# Patient Record
Sex: FEMALE | Race: WHITE | NOT HISPANIC OR LATINO | Employment: UNEMPLOYED
[De-identification: names, ages, dates, MRNs, and addresses within clinical notes are randomized per-mention and may not be internally consistent; named-entity substitution may affect disease eponyms.]

---

## 2021-05-12 ENCOUNTER — TRANSCRIBE ORDERS (OUTPATIENT)
Dept: OTHER | Age: 26
End: 2021-05-12

## 2021-05-12 DIAGNOSIS — K59.00 CONSTIPATION: ICD-10-CM

## 2021-05-12 DIAGNOSIS — K86.0 ALCOHOL-INDUCED CHRONIC PANCREATITIS (H): Primary | ICD-10-CM

## 2021-05-13 ENCOUNTER — TELEPHONE (OUTPATIENT)
Dept: GASTROENTEROLOGY | Facility: CLINIC | Age: 26
End: 2021-05-13

## 2021-05-13 NOTE — TELEPHONE ENCOUNTER
Advanced Endoscopy     Referring provider: Isaac Pride - [facility]     Referred to: [facility] Endoscopy [facility]     Provider Requested: NA     Referral Received: 5/11/21     Records received: in CareEverywhere     Images received: requesting CT noting calcifications 5/13    Evaluation for: alcohol-induced chronic pancreatitis     Clinical History (per RN review):     Clinic note 5/12/21    GI note from 5/11/21  Isaac Pride, APRN, CNP - 05/11/2021 1:45 PM CDT    Formatting of this note might be different from the original.  1. We discussed her recurring acute pancreatitis along with her chronic pancreatitis. We also discussed her recent CT scan noting chronic pancreatitis with numerous calcifications and pancreatic ductal dilation and dilated side branches. We briefly discussed ordering an EUS but will hold off for now. I am going to place a referral for her to be seen down at the [facility] at their pancreatic specialty clinic.  2. We reviewed her recent lab work and I reassured her that there were no abnormal findings. I did place an order to obtain an IgG4 today to assess for autoimmune pancreatitis. I will notify her of these results once available.  3. I would still encourage her to avoid fatty/greasy foods. Would like her to continue to advance her diet as tolerated.  4. She can continue on her Creon as prescribed.  5. We discussed her use of narcotics and that these are notorious for causing constipation. With her already having issues with constipation I encouraged her to continue on a daily dose of MiraLAX. We discussed that she could continue to play with that dose a bit to get the desired results.  6. I stressed the importance of her trying to quit smoking cigarettes as well as try to abstain from marijuana use. She should continue to not drink any alcohol.    PCP put in referral to [facility] Pancreatitis clinic 5/12/21  1. Highly urged to discontinue marijuana use for 1 to 2  weeks. Concerned about the potential of cyclical vomiting and abdominal pain related to marijuana use. Update lipase today. Injection of ketorolac in office. Up to 5 days of Toradol as an outpatient as needed for pain. Cautioned on no additional NSAIDs while using ketorolac. Phenergan for nausea to help with sleep. Increase Creon to 3 capsules with each meal/snack until starts feeling better. Visit with GI yesterday, 5/11/2021. Referral was placed for her to be seen at the [facility] at the pancreatic specialty clinic.         Chart review  Many elevations of lipase dating back to 2018 (CareEverywhere)  Has NOT had sMRCP, EUS or other pancreatic work up    CT 5/7/21  FINDINGS:  No consolidation or effusion at the lung bases.  Several liver hemangiomas  again noted.  Normal appearance of the gallbladder and spleen.  Normal  appearance of the kidneys.  Findings of remote, chronic pancreatitis with  numerous calcifications and pancreatic ductal dilation and dilated side  branches.  Acute interstitial pancreatitis also noted prominently visualized at  the pancreatic head and the uncinate process.  Negative for definite necrosis  or peripancreatic fluid collection.  IMPRESSION:  1. Acute on chronic interstitial pancreatitis.  Negative for necrosis or  peripancreatic collection.      MD review date: 5/13  MD Decision for clinic consultation/Orders:            Referral updates/Patient contacted: 5/19- called patient, referral received, waiting on images- updated patient  Called facility, says they mailed disk on 5/14, will follow

## 2021-05-14 ENCOUNTER — DOCUMENTATION ONLY (OUTPATIENT)
Dept: GASTROENTEROLOGY | Facility: CLINIC | Age: 26
End: 2021-05-14

## 2021-05-14 NOTE — PROGRESS NOTES
Called to request for images:  -CT abd pelv 5/7/21  -US abd 3/16/21  -CT abd pelv 9/5/20  -CT abd pelv 8/20/20  -CT abd liver 3/20/19    [facility]    [address] [place], [place] [id number] [phone]      Images will be sent in a CD.      Also called to have Xray images from 3/29/21 pushed:  [facility]    [address] [place] [address] [phone]    Images will be pushed.    Aime Velasquez LPN

## 2021-05-28 NOTE — TELEPHONE ENCOUNTER
Called Pt to schedule appt with Dr. Campuzano   Dx: alcohol-induced chronic pancreatitis    Pt scheduled for 7/22/21 at 8am. Pt will get Cand[employer] activated for video visit.    Aime Velasquez LPN

## 2021-06-27 ENCOUNTER — HEALTH MAINTENANCE LETTER (OUTPATIENT)
Age: 26
End: 2021-06-27

## 2021-07-22 ENCOUNTER — VIRTUAL VISIT (OUTPATIENT)
Dept: GASTROENTEROLOGY | Facility: CLINIC | Age: 26
End: 2021-07-22
Payer: COMMERCIAL

## 2021-07-22 VITALS — HEIGHT: 62 IN | WEIGHT: 110 LBS | BODY MASS INDEX: 20.24 KG/M2

## 2021-07-22 DIAGNOSIS — K86.0 ALCOHOL-INDUCED CHRONIC PANCREATITIS (H): ICD-10-CM

## 2021-07-22 PROCEDURE — 99204 OFFICE O/P NEW MOD 45 MIN: CPT | Mod: 95 | Performed by: INTERNAL MEDICINE

## 2021-07-22 RX ORDER — CETIRIZINE HYDROCHLORIDE 10 MG/1
10 TABLET ORAL DAILY PRN
Status: ON HOLD | COMMUNITY
End: 2023-10-03

## 2021-07-22 RX ORDER — BUSPIRONE HYDROCHLORIDE 10 MG/1
10 TABLET ORAL 2 TIMES DAILY
Status: ON HOLD | COMMUNITY
Start: 2021-01-05 | End: 2023-10-03

## 2021-07-22 RX ORDER — DULOXETIN HYDROCHLORIDE 20 MG/1
20 CAPSULE, DELAYED RELEASE ORAL
COMMUNITY
Start: 2021-01-05 | End: 2021-09-08

## 2021-07-22 RX ORDER — PANCRELIPASE 15000; 3000; 9500 [USP'U]/1; [USP'U]/1; [USP'U]/1
2 CAPSULE, DELAYED RELEASE ORAL
Status: ON HOLD | COMMUNITY
Start: 2021-03-19 | End: 2023-10-03

## 2021-07-22 RX ORDER — FAMOTIDINE 20 MG/1
20 TABLET, FILM COATED ORAL DAILY PRN
Status: ON HOLD | COMMUNITY
Start: 2021-02-01 | End: 2023-10-03

## 2021-07-22 ASSESSMENT — MIFFLIN-ST. JEOR: SCORE: 1187.21

## 2021-07-22 ASSESSMENT — PATIENT HEALTH QUESTIONNAIRE - PHQ9: SUM OF ALL RESPONSES TO PHQ QUESTIONS 1-9: 12

## 2021-07-22 ASSESSMENT — PAIN SCALES - GENERAL: PAINLEVEL: SEVERE PAIN (7)

## 2021-07-22 NOTE — PROGRESS NOTES
"Artie Burger is a 25 year old female who is being evaluated via a billable video visit.      The patient has been notified of following:     \"This video visit will be conducted via a call between you and your physician/provider. We have found that certain health care needs can be provided without the need for an in-person physical exam.  This service lets us provide the care you need with a video conversation.  If a prescription is necessary we can send it directly to your pharmacy.  If lab work is needed we can place an order for that and you can then stop by our lab to have the test done at a later time.    If during the course of the call the physician/provider feels a video visit is not appropriate, you will not be charged for this service.\"     Patient confirmed that they are in [place] for today's visit YES    Video-Visit Details  Type of service:  Video Visit    Video Start Time:   Video End Time:  \  Originating Location (pt. Location): Home    Distant Location (provider location):  [facility] [place]     Platform used: Usman          "

## 2021-07-22 NOTE — NURSING NOTE
Depression Response    Patient completed the PHQ-9 assessment for depression and scored >9? Yes  Question 9 on the PHQ-9 was positive for suicidality? No  Does patient have current mental health provider? Yes    Is this a virtual visit? No    I personally notified the following: clinic nurse     Ivette PONCE RN, BSN

## 2021-07-22 NOTE — PROGRESS NOTES
REFERRING PROVIDER:  ROSIE Davis, CNP    HISTORY OF PRESENT ILLNESS:  This is a very pleasant 25-year-old female who is being referred for further evaluation and management for acute recurrent pancreatitis which has progressed to chronic calcific pancreatitis, likely from alcohol etiology.  The patient reports that her history dates back to 02/2018.  Since that time, she had been hospitalized at least 3-4 times for pancreatitis related to alcohol abuse.  Her lipase level is ranging up to 7000.  She has been smoking for over 11 years and has had heavy alcohol history but has been abstinent from alcohol for the past 10 months.  The patient reports that she was in remission from alcohol from 2018 but relapsed and subsequently had further attacks as well. She has been worked up by referring provider and has had multiple imaging including CT scans.  The most recent CT was done in 05/2021 which shows multiple stones in the pancreatic head, most likely either in the side branch, with a possible stone obstructing the main duct and side branch stones are extrinsically compressing the main duct from outside. Patient currently reports that she has 6/10 abdominal pain and currently is not on narcotics and is controlling pain with Tylenol and ibuprofen.  She reports taking narcotics only during hospital admission.  She also had nausea for which she takes Pepto-Bismol and Zofran.  She denies steatorrhea but is more constipated and has constipation with 1 bowel movement every other day or every 2 days.  She has had significant weight loss, about 40 pounds, over the past 5-6 months and 7 pounds more recently.  She denies dysphagia, odynophagia, although reports that her stools have been dark colored recently. She has been started on Creon which she is taking judiciously.    PAST MEDICAL HISTORY:    1.  Alcohol abuse, currently in remission.  2.  Panic attacks.  3.  Alcohol hepatitis.  4.  Major depression.    PAST  SURGICAL HISTORY:  None.    FAMILY HISTORY:  History of pancreatic cancer in a distant cousin.  No history of pancreatic cancer, bile duct cancer or gallbladder cancer and no history of recurrent pancreatitis in the family.    SOCIAL HISTORY:  Currently smokes 1 pack every 3-4 days.  Used to smoke 1 pack every 1-1/2 to 2 days and has been smoking for over 11 years.  No history of alcohol for the past 10 months but was a heavy drinker.  The patient currently smoking marijuana a couple of times daily for nausea.    ASSESSMENT AND PLAN:     25-year-old female with past medical history significant for depression, panic attacks, alcoholism, currently in remission, currently with recurrent bouts of pancreatitis which has progressed to chronic calcific pancreatitis likely from alcohol abuse.  The following are our recommendations.  1. I reviewed the CT scan with the patient.  The stones appear mainly to be in the side branch but a stone possibly in the main pancreatic duct.  Will discuss patient in our multidisciplinary chronic pancreatitis meeting next week.  We will consider consultation with Dr. Raheem Yanes, our pancreatic surgeon, for possible Jaye procedure with pancreatojejunostomy as the pancreatic duct is significantly dilated up to 9 mm   2.  The patient has been asked to abstain from smoking.  She wanted nicotine patches, which I would strongly favor and have the primary care physician order the same.  3.  Will recommend tramadol 50 mg 3 times daily as needed for pain management.  4.  If our surgery team thinks she is not ready for surgery, can consider an ERCP.  However, I expressed my concerns that her stones are probably not in the main duct but stenting may help extrinsic compression from the stones in the side branch and also maybe we can remove 1 or 2 stones in the main duct.    5.  Recommend nutrition panel since she has lost 40 pounds.  This would include vitamin A, D, E, K, B12, folic acid, iron panel  and zinc and will supplement micronutrients which she is deficient.    6. We will need a DEXA scan in future as per AGA guidelines.  7.  The patient has been advised to abstain from alcohol as she is currently doing.     Video start time 8:00 AM and Video End time 8:45 AM    A total of 45 minutes was spent in reviewing her chart and formulating a management plan.  Will consider ERCP if the surgery team favors us doing that.

## 2021-07-22 NOTE — LETTER
7/22/2021         RE: Artie Burger  [address]  [facility] [address]        Dear Colleague,    Thank you for referring your patient, Artie Burger, to the [facility] [place]. Please see a copy of my visit note below.    REFERRING PROVIDER:  ROSIE Davis CNP    HISTORY OF PRESENT ILLNESS:  This is a very pleasant 25-year-old female who is being referred for further evaluation and management for acute recurrent pancreatitis which has progressed to chronic calcific pancreatitis, likely from alcohol etiology.  The patient reports that her history dates back to 02/2018.  Since that time, she had been hospitalized at least 3-4 times for pancreatitis related to alcohol abuse.  Her lipase level is ranging up to 7000.  She has been smoking for over 11 years and has had heavy alcohol history but has been abstinent from alcohol for the past 10 months.  The patient reports that she was in remission from alcohol from 2018 but relapsed and subsequently had further attacks as well. She has been worked up by referring provider and has had multiple imaging including CT scans.  The most recent CT was done in 05/2021 which shows multiple stones in the pancreatic head, most likely either in the side branch, with a possible stone obstructing the main duct and side branch stones are extrinsically compressing the main duct from outside. Patient currently reports that she has 6/10 abdominal pain and currently is not on narcotics and is controlling pain with Tylenol and ibuprofen.  She reports taking narcotics only during hospital admission.  She also had nausea for which she takes Pepto-Bismol and Zofran.  She denies steatorrhea but is more constipated and has constipation with 1 bowel movement every other day or every 2 days.  She has had significant weight loss, about 40 pounds, over the past 5-6 months and 7 pounds more recently.  She denies dysphagia, odynophagia, although reports  that her stools have been dark colored recently. She has been started on Creon which she is taking judiciously.    PAST MEDICAL HISTORY:    1.  Alcohol abuse, currently in remission.  2.  Panic attacks.  3.  Alcohol hepatitis.  4.  Major depression.    PAST SURGICAL HISTORY:  None.    FAMILY HISTORY:  History of pancreatic cancer in a distant cousin.  No history of pancreatic cancer, bile duct cancer or gallbladder cancer and no history of recurrent pancreatitis in the family.    SOCIAL HISTORY:  Currently smokes 1 pack every 3-4 days.  Used to smoke 1 pack every 1-1/2 to 2 days and has been smoking for over 11 years.  No history of alcohol for the past 10 months but was a heavy drinker.  The patient currently smoking marijuana a couple of times daily for nausea.    ASSESSMENT AND PLAN:     25-year-old female with past medical history significant for depression, panic attacks, alcoholism, currently in remission, currently with recurrent bouts of pancreatitis which has progressed to chronic calcific pancreatitis likely from alcohol abuse.  The following are our recommendations.  1. I reviewed the CT scan with the patient.  The stones appear mainly to be in the side branch but a stone possibly in the main pancreatic duct.  Will discuss patient in our multidisciplinary chronic pancreatitis meeting next week.  We will consider consultation with Dr. Raheem Yanes, our pancreatic surgeon, for possible Jaye procedure with pancreatojejunostomy as the pancreatic duct is significantly dilated up to 9 mm   2.  The patient has been asked to abstain from smoking.  She wanted nicotine patches, which I would strongly favor and have the primary care physician order the same.  3.  Will recommend tramadol 50 mg 3 times daily as needed for pain management.  4.  If our surgery team thinks she is not ready for surgery, can consider an ERCP.  However, I expressed my concerns that her stones are probably not in the main duct but stenting  may help extrinsic compression from the stones in the side branch and also maybe we can remove 1 or 2 stones in the main duct.    5.  Recommend nutrition panel since she has lost 40 pounds.  This would include vitamin A, D, E, K, B12, folic acid, iron panel and zinc and will supplement micronutrients which she is deficient.    6. We will need a DEXA scan in future as per AGA guidelines.  7.  The patient has been advised to abstain from alcohol as she is currently doing.     Video start time 8:00 AM and Video End time 8:45 AM    A total of 45 minutes was spent in reviewing her chart and formulating a management plan.  Will consider ERCP if the surgery team favors us doing that.       Again, thank you for allowing me to participate in the care of your patient.        Sincerely,    Guru Justen MD

## 2021-07-22 NOTE — NURSING NOTE
"Chief Complaint   Patient presents with     Consult     New patient consult for alcohol-induced chronic pancreatitis.        Vitals:    07/22/21 0740   Weight: 49.9 kg (110 lb)   Height: 1.575 m (5' 2\")       Body mass index is 20.12 kg/m .    Ivette Darling, RN, BSN      "

## 2021-07-22 NOTE — PATIENT INSTRUCTIONS
Follow up:    Dr. Campuzano has outlined the following steps after your recent clinic visit:    1) Will discuss your case at the multidisciplinary chronic pancreatitis meeting next week. Will consider consultation with Dr. Raheem Yanes, our pancreatic surgeon, for possible Jaye procedure, pending conference    2) Recommend to abstain from smoking. Please follow up with primary care physician to discuss and order nicotine patches    3) Recommend Tramadol 50 mg 3 times daily as needed for pain management.    4) If our surgery team thinks your are not ready for surgery, can consider an ERCP.     5) Recommend nutrition panel.  This would include vitamin A, D, E, K, B12, folic acid, iron panel and zinc and will supplement micronutrients which she is deficient. Please let me know where you would like these orders faxed to.     6) We will need a DEXA scan in future as per AGA guidelines. Please let me know where you would like this order faxed to.    7) Recommend to abstain from alcohol as you are currently doing.         Please call with any questions or concerns regarding your clinic visit today.    It is a pleasure being involved in your health care.    Contacts post-consultation depending on your need:    Schedule Clinic Appointments            [phone] # 1   M-F 7:30 - 5 pm    Jacklyn Rodríguez RN Care Coordinator  [phone]    Aime Velasquez LPN    [phone]     OR Procedure Scheduling                             [phone]    My Chart is available 24 hours a day and is a secure way to access your records and communicate with your care team.  I strongly recommend signing up if you haven't already done so, if you are comfortable with computers.  If you would like to inquire about this or are having problems with My Chart access, you may call [phone] or go online at [email or web address].  Please allow at least 24 hours for a response and extra time on weekends and Holidays.

## 2021-07-26 ENCOUNTER — CARE COORDINATION (OUTPATIENT)
Dept: GASTROENTEROLOGY | Facility: CLINIC | Age: 26
End: 2021-07-26

## 2021-07-26 DIAGNOSIS — K86.0 ALCOHOL-INDUCED CHRONIC PANCREATITIS (H): Primary | ICD-10-CM

## 2021-07-26 NOTE — PROGRESS NOTES
Per Dr. Campuzano    Tramadol 50 mg 3 times daily as needed for pain management. Per Dr. Amaro, #90 (0)     Message sent to to Dr. Campuzano to clarify order w/ pharmacy warning.

## 2021-07-26 NOTE — PROGRESS NOTES
Per Dr. Campuzano  We should hold it off. I will discuss her in the chronic pancreatitis meeting and see what they can offer      Called to discuss with patient, she's already gotten the med from her PCP.     ML

## 2021-07-28 ENCOUNTER — MYC MEDICAL ADVICE (OUTPATIENT)
Dept: GASTROENTEROLOGY | Facility: CLINIC | Age: 26
End: 2021-07-28

## 2021-07-28 DIAGNOSIS — K86.0 ALCOHOL-INDUCED CHRONIC PANCREATITIS (H): Primary | ICD-10-CM

## 2021-07-29 NOTE — TELEPHONE ENCOUNTER
Returned patient call regarding MyChart questions and to discuss next steps in POC.    Advised multivitamin as directed is fine.     Plan per Dr. Campuzano  Let us schedule pt for ERCP after I come back from Aug vacation   Also needs sMRCP to check for divisum    Pt at work, asked that I send Incentive[employer] message- sent.    ML

## 2021-08-24 ENCOUNTER — PATIENT OUTREACH (OUTPATIENT)
Dept: GASTROENTEROLOGY | Facility: CLINIC | Age: 26
End: 2021-08-24

## 2021-08-24 ENCOUNTER — PREP FOR PROCEDURE (OUTPATIENT)
Dept: GASTROENTEROLOGY | Facility: CLINIC | Age: 26
End: 2021-08-24

## 2021-08-24 DIAGNOSIS — K86.1 CHRONIC PANCREATITIS (H): Primary | ICD-10-CM

## 2021-08-24 NOTE — TELEPHONE ENCOUNTER
Called patient to discuss ERCP with Dr. Campuzano    Please assist in scheduling:     Procedure/Imaging/Clinic: ERCP  Physician: Dr. Campuzano  Timin/13  Procedure length:90 min  Anesthesia:gen  Dx: chronic pancreatitis  Tier:2   Location: [facility]       Called to discuss with patient. Explained they can expect a call from  for date and time of procedure, will need a , someone to stay with them for 24 hours and should stay in town for 24 hours (within 45 min of Hospital) post procedure    Patient needs to get pre-op physical completed. If outside [facility] system will need physical faxed to number [phone]   If you do not get a preop physical, your procedure could be cancelled, patient voiced understanding*    Preop Plan:PCP will do    Med Review    Blood thinner -  no  ASA - no  Diabetic - no    COVID test discussed:will get 3-4 days prior    Patient Education r/t procedure:done    Does patient have any history of gastric bypass/gastric surgery/altered panc/bili anatomy?no    A pre-op nurse will call 1-2 days prior to the procedure. I    NPO/Prep: 8 solids/2 CLD, no prep needed    Other specific details/comments:none     Verbalized understanding of all instructions. All questions answered.              ML

## 2021-08-25 DIAGNOSIS — Z11.59 ENCOUNTER FOR SCREENING FOR OTHER VIRAL DISEASES: ICD-10-CM

## 2021-08-25 PROBLEM — K86.1 CHRONIC PANCREATITIS (H): Status: ACTIVE | Noted: 2021-08-25

## 2021-09-03 ENCOUNTER — PATIENT OUTREACH (OUTPATIENT)
Dept: GASTROENTEROLOGY | Facility: CLINIC | Age: 26
End: 2021-09-03

## 2021-09-03 ENCOUNTER — ANCILLARY PROCEDURE (OUTPATIENT)
Dept: MRI IMAGING | Facility: CLINIC | Age: 26
End: 2021-09-03
Attending: INTERNAL MEDICINE
Payer: COMMERCIAL

## 2021-09-03 VITALS — WEIGHT: 110 LBS | BODY MASS INDEX: 20.12 KG/M2

## 2021-09-03 DIAGNOSIS — K86.0 ALCOHOL-INDUCED CHRONIC PANCREATITIS (H): ICD-10-CM

## 2021-09-03 PROCEDURE — 74183 MRI ABD W/O CNTR FLWD CNTR: CPT | Mod: GC | Performed by: RADIOLOGY

## 2021-09-03 PROCEDURE — A9585 GADOBUTROL INJECTION: HCPCS | Performed by: RADIOLOGY

## 2021-09-03 RX ORDER — HEPARIN SODIUM,PORCINE 10 UNIT/ML
5 VIAL (ML) INTRAVENOUS
Status: CANCELLED | OUTPATIENT
Start: 2021-09-03

## 2021-09-03 RX ORDER — HEPARIN SODIUM (PORCINE) LOCK FLUSH IV SOLN 100 UNIT/ML 100 UNIT/ML
5 SOLUTION INTRAVENOUS
Status: CANCELLED | OUTPATIENT
Start: 2021-09-03

## 2021-09-03 RX ORDER — ONDANSETRON 2 MG/ML
4 INJECTION INTRAMUSCULAR; INTRAVENOUS ONCE
Status: CANCELLED | OUTPATIENT
Start: 2021-09-03 | End: 2021-09-03

## 2021-09-03 RX ORDER — GADOBUTROL 604.72 MG/ML
7.5 INJECTION INTRAVENOUS ONCE
Status: COMPLETED | OUTPATIENT
Start: 2021-09-03 | End: 2021-09-03

## 2021-09-03 RX ADMIN — GADOBUTROL 5 ML: 604.72 INJECTION INTRAVENOUS at 14:31

## 2021-09-03 NOTE — DISCHARGE INSTRUCTIONS
MRI Contrast Discharge Instructions    The IV contrast you received today will pass out of your body in your  urine. This will happen in the next 24 hours. You will not feel this process.  Your urine will not change color.    Drink at least 4 extra glasses of water or juice today (unless your doctor  has restricted your fluids). This reduces the stress on your kidneys.  You may take your regular medicines.    If you are on dialysis: It is best to have dialysis today.    If you have a reaction: Most reactions happen right away. If you have  any new symptoms after leaving the hospital (such as hives or swelling),  call your hospital at the correct number below. Or call your family doctor.  If you have breathing distress or wheezing, call 911.    Special instructions: ***    I have read and understand the above information.    Signature:______________________________________ Date:___________    Staff:__________________________________________ Date:___________     Time:__________    [facility] Departments:    ___[place]: [phone]  ___[facility]: [phone]  ___[place]: [phone] ___[facility]: [phone]  ___[facility]: [phone]  ___[facility]: [phone]  ___[phone]  ___[facility]: [phone]  ___[phone]

## 2021-09-08 RX ORDER — KETOROLAC TROMETHAMINE 10 MG/1
TABLET, FILM COATED ORAL
COMMUNITY
Start: 2021-09-01 | End: 2021-10-04

## 2021-09-08 RX ORDER — ONDANSETRON 4 MG/1
4-8 TABLET, ORALLY DISINTEGRATING ORAL EVERY 6 HOURS PRN
Status: ON HOLD | COMMUNITY
Start: 2021-08-23 | End: 2023-10-03

## 2021-09-08 RX ORDER — MULTIVITAMIN WITH FOLIC ACID 400 MCG
1 TABLET ORAL DAILY
Status: ON HOLD | COMMUNITY
Start: 2020-11-09 | End: 2023-10-03

## 2021-09-08 RX ORDER — NICOTINE 21 MG/24HR
1 PATCH, TRANSDERMAL 24 HOURS TRANSDERMAL
COMMUNITY
Start: 2021-07-22 | End: 2022-11-17

## 2021-09-08 RX ORDER — DULOXETIN HYDROCHLORIDE 60 MG/1
60 CAPSULE, DELAYED RELEASE ORAL
COMMUNITY
Start: 2021-08-10 | End: 2022-03-30

## 2021-09-08 RX ORDER — QUETIAPINE FUMARATE 25 MG/1
TABLET, FILM COATED ORAL
Status: ON HOLD | COMMUNITY
Start: 2021-08-11 | End: 2023-05-10

## 2021-09-11 ENCOUNTER — ANESTHESIA EVENT (OUTPATIENT)
Dept: SURGERY | Facility: CLINIC | Age: 26
End: 2021-09-11
Payer: COMMERCIAL

## 2021-09-11 ASSESSMENT — LIFESTYLE VARIABLES: TOBACCO_USE: 1

## 2021-09-11 NOTE — BRIEF OP NOTE
[facility] Brief Operative Note    Pre-operative diagnosis: Chronic pancreatitis (H) [K86.1]   Post-operative diagnosis As prior    Procedure: Procedure(s):  ENDOSCOPIC RETROGRADE CHOLANGIOPANCREATOGRAPHY   Surgeon(s): Surgeon(s) and Role:     * Guru Sabino Campuzano MD - Primary   Estimated blood loss: * No values recorded between  and 9/11/2021 12:59 PM *    Specimens: * No specimens in log *   Findings:          ** Overall impression: Patient is a 25-year-old female with past medical history significant for alcoholism, currently in remission, with recurrent bouts of pancreatitis which has progressed to chronic calcific pancreatitis likely from alcohol abuse. CT scan with pancreatic stones appear mainly to be in the side branch but a stone possibly in the main pancreatic duct. ERCP today showed showed a normal major papilla. We proceeded with an uncomplicated pancreatic sphincterotomy. Pancreaticogram demonstrated a severe stricture in the MPD (head/genu) with upstream ductal dilation. Balloon dilation was successfully performed first with a 3.5 mm and then 4 mm balloon. Sludge and stone fragments were removed from the MPD. Encountered difficulties advancing the wire to the body part of the MPD because of stones and stricture in the head area. Elected to place a short 4 Fr x 4 cm Zimmon stents into the ventral PD. This is expected to help with stricture dilation and possibly stone fragmentation prior to future pancreatic endotherapy for complete stones clearance.     - Observe patient in PACU for possible discharge same day.   - Avoid aspirin and nonsteroidal anti-inflammatory medicines for 3 days.   - Check serum amylase/lipase in 2-hours post-procedure, & give clear liquids and Grahm crackers in 3C.   - Discharge home if asymptomatic, amylase/lipase < 3x ULN, & tolerating PO.  - Will order short course of oxycodone for 3-45 days to help with expected postprocedural pain (to discharge  pharmacy)  - Repeat ERCP with Dr. Campuzano in 3-4 weeks for further pancreatic endotherapy and stones removal.   - If patient develops pancreatitis like pain, bleeding, fever, chills before scheduled ERCP, will recommend patient to call us immediately for consideration of an earlier urgent ERCP  - The findings and recommendations were discussed with the patient and their family.

## 2021-09-11 NOTE — ANESTHESIA PREPROCEDURE EVALUATION
Anesthesia Pre-Procedure Evaluation    Patient: Artie Burger   MRN: [id number] : 1995        Preoperative Diagnosis: Chronic pancreatitis (H) [K86.1]   Procedure : Procedure(s):  ENDOSCOPIC RETROGRADE CHOLANGIOPANCREATOGRAPHY     Past Medical History:   Diagnosis Date     Gastroesophageal reflux disease      Pancreatic disease      PONV (postoperative nausea and vomiting)       History reviewed. No pertinent surgical history.   No Known Allergies   Social History     Tobacco Use     Smoking status: Current Every Day Smoker     Types: Cigarettes     Smokeless tobacco: Never Used   Substance Use Topics     Alcohol use: Not Currently      Wt Readings from Last 1 Encounters:   21 49.9 kg (110 lb)        Anesthesia Evaluation   Pt has not had prior anesthetic         ROS/MED HX  ENT/Pulmonary:     (+) tobacco use, Current use,  (-) asthma, COPD and sleep apnea   Neurologic:       Cardiovascular:    (-) hypertension   METS/Exercise Tolerance: >4 METS    Hematologic:    (-) history of blood clots and history of blood transfusion   Musculoskeletal:       GI/Hepatic: Comment: 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)     Infectious Disease:       Malignancy:       Other:            Physical Exam    Airway        Mallampati: I   TM distance: > 3 FB   Neck ROM: full   Mouth opening: > 3 cm    Respiratory Devices and Support         Dental  no notable dental history         Cardiovascular          Rhythm and rate: regular and normal     Pulmonary           breath sounds clear to auscultation           OUTSIDE LABS:  CBC: No results found for: WBC, HGB, HCT, PLT  BMP: No results found for: NA, POTASSIUM, CHLORIDE, CO2, BUN, CR, GLC  COAGS: No results found for: PTT, INR, FIBR  POC: No results  found for: BGM, HCG, HCGS  HEPATIC: No results found for: ALBUMIN, PROTTOTAL, ALT, AST, GGT, ALKPHOS, BILITOTAL, BILIDIRECT, LEONA  OTHER: No results found for: PH, LACT, A1C, MURTAZA, PHOS, MAG, LIPASE, AMYLASE, TSH, T4, T3, CRP, SED    Anesthesia Plan    ASA Status:  2   NPO Status:  NPO Appropriate    Anesthesia Type: General.     - Airway: ETT   Induction: Intravenous.   Maintenance: Inhalation.   Techniques and Equipment:     - Lines/Monitors: BIS     Consents    Anesthesia Plan(s) and associated risks, benefits, and realistic alternatives discussed. Questions answered and patient/representative(s) expressed understanding.     - Discussed with:  Patient      - Extended Intubation/Ventilatory Support Discussed: No.      - Patient is DNR/DNI Status: No    Use of blood products discussed: Yes.     - Discussed with: Patient.     Postoperative Care    Pain management: IV analgesics, Oral pain medications, Multi-modal analgesia.   PONV prophylaxis: Ondansetron (or other 5HT-3), Dexamethasone or Solumedrol     Comments:    25 Y F ASA2 BMI 20 schedulled for  ECRP for chronic pancreatitis  Pertinent PMH:   Anxiety, panic attack,  Current smoker, GERD,  alcohl abuse in remision  Currently on Hormonal contraceptive    Plan:  GA/ETT, Standard ASA monitors.  IV induction. Maintenance with anesthetic gas &/or IV meds.  PACU after extubation.  Would discuss using the second contraception method for 7 days (as plan to reverse the NMB with sugammadex )     Discussed anesthetic procedure and risks  with the patient which include, but are not limited to:  sore throat, PONV, dental/oral trauma, (and if T&S done), administration of blood products.  Questions welcomed.  Verbalized understanding and agrees to proceed.              Dougie Calix MD

## 2021-09-13 ENCOUNTER — HOSPITAL ENCOUNTER (OUTPATIENT)
Facility: CLINIC | Age: 26
Discharge: HOME | End: 2021-09-13
Attending: INTERNAL MEDICINE | Admitting: INTERNAL MEDICINE
Payer: COMMERCIAL

## 2021-09-13 ENCOUNTER — APPOINTMENT (OUTPATIENT)
Dept: GENERAL RADIOLOGY | Facility: CLINIC | Age: 26
End: 2021-09-13
Attending: INTERNAL MEDICINE
Payer: COMMERCIAL

## 2021-09-13 ENCOUNTER — ANESTHESIA (OUTPATIENT)
Dept: SURGERY | Facility: CLINIC | Age: 26
End: 2021-09-13
Payer: COMMERCIAL

## 2021-09-13 VITALS
DIASTOLIC BLOOD PRESSURE: 75 MMHG | RESPIRATION RATE: 12 BRPM | BODY MASS INDEX: 20.65 KG/M2 | SYSTOLIC BLOOD PRESSURE: 112 MMHG | TEMPERATURE: 97.6 F | OXYGEN SATURATION: 99 % | WEIGHT: 112.21 LBS | HEART RATE: 53 BPM | HEIGHT: 62 IN

## 2021-09-13 DIAGNOSIS — K86.1 CHRONIC PANCREATITIS (H): ICD-10-CM

## 2021-09-13 LAB
ALBUMIN SERPL-MCNC: 3.6 G/DL (ref 3.4–5)
ALP SERPL-CCNC: 54 U/L (ref 40–150)
ALT SERPL W P-5'-P-CCNC: 14 U/L (ref 0–50)
AMYLASE SERPL-CCNC: 191 U/L (ref 30–110)
AMYLASE SERPL-CCNC: 804 U/L (ref 30–110)
ANION GAP SERPL CALCULATED.3IONS-SCNC: 4 MMOL/L (ref 3–14)
AST SERPL W P-5'-P-CCNC: 9 U/L (ref 0–45)
BILIRUB SERPL-MCNC: 0.4 MG/DL (ref 0.2–1.3)
BUN SERPL-MCNC: 13 MG/DL (ref 7–30)
CALCIUM SERPL-MCNC: 9.3 MG/DL (ref 8.5–10.1)
CHLORIDE BLD-SCNC: 108 MMOL/L (ref 94–109)
CO2 SERPL-SCNC: 24 MMOL/L (ref 20–32)
CREAT SERPL-MCNC: 0.64 MG/DL (ref 0.52–1.04)
ERCP: NORMAL
ERYTHROCYTE [DISTWIDTH] IN BLOOD BY AUTOMATED COUNT: 13 % (ref 10–15)
GFR SERPL CREATININE-BSD FRML MDRD: >90 ML/MIN/1.73M2
GLUCOSE BLD-MCNC: 100 MG/DL (ref 70–99)
GLUCOSE BLDC GLUCOMTR-MCNC: 105 MG/DL (ref 70–99)
HCG UR QL: NEGATIVE
HCT VFR BLD AUTO: 33 % (ref 35–47)
HGB BLD-MCNC: 11.4 G/DL (ref 11.7–15.7)
INR PPP: 1.06 (ref 0.85–1.15)
LIPASE SERPL-CCNC: 360 U/L (ref 73–393)
LIPASE SERPL-CCNC: 459 U/L (ref 73–393)
MCH RBC QN AUTO: 31 PG (ref 26.5–33)
MCHC RBC AUTO-ENTMCNC: 34.5 G/DL (ref 31.5–36.5)
MCV RBC AUTO: 90 FL (ref 78–100)
PLATELET # BLD AUTO: 372 10E3/UL (ref 150–450)
POTASSIUM BLD-SCNC: 3.6 MMOL/L (ref 3.4–5.3)
PROT SERPL-MCNC: 7 G/DL (ref 6.8–8.8)
RBC # BLD AUTO: 3.68 10E6/UL (ref 3.8–5.2)
SODIUM SERPL-SCNC: 136 MMOL/L (ref 133–144)
WBC # BLD AUTO: 8.6 10E3/UL (ref 4–11)

## 2021-09-13 PROCEDURE — 250N000011 HC RX IP 250 OP 636: Performed by: STUDENT IN AN ORGANIZED HEALTH CARE EDUCATION/TRAINING PROGRAM

## 2021-09-13 PROCEDURE — 250N000011 HC RX IP 250 OP 636: Performed by: NURSE ANESTHETIST, CERTIFIED REGISTERED

## 2021-09-13 PROCEDURE — 83690 ASSAY OF LIPASE: CPT | Performed by: INTERNAL MEDICINE

## 2021-09-13 PROCEDURE — 272N000001 HC OR GENERAL SUPPLY STERILE: Performed by: INTERNAL MEDICINE

## 2021-09-13 PROCEDURE — C1769 GUIDE WIRE: HCPCS | Performed by: INTERNAL MEDICINE

## 2021-09-13 PROCEDURE — 80053 COMPREHEN METABOLIC PANEL: CPT | Performed by: INTERNAL MEDICINE

## 2021-09-13 PROCEDURE — C1877 STENT, NON-COAT/COV W/O DEL: HCPCS | Performed by: INTERNAL MEDICINE

## 2021-09-13 PROCEDURE — 999N000181 XR SURGERY CARM FLUORO GREATER THAN 5 MIN W STILLS: Mod: TC

## 2021-09-13 PROCEDURE — 710N000010 HC RECOVERY PHASE 1, LEVEL 2, PER MIN: Performed by: INTERNAL MEDICINE

## 2021-09-13 PROCEDURE — 250N000009 HC RX 250: Performed by: STUDENT IN AN ORGANIZED HEALTH CARE EDUCATION/TRAINING PROGRAM

## 2021-09-13 PROCEDURE — 250N000013 HC RX MED GY IP 250 OP 250 PS 637: Performed by: STUDENT IN AN ORGANIZED HEALTH CARE EDUCATION/TRAINING PROGRAM

## 2021-09-13 PROCEDURE — 999N000141 HC STATISTIC PRE-PROCEDURE NURSING ASSESSMENT: Performed by: INTERNAL MEDICINE

## 2021-09-13 PROCEDURE — 370N000017 HC ANESTHESIA TECHNICAL FEE, PER MIN: Performed by: INTERNAL MEDICINE

## 2021-09-13 PROCEDURE — 82150 ASSAY OF AMYLASE: CPT | Performed by: INTERNAL MEDICINE

## 2021-09-13 PROCEDURE — 258N000003 HC RX IP 258 OP 636: Performed by: STUDENT IN AN ORGANIZED HEALTH CARE EDUCATION/TRAINING PROGRAM

## 2021-09-13 PROCEDURE — 360N000083 HC SURGERY LEVEL 3 W/ FLUORO, PER MIN: Performed by: INTERNAL MEDICINE

## 2021-09-13 PROCEDURE — 81025 URINE PREGNANCY TEST: CPT | Performed by: STUDENT IN AN ORGANIZED HEALTH CARE EDUCATION/TRAINING PROGRAM

## 2021-09-13 PROCEDURE — 250N000025 HC SEVOFLURANE, PER MIN: Performed by: INTERNAL MEDICINE

## 2021-09-13 PROCEDURE — 85027 COMPLETE CBC AUTOMATED: CPT | Performed by: INTERNAL MEDICINE

## 2021-09-13 PROCEDURE — 82962 GLUCOSE BLOOD TEST: CPT

## 2021-09-13 PROCEDURE — 710N000012 HC RECOVERY PHASE 2, PER MINUTE: Performed by: INTERNAL MEDICINE

## 2021-09-13 PROCEDURE — 250N000009 HC RX 250: Performed by: INTERNAL MEDICINE

## 2021-09-13 PROCEDURE — C1725 CATH, TRANSLUMIN NON-LASER: HCPCS | Performed by: INTERNAL MEDICINE

## 2021-09-13 PROCEDURE — 255N000002 HC RX 255 OP 636: Performed by: INTERNAL MEDICINE

## 2021-09-13 PROCEDURE — 36415 COLL VENOUS BLD VENIPUNCTURE: CPT | Performed by: INTERNAL MEDICINE

## 2021-09-13 PROCEDURE — C1726 CATH, BAL DIL, NON-VASCULAR: HCPCS | Performed by: INTERNAL MEDICINE

## 2021-09-13 PROCEDURE — 85610 PROTHROMBIN TIME: CPT | Performed by: INTERNAL MEDICINE

## 2021-09-13 DEVICE — IMPLANTABLE DEVICE
Type: IMPLANTABLE DEVICE | Site: PANCREATIC DUCT | Status: NON-FUNCTIONAL
Removed: 2021-10-06

## 2021-09-13 RX ORDER — PROPOFOL 10 MG/ML
INJECTION, EMULSION INTRAVENOUS PRN
Status: DISCONTINUED | OUTPATIENT
Start: 2021-09-13 | End: 2021-09-13

## 2021-09-13 RX ORDER — NALOXONE HYDROCHLORIDE 0.4 MG/ML
0.2 INJECTION, SOLUTION INTRAMUSCULAR; INTRAVENOUS; SUBCUTANEOUS
Status: CANCELLED | OUTPATIENT
Start: 2021-09-13

## 2021-09-13 RX ORDER — LIDOCAINE 40 MG/G
CREAM TOPICAL
Status: DISCONTINUED | OUTPATIENT
Start: 2021-09-13 | End: 2021-09-13 | Stop reason: HOSPADM

## 2021-09-13 RX ORDER — SODIUM CHLORIDE, SODIUM LACTATE, POTASSIUM CHLORIDE, CALCIUM CHLORIDE 600; 310; 30; 20 MG/100ML; MG/100ML; MG/100ML; MG/100ML
INJECTION, SOLUTION INTRAVENOUS CONTINUOUS
Status: DISCONTINUED | OUTPATIENT
Start: 2021-09-13 | End: 2021-09-13 | Stop reason: HOSPADM

## 2021-09-13 RX ORDER — ONDANSETRON 2 MG/ML
4 INJECTION INTRAMUSCULAR; INTRAVENOUS EVERY 30 MIN PRN
Status: DISCONTINUED | OUTPATIENT
Start: 2021-09-13 | End: 2021-09-13 | Stop reason: HOSPADM

## 2021-09-13 RX ORDER — ACETAMINOPHEN 325 MG/1
975 TABLET ORAL ONCE
Status: COMPLETED | OUTPATIENT
Start: 2021-09-13 | End: 2021-09-13

## 2021-09-13 RX ORDER — OXYCODONE HYDROCHLORIDE 5 MG/1
5 TABLET ORAL EVERY 6 HOURS PRN
Qty: 12 TABLET | Refills: 0 | Status: SHIPPED | OUTPATIENT
Start: 2021-09-13 | End: 2021-09-16

## 2021-09-13 RX ORDER — OXYCODONE HYDROCHLORIDE 5 MG/1
5 TABLET ORAL EVERY 4 HOURS PRN
Status: DISCONTINUED | OUTPATIENT
Start: 2021-09-13 | End: 2021-09-13 | Stop reason: HOSPADM

## 2021-09-13 RX ORDER — LIDOCAINE HYDROCHLORIDE 20 MG/ML
INJECTION, SOLUTION INFILTRATION; PERINEURAL PRN
Status: DISCONTINUED | OUTPATIENT
Start: 2021-09-13 | End: 2021-09-13

## 2021-09-13 RX ORDER — FLUMAZENIL 0.1 MG/ML
0.2 INJECTION, SOLUTION INTRAVENOUS
Status: CANCELLED | OUTPATIENT
Start: 2021-09-13 | End: 2021-09-14

## 2021-09-13 RX ORDER — NALOXONE HYDROCHLORIDE 0.4 MG/ML
0.4 INJECTION, SOLUTION INTRAMUSCULAR; INTRAVENOUS; SUBCUTANEOUS
Status: CANCELLED | OUTPATIENT
Start: 2021-09-13

## 2021-09-13 RX ORDER — LEVOFLOXACIN 5 MG/ML
INJECTION, SOLUTION INTRAVENOUS PRN
Status: DISCONTINUED | OUTPATIENT
Start: 2021-09-13 | End: 2021-09-13

## 2021-09-13 RX ORDER — FENTANYL CITRATE 50 UG/ML
25 INJECTION, SOLUTION INTRAMUSCULAR; INTRAVENOUS EVERY 5 MIN PRN
Status: DISCONTINUED | OUTPATIENT
Start: 2021-09-13 | End: 2021-09-13 | Stop reason: HOSPADM

## 2021-09-13 RX ORDER — ONDANSETRON 4 MG/1
4 TABLET, ORALLY DISINTEGRATING ORAL EVERY 6 HOURS PRN
Status: CANCELLED | OUTPATIENT
Start: 2021-09-13

## 2021-09-13 RX ORDER — INDOMETHACIN 50 MG/1
SUPPOSITORY RECTAL PRN
Status: DISCONTINUED | OUTPATIENT
Start: 2021-09-13 | End: 2021-09-13 | Stop reason: HOSPADM

## 2021-09-13 RX ORDER — DEXAMETHASONE SODIUM PHOSPHATE 4 MG/ML
INJECTION, SOLUTION INTRA-ARTICULAR; INTRALESIONAL; INTRAMUSCULAR; INTRAVENOUS; SOFT TISSUE PRN
Status: DISCONTINUED | OUTPATIENT
Start: 2021-09-13 | End: 2021-09-13

## 2021-09-13 RX ORDER — HYDROMORPHONE HCL IN WATER/PF 6 MG/30 ML
0.2 PATIENT CONTROLLED ANALGESIA SYRINGE INTRAVENOUS EVERY 5 MIN PRN
Status: DISCONTINUED | OUTPATIENT
Start: 2021-09-13 | End: 2021-09-13 | Stop reason: HOSPADM

## 2021-09-13 RX ORDER — ONDANSETRON 2 MG/ML
4 INJECTION INTRAMUSCULAR; INTRAVENOUS EVERY 6 HOURS PRN
Status: CANCELLED | OUTPATIENT
Start: 2021-09-13

## 2021-09-13 RX ORDER — ONDANSETRON 2 MG/ML
4 INJECTION INTRAMUSCULAR; INTRAVENOUS ONCE
Status: COMPLETED | OUTPATIENT
Start: 2021-09-13 | End: 2021-09-13

## 2021-09-13 RX ORDER — ONDANSETRON 2 MG/ML
INJECTION INTRAMUSCULAR; INTRAVENOUS PRN
Status: DISCONTINUED | OUTPATIENT
Start: 2021-09-13 | End: 2021-09-13

## 2021-09-13 RX ORDER — PROPOFOL 10 MG/ML
INJECTION, EMULSION INTRAVENOUS CONTINUOUS PRN
Status: DISCONTINUED | OUTPATIENT
Start: 2021-09-13 | End: 2021-09-13

## 2021-09-13 RX ORDER — ONDANSETRON 2 MG/ML
4 INJECTION INTRAMUSCULAR; INTRAVENOUS EVERY 6 HOURS PRN
Status: DISCONTINUED | OUTPATIENT
Start: 2021-09-13 | End: 2021-09-13

## 2021-09-13 RX ORDER — ONDANSETRON 4 MG/1
4 TABLET, ORALLY DISINTEGRATING ORAL EVERY 30 MIN PRN
Status: DISCONTINUED | OUTPATIENT
Start: 2021-09-13 | End: 2021-09-13 | Stop reason: HOSPADM

## 2021-09-13 RX ORDER — IOPAMIDOL 510 MG/ML
INJECTION, SOLUTION INTRAVASCULAR PRN
Status: DISCONTINUED | OUTPATIENT
Start: 2021-09-13 | End: 2021-09-13 | Stop reason: HOSPADM

## 2021-09-13 RX ORDER — FENTANYL CITRATE 50 UG/ML
INJECTION, SOLUTION INTRAMUSCULAR; INTRAVENOUS PRN
Status: DISCONTINUED | OUTPATIENT
Start: 2021-09-13 | End: 2021-09-13

## 2021-09-13 RX ADMIN — FENTANYL CITRATE 50 MCG: 50 INJECTION, SOLUTION INTRAMUSCULAR; INTRAVENOUS at 14:35

## 2021-09-13 RX ADMIN — HYDROMORPHONE HYDROCHLORIDE 0.2 MG: 0.2 INJECTION, SOLUTION INTRAMUSCULAR; INTRAVENOUS; SUBCUTANEOUS at 16:57

## 2021-09-13 RX ADMIN — FENTANYL CITRATE 50 MCG: 50 INJECTION, SOLUTION INTRAMUSCULAR; INTRAVENOUS at 14:40

## 2021-09-13 RX ADMIN — SODIUM CHLORIDE, POTASSIUM CHLORIDE, SODIUM LACTATE AND CALCIUM CHLORIDE: 600; 310; 30; 20 INJECTION, SOLUTION INTRAVENOUS at 14:52

## 2021-09-13 RX ADMIN — ONDANSETRON 4 MG: 2 INJECTION INTRAMUSCULAR; INTRAVENOUS at 12:02

## 2021-09-13 RX ADMIN — FENTANYL CITRATE 25 MCG: 50 INJECTION, SOLUTION INTRAMUSCULAR; INTRAVENOUS at 16:04

## 2021-09-13 RX ADMIN — PROPOFOL 30 MG: 10 INJECTION, EMULSION INTRAVENOUS at 13:59

## 2021-09-13 RX ADMIN — PROPOFOL 30 MG: 10 INJECTION, EMULSION INTRAVENOUS at 14:18

## 2021-09-13 RX ADMIN — ACETAMINOPHEN 975 MG: 325 TABLET, FILM COATED ORAL at 11:41

## 2021-09-13 RX ADMIN — MIDAZOLAM 2 MG: 1 INJECTION INTRAMUSCULAR; INTRAVENOUS at 12:24

## 2021-09-13 RX ADMIN — ONDANSETRON 4 MG: 2 INJECTION INTRAMUSCULAR; INTRAVENOUS at 15:07

## 2021-09-13 RX ADMIN — HYDROMORPHONE HYDROCHLORIDE 0.2 MG: 0.2 INJECTION, SOLUTION INTRAMUSCULAR; INTRAVENOUS; SUBCUTANEOUS at 16:39

## 2021-09-13 RX ADMIN — LIDOCAINE HYDROCHLORIDE 100 MG: 20 INJECTION, SOLUTION INFILTRATION; PERINEURAL at 12:27

## 2021-09-13 RX ADMIN — DEXAMETHASONE SODIUM PHOSPHATE 4 MG: 4 INJECTION, SOLUTION INTRA-ARTICULAR; INTRALESIONAL; INTRAMUSCULAR; INTRAVENOUS; SOFT TISSUE at 12:30

## 2021-09-13 RX ADMIN — SODIUM CHLORIDE, POTASSIUM CHLORIDE, SODIUM LACTATE AND CALCIUM CHLORIDE: 600; 310; 30; 20 INJECTION, SOLUTION INTRAVENOUS at 12:21

## 2021-09-13 RX ADMIN — FENTANYL CITRATE 25 MCG: 50 INJECTION, SOLUTION INTRAMUSCULAR; INTRAVENOUS at 13:32

## 2021-09-13 RX ADMIN — GLUCAGON HYDROCHLORIDE 0.4 MG: KIT at 13:15

## 2021-09-13 RX ADMIN — FENTANYL CITRATE 25 MCG: 50 INJECTION, SOLUTION INTRAMUSCULAR; INTRAVENOUS at 15:58

## 2021-09-13 RX ADMIN — FENTANYL CITRATE 25 MCG: 50 INJECTION, SOLUTION INTRAMUSCULAR; INTRAVENOUS at 15:53

## 2021-09-13 RX ADMIN — PROPOFOL 200 MCG/KG/MIN: 10 INJECTION, EMULSION INTRAVENOUS at 12:38

## 2021-09-13 RX ADMIN — LEVOFLOXACIN 500 MG: 5 INJECTION, SOLUTION INTRAVENOUS at 12:38

## 2021-09-13 RX ADMIN — GLUCAGON HYDROCHLORIDE 0.4 MG: KIT at 13:50

## 2021-09-13 RX ADMIN — FENTANYL CITRATE 25 MCG: 50 INJECTION, SOLUTION INTRAMUSCULAR; INTRAVENOUS at 16:10

## 2021-09-13 RX ADMIN — PHENYLEPHRINE HYDROCHLORIDE 100 MCG: 10 INJECTION INTRAVENOUS at 12:51

## 2021-09-13 RX ADMIN — FENTANYL CITRATE 25 MCG: 50 INJECTION, SOLUTION INTRAMUSCULAR; INTRAVENOUS at 13:04

## 2021-09-13 RX ADMIN — FENTANYL CITRATE 100 MCG: 50 INJECTION, SOLUTION INTRAMUSCULAR; INTRAVENOUS at 12:27

## 2021-09-13 RX ADMIN — FENTANYL CITRATE 50 MCG: 50 INJECTION, SOLUTION INTRAMUSCULAR; INTRAVENOUS at 13:10

## 2021-09-13 RX ADMIN — Medication 100 MG: at 12:28

## 2021-09-13 RX ADMIN — PROPOFOL 100 MG: 10 INJECTION, EMULSION INTRAVENOUS at 12:27

## 2021-09-13 RX ADMIN — PROPOFOL 20 MG: 10 INJECTION, EMULSION INTRAVENOUS at 13:46

## 2021-09-13 ASSESSMENT — MIFFLIN-ST. JEOR: SCORE: 1197.25

## 2021-09-13 ASSESSMENT — COPD QUESTIONNAIRES: COPD: 0

## 2021-09-13 NOTE — ANESTHESIA CARE TRANSFER NOTE
Patient: Artie Burger    Procedure(s):  ENDOSCOPIC RETROGRADE CHOLANGIOPANCREATOGRAPHY with pancreatic sphincterotomy, dilation, stone removal, stent placement    Diagnosis: Chronic pancreatitis (H) [K86.1]  Diagnosis Additional Information: No value filed.    Anesthesia Type:   General     Note:    Oropharynx: oropharynx clear of all foreign objects and spontaneously breathing  Level of Consciousness: awake  Oxygen Supplementation: nasal cannula  Level of Supplemental Oxygen (L/min / FiO2): 2  Independent Airway: airway patency satisfactory and stable  Dentition: dentition unchanged  Vital Signs Stable: post-procedure vital signs reviewed and stable  Report to RN Given: handoff report given  Patient transferred to: PACU    Handoff Report: Identifed the Patient, Identified the Reponsible Provider, Reviewed the pertinent medical history, Discussed the surgical course, Reviewed Intra-OP anesthesia mangement and issues during anesthesia, Set expectations for post-procedure period and Allowed opportunity for questions and acknowledgement of understanding      Vitals:  Vitals Value Taken Time   BP     Temp     Pulse     Resp     SpO2         Electronically Signed By: ROSIE Garcia CRNA  September 13, 2021  3:21 PM

## 2021-09-13 NOTE — ANESTHESIA PROCEDURE NOTES
Airway       Patient location during procedure: OR       Procedure Start/Stop Times: 9/13/2021 12:31 PM  Staff -        Anesthesiologist:  Marjorie Thomason MD       Resident/Fellow: Dougie Calix MD       Performed By: anesthesiologist and resident  Consent for Airway        Urgency: elective  Indications and Patient Condition       Indications for airway management: rolando-procedural       Induction type:intravenous       Mask difficulty assessment: 1 - vent by mask    Final Airway Details       Final airway type: endotracheal airway       Successful airway: ETT - single  Endotracheal Airway Details        ETT size (mm): 7.0       Cuffed: yes       Successful intubation technique: direct laryngoscopy       DL Blade Type: MAC 3       Grade View of Cords: 1       Adjucts: bougie ([id number])       Position: Right       Measured from: gums/teeth       Secured at (cm): 21    Post intubation assessment        Placement verified by: capnometry, equal breath sounds and chest rise        Number of attempts at approach: 1       Secured with: pink tape       Ease of procedure: easy       Dentition: Intact and Unchanged

## 2021-09-13 NOTE — OR NURSING
Dr. Edgar at bedside to assess patient for Sign Out. Per Dr. Edgar, patient can transition to Phase II.

## 2021-09-13 NOTE — DISCHARGE INSTRUCTIONS
[facility]  Same-Day Surgery   Adult Discharge Orders & Instructions     For 24 hours after surgery    1. Get plenty of rest.  A responsible adult must stay with you for at least 24 hours after you leave the hospital.   2. Do not drive or use heavy equipment.  If you have weakness or tingling, don't drive or use heavy equipment until this feeling goes away.  3. Do not drink alcohol.  4. Avoid strenuous or risky activities.  Ask for help when climbing stairs.   5. You may feel lightheaded.  IF so, sit for a few minutes before standing.  Have someone help you get up.   6. If you have nausea (feel sick to your stomach): Drink only clear liquids such as apple juice, ginger ale, broth or 7-Up.  Rest may also help.  Be sure to drink enough fluids.  Move to a regular diet as you feel able.  7. You may have a slight fever. Call the doctor if your fever is over 100 F (37.7 C) (taken under the tongue) or lasts longer than 24 hours.  8. You may have a dry mouth, a sore throat, muscle aches or trouble sleeping.  These should go away after 24 hours.  9. Do not make important or legal decisions.   Call your doctor for any of the followin.  Signs of infection (fever, growing tenderness at the surgery site, a large amount of drainage or bleeding, severe pain, foul-smelling drainage, redness, swelling).    2. It has been over 8 to 10 hours since surgery and you are still not able to urinate (pass water).    3.  Headache for over 24 hours.    To contact a doctor, call Dr. Schwartz' office at [phone] or:        [phone] and ask for the resident on call for gastroenterology (answered 24 hours a day)      Emergency Department:[facility]: [phone]       (TTY for hearing impaired: [phone])

## 2021-09-13 NOTE — PROVIDER NOTIFICATION
Dr. Amaro and Dr. Paula called regarding amalyse and lipase levels. Patient not complaining of pain, nausea or vomiting. Patient is tolerating PO intake. Patient okay to discharge per Dr. Amaro.     Discharge instructions reviewed with patient and responsible adult. All questions regarding this information answered at this time. AVS papers printed and given to patient to give to responsible adult. Patient and responsible adult verbalized understanding of discharge instructions and understand where to find the phone number to call with any questions or concerns.

## 2021-09-14 NOTE — ANESTHESIA POSTPROCEDURE EVALUATION
Patient: Artie Burger    Procedure(s):  ENDOSCOPIC RETROGRADE CHOLANGIOPANCREATOGRAPHY with pancreatic sphincterotomy, dilation, stone removal, stent placement    Diagnosis:Chronic pancreatitis (H) [K86.1]  Diagnosis Additional Information: No value filed.    Anesthesia Type:  General    Note:  Disposition: Outpatient   Postop Pain Control: Uneventful            Sign Out: Well controlled pain   PONV: No   Neuro/Psych: Uneventful            Sign Out: Acceptable/Baseline neuro status   Airway/Respiratory: Uneventful            Sign Out: Acceptable/Baseline resp. status   CV/Hemodynamics: Uneventful            Sign Out: Acceptable CV status; No obvious hypovolemia; No obvious fluid overload   Other NRE: NONE   DID A NON-ROUTINE EVENT OCCUR? No    Event details/Postop Comments:  I assessed the patient in PACU prior to discharge.  The patient was awake and alert with minimal to moderate pain which was well controlled with medications.  The patient denied any significant nausea.  The patient's heart rate and blood pressure with consistent with her preoperative measurements, and she was breathing comfortably without any signs of respiratory distress.  There were no readily apparent anesthetic complications.  All her questions were answered.           Last vitals:  Vitals Value Taken Time   /80 09/13/21 1715   Temp 36.3  C (97.4  F) 09/13/21 1700   Pulse 56 09/13/21 1722   Resp 11 09/13/21 1722   SpO2 91 % 09/13/21 1724   Vitals shown include unvalidated device data.    Electronically Signed By: Lon Edgar MD  September 13, 2021  7:05 PM

## 2021-09-16 ENCOUNTER — PREP FOR PROCEDURE (OUTPATIENT)
Dept: GASTROENTEROLOGY | Facility: CLINIC | Age: 26
End: 2021-09-16

## 2021-09-16 ENCOUNTER — PATIENT OUTREACH (OUTPATIENT)
Dept: GASTROENTEROLOGY | Facility: CLINIC | Age: 26
End: 2021-09-16

## 2021-09-16 DIAGNOSIS — K86.89 PANCREATIC DUCT STONES: Primary | ICD-10-CM

## 2021-09-16 NOTE — PROGRESS NOTES
Called patient to follow up s/p procedure 9/13  Having abdominal discomfort, though it is lessening each day   Discussed recommendations :  If patient develops pancreatitis like pain, bleeding, fever, chills before scheduled ERCP, will recommend patient to call us immediately for consideration of an   earlier urgent ERCP  Repeat ERCP with Dr. Campuzano in 3-4 weeks with possible digital spyglass for further pancreatic endotherapy and stones removal.     Procedure/Imaging/Clinic: ERCP with spyglass  Physician: Justen  Timing: 3-4 weeks (from prior procedure 9/13)  Procedure length:   Anesthesia:general  Dx: Panc duct stone removal  Tier: 2  Location: [facility]     Pt understands needs an up dated pre op exam within 30 days of procedure. Stated that she has a follow up today in clinic and will ask that provider to perform a pre op     Discussed COVID test christine 4 days of procedure    Case request placed, message sent to OR     Jessie Amin, RN, BSN,   [facility]  Care coordinator

## 2021-09-17 ENCOUNTER — TELEPHONE (OUTPATIENT)
Dept: GASTROENTEROLOGY | Facility: CLINIC | Age: 26
End: 2021-09-17

## 2021-09-17 NOTE — TELEPHONE ENCOUNTER
LVM for patient in regards to scheduled procedure with Dr. Amaro on 10/6/2021. Left direct line for patient to call to go over scheduling details.

## 2021-09-21 DIAGNOSIS — Z11.59 ENCOUNTER FOR SCREENING FOR OTHER VIRAL DISEASES: ICD-10-CM

## 2021-09-21 NOTE — TELEPHONE ENCOUNTER
Patient returned call from last week. Stated that the 10/6 procedure with Dr. Venegas works for her schedule. Informed patient she will need an updated pre-op physical within 30 days of her procedure and a COVID-19 test within 96-72 hours of procedure date.  Informed patient she will need a  and someone to monitor her for 24 hours after the procedure. Informed patient all scheduling details will be sent to the address listed on Epic. Address confirmed on this call.

## 2021-10-04 RX ORDER — ACETAMINOPHEN 500 MG
500-1000 TABLET ORAL EVERY 6 HOURS PRN
Status: ON HOLD | COMMUNITY
End: 2023-10-03

## 2021-10-05 ENCOUNTER — ANESTHESIA EVENT (OUTPATIENT)
Dept: SURGERY | Facility: CLINIC | Age: 26
End: 2021-10-05
Payer: COMMERCIAL

## 2021-10-06 ENCOUNTER — HOSPITAL ENCOUNTER (OUTPATIENT)
Facility: CLINIC | Age: 26
Discharge: HOME | End: 2021-10-06
Attending: INTERNAL MEDICINE | Admitting: INTERNAL MEDICINE
Payer: COMMERCIAL

## 2021-10-06 ENCOUNTER — APPOINTMENT (OUTPATIENT)
Dept: GENERAL RADIOLOGY | Facility: CLINIC | Age: 26
End: 2021-10-06
Attending: INTERNAL MEDICINE
Payer: COMMERCIAL

## 2021-10-06 ENCOUNTER — ANESTHESIA (OUTPATIENT)
Dept: SURGERY | Facility: CLINIC | Age: 26
End: 2021-10-06
Payer: COMMERCIAL

## 2021-10-06 VITALS
RESPIRATION RATE: 16 BRPM | WEIGHT: 117.5 LBS | TEMPERATURE: 97.5 F | HEART RATE: 57 BPM | SYSTOLIC BLOOD PRESSURE: 118 MMHG | OXYGEN SATURATION: 99 % | DIASTOLIC BLOOD PRESSURE: 83 MMHG | BODY MASS INDEX: 21.62 KG/M2 | HEIGHT: 62 IN

## 2021-10-06 DIAGNOSIS — K86.89 PANCREATIC DUCT STONES: ICD-10-CM

## 2021-10-06 LAB
ALBUMIN SERPL-MCNC: 3 G/DL (ref 3.4–5)
ALP SERPL-CCNC: 116 U/L (ref 40–150)
ALT SERPL W P-5'-P-CCNC: 60 U/L (ref 0–50)
AMYLASE SERPL-CCNC: 126 U/L (ref 30–110)
ANION GAP SERPL CALCULATED.3IONS-SCNC: 9 MMOL/L (ref 3–14)
AST SERPL W P-5'-P-CCNC: 51 U/L (ref 0–45)
BILIRUB SERPL-MCNC: 0.5 MG/DL (ref 0.2–1.3)
BUN SERPL-MCNC: 9 MG/DL (ref 7–30)
CALCIUM SERPL-MCNC: 9.1 MG/DL (ref 8.5–10.1)
CHLORIDE BLD-SCNC: 105 MMOL/L (ref 94–109)
CO2 SERPL-SCNC: 27 MMOL/L (ref 20–32)
CREAT SERPL-MCNC: 0.76 MG/DL (ref 0.52–1.04)
ERCP: NORMAL
ERYTHROCYTE [DISTWIDTH] IN BLOOD BY AUTOMATED COUNT: 13.2 % (ref 10–15)
GFR SERPL CREATININE-BSD FRML MDRD: >90 ML/MIN/1.73M2
GLUCOSE BLD-MCNC: 103 MG/DL (ref 70–99)
GLUCOSE BLDC GLUCOMTR-MCNC: 86 MG/DL (ref 70–99)
HCT VFR BLD AUTO: 29.5 % (ref 35–47)
HGB BLD-MCNC: 9.9 G/DL (ref 11.7–15.7)
INR PPP: 0.95 (ref 0.85–1.15)
LIPASE SERPL-CCNC: 390 U/L (ref 73–393)
MCH RBC QN AUTO: 30.9 PG (ref 26.5–33)
MCHC RBC AUTO-ENTMCNC: 33.6 G/DL (ref 31.5–36.5)
MCV RBC AUTO: 92 FL (ref 78–100)
PLATELET # BLD AUTO: 337 10E3/UL (ref 150–450)
POTASSIUM BLD-SCNC: 3.5 MMOL/L (ref 3.4–5.3)
PROT SERPL-MCNC: 6.3 G/DL (ref 6.8–8.8)
RBC # BLD AUTO: 3.2 10E6/UL (ref 3.8–5.2)
SODIUM SERPL-SCNC: 141 MMOL/L (ref 133–144)
WBC # BLD AUTO: 8.5 10E3/UL (ref 4–11)

## 2021-10-06 PROCEDURE — 36415 COLL VENOUS BLD VENIPUNCTURE: CPT | Performed by: INTERNAL MEDICINE

## 2021-10-06 PROCEDURE — 83690 ASSAY OF LIPASE: CPT | Performed by: INTERNAL MEDICINE

## 2021-10-06 PROCEDURE — 80053 COMPREHEN METABOLIC PANEL: CPT | Performed by: INTERNAL MEDICINE

## 2021-10-06 PROCEDURE — 999N000141 HC STATISTIC PRE-PROCEDURE NURSING ASSESSMENT: Performed by: INTERNAL MEDICINE

## 2021-10-06 PROCEDURE — 272N000001 HC OR GENERAL SUPPLY STERILE: Performed by: INTERNAL MEDICINE

## 2021-10-06 PROCEDURE — 999N000181 XR SURGERY CARM FLUORO GREATER THAN 5 MIN W STILLS: Mod: TC

## 2021-10-06 PROCEDURE — 999N000010 HC STATISTIC ANES STAT CODE-CRNA PER MINUTE: Performed by: INTERNAL MEDICINE

## 2021-10-06 PROCEDURE — 85610 PROTHROMBIN TIME: CPT | Performed by: INTERNAL MEDICINE

## 2021-10-06 PROCEDURE — C1877 STENT, NON-COAT/COV W/O DEL: HCPCS | Performed by: INTERNAL MEDICINE

## 2021-10-06 PROCEDURE — 82962 GLUCOSE BLOOD TEST: CPT

## 2021-10-06 PROCEDURE — 370N000017 HC ANESTHESIA TECHNICAL FEE, PER MIN: Performed by: INTERNAL MEDICINE

## 2021-10-06 PROCEDURE — 360N000084 HC SURGERY LEVEL 4 W/ FLUORO, PER MIN: Performed by: INTERNAL MEDICINE

## 2021-10-06 PROCEDURE — 255N000002 HC RX 255 OP 636: Performed by: INTERNAL MEDICINE

## 2021-10-06 PROCEDURE — C1726 CATH, BAL DIL, NON-VASCULAR: HCPCS | Performed by: INTERNAL MEDICINE

## 2021-10-06 PROCEDURE — 250N000009 HC RX 250: Performed by: STUDENT IN AN ORGANIZED HEALTH CARE EDUCATION/TRAINING PROGRAM

## 2021-10-06 PROCEDURE — 710N000012 HC RECOVERY PHASE 2, PER MINUTE: Performed by: INTERNAL MEDICINE

## 2021-10-06 PROCEDURE — 250N000011 HC RX IP 250 OP 636: Performed by: STUDENT IN AN ORGANIZED HEALTH CARE EDUCATION/TRAINING PROGRAM

## 2021-10-06 PROCEDURE — C1769 GUIDE WIRE: HCPCS | Performed by: INTERNAL MEDICINE

## 2021-10-06 PROCEDURE — 258N000003 HC RX IP 258 OP 636: Performed by: STUDENT IN AN ORGANIZED HEALTH CARE EDUCATION/TRAINING PROGRAM

## 2021-10-06 PROCEDURE — 82150 ASSAY OF AMYLASE: CPT | Performed by: INTERNAL MEDICINE

## 2021-10-06 PROCEDURE — C1725 CATH, TRANSLUMIN NON-LASER: HCPCS | Performed by: INTERNAL MEDICINE

## 2021-10-06 PROCEDURE — 710N000010 HC RECOVERY PHASE 1, LEVEL 2, PER MIN: Performed by: INTERNAL MEDICINE

## 2021-10-06 PROCEDURE — 250N000009 HC RX 250: Performed by: INTERNAL MEDICINE

## 2021-10-06 PROCEDURE — 250N000025 HC SEVOFLURANE, PER MIN: Performed by: INTERNAL MEDICINE

## 2021-10-06 PROCEDURE — 85027 COMPLETE CBC AUTOMATED: CPT | Performed by: INTERNAL MEDICINE

## 2021-10-06 PROCEDURE — 250N000013 HC RX MED GY IP 250 OP 250 PS 637: Performed by: STUDENT IN AN ORGANIZED HEALTH CARE EDUCATION/TRAINING PROGRAM

## 2021-10-06 DEVICE — IMPLANTABLE DEVICE
Type: IMPLANTABLE DEVICE | Site: PANCREATIC DUCT | Status: NON-FUNCTIONAL
Removed: 2021-11-01

## 2021-10-06 RX ORDER — OXYCODONE HYDROCHLORIDE 5 MG/1
5 TABLET ORAL EVERY 4 HOURS PRN
Status: DISCONTINUED | OUTPATIENT
Start: 2021-10-06 | End: 2021-10-06 | Stop reason: HOSPADM

## 2021-10-06 RX ORDER — NALOXONE HYDROCHLORIDE 0.4 MG/ML
0.2 INJECTION, SOLUTION INTRAMUSCULAR; INTRAVENOUS; SUBCUTANEOUS
Status: DISCONTINUED | OUTPATIENT
Start: 2021-10-06 | End: 2021-10-06 | Stop reason: HOSPADM

## 2021-10-06 RX ORDER — NALOXONE HYDROCHLORIDE 0.4 MG/ML
0.4 INJECTION, SOLUTION INTRAMUSCULAR; INTRAVENOUS; SUBCUTANEOUS
Status: DISCONTINUED | OUTPATIENT
Start: 2021-10-06 | End: 2021-10-06

## 2021-10-06 RX ORDER — ONDANSETRON 2 MG/ML
4 INJECTION INTRAMUSCULAR; INTRAVENOUS EVERY 6 HOURS PRN
Status: DISCONTINUED | OUTPATIENT
Start: 2021-10-06 | End: 2021-10-06 | Stop reason: HOSPADM

## 2021-10-06 RX ORDER — SODIUM CHLORIDE, SODIUM LACTATE, POTASSIUM CHLORIDE, CALCIUM CHLORIDE 600; 310; 30; 20 MG/100ML; MG/100ML; MG/100ML; MG/100ML
INJECTION, SOLUTION INTRAVENOUS CONTINUOUS
Status: DISCONTINUED | OUTPATIENT
Start: 2021-10-06 | End: 2021-10-06 | Stop reason: HOSPADM

## 2021-10-06 RX ORDER — LEVOFLOXACIN 5 MG/ML
INJECTION, SOLUTION INTRAVENOUS PRN
Status: DISCONTINUED | OUTPATIENT
Start: 2021-10-06 | End: 2021-10-06

## 2021-10-06 RX ORDER — FENTANYL CITRATE 50 UG/ML
50 INJECTION, SOLUTION INTRAMUSCULAR; INTRAVENOUS EVERY 5 MIN PRN
Status: DISCONTINUED | OUTPATIENT
Start: 2021-10-06 | End: 2021-10-06 | Stop reason: HOSPADM

## 2021-10-06 RX ORDER — LIDOCAINE HYDROCHLORIDE 20 MG/ML
INJECTION, SOLUTION INFILTRATION; PERINEURAL PRN
Status: DISCONTINUED | OUTPATIENT
Start: 2021-10-06 | End: 2021-10-06

## 2021-10-06 RX ORDER — NALOXONE HYDROCHLORIDE 0.4 MG/ML
0.4 INJECTION, SOLUTION INTRAMUSCULAR; INTRAVENOUS; SUBCUTANEOUS
Status: DISCONTINUED | OUTPATIENT
Start: 2021-10-06 | End: 2021-10-06 | Stop reason: HOSPADM

## 2021-10-06 RX ORDER — FENTANYL CITRATE 50 UG/ML
25 INJECTION, SOLUTION INTRAMUSCULAR; INTRAVENOUS
Status: DISCONTINUED | OUTPATIENT
Start: 2021-10-06 | End: 2021-10-06 | Stop reason: HOSPADM

## 2021-10-06 RX ORDER — FENTANYL CITRATE 50 UG/ML
INJECTION, SOLUTION INTRAMUSCULAR; INTRAVENOUS PRN
Status: DISCONTINUED | OUTPATIENT
Start: 2021-10-06 | End: 2021-10-06

## 2021-10-06 RX ORDER — IOPAMIDOL 510 MG/ML
INJECTION, SOLUTION INTRAVASCULAR PRN
Status: DISCONTINUED | OUTPATIENT
Start: 2021-10-06 | End: 2021-10-06 | Stop reason: HOSPADM

## 2021-10-06 RX ORDER — FLUMAZENIL 0.1 MG/ML
0.2 INJECTION, SOLUTION INTRAVENOUS
Status: DISCONTINUED | OUTPATIENT
Start: 2021-10-06 | End: 2021-10-06 | Stop reason: HOSPADM

## 2021-10-06 RX ORDER — PROPOFOL 10 MG/ML
INJECTION, EMULSION INTRAVENOUS PRN
Status: DISCONTINUED | OUTPATIENT
Start: 2021-10-06 | End: 2021-10-06

## 2021-10-06 RX ORDER — ONDANSETRON 2 MG/ML
INJECTION INTRAMUSCULAR; INTRAVENOUS PRN
Status: DISCONTINUED | OUTPATIENT
Start: 2021-10-06 | End: 2021-10-06

## 2021-10-06 RX ORDER — ONDANSETRON 4 MG/1
4 TABLET, ORALLY DISINTEGRATING ORAL EVERY 6 HOURS PRN
Status: DISCONTINUED | OUTPATIENT
Start: 2021-10-06 | End: 2021-10-06

## 2021-10-06 RX ORDER — LIDOCAINE 40 MG/G
CREAM TOPICAL
Status: DISCONTINUED | OUTPATIENT
Start: 2021-10-06 | End: 2021-10-06 | Stop reason: HOSPADM

## 2021-10-06 RX ORDER — ONDANSETRON 4 MG/1
4 TABLET, ORALLY DISINTEGRATING ORAL EVERY 6 HOURS PRN
Status: DISCONTINUED | OUTPATIENT
Start: 2021-10-06 | End: 2021-10-06 | Stop reason: HOSPADM

## 2021-10-06 RX ORDER — DEXAMETHASONE SODIUM PHOSPHATE 4 MG/ML
INJECTION, SOLUTION INTRA-ARTICULAR; INTRALESIONAL; INTRAMUSCULAR; INTRAVENOUS; SOFT TISSUE PRN
Status: DISCONTINUED | OUTPATIENT
Start: 2021-10-06 | End: 2021-10-06

## 2021-10-06 RX ORDER — NALOXONE HYDROCHLORIDE 0.4 MG/ML
0.2 INJECTION, SOLUTION INTRAMUSCULAR; INTRAVENOUS; SUBCUTANEOUS
Status: DISCONTINUED | OUTPATIENT
Start: 2021-10-06 | End: 2021-10-06

## 2021-10-06 RX ORDER — INDOMETHACIN 50 MG/1
SUPPOSITORY RECTAL PRN
Status: DISCONTINUED | OUTPATIENT
Start: 2021-10-06 | End: 2021-10-06 | Stop reason: HOSPADM

## 2021-10-06 RX ORDER — SODIUM CHLORIDE, SODIUM LACTATE, POTASSIUM CHLORIDE, CALCIUM CHLORIDE 600; 310; 30; 20 MG/100ML; MG/100ML; MG/100ML; MG/100ML
INJECTION, SOLUTION INTRAVENOUS CONTINUOUS PRN
Status: DISCONTINUED | OUTPATIENT
Start: 2021-10-06 | End: 2021-10-06

## 2021-10-06 RX ORDER — ONDANSETRON 2 MG/ML
4 INJECTION INTRAMUSCULAR; INTRAVENOUS EVERY 30 MIN PRN
Status: DISCONTINUED | OUTPATIENT
Start: 2021-10-06 | End: 2021-10-06

## 2021-10-06 RX ORDER — FLUMAZENIL 0.1 MG/ML
0.2 INJECTION, SOLUTION INTRAVENOUS
Status: DISCONTINUED | OUTPATIENT
Start: 2021-10-06 | End: 2021-10-06

## 2021-10-06 RX ORDER — ONDANSETRON 2 MG/ML
4 INJECTION INTRAMUSCULAR; INTRAVENOUS EVERY 6 HOURS PRN
Status: DISCONTINUED | OUTPATIENT
Start: 2021-10-06 | End: 2021-10-06

## 2021-10-06 RX ORDER — INDOMETHACIN 50 MG/1
100 SUPPOSITORY RECTAL
Status: DISCONTINUED | OUTPATIENT
Start: 2021-10-06 | End: 2021-10-06 | Stop reason: HOSPADM

## 2021-10-06 RX ORDER — PROPOFOL 10 MG/ML
INJECTION, EMULSION INTRAVENOUS CONTINUOUS PRN
Status: DISCONTINUED | OUTPATIENT
Start: 2021-10-06 | End: 2021-10-06

## 2021-10-06 RX ORDER — ONDANSETRON 4 MG/1
4 TABLET, ORALLY DISINTEGRATING ORAL EVERY 30 MIN PRN
Status: DISCONTINUED | OUTPATIENT
Start: 2021-10-06 | End: 2021-10-06 | Stop reason: HOSPADM

## 2021-10-06 RX ADMIN — FENTANYL CITRATE 100 MCG: 50 INJECTION, SOLUTION INTRAMUSCULAR; INTRAVENOUS at 07:40

## 2021-10-06 RX ADMIN — SUGAMMADEX 150 MG: 100 INJECTION, SOLUTION INTRAVENOUS at 10:48

## 2021-10-06 RX ADMIN — LEVOFLOXACIN 500 MG: 5 INJECTION, SOLUTION INTRAVENOUS at 07:50

## 2021-10-06 RX ADMIN — ROCURONIUM BROMIDE 20 MG: 10 INJECTION INTRAVENOUS at 08:27

## 2021-10-06 RX ADMIN — ONDANSETRON 4 MG: 2 INJECTION INTRAMUSCULAR; INTRAVENOUS at 10:34

## 2021-10-06 RX ADMIN — LIDOCAINE HYDROCHLORIDE 40 MG: 20 INJECTION, SOLUTION INFILTRATION; PERINEURAL at 07:41

## 2021-10-06 RX ADMIN — GLUCAGON HYDROCHLORIDE 0.2 MG: KIT at 08:30

## 2021-10-06 RX ADMIN — SODIUM CHLORIDE, POTASSIUM CHLORIDE, SODIUM LACTATE AND CALCIUM CHLORIDE: 600; 310; 30; 20 INJECTION, SOLUTION INTRAVENOUS at 07:33

## 2021-10-06 RX ADMIN — MIDAZOLAM 2 MG: 1 INJECTION INTRAMUSCULAR; INTRAVENOUS at 07:35

## 2021-10-06 RX ADMIN — FENTANYL CITRATE 50 MCG: 50 INJECTION, SOLUTION INTRAMUSCULAR; INTRAVENOUS at 08:24

## 2021-10-06 RX ADMIN — PROPOFOL 150 MCG/KG/MIN: 10 INJECTION, EMULSION INTRAVENOUS at 07:42

## 2021-10-06 RX ADMIN — ROCURONIUM BROMIDE 20 MG: 10 INJECTION INTRAVENOUS at 09:16

## 2021-10-06 RX ADMIN — DEXAMETHASONE SODIUM PHOSPHATE 10 MG: 4 INJECTION, SOLUTION INTRA-ARTICULAR; INTRALESIONAL; INTRAMUSCULAR; INTRAVENOUS; SOFT TISSUE at 07:43

## 2021-10-06 RX ADMIN — ROCURONIUM BROMIDE 50 MG: 10 INJECTION INTRAVENOUS at 07:40

## 2021-10-06 RX ADMIN — HYDROMORPHONE HYDROCHLORIDE 0.5 MG: 1 INJECTION, SOLUTION INTRAMUSCULAR; INTRAVENOUS; SUBCUTANEOUS at 10:18

## 2021-10-06 RX ADMIN — SODIUM CHLORIDE, POTASSIUM CHLORIDE, SODIUM LACTATE AND CALCIUM CHLORIDE: 600; 310; 30; 20 INJECTION, SOLUTION INTRAVENOUS at 09:19

## 2021-10-06 RX ADMIN — FENTANYL CITRATE 50 MCG: 50 INJECTION, SOLUTION INTRAMUSCULAR; INTRAVENOUS at 08:43

## 2021-10-06 RX ADMIN — PROPOFOL 170 MG: 10 INJECTION, EMULSION INTRAVENOUS at 07:41

## 2021-10-06 RX ADMIN — OXYCODONE HYDROCHLORIDE 5 MG: 5 TABLET ORAL at 12:13

## 2021-10-06 RX ADMIN — GLUCAGON HYDROCHLORIDE 0.4 MG: KIT at 08:18

## 2021-10-06 ASSESSMENT — MIFFLIN-ST. JEOR: SCORE: 1221.25

## 2021-10-06 ASSESSMENT — LIFESTYLE VARIABLES: TOBACCO_USE: 1

## 2021-10-06 ASSESSMENT — COPD QUESTIONNAIRES: COPD: 0

## 2021-10-06 NOTE — ANESTHESIA CARE TRANSFER NOTE
Patient: Artie Burger    Procedure: Procedure(s):  ENDOSCOPIC RETROGRADE CHOLANGIOPANCREATOGRAPHY, WITH DIRECT DUCT VISUALIZATION, USING PANCREATICOBILIARY FIBEROPTIC PROBE, BILE DUCT STENT EXCHANGE, LITHOTRIPSY OF PANCREATIC STONE, SPHINCTEROTOMY, BALLOON DILATION OF PANCREATIC DUCT AND STONE EXTRACTION       Diagnosis: Pancreatic duct stones [K86.89]  Diagnosis Additional Information: No value filed.    Anesthesia Type:   General     Note:    Oropharynx: oropharynx clear of all foreign objects and spontaneously breathing  Level of Consciousness: awake  Oxygen Supplementation: nasal cannula    Independent Airway: airway patency satisfactory and stable  Dentition: dentition unchanged  Vital Signs Stable: post-procedure vital signs reviewed and stable  Report to RN Given: handoff report given  Patient transferred to: PACU  Comments: Patient transferred to PACU in stable condition, breathing spontaneously on NC, VSS.  Report given to RN.  Handoff Report: Identifed the Patient, Identified the Reponsible Provider, Reviewed the pertinent medical history, Discussed the surgical course, Reviewed Intra-OP anesthesia mangement and issues during anesthesia, Set expectations for post-procedure period and Allowed opportunity for questions and acknowledgement of understanding      Vitals:  Vitals Value Taken Time   /82 10/06/21 1058   Temp     Pulse 59 10/06/21 1058   Resp     SpO2 97 % 10/06/21 1059   Vitals shown include unvalidated device data.    Electronically Signed By: ROSIE Al CRNA  October 6, 2021  11:01 AM

## 2021-10-06 NOTE — OR NURSING
Patient had negative COVID-19 result from clinic test on 10/4. Result presented on patient's phone and writer RN verified negative result.

## 2021-10-06 NOTE — DISCHARGE INSTRUCTIONS
[facility]  Same-Day Surgery   Adult Discharge Orders & Instructions     For 24 hours after surgery    1. Get plenty of rest.  A responsible adult must stay with you for at least 24 hours after you leave the hospital.   2. Do not drive or use heavy equipment.  If you have weakness or tingling, don't drive or use heavy equipment until this feeling goes away.  3. Do not drink alcohol.  4. Avoid strenuous or risky activities.  Ask for help when climbing stairs.   5. You may feel lightheaded.  IF so, sit for a few minutes before standing.  Have someone help you get up.   6. If you have nausea (feel sick to your stomach): Drink only clear liquids such as apple juice, ginger ale, broth or 7-Up.  Rest may also help.  Be sure to drink enough fluids.  Move to a regular diet as you feel able.  7. You may have a slight fever. Call the doctor if your fever is over 100 F (37.7 C) (taken under the tongue) or lasts longer than 24 hours.  8. You may have a dry mouth, a sore throat, muscle aches or trouble sleeping.  These should go away after 24 hours.  9. Do not make important or legal decisions.   Call your doctor for any of the followin.  Signs of infection (fever, growing tenderness at the surgery site, a large amount of drainage or bleeding, severe pain, foul-smelling drainage, redness, swelling).    2. It has been over 8 to 10 hours since surgery and you are still not able to urinate (pass water).    3.  Headache for over 24 hours.    To contact a doctor, Dr. Guru Campuzano @ [phone] (GI Clinic) or [phone] and ask for the resident on call for Gastroenterology (answered 24 hours a day)  Emergency Department on [facility]: [phone]                                                                  (TTY for hearing impaired: [phone])

## 2021-10-06 NOTE — ANESTHESIA PREPROCEDURE EVALUATION
Anesthesia Pre-Procedure Evaluation    Patient: Artie Burger   MRN: [id number] : 1995        Preoperative Diagnosis: Chronic pancreatitis (H) [K86.1]   Procedure : Procedure(s):  ENDOSCOPIC RETROGRADE CHOLANGIOPANCREATOGRAPHY     Past Medical History:   Diagnosis Date     Gastroesophageal reflux disease      Pancreatic disease      PONV (postoperative nausea and vomiting)       Past Surgical History:   Procedure Laterality Date     ENDOSCOPIC RETROGRADE CHOLANGIOPANCREATOGRAM COMPLEX N/A 2021    Procedure: ENDOSCOPIC RETROGRADE CHOLANGIOPANCREATOGRAPHY with pancreatic sphincterotomy, dilation, stone removal, stent placement;  Surgeon: Guru Sabino Campuzano MD;  Location:  OR      No Known Allergies   Social History     Tobacco Use     Smoking status: Current Every Day Smoker     Types: Cigarettes     Smokeless tobacco: Never Used   Substance Use Topics     Alcohol use: Not Currently      Wt Readings from Last 1 Encounters:   10/06/21 53.3 kg (117 lb 8.1 oz)        Anesthesia Evaluation   Pt has had prior anesthetic. Type: General.    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)     Infectious Disease:       Malignancy:       Other:            Physical Exam    Airway        Mallampati: I   TM distance: > 3 FB   Neck ROM: full   Mouth opening: > 3 cm    Respiratory Devices and Support         Dental  no notable dental  history         Cardiovascular          Rhythm and rate: regular and normal     Pulmonary           breath sounds clear to auscultation           OUTSIDE LABS:  CBC:   Lab Results   Component Value Date    WBC 8.6 09/13/2021    HGB 11.4 (L) 09/13/2021    HCT 33.0 (L) 09/13/2021     09/13/2021     BMP:   Lab Results   Component Value Date     09/13/2021    POTASSIUM 3.6 09/13/2021    CHLORIDE 108 09/13/2021    CO2 24 09/13/2021    BUN 13 09/13/2021    CR 0.64 09/13/2021    GLC 86 10/06/2021     (H) 09/13/2021     COAGS:   Lab Results   Component Value Date    INR 1.06 09/13/2021     POC:   Lab Results   Component Value Date    HCG Negative 09/13/2021     HEPATIC:   Lab Results   Component Value Date    ALBUMIN 3.6 09/13/2021    PROTTOTAL 7.0 09/13/2021    ALT 14 09/13/2021    AST 9 09/13/2021    ALKPHOS 54 09/13/2021    BILITOTAL 0.4 09/13/2021     OTHER:   Lab Results   Component Value Date    MURTAZA 9.3 09/13/2021    LIPASE 459 (H) 09/13/2021    AMYLASE 804 (H) 09/13/2021       Anesthesia Plan    ASA Status:  2   NPO Status:  NPO Appropriate    Anesthesia Type: General.     - Airway: ETT   Induction: Intravenous.   Maintenance: Inhalation.   Techniques and Equipment:     - Lines/Monitors: BIS     Consents    Anesthesia Plan(s) and associated risks, benefits, and realistic alternatives discussed. Questions answered and patient/representative(s) expressed understanding.     - Discussed with:  Patient      - Extended Intubation/Ventilatory Support Discussed: No.      - Patient is DNR/DNI Status: No    Use of blood products discussed: Yes.     - Discussed with: Patient.     Postoperative Care    Pain management: IV analgesics, Oral pain medications, Multi-modal analgesia.   PONV prophylaxis: Ondansetron (or other 5HT-3), Dexamethasone or Solumedrol     Comments:    25 Y F ASA2 BMI 20 schedulled for  ECRP for chronic pancreatitis  Pertinent PMH:   Anxiety, panic attack,  Current smoker, GERD,  alcohl  abuse in remision  Currently on Hormonal contraceptive    Plan:  GA/ETT, Standard ASA monitors.  IV induction. Maintenance with anesthetic gas &/or IV meds.  PACU after extubation.  Would discuss using the second contraception method for 7 days (as plan to reverse the NMB with sugammadex )                   Álvaro Truong MD

## 2021-10-06 NOTE — ANESTHESIA PROCEDURE NOTES
Airway       Patient location during procedure: OR       Procedure Start/Stop Times: 10/6/2021 7:59 AM  Staff -        Performed By: with residents       Procedure performed by resident/fellow/CRNA in presence of a teaching physician.    Consent for Airway        Urgency: elective  Indications and Patient Condition       Indications for airway management: rolando-procedural       Induction type:intravenous       Mask difficulty assessment: 1 - vent by mask    Final Airway Details       Final airway type: endotracheal airway       Successful airway: ETT - single  Endotracheal Airway Details        ETT size (mm): 7.0       Cuffed: yes       Successful intubation technique: direct laryngoscopy       DL Blade Type: MAC 3       Grade View of Cords: 2       Adjucts: stylet       Position: Right       Measured from: lips       Secured at (cm): 22       Bite Block used: Endo bite block.    Post intubation assessment        Placement verified by: capnometry, equal breath sounds and chest rise        Number of attempts at approach: 1       Secured with: pink tape       Ease of procedure: easy       Dentition: Unchanged and Intact

## 2021-10-06 NOTE — ANESTHESIA POSTPROCEDURE EVALUATION
Patient: Artie Burger    Procedure: Procedure(s):  ENDOSCOPIC RETROGRADE CHOLANGIOPANCREATOGRAPHY, WITH DIRECT DUCT VISUALIZATION, USING PANCREATICOBILIARY FIBEROPTIC PROBE, BILE DUCT STENT EXCHANGE, LITHOTRIPSY OF PANCREATIC STONE, SPHINCTEROTOMY, BALLOON DILATION OF PANCREATIC DUCT AND STONE EXTRACTION       Diagnosis:Pancreatic duct stones [K86.89]  Diagnosis Additional Information: No value filed.    Anesthesia Type:  General    Note:  Disposition: Outpatient   Postop Pain Control: Uneventful            Sign Out: Well controlled pain   PONV: No   Neuro/Psych: Uneventful            Sign Out: Acceptable/Baseline neuro status   Airway/Respiratory: Uneventful            Sign Out: Acceptable/Baseline resp. status   CV/Hemodynamics: Uneventful            Sign Out: Acceptable CV status; No obvious hypovolemia; No obvious fluid overload   Other NRE: NONE   DID A NON-ROUTINE EVENT OCCUR? No           Last vitals:  Vitals Value Taken Time   /80 10/06/21 1140   Temp 36.6  C (97.9  F) 10/06/21 1100   Pulse 53 10/06/21 1144   Resp 14 10/06/21 1130   SpO2 95 % 10/06/21 1144   Vitals shown include unvalidated device data.    Electronically Signed By: Álvaro Truong MD  October 6, 2021  11:46 AM

## 2021-10-12 ENCOUNTER — PATIENT OUTREACH (OUTPATIENT)
Dept: GASTROENTEROLOGY | Facility: CLINIC | Age: 26
End: 2021-10-12

## 2021-10-12 ENCOUNTER — PREP FOR PROCEDURE (OUTPATIENT)
Dept: GASTROENTEROLOGY | Facility: CLINIC | Age: 26
End: 2021-10-12

## 2021-10-12 DIAGNOSIS — K86.89 PANCREATIC STONES: Primary | ICD-10-CM

## 2021-10-12 NOTE — TELEPHONE ENCOUNTER
Post ERCP (10-5-21) with Dr. Campuzano: Follow-up    Post procedure recommendations:   Will repeat ERCP with digital spyglass lithotripsy in  4 weeks for further fragmentation of stones.       - If patient develops pancreatitis like pain, bleeding,  fever, chills before scheduled ERCP, will recommend patient to call us immediately for consideration of an earlier urgent ERCP     Orders placed:     Please assist in scheduling:     Procedure/Imaging/Clinic: ERCP w/ spyglass  Physician: Dr. Campuzano  Timin weeks  Procedure length:90 min  Anesthesia:gen  Dx: pancreatic stones  Tier:2  Location: [facility]       Comments:       Patient states: left message, [employer] sent    Clinic contact and scheduling numbers verified for future questions/concerns.    Jacklyn Rodríguez, RN Care Coordinator

## 2021-10-17 ENCOUNTER — HEALTH MAINTENANCE LETTER (OUTPATIENT)
Age: 26
End: 2021-10-17

## 2021-10-25 ENCOUNTER — TRANSFERRED RECORDS (OUTPATIENT)
Dept: MULTI SPECIALTY CLINIC | Facility: CLINIC | Age: 26
End: 2021-10-25

## 2021-10-25 LAB — TSH SERPL-ACNC: 4.01 ULU/ML (ref 0.47–4.68)

## 2021-10-31 ENCOUNTER — ANESTHESIA EVENT (OUTPATIENT)
Dept: SURGERY | Facility: CLINIC | Age: 26
End: 2021-10-31
Payer: COMMERCIAL

## 2021-11-01 ENCOUNTER — APPOINTMENT (OUTPATIENT)
Dept: GENERAL RADIOLOGY | Facility: CLINIC | Age: 26
End: 2021-11-01
Attending: INTERNAL MEDICINE
Payer: COMMERCIAL

## 2021-11-01 ENCOUNTER — HOSPITAL ENCOUNTER (OUTPATIENT)
Facility: CLINIC | Age: 26
Discharge: HOME | End: 2021-11-01
Attending: INTERNAL MEDICINE | Admitting: INTERNAL MEDICINE
Payer: COMMERCIAL

## 2021-11-01 ENCOUNTER — ANESTHESIA (OUTPATIENT)
Dept: SURGERY | Facility: CLINIC | Age: 26
End: 2021-11-01
Payer: COMMERCIAL

## 2021-11-01 VITALS
RESPIRATION RATE: 15 BRPM | HEART RATE: 51 BPM | HEIGHT: 62 IN | TEMPERATURE: 98 F | WEIGHT: 121.25 LBS | DIASTOLIC BLOOD PRESSURE: 81 MMHG | SYSTOLIC BLOOD PRESSURE: 108 MMHG | OXYGEN SATURATION: 100 % | BODY MASS INDEX: 22.31 KG/M2

## 2021-11-01 DIAGNOSIS — K86.89 PANCREATIC STONES: ICD-10-CM

## 2021-11-01 LAB
ALBUMIN SERPL-MCNC: 3.7 G/DL (ref 3.4–5)
ALP SERPL-CCNC: 57 U/L (ref 40–150)
ALT SERPL W P-5'-P-CCNC: 12 U/L (ref 0–50)
AMYLASE SERPL-CCNC: 295 U/L (ref 30–110)
AMYLASE SERPL-CCNC: 62 U/L (ref 30–110)
ANION GAP SERPL CALCULATED.3IONS-SCNC: 3 MMOL/L (ref 3–14)
AST SERPL W P-5'-P-CCNC: 12 U/L (ref 0–45)
BILIRUB SERPL-MCNC: 0.4 MG/DL (ref 0.2–1.3)
BUN SERPL-MCNC: 17 MG/DL (ref 7–30)
CALCIUM SERPL-MCNC: 9.1 MG/DL (ref 8.5–10.1)
CHLORIDE BLD-SCNC: 109 MMOL/L (ref 94–109)
CO2 SERPL-SCNC: 24 MMOL/L (ref 20–32)
CREAT SERPL-MCNC: 0.72 MG/DL (ref 0.52–1.04)
ERCP: NORMAL
ERYTHROCYTE [DISTWIDTH] IN BLOOD BY AUTOMATED COUNT: 14.1 % (ref 10–15)
GFR SERPL CREATININE-BSD FRML MDRD: >90 ML/MIN/1.73M2
GLUCOSE BLD-MCNC: 72 MG/DL (ref 70–99)
GLUCOSE BLDC GLUCOMTR-MCNC: 71 MG/DL (ref 70–99)
HCT VFR BLD AUTO: 36.6 % (ref 35–47)
HGB BLD-MCNC: 12.1 G/DL (ref 11.7–15.7)
INR PPP: 1.01 (ref 0.85–1.15)
LIPASE SERPL-CCNC: 151 U/L (ref 73–393)
LIPASE SERPL-CCNC: 87 U/L (ref 73–393)
MCH RBC QN AUTO: 30.6 PG (ref 26.5–33)
MCHC RBC AUTO-ENTMCNC: 33.1 G/DL (ref 31.5–36.5)
MCV RBC AUTO: 93 FL (ref 78–100)
PLATELET # BLD AUTO: 301 10E3/UL (ref 150–450)
POTASSIUM BLD-SCNC: 3.6 MMOL/L (ref 3.4–5.3)
PROT SERPL-MCNC: 7.3 G/DL (ref 6.8–8.8)
RBC # BLD AUTO: 3.95 10E6/UL (ref 3.8–5.2)
SODIUM SERPL-SCNC: 136 MMOL/L (ref 133–144)
WBC # BLD AUTO: 10.2 10E3/UL (ref 4–11)

## 2021-11-01 PROCEDURE — 250N000009 HC RX 250: Performed by: NURSE ANESTHETIST, CERTIFIED REGISTERED

## 2021-11-01 PROCEDURE — 255N000002 HC RX 255 OP 636: Performed by: INTERNAL MEDICINE

## 2021-11-01 PROCEDURE — C1725 CATH, TRANSLUMIN NON-LASER: HCPCS | Performed by: INTERNAL MEDICINE

## 2021-11-01 PROCEDURE — 710N000012 HC RECOVERY PHASE 2, PER MINUTE: Performed by: INTERNAL MEDICINE

## 2021-11-01 PROCEDURE — 250N000009 HC RX 250: Performed by: INTERNAL MEDICINE

## 2021-11-01 PROCEDURE — 360N000082 HC SURGERY LEVEL 2 W/ FLUORO, PER MIN: Performed by: INTERNAL MEDICINE

## 2021-11-01 PROCEDURE — 85610 PROTHROMBIN TIME: CPT | Performed by: INTERNAL MEDICINE

## 2021-11-01 PROCEDURE — C1877 STENT, NON-COAT/COV W/O DEL: HCPCS | Performed by: INTERNAL MEDICINE

## 2021-11-01 PROCEDURE — 250N000009 HC RX 250: Performed by: ANESTHESIOLOGY

## 2021-11-01 PROCEDURE — 36415 COLL VENOUS BLD VENIPUNCTURE: CPT | Performed by: INTERNAL MEDICINE

## 2021-11-01 PROCEDURE — 272N000001 HC OR GENERAL SUPPLY STERILE: Performed by: INTERNAL MEDICINE

## 2021-11-01 PROCEDURE — 370N000017 HC ANESTHESIA TECHNICAL FEE, PER MIN: Performed by: INTERNAL MEDICINE

## 2021-11-01 PROCEDURE — 250N000011 HC RX IP 250 OP 636: Performed by: ANESTHESIOLOGY

## 2021-11-01 PROCEDURE — 710N000010 HC RECOVERY PHASE 1, LEVEL 2, PER MIN: Performed by: INTERNAL MEDICINE

## 2021-11-01 PROCEDURE — 83690 ASSAY OF LIPASE: CPT | Performed by: INTERNAL MEDICINE

## 2021-11-01 PROCEDURE — 82962 GLUCOSE BLOOD TEST: CPT

## 2021-11-01 PROCEDURE — 250N000025 HC SEVOFLURANE, PER MIN: Performed by: INTERNAL MEDICINE

## 2021-11-01 PROCEDURE — 258N000003 HC RX IP 258 OP 636: Performed by: NURSE ANESTHETIST, CERTIFIED REGISTERED

## 2021-11-01 PROCEDURE — 82040 ASSAY OF SERUM ALBUMIN: CPT | Performed by: INTERNAL MEDICINE

## 2021-11-01 PROCEDURE — 999N000181 XR SURGERY CARM FLUORO GREATER THAN 5 MIN W STILLS: Mod: TC

## 2021-11-01 PROCEDURE — 85027 COMPLETE CBC AUTOMATED: CPT | Performed by: INTERNAL MEDICINE

## 2021-11-01 PROCEDURE — 258N000003 HC RX IP 258 OP 636: Performed by: ANESTHESIOLOGY

## 2021-11-01 PROCEDURE — C1726 CATH, BAL DIL, NON-VASCULAR: HCPCS | Performed by: INTERNAL MEDICINE

## 2021-11-01 PROCEDURE — 82150 ASSAY OF AMYLASE: CPT | Performed by: INTERNAL MEDICINE

## 2021-11-01 PROCEDURE — C1769 GUIDE WIRE: HCPCS | Performed by: INTERNAL MEDICINE

## 2021-11-01 PROCEDURE — 999N000141 HC STATISTIC PRE-PROCEDURE NURSING ASSESSMENT: Performed by: INTERNAL MEDICINE

## 2021-11-01 PROCEDURE — 250N000011 HC RX IP 250 OP 636: Performed by: NURSE ANESTHETIST, CERTIFIED REGISTERED

## 2021-11-01 DEVICE — STENT ZIMMON PANCREA 5FRX12CM SGL PIGTAIL G22363
Type: IMPLANTABLE DEVICE | Site: PANCREATIC DUCT | Status: NON-FUNCTIONAL
Removed: 2021-12-13

## 2021-11-01 RX ORDER — INDOMETHACIN 50 MG/1
SUPPOSITORY RECTAL PRN
Status: DISCONTINUED | OUTPATIENT
Start: 2021-11-01 | End: 2021-11-01 | Stop reason: HOSPADM

## 2021-11-01 RX ORDER — IOPAMIDOL 510 MG/ML
INJECTION, SOLUTION INTRAVASCULAR PRN
Status: DISCONTINUED | OUTPATIENT
Start: 2021-11-01 | End: 2021-11-01 | Stop reason: HOSPADM

## 2021-11-01 RX ORDER — NALOXONE HYDROCHLORIDE 0.4 MG/ML
0.4 INJECTION, SOLUTION INTRAMUSCULAR; INTRAVENOUS; SUBCUTANEOUS
Status: DISCONTINUED | OUTPATIENT
Start: 2021-11-01 | End: 2021-11-01 | Stop reason: HOSPADM

## 2021-11-01 RX ORDER — SCOLOPAMINE TRANSDERMAL SYSTEM 1 MG/1
1 PATCH, EXTENDED RELEASE TRANSDERMAL ONCE
Status: COMPLETED | OUTPATIENT
Start: 2021-11-01 | End: 2021-11-01

## 2021-11-01 RX ORDER — HYDROMORPHONE HCL IN WATER/PF 6 MG/30 ML
.2-.4 PATIENT CONTROLLED ANALGESIA SYRINGE INTRAVENOUS EVERY 10 MIN PRN
Status: DISCONTINUED | OUTPATIENT
Start: 2021-11-01 | End: 2021-11-01 | Stop reason: HOSPADM

## 2021-11-01 RX ORDER — ONDANSETRON 2 MG/ML
4 INJECTION INTRAMUSCULAR; INTRAVENOUS EVERY 6 HOURS PRN
Status: DISCONTINUED | OUTPATIENT
Start: 2021-11-01 | End: 2021-11-01 | Stop reason: HOSPADM

## 2021-11-01 RX ORDER — FLUMAZENIL 0.1 MG/ML
0.2 INJECTION, SOLUTION INTRAVENOUS
Status: DISCONTINUED | OUTPATIENT
Start: 2021-11-01 | End: 2021-11-01 | Stop reason: HOSPADM

## 2021-11-01 RX ORDER — ONDANSETRON 4 MG/1
4 TABLET, ORALLY DISINTEGRATING ORAL EVERY 30 MIN PRN
Status: DISCONTINUED | OUTPATIENT
Start: 2021-11-01 | End: 2021-11-01 | Stop reason: HOSPADM

## 2021-11-01 RX ORDER — PROPOFOL 10 MG/ML
INJECTION, EMULSION INTRAVENOUS PRN
Status: DISCONTINUED | OUTPATIENT
Start: 2021-11-01 | End: 2021-11-01

## 2021-11-01 RX ORDER — MEPERIDINE HYDROCHLORIDE 25 MG/ML
12.5 INJECTION INTRAMUSCULAR; INTRAVENOUS; SUBCUTANEOUS
Status: DISCONTINUED | OUTPATIENT
Start: 2021-11-01 | End: 2021-11-01 | Stop reason: HOSPADM

## 2021-11-01 RX ORDER — FENTANYL CITRATE 50 UG/ML
INJECTION, SOLUTION INTRAMUSCULAR; INTRAVENOUS PRN
Status: DISCONTINUED | OUTPATIENT
Start: 2021-11-01 | End: 2021-11-01

## 2021-11-01 RX ORDER — LABETALOL HYDROCHLORIDE 5 MG/ML
5-10 INJECTION, SOLUTION INTRAVENOUS EVERY 10 MIN PRN
Status: DISCONTINUED | OUTPATIENT
Start: 2021-11-01 | End: 2021-11-01 | Stop reason: HOSPADM

## 2021-11-01 RX ORDER — LIDOCAINE 40 MG/G
CREAM TOPICAL
Status: DISCONTINUED | OUTPATIENT
Start: 2021-11-01 | End: 2021-11-01 | Stop reason: HOSPADM

## 2021-11-01 RX ORDER — NALOXONE HYDROCHLORIDE 0.4 MG/ML
0.2 INJECTION, SOLUTION INTRAMUSCULAR; INTRAVENOUS; SUBCUTANEOUS
Status: DISCONTINUED | OUTPATIENT
Start: 2021-11-01 | End: 2021-11-01 | Stop reason: HOSPADM

## 2021-11-01 RX ORDER — ONDANSETRON 4 MG/1
4 TABLET, ORALLY DISINTEGRATING ORAL EVERY 6 HOURS PRN
Status: DISCONTINUED | OUTPATIENT
Start: 2021-11-01 | End: 2021-11-01 | Stop reason: HOSPADM

## 2021-11-01 RX ORDER — HYDRALAZINE HYDROCHLORIDE 20 MG/ML
2.5-5 INJECTION INTRAMUSCULAR; INTRAVENOUS EVERY 10 MIN PRN
Status: DISCONTINUED | OUTPATIENT
Start: 2021-11-01 | End: 2021-11-01 | Stop reason: HOSPADM

## 2021-11-01 RX ORDER — SODIUM CHLORIDE, SODIUM LACTATE, POTASSIUM CHLORIDE, CALCIUM CHLORIDE 600; 310; 30; 20 MG/100ML; MG/100ML; MG/100ML; MG/100ML
INJECTION, SOLUTION INTRAVENOUS CONTINUOUS PRN
Status: DISCONTINUED | OUTPATIENT
Start: 2021-11-01 | End: 2021-11-01

## 2021-11-01 RX ORDER — FENTANYL CITRATE 50 UG/ML
25-50 INJECTION, SOLUTION INTRAMUSCULAR; INTRAVENOUS
Status: DISCONTINUED | OUTPATIENT
Start: 2021-11-01 | End: 2021-11-01 | Stop reason: HOSPADM

## 2021-11-01 RX ORDER — ONDANSETRON 2 MG/ML
4 INJECTION INTRAMUSCULAR; INTRAVENOUS EVERY 30 MIN PRN
Status: DISCONTINUED | OUTPATIENT
Start: 2021-11-01 | End: 2021-11-01 | Stop reason: HOSPADM

## 2021-11-01 RX ORDER — SODIUM CHLORIDE, SODIUM LACTATE, POTASSIUM CHLORIDE, CALCIUM CHLORIDE 600; 310; 30; 20 MG/100ML; MG/100ML; MG/100ML; MG/100ML
INJECTION, SOLUTION INTRAVENOUS CONTINUOUS
Status: DISCONTINUED | OUTPATIENT
Start: 2021-11-01 | End: 2021-11-01 | Stop reason: HOSPADM

## 2021-11-01 RX ORDER — DEXAMETHASONE SODIUM PHOSPHATE 4 MG/ML
INJECTION, SOLUTION INTRA-ARTICULAR; INTRALESIONAL; INTRAMUSCULAR; INTRAVENOUS; SOFT TISSUE PRN
Status: DISCONTINUED | OUTPATIENT
Start: 2021-11-01 | End: 2021-11-01

## 2021-11-01 RX ORDER — INDOMETHACIN 50 MG/1
100 SUPPOSITORY RECTAL
Status: DISCONTINUED | OUTPATIENT
Start: 2021-11-01 | End: 2021-11-01 | Stop reason: HOSPADM

## 2021-11-01 RX ORDER — ONDANSETRON 2 MG/ML
INJECTION INTRAMUSCULAR; INTRAVENOUS PRN
Status: DISCONTINUED | OUTPATIENT
Start: 2021-11-01 | End: 2021-11-01

## 2021-11-01 RX ORDER — FENTANYL CITRATE 50 UG/ML
25-50 INJECTION, SOLUTION INTRAMUSCULAR; INTRAVENOUS EVERY 5 MIN PRN
Status: DISCONTINUED | OUTPATIENT
Start: 2021-11-01 | End: 2021-11-01 | Stop reason: HOSPADM

## 2021-11-01 RX ORDER — LEVOFLOXACIN 5 MG/ML
INJECTION, SOLUTION INTRAVENOUS PRN
Status: DISCONTINUED | OUTPATIENT
Start: 2021-11-01 | End: 2021-11-01

## 2021-11-01 RX ORDER — OXYCODONE HYDROCHLORIDE 5 MG/1
5 TABLET ORAL EVERY 4 HOURS PRN
Status: DISCONTINUED | OUTPATIENT
Start: 2021-11-01 | End: 2021-11-01 | Stop reason: HOSPADM

## 2021-11-01 RX ORDER — ESMOLOL HYDROCHLORIDE 10 MG/ML
INJECTION INTRAVENOUS PRN
Status: DISCONTINUED | OUTPATIENT
Start: 2021-11-01 | End: 2021-11-01

## 2021-11-01 RX ADMIN — SCOPALAMINE 1 PATCH: 1 PATCH, EXTENDED RELEASE TRANSDERMAL at 15:41

## 2021-11-01 RX ADMIN — ROCURONIUM BROMIDE 50 MG: 50 INJECTION, SOLUTION INTRAVENOUS at 13:38

## 2021-11-01 RX ADMIN — ONDANSETRON 4 MG: 2 INJECTION INTRAMUSCULAR; INTRAVENOUS at 14:27

## 2021-11-01 RX ADMIN — FENTANYL CITRATE 50 MCG: 50 INJECTION, SOLUTION INTRAMUSCULAR; INTRAVENOUS at 13:29

## 2021-11-01 RX ADMIN — PROPOFOL 20 MG: 10 INJECTION, EMULSION INTRAVENOUS at 14:15

## 2021-11-01 RX ADMIN — FENTANYL CITRATE 50 MCG: 50 INJECTION, SOLUTION INTRAMUSCULAR; INTRAVENOUS at 13:34

## 2021-11-01 RX ADMIN — SUGAMMADEX 200 MG: 100 INJECTION, SOLUTION INTRAVENOUS at 15:14

## 2021-11-01 RX ADMIN — GLUCAGON HYDROCHLORIDE 0.4 MG: KIT at 14:17

## 2021-11-01 RX ADMIN — ONDANSETRON 4 MG: 2 INJECTION INTRAMUSCULAR; INTRAVENOUS at 15:41

## 2021-11-01 RX ADMIN — PROPOFOL 200 MG: 10 INJECTION, EMULSION INTRAVENOUS at 13:37

## 2021-11-01 RX ADMIN — ESMOLOL HYDROCHLORIDE 20 MG: 10 INJECTION, SOLUTION INTRAVENOUS at 14:41

## 2021-11-01 RX ADMIN — ESMOLOL HYDROCHLORIDE 20 MG: 10 INJECTION, SOLUTION INTRAVENOUS at 14:28

## 2021-11-01 RX ADMIN — LEVOFLOXACIN 500 MG: 5 INJECTION, SOLUTION INTRAVENOUS at 13:44

## 2021-11-01 RX ADMIN — SODIUM CHLORIDE, POTASSIUM CHLORIDE, SODIUM LACTATE AND CALCIUM CHLORIDE: 600; 310; 30; 20 INJECTION, SOLUTION INTRAVENOUS at 13:19

## 2021-11-01 RX ADMIN — SODIUM CHLORIDE, POTASSIUM CHLORIDE, SODIUM LACTATE AND CALCIUM CHLORIDE: 600; 310; 30; 20 INJECTION, SOLUTION INTRAVENOUS at 15:43

## 2021-11-01 RX ADMIN — GLUCAGON HYDROCHLORIDE 0.4 MG: KIT at 14:05

## 2021-11-01 RX ADMIN — MIDAZOLAM 2 MG: 1 INJECTION INTRAMUSCULAR; INTRAVENOUS at 13:19

## 2021-11-01 RX ADMIN — DEXAMETHASONE SODIUM PHOSPHATE 8 MG: 4 INJECTION, SOLUTION INTRA-ARTICULAR; INTRALESIONAL; INTRAMUSCULAR; INTRAVENOUS; SOFT TISSUE at 13:57

## 2021-11-01 RX ADMIN — HYDROMORPHONE HYDROCHLORIDE 0.5 MG: 1 INJECTION, SOLUTION INTRAMUSCULAR; INTRAVENOUS; SUBCUTANEOUS at 14:09

## 2021-11-01 RX ADMIN — GLUCAGON HYDROCHLORIDE 0.2 MG: KIT at 14:47

## 2021-11-01 ASSESSMENT — MIFFLIN-ST. JEOR: SCORE: 1233.25

## 2021-11-01 ASSESSMENT — LIFESTYLE VARIABLES: TOBACCO_USE: 1

## 2021-11-01 NOTE — ANESTHESIA PREPROCEDURE EVALUATION
Anesthesia Pre-Procedure Evaluation    Patient: Artie Burger   MRN: [id number] : 1995        Preoperative Diagnosis: Pancreatic stones [K86.89]    Procedure : Procedure(s):  ENDOSCOPIC RETROGRADE CHOLANGIOPANCREATOGRAPHY, WITH ELECTROHYDRAULIC LITHOTRIPSY USING SPYGLASS DIRECT VISUALIZATION SYSTEM          Past Medical History:   Diagnosis Date     Gastroesophageal reflux disease      Pancreatic disease      PONV (postoperative nausea and vomiting)       Past Surgical History:   Procedure Laterality Date     ENDOSCOPIC RETROGRADE CHOLANGIOPANCREATOGRAM COMPLEX N/A 2021    Procedure: ENDOSCOPIC RETROGRADE CHOLANGIOPANCREATOGRAPHY with pancreatic sphincterotomy, dilation, stone removal, stent placement;  Surgeon: Guru Sabino Campuzano MD;  Location: [facility] OR     ENDOSCOPIC RETROGRADE CHOLANGIOPANCREATOGRAM WITH SPYGLASS N/A 10/6/2021    Procedure: ENDOSCOPIC RETROGRADE CHOLANGIOPANCREATOGRAPHY, WITH DIRECT DUCT VISUALIZATION, USING PANCREATICOBILIARY FIBEROPTIC PROBE, BILE DUCT STENT EXCHANGE, LITHOTRIPSY OF PANCREATIC STONE, SPHINCTEROTOMY, BALLOON DILATION OF PANCREATIC DUCT AND STONE EXTRACTION;  Surgeon: Guru Sabino Campuzano MD;  Location: [facility] OR      No Known Allergies   Social History     Tobacco Use     Smoking status: Current Every Day Smoker     Types: Cigarettes     Smokeless tobacco: Never Used     Tobacco comment: smokes about 4 cigarettes a day   Substance Use Topics     Alcohol use: Not Currently      Wt Readings from Last 1 Encounters:   21 55 kg (121 lb 4.1 oz)        Anesthesia Evaluation   Pt has had prior anesthetic.     No history of anesthetic complications       ROS/MED HX  ENT/Pulmonary:     (+) tobacco use, Current use,     Neurologic:  - neg neurologic ROS     Cardiovascular:       METS/Exercise Tolerance:     Hematologic:       Musculoskeletal:       GI/Hepatic: Comment: EtOH pancreatitis    (+) GERD,     Renal/Genitourinary:       Endo:        Psychiatric/Substance Use:       Infectious Disease:       Malignancy:       Other:            Physical Exam    Airway        Mallampati: II   TM distance: > 3 FB   Neck ROM: full   Mouth opening: > 3 cm    Respiratory Devices and Support         Dental  no notable dental history         Cardiovascular          Rhythm and rate: regular and normal     Pulmonary           breath sounds clear to auscultation           OUTSIDE LABS:  CBC:   Lab Results   Component Value Date    WBC 10.2 11/01/2021    WBC 8.5 10/06/2021    HGB 12.1 11/01/2021    HGB 9.9 (L) 10/06/2021    HCT 36.6 11/01/2021    HCT 29.5 (L) 10/06/2021     11/01/2021     10/06/2021     BMP:   Lab Results   Component Value Date     11/01/2021     10/06/2021    POTASSIUM 3.6 11/01/2021    POTASSIUM 3.5 10/06/2021    CHLORIDE 109 11/01/2021    CHLORIDE 105 10/06/2021    CO2 24 11/01/2021    CO2 27 10/06/2021    BUN 17 11/01/2021    BUN 9 10/06/2021    CR 0.72 11/01/2021    CR 0.76 10/06/2021    GLC 72 11/01/2021    GLC 71 11/01/2021     COAGS:   Lab Results   Component Value Date    INR 1.01 11/01/2021     POC:   Lab Results   Component Value Date    HCG Negative 09/13/2021     HEPATIC:   Lab Results   Component Value Date    ALBUMIN 3.7 11/01/2021    PROTTOTAL 7.3 11/01/2021    ALT 12 11/01/2021    AST 12 11/01/2021    ALKPHOS 57 11/01/2021    BILITOTAL 0.4 11/01/2021     OTHER:   Lab Results   Component Value Date    MURTAZA 9.1 11/01/2021    LIPASE 87 11/01/2021    AMYLASE 62 11/01/2021       Anesthesia Plan    ASA Status:  2      Anesthesia Type: General.     - Airway: ETT              Consents    Anesthesia Plan(s) and associated risks, benefits, and realistic alternatives discussed. Questions answered and patient/representative(s) expressed understanding.     - Discussed with:  Patient      - Extended Intubation/Ventilatory Support Discussed: No.      - Patient is DNR/DNI Status: No    Use of blood products discussed: No .      Postoperative Care            Comments:                Jackson Noble MD

## 2021-11-01 NOTE — ANESTHESIA CARE TRANSFER NOTE
Patient: Artie Burger    Procedure: Procedure(s):  ENDOSCOPIC RETROGRADE CHOLANGIOPANCREATOGRAPHY, dilation and debridement sludge removal, pancreatic duct stent placement       Diagnosis: Pancreatic stones [K86.89]  Diagnosis Additional Information: No value filed.    Anesthesia Type:   No value filed.     Note:    Oropharynx: oropharynx clear of all foreign objects and spontaneously breathing  Level of Consciousness: awake  Oxygen Supplementation: face mask  Level of Supplemental Oxygen (L/min / FiO2): 6  Independent Airway: airway patency satisfactory and stable  Dentition: dentition unchanged  Vital Signs Stable: post-procedure vital signs reviewed and stable  Report to RN Given: handoff report given  Patient transferred to: PACU    Handoff Report: Identifed the Patient, Identified the Reponsible Provider, Reviewed the pertinent medical history, Discussed the surgical course, Reviewed Intra-OP anesthesia mangement and issues during anesthesia, Set expectations for post-procedure period and Allowed opportunity for questions and acknowledgement of understanding      Vitals:  Vitals Value Taken Time   /84    Temp 96.3    Pulse 63 11/01/21 1526   Resp 14    SpO2 99 % 11/01/21 1526   Vitals shown include unvalidated device data.    Electronically Signed By: Gladis Barton  November 1, 2021  3:27 PM

## 2021-11-01 NOTE — ANESTHESIA PROCEDURE NOTES
Airway       Patient location during procedure: OR       Procedure Start/Stop Times: 11/1/2021 1:43 PM  Staff -        Anesthesiologist:  Jackson Noble MD       Performed By: anesthesiologistIndications and Patient Condition       Indications for airway management: rolando-procedural       Induction type:intravenous       Mask difficulty assessment: 1 - vent by mask    Final Airway Details       Final airway type: endotracheal airway       Successful airway: ETT - single  Endotracheal Airway Details        ETT size (mm): 7.0       Cuffed: yes       Successful intubation technique: video laryngoscopy (for teaching purposes)       VL Blade Size: Boyer 3 (cords visible DL)       Grade View of Cords: 1       Adjucts: stylet       Position: Right       Measured from: gums/teeth       Secured at (cm): 22       Bite Block used: Gi green oaw.    Post intubation assessment        Number of attempts at approach: 1       Secured with: pink tape       Ease of procedure: easy       Dentition: Intact and Unchanged    Additional Comments       Performed by medical student

## 2021-11-01 NOTE — OR NURSING
Dr. Campuzano paged with post op lab results. Ok with patient discharging. IV out, belongings returned to patient. Discharged to home via wheelchair.

## 2021-11-01 NOTE — ANESTHESIA POSTPROCEDURE EVALUATION
Patient: Artie Burger    Procedure: Procedure(s):  ENDOSCOPIC RETROGRADE CHOLANGIOPANCREATOGRAPHY, dilation and debridement sludge removal, pancreatic duct stent placement       Diagnosis:Pancreatic stones [K86.89]  Diagnosis Additional Information: No value filed.    Anesthesia Type:  No value filed.    Note:  Disposition: Outpatient   Postop Pain Control: Uneventful            Sign Out: Well controlled pain   PONV: No   Neuro/Psych: Uneventful            Sign Out: Acceptable/Baseline neuro status   Airway/Respiratory: Uneventful            Sign Out: Acceptable/Baseline resp. status   CV/Hemodynamics: Uneventful            Sign Out: Acceptable CV status; No obvious hypovolemia; No obvious fluid overload   Other NRE: NONE   DID A NON-ROUTINE EVENT OCCUR? No           Last vitals:  Vitals Value Taken Time   /79 11/01/21 1530   Temp     Pulse 50 11/01/21 1536   Resp     SpO2 100 % 11/01/21 1536   Vitals shown include unvalidated device data.    Electronically Signed By: Jackson Noble MD  November 1, 2021  3:38 PM

## 2021-11-01 NOTE — DISCHARGE INSTRUCTIONS
[facility], [place]  Same-Day Surgery ERCP Procedure   Adult Discharge Orders & Instructions     You had a procedure known as an Endoscopic Retrograde Cholangiopancreatography (ERCP). Your healthcare provider performed the ERCP to look at your bile or pancreatic ducts, and to locate and/or treat blockages (dilation, stenting, removal, etc.) in these ducts. Often biopsies, small samples of tissue, are taken to help diagnose and/or classify stages of disease growth. This procedure is used to diagnose diseases of the pancreas, bile ducts, pancreatic duct, liver, and gallbladder.     After your procedure   1. Make sure to clarify with your healthcare provider any diet restrictions (For example, clear liquid, low fat, no caffeine, etc.)   2. Do NOT take aspirin containing medications or any other blood-thinning medicines (anticoagulants) until your healthcare provider says it's OK.   3. You MAY be prescribed antibiotics, depending on what was done and/or found during your EUS, make sure to take antibiotics as prescribed by your healthcare provider    For 24 hours after surgery  1. Get plenty of rest.  A responsible adult must stay with you for at least 24 hours after you leave the hospital.   2. Do not drive or use heavy equipment.  If you have weakness or tingling, don't drive or use heavy equipment until this feeling goes away.  3. Do not drink alcohol.  4. Avoid strenuous or risky activities (gym, yoga, cycling, etc.).  Ask for help when climbing stairs.   5. You may feel lightheaded.  IF so, sit for a few minutes before standing.  Have someone help you get up.   6. If you have nausea (feel sick to your stomach): Drink only clear liquids such as apple juice, ginger ale, broth or 7-Up.  Rest may also help.  Be sure to drink enough fluids.  Move to a regular diet as you feel able.  7. If you feel bloated or have too much gas, use a heating pad on your belly to help reduce the discomfort.  This should help you feel better.   8. You may have a slight fever. This is normal for the first 24 hours.   9. You may have a dry mouth, a sore throat, muscle aches or trouble sleeping.  These are normal and will go away after 24 hours. A sore throat is most common. Use lozenges or gargle with salt water to ease the discomfort.   10. Do not make important or legal decisions.      Call your doctor for any of the followin. Chest pain, and/or shortness of breath  2. Abdominal  pain, bloating or cramping that has not improved or does not respond to pain reliving medications (Tylenol or narcotics if prescribed)   3. Difficulty swallowing or feeling as though food or liquids are stuck in your throat   4. Sore throat lasting more than 2 days or pain that has worsened over time   5. Black or tarry stools   6. Nausea and/or vomiting that is not resolving or has not responded to anti-nausea medications prescribed to you   7. It has been over 8 to 10 hours since surgery and you are still not able to urinate (pass water)   8. Headache for over 24 hours   9. Fever over 100.5 F (38 C) lasting more than 24 hours after the procedure   10. Signs of jaundice or blockage (fever, chills, abdominal pain, yellowing of the whites of your eyes, yellowing of your skin, and/or passing darker than normal urine)     To contact a doctor, call:   [ ] Dr. Guru Campuzano @ [phone] (GI Clinic) or [phone] (Monday thru Friday 8:00am to 4:30pm)   [ ] [phone] and ask for the Gastroenterology (GI) resident on call (answered 24 hours a day)   [ ] Emergency Department: [facility]: [phone]     Take it easy when you get home.  Remember, same day surgery DOES NOT MEAN SAME DAY RECOVERY!  Healing is a gradual process.  You will need some time to recover - you may be more tired than you realize at first.  Rest and relax for at least the first 24 hours at home.  You'll feel better and heal faster if you take good care of  yourself.     [facility], [place]  Same-Day Surgery   Adult Discharge Orders & Instructions     For 24 hours after surgery    1. Get plenty of rest.  A responsible adult must stay with you for at least 24 hours after you leave the hospital.   2. Do not drive or use heavy equipment.  If you have weakness or tingling, don't drive or use heavy equipment until this feeling goes away.  3. Do not drink alcohol.  4. Avoid strenuous or risky activities.  Ask for help when climbing stairs.   5. You may feel lightheaded.  IF so, sit for a few minutes before standing.  Have someone help you get up.   6. If you have nausea (feel sick to your stomach): Drink only clear liquids such as apple juice, ginger ale, broth or 7-Up.  Rest may also help.  Be sure to drink enough fluids.  Move to a regular diet as you feel able.  7. You may have a slight fever. Call the doctor if your fever is over 100 F (37.7 C) (taken under the tongue) or lasts longer than 24 hours.  8. You may have a dry mouth, a sore throat, muscle aches or trouble sleeping.  These should go away after 24 hours.  9. Do not make important or legal decisions.   Call your doctor for any of the followin.  Signs of infection (fever, growing tenderness at the surgery site, a large amount of drainage or bleeding, severe pain, foul-smelling drainage, redness, swelling).    2. It has been over 8 to 10 hours since surgery and you are still not able to urinate (pass water).    3.  Headache for over 24 hours.    4.  Numbness, tingling or weakness the day after surgery (if you had spinal anesthesia).

## 2021-11-01 NOTE — OR NURSING
Dr. Campuzano at bedside, put procedure note in paper chart, stated he does not need to see patient again.

## 2021-11-04 ENCOUNTER — MYC MEDICAL ADVICE (OUTPATIENT)
Dept: GASTROENTEROLOGY | Facility: CLINIC | Age: 26
End: 2021-11-04

## 2021-11-05 ENCOUNTER — PREP FOR PROCEDURE (OUTPATIENT)
Dept: GASTROENTEROLOGY | Facility: CLINIC | Age: 26
End: 2021-11-05

## 2021-11-05 DIAGNOSIS — K86.89 PANCREATIC DUCT STONES: Primary | ICD-10-CM

## 2021-11-05 NOTE — TELEPHONE ENCOUNTER
Post ERCP (21) with Dr. Campuzano: Follow-up    Post procedure recommendations:   Will recommend repeat ERCP in 6-8 weeks       Orders placed:   Please assist in scheduling:     Procedure/Imaging/Clinic: ERCP  Physician: Dr. Campuzano  Timin-8 weeks  Procedure length:90 min  Anesthesia:gen  Dx: PD stone  Tier:2  Location: [facility]      Patient states: left message, sent [facility]    Clinic contact and scheduling numbers verified for future questions/concerns.    Jacklyn Rodríguez RN Care Coordinator

## 2021-11-15 DIAGNOSIS — Z11.59 ENCOUNTER FOR SCREENING FOR OTHER VIRAL DISEASES: ICD-10-CM

## 2021-12-11 NOTE — BRIEF OP NOTE
[facility]    Brief Operative Note    Pre-operative diagnosis: Pancreatic duct stones [K86.89]  Post-operative diagnosis Same as pre-operative diagnosis    Procedure: Procedure(s):  ENDOSCOPIC RETROGRADE CHOLANGIOPANCREATOGRAPHY Latex Free  Surgeon: Surgeon(s) and Role:     * Guru Sabino Campuzano MD - Primary  Anesthesia: General   Estimated Blood Loss: Minimal    Drains: None  Specimens: * No specimens in log *  Findings:   None.  Complications: None.      Patient is a 26-year-old female with past medical history significant for alcoholism, currently in remission, with recurrent bouts of pancreatitis which has progressed to chronic calcific pancreatitis likely from alcohol abuse. CT scan with pancreatic stones appear mainly to be in the side branch but a stone possibly in the main pancreatic duct. Now s/p interval ERCPs with last one 11/1/2021 with confirmed multiple stone, sidebranches and a high grade stricture in the neck with stone impacted in stricture, dilated using Angioplasty balloon.  5 Fr by 12 cm Zimmon plastic pancreatic stent was placed across the PD stricture with great difficulty. Patient presents today for repeat ERCP with plans  For further interrogation of the MPD and stone/stricture management    -  film showed a well positioned pancreatic stent and calcifications in the area of the pancreatic genu and uncinate.   - Selective pancreatic cannulation and pancreatogram confirmed a moderate grade stricture at the genu/body junction with multiple stone involving sidebranches.   - Stricture was dilated using 6 mm balloon.  - SpyGlass performed with visualization of small MPD stone and stricture in the neck area.   - Basket sweep with successful extraction of conceretions and clearance of main PD.   - Placement of two 5 Fr Zimmon stent deep to the tail area (12, 11 cm) passing the stricture. This suggests improvement of stricture site  from prior with stone clearance and dilation.    - Procedure was technically very challenging given the high grade nature of the stricture and required multiple accessories (MODIFIER 22).    Plan  - Observe patient in same day observation unit for possible same day discharge.   - Check amylase and lipase in 2 hours.   - Will recommend to discharge patient as per our standard post ERCP recovery protocol based on clinical symptoms after reviewing the labs (amylase and lipase).   - Will recommend repeat ERCP in 8 weeks for repeat PD interrogation and stent exchange.  -If patient develops fever, or worsening abdominal pain before scheduled ERCP, will recommend patient to call us immediately for consideration of an earlier urgent ERCP.   - The findings and recommendations were discussed with the patient and their family.

## 2021-12-13 ENCOUNTER — ANESTHESIA EVENT (OUTPATIENT)
Dept: SURGERY | Facility: CLINIC | Age: 26
End: 2021-12-13
Payer: COMMERCIAL

## 2021-12-13 ENCOUNTER — HOSPITAL ENCOUNTER (OUTPATIENT)
Facility: CLINIC | Age: 26
Discharge: HOME | End: 2021-12-13
Attending: INTERNAL MEDICINE | Admitting: INTERNAL MEDICINE
Payer: COMMERCIAL

## 2021-12-13 ENCOUNTER — ANESTHESIA (OUTPATIENT)
Dept: SURGERY | Facility: CLINIC | Age: 26
End: 2021-12-13
Payer: COMMERCIAL

## 2021-12-13 ENCOUNTER — APPOINTMENT (OUTPATIENT)
Dept: GENERAL RADIOLOGY | Facility: CLINIC | Age: 26
End: 2021-12-13
Attending: INTERNAL MEDICINE
Payer: COMMERCIAL

## 2021-12-13 VITALS
TEMPERATURE: 97.9 F | WEIGHT: 120.59 LBS | DIASTOLIC BLOOD PRESSURE: 77 MMHG | BODY MASS INDEX: 22.19 KG/M2 | RESPIRATION RATE: 14 BRPM | OXYGEN SATURATION: 99 % | HEIGHT: 62 IN | SYSTOLIC BLOOD PRESSURE: 108 MMHG | HEART RATE: 54 BPM

## 2021-12-13 LAB
ALBUMIN SERPL-MCNC: 3.7 G/DL (ref 3.4–5)
ALP SERPL-CCNC: 50 U/L (ref 40–150)
ALT SERPL W P-5'-P-CCNC: 19 U/L (ref 0–50)
AMYLASE SERPL-CCNC: 301 U/L (ref 30–110)
AMYLASE SERPL-CCNC: 55 U/L (ref 30–110)
ANION GAP SERPL CALCULATED.3IONS-SCNC: 7 MMOL/L (ref 3–14)
AST SERPL W P-5'-P-CCNC: 13 U/L (ref 0–45)
BILIRUB SERPL-MCNC: 0.2 MG/DL (ref 0.2–1.3)
BUN SERPL-MCNC: 19 MG/DL (ref 7–30)
CALCIUM SERPL-MCNC: 9.8 MG/DL (ref 8.5–10.1)
CHLORIDE BLD-SCNC: 111 MMOL/L (ref 94–109)
CO2 SERPL-SCNC: 22 MMOL/L (ref 20–32)
CREAT SERPL-MCNC: 0.81 MG/DL (ref 0.52–1.04)
ERCP: NORMAL
ERYTHROCYTE [DISTWIDTH] IN BLOOD BY AUTOMATED COUNT: 14.5 % (ref 10–15)
GFR SERPL CREATININE-BSD FRML MDRD: >90 ML/MIN/1.73M2
GLUCOSE BLD-MCNC: 109 MG/DL (ref 70–99)
GLUCOSE BLDC GLUCOMTR-MCNC: 121 MG/DL (ref 70–99)
HCT VFR BLD AUTO: 35 % (ref 35–47)
HGB BLD-MCNC: 11.7 G/DL (ref 11.7–15.7)
INR PPP: 0.97 (ref 0.85–1.15)
LIPASE SERPL-CCNC: 134 U/L (ref 73–393)
LIPASE SERPL-CCNC: 689 U/L (ref 73–393)
MCH RBC QN AUTO: 30.6 PG (ref 26.5–33)
MCHC RBC AUTO-ENTMCNC: 33.4 G/DL (ref 31.5–36.5)
MCV RBC AUTO: 92 FL (ref 78–100)
PLATELET # BLD AUTO: 327 10E3/UL (ref 150–450)
POTASSIUM BLD-SCNC: 3.8 MMOL/L (ref 3.4–5.3)
PROT SERPL-MCNC: 7.1 G/DL (ref 6.8–8.8)
RBC # BLD AUTO: 3.82 10E6/UL (ref 3.8–5.2)
SODIUM SERPL-SCNC: 140 MMOL/L (ref 133–144)
WBC # BLD AUTO: 7.9 10E3/UL (ref 4–11)

## 2021-12-13 PROCEDURE — 258N000003 HC RX IP 258 OP 636: Performed by: ANESTHESIOLOGY

## 2021-12-13 PROCEDURE — 250N000013 HC RX MED GY IP 250 OP 250 PS 637: Performed by: ANESTHESIOLOGY

## 2021-12-13 PROCEDURE — 85610 PROTHROMBIN TIME: CPT | Performed by: INTERNAL MEDICINE

## 2021-12-13 PROCEDURE — 999N000141 HC STATISTIC PRE-PROCEDURE NURSING ASSESSMENT: Performed by: INTERNAL MEDICINE

## 2021-12-13 PROCEDURE — 250N000009 HC RX 250: Performed by: ANESTHESIOLOGY

## 2021-12-13 PROCEDURE — 258N000003 HC RX IP 258 OP 636: Performed by: INTERNAL MEDICINE

## 2021-12-13 PROCEDURE — 370N000017 HC ANESTHESIA TECHNICAL FEE, PER MIN: Performed by: INTERNAL MEDICINE

## 2021-12-13 PROCEDURE — 82962 GLUCOSE BLOOD TEST: CPT

## 2021-12-13 PROCEDURE — C1769 GUIDE WIRE: HCPCS | Performed by: INTERNAL MEDICINE

## 2021-12-13 PROCEDURE — 82150 ASSAY OF AMYLASE: CPT | Performed by: INTERNAL MEDICINE

## 2021-12-13 PROCEDURE — 36415 COLL VENOUS BLD VENIPUNCTURE: CPT | Performed by: INTERNAL MEDICINE

## 2021-12-13 PROCEDURE — 250N000009 HC RX 250: Performed by: INTERNAL MEDICINE

## 2021-12-13 PROCEDURE — 272N000001 HC OR GENERAL SUPPLY STERILE: Performed by: INTERNAL MEDICINE

## 2021-12-13 PROCEDURE — C1877 STENT, NON-COAT/COV W/O DEL: HCPCS | Performed by: INTERNAL MEDICINE

## 2021-12-13 PROCEDURE — 250N000011 HC RX IP 250 OP 636: Performed by: NURSE ANESTHETIST, CERTIFIED REGISTERED

## 2021-12-13 PROCEDURE — 710N000010 HC RECOVERY PHASE 1, LEVEL 2, PER MIN: Performed by: INTERNAL MEDICINE

## 2021-12-13 PROCEDURE — 82247 BILIRUBIN TOTAL: CPT | Performed by: INTERNAL MEDICINE

## 2021-12-13 PROCEDURE — 255N000002 HC RX 255 OP 636: Performed by: INTERNAL MEDICINE

## 2021-12-13 PROCEDURE — 85027 COMPLETE CBC AUTOMATED: CPT | Performed by: INTERNAL MEDICINE

## 2021-12-13 PROCEDURE — 82040 ASSAY OF SERUM ALBUMIN: CPT | Performed by: INTERNAL MEDICINE

## 2021-12-13 PROCEDURE — 999N000181 XR SURGERY CARM FLUORO GREATER THAN 5 MIN W STILLS: Mod: TC

## 2021-12-13 PROCEDURE — 250N000009 HC RX 250: Performed by: NURSE ANESTHETIST, CERTIFIED REGISTERED

## 2021-12-13 PROCEDURE — 710N000012 HC RECOVERY PHASE 2, PER MINUTE: Performed by: INTERNAL MEDICINE

## 2021-12-13 PROCEDURE — 360N000084 HC SURGERY LEVEL 4 W/ FLUORO, PER MIN: Performed by: INTERNAL MEDICINE

## 2021-12-13 PROCEDURE — 83690 ASSAY OF LIPASE: CPT | Performed by: INTERNAL MEDICINE

## 2021-12-13 PROCEDURE — C1726 CATH, BAL DIL, NON-VASCULAR: HCPCS | Performed by: INTERNAL MEDICINE

## 2021-12-13 DEVICE — STENT ZIMMON PANCREA 5FRX12CM SGL PIGTAIL G22363
Type: IMPLANTABLE DEVICE | Site: PANCREATIC DUCT | Status: NON-FUNCTIONAL
Removed: 2022-02-16

## 2021-12-13 DEVICE — STENT ZIMMON PANCREA 5FRX11CM SGL PIGTAIL G27194
Type: IMPLANTABLE DEVICE | Site: PANCREATIC DUCT | Status: NON-FUNCTIONAL
Removed: 2022-02-16

## 2021-12-13 RX ORDER — INDOMETHACIN 50 MG/1
SUPPOSITORY RECTAL PRN
Status: DISCONTINUED | OUTPATIENT
Start: 2021-12-13 | End: 2021-12-13 | Stop reason: HOSPADM

## 2021-12-13 RX ORDER — LIDOCAINE 40 MG/G
CREAM TOPICAL
Status: DISCONTINUED | OUTPATIENT
Start: 2021-12-13 | End: 2021-12-13 | Stop reason: HOSPADM

## 2021-12-13 RX ORDER — ONDANSETRON 2 MG/ML
4 INJECTION INTRAMUSCULAR; INTRAVENOUS EVERY 6 HOURS PRN
Status: DISCONTINUED | OUTPATIENT
Start: 2021-12-13 | End: 2021-12-13 | Stop reason: HOSPADM

## 2021-12-13 RX ORDER — ACETAMINOPHEN 325 MG/1
975 TABLET ORAL
Status: COMPLETED | OUTPATIENT
Start: 2021-12-13 | End: 2021-12-13

## 2021-12-13 RX ORDER — ONDANSETRON 4 MG/1
4 TABLET, ORALLY DISINTEGRATING ORAL EVERY 6 HOURS PRN
Status: DISCONTINUED | OUTPATIENT
Start: 2021-12-13 | End: 2021-12-13 | Stop reason: HOSPADM

## 2021-12-13 RX ORDER — ONDANSETRON 4 MG/1
4 TABLET, ORALLY DISINTEGRATING ORAL EVERY 30 MIN PRN
Status: DISCONTINUED | OUTPATIENT
Start: 2021-12-13 | End: 2021-12-13 | Stop reason: HOSPADM

## 2021-12-13 RX ORDER — FLUMAZENIL 0.1 MG/ML
0.2 INJECTION, SOLUTION INTRAVENOUS
Status: DISCONTINUED | OUTPATIENT
Start: 2021-12-13 | End: 2021-12-13 | Stop reason: HOSPADM

## 2021-12-13 RX ORDER — PROPOFOL 10 MG/ML
INJECTION, EMULSION INTRAVENOUS PRN
Status: DISCONTINUED | OUTPATIENT
Start: 2021-12-13 | End: 2021-12-13

## 2021-12-13 RX ORDER — FENTANYL CITRATE 50 UG/ML
INJECTION, SOLUTION INTRAMUSCULAR; INTRAVENOUS PRN
Status: DISCONTINUED | OUTPATIENT
Start: 2021-12-13 | End: 2021-12-13

## 2021-12-13 RX ORDER — IOPAMIDOL 510 MG/ML
INJECTION, SOLUTION INTRAVASCULAR PRN
Status: DISCONTINUED | OUTPATIENT
Start: 2021-12-13 | End: 2021-12-13 | Stop reason: HOSPADM

## 2021-12-13 RX ORDER — SCOLOPAMINE TRANSDERMAL SYSTEM 1 MG/1
1 PATCH, EXTENDED RELEASE TRANSDERMAL ONCE
Status: DISCONTINUED | OUTPATIENT
Start: 2021-12-13 | End: 2021-12-13 | Stop reason: HOSPADM

## 2021-12-13 RX ORDER — NALOXONE HYDROCHLORIDE 0.4 MG/ML
0.4 INJECTION, SOLUTION INTRAMUSCULAR; INTRAVENOUS; SUBCUTANEOUS
Status: DISCONTINUED | OUTPATIENT
Start: 2021-12-13 | End: 2021-12-13 | Stop reason: HOSPADM

## 2021-12-13 RX ORDER — FENTANYL CITRATE 50 UG/ML
25-50 INJECTION, SOLUTION INTRAMUSCULAR; INTRAVENOUS EVERY 5 MIN PRN
Status: DISCONTINUED | OUTPATIENT
Start: 2021-12-13 | End: 2021-12-13 | Stop reason: HOSPADM

## 2021-12-13 RX ORDER — ALBUTEROL SULFATE 0.83 MG/ML
2.5 SOLUTION RESPIRATORY (INHALATION) EVERY 4 HOURS PRN
Status: DISCONTINUED | OUTPATIENT
Start: 2021-12-13 | End: 2021-12-13 | Stop reason: HOSPADM

## 2021-12-13 RX ORDER — DEXAMETHASONE SODIUM PHOSPHATE 4 MG/ML
INJECTION, SOLUTION INTRA-ARTICULAR; INTRALESIONAL; INTRAMUSCULAR; INTRAVENOUS; SOFT TISSUE PRN
Status: DISCONTINUED | OUTPATIENT
Start: 2021-12-13 | End: 2021-12-13

## 2021-12-13 RX ORDER — HYDROMORPHONE HCL IN WATER/PF 6 MG/30 ML
.2-.4 PATIENT CONTROLLED ANALGESIA SYRINGE INTRAVENOUS EVERY 5 MIN PRN
Status: DISCONTINUED | OUTPATIENT
Start: 2021-12-13 | End: 2021-12-13 | Stop reason: HOSPADM

## 2021-12-13 RX ORDER — MEPERIDINE HYDROCHLORIDE 25 MG/ML
12.5 INJECTION INTRAMUSCULAR; INTRAVENOUS; SUBCUTANEOUS
Status: DISCONTINUED | OUTPATIENT
Start: 2021-12-13 | End: 2021-12-13 | Stop reason: HOSPADM

## 2021-12-13 RX ORDER — ONDANSETRON 2 MG/ML
INJECTION INTRAMUSCULAR; INTRAVENOUS PRN
Status: DISCONTINUED | OUTPATIENT
Start: 2021-12-13 | End: 2021-12-13

## 2021-12-13 RX ORDER — ONDANSETRON 2 MG/ML
4 INJECTION INTRAMUSCULAR; INTRAVENOUS EVERY 30 MIN PRN
Status: DISCONTINUED | OUTPATIENT
Start: 2021-12-13 | End: 2021-12-13 | Stop reason: HOSPADM

## 2021-12-13 RX ORDER — LIDOCAINE HYDROCHLORIDE 20 MG/ML
INJECTION, SOLUTION INFILTRATION; PERINEURAL PRN
Status: DISCONTINUED | OUTPATIENT
Start: 2021-12-13 | End: 2021-12-13

## 2021-12-13 RX ORDER — LEVOFLOXACIN 5 MG/ML
INJECTION, SOLUTION INTRAVENOUS PRN
Status: DISCONTINUED | OUTPATIENT
Start: 2021-12-13 | End: 2021-12-13

## 2021-12-13 RX ORDER — OXYCODONE HYDROCHLORIDE 5 MG/1
5 TABLET ORAL EVERY 4 HOURS PRN
Status: DISCONTINUED | OUTPATIENT
Start: 2021-12-13 | End: 2021-12-13 | Stop reason: HOSPADM

## 2021-12-13 RX ORDER — NALOXONE HYDROCHLORIDE 0.4 MG/ML
0.2 INJECTION, SOLUTION INTRAMUSCULAR; INTRAVENOUS; SUBCUTANEOUS
Status: DISCONTINUED | OUTPATIENT
Start: 2021-12-13 | End: 2021-12-13 | Stop reason: HOSPADM

## 2021-12-13 RX ORDER — SODIUM CHLORIDE, SODIUM LACTATE, POTASSIUM CHLORIDE, CALCIUM CHLORIDE 600; 310; 30; 20 MG/100ML; MG/100ML; MG/100ML; MG/100ML
INJECTION, SOLUTION INTRAVENOUS CONTINUOUS
Status: DISCONTINUED | OUTPATIENT
Start: 2021-12-13 | End: 2021-12-13 | Stop reason: HOSPADM

## 2021-12-13 RX ORDER — ESCITALOPRAM OXALATE 20 MG/1
20 TABLET ORAL DAILY
COMMUNITY
Start: 2021-11-26 | End: 2023-07-03

## 2021-12-13 RX ORDER — PROPOFOL 10 MG/ML
INJECTION, EMULSION INTRAVENOUS CONTINUOUS PRN
Status: DISCONTINUED | OUTPATIENT
Start: 2021-12-13 | End: 2021-12-13

## 2021-12-13 RX ORDER — FENTANYL CITRATE 50 UG/ML
25 INJECTION, SOLUTION INTRAMUSCULAR; INTRAVENOUS
Status: DISCONTINUED | OUTPATIENT
Start: 2021-12-13 | End: 2021-12-13 | Stop reason: HOSPADM

## 2021-12-13 RX ADMIN — LIDOCAINE HYDROCHLORIDE 50 MG: 20 INJECTION, SOLUTION INFILTRATION; PERINEURAL at 07:43

## 2021-12-13 RX ADMIN — FENTANYL CITRATE 50 MCG: 50 INJECTION, SOLUTION INTRAMUSCULAR; INTRAVENOUS at 07:40

## 2021-12-13 RX ADMIN — FENTANYL CITRATE 50 MCG: 50 INJECTION, SOLUTION INTRAMUSCULAR; INTRAVENOUS at 08:45

## 2021-12-13 RX ADMIN — LEVOFLOXACIN 500 MG: 5 INJECTION, SOLUTION INTRAVENOUS at 07:58

## 2021-12-13 RX ADMIN — PROPOFOL 150 MG: 10 INJECTION, EMULSION INTRAVENOUS at 07:43

## 2021-12-13 RX ADMIN — SODIUM CHLORIDE, POTASSIUM CHLORIDE, SODIUM LACTATE AND CALCIUM CHLORIDE 1000 ML: 600; 310; 30; 20 INJECTION, SOLUTION INTRAVENOUS at 09:56

## 2021-12-13 RX ADMIN — PROPOFOL 125 MCG/KG/MIN: 10 INJECTION, EMULSION INTRAVENOUS at 07:44

## 2021-12-13 RX ADMIN — FENTANYL CITRATE 50 MCG: 50 INJECTION, SOLUTION INTRAMUSCULAR; INTRAVENOUS at 08:25

## 2021-12-13 RX ADMIN — ROCURONIUM BROMIDE 10 MG: 50 INJECTION, SOLUTION INTRAVENOUS at 08:22

## 2021-12-13 RX ADMIN — FENTANYL CITRATE 50 MCG: 50 INJECTION, SOLUTION INTRAMUSCULAR; INTRAVENOUS at 09:10

## 2021-12-13 RX ADMIN — ACETAMINOPHEN 975 MG: 325 TABLET, FILM COATED ORAL at 11:53

## 2021-12-13 RX ADMIN — ROCURONIUM BROMIDE 40 MG: 50 INJECTION, SOLUTION INTRAVENOUS at 07:43

## 2021-12-13 RX ADMIN — MIDAZOLAM 2 MG: 1 INJECTION INTRAMUSCULAR; INTRAVENOUS at 07:33

## 2021-12-13 RX ADMIN — SCOPALAMINE 1 PATCH: 1 PATCH, EXTENDED RELEASE TRANSDERMAL at 07:09

## 2021-12-13 RX ADMIN — SODIUM CHLORIDE, POTASSIUM CHLORIDE, SODIUM LACTATE AND CALCIUM CHLORIDE: 600; 310; 30; 20 INJECTION, SOLUTION INTRAVENOUS at 09:00

## 2021-12-13 RX ADMIN — SUGAMMADEX 120 MG: 100 INJECTION, SOLUTION INTRAVENOUS at 09:10

## 2021-12-13 RX ADMIN — SODIUM CHLORIDE, POTASSIUM CHLORIDE, SODIUM LACTATE AND CALCIUM CHLORIDE: 600; 310; 30; 20 INJECTION, SOLUTION INTRAVENOUS at 07:34

## 2021-12-13 RX ADMIN — ONDANSETRON 4 MG: 2 INJECTION INTRAMUSCULAR; INTRAVENOUS at 09:03

## 2021-12-13 RX ADMIN — GLUCAGON HYDROCHLORIDE 0.4 MG: KIT at 08:06

## 2021-12-13 RX ADMIN — DEXAMETHASONE SODIUM PHOSPHATE 8 MG: 4 INJECTION, SOLUTION INTRA-ARTICULAR; INTRALESIONAL; INTRAMUSCULAR; INTRAVENOUS; SOFT TISSUE at 08:11

## 2021-12-13 ASSESSMENT — LIFESTYLE VARIABLES: TOBACCO_USE: 1

## 2021-12-13 ASSESSMENT — MIFFLIN-ST. JEOR: SCORE: 1230.25

## 2021-12-13 NOTE — DISCHARGE INSTRUCTIONS
[facility], [place]  Same-Day Surgery   Adult Discharge Orders & Instructions       *Take it easy when you get home.  Remember, same day surgery DOES NOT MEAN SAME DAY RECOVERY!    *Healing is a gradual process and you will need some time to recover - you may be more tired than you realize at first.    *Rest and relax for at least the first 24 hours at home.  You'll feel better and heal faster if you take good care of yourself.    For 24 hours after surgery    1. A responsible adult must stay with you for at least 24 hours after you leave the hospital.   2. Do not drink alcohol, drive, or use heavy equipment for 24 hours. This is because you have had anesthesia medications.  3. Avoid strenuous and risky activities for 24 hours.   4. You may feel lightheaded, if so, sit for a few minutes before standing.  You may need someone to help you get up. Ask for help when climbing stairs.  5. If you have nausea: Drink only clear liquids such as water, juice, soda, or broth. Rest may also help. Be sure to drink enough fluids. Move to a  regular diet as you feel able.  6. You may have a slight fever. Call the doctor if your fever is over 100 F (37.7 C) (taken under the tongue) or lasts longer than 24 hours.  7. You might have a dry mouth, sore throat, muscle aches or trouble sleeping.  These should go away after 24 hours.  8. Do not make important or legal decisions.     Call your doctor for any of the followin.  Signs of infection: fever, growing tenderness at the surgery site, a large amount of drainage or bleeding, severe pain, foul-smelling drainage, redness, swelling. Please call if you experience any of these symptoms.    2. If it has been 8 to 10 hours since surgery and you are still not able to urinate (pass water), please call.    3.  If you have a headache for over 24 hours, please call.    4. - Will recommend repeat ERCP in 8 weeks for repeat PD interrogation and stent  "exchange.  -If patient develops fever, or worsening abdominal pain before scheduled ERCP, will recommend patient to call us immediately for consideration of an earlier urgent ERCP.     To contact a doctor, call Dr. Guru Campuzano @ [phone] (GI Clinic) or [phone]   or:    '   [phone] and ask for \"the resident on call for  gastroenterology       \" (this is the hospital and is answered 24 hours a day)    '   Emergency Department: [facility]: [phone]       (TTY for hearing impaired: [phone])    "

## 2021-12-13 NOTE — ANESTHESIA POSTPROCEDURE EVALUATION
Patient: Artie Burger    Procedure: Procedure(s):  ENDOSCOPIC RETROGRADE CHOLANGIOPANCREATOGRAPHY, with pancreatic stent exchange, ballon dilation, stone removal, with spyglass direct visualization       Diagnosis:Pancreatic duct stones [K86.89]  Diagnosis Additional Information: No value filed.    Anesthesia Type:  General    Note:  Disposition: Outpatient   Postop Pain Control: Uneventful            Sign Out: Well controlled pain   PONV: No   Neuro/Psych: Uneventful            Sign Out: Acceptable/Baseline neuro status   Airway/Respiratory: Uneventful            Sign Out: Acceptable/Baseline resp. status   CV/Hemodynamics: Uneventful            Sign Out: Acceptable CV status; No obvious hypovolemia; No obvious fluid overload   Other NRE: NONE   DID A NON-ROUTINE EVENT OCCUR? No           Last vitals:  Vitals Value Taken Time   BP     Temp     Pulse     Resp     SpO2 99 % 12/13/21 0915   Vitals shown include unvalidated device data.    Electronically Signed By: Luli Smith MD  December 13, 2021  9:16 AM

## 2021-12-13 NOTE — ANESTHESIA PROCEDURE NOTES
Airway       Patient location during procedure: OR       Procedure Start/Stop Times: 12/13/2021 7:46 AM  Staff -        CRNA: Vitaly Isaac APRN CRNA       Performed By: CRNA  Consent for Airway        Urgency: elective  Indications and Patient Condition       Indications for airway management: rolando-procedural       Induction type:intravenous       Mask difficulty assessment: 1 - vent by mask    Final Airway Details       Final airway type: endotracheal airway       Successful airway: ETT - single  Endotracheal Airway Details        ETT size (mm): 6.5       Successful intubation technique: direct laryngoscopy       DL Blade Type: Marsh 2       Grade View of Cords: 1       Position: Right       Measured from: gums/teeth       Secured at (cm): 20       Bite block used: None    Post intubation assessment        Placement verified by: capnometry, equal breath sounds and chest rise        Number of attempts at approach: 1       Secured with: plastic tape and pink tape       Ease of procedure: easy       Dentition: Intact and Unchanged

## 2021-12-13 NOTE — PROVIDER NOTIFICATION
Spoke to Dr. Paula regarding Lipase and Amylase result. OK to discharge home.    Therese LEVINE RN

## 2021-12-13 NOTE — ANESTHESIA PREPROCEDURE EVALUATION
Anesthesia Pre-Procedure Evaluation    Patient: Artie Burger   MRN: [id number] : 1995        Preoperative Diagnosis: Pancreatic duct stones [K86.89]    Procedure : Procedure(s):  ENDOSCOPIC RETROGRADE CHOLANGIOPANCREATOGRAPHY Latex Free          Past Medical History:   Diagnosis Date     Gastroesophageal reflux disease      Pancreatic disease      PONV (postoperative nausea and vomiting)       Past Surgical History:   Procedure Laterality Date     ENDOSCOPIC RETROGRADE CHOLANGIOPANCREATOGRAM COMPLEX N/A 2021    Procedure: ENDOSCOPIC RETROGRADE CHOLANGIOPANCREATOGRAPHY with pancreatic sphincterotomy, dilation, stone removal, stent placement;  Surgeon: Guru Sabino Campuzano MD;  [facility]: [facility]     ENDOSCOPIC RETROGRADE CHOLANGIOPANCREATOGRAM WITH DIRECT VISUALIZATION SYSTEM AND GENERATOR N/A 2021    Procedure: ENDOSCOPIC RETROGRADE CHOLANGIOPANCREATOGRAPHY, dilation and debridement sludge removal, pancreatic duct stent placement;  Surgeon: Guru Sabino Campuzano MD;  Location: [facility]     ENDOSCOPIC RETROGRADE CHOLANGIOPANCREATOGRAM WITH SPYGLASS N/A 10/6/2021    Procedure: ENDOSCOPIC RETROGRADE CHOLANGIOPANCREATOGRAPHY, WITH DIRECT DUCT VISUALIZATION, USING PANCREATICOBILIARY FIBEROPTIC PROBE, BILE DUCT STENT EXCHANGE, LITHOTRIPSY OF PANCREATIC STONE, SPHINCTEROTOMY, BALLOON DILATION OF PANCREATIC DUCT AND STONE EXTRACTION;  Surgeon: Guru Sabino Campuzano MD;  Location: [facility]      No Known Allergies   Social History     Tobacco Use     Smoking status: Current Every Day Smoker     Types: Cigarettes     Smokeless tobacco: Never Used     Tobacco comment: smokes about 4 cigarettes a day   Substance Use Topics     Alcohol use: Not Currently      Wt Readings from Last 1 Encounters:   21 54.7 kg (120 lb 9.5 oz)        Anesthesia Evaluation   Pt has had prior anesthetic.     History of anesthetic complications  - PONV.      ROS/MED HX  ENT/Pulmonary:      (+) tobacco use, Current use,     Neurologic:  - neg neurologic ROS     Cardiovascular:       METS/Exercise Tolerance:     Hematologic:       Musculoskeletal:       GI/Hepatic: Comment: EtOH pancreatitis    (+) GERD,     Renal/Genitourinary:       Endo:       Psychiatric/Substance Use:     (+) psychiatric history anxiety and depression     Infectious Disease:       Malignancy:       Other:            Physical Exam    Airway  airway exam normal           Respiratory Devices and Support         Dental  no notable dental history         Cardiovascular             Pulmonary                   OUTSIDE LABS:  CBC:   Lab Results   Component Value Date    WBC 7.9 12/13/2021    WBC 10.2 11/01/2021    HGB 11.7 12/13/2021    HGB 12.1 11/01/2021    HCT 35.0 12/13/2021    HCT 36.6 11/01/2021     12/13/2021     11/01/2021     BMP:   Lab Results   Component Value Date     11/01/2021     10/06/2021    POTASSIUM 3.6 11/01/2021    POTASSIUM 3.5 10/06/2021    CHLORIDE 109 11/01/2021    CHLORIDE 105 10/06/2021    CO2 24 11/01/2021    CO2 27 10/06/2021    BUN 17 11/01/2021    BUN 9 10/06/2021    CR 0.72 11/01/2021    CR 0.76 10/06/2021     (H) 12/13/2021    GLC 72 11/01/2021     COAGS:   Lab Results   Component Value Date    INR 1.01 11/01/2021     POC:   Lab Results   Component Value Date    HCG Negative 09/13/2021     HEPATIC:   Lab Results   Component Value Date    ALBUMIN 3.7 11/01/2021    PROTTOTAL 7.3 11/01/2021    ALT 12 11/01/2021    AST 12 11/01/2021    ALKPHOS 57 11/01/2021    BILITOTAL 0.4 11/01/2021     OTHER:   Lab Results   Component Value Date    MURTAZA 9.1 11/01/2021    LIPASE 151 11/01/2021    AMYLASE 295 (H) 11/01/2021       Anesthesia Plan    ASA Status:  2   NPO Status:  NPO Appropriate    Anesthesia Type: General.     - Airway: ETT   Induction: Intravenous, Propofol.   Maintenance: TIVA.        Consents    Anesthesia Plan(s) and associated risks, benefits, and realistic alternatives  discussed. Questions answered and patient/representative(s) expressed understanding.    - Discussed:     - Discussed with:  Patient      - Extended Intubation/Ventilatory Support Discussed: No.      - Patient is DNR/DNI Status: No    Use of blood products discussed: No .     Postoperative Care    Pain management: IV analgesics.   PONV prophylaxis: Ondansetron (or other 5HT-3), Dexamethasone or Solumedrol, Background Propofol Infusion, Scopolamine patch     Comments:                Jeremías Schumacher MD

## 2021-12-13 NOTE — ANESTHESIA CARE TRANSFER NOTE
Patient: Artie Burger    Procedure: Procedure(s):  ENDOSCOPIC RETROGRADE CHOLANGIOPANCREATOGRAPHY, with pancreatic stent exchange, ballon dilation, stone removal, with spyglass direct visualization       Diagnosis: Pancreatic duct stones [K86.89]  Diagnosis Additional Information: No value filed.    Anesthesia Type:   General     Note:    Oropharynx: oropharynx clear of all foreign objects and spontaneously breathing  Level of Consciousness: awake  Oxygen Supplementation: nasal cannula  Level of Supplemental Oxygen (L/min / FiO2): 3  Independent Airway: airway patency satisfactory and stable  Dentition: dentition unchanged  Vital Signs Stable: post-procedure vital signs reviewed and stable  Report to RN Given: handoff report given  Patient transferred to: PACU    Handoff Report: Identifed the Patient, Identified the Reponsible Provider, Reviewed the pertinent medical history, Discussed the surgical course, Reviewed Intra-OP anesthesia mangement and issues during anesthesia, Set expectations for post-procedure period and Allowed opportunity for questions and acknowledgement of understanding      Vitals:  Vitals Value Taken Time   BP     Temp     Pulse     Resp     SpO2 100 % 12/13/21 0916   Vitals shown include unvalidated device data.    Electronically Signed By: ROSIE Burroughs CRNA  December 13, 2021  9:17 AM

## 2021-12-20 ENCOUNTER — PATIENT OUTREACH (OUTPATIENT)
Dept: GASTROENTEROLOGY | Facility: CLINIC | Age: 26
End: 2021-12-20
Payer: COMMERCIAL

## 2021-12-20 ENCOUNTER — PREP FOR PROCEDURE (OUTPATIENT)
Dept: GASTROENTEROLOGY | Facility: CLINIC | Age: 26
End: 2021-12-20
Payer: COMMERCIAL

## 2021-12-20 DIAGNOSIS — Z46.59 ENCOUNTER FOR PANCREATIC DUCT STENT EXCHANGE: Primary | ICD-10-CM

## 2021-12-20 NOTE — TELEPHONE ENCOUNTER
Post ERCP (21) with Dr. Campuzano: Follow-up    Post procedure recommendations:   Will recommend repeat ERCP in 8 weeks for repeat PD   interrogation and stent exchange.      -If patient develops fever, or worsening abdominal      pain before scheduled ERCP, will recommend patient to  call us immediately for consideration of an earlier  urgent ERCP.     Orders placed:   Please assist in scheduling:     Procedure/Imaging/Clinic: ERCP  Physician: Dr. Campuzano  Timin weeks  Procedure length:90 min  Anesthesia:gen  Dx: pancreatic stent exchange  Tier:2  [facility]: [facility]       Comments:       Patient states: left message.    Clinic contact and scheduling numbers verified for future questions/concerns.    Jacklyn Rodríguez, RN Care Coordinator

## 2022-02-14 RX ORDER — PROPRANOLOL HYDROCHLORIDE 10 MG/1
10 TABLET ORAL 3 TIMES DAILY PRN
Status: ON HOLD | COMMUNITY
End: 2023-05-10

## 2022-02-14 RX ORDER — DESOGESTREL AND ETHINYL ESTRADIOL 0.15-0.03
600 KIT ORAL DAILY
COMMUNITY
End: 2022-07-13

## 2022-02-16 ENCOUNTER — HOSPITAL ENCOUNTER (OUTPATIENT)
Facility: CLINIC | Age: 27
Discharge: HOME | End: 2022-02-16
Attending: INTERNAL MEDICINE | Admitting: INTERNAL MEDICINE
Payer: COMMERCIAL

## 2022-02-16 ENCOUNTER — ANESTHESIA EVENT (OUTPATIENT)
Dept: SURGERY | Facility: CLINIC | Age: 27
End: 2022-02-16
Payer: COMMERCIAL

## 2022-02-16 ENCOUNTER — ANESTHESIA (OUTPATIENT)
Dept: SURGERY | Facility: CLINIC | Age: 27
End: 2022-02-16
Payer: COMMERCIAL

## 2022-02-16 ENCOUNTER — APPOINTMENT (OUTPATIENT)
Dept: GENERAL RADIOLOGY | Facility: CLINIC | Age: 27
End: 2022-02-16
Attending: INTERNAL MEDICINE
Payer: COMMERCIAL

## 2022-02-16 VITALS
DIASTOLIC BLOOD PRESSURE: 79 MMHG | HEART RATE: 90 BPM | RESPIRATION RATE: 16 BRPM | SYSTOLIC BLOOD PRESSURE: 119 MMHG | WEIGHT: 119.93 LBS | HEIGHT: 62 IN | TEMPERATURE: 98.5 F | OXYGEN SATURATION: 98 % | BODY MASS INDEX: 22.07 KG/M2

## 2022-02-16 LAB
ALBUMIN SERPL-MCNC: 3.3 G/DL (ref 3.4–5)
ALP SERPL-CCNC: 52 U/L (ref 40–150)
ALT SERPL W P-5'-P-CCNC: 15 U/L (ref 0–50)
AMYLASE SERPL-CCNC: 93 U/L (ref 30–110)
ANION GAP SERPL CALCULATED.3IONS-SCNC: 6 MMOL/L (ref 3–14)
AST SERPL W P-5'-P-CCNC: 15 U/L (ref 0–45)
BILIRUB SERPL-MCNC: 0.4 MG/DL (ref 0.2–1.3)
BUN SERPL-MCNC: 20 MG/DL (ref 7–30)
CALCIUM SERPL-MCNC: 8.8 MG/DL (ref 8.5–10.1)
CHLORIDE BLD-SCNC: 109 MMOL/L (ref 94–109)
CO2 SERPL-SCNC: 23 MMOL/L (ref 20–32)
CREAT SERPL-MCNC: 0.83 MG/DL (ref 0.52–1.04)
ERCP: NORMAL
ERYTHROCYTE [DISTWIDTH] IN BLOOD BY AUTOMATED COUNT: 13.4 % (ref 10–15)
GFR SERPL CREATININE-BSD FRML MDRD: >90 ML/MIN/1.73M2
GLUCOSE BLD-MCNC: 105 MG/DL (ref 70–99)
GLUCOSE BLDC GLUCOMTR-MCNC: 115 MG/DL (ref 70–99)
HCT VFR BLD AUTO: 35.3 % (ref 35–47)
HGB BLD-MCNC: 11.6 G/DL (ref 11.7–15.7)
INR PPP: 0.9 (ref 0.85–1.15)
LIPASE SERPL-CCNC: 217 U/L (ref 73–393)
MCH RBC QN AUTO: 30.5 PG (ref 26.5–33)
MCHC RBC AUTO-ENTMCNC: 32.9 G/DL (ref 31.5–36.5)
MCV RBC AUTO: 93 FL (ref 78–100)
PLATELET # BLD AUTO: 368 10E3/UL (ref 150–450)
POTASSIUM BLD-SCNC: 3.8 MMOL/L (ref 3.4–5.3)
PROT SERPL-MCNC: 7 G/DL (ref 6.8–8.8)
RBC # BLD AUTO: 3.8 10E6/UL (ref 3.8–5.2)
SODIUM SERPL-SCNC: 138 MMOL/L (ref 133–144)
WBC # BLD AUTO: 12 10E3/UL (ref 4–11)

## 2022-02-16 PROCEDURE — 999N000141 HC STATISTIC PRE-PROCEDURE NURSING ASSESSMENT: Performed by: INTERNAL MEDICINE

## 2022-02-16 PROCEDURE — 360N000082 HC SURGERY LEVEL 2 W/ FLUORO, PER MIN: Performed by: INTERNAL MEDICINE

## 2022-02-16 PROCEDURE — 82040 ASSAY OF SERUM ALBUMIN: CPT | Performed by: INTERNAL MEDICINE

## 2022-02-16 PROCEDURE — 250N000011 HC RX IP 250 OP 636: Performed by: NURSE ANESTHETIST, CERTIFIED REGISTERED

## 2022-02-16 PROCEDURE — 999N000181 XR SURGERY CARM FLUORO GREATER THAN 5 MIN W STILLS: Mod: TC

## 2022-02-16 PROCEDURE — 255N000002 HC RX 255 OP 636: Performed by: INTERNAL MEDICINE

## 2022-02-16 PROCEDURE — 82962 GLUCOSE BLOOD TEST: CPT

## 2022-02-16 PROCEDURE — 250N000013 HC RX MED GY IP 250 OP 250 PS 637

## 2022-02-16 PROCEDURE — C1769 GUIDE WIRE: HCPCS | Performed by: INTERNAL MEDICINE

## 2022-02-16 PROCEDURE — 82150 ASSAY OF AMYLASE: CPT | Performed by: INTERNAL MEDICINE

## 2022-02-16 PROCEDURE — 258N000003 HC RX IP 258 OP 636: Performed by: NURSE ANESTHETIST, CERTIFIED REGISTERED

## 2022-02-16 PROCEDURE — 272N000001 HC OR GENERAL SUPPLY STERILE: Performed by: INTERNAL MEDICINE

## 2022-02-16 PROCEDURE — 36415 COLL VENOUS BLD VENIPUNCTURE: CPT | Performed by: INTERNAL MEDICINE

## 2022-02-16 PROCEDURE — C1877 STENT, NON-COAT/COV W/O DEL: HCPCS | Performed by: INTERNAL MEDICINE

## 2022-02-16 PROCEDURE — 80053 COMPREHEN METABOLIC PANEL: CPT | Performed by: INTERNAL MEDICINE

## 2022-02-16 PROCEDURE — 85610 PROTHROMBIN TIME: CPT | Performed by: INTERNAL MEDICINE

## 2022-02-16 PROCEDURE — C1726 CATH, BAL DIL, NON-VASCULAR: HCPCS | Performed by: INTERNAL MEDICINE

## 2022-02-16 PROCEDURE — 250N000009 HC RX 250: Performed by: INTERNAL MEDICINE

## 2022-02-16 PROCEDURE — 250N000009 HC RX 250: Performed by: NURSE ANESTHETIST, CERTIFIED REGISTERED

## 2022-02-16 PROCEDURE — 370N000017 HC ANESTHESIA TECHNICAL FEE, PER MIN: Performed by: INTERNAL MEDICINE

## 2022-02-16 PROCEDURE — 710N000010 HC RECOVERY PHASE 1, LEVEL 2, PER MIN: Performed by: INTERNAL MEDICINE

## 2022-02-16 PROCEDURE — 85027 COMPLETE CBC AUTOMATED: CPT | Performed by: INTERNAL MEDICINE

## 2022-02-16 PROCEDURE — 250N000025 HC SEVOFLURANE, PER MIN: Performed by: INTERNAL MEDICINE

## 2022-02-16 PROCEDURE — 83690 ASSAY OF LIPASE: CPT | Performed by: INTERNAL MEDICINE

## 2022-02-16 PROCEDURE — 710N000012 HC RECOVERY PHASE 2, PER MINUTE: Performed by: INTERNAL MEDICINE

## 2022-02-16 DEVICE — ZIMMON PANCREATIC STENT
Type: IMPLANTABLE DEVICE | Site: PANCREATIC DUCT | Status: NON-FUNCTIONAL
Brand: ZIMMON
Removed: 2022-04-04

## 2022-02-16 RX ORDER — NALOXONE HYDROCHLORIDE 0.4 MG/ML
0.4 INJECTION, SOLUTION INTRAMUSCULAR; INTRAVENOUS; SUBCUTANEOUS
Status: DISCONTINUED | OUTPATIENT
Start: 2022-02-16 | End: 2022-02-21 | Stop reason: HOSPADM

## 2022-02-16 RX ORDER — IOPAMIDOL 510 MG/ML
INJECTION, SOLUTION INTRAVASCULAR PRN
Status: DISCONTINUED | OUTPATIENT
Start: 2022-02-16 | End: 2022-02-16 | Stop reason: HOSPADM

## 2022-02-16 RX ORDER — ONDANSETRON 4 MG/1
4 TABLET, ORALLY DISINTEGRATING ORAL EVERY 30 MIN PRN
Status: DISCONTINUED | OUTPATIENT
Start: 2022-02-16 | End: 2022-02-21 | Stop reason: HOSPADM

## 2022-02-16 RX ORDER — NALOXONE HYDROCHLORIDE 0.4 MG/ML
0.2 INJECTION, SOLUTION INTRAMUSCULAR; INTRAVENOUS; SUBCUTANEOUS
Status: DISCONTINUED | OUTPATIENT
Start: 2022-02-16 | End: 2022-02-21 | Stop reason: HOSPADM

## 2022-02-16 RX ORDER — SODIUM CHLORIDE, SODIUM LACTATE, POTASSIUM CHLORIDE, CALCIUM CHLORIDE 600; 310; 30; 20 MG/100ML; MG/100ML; MG/100ML; MG/100ML
INJECTION, SOLUTION INTRAVENOUS CONTINUOUS
Status: DISCONTINUED | OUTPATIENT
Start: 2022-02-16 | End: 2022-02-21 | Stop reason: HOSPADM

## 2022-02-16 RX ORDER — ONDANSETRON 4 MG/1
4 TABLET, ORALLY DISINTEGRATING ORAL EVERY 6 HOURS PRN
Status: DISCONTINUED | OUTPATIENT
Start: 2022-02-16 | End: 2022-02-21 | Stop reason: HOSPADM

## 2022-02-16 RX ORDER — ONDANSETRON 2 MG/ML
INJECTION INTRAMUSCULAR; INTRAVENOUS PRN
Status: DISCONTINUED | OUTPATIENT
Start: 2022-02-16 | End: 2022-02-16

## 2022-02-16 RX ORDER — ONDANSETRON 2 MG/ML
4 INJECTION INTRAMUSCULAR; INTRAVENOUS EVERY 6 HOURS PRN
Status: DISCONTINUED | OUTPATIENT
Start: 2022-02-16 | End: 2022-02-21 | Stop reason: HOSPADM

## 2022-02-16 RX ORDER — FLUMAZENIL 0.1 MG/ML
0.2 INJECTION, SOLUTION INTRAVENOUS
Status: ACTIVE | OUTPATIENT
Start: 2022-02-16 | End: 2022-02-16

## 2022-02-16 RX ORDER — FENTANYL CITRATE 50 UG/ML
25 INJECTION, SOLUTION INTRAMUSCULAR; INTRAVENOUS
Status: DISCONTINUED | OUTPATIENT
Start: 2022-02-16 | End: 2022-02-21 | Stop reason: HOSPADM

## 2022-02-16 RX ORDER — INDOMETHACIN 50 MG/1
SUPPOSITORY RECTAL PRN
Status: DISCONTINUED | OUTPATIENT
Start: 2022-02-16 | End: 2022-02-16 | Stop reason: HOSPADM

## 2022-02-16 RX ORDER — MEPERIDINE HYDROCHLORIDE 25 MG/ML
12.5 INJECTION INTRAMUSCULAR; INTRAVENOUS; SUBCUTANEOUS
Status: DISCONTINUED | OUTPATIENT
Start: 2022-02-16 | End: 2022-02-21 | Stop reason: HOSPADM

## 2022-02-16 RX ORDER — PROPOFOL 10 MG/ML
INJECTION, EMULSION INTRAVENOUS PRN
Status: DISCONTINUED | OUTPATIENT
Start: 2022-02-16 | End: 2022-02-16

## 2022-02-16 RX ORDER — LEVOFLOXACIN 5 MG/ML
INJECTION, SOLUTION INTRAVENOUS PRN
Status: DISCONTINUED | OUTPATIENT
Start: 2022-02-16 | End: 2022-02-16

## 2022-02-16 RX ORDER — ACETAMINOPHEN 325 MG/1
975 TABLET ORAL ONCE
Status: COMPLETED | OUTPATIENT
Start: 2022-02-16 | End: 2022-02-16

## 2022-02-16 RX ORDER — FENTANYL CITRATE 50 UG/ML
INJECTION, SOLUTION INTRAMUSCULAR; INTRAVENOUS PRN
Status: DISCONTINUED | OUTPATIENT
Start: 2022-02-16 | End: 2022-02-16

## 2022-02-16 RX ORDER — DEXAMETHASONE SODIUM PHOSPHATE 4 MG/ML
INJECTION, SOLUTION INTRA-ARTICULAR; INTRALESIONAL; INTRAMUSCULAR; INTRAVENOUS; SOFT TISSUE PRN
Status: DISCONTINUED | OUTPATIENT
Start: 2022-02-16 | End: 2022-02-16

## 2022-02-16 RX ORDER — OXYCODONE HYDROCHLORIDE 5 MG/1
5 TABLET ORAL EVERY 4 HOURS PRN
Status: DISCONTINUED | OUTPATIENT
Start: 2022-02-16 | End: 2022-02-21 | Stop reason: HOSPADM

## 2022-02-16 RX ORDER — SODIUM CHLORIDE, SODIUM LACTATE, POTASSIUM CHLORIDE, CALCIUM CHLORIDE 600; 310; 30; 20 MG/100ML; MG/100ML; MG/100ML; MG/100ML
INJECTION, SOLUTION INTRAVENOUS CONTINUOUS PRN
Status: DISCONTINUED | OUTPATIENT
Start: 2022-02-16 | End: 2022-02-16

## 2022-02-16 RX ORDER — ONDANSETRON 2 MG/ML
4 INJECTION INTRAMUSCULAR; INTRAVENOUS EVERY 30 MIN PRN
Status: DISCONTINUED | OUTPATIENT
Start: 2022-02-16 | End: 2022-02-21 | Stop reason: HOSPADM

## 2022-02-16 RX ORDER — FENTANYL CITRATE 50 UG/ML
25 INJECTION, SOLUTION INTRAMUSCULAR; INTRAVENOUS EVERY 5 MIN PRN
Status: DISCONTINUED | OUTPATIENT
Start: 2022-02-16 | End: 2022-02-16 | Stop reason: HOSPADM

## 2022-02-16 RX ORDER — HYDROMORPHONE HCL IN WATER/PF 6 MG/30 ML
0.2 PATIENT CONTROLLED ANALGESIA SYRINGE INTRAVENOUS EVERY 5 MIN PRN
Status: DISCONTINUED | OUTPATIENT
Start: 2022-02-16 | End: 2022-02-16 | Stop reason: HOSPADM

## 2022-02-16 RX ORDER — LIDOCAINE HYDROCHLORIDE 20 MG/ML
INJECTION, SOLUTION INFILTRATION; PERINEURAL PRN
Status: DISCONTINUED | OUTPATIENT
Start: 2022-02-16 | End: 2022-02-16

## 2022-02-16 RX ORDER — LIDOCAINE 40 MG/G
CREAM TOPICAL
Status: DISCONTINUED | OUTPATIENT
Start: 2022-02-16 | End: 2022-02-16 | Stop reason: HOSPADM

## 2022-02-16 RX ORDER — EPHEDRINE SULFATE 50 MG/ML
INJECTION, SOLUTION INTRAMUSCULAR; INTRAVENOUS; SUBCUTANEOUS PRN
Status: DISCONTINUED | OUTPATIENT
Start: 2022-02-16 | End: 2022-02-16

## 2022-02-16 RX ADMIN — SODIUM CHLORIDE, POTASSIUM CHLORIDE, SODIUM LACTATE AND CALCIUM CHLORIDE: 600; 310; 30; 20 INJECTION, SOLUTION INTRAVENOUS at 07:30

## 2022-02-16 RX ADMIN — FENTANYL CITRATE 100 MCG: 50 INJECTION, SOLUTION INTRAMUSCULAR; INTRAVENOUS at 07:40

## 2022-02-16 RX ADMIN — PHENYLEPHRINE HYDROCHLORIDE 100 MCG: 10 INJECTION INTRAVENOUS at 07:56

## 2022-02-16 RX ADMIN — SUGAMMADEX 200 MG: 100 INJECTION, SOLUTION INTRAVENOUS at 08:42

## 2022-02-16 RX ADMIN — Medication 10 MG: at 07:56

## 2022-02-16 RX ADMIN — PROPOFOL 200 MG: 10 INJECTION, EMULSION INTRAVENOUS at 07:40

## 2022-02-16 RX ADMIN — LEVOFLOXACIN 500 MG: 5 INJECTION, SOLUTION INTRAVENOUS at 07:56

## 2022-02-16 RX ADMIN — ROCURONIUM BROMIDE 50 MG: 50 INJECTION, SOLUTION INTRAVENOUS at 07:40

## 2022-02-16 RX ADMIN — ACETAMINOPHEN 1000 MG: 500 TABLET, FILM COATED ORAL at 09:51

## 2022-02-16 RX ADMIN — DEXAMETHASONE SODIUM PHOSPHATE 8 MG: 4 INJECTION, SOLUTION INTRA-ARTICULAR; INTRALESIONAL; INTRAMUSCULAR; INTRAVENOUS; SOFT TISSUE at 07:40

## 2022-02-16 RX ADMIN — ONDANSETRON 4 MG: 2 INJECTION INTRAMUSCULAR; INTRAVENOUS at 07:40

## 2022-02-16 RX ADMIN — MIDAZOLAM 2 MG: 1 INJECTION INTRAMUSCULAR; INTRAVENOUS at 07:30

## 2022-02-16 RX ADMIN — LIDOCAINE HYDROCHLORIDE 100 MG: 20 INJECTION, SOLUTION INFILTRATION; PERINEURAL at 07:40

## 2022-02-16 NOTE — ANESTHESIA CARE TRANSFER NOTE
Patient: Artie Burger    Procedure: Procedure(s):  ENDOSCOPIC RETROGRADE CHOLANGIOPANCREATOGRAPH with pancreatic dilation, debris removal and stent exchange.       Diagnosis: Encounter for pancreatic duct stent exchange [Z46.59]  Diagnosis Additional Information: No value filed.    Anesthesia Type:   General     Note:    Oropharynx: oropharynx clear of all foreign objects  Level of Consciousness: awake  Oxygen Supplementation: room air    Independent Airway: airway patency satisfactory and stable  Dentition: dentition unchanged  Vital Signs Stable: post-procedure vital signs reviewed and stable  Report to RN Given: handoff report given  Patient transferred to: PACU    Handoff Report: Identifed the Patient, Identified the Reponsible Provider, Reviewed the pertinent medical history, Discussed the surgical course, Reviewed Intra-OP anesthesia mangement and issues during anesthesia, Set expectations for post-procedure period and Allowed opportunity for questions and acknowledgement of understanding      Vitals:  Vitals Value Taken Time   /82 02/16/22 0900   Temp 36.5 02/16/22 0900   Pulse 92 02/16/22 0901   Resp 13 02/16/22 0901   SpO2 100 % 02/16/22 0901   Vitals shown include unvalidated device data.    Electronically Signed By: ROSIE Aragon CRNA  February 16, 2022  9:03 AM

## 2022-02-16 NOTE — DISCHARGE INSTRUCTIONS
Will recommend repeat ERCP in 10-12 weeks for repeat PD interrogation and stent exchange.   -If patient develops fever, or worsening abdominal pain before scheduled ERCP, will recommend patient to call us immediately for consideration of an earlier urgent ERCP.     [facility], [place]  Same-Day Surgery   Adult Discharge Orders & Instructions     For 24 hours after surgery    1. Get plenty of rest.  A responsible adult must stay with you for at least 24 hours after you leave the hospital.   2. Do not drive or use heavy equipment.  If you have weakness or tingling, don't drive or use heavy equipment until this feeling goes away.  3. Do not drink alcohol.  4. Avoid strenuous or risky activities.  Ask for help when climbing stairs.   5. You may feel lightheaded.  IF so, sit for a few minutes before standing.  Have someone help you get up.   6. If you have nausea (feel sick to your stomach): Drink only clear liquids such as apple juice, ginger ale, broth or 7-Up.  Rest may also help.  Be sure to drink enough fluids.  Move to a regular diet as you feel able.  7. You may have a slight fever. Call the doctor if your fever is over 100 F (37.7 C) (taken under the tongue) or lasts longer than 24 hours.  8. You may have a dry mouth, a sore throat, muscle aches or trouble sleeping.  These should go away after 24 hours.  9. Do not make important or legal decisions.   Call your doctor for any of the followin.  Signs of infection (fever, growing tenderness at the surgery site, a large amount of drainage or bleeding, severe pain, foul-smelling drainage, redness, swelling).    2. It has been over 8 to 10 hours since surgery and you are still not able to urinate (pass water).    3.  Headache for over 24 hours.    To contact a doctor, call Dr. Schwartz' office at [phone]   or:      [phone] and ask for the resident on call for GI (answered 24 hours a day)      Emergency  Department:    [facility]: [phone]       (TTY for hearing impaired: [phone])

## 2022-02-16 NOTE — OR NURSING
Patient brought negative COVID test from [facility] completed on 2/13. Witnessed result from patient's cell phone. Will show anesthesia result as well.

## 2022-02-16 NOTE — ANESTHESIA PREPROCEDURE EVALUATION
Anesthesia Pre-Procedure Evaluation    Patient: Artie Burger   MRN: [id number] : 1995        Preoperative Diagnosis: Encounter for pancreatic duct stent exchange [Z46.59]    Procedure : Procedure(s):  ENDOSCOPIC RETROGRADE CHOLANGIOPANCREATOGRAPHY          Past Medical History:   Diagnosis Date     Gastroesophageal reflux disease      Pancreatic disease      PONV (postoperative nausea and vomiting)       Past Surgical History:   Procedure Laterality Date     ENDOSCOPIC RETROGRADE CHOLANGIOPANCREATOGRAM COMPLEX N/A 2021    Procedure: ENDOSCOPIC RETROGRADE CHOLANGIOPANCREATOGRAPHY with pancreatic sphincterotomy, dilation, stone removal, stent placement;  Surgeon: Guru Sabino Campuzano MD;  Location: [facility]     ENDOSCOPIC RETROGRADE CHOLANGIOPANCREATOGRAM WITH DIRECT VISUALIZATION SYSTEM AND GENERATOR N/A 2021    Procedure: ENDOSCOPIC RETROGRADE CHOLANGIOPANCREATOGRAPHY, dilation and debridement sludge removal, pancreatic duct stent placement;  Surgeon: Guru Sabino Campuzano MD;  Location: [facility]     ENDOSCOPIC RETROGRADE CHOLANGIOPANCREATOGRAM WITH SPYGLASS N/A 10/6/2021    Procedure: ENDOSCOPIC RETROGRADE CHOLANGIOPANCREATOGRAPHY, WITH DIRECT DUCT VISUALIZATION, USING PANCREATICOBILIARY FIBEROPTIC PROBE, BILE DUCT STENT EXCHANGE, LITHOTRIPSY OF PANCREATIC STONE, SPHINCTEROTOMY, BALLOON DILATION OF PANCREATIC DUCT AND STONE EXTRACTION;  Surgeon: Guru Sabino Campuzano MD;  Location: [facility]     ENDOSCOPIC RETROGRADE CHOLANGIOPANCREATOGRAM WITH SPYGLASS N/A 2021    Procedure: ENDOSCOPIC RETROGRADE CHOLANGIOPANCREATOGRAPHY, with pancreatic stent exchange, ballon dilation, stone removal, with spyglass direct visualization;  Surgeon: Guru Sabino Campuzano MD;  Location: [facility]      No Known Allergies   Social History     Tobacco Use     Smoking status: Current Every Day Smoker     Types: Cigarettes     Smokeless tobacco: Never Used      Tobacco comment: smokes about 4 cigarettes a day   Substance Use Topics     Alcohol use: Not Currently      Wt Readings from Last 1 Encounters:   02/16/22 54.4 kg (119 lb 14.9 oz)        Anesthesia Evaluation   Pt has had prior anesthetic. Type: General.    History of anesthetic complications  - PONV.      ROS/MED HX  ENT/Pulmonary:       Neurologic:       Cardiovascular:    (-) murmur and wheezes   METS/Exercise Tolerance:     Hematologic:       Musculoskeletal:       GI/Hepatic:     (+) GERD, Asymptomatic on medication,     Renal/Genitourinary:       Endo:       Psychiatric/Substance Use:       Infectious Disease:       Malignancy:       Other:            Physical Exam    Airway        Mallampati: II   TM distance: > 3 FB   Neck ROM: full   Mouth opening: > 3 cm    Respiratory Devices and Support         Dental  no notable dental history         Cardiovascular          Rhythm and rate: regular and normal (-) no systolic click and no murmur    Pulmonary   pulmonary exam normal        breath sounds clear to auscultation   (-) no wheezes        OUTSIDE LABS:  CBC:   Lab Results   Component Value Date    WBC 12.0 (H) 02/16/2022    WBC 7.9 12/13/2021    HGB 11.6 (L) 02/16/2022    HGB 11.7 12/13/2021    HCT 35.3 02/16/2022    HCT 35.0 12/13/2021     02/16/2022     12/13/2021     BMP:   Lab Results   Component Value Date     02/16/2022     12/13/2021    POTASSIUM 3.8 02/16/2022    POTASSIUM 3.8 12/13/2021    CHLORIDE 109 02/16/2022    CHLORIDE 111 (H) 12/13/2021    CO2 22 12/13/2021    CO2 24 11/01/2021    BUN 19 12/13/2021    BUN 17 11/01/2021    CR 0.81 12/13/2021    CR 0.72 11/01/2021     (H) 02/16/2022     (H) 12/13/2021     COAGS:   Lab Results   Component Value Date    INR 0.90 02/16/2022     POC:   Lab Results   Component Value Date    HCG Negative 09/13/2021     HEPATIC:   Lab Results   Component Value Date    ALBUMIN 3.7 12/13/2021    PROTTOTAL 7.1 12/13/2021    ALT 19  12/13/2021    AST 13 12/13/2021    ALKPHOS 50 12/13/2021    BILITOTAL 0.2 12/13/2021     OTHER:   Lab Results   Component Value Date    MURTAZA 9.8 12/13/2021    LIPASE 689 (H) 12/13/2021    AMYLASE 301 (H) 12/13/2021       Anesthesia Plan    ASA Status:  2   NPO Status:  NPO Appropriate    Anesthesia Type: General.     - Airway: ETT   Induction: Intravenous.   Maintenance: Inhalation.        Consents    Anesthesia Plan(s) and associated risks, benefits, and realistic alternatives discussed. Questions answered and patient/representative(s) expressed understanding.    - Discussed:     - Discussed with:  Patient      - Extended Intubation/Ventilatory Support Discussed: Yes.      - Patient is DNR/DNI Status: No    Use of blood products discussed: Yes.     - Discussed with: Patient.     Postoperative Care    Pain management: IV analgesics.   PONV prophylaxis: Ondansetron (or other 5HT-3), Dexamethasone or Solumedrol     Comments:                Christopher J. Behrens, MD

## 2022-02-16 NOTE — ANESTHESIA POSTPROCEDURE EVALUATION
Patient: Artie Burger    Procedure: Procedure(s):  ENDOSCOPIC RETROGRADE CHOLANGIOPANCREATOGRAPH with pancreatic dilation, debris removal and stent exchange.       Diagnosis:Encounter for pancreatic duct stent exchange [Z46.59]  Diagnosis Additional Information: No value filed.    Anesthesia Type:  General    Note:  Disposition: Outpatient   Postop Pain Control: Uneventful            Sign Out: Well controlled pain   PONV: No   Neuro/Psych: Uneventful            Sign Out: Acceptable/Baseline neuro status   Airway/Respiratory: Uneventful            Sign Out: Acceptable/Baseline resp. status   CV/Hemodynamics: Uneventful            Sign Out: Acceptable CV status; No obvious hypovolemia; No obvious fluid overload   Other NRE: NONE   DID A NON-ROUTINE EVENT OCCUR? No    Event details/Postop Comments:  Hemodynamically stable, denies N/V, well controlled pain.             Last vitals:  Vitals Value Taken Time   /82 02/16/22 0900   Temp     Pulse 92 02/16/22 0901   Resp 13 02/16/22 0901   SpO2 100 % 02/16/22 0901   Vitals shown include unvalidated device data.    Electronically Signed By: Rai Murphy MD  February 16, 2022  9:03 AM

## 2022-02-16 NOTE — ANESTHESIA PROCEDURE NOTES
Airway       Patient location during procedure: OR       Procedure Start/Stop Times: 2/16/2022 7:42 AM  Staff -        CRNA: Daniel Devi APRN CRNA       Performed By: CRNAIndications and Patient Condition       Indications for airway management: rolando-procedural       Induction type:intravenous       Mask difficulty assessment: 1 - vent by mask    Final Airway Details       Final airway type: endotracheal airway       Successful airway: ETT - single  Endotracheal Airway Details        ETT size (mm): 7.0       Cuffed: yes       Cuff volume (mL): 7       Successful intubation technique: direct laryngoscopy       DL Blade Type: Marsh 2       Grade View of Cords: 1       Adjucts: stylet       Position: Right       Measured from: lips       Secured at (cm): 21       Bite block used: None    Post intubation assessment        Placement verified by: capnometry, equal breath sounds and chest rise        Number of attempts at approach: 1       Number of other approaches attempted: 0       Secured with: pink tape       Ease of procedure: easy       Dentition: Intact and Unchanged

## 2022-02-21 ENCOUNTER — PATIENT OUTREACH (OUTPATIENT)
Dept: GASTROENTEROLOGY | Facility: CLINIC | Age: 27
End: 2022-02-21
Payer: COMMERCIAL

## 2022-02-21 ENCOUNTER — PREP FOR PROCEDURE (OUTPATIENT)
Dept: GASTROENTEROLOGY | Facility: CLINIC | Age: 27
End: 2022-02-21
Payer: COMMERCIAL

## 2022-02-21 DIAGNOSIS — Z46.59 ENCOUNTER FOR PANCREATIC DUCT STENT EXCHANGE: Primary | ICD-10-CM

## 2022-02-21 NOTE — TELEPHONE ENCOUNTER
Post ERCP (2-16-22) with Dr. Campuzano: Follow-up    Post procedure recommendations:   Will recommend repeat ERCP in 10-12 weeks for repeat   PD interrogation and stent exchange.      -If patient develops fever, or worsening abdominal  pain before scheduled ERCP, will recommend patient to  call us immediately for consideration of an earlier    urgent ERCP.     Orders placed:   Please assist in scheduling:     Procedure/Imaging/Clinic: ERCP  Physician: Dr. Campuzano  Timing: 10-12 weeks  Procedure length:provider average  Anesthesia:gen  Dx: pancreatic stent exchange  Tier:2  Location: [facility]     Patient states: left message    Clinic contact and scheduling numbers verified for future questions/concerns.    Jacklyn Rodríguez, RN Care Coordinator

## 2022-02-24 ENCOUNTER — MYC MEDICAL ADVICE (OUTPATIENT)
Dept: GASTROENTEROLOGY | Facility: CLINIC | Age: 27
End: 2022-02-24
Payer: COMMERCIAL

## 2022-02-25 NOTE — TELEPHONE ENCOUNTER
Called patient  To discuss symptoms- per patient symptoms better today.     No Fever, some nausea, no vomiting - able to stay hydrated. Encouraged patient to call back with questions or worsening symptoms    ML

## 2022-03-16 ENCOUNTER — PATIENT OUTREACH (OUTPATIENT)
Dept: GASTROENTEROLOGY | Facility: CLINIC | Age: 27
End: 2022-03-16
Payer: COMMERCIAL

## 2022-03-16 DIAGNOSIS — Z11.59 ENCOUNTER FOR SCREENING FOR OTHER VIRAL DISEASES: Primary | ICD-10-CM

## 2022-03-16 NOTE — TELEPHONE ENCOUNTER
Pt sent MyChart related to increased symptoms. Per last ERCP note from 2/16/22  Will recommend repeat ERCP in 10-12 weeks for repeat   PD interrogation and stent exchange.    -If patient develops fever, or worsening abdominal  pain before scheduled ERCP, will recommend patient to  call us immediately for consideration of an earlier urgent ERCP.     Per patient, pain under ribs, in the middle, radiates to back, also bad heartburn. [place] with all food, vomited on Monday. Tolerates liquids ok. No fever, tmax 99.8. Encouraged liquid diet to see if that helps, small frequent meals. Ok to keep taking tylenol and ibuprofen for pain, cautioned to keep under daily maximums. Encouraged hydration.    Will get in for earlier ERCP with Dr. Campuzano on 4/4, reviewed need for preop physical and covid test prior to procedure.    Jacklyn Rodríguez, RN Care Coordinator

## 2022-04-02 NOTE — BRIEF OP NOTE
[facility] Brief Operative Note    Pre-operative diagnosis: Encounter for pancreatic duct stent exchange [Z46.59]   Post-operative diagnosis As prior   Procedure: Procedure(s):  ENDOSCOPIC RETROGRADE CHOLANGIOPANCREATOGRAPHY, WITH REPLACEMENT OF TUBE OR STENT   Surgeon(s): Surgeon(s) and Role:     * Guru Sabino Campuzano MD - Primary   Estimated blood loss: * No values recorded between  and 4/2/2022 11:01 AM *    Specimens: * No specimens in log *   Findings:    26-year-old white female with past medical history  significant for alcoholism, currently in remission, with recurrent bouts of pancreatitis which has progressed to chronic calcific pancreatitis likely from alcohol abuse. CT scan with pancreatic stones  appear mainly to be in the side branch but a stone possibly in the main pancreatic duct. Now s/p interval ERCPs with last one 2/16/2022 with PD stricture in genu has significantly improved since we started. Pancreatogram shows multiple side branches in the head with stone consistent with advanced Sar[place] 4 changes for calcific chronic pancreatitis. Two 5 Fr by 12 cm and one 5 Fr by 11 cm Zimmon stents were placed across the PD stricture . ERCP today with plans to perform stent exchange after re-evaluating the PD stricture       - PD stents well positioned across the major papilla. Extracted.  - stricture in genu has almost resolved. Pancreatogram shows multiple side branches in the head with stone consistent with advanced [place] 4 changes for calcific chronic pancreatitis.  - Balloon sweep and extraction of all ductal stones.  - Placement of a 5 Fr by 12 cm ''fall out'' single pigtailed plastic pancreatic stent       - Observe patient in PACU for possible discharge same day.   - Follow with clinical course and obtain a KUB (AP/Lat.) in 4 weeks to ensure spontaneous passage of pancreatic stent. If still in place, will arrange for an EGD and stent removal in unit J.   - Expect no  futher ERCP given resolution of prior stricture and clearance of ductal stones.   - The findings and recommendations were discussed with the patient and their family.

## 2022-04-04 ENCOUNTER — ANESTHESIA (OUTPATIENT)
Dept: SURGERY | Facility: CLINIC | Age: 27
End: 2022-04-04
Payer: COMMERCIAL

## 2022-04-04 ENCOUNTER — HOSPITAL ENCOUNTER (OUTPATIENT)
Facility: CLINIC | Age: 27
Discharge: HOME | End: 2022-04-04
Attending: INTERNAL MEDICINE | Admitting: INTERNAL MEDICINE
Payer: COMMERCIAL

## 2022-04-04 ENCOUNTER — APPOINTMENT (OUTPATIENT)
Dept: GENERAL RADIOLOGY | Facility: CLINIC | Age: 27
End: 2022-04-04
Attending: INTERNAL MEDICINE
Payer: COMMERCIAL

## 2022-04-04 ENCOUNTER — ANESTHESIA EVENT (OUTPATIENT)
Dept: SURGERY | Facility: CLINIC | Age: 27
End: 2022-04-04
Payer: COMMERCIAL

## 2022-04-04 VITALS
SYSTOLIC BLOOD PRESSURE: 119 MMHG | WEIGHT: 123.02 LBS | RESPIRATION RATE: 16 BRPM | HEART RATE: 75 BPM | DIASTOLIC BLOOD PRESSURE: 83 MMHG | TEMPERATURE: 98.5 F | OXYGEN SATURATION: 98 % | BODY MASS INDEX: 22.64 KG/M2 | HEIGHT: 62 IN

## 2022-04-04 LAB
ALBUMIN SERPL-MCNC: 3.2 G/DL (ref 3.4–5)
ALP SERPL-CCNC: 52 U/L (ref 40–150)
ALT SERPL W P-5'-P-CCNC: 13 U/L (ref 0–50)
ANION GAP SERPL CALCULATED.3IONS-SCNC: 7 MMOL/L (ref 3–14)
AST SERPL W P-5'-P-CCNC: 12 U/L (ref 0–45)
BILIRUB SERPL-MCNC: 0.7 MG/DL (ref 0.2–1.3)
BUN SERPL-MCNC: 20 MG/DL (ref 7–30)
CALCIUM SERPL-MCNC: 8.6 MG/DL (ref 8.5–10.1)
CHLORIDE BLD-SCNC: 112 MMOL/L (ref 94–109)
CO2 SERPL-SCNC: 21 MMOL/L (ref 20–32)
CREAT SERPL-MCNC: 0.67 MG/DL (ref 0.52–1.04)
ERCP: NORMAL
ERYTHROCYTE [DISTWIDTH] IN BLOOD BY AUTOMATED COUNT: 12.8 % (ref 10–15)
GFR SERPL CREATININE-BSD FRML MDRD: >90 ML/MIN/1.73M2
GLUCOSE BLD-MCNC: 110 MG/DL (ref 70–99)
HCG SERPL QL: NEGATIVE
HCT VFR BLD AUTO: 32.8 % (ref 35–47)
HGB BLD-MCNC: 10.9 G/DL (ref 11.7–15.7)
INR PPP: 0.93 (ref 0.85–1.15)
MCH RBC QN AUTO: 31.1 PG (ref 26.5–33)
MCHC RBC AUTO-ENTMCNC: 33.2 G/DL (ref 31.5–36.5)
MCV RBC AUTO: 94 FL (ref 78–100)
PLATELET # BLD AUTO: 438 10E3/UL (ref 150–450)
POTASSIUM BLD-SCNC: 3.8 MMOL/L (ref 3.4–5.3)
PROT SERPL-MCNC: 6.6 G/DL (ref 6.8–8.8)
RBC # BLD AUTO: 3.5 10E6/UL (ref 3.8–5.2)
SODIUM SERPL-SCNC: 140 MMOL/L (ref 133–144)
WBC # BLD AUTO: 12.2 10E3/UL (ref 4–11)

## 2022-04-04 PROCEDURE — 85027 COMPLETE CBC AUTOMATED: CPT | Performed by: INTERNAL MEDICINE

## 2022-04-04 PROCEDURE — 250N000009 HC RX 250: Performed by: NURSE ANESTHETIST, CERTIFIED REGISTERED

## 2022-04-04 PROCEDURE — 360N000082 HC SURGERY LEVEL 2 W/ FLUORO, PER MIN: Performed by: INTERNAL MEDICINE

## 2022-04-04 PROCEDURE — 84703 CHORIONIC GONADOTROPIN ASSAY: CPT | Performed by: STUDENT IN AN ORGANIZED HEALTH CARE EDUCATION/TRAINING PROGRAM

## 2022-04-04 PROCEDURE — 250N000011 HC RX IP 250 OP 636: Performed by: NURSE ANESTHETIST, CERTIFIED REGISTERED

## 2022-04-04 PROCEDURE — 370N000017 HC ANESTHESIA TECHNICAL FEE, PER MIN: Performed by: INTERNAL MEDICINE

## 2022-04-04 PROCEDURE — 255N000002 HC RX 255 OP 636: Performed by: INTERNAL MEDICINE

## 2022-04-04 PROCEDURE — 36415 COLL VENOUS BLD VENIPUNCTURE: CPT | Performed by: INTERNAL MEDICINE

## 2022-04-04 PROCEDURE — C1726 CATH, BAL DIL, NON-VASCULAR: HCPCS | Performed by: INTERNAL MEDICINE

## 2022-04-04 PROCEDURE — C1769 GUIDE WIRE: HCPCS | Performed by: INTERNAL MEDICINE

## 2022-04-04 PROCEDURE — 85610 PROTHROMBIN TIME: CPT | Performed by: INTERNAL MEDICINE

## 2022-04-04 PROCEDURE — 999N000141 HC STATISTIC PRE-PROCEDURE NURSING ASSESSMENT: Performed by: INTERNAL MEDICINE

## 2022-04-04 PROCEDURE — 999N000179 XR SURGERY CARM FLUORO LESS THAN 5 MIN W STILLS: Mod: TC

## 2022-04-04 PROCEDURE — 80053 COMPREHEN METABOLIC PANEL: CPT | Performed by: INTERNAL MEDICINE

## 2022-04-04 PROCEDURE — 250N000009 HC RX 250: Performed by: INTERNAL MEDICINE

## 2022-04-04 PROCEDURE — 710N000010 HC RECOVERY PHASE 1, LEVEL 2, PER MIN: Performed by: INTERNAL MEDICINE

## 2022-04-04 PROCEDURE — 250N000025 HC SEVOFLURANE, PER MIN: Performed by: INTERNAL MEDICINE

## 2022-04-04 PROCEDURE — 258N000003 HC RX IP 258 OP 636: Performed by: NURSE ANESTHETIST, CERTIFIED REGISTERED

## 2022-04-04 PROCEDURE — 710N000012 HC RECOVERY PHASE 2, PER MINUTE: Performed by: INTERNAL MEDICINE

## 2022-04-04 PROCEDURE — 272N000001 HC OR GENERAL SUPPLY STERILE: Performed by: INTERNAL MEDICINE

## 2022-04-04 PROCEDURE — C1877 STENT, NON-COAT/COV W/O DEL: HCPCS | Performed by: INTERNAL MEDICINE

## 2022-04-04 DEVICE — IMPLANTABLE DEVICE
Type: IMPLANTABLE DEVICE | Site: PANCREATIC DUCT | Status: NON-FUNCTIONAL
Removed: 2022-07-13

## 2022-04-04 RX ORDER — LIDOCAINE 40 MG/G
CREAM TOPICAL
Status: DISCONTINUED | OUTPATIENT
Start: 2022-04-04 | End: 2022-04-04 | Stop reason: HOSPADM

## 2022-04-04 RX ORDER — FENTANYL CITRATE 50 UG/ML
25 INJECTION, SOLUTION INTRAMUSCULAR; INTRAVENOUS
Status: DISCONTINUED | OUTPATIENT
Start: 2022-04-04 | End: 2022-04-04 | Stop reason: HOSPADM

## 2022-04-04 RX ORDER — NALOXONE HYDROCHLORIDE 0.4 MG/ML
0.4 INJECTION, SOLUTION INTRAMUSCULAR; INTRAVENOUS; SUBCUTANEOUS
Status: DISCONTINUED | OUTPATIENT
Start: 2022-04-04 | End: 2022-04-04 | Stop reason: HOSPADM

## 2022-04-04 RX ORDER — OXYCODONE HYDROCHLORIDE 5 MG/1
5 TABLET ORAL EVERY 4 HOURS PRN
Status: DISCONTINUED | OUTPATIENT
Start: 2022-04-04 | End: 2022-04-04 | Stop reason: HOSPADM

## 2022-04-04 RX ORDER — FENTANYL CITRATE 50 UG/ML
25 INJECTION, SOLUTION INTRAMUSCULAR; INTRAVENOUS EVERY 5 MIN PRN
Status: DISCONTINUED | OUTPATIENT
Start: 2022-04-04 | End: 2022-04-04 | Stop reason: HOSPADM

## 2022-04-04 RX ORDER — SODIUM CHLORIDE, SODIUM LACTATE, POTASSIUM CHLORIDE, CALCIUM CHLORIDE 600; 310; 30; 20 MG/100ML; MG/100ML; MG/100ML; MG/100ML
INJECTION, SOLUTION INTRAVENOUS CONTINUOUS
Status: DISCONTINUED | OUTPATIENT
Start: 2022-04-04 | End: 2022-04-04 | Stop reason: HOSPADM

## 2022-04-04 RX ORDER — SODIUM CHLORIDE, SODIUM LACTATE, POTASSIUM CHLORIDE, CALCIUM CHLORIDE 600; 310; 30; 20 MG/100ML; MG/100ML; MG/100ML; MG/100ML
INJECTION, SOLUTION INTRAVENOUS CONTINUOUS PRN
Status: DISCONTINUED | OUTPATIENT
Start: 2022-04-04 | End: 2022-04-04

## 2022-04-04 RX ORDER — PROPOFOL 10 MG/ML
INJECTION, EMULSION INTRAVENOUS PRN
Status: DISCONTINUED | OUTPATIENT
Start: 2022-04-04 | End: 2022-04-04

## 2022-04-04 RX ORDER — NALOXONE HYDROCHLORIDE 0.4 MG/ML
0.2 INJECTION, SOLUTION INTRAMUSCULAR; INTRAVENOUS; SUBCUTANEOUS
Status: DISCONTINUED | OUTPATIENT
Start: 2022-04-04 | End: 2022-04-04 | Stop reason: HOSPADM

## 2022-04-04 RX ORDER — IOPAMIDOL 510 MG/ML
INJECTION, SOLUTION INTRAVASCULAR PRN
Status: DISCONTINUED | OUTPATIENT
Start: 2022-04-04 | End: 2022-04-04 | Stop reason: HOSPADM

## 2022-04-04 RX ORDER — ONDANSETRON 2 MG/ML
4 INJECTION INTRAMUSCULAR; INTRAVENOUS EVERY 6 HOURS PRN
Status: DISCONTINUED | OUTPATIENT
Start: 2022-04-04 | End: 2022-04-04 | Stop reason: HOSPADM

## 2022-04-04 RX ORDER — MEPERIDINE HYDROCHLORIDE 25 MG/ML
12.5 INJECTION INTRAMUSCULAR; INTRAVENOUS; SUBCUTANEOUS
Status: DISCONTINUED | OUTPATIENT
Start: 2022-04-04 | End: 2022-04-04 | Stop reason: HOSPADM

## 2022-04-04 RX ORDER — INDOMETHACIN 50 MG/1
SUPPOSITORY RECTAL PRN
Status: DISCONTINUED | OUTPATIENT
Start: 2022-04-04 | End: 2022-04-04 | Stop reason: HOSPADM

## 2022-04-04 RX ORDER — ONDANSETRON 2 MG/ML
4 INJECTION INTRAMUSCULAR; INTRAVENOUS EVERY 30 MIN PRN
Status: DISCONTINUED | OUTPATIENT
Start: 2022-04-04 | End: 2022-04-04 | Stop reason: HOSPADM

## 2022-04-04 RX ORDER — FENTANYL CITRATE 50 UG/ML
INJECTION, SOLUTION INTRAMUSCULAR; INTRAVENOUS PRN
Status: DISCONTINUED | OUTPATIENT
Start: 2022-04-04 | End: 2022-04-04

## 2022-04-04 RX ORDER — ONDANSETRON 4 MG/1
4 TABLET, ORALLY DISINTEGRATING ORAL EVERY 30 MIN PRN
Status: DISCONTINUED | OUTPATIENT
Start: 2022-04-04 | End: 2022-04-04 | Stop reason: HOSPADM

## 2022-04-04 RX ORDER — ONDANSETRON 4 MG/1
4 TABLET, ORALLY DISINTEGRATING ORAL EVERY 6 HOURS PRN
Status: DISCONTINUED | OUTPATIENT
Start: 2022-04-04 | End: 2022-04-04 | Stop reason: HOSPADM

## 2022-04-04 RX ORDER — FLUMAZENIL 0.1 MG/ML
0.2 INJECTION, SOLUTION INTRAVENOUS
Status: DISCONTINUED | OUTPATIENT
Start: 2022-04-04 | End: 2022-04-04 | Stop reason: HOSPADM

## 2022-04-04 RX ORDER — ONDANSETRON 2 MG/ML
INJECTION INTRAMUSCULAR; INTRAVENOUS PRN
Status: DISCONTINUED | OUTPATIENT
Start: 2022-04-04 | End: 2022-04-04

## 2022-04-04 RX ORDER — DEXAMETHASONE SODIUM PHOSPHATE 4 MG/ML
INJECTION, SOLUTION INTRA-ARTICULAR; INTRALESIONAL; INTRAMUSCULAR; INTRAVENOUS; SOFT TISSUE PRN
Status: DISCONTINUED | OUTPATIENT
Start: 2022-04-04 | End: 2022-04-04

## 2022-04-04 RX ORDER — LIDOCAINE HYDROCHLORIDE 20 MG/ML
INJECTION, SOLUTION INFILTRATION; PERINEURAL PRN
Status: DISCONTINUED | OUTPATIENT
Start: 2022-04-04 | End: 2022-04-04

## 2022-04-04 RX ORDER — LEVOFLOXACIN 5 MG/ML
INJECTION, SOLUTION INTRAVENOUS PRN
Status: DISCONTINUED | OUTPATIENT
Start: 2022-04-04 | End: 2022-04-04

## 2022-04-04 RX ORDER — OMEGA-3 FATTY ACIDS/FISH OIL 300-1000MG
800 CAPSULE ORAL ONCE
Status: ON HOLD | COMMUNITY
End: 2023-08-24

## 2022-04-04 RX ORDER — HYDROMORPHONE HCL IN WATER/PF 6 MG/30 ML
0.2 PATIENT CONTROLLED ANALGESIA SYRINGE INTRAVENOUS EVERY 5 MIN PRN
Status: DISCONTINUED | OUTPATIENT
Start: 2022-04-04 | End: 2022-04-04 | Stop reason: HOSPADM

## 2022-04-04 RX ADMIN — SUGAMMADEX 150 MG: 100 INJECTION, SOLUTION INTRAVENOUS at 08:50

## 2022-04-04 RX ADMIN — LIDOCAINE HYDROCHLORIDE 100 MG: 20 INJECTION, SOLUTION INFILTRATION; PERINEURAL at 08:15

## 2022-04-04 RX ADMIN — MIDAZOLAM 2 MG: 1 INJECTION INTRAMUSCULAR; INTRAVENOUS at 08:00

## 2022-04-04 RX ADMIN — ONDANSETRON 4 MG: 2 INJECTION INTRAMUSCULAR; INTRAVENOUS at 08:18

## 2022-04-04 RX ADMIN — FENTANYL CITRATE 100 MCG: 50 INJECTION, SOLUTION INTRAMUSCULAR; INTRAVENOUS at 08:10

## 2022-04-04 RX ADMIN — PHENYLEPHRINE HYDROCHLORIDE 50 MCG: 10 INJECTION INTRAVENOUS at 08:32

## 2022-04-04 RX ADMIN — ROCURONIUM BROMIDE 50 MG: 50 INJECTION, SOLUTION INTRAVENOUS at 08:17

## 2022-04-04 RX ADMIN — PHENYLEPHRINE HYDROCHLORIDE 50 MCG: 10 INJECTION INTRAVENOUS at 08:29

## 2022-04-04 RX ADMIN — LEVOFLOXACIN 500 MG: 5 INJECTION, SOLUTION INTRAVENOUS at 08:20

## 2022-04-04 RX ADMIN — DEXAMETHASONE SODIUM PHOSPHATE 10 MG: 4 INJECTION, SOLUTION INTRA-ARTICULAR; INTRALESIONAL; INTRAMUSCULAR; INTRAVENOUS; SOFT TISSUE at 08:18

## 2022-04-04 RX ADMIN — SODIUM CHLORIDE, POTASSIUM CHLORIDE, SODIUM LACTATE AND CALCIUM CHLORIDE: 600; 310; 30; 20 INJECTION, SOLUTION INTRAVENOUS at 08:05

## 2022-04-04 RX ADMIN — PROPOFOL 100 MG: 10 INJECTION, EMULSION INTRAVENOUS at 08:16

## 2022-04-04 RX ADMIN — PHENYLEPHRINE HYDROCHLORIDE 100 MCG: 10 INJECTION INTRAVENOUS at 08:34

## 2022-04-04 NOTE — ANESTHESIA CARE TRANSFER NOTE
Patient: Artie Burger    Procedure: Procedure(s):  ENDOSCOPIC RETROGRADE CHOLANGIOPANCREATOGRAPHY, WITH REPLACEMENT OF pancreatic STENT, stone removal       Diagnosis: Encounter for pancreatic duct stent exchange [Z46.59]  Diagnosis Additional Information: No value filed.    Anesthesia Type:   General     Note:    Oropharynx: oropharynx clear of all foreign objects and spontaneously breathing  Level of Consciousness: drowsy  Oxygen Supplementation: nasal cannula  Level of Supplemental Oxygen (L/min / FiO2): 2  Independent Airway: airway patency satisfactory and stable  Dentition: dentition unchanged  Vital Signs Stable: post-procedure vital signs reviewed and stable  Report to RN Given: handoff report given  Patient transferred to: PACU    Handoff Report: Identifed the Patient, Identified the Reponsible Provider, Reviewed the pertinent medical history, Discussed the surgical course, Reviewed Intra-OP anesthesia mangement and issues during anesthesia, Set expectations for post-procedure period and Allowed opportunity for questions and acknowledgement of understanding      Vitals:  Vitals Value Taken Time   BP     Temp     Pulse     Resp     SpO2         Electronically Signed By: ROSIE Corbett CRNA  April 4, 2022  9:01 AM

## 2022-04-04 NOTE — OR NURSING
Pt A/O, VSS, denies pain/nausea.  Discharge education reviewed with patient and mother, belongings returned, all patient's questions answered. Discharge criteria met. Pt discharged to home care via private vehicle with mother.

## 2022-04-04 NOTE — ANESTHESIA POSTPROCEDURE EVALUATION
Patient: Artie Burger    Procedure: Procedure(s):  ENDOSCOPIC RETROGRADE CHOLANGIOPANCREATOGRAPHY, WITH REPLACEMENT OF pancreatic STENT, stone removal       Anesthesia Type:  General    Note:  Disposition: Outpatient   Postop Pain Control: Uneventful            Sign Out: Well controlled pain   PONV: No   Neuro/Psych: Uneventful            Sign Out: Acceptable/Baseline neuro status   Airway/Respiratory: Uneventful            Sign Out: Acceptable/Baseline resp. status   CV/Hemodynamics: Uneventful            Sign Out: Acceptable CV status; No obvious hypovolemia; No obvious fluid overload   Other NRE: NONE   DID A NON-ROUTINE EVENT OCCUR? No           Last vitals:  Vitals Value Taken Time   /81 04/04/22 0930   Temp 36.8  C (98.2  F) 04/04/22 0900   Pulse 71 04/04/22 0939   Resp 11 04/04/22 0939   SpO2 98 % 04/04/22 0939   Vitals shown include unvalidated device data.    Electronically Signed By: Chuy Vitale MD  April 4, 2022  9:39 AM

## 2022-04-04 NOTE — ANESTHESIA PREPROCEDURE EVALUATION
Anesthesia Pre-Procedure Evaluation    Patient: Artie Burger   MRN: [id number] : 1995        Preoperative Diagnosis: Encounter for pancreatic duct stent exchange [Z46.59]    Procedure : Procedure(s):  ENDOSCOPIC RETROGRADE CHOLANGIOPANCREATOGRAPHY          Past Medical History:   Diagnosis Date     Gastroesophageal reflux disease      Pancreatic disease      PONV (postoperative nausea and vomiting)       Past Surgical History:   Procedure Laterality Date     ENDOSCOPIC RETROGRADE CHOLANGIOPANCREATOGRAM COMPLEX N/A 2021    Procedure: ENDOSCOPIC RETROGRADE CHOLANGIOPANCREATOGRAPHY with pancreatic sphincterotomy, dilation, stone removal, stent placement;  Surgeon: Guru Sabino Campuzano MD;  Location: [facility]     ENDOSCOPIC RETROGRADE CHOLANGIOPANCREATOGRAM WITH DIRECT VISUALIZATION SYSTEM AND GENERATOR N/A 2021    Procedure: ENDOSCOPIC RETROGRADE CHOLANGIOPANCREATOGRAPHY, dilation and debridement sludge removal, pancreatic duct stent placement;  Surgeon: Guru Sabino Campuzano MD;  Location: [facility]     ENDOSCOPIC RETROGRADE CHOLANGIOPANCREATOGRAM WITH SPYGLASS N/A 10/6/2021    Procedure: ENDOSCOPIC RETROGRADE CHOLANGIOPANCREATOGRAPHY, WITH DIRECT DUCT VISUALIZATION, USING PANCREATICOBILIARY FIBEROPTIC PROBE, BILE DUCT STENT EXCHANGE, LITHOTRIPSY OF PANCREATIC STONE, SPHINCTEROTOMY, BALLOON DILATION OF PANCREATIC DUCT AND STONE EXTRACTION;  Surgeon: Guru Sabino Campuzano MD;  Location: [facility]     ENDOSCOPIC RETROGRADE CHOLANGIOPANCREATOGRAM WITH SPYGLASS N/A 2021    Procedure: ENDOSCOPIC RETROGRADE CHOLANGIOPANCREATOGRAPHY, with pancreatic stent exchange, ballon dilation, stone removal, with spyglass direct visualization;  Surgeon: Guru Sabino Campuzano MD;  Location: [facility]     ENDOSCOPIC RETROGRADE CHOLANGIOPANCREATOGRAPHY, EXCHANGE TUBE/STENT N/A 2022    Procedure: ENDOSCOPIC RETROGRADE CHOLANGIOPANCREATOGRAPH with pancreatic  dilation, debris removal and stent exchange.;  Surgeon: Guru Sabino Campuzano MD;  Location: [facility]      No Known Allergies   Social History     Tobacco Use     Smoking status: Current Every Day Smoker     Types: Cigarettes     Smokeless tobacco: Never Used     Tobacco comment: smokes about 4 cigarettes a day   Substance Use Topics     Alcohol use: Not Currently      Wt Readings from Last 1 Encounters:   04/04/22 55.8 kg (123 lb 0.3 oz)        Anesthesia Evaluation   Pt has had prior anesthetic. Type: General.    History of anesthetic complications  - PONV.      ROS/MED HX  ENT/Pulmonary:       Neurologic:       Cardiovascular:    (-) murmur and wheezes   METS/Exercise Tolerance:     Hematologic:       Musculoskeletal:       GI/Hepatic:     (+) GERD, Asymptomatic on medication,     Renal/Genitourinary:       Endo:       Psychiatric/Substance Use:       Infectious Disease:       Malignancy:       Other:            Physical Exam    Airway        Mallampati: II   TM distance: > 3 FB   Neck ROM: full   Mouth opening: > 3 cm    Respiratory Devices and Support         Dental  no notable dental history         Cardiovascular          Rhythm and rate: regular and normal (-) no systolic click and no murmur    Pulmonary   pulmonary exam normal        breath sounds clear to auscultation   (-) no wheezes        OUTSIDE LABS:  CBC:   Lab Results   Component Value Date    WBC 12.0 (H) 02/16/2022    WBC 7.9 12/13/2021    HGB 11.6 (L) 02/16/2022    HGB 11.7 12/13/2021    HCT 35.3 02/16/2022    HCT 35.0 12/13/2021     02/16/2022     12/13/2021     BMP:   Lab Results   Component Value Date     02/16/2022     12/13/2021    POTASSIUM 3.8 02/16/2022    POTASSIUM 3.8 12/13/2021    CHLORIDE 109 02/16/2022    CHLORIDE 111 (H) 12/13/2021    CO2 23 02/16/2022    CO2 22 12/13/2021    BUN 20 02/16/2022    BUN 19 12/13/2021    CR 0.83 02/16/2022    CR 0.81 12/13/2021     (H) 02/16/2022      (H) 02/16/2022     COAGS:   Lab Results   Component Value Date    INR 0.90 02/16/2022     POC:   Lab Results   Component Value Date    HCG Negative 09/13/2021     HEPATIC:   Lab Results   Component Value Date    ALBUMIN 3.3 (L) 02/16/2022    PROTTOTAL 7.0 02/16/2022    ALT 15 02/16/2022    AST 15 02/16/2022    ALKPHOS 52 02/16/2022    BILITOTAL 0.4 02/16/2022     OTHER:   Lab Results   Component Value Date    MURTAZA 8.8 02/16/2022    LIPASE 217 02/16/2022    AMYLASE 93 02/16/2022       Anesthesia Plan    ASA Status:  2   NPO Status:  NPO Appropriate    Anesthesia Type: General.     - Airway: ETT   Induction: Intravenous.   Maintenance: Inhalation.        Consents    Anesthesia Plan(s) and associated risks, benefits, and realistic alternatives discussed. Questions answered and patient/representative(s) expressed understanding.     - Discussed: Risks, Benefits and Alternatives for the PROCEDURE were discussed     - Discussed with:  Patient      - Extended Intubation/Ventilatory Support Discussed: Yes.      - Patient is DNR/DNI Status: No    Use of blood products discussed: Yes.     - Discussed with: Patient.     Postoperative Care    Pain management: IV analgesics.   PONV prophylaxis: Ondansetron (or other 5HT-3), Dexamethasone or Solumedrol     Comments:                    Alan Sepulveda MD

## 2022-04-04 NOTE — ANESTHESIA PROCEDURE NOTES
Airway       Patient location during procedure: OR       Procedure Start/Stop Times: 4/4/2022 8:19 AM  Staff -        Anesthesiologist:  Alan Sepulveda MD       CRNA: Miriam Vásquez APRN CRNA       Performed By: CRNA  Consent for Airway        Urgency: elective  Indications and Patient Condition       Indications for airway management: rolando-procedural       Induction type:intravenous       Mask difficulty assessment: 1 - vent by mask    Final Airway Details       Final airway type: endotracheal airway       Successful airway: ETT - single and Oral  Endotracheal Airway Details        ETT size (mm): 7.0       Cuffed: yes       Successful intubation technique: direct laryngoscopy       DL Blade Type: Marsh 2       Grade View of Cords: 1 (clear and open)       Adjucts: stylet       Position: Right       Measured from: lips       Secured at (cm): 22       Bite Block used: endo bite block.    Post intubation assessment        Placement verified by: capnometry, equal breath sounds and chest rise        Number of attempts at approach: 1       Number of other approaches attempted: 0       Secured with: silk tape       Ease of procedure: easy       Dentition: Intact and Unchanged

## 2022-04-04 NOTE — DISCHARGE INSTRUCTIONS
[facility]  Same-Day Surgery   Adult Discharge Orders & Instructions     For 24 hours after surgery    1. Get plenty of rest.  A responsible adult must stay with you for at least 24 hours after you leave the hospital.   2. Do not drive or use heavy equipment.  If you have weakness or tingling, don't drive or use heavy equipment until this feeling goes away.  3. Do not drink alcohol.  4. Avoid strenuous or risky activities.  Ask for help when climbing stairs.   5. You may feel lightheaded.  IF so, sit for a few minutes before standing.  Have someone help you get up.   6. If you have nausea (feel sick to your stomach): Drink only clear liquids such as apple juice, ginger ale, broth or 7-Up.  Rest may also help.  Be sure to drink enough fluids.  Move to a regular diet as you feel able.  7. You may have a slight fever. Call the doctor if your fever is over 100 F (37.7 C) (taken under the tongue) or lasts longer than 24 hours.  8. You may have a dry mouth, a sore throat, muscle aches or trouble sleeping.  These should go away after 24 hours.  9. Do not make important or legal decisions.   Call your doctor for any of the followin.  Signs of infection (fever, growing tenderness at the surgery site, a large amount of drainage or bleeding, severe pain, foul-smelling drainage, redness, swelling).    2. It has been over 8 to 10 hours since surgery and you are still not able to urinate (pass water).    3.  Headache for over 24 hours.    4.  Numbness, tingling or weakness the day after surgery (if you had spinal anesthesia).  To contact a doctor, call .hung     or:        [phone] and ask for the resident on call for   ______________________________________________ (answered 24 hours a day)      Emergency Department:    [facility]: [phone]       (TTY for hearing impaired: [phone])    [facility]: [phone]       (TTY for hearing impaired:  [phone])

## 2022-04-08 ENCOUNTER — PATIENT OUTREACH (OUTPATIENT)
Dept: GASTROENTEROLOGY | Facility: CLINIC | Age: 27
End: 2022-04-08
Payer: COMMERCIAL

## 2022-04-08 DIAGNOSIS — Z46.89 ENCOUNTER FOR REMOVAL OF PANCREATIC STENT: Primary | ICD-10-CM

## 2022-04-08 NOTE — TELEPHONE ENCOUNTER
Post  ERCP (4/4/22) with Dr. Campuzano: Follow-up    Post procedure recommendations:   Follow with clinical course and obtain a KUB  (AP/Lat.) in 4 weeks to ensure spontaneous passage of   pancreatic stent. If still in place, will arrange for    an EGD and stent removal in unit J.    - Expect no futher ERCP given resolution of prior stricture and clearance of ductal stones.     Orders placed: KUB    Patient states: MyChart sent    Clinic contact and scheduling numbers verified for future questions/concerns.    Jacklyn Rodríguez RN Care Coordinator

## 2022-05-10 ENCOUNTER — DOCUMENTATION ONLY (OUTPATIENT)
Dept: GASTROENTEROLOGY | Facility: CLINIC | Age: 27
End: 2022-05-10
Payer: COMMERCIAL

## 2022-05-10 NOTE — PROGRESS NOTES
VO - Fax xray orders to  [email or web address]  [address], [place] [address]  [phone]    Called and fax number is   [phone]    Faxed on 5/10/22 at 11AM    Jessica Chiu MA

## 2022-05-17 ENCOUNTER — TRANSFERRED RECORDS (OUTPATIENT)
Dept: HEALTH INFORMATION MANAGEMENT | Facility: CLINIC | Age: 27
End: 2022-05-17
Payer: COMMERCIAL

## 2022-05-18 ENCOUNTER — CARE COORDINATION (OUTPATIENT)
Dept: GASTROENTEROLOGY | Facility: CLINIC | Age: 27
End: 2022-05-18
Payer: COMMERCIAL

## 2022-05-18 NOTE — PROGRESS NOTES
Called to get xray imaging pushed from [place], read in CE. Called film room to attach record, forwarded to Dr. Campuzano for review.    ML

## 2022-06-23 ENCOUNTER — VIRTUAL VISIT (OUTPATIENT)
Dept: GASTROENTEROLOGY | Facility: CLINIC | Age: 27
End: 2022-06-23
Payer: COMMERCIAL

## 2022-06-23 ENCOUNTER — PREP FOR PROCEDURE (OUTPATIENT)
Dept: GASTROENTEROLOGY | Facility: CLINIC | Age: 27
End: 2022-06-23

## 2022-06-23 ENCOUNTER — PATIENT OUTREACH (OUTPATIENT)
Dept: GASTROENTEROLOGY | Facility: CLINIC | Age: 27
End: 2022-06-23

## 2022-06-23 VITALS — BODY MASS INDEX: 22.5 KG/M2 | WEIGHT: 123 LBS

## 2022-06-23 DIAGNOSIS — K86.0 ALCOHOL-INDUCED CHRONIC PANCREATITIS (H): Primary | ICD-10-CM

## 2022-06-23 PROCEDURE — 99214 OFFICE O/P EST MOD 30 MIN: CPT | Mod: 95 | Performed by: INTERNAL MEDICINE

## 2022-06-23 ASSESSMENT — PAIN SCALES - GENERAL: PAINLEVEL: MODERATE PAIN (4)

## 2022-06-23 NOTE — PROGRESS NOTES
Artie is a 26 year old who is being evaluated via a billable video visit.    Patient denies any changes since echeck-in regarding medication and allergies and states all information entered during echeck-in remains accurate.      How would you like to obtain your AVS? MyChart  If the video visit is dropped, the invitation should be resent by: Text to cell phone: [phone]  Will anyone else be joining your video visit? No       Pamela Cash        Video-Visit Details    Video Start Time: 9:00 AM    Type of service:  Video Visit    Video End Time:9:50 AM    Originating Location (pt. Location): Home    Distant Location (provider location):  [facility] [place]     Platform used for Video Visit: [employer]

## 2022-06-23 NOTE — PROGRESS NOTES
Per Dr. Campuzano  ERCP in  with me for PD stenting and also obtain the CT and MRCP from earlier this month     Please assist in scheduling:     Procedure/Imaging/Clinic: ERCP  Physician: Dr. Campuzano  Timin/13  Procedure length:provider average  Anesthesia:gen  Dx: recurrent pancreatitis  Tier:2  Location: [facility]     Orders placed, pt prefers [employer] message    ML

## 2022-06-23 NOTE — LETTER
6/23/2022         RE: Artie Burger  [address]  [place] [address]        Dear Colleague,    Thank you for referring your patient, Artie Burger, to the [facility] [place]. Please see a copy of my visit note below.    REFERRING PHYSICIAN:  Isaac Pride CNP; Lila Gallegos CNP    HISTORY OF PRESENT ILLNESS:  This is an extremely pleasant 26-year-old white female who was referred for further evaluation of acute recurrent pancreatitis which has progressed to chronic calcific pancreatitis from alcohol etiology and was seen first in clinic in 07/2021.  Her history dates back to 02/2018.  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  Lipase levels reached up to 7000.  She was also noted to be smoking for about 11 years and heavy alcohol history. Abstinent for 10 months at the time of initial clinic visit (22 months now).  The patient reports she was in remission from alcohol but relapsed and subsequently had further attacks of pancreatitis.  During the initial clinic visit, a CT scan on 05/2021 showed multiple stones in the pancreatic head, most likely either side branch, but a stone obstructing the main duct and multiple side branch stones.  She had 6/10 abdominal pain, although not on narcotics.  She was started on Creon and she underwent an ERCP 04/13/2021.  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  Initially, we were able to only place a 4-Cook Islander Zimmon stent in September.  Subsequently, ERCP in October, we were able to perform SpyGlass pancreatoscopy with EHL, which was successful in fragmenting the stones.  She had subsequent ERCP in November.  During this ERCP, we were able to get a 5-Cook Islander stent across the PD stricture and patient was improving  clinically with every ERCP.  During the December ERCP, we were able to get two 5-Mozambican stents and pancreatoscopy showed minimal stone burden and stricture was a more dominant problem.  During the February ERCP, she had three 5-Mozambican stents, and during the April ERCP, we decided to give her a stent-free trial as the PD stricture had improved.  She had significant number of side branches in the head, which was consistent with chronic calcific pancreatitis.  Pancreatic duct stent spontaneously had fallen off as noted on the 05/17 x-ray.  Subsequently, she developed an episode of acute pancreatitis for which she was admitted at [facility] between 06/11/2022  with acute pancreatitis.  CT scan during this hospital stay 06/09/2022 which showed a dilated pancreatic duct at the level of the neck measuring 4 mm and mild fat stranding around the pancreatic head.  No pancreatic fluid or necrotic collections were noted on the CT.  She subsequently had an MRCP which confirmed diffusely dilated pancreatic duct with small side branch noted again and minor pancreatic interstitial edema was seen.  There were no pancreatic duct filling defects.  She reports daily ongoing pain and has much worse quality of life since the stents have been removed.  She is not on narcotics, but she has had at least 2 absences from work secondary to pain.  She reports that she is more on the constipated side and she has lost about 6 pounds since the hospitalization.  She denies dysphagia or odynophagia.  She is currently not on Creon.    PAST MEDICAL HISTORY:    1.  Alcohol abuse.  2.  Panic attacks.  3.  Alcoholic hepatitis.  4.  Major depression.    PAST SURGICAL HISTORY:  None.    FAMILY HISTORY:  History of pancreatic cancer in distant cousin.  No history of pancreas cancer, bile duct cancer, gallbladder cancer or recurrent pancreatitis in the family.  She reports having quit smoking.  No history of alcohol for the past 22 months.  Used to be a heavy  drinker.  Used to smoke marijuana a couple of times daily.  She currently for [employer].     ASSESSMENT AND PLAN:  26-year-old female with past medical history significant for depression, panic attacks, alcoholism, currently in remission for the last 22 months, with recurrent bouts of pancreatitis, which progressed to chronic calcific pancreatitis from alcohol abuse, status post multiple ERCPs.  Most recent ERCP in April she was noted to have a high-grade PD stricture and stone.  The stones have all been removed.  The stricture was treated with multiple stents and those stents were all removed.  Developed an attack of pancreatitis after stent removal and is here for a followup, currently with ongoing abdominal pain.  The following are our recommendations:  1.  The patient had obviously failed a pancreatic stent free trial. The MRCP suggests recurrent PD stricture.  However, no stones have been noted.  We will, therefore, recommend a repeat ERCP with placement of multiple pancreatic stents. Will recommend stenting for another 6 months.  If she fails next stent-free trial, will refer her for surgery given her age and given how young she is, and surgery would be a definitive option.  The patient agrees with this plan.  2.  She has been recommended to continue abstaining from smoking and alcohol as before.  3.  We will recommend pancreatic enzyme replacement therapy.  She should be dosed with Creon 24,000 International Units 3 capsules with meals and 2 capsules with snacks.  She is currently not taking Creon and Creon may help with her pain and her weight loss.  4.  I will recommend DEXA scan as per AGA guidelines.     A total of 45 minutes was spent in reviewing her chart and discussing further procedures including ERCP.        Sincerely,    Guru Justen MD

## 2022-06-23 NOTE — PROGRESS NOTES
REFERRING PHYSICIAN:  Isaac Pride CNP; Lila Gallegos CNP    HISTORY OF PRESENT ILLNESS:  This is an extremely pleasant 26-year-old white female who was referred for further evaluation of acute recurrent pancreatitis which has progressed to chronic calcific pancreatitis from alcohol etiology and was seen first in clinic in 07/2021.  Her history dates back to 02/2018.  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  Lipase levels reached up to 7000.  She was also noted to be smoking for about 11 years and heavy alcohol history. Abstinent for 10 months at the time of initial clinic visit (22 months now).  The patient reports she was in remission from alcohol but relapsed and subsequently had further attacks of pancreatitis.  During the initial clinic visit, a CT scan on 05/2021 showed multiple stones in the pancreatic head, most likely either side branch, but a stone obstructing the main duct and multiple side branch stones.  She had 6/10 abdominal pain, although not on narcotics.  She was started on Creon and she underwent an ERCP 04/13/2021.  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  Initially, we were able to only place a 4-Panamanian Zimmon stent in September.  Subsequently, ERCP in October, we were able to perform SpyGlass pancreatoscopy with EHL, which was successful in fragmenting the stones.  She had subsequent ERCP in November.  During this ERCP, we were able to get a 5-Panamanian stent across the PD stricture and patient was improving clinically with every ERCP.  During the December ERCP, we were able to get two 5-Panamanian stents and pancreatoscopy showed minimal stone burden and stricture was a more dominant problem.  During the February ERCP, she had three 5-Panamanian stents, and during the April ERCP, we decided  to give her a stent-free trial as the PD stricture had improved.  She had significant number of side branches in the head, which was consistent with chronic calcific pancreatitis.  Pancreatic duct stent spontaneously had fallen off as noted on the 05/17 x-ray.  Subsequently, she developed an episode of acute pancreatitis for which she was admitted at [facility] between 06/11/2022  with acute pancreatitis.  CT scan during this hospital stay 06/09/2022 which showed a dilated pancreatic duct at the level of the neck measuring 4 mm and mild fat stranding around the pancreatic head.  No pancreatic fluid or necrotic collections were noted on the CT.  She subsequently had an MRCP which confirmed diffusely dilated pancreatic duct with small side branch noted again and minor pancreatic interstitial edema was seen.  There were no pancreatic duct filling defects.  She reports daily ongoing pain and has much worse quality of life since the stents have been removed.  She is not on narcotics, but she has had at least 2 absences from work secondary to pain.  She reports that she is more on the constipated side and she has lost about 6 pounds since the hospitalization.  She denies dysphagia or odynophagia.  She is currently not on Creon.    PAST MEDICAL HISTORY:    1.  Alcohol abuse.  2.  Panic attacks.  3.  Alcoholic hepatitis.  4.  Major depression.    PAST SURGICAL HISTORY:  None.    FAMILY HISTORY:  History of pancreatic cancer in distant cousin.  No history of pancreas cancer, bile duct cancer, gallbladder cancer or recurrent pancreatitis in the family.  She reports having quit smoking.  No history of alcohol for the past 22 months.  Used to be a heavy drinker.  Used to smoke marijuana a couple of times daily.  She currently for [employer].     ASSESSMENT AND PLAN:  26-year-old female with past medical history significant for depression, panic attacks, alcoholism, currently in remission for the last 22 months, with recurrent  bouts of pancreatitis, which progressed to chronic calcific pancreatitis from alcohol abuse, status post multiple ERCPs.  Most recent ERCP in April she was noted to have a high-grade PD stricture and stone.  The stones have all been removed.  The stricture was treated with multiple stents and those stents were all removed.  Developed an attack of pancreatitis after stent removal and is here for a followup, currently with ongoing abdominal pain.  The following are our recommendations:  1.  The patient had obviously failed a pancreatic stent free trial. The MRCP suggests recurrent PD stricture.  However, no stones have been noted.  We will, therefore, recommend a repeat ERCP with placement of multiple pancreatic stents. Will recommend stenting for another 6 months.  If she fails next stent-free trial, will refer her for surgery given her age and given how young she is, and surgery would be a definitive option.  The patient agrees with this plan.  2.  She has been recommended to continue abstaining from smoking and alcohol as before.  3.  We will recommend pancreatic enzyme replacement therapy.  She should be dosed with Creon 24,000 International Units 3 capsules with meals and 2 capsules with snacks.  She is currently not taking Creon and Creon may help with her pain and her weight loss.  4.  I will recommend DEXA scan as per AGA guidelines.     A total of 45 minutes was spent in reviewing her chart and discussing further procedures including ERCP.

## 2022-07-12 ENCOUNTER — ANESTHESIA EVENT (OUTPATIENT)
Dept: SURGERY | Facility: CLINIC | Age: 27
End: 2022-07-12
Payer: COMMERCIAL

## 2022-07-12 ASSESSMENT — COPD QUESTIONNAIRES: COPD: 0

## 2022-07-12 ASSESSMENT — LIFESTYLE VARIABLES: TOBACCO_USE: 1

## 2022-07-12 NOTE — ANESTHESIA PREPROCEDURE EVALUATION
Anesthesia Pre-Procedure Evaluation    Patient: Artie Burger   MRN: [id number] : 1995        Procedure : Procedure(s):  ENDOSCOPIC RETROGRADE CHOLANGIOPANCREATOGRAPHY          Past Medical History:   Diagnosis Date     Gastroesophageal reflux disease      Pancreatic disease      PONV (postoperative nausea and vomiting)       Past Surgical History:   Procedure Laterality Date     ENDOSCOPIC RETROGRADE CHOLANGIOPANCREATOGRAM COMPLEX N/A 2021    Procedure: ENDOSCOPIC RETROGRADE CHOLANGIOPANCREATOGRAPHY with pancreatic sphincterotomy, dilation, stone removal, stent placement;  Surgeon: Guru Sabino Campuzano MD;  Location: [facility]     ENDOSCOPIC RETROGRADE CHOLANGIOPANCREATOGRAM WITH DIRECT VISUALIZATION SYSTEM AND GENERATOR N/A 2021    Procedure: ENDOSCOPIC RETROGRADE CHOLANGIOPANCREATOGRAPHY, dilation and debridement sludge removal, pancreatic duct stent placement;  Surgeon: Guru Sabino Campuzano MD;  Location: [facility]     ENDOSCOPIC RETROGRADE CHOLANGIOPANCREATOGRAM WITH SPYGLASS N/A 10/6/2021    Procedure: ENDOSCOPIC RETROGRADE CHOLANGIOPANCREATOGRAPHY, WITH DIRECT DUCT VISUALIZATION, USING PANCREATICOBILIARY FIBEROPTIC PROBE, BILE DUCT STENT EXCHANGE, LITHOTRIPSY OF PANCREATIC STONE, SPHINCTEROTOMY, BALLOON DILATION OF PANCREATIC DUCT AND STONE EXTRACTION;  Surgeon: Guru Sabino Campuzano MD;  Location: [facility]     ENDOSCOPIC RETROGRADE CHOLANGIOPANCREATOGRAM WITH SPYGLASS N/A 2021    Procedure: ENDOSCOPIC RETROGRADE CHOLANGIOPANCREATOGRAPHY, with pancreatic stent exchange, ballon dilation, stone removal, with spyglass direct visualization;  Surgeon: Guru Sabino Campuzano MD;  Location: [facility]     ENDOSCOPIC RETROGRADE CHOLANGIOPANCREATOGRAPHY, EXCHANGE TUBE/STENT N/A 2022    Procedure: ENDOSCOPIC RETROGRADE CHOLANGIOPANCREATOGRAPH with pancreatic dilation, debris removal and stent exchange.;  Surgeon: Guru Sabino Campuzano  MD Jamie;  Location: [facility]     ENDOSCOPIC RETROGRADE CHOLANGIOPANCREATOGRAPHY, EXCHANGE TUBE/STENT N/A 4/4/2022    Procedure: ENDOSCOPIC RETROGRADE CHOLANGIOPANCREATOGRAPHY, WITH REPLACEMENT OF pancreatic STENT, stone removal;  Surgeon: Guru Sabino Campuzano MD;  Location: [facility]      No Known Allergies   Social History     Tobacco Use     Smoking status: Former Smoker     Types: Cigarettes     Smokeless tobacco: Never Used   Substance Use Topics     Alcohol use: Not Currently      Wt Readings from Last 1 Encounters:   06/23/22 55.8 kg (123 lb)        Anesthesia Evaluation   Pt has had prior anesthetic. Type: General.    History of anesthetic complications  - PONV.      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    (+) GERD, Asymptomatic on medication, liver disease,     Renal/Genitourinary:  - neg Renal ROS     Endo:  - neg endo ROS  (-) Type II DM   Psychiatric/Substance Use:     (+) psychiatric history (Panic attack) anxiety (taking buspar and lexapro) alcohol abuse (alcohl abuse in remision)     Infectious Disease:  - neg infectious disease ROS     Malignancy:  - neg malignancy ROS     Other:            Physical Exam    Airway        Mallampati: I   TM distance: > 3 FB   Neck ROM: full   Mouth opening: > 3 cm    Respiratory Devices and Support         Dental  no notable dental history         Cardiovascular   cardiovascular exam normal          Pulmonary   pulmonary exam normal                OUTSIDE LABS:  CBC:   Lab Results   Component Value Date    WBC 12.2 (H) 04/04/2022    WBC 12.0 (H) 02/16/2022    HGB 10.9 (L) 04/04/2022    HGB 11.6 (L)  02/16/2022    HCT 32.8 (L) 04/04/2022    HCT 35.3 02/16/2022     04/04/2022     02/16/2022     BMP:   Lab Results   Component Value Date     04/04/2022     02/16/2022    POTASSIUM 3.8 04/04/2022    POTASSIUM 3.8 02/16/2022    CHLORIDE 112 (H) 04/04/2022    CHLORIDE 109 02/16/2022    CO2 21 04/04/2022    CO2 23 02/16/2022    BUN 20 04/04/2022    BUN 20 02/16/2022    CR 0.67 04/04/2022    CR 0.83 02/16/2022     (H) 04/04/2022     (H) 02/16/2022     COAGS:   Lab Results   Component Value Date    INR 0.93 04/04/2022     POC:   Lab Results   Component Value Date    HCG Negative 09/13/2021    HCGS Negative 04/04/2022     HEPATIC:   Lab Results   Component Value Date    ALBUMIN 3.2 (L) 04/04/2022    PROTTOTAL 6.6 (L) 04/04/2022    ALT 13 04/04/2022    AST 12 04/04/2022    ALKPHOS 52 04/04/2022    BILITOTAL 0.7 04/04/2022     OTHER:   Lab Results   Component Value Date    MURTAZA 8.6 04/04/2022    LIPASE 217 02/16/2022    AMYLASE 93 02/16/2022       Anesthesia Plan    ASA Status:  2   NPO Status:  NPO Appropriate    Anesthesia Type: General.     - Airway: ETT   Induction: Intravenous.   Maintenance: TIVA.   Techniques and Equipment:     - Lines/Monitors: BIS     Consents    Anesthesia Plan(s) and associated risks, benefits, and realistic alternatives discussed. Questions answered and patient/representative(s) expressed understanding.    - Discussed:     - Discussed with:  Patient, Parent (Mother and/or Father)      - Extended Intubation/Ventilatory Support Discussed: No.      - Patient is DNR/DNI Status: No    Use of blood products discussed: No .     Postoperative Care    Pain management: Multi-modal analgesia.   PONV prophylaxis: Ondansetron (or other 5HT-3), Dexamethasone or Solumedrol, Background Propofol Infusion, Scopolamine patch     Comments:                Dougie Calix MD

## 2022-07-13 ENCOUNTER — ANESTHESIA (OUTPATIENT)
Dept: SURGERY | Facility: CLINIC | Age: 27
End: 2022-07-13
Payer: COMMERCIAL

## 2022-07-13 ENCOUNTER — APPOINTMENT (OUTPATIENT)
Dept: GENERAL RADIOLOGY | Facility: CLINIC | Age: 27
End: 2022-07-13
Attending: INTERNAL MEDICINE
Payer: COMMERCIAL

## 2022-07-13 ENCOUNTER — HOSPITAL ENCOUNTER (OUTPATIENT)
Facility: CLINIC | Age: 27
Discharge: HOME | End: 2022-07-13
Attending: INTERNAL MEDICINE | Admitting: INTERNAL MEDICINE
Payer: COMMERCIAL

## 2022-07-13 VITALS
OXYGEN SATURATION: 98 % | HEART RATE: 61 BPM | WEIGHT: 124.34 LBS | RESPIRATION RATE: 15 BRPM | HEIGHT: 62 IN | DIASTOLIC BLOOD PRESSURE: 79 MMHG | TEMPERATURE: 98 F | SYSTOLIC BLOOD PRESSURE: 112 MMHG | BODY MASS INDEX: 22.88 KG/M2

## 2022-07-13 LAB
ALBUMIN SERPL BCG-MCNC: 4.1 G/DL (ref 3.5–5.2)
ALP SERPL-CCNC: 44 U/L (ref 35–104)
ALT SERPL W P-5'-P-CCNC: 12 U/L (ref 10–35)
AMYLASE SERPL-CCNC: 69 U/L (ref 28–100)
ANION GAP SERPL CALCULATED.3IONS-SCNC: 9 MMOL/L (ref 7–15)
AST SERPL W P-5'-P-CCNC: 18 U/L (ref 10–35)
BILIRUB SERPL-MCNC: 0.2 MG/DL
BUN SERPL-MCNC: 21.2 MG/DL (ref 6–20)
CALCIUM SERPL-MCNC: 8.8 MG/DL (ref 8.6–10)
CHLORIDE SERPL-SCNC: 105 MMOL/L (ref 98–107)
CREAT SERPL-MCNC: 0.88 MG/DL (ref 0.51–0.95)
DEPRECATED HCO3 PLAS-SCNC: 22 MMOL/L (ref 22–29)
ERCP: NORMAL
ERYTHROCYTE [DISTWIDTH] IN BLOOD BY AUTOMATED COUNT: 13.7 % (ref 10–15)
GFR SERPL CREATININE-BSD FRML MDRD: >90 ML/MIN/1.73M2
GLUCOSE BLDC GLUCOMTR-MCNC: 100 MG/DL (ref 70–99)
GLUCOSE SERPL-MCNC: 99 MG/DL (ref 70–99)
HCG UR QL: NEGATIVE
HCT VFR BLD AUTO: 33.2 % (ref 35–47)
HGB BLD-MCNC: 11.1 G/DL (ref 11.7–15.7)
INR PPP: 0.98 (ref 0.85–1.15)
LIPASE SERPL-CCNC: 30 U/L (ref 13–60)
MCH RBC QN AUTO: 30 PG (ref 26.5–33)
MCHC RBC AUTO-ENTMCNC: 33.4 G/DL (ref 31.5–36.5)
MCV RBC AUTO: 90 FL (ref 78–100)
PLATELET # BLD AUTO: 437 10E3/UL (ref 150–450)
POTASSIUM SERPL-SCNC: 4.4 MMOL/L (ref 3.4–5.3)
PROT SERPL-MCNC: 6.7 G/DL (ref 6.4–8.3)
RBC # BLD AUTO: 3.7 10E6/UL (ref 3.8–5.2)
SODIUM SERPL-SCNC: 136 MMOL/L (ref 136–145)
WBC # BLD AUTO: 9.4 10E3/UL (ref 4–11)

## 2022-07-13 PROCEDURE — 85610 PROTHROMBIN TIME: CPT | Performed by: INTERNAL MEDICINE

## 2022-07-13 PROCEDURE — 81025 URINE PREGNANCY TEST: CPT | Performed by: ANESTHESIOLOGY

## 2022-07-13 PROCEDURE — C1877 STENT, NON-COAT/COV W/O DEL: HCPCS | Performed by: INTERNAL MEDICINE

## 2022-07-13 PROCEDURE — 80053 COMPREHEN METABOLIC PANEL: CPT | Performed by: INTERNAL MEDICINE

## 2022-07-13 PROCEDURE — C1726 CATH, BAL DIL, NON-VASCULAR: HCPCS | Performed by: INTERNAL MEDICINE

## 2022-07-13 PROCEDURE — C1769 GUIDE WIRE: HCPCS | Performed by: INTERNAL MEDICINE

## 2022-07-13 PROCEDURE — 258N000003 HC RX IP 258 OP 636: Performed by: STUDENT IN AN ORGANIZED HEALTH CARE EDUCATION/TRAINING PROGRAM

## 2022-07-13 PROCEDURE — 360N000082 HC SURGERY LEVEL 2 W/ FLUORO, PER MIN: Performed by: INTERNAL MEDICINE

## 2022-07-13 PROCEDURE — 85027 COMPLETE CBC AUTOMATED: CPT | Performed by: INTERNAL MEDICINE

## 2022-07-13 PROCEDURE — 255N000002 HC RX 255 OP 636: Performed by: INTERNAL MEDICINE

## 2022-07-13 PROCEDURE — 36415 COLL VENOUS BLD VENIPUNCTURE: CPT | Performed by: INTERNAL MEDICINE

## 2022-07-13 PROCEDURE — 83690 ASSAY OF LIPASE: CPT | Performed by: INTERNAL MEDICINE

## 2022-07-13 PROCEDURE — 370N000017 HC ANESTHESIA TECHNICAL FEE, PER MIN: Performed by: INTERNAL MEDICINE

## 2022-07-13 PROCEDURE — 999N000181 XR SURGERY CARM FLUORO GREATER THAN 5 MIN W STILLS: Mod: TC

## 2022-07-13 PROCEDURE — 250N000013 HC RX MED GY IP 250 OP 250 PS 637: Performed by: STUDENT IN AN ORGANIZED HEALTH CARE EDUCATION/TRAINING PROGRAM

## 2022-07-13 PROCEDURE — 710N000010 HC RECOVERY PHASE 1, LEVEL 2, PER MIN: Performed by: INTERNAL MEDICINE

## 2022-07-13 PROCEDURE — 250N000011 HC RX IP 250 OP 636: Performed by: INTERNAL MEDICINE

## 2022-07-13 PROCEDURE — 272N000001 HC OR GENERAL SUPPLY STERILE: Performed by: INTERNAL MEDICINE

## 2022-07-13 PROCEDURE — 82962 GLUCOSE BLOOD TEST: CPT

## 2022-07-13 PROCEDURE — 250N000009 HC RX 250: Performed by: STUDENT IN AN ORGANIZED HEALTH CARE EDUCATION/TRAINING PROGRAM

## 2022-07-13 PROCEDURE — 250N000025 HC SEVOFLURANE, PER MIN: Performed by: INTERNAL MEDICINE

## 2022-07-13 PROCEDURE — 999N000141 HC STATISTIC PRE-PROCEDURE NURSING ASSESSMENT: Performed by: INTERNAL MEDICINE

## 2022-07-13 PROCEDURE — 250N000011 HC RX IP 250 OP 636: Performed by: STUDENT IN AN ORGANIZED HEALTH CARE EDUCATION/TRAINING PROGRAM

## 2022-07-13 PROCEDURE — 250N000009 HC RX 250: Performed by: INTERNAL MEDICINE

## 2022-07-13 PROCEDURE — 82150 ASSAY OF AMYLASE: CPT | Performed by: INTERNAL MEDICINE

## 2022-07-13 PROCEDURE — 710N000012 HC RECOVERY PHASE 2, PER MINUTE: Performed by: INTERNAL MEDICINE

## 2022-07-13 DEVICE — STENT ZIMMON PANCREA 4FRX12CM SGL PIGTAIL G50281
Type: IMPLANTABLE DEVICE | Site: PANCREATIC DUCT | Status: NON-FUNCTIONAL
Removed: 2022-09-14

## 2022-07-13 DEVICE — IMPLANTABLE DEVICE
Type: IMPLANTABLE DEVICE | Site: BILE DUCT | Status: NON-FUNCTIONAL
Removed: 2022-09-14

## 2022-07-13 DEVICE — STENT ZIMMON PANCREA 5FRX12CM SGL PIGTAIL G22363
Type: IMPLANTABLE DEVICE | Site: PANCREATIC DUCT | Status: NON-FUNCTIONAL
Removed: 2022-09-14

## 2022-07-13 RX ORDER — PROPOFOL 10 MG/ML
INJECTION, EMULSION INTRAVENOUS PRN
Status: DISCONTINUED | OUTPATIENT
Start: 2022-07-13 | End: 2022-07-13

## 2022-07-13 RX ORDER — INDOMETHACIN 50 MG/1
SUPPOSITORY RECTAL PRN
Status: DISCONTINUED | OUTPATIENT
Start: 2022-07-13 | End: 2022-07-13 | Stop reason: HOSPADM

## 2022-07-13 RX ORDER — FENTANYL CITRATE 50 UG/ML
INJECTION, SOLUTION INTRAMUSCULAR; INTRAVENOUS PRN
Status: DISCONTINUED | OUTPATIENT
Start: 2022-07-13 | End: 2022-07-13

## 2022-07-13 RX ORDER — DEXAMETHASONE SODIUM PHOSPHATE 4 MG/ML
INJECTION, SOLUTION INTRA-ARTICULAR; INTRALESIONAL; INTRAMUSCULAR; INTRAVENOUS; SOFT TISSUE PRN
Status: DISCONTINUED | OUTPATIENT
Start: 2022-07-13 | End: 2022-07-13

## 2022-07-13 RX ORDER — LIDOCAINE 40 MG/G
CREAM TOPICAL
Status: DISCONTINUED | OUTPATIENT
Start: 2022-07-13 | End: 2022-07-13 | Stop reason: HOSPADM

## 2022-07-13 RX ORDER — ACETAMINOPHEN 325 MG/1
975 TABLET ORAL ONCE
Status: COMPLETED | OUTPATIENT
Start: 2022-07-13 | End: 2022-07-13

## 2022-07-13 RX ORDER — GABAPENTIN 300 MG/1
300 CAPSULE ORAL
Status: COMPLETED | OUTPATIENT
Start: 2022-07-13 | End: 2022-07-13

## 2022-07-13 RX ORDER — MEPERIDINE HYDROCHLORIDE 25 MG/ML
12.5 INJECTION INTRAMUSCULAR; INTRAVENOUS; SUBCUTANEOUS
Status: DISCONTINUED | OUTPATIENT
Start: 2022-07-13 | End: 2022-07-13 | Stop reason: HOSPADM

## 2022-07-13 RX ORDER — ONDANSETRON 2 MG/ML
INJECTION INTRAMUSCULAR; INTRAVENOUS PRN
Status: DISCONTINUED | OUTPATIENT
Start: 2022-07-13 | End: 2022-07-13

## 2022-07-13 RX ORDER — FENTANYL CITRATE 50 UG/ML
25 INJECTION, SOLUTION INTRAMUSCULAR; INTRAVENOUS
Status: DISCONTINUED | OUTPATIENT
Start: 2022-07-13 | End: 2022-07-13 | Stop reason: HOSPADM

## 2022-07-13 RX ORDER — HYDROMORPHONE HCL IN WATER/PF 6 MG/30 ML
0.2 PATIENT CONTROLLED ANALGESIA SYRINGE INTRAVENOUS EVERY 5 MIN PRN
Status: DISCONTINUED | OUTPATIENT
Start: 2022-07-13 | End: 2022-07-13 | Stop reason: HOSPADM

## 2022-07-13 RX ORDER — ONDANSETRON 2 MG/ML
4 INJECTION INTRAMUSCULAR; INTRAVENOUS EVERY 30 MIN PRN
Status: DISCONTINUED | OUTPATIENT
Start: 2022-07-13 | End: 2022-07-13 | Stop reason: HOSPADM

## 2022-07-13 RX ORDER — OXYCODONE HYDROCHLORIDE 5 MG/1
5 TABLET ORAL EVERY 4 HOURS PRN
Status: DISCONTINUED | OUTPATIENT
Start: 2022-07-13 | End: 2022-07-13 | Stop reason: HOSPADM

## 2022-07-13 RX ORDER — LIDOCAINE HYDROCHLORIDE 20 MG/ML
INJECTION, SOLUTION INFILTRATION; PERINEURAL PRN
Status: DISCONTINUED | OUTPATIENT
Start: 2022-07-13 | End: 2022-07-13

## 2022-07-13 RX ORDER — FENTANYL CITRATE 50 UG/ML
25 INJECTION, SOLUTION INTRAMUSCULAR; INTRAVENOUS EVERY 5 MIN PRN
Status: DISCONTINUED | OUTPATIENT
Start: 2022-07-13 | End: 2022-07-13 | Stop reason: HOSPADM

## 2022-07-13 RX ORDER — ONDANSETRON 4 MG/1
4 TABLET, ORALLY DISINTEGRATING ORAL EVERY 30 MIN PRN
Status: DISCONTINUED | OUTPATIENT
Start: 2022-07-13 | End: 2022-07-13 | Stop reason: HOSPADM

## 2022-07-13 RX ORDER — CIPROFLOXACIN 2 MG/ML
400 INJECTION, SOLUTION INTRAVENOUS
Status: COMPLETED | OUTPATIENT
Start: 2022-07-13 | End: 2022-07-13

## 2022-07-13 RX ORDER — SODIUM CHLORIDE, SODIUM LACTATE, POTASSIUM CHLORIDE, CALCIUM CHLORIDE 600; 310; 30; 20 MG/100ML; MG/100ML; MG/100ML; MG/100ML
INJECTION, SOLUTION INTRAVENOUS CONTINUOUS
Status: DISCONTINUED | OUTPATIENT
Start: 2022-07-13 | End: 2022-07-13 | Stop reason: HOSPADM

## 2022-07-13 RX ORDER — IOPAMIDOL 510 MG/ML
INJECTION, SOLUTION INTRAVASCULAR PRN
Status: DISCONTINUED | OUTPATIENT
Start: 2022-07-13 | End: 2022-07-13 | Stop reason: HOSPADM

## 2022-07-13 RX ORDER — PROPOFOL 10 MG/ML
INJECTION, EMULSION INTRAVENOUS CONTINUOUS PRN
Status: DISCONTINUED | OUTPATIENT
Start: 2022-07-13 | End: 2022-07-13

## 2022-07-13 RX ORDER — SCOLOPAMINE TRANSDERMAL SYSTEM 1 MG/1
1 PATCH, EXTENDED RELEASE TRANSDERMAL ONCE
Status: DISCONTINUED | OUTPATIENT
Start: 2022-07-13 | End: 2022-07-13 | Stop reason: HOSPADM

## 2022-07-13 RX ORDER — SODIUM CHLORIDE, SODIUM LACTATE, POTASSIUM CHLORIDE, CALCIUM CHLORIDE 600; 310; 30; 20 MG/100ML; MG/100ML; MG/100ML; MG/100ML
INJECTION, SOLUTION INTRAVENOUS CONTINUOUS PRN
Status: DISCONTINUED | OUTPATIENT
Start: 2022-07-13 | End: 2022-07-13

## 2022-07-13 RX ADMIN — DEXAMETHASONE SODIUM PHOSPHATE 4 MG: 4 INJECTION, SOLUTION INTRA-ARTICULAR; INTRALESIONAL; INTRAMUSCULAR; INTRAVENOUS; SOFT TISSUE at 09:46

## 2022-07-13 RX ADMIN — SODIUM CHLORIDE, POTASSIUM CHLORIDE, SODIUM LACTATE AND CALCIUM CHLORIDE: 600; 310; 30; 20 INJECTION, SOLUTION INTRAVENOUS at 09:43

## 2022-07-13 RX ADMIN — SUGAMMADEX 200 MG: 100 INJECTION, SOLUTION INTRAVENOUS at 10:50

## 2022-07-13 RX ADMIN — PROPOFOL 150 MG: 10 INJECTION, EMULSION INTRAVENOUS at 09:46

## 2022-07-13 RX ADMIN — LIDOCAINE HYDROCHLORIDE 100 MG: 20 INJECTION, SOLUTION INFILTRATION; PERINEURAL at 09:46

## 2022-07-13 RX ADMIN — ACETAMINOPHEN 975 MG: 325 TABLET, FILM COATED ORAL at 08:33

## 2022-07-13 RX ADMIN — Medication 20 MG: at 10:15

## 2022-07-13 RX ADMIN — CIPROFLOXACIN 400 MG: 2 INJECTION INTRAVENOUS at 09:43

## 2022-07-13 RX ADMIN — FENTANYL CITRATE 25 MCG: 50 INJECTION, SOLUTION INTRAMUSCULAR; INTRAVENOUS at 10:38

## 2022-07-13 RX ADMIN — PROPOFOL 150 MCG/KG/MIN: 10 INJECTION, EMULSION INTRAVENOUS at 09:49

## 2022-07-13 RX ADMIN — FENTANYL CITRATE 100 MCG: 50 INJECTION, SOLUTION INTRAMUSCULAR; INTRAVENOUS at 09:46

## 2022-07-13 RX ADMIN — SCOPALAMINE 1 PATCH: 1 PATCH, EXTENDED RELEASE TRANSDERMAL at 08:33

## 2022-07-13 RX ADMIN — OXYCODONE HYDROCHLORIDE 5 MG: 5 TABLET ORAL at 11:47

## 2022-07-13 RX ADMIN — GLUCAGON HYDROCHLORIDE 0.2 MG: KIT at 10:34

## 2022-07-13 RX ADMIN — Medication 50 MG: at 09:46

## 2022-07-13 RX ADMIN — GABAPENTIN 300 MG: 300 CAPSULE ORAL at 08:33

## 2022-07-13 RX ADMIN — GLUCAGON HYDROCHLORIDE 0.4 MG: KIT at 10:18

## 2022-07-13 RX ADMIN — FENTANYL CITRATE 50 MCG: 50 INJECTION, SOLUTION INTRAMUSCULAR; INTRAVENOUS at 10:25

## 2022-07-13 RX ADMIN — ONDANSETRON 4 MG: 2 INJECTION INTRAMUSCULAR; INTRAVENOUS at 10:44

## 2022-07-13 NOTE — DISCHARGE INSTRUCTIONS
[facility]  Same-Day Surgery   Adult Discharge Orders & Instructions     For 24 hours after surgery    Get plenty of rest.  A responsible adult must stay with you for at least 24 hours after you leave the hospital.   Do not drive or use heavy equipment.  If you have weakness or tingling, don't drive or use heavy equipment until this feeling goes away.  Do not drink alcohol.  Avoid strenuous or risky activities.  Ask for help when climbing stairs.   You may feel lightheaded.  IF so, sit for a few minutes before standing.  Have someone help you get up.   If you have nausea (feel sick to your stomach): Drink only clear liquids such as apple juice, ginger ale, broth or 7-Up.  Rest may also help.  Be sure to drink enough fluids.  Move to a regular diet as you feel able.  You may have a slight fever. Call the doctor if your fever is over 100 F (37.7 C) (taken under the tongue) or lasts longer than 24 hours.  You may have a dry mouth, a sore throat, muscle aches or trouble sleeping.  These should go away after 24 hours.  Do not make important or legal decisions.   Call your doctor for any of the followin.  Signs of infection (fever, growing tenderness at the surgery site, a large amount of drainage or bleeding, severe pain, foul-smelling drainage, redness, swelling).    2. It has been over 8 to 10 hours since surgery and you are still not able to urinate (pass water).    3.  Headache for over 24 hours.    4.  Numbness, tingling or weakness the day after surgery (if you had spinal anesthesia).  To contact a doctor, call __Dr Campuzano's clinic at [phone] ________ or:    '   [phone] and ask for the resident on call for   __Medicine_________ (answered 24 hours a day)  '   Emergency Department:    [facility]: [phone]       (TTY for hearing impaired: [phone])    Scopolamine Patch- (Absorbed through the skin)    Prevents nausea and vomiting caused by  motion sickness or anesthesia and surgery in adults.    Brand Name(s): Transderm Scop, Transderm-Scope  There may be other brand names for this medicine.    How to Use This Medicine:  Patch  After you take off the patch, wash the place where the patch was and your hands thoroughly.    How to Dispose of This Medicine:    Fold the used patch in half with the sticky sides together. Throw any used patch away so that children or pets cannot get to it. You will also need to throw away old patches after the expiration date has passed.  Keep all medicine away from children and never share your medicine with anyone.    Drugs and Foods to Avoid:  Ask your doctor or pharmacist before using any other medicine, including over-the-counter medicines, vitamins, and herbal products.    Tell your doctor if you are using any medicines that make you sleepy. These include sleeping pills, cold and allergy medicine, narcotic pain relievers, and sedatives.   Do not drink alcohol while you are using this medicine.    Possible Side Effects While Using This Medicine:  Call your doctor right away if you notice any of these side effects:    Allergic reaction: Itching or hives, swelling in your face or hands, swelling or tingling in your mouth or throat, chest tightness, trouble breathing.  Blurred vision,  Confusion or memory loss.  Fast,slow, or uneven heartbeat.  Lightheadedness, dizziness, drowsiness, or fainting.  Seeing, hearing, or feeling things that are not there.  Severe eye pain.  Trouble urinating.      If you notice these less serious side effects, talk with your doctor:    Dry mouth.  Dry, itchy, or red eyes.  Restlessness  Skin rash or redness.      If you experience these side effects after you are discharged from the hospital - REMOVE your patch.

## 2022-07-13 NOTE — ANESTHESIA POSTPROCEDURE EVALUATION
Patient: Artie Burger    Procedure: Procedure(s):  ENDOSCOPIC RETROGRADE CHOLANGIOPANCREATOGRAPHY, WITH pancreatic duct dilation and stent exchange, biliary stent placement, debris removal       Anesthesia Type:  General    Note:  Disposition: Outpatient   Postop Pain Control: Uneventful            Sign Out: Well controlled pain   PONV: No   Neuro/Psych: Uneventful            Sign Out: Acceptable/Baseline neuro status   Airway/Respiratory: Uneventful            Sign Out: Acceptable/Baseline resp. status   CV/Hemodynamics: Uneventful            Sign Out: Acceptable CV status; No obvious hypovolemia; No obvious fluid overload   Other NRE: NONE   DID A NON-ROUTINE EVENT OCCUR? No           Last vitals:  Vitals Value Taken Time   /76 07/13/22 1130   Temp 36.8  C (98.2  F) 07/13/22 1110   Pulse 51 07/13/22 1141   Resp 18 07/13/22 1141   SpO2 97 % 07/13/22 1141   Vitals shown include unvalidated device data.    Electronically Signed By: Bryan Cuello MD  July 13, 2022  11:42 AM

## 2022-07-13 NOTE — ANESTHESIA PROCEDURE NOTES
Airway       Patient location during procedure: OR       Procedure Start/Stop Times: 7/13/2022 9:54 AM  Staff -        Anesthesiologist:  Cindy Bah MD       Resident/Fellow: Serenity Chowdary MD       Performed By: resident  Consent for Airway        Urgency: elective  Indications and Patient Condition       Indications for airway management: rolando-procedural       Induction type:intravenous       Mask difficulty assessment: 1 - vent by mask    Final Airway Details       Final airway type: endotracheal airway       Successful airway: ETT - single  Endotracheal Airway Details        ETT size (mm): 7.0       Cuffed: yes       Successful intubation technique: video laryngoscopy       VL Blade Size: MAC 3       Grade View of Cords: 1       Adjucts: stylet       Position: Right       Measured from: gums/teeth       Secured at (cm): 21    Post intubation assessment        Placement verified by: capnometry, equal breath sounds and chest rise        Number of attempts at approach: 1       Secured with: silk tape       Ease of procedure: easy       Dentition: Intact    Medication(s) Administered   Medication Administration Time: 7/13/2022 9:54 AM

## 2022-07-13 NOTE — ANESTHESIA CARE TRANSFER NOTE
Patient: Artie Burger    Procedure: Procedure(s):  ENDOSCOPIC RETROGRADE CHOLANGIOPANCREATOGRAPHY, WITH pancreatic duct dilation and stent exchange, biliary stent placement, debris removal       Diagnosis: Recurrent pancreatitis [K85.90]  Diagnosis Additional Information: No value filed.    Anesthesia Type:   General     Note:    Oropharynx: spontaneously breathing  Level of Consciousness: awake  Oxygen Supplementation: face mask  Level of Supplemental Oxygen (L/min / FiO2): 6  Independent Airway: airway patency satisfactory and stable  Dentition: dentition unchanged  Vital Signs Stable: post-procedure vital signs reviewed and stable  Report to RN Given: handoff report given  Patient transferred to: PACU    Handoff Report: Identifed the Patient, Identified the Reponsible Provider, Reviewed the pertinent medical history, Discussed the surgical course, Reviewed Intra-OP anesthesia mangement and issues during anesthesia, Set expectations for post-procedure period and Allowed opportunity for questions and acknowledgement of understanding      Vitals:  Vitals Value Taken Time   /78    Temp     Pulse 84    Resp 16    SpO2 100 % 07/13/22 1107   Vitals shown include unvalidated device data.    Electronically Signed By: Dougie Calix MD  July 13, 2022  11:08 AM

## 2022-07-19 ENCOUNTER — PREP FOR PROCEDURE (OUTPATIENT)
Dept: GASTROENTEROLOGY | Facility: CLINIC | Age: 27
End: 2022-07-19

## 2022-07-19 DIAGNOSIS — Z46.59 ENCOUNTER FOR PANCREATIC DUCT STENT EXCHANGE: ICD-10-CM

## 2022-07-19 DIAGNOSIS — Z46.89 ENCOUNTER FOR REPLACEMENT OF BILIARY STENT: Primary | ICD-10-CM

## 2022-07-21 ENCOUNTER — DOCUMENTATION ONLY (OUTPATIENT)
Dept: GASTROENTEROLOGY | Facility: CLINIC | Age: 27
End: 2022-07-21

## 2022-07-21 DIAGNOSIS — R10.84 ABDOMINAL PAIN, GENERALIZED: Primary | ICD-10-CM

## 2022-07-21 NOTE — PROGRESS NOTES
Faxed orders for CT scan dated 7/21/2022 Dr. Amaro to:   [email or web address]   [address], [place], [place] [address] [phone]- phone   Fax for radiology: [phone]       [facility]

## 2022-07-24 ENCOUNTER — HEALTH MAINTENANCE LETTER (OUTPATIENT)
Age: 27
End: 2022-07-24

## 2022-08-02 ENCOUNTER — DOCUMENTATION ONLY (OUTPATIENT)
Dept: GASTROENTEROLOGY | Facility: CLINIC | Age: 27
End: 2022-08-02

## 2022-08-02 NOTE — PROGRESS NOTES
VO -   Fax CT order   To: [place]   Fax number: [phone]    Order faxed on 8/2/2022 at 7:36AM    Done.    Jessica Chiu MA

## 2022-08-03 NOTE — TELEPHONE ENCOUNTER
RECORDS RECEIVED FROM: Internal   Appt Date: 08.31.2022   NOTES STATUS DETAILS   OFFICE NOTE from referring provider     OFFICE NOTES from other specialists Internal  06.23.2022 Guru Sabino Campuzano MD     DISCHARGE SUMMARY from hospital     MEDICATION LIST Internal / CE    LIVER BIOSPY (IF APPLICABLE)      PATHOLOGY REPORTS      IMAGING     ENDOSCOPY (IF AVAILABLE)     COLONOSCOPY (IF AVAILABLE)     ULTRASOUND LIVER     CT OF ABDOMEN     MRI OF LIVER     FIBROSCAN, US ELASTOGRAPHY, FIBROSIS SCAN, MR ELASTOGRAPHY     LABS     HEPATIC PANEL (LIVER PANEL) Care Everywhere 11.24.2021   BASIC METABOLIC PANEL Care Everywhere 03.10.2022   COMPLETE METABOLIC PANEL Internal 07.13.2022   COMPLETE BLOOD COUNT (CBC) Internal 07.13.2022   INTERNATIONAL NORMALIZED RATIO (INR) Internal 07.13.2022   HEPATITIS C ANTIBODY     HEPATITIS C VIRAL LOAD/PCR     HEPATITIS C GENOTYPE     HEPATITIS B SURFACE ANTIGEN     HEPATITIS B SURFACE ANTIBODY     HEPATITIS B DNA QUANT LEVEL     HEPATITIS B CORE ANTIBODY

## 2022-08-17 ENCOUNTER — PATIENT OUTREACH (OUTPATIENT)
Dept: GASTROENTEROLOGY | Facility: CLINIC | Age: 27
End: 2022-08-17

## 2022-08-17 NOTE — TELEPHONE ENCOUNTER
Called pt to confirm procedure on 9-14, she can come that day as scheduled. Understands preop requirements/plan.    ML

## 2022-08-31 ENCOUNTER — PRE VISIT (OUTPATIENT)
Dept: GASTROENTEROLOGY | Facility: CLINIC | Age: 27
End: 2022-08-31

## 2022-09-13 ENCOUNTER — ANESTHESIA EVENT (OUTPATIENT)
Dept: SURGERY | Facility: CLINIC | Age: 27
End: 2022-09-13
Payer: COMMERCIAL

## 2022-09-13 ASSESSMENT — COPD QUESTIONNAIRES: COPD: 0

## 2022-09-13 ASSESSMENT — LIFESTYLE VARIABLES: TOBACCO_USE: 1

## 2022-09-13 NOTE — ANESTHESIA PREPROCEDURE EVALUATION
Anesthesia Pre-Procedure Evaluation    Patient: Artie Burger   MRN: [id number] : 1995        Procedure : Procedure(s):  ENDOSCOPIC RETROGRADE CHOLANGIOPANCREATOGRAPHY, WITH REPLACEMENT OF TUBE OR STENT          Past Medical History:   Diagnosis Date     Gastroesophageal reflux disease      Pancreatic disease      PONV (postoperative nausea and vomiting)       Past Surgical History:   Procedure Laterality Date     ENDOSCOPIC RETROGRADE CHOLANGIOPANCREATOGRAM COMPLEX N/A 2021    Procedure: ENDOSCOPIC RETROGRADE CHOLANGIOPANCREATOGRAPHY with pancreatic sphincterotomy, dilation, stone removal, stent placement;  Surgeon: Guru Sabino Campuzano MD;  Location: [facility]     ENDOSCOPIC RETROGRADE CHOLANGIOPANCREATOGRAM WITH DIRECT VISUALIZATION SYSTEM AND GENERATOR N/A 2021    Procedure: ENDOSCOPIC RETROGRADE CHOLANGIOPANCREATOGRAPHY, dilation and debridement sludge removal, pancreatic duct stent placement;  Surgeon: Guru Sabino Campuzano MD;  Location: [facility]     ENDOSCOPIC RETROGRADE CHOLANGIOPANCREATOGRAM WITH SPYGLASS N/A 10/6/2021    Procedure: ENDOSCOPIC RETROGRADE CHOLANGIOPANCREATOGRAPHY, WITH DIRECT DUCT VISUALIZATION, USING PANCREATICOBILIARY FIBEROPTIC PROBE, BILE DUCT STENT EXCHANGE, LITHOTRIPSY OF PANCREATIC STONE, SPHINCTEROTOMY, BALLOON DILATION OF PANCREATIC DUCT AND STONE EXTRACTION;  Surgeon: Guru Sabino Campuzano MD;  Location: [facility]     ENDOSCOPIC RETROGRADE CHOLANGIOPANCREATOGRAM WITH SPYGLASS N/A 2021    Procedure: ENDOSCOPIC RETROGRADE CHOLANGIOPANCREATOGRAPHY, with pancreatic stent exchange, ballon dilation, stone removal, with spyglass direct visualization;  Surgeon: Guru Sabino Campuzano MD;  Location: [facility]     ENDOSCOPIC RETROGRADE CHOLANGIOPANCREATOGRAPHY, EXCHANGE TUBE/STENT N/A 2022    Procedure: ENDOSCOPIC RETROGRADE CHOLANGIOPANCREATOGRAPH with pancreatic dilation, debris removal and stent exchange.;   Surgeon: Guru Sabino Campuzano MD;  Location: [facility]     ENDOSCOPIC RETROGRADE CHOLANGIOPANCREATOGRAPHY, EXCHANGE TUBE/STENT N/A 4/4/2022    Procedure: ENDOSCOPIC RETROGRADE CHOLANGIOPANCREATOGRAPHY, WITH REPLACEMENT OF pancreatic STENT, stone removal;  Surgeon: Guru Sabino Campuzano MD;  Location: [facility]     ENDOSCOPIC RETROGRADE CHOLANGIOPANCREATOGRAPHY, EXCHANGE TUBE/STENT N/A 7/13/2022    Procedure: ENDOSCOPIC RETROGRADE CHOLANGIOPANCREATOGRAPHY, WITH pancreatic duct dilation and stent exchange, biliary stent placement, debris removal;  Surgeon: Guru Sabino Campuzano MD;  Location: [facility]      No Known Allergies   Social History     Tobacco Use     Smoking status: Former Smoker     Types: Cigarettes     Smokeless tobacco: Never Used   Substance Use Topics     Alcohol use: Not Currently      Wt Readings from Last 1 Encounters:   07/13/22 56.4 kg (124 lb 5.4 oz)        Anesthesia Evaluation   Pt has had prior anesthetic. Type: General.    History of anesthetic complications  - PONV.      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    (+) GERD, Asymptomatic on medication, liver disease,     Renal/Genitourinary:  - neg Renal ROS     Endo:  - neg endo ROS  (-) Type II DM   Psychiatric/Substance Use:     (+) psychiatric history (Panic attack) anxiety (taking buspar and lexapro) alcohol abuse (alcohl abuse in remision)     Infectious Disease:  - neg infectious disease ROS     Malignancy:  - neg malignancy ROS     Other:            Physical Exam    Airway        Mallampati: I    Neck ROM: full     Respiratory Devices and Support          Dental  no notable dental history         Cardiovascular   cardiovascular exam normal          Pulmonary   pulmonary exam normal                OUTSIDE LABS:  CBC:   Lab Results   Component Value Date    WBC 9.4 07/13/2022    WBC 12.2 (H) 04/04/2022    HGB 11.1 (L) 07/13/2022    HGB 10.9 (L) 04/04/2022    HCT 33.2 (L) 07/13/2022    HCT 32.8 (L) 04/04/2022     07/13/2022     04/04/2022     BMP:   Lab Results   Component Value Date     07/13/2022     04/04/2022    POTASSIUM 4.4 07/13/2022    POTASSIUM 3.8 04/04/2022    CHLORIDE 105 07/13/2022    CHLORIDE 112 (H) 04/04/2022    CO2 22 07/13/2022    CO2 21 04/04/2022    BUN 21.2 (H) 07/13/2022    BUN 20 04/04/2022    CR 0.88 07/13/2022    CR 0.67 04/04/2022    GLC 99 07/13/2022     (H) 07/13/2022     COAGS:   Lab Results   Component Value Date    INR 0.98 07/13/2022     POC:   Lab Results   Component Value Date    HCG Negative 07/13/2022    HCGS Negative 04/04/2022     HEPATIC:   Lab Results   Component Value Date    ALBUMIN 4.1 07/13/2022    PROTTOTAL 6.7 07/13/2022    ALT 12 07/13/2022    AST 18 07/13/2022    ALKPHOS 44 07/13/2022    BILITOTAL 0.2 07/13/2022     OTHER:   Lab Results   Component Value Date    MURTAZA 8.8 07/13/2022    LIPASE 30 07/13/2022    AMYLASE 69 07/13/2022       Anesthesia Plan    ASA Status:  2   NPO Status:  NPO Appropriate    Anesthesia Type: General.     - Airway: ETT   Induction: Intravenous.   Maintenance: Inhalation.        Consents    Anesthesia Plan(s) and associated risks, benefits, and realistic alternatives discussed. Questions answered and patient/representative(s) expressed understanding.     - Discussed: Risks, Benefits and Alternatives for BOTH SEDATION and the PROCEDURE were discussed     - Discussed with:  Patient         Postoperative Care    Pain management: IV analgesics.   PONV prophylaxis: Ondansetron (or other 5HT-3), Dexamethasone or Solumedrol, Scopolamine patch     Comments:                 Rashi Darling MD

## 2022-09-14 ENCOUNTER — APPOINTMENT (OUTPATIENT)
Dept: GENERAL RADIOLOGY | Facility: CLINIC | Age: 27
End: 2022-09-14
Attending: INTERNAL MEDICINE
Payer: COMMERCIAL

## 2022-09-14 ENCOUNTER — ANESTHESIA (OUTPATIENT)
Dept: SURGERY | Facility: CLINIC | Age: 27
End: 2022-09-14
Payer: COMMERCIAL

## 2022-09-14 ENCOUNTER — HOSPITAL ENCOUNTER (OUTPATIENT)
Facility: CLINIC | Age: 27
Discharge: HOME | End: 2022-09-14
Attending: INTERNAL MEDICINE | Admitting: INTERNAL MEDICINE
Payer: COMMERCIAL

## 2022-09-14 VITALS
DIASTOLIC BLOOD PRESSURE: 77 MMHG | RESPIRATION RATE: 16 BRPM | WEIGHT: 119.93 LBS | BODY MASS INDEX: 22.07 KG/M2 | TEMPERATURE: 98.4 F | HEIGHT: 62 IN | HEART RATE: 57 BPM | OXYGEN SATURATION: 100 % | SYSTOLIC BLOOD PRESSURE: 111 MMHG

## 2022-09-14 DIAGNOSIS — K86.0 ALCOHOL-INDUCED CHRONIC PANCREATITIS (H): Primary | ICD-10-CM

## 2022-09-14 LAB
ALBUMIN SERPL BCG-MCNC: 4.2 G/DL (ref 3.5–5.2)
ALP SERPL-CCNC: 47 U/L (ref 35–104)
ALT SERPL W P-5'-P-CCNC: 9 U/L (ref 10–35)
AMYLASE SERPL-CCNC: 50 U/L (ref 28–100)
AMYLASE SERPL-CCNC: 55 U/L (ref 28–100)
ANION GAP SERPL CALCULATED.3IONS-SCNC: 10 MMOL/L (ref 7–15)
AST SERPL W P-5'-P-CCNC: 20 U/L (ref 10–35)
BILIRUB SERPL-MCNC: 0.3 MG/DL
BUN SERPL-MCNC: 16.9 MG/DL (ref 6–20)
CALCIUM SERPL-MCNC: 9 MG/DL (ref 8.6–10)
CHLORIDE SERPL-SCNC: 106 MMOL/L (ref 98–107)
CREAT SERPL-MCNC: 0.89 MG/DL (ref 0.51–0.95)
DEPRECATED HCO3 PLAS-SCNC: 23 MMOL/L (ref 22–29)
ERCP: NORMAL
ERYTHROCYTE [DISTWIDTH] IN BLOOD BY AUTOMATED COUNT: 13.4 % (ref 10–15)
GFR SERPL CREATININE-BSD FRML MDRD: 90 ML/MIN/1.73M2
GLUCOSE BLDC GLUCOMTR-MCNC: 106 MG/DL (ref 70–99)
GLUCOSE SERPL-MCNC: 100 MG/DL (ref 70–99)
HCT VFR BLD AUTO: 34.1 % (ref 35–47)
HGB BLD-MCNC: 11.1 G/DL (ref 11.7–15.7)
INR PPP: 0.96 (ref 0.85–1.15)
LIPASE SERPL-CCNC: 22 U/L (ref 13–60)
LIPASE SERPL-CCNC: 31 U/L (ref 13–60)
MCH RBC QN AUTO: 29.9 PG (ref 26.5–33)
MCHC RBC AUTO-ENTMCNC: 32.6 G/DL (ref 31.5–36.5)
MCV RBC AUTO: 92 FL (ref 78–100)
PLATELET # BLD AUTO: 413 10E3/UL (ref 150–450)
POTASSIUM SERPL-SCNC: 3.8 MMOL/L (ref 3.4–5.3)
PROT SERPL-MCNC: 7.1 G/DL (ref 6.4–8.3)
RBC # BLD AUTO: 3.71 10E6/UL (ref 3.8–5.2)
SODIUM SERPL-SCNC: 139 MMOL/L (ref 136–145)
WBC # BLD AUTO: 9 10E3/UL (ref 4–11)

## 2022-09-14 PROCEDURE — 83690 ASSAY OF LIPASE: CPT | Mod: 91 | Performed by: INTERNAL MEDICINE

## 2022-09-14 PROCEDURE — 250N000009 HC RX 250: Performed by: INTERNAL MEDICINE

## 2022-09-14 PROCEDURE — 258N000003 HC RX IP 258 OP 636: Performed by: STUDENT IN AN ORGANIZED HEALTH CARE EDUCATION/TRAINING PROGRAM

## 2022-09-14 PROCEDURE — 250N000025 HC SEVOFLURANE, PER MIN: Performed by: INTERNAL MEDICINE

## 2022-09-14 PROCEDURE — 250N000009 HC RX 250: Performed by: STUDENT IN AN ORGANIZED HEALTH CARE EDUCATION/TRAINING PROGRAM

## 2022-09-14 PROCEDURE — C1877 STENT, NON-COAT/COV W/O DEL: HCPCS | Performed by: INTERNAL MEDICINE

## 2022-09-14 PROCEDURE — C1769 GUIDE WIRE: HCPCS | Performed by: INTERNAL MEDICINE

## 2022-09-14 PROCEDURE — 82150 ASSAY OF AMYLASE: CPT | Performed by: INTERNAL MEDICINE

## 2022-09-14 PROCEDURE — 370N000017 HC ANESTHESIA TECHNICAL FEE, PER MIN: Performed by: INTERNAL MEDICINE

## 2022-09-14 PROCEDURE — 710N000012 HC RECOVERY PHASE 2, PER MINUTE: Performed by: INTERNAL MEDICINE

## 2022-09-14 PROCEDURE — 360N000082 HC SURGERY LEVEL 2 W/ FLUORO, PER MIN: Performed by: INTERNAL MEDICINE

## 2022-09-14 PROCEDURE — 85027 COMPLETE CBC AUTOMATED: CPT | Performed by: INTERNAL MEDICINE

## 2022-09-14 PROCEDURE — 83690 ASSAY OF LIPASE: CPT | Performed by: INTERNAL MEDICINE

## 2022-09-14 PROCEDURE — 272N000001 HC OR GENERAL SUPPLY STERILE: Performed by: INTERNAL MEDICINE

## 2022-09-14 PROCEDURE — 36415 COLL VENOUS BLD VENIPUNCTURE: CPT | Performed by: INTERNAL MEDICINE

## 2022-09-14 PROCEDURE — 710N000010 HC RECOVERY PHASE 1, LEVEL 2, PER MIN: Performed by: INTERNAL MEDICINE

## 2022-09-14 PROCEDURE — 255N000002 HC RX 255 OP 636: Performed by: INTERNAL MEDICINE

## 2022-09-14 PROCEDURE — 85610 PROTHROMBIN TIME: CPT | Performed by: INTERNAL MEDICINE

## 2022-09-14 PROCEDURE — 80053 COMPREHEN METABOLIC PANEL: CPT | Performed by: INTERNAL MEDICINE

## 2022-09-14 PROCEDURE — 250N000011 HC RX IP 250 OP 636: Performed by: STUDENT IN AN ORGANIZED HEALTH CARE EDUCATION/TRAINING PROGRAM

## 2022-09-14 PROCEDURE — 82962 GLUCOSE BLOOD TEST: CPT

## 2022-09-14 PROCEDURE — 999N000181 XR SURGERY CARM FLUORO GREATER THAN 5 MIN W STILLS: Mod: TC

## 2022-09-14 PROCEDURE — 999N000141 HC STATISTIC PRE-PROCEDURE NURSING ASSESSMENT: Performed by: INTERNAL MEDICINE

## 2022-09-14 DEVICE — A STERILE NON-BIOABSORBABLE TUBULAR DEVICE INTENDED TO BE IMPLANTED IN AN OBSTRUCTED PANCREATIC DUCT (E.G., DUE TO STRICTURE OR MALIGNANCY) TO MAINTAIN LUMINAL PATENCY; IT MAY ALSO BE INTENDED TO TREAT A WIDE VARIETY OF CONDITIONS IN PANCREATIC ENDOSCOPY (E.G., DRAIN PSEUDOCYST, TREAT FISTULA OR DUCT DISRUPTION). IT IS MADE ENTIRELY OF A SYNTHETIC POLYMER AND HAS A CONTINUOUS TUBE DESIGN WITH OR WITHOUT RETENTION FLANGES. THIS IS A SINGLE-USE DEVICE.
Type: IMPLANTABLE DEVICE | Site: PANCREATIC DUCT | Status: NON-FUNCTIONAL
Brand: FREEMAN PANCREATIC FLEXI-STENT
Removed: 2022-11-21

## 2022-09-14 DEVICE — ZIMMON PANCREATIC STENT
Type: IMPLANTABLE DEVICE | Site: PANCREATIC DUCT | Status: NON-FUNCTIONAL
Brand: ZIMMON
Removed: 2022-11-21

## 2022-09-14 RX ORDER — ESMOLOL HYDROCHLORIDE 10 MG/ML
INJECTION INTRAVENOUS PRN
Status: DISCONTINUED | OUTPATIENT
Start: 2022-09-14 | End: 2022-09-14

## 2022-09-14 RX ORDER — SCOLOPAMINE TRANSDERMAL SYSTEM 1 MG/1
1 PATCH, EXTENDED RELEASE TRANSDERMAL ONCE
Status: DISCONTINUED | OUTPATIENT
Start: 2022-09-14 | End: 2022-09-14 | Stop reason: HOSPADM

## 2022-09-14 RX ORDER — OXYCODONE AND ACETAMINOPHEN 5; 325 MG/1; MG/1
1 TABLET ORAL EVERY 6 HOURS PRN
Qty: 20 TABLET | Refills: 0 | Status: SHIPPED | OUTPATIENT
Start: 2022-09-14 | End: 2022-09-19

## 2022-09-14 RX ORDER — HYDROMORPHONE HCL IN WATER/PF 6 MG/30 ML
0.2 PATIENT CONTROLLED ANALGESIA SYRINGE INTRAVENOUS EVERY 5 MIN PRN
Status: DISCONTINUED | OUTPATIENT
Start: 2022-09-14 | End: 2022-09-14 | Stop reason: HOSPADM

## 2022-09-14 RX ORDER — ONDANSETRON 4 MG/1
4 TABLET, ORALLY DISINTEGRATING ORAL EVERY 30 MIN PRN
Status: DISCONTINUED | OUTPATIENT
Start: 2022-09-14 | End: 2022-09-14 | Stop reason: HOSPADM

## 2022-09-14 RX ORDER — OXYCODONE HYDROCHLORIDE 5 MG/1
5 TABLET ORAL EVERY 4 HOURS PRN
Status: DISCONTINUED | OUTPATIENT
Start: 2022-09-14 | End: 2022-09-14 | Stop reason: HOSPADM

## 2022-09-14 RX ORDER — FENTANYL CITRATE 50 UG/ML
25 INJECTION, SOLUTION INTRAMUSCULAR; INTRAVENOUS
Status: DISCONTINUED | OUTPATIENT
Start: 2022-09-14 | End: 2022-09-14 | Stop reason: HOSPADM

## 2022-09-14 RX ORDER — LIDOCAINE HYDROCHLORIDE 20 MG/ML
INJECTION, SOLUTION INFILTRATION; PERINEURAL PRN
Status: DISCONTINUED | OUTPATIENT
Start: 2022-09-14 | End: 2022-09-14

## 2022-09-14 RX ORDER — ONDANSETRON 2 MG/ML
INJECTION INTRAMUSCULAR; INTRAVENOUS PRN
Status: DISCONTINUED | OUTPATIENT
Start: 2022-09-14 | End: 2022-09-14

## 2022-09-14 RX ORDER — NALOXONE HYDROCHLORIDE 0.4 MG/ML
0.4 INJECTION, SOLUTION INTRAMUSCULAR; INTRAVENOUS; SUBCUTANEOUS
Status: DISCONTINUED | OUTPATIENT
Start: 2022-09-14 | End: 2022-09-14 | Stop reason: HOSPADM

## 2022-09-14 RX ORDER — FENTANYL CITRATE 50 UG/ML
25 INJECTION, SOLUTION INTRAMUSCULAR; INTRAVENOUS EVERY 5 MIN PRN
Status: DISCONTINUED | OUTPATIENT
Start: 2022-09-14 | End: 2022-09-14 | Stop reason: HOSPADM

## 2022-09-14 RX ORDER — NALOXONE HYDROCHLORIDE 0.4 MG/ML
0.2 INJECTION, SOLUTION INTRAMUSCULAR; INTRAVENOUS; SUBCUTANEOUS
Status: DISCONTINUED | OUTPATIENT
Start: 2022-09-14 | End: 2022-09-14 | Stop reason: HOSPADM

## 2022-09-14 RX ORDER — DEXAMETHASONE SODIUM PHOSPHATE 4 MG/ML
INJECTION, SOLUTION INTRA-ARTICULAR; INTRALESIONAL; INTRAMUSCULAR; INTRAVENOUS; SOFT TISSUE PRN
Status: DISCONTINUED | OUTPATIENT
Start: 2022-09-14 | End: 2022-09-14

## 2022-09-14 RX ORDER — ONDANSETRON 2 MG/ML
4 INJECTION INTRAMUSCULAR; INTRAVENOUS EVERY 30 MIN PRN
Status: DISCONTINUED | OUTPATIENT
Start: 2022-09-14 | End: 2022-09-14 | Stop reason: HOSPADM

## 2022-09-14 RX ORDER — SODIUM CHLORIDE, SODIUM LACTATE, POTASSIUM CHLORIDE, CALCIUM CHLORIDE 600; 310; 30; 20 MG/100ML; MG/100ML; MG/100ML; MG/100ML
INJECTION, SOLUTION INTRAVENOUS CONTINUOUS
Status: DISCONTINUED | OUTPATIENT
Start: 2022-09-14 | End: 2022-09-14 | Stop reason: HOSPADM

## 2022-09-14 RX ORDER — MEPERIDINE HYDROCHLORIDE 25 MG/ML
12.5 INJECTION INTRAMUSCULAR; INTRAVENOUS; SUBCUTANEOUS
Status: DISCONTINUED | OUTPATIENT
Start: 2022-09-14 | End: 2022-09-14 | Stop reason: HOSPADM

## 2022-09-14 RX ORDER — FLUMAZENIL 0.1 MG/ML
0.2 INJECTION, SOLUTION INTRAVENOUS
Status: DISCONTINUED | OUTPATIENT
Start: 2022-09-14 | End: 2022-09-14 | Stop reason: HOSPADM

## 2022-09-14 RX ORDER — FENTANYL CITRATE 50 UG/ML
INJECTION, SOLUTION INTRAMUSCULAR; INTRAVENOUS PRN
Status: DISCONTINUED | OUTPATIENT
Start: 2022-09-14 | End: 2022-09-14

## 2022-09-14 RX ORDER — LIDOCAINE 40 MG/G
CREAM TOPICAL
Status: DISCONTINUED | OUTPATIENT
Start: 2022-09-14 | End: 2022-09-14 | Stop reason: HOSPADM

## 2022-09-14 RX ORDER — IOPAMIDOL 510 MG/ML
INJECTION, SOLUTION INTRAVASCULAR PRN
Status: DISCONTINUED | OUTPATIENT
Start: 2022-09-14 | End: 2022-09-14 | Stop reason: HOSPADM

## 2022-09-14 RX ORDER — GABAPENTIN 300 MG/1
300 CAPSULE ORAL 3 TIMES DAILY
Status: ON HOLD | COMMUNITY
End: 2023-05-10

## 2022-09-14 RX ORDER — PROPOFOL 10 MG/ML
INJECTION, EMULSION INTRAVENOUS PRN
Status: DISCONTINUED | OUTPATIENT
Start: 2022-09-14 | End: 2022-09-14

## 2022-09-14 RX ORDER — SCOLOPAMINE TRANSDERMAL SYSTEM 1 MG/1
1 PATCH, EXTENDED RELEASE TRANSDERMAL
Status: DISCONTINUED | OUTPATIENT
Start: 2022-09-14 | End: 2022-09-14 | Stop reason: HOSPADM

## 2022-09-14 RX ORDER — ONDANSETRON 2 MG/ML
4 INJECTION INTRAMUSCULAR; INTRAVENOUS EVERY 6 HOURS PRN
Status: DISCONTINUED | OUTPATIENT
Start: 2022-09-14 | End: 2022-09-14 | Stop reason: HOSPADM

## 2022-09-14 RX ORDER — DIMENHYDRINATE 50 MG/ML
25 INJECTION, SOLUTION INTRAMUSCULAR; INTRAVENOUS
Status: DISCONTINUED | OUTPATIENT
Start: 2022-09-14 | End: 2022-09-14 | Stop reason: HOSPADM

## 2022-09-14 RX ORDER — ONDANSETRON 4 MG/1
4 TABLET, ORALLY DISINTEGRATING ORAL EVERY 6 HOURS PRN
Status: DISCONTINUED | OUTPATIENT
Start: 2022-09-14 | End: 2022-09-14 | Stop reason: HOSPADM

## 2022-09-14 RX ORDER — INDOMETHACIN 50 MG/1
SUPPOSITORY RECTAL PRN
Status: DISCONTINUED | OUTPATIENT
Start: 2022-09-14 | End: 2022-09-14 | Stop reason: HOSPADM

## 2022-09-14 RX ORDER — LEVOFLOXACIN 5 MG/ML
INJECTION, SOLUTION INTRAVENOUS PRN
Status: DISCONTINUED | OUTPATIENT
Start: 2022-09-14 | End: 2022-09-14

## 2022-09-14 RX ADMIN — MIDAZOLAM 2 MG: 1 INJECTION INTRAMUSCULAR; INTRAVENOUS at 12:47

## 2022-09-14 RX ADMIN — ONDANSETRON 4 MG: 2 INJECTION INTRAMUSCULAR; INTRAVENOUS at 13:57

## 2022-09-14 RX ADMIN — LIDOCAINE HYDROCHLORIDE 100 MG: 20 INJECTION, SOLUTION INFILTRATION; PERINEURAL at 13:05

## 2022-09-14 RX ADMIN — GLUCAGON HYDROCHLORIDE 0.4 MG: KIT at 13:34

## 2022-09-14 RX ADMIN — SUGAMMADEX 400 MG: 100 INJECTION, SOLUTION INTRAVENOUS at 14:07

## 2022-09-14 RX ADMIN — FENTANYL CITRATE 50 MCG: 50 INJECTION, SOLUTION INTRAMUSCULAR; INTRAVENOUS at 13:38

## 2022-09-14 RX ADMIN — PROPOFOL 150 MG: 10 INJECTION, EMULSION INTRAVENOUS at 13:05

## 2022-09-14 RX ADMIN — PHENYLEPHRINE HYDROCHLORIDE 100 MCG: 10 INJECTION INTRAVENOUS at 13:26

## 2022-09-14 RX ADMIN — SODIUM CHLORIDE, POTASSIUM CHLORIDE, SODIUM LACTATE AND CALCIUM CHLORIDE: 600; 310; 30; 20 INJECTION, SOLUTION INTRAVENOUS at 12:55

## 2022-09-14 RX ADMIN — ESMOLOL HYDROCHLORIDE 10 MG: 10 INJECTION, SOLUTION INTRAVENOUS at 13:46

## 2022-09-14 RX ADMIN — PROPOFOL 20 MG: 10 INJECTION, EMULSION INTRAVENOUS at 13:53

## 2022-09-14 RX ADMIN — SCOPALAMINE 1 PATCH: 1 PATCH, EXTENDED RELEASE TRANSDERMAL at 10:28

## 2022-09-14 RX ADMIN — FENTANYL CITRATE 50 MCG: 50 INJECTION, SOLUTION INTRAMUSCULAR; INTRAVENOUS at 13:14

## 2022-09-14 RX ADMIN — PHENYLEPHRINE HYDROCHLORIDE 100 MCG: 10 INJECTION INTRAVENOUS at 13:24

## 2022-09-14 RX ADMIN — DEXAMETHASONE SODIUM PHOSPHATE 8 MG: 4 INJECTION, SOLUTION INTRA-ARTICULAR; INTRALESIONAL; INTRAMUSCULAR; INTRAVENOUS; SOFT TISSUE at 13:43

## 2022-09-14 RX ADMIN — LEVOFLOXACIN 500 MG: 5 INJECTION, SOLUTION INTRAVENOUS at 13:15

## 2022-09-14 RX ADMIN — Medication 50 MG: at 13:05

## 2022-09-14 ASSESSMENT — ACTIVITIES OF DAILY LIVING (ADL)
ADLS_ACUITY_SCORE: 35

## 2022-09-14 NOTE — ANESTHESIA PROCEDURE NOTES
Airway       Patient location during procedure: ICU       Procedure Start/Stop Times: 9/14/2022 1:07 PM  Staff -        Performed By: CRNA  Consent for Airway        Urgency: emergent  Indications and Patient Condition       Indications for airway management: rolando-procedural       Mallampati: I     Induction type:intravenous       Mask difficulty assessment: 1 - vent by mask    Final Airway Details       Final airway type: endotracheal airway       Successful airway: ETT - single  Endotracheal Airway Details        ETT size (mm): 7.0       Successful intubation technique: direct laryngoscopy       DL Blade Type: MAC 1       Grade View of Cords: 1       Adjucts: stylet       Measured from: lips       Secured at (cm): 21    Post intubation assessment        ETT secured       Placement verified by: capnometry        Number of attempts at approach: 1       Ease of procedure: easy       Dentition: Intact    Medication(s) Administered   Medication Administration Time: 9/14/2022 1:07 PM

## 2022-09-14 NOTE — ADDENDUM NOTE
Addendum  created 09/14/22 1806 by Jeremías Wright MD    Order list changed, Order sets accessed

## 2022-09-14 NOTE — ANESTHESIA POSTPROCEDURE EVALUATION
Patient: Artie Burger    Procedure: Procedure(s):  ENDOSCOPIC RETROGRADE CHOLANGIOPANCREATOGRAPHY, WITH pancreatic stone removal, biliary stent removal, pancreatic stent exchange       Anesthesia Type:  General    Note:  Disposition: Outpatient   Postop Pain Control: Uneventful            Sign Out: Well controlled pain   PONV: No   Neuro/Psych: Uneventful            Sign Out: Acceptable/Baseline neuro status   Airway/Respiratory: Uneventful            Sign Out: Acceptable/Baseline resp. status   CV/Hemodynamics: Uneventful            Sign Out: Acceptable CV status; No obvious hypovolemia; No obvious fluid overload   Other NRE: NONE   DID A NON-ROUTINE EVENT OCCUR? No           Last vitals:  Vitals Value Taken Time   /65 09/14/22 1450   Temp 37  C (98.6  F) 09/14/22 1450   Pulse 68 09/14/22 1452   Resp 13 09/14/22 1452   SpO2 99 % 09/14/22 1452   Vitals shown include unvalidated device data.    Electronically Signed By: Nima Lino MD  September 14, 2022  2:53 PM

## 2022-09-14 NOTE — DISCHARGE INSTRUCTIONS
[facility], [place] // Same-Day Surgery // Adult Discharge Orders & Instructions     Take it easy when you get home.  Remember, same day surgery DOES NOT MEAN SAME DAY RECOVERY! Healing is a gradual process.   You will need some time to recover- you may be more tired than you realize at first.  Rest and relax for at least the first 24 hours at home.  You'll feel better and heal faster if you take good care of yourself.     ANESTHESIA RECOVERY -   For 24 hours after surgery    Get plenty of rest.  A responsible adult must stay with you for at least 24 hours after you leave the hospital.   Do not drive or use heavy equipment.  If you have weakness or tingling, don't drive or use heavy equipment until this feeling goes away.  Do not drink alcohol.  Avoid strenuous or risky activities.  Ask for help when climbing stairs.   You may feel lightheaded.  IF so, sit for a few minutes before standing.  Have someone help you get up.   If you have nausea (feel sick to your stomach): Drink only clear liquids such as apple juice, ginger ale, broth or 7-Up.  Rest may also help.  Be sure to drink enough fluids.  Move to a regular diet as you feel able.  You may have a slight fever. Call the doctor if your fever is over 100 F (37.7 C) (taken under the tongue) or lasts longer than 24 hours.  You may have a dry mouth, a sore throat, muscle aches or trouble sleeping.  These should go away after 24 hours.  Do not make important or legal decisions.     Call your doctor for any of the following:  Chest pain, and/or shortness of breath  Abdominal  pain, bloating or cramping that has not improved or does not respond to pain reliving medications (Tylenol or narcotics if prescribed)   Difficulty swallowing or feeling as though food or liquids are stuck in your throat   Sore throat lasting more than 2 days or pain that has worsened over time   Black or tarry stools   Nausea and/or vomiting that is not resolving or  has not responded to anti-nausea medications prescribed to you   It has been over 8 to 10 hours since surgery and you are still not able to urinate (pass water)   Headache for over 24 hours   Fever over 100.5 F (38 C) lasting more than 24 hours after the procedure   Signs of jaundice or blockage (fever, chills, abdominal pain, yellowing of the whites of your eyes, yellowing of your skin, and/or passing darker than normal urine)     To contact a doctor, call:  Dr. Guru Campuzano @ [phone] (GI Clinic) or [phone] and ask for the resident on call for Gasteroenterology (answered 24 hours a day)  Emergency Department: [facility]: [phone] (TTY for hearing impaired: [phone])

## 2022-09-14 NOTE — ANESTHESIA CARE TRANSFER NOTE
Patient: Artie Burger    Procedure: Procedure(s):  ENDOSCOPIC RETROGRADE CHOLANGIOPANCREATOGRAPHY, WITH pancreatic stone removal, biliary stent removal, pancreatic stent exchange       Diagnosis: Encounter for replacement of biliary stent [Z46.89]  Encounter for pancreatic duct stent exchange [Z46.59]  Diagnosis Additional Information: No value filed.    Anesthesia Type:   General     Note:    Oropharynx: oropharynx clear of all foreign objects and spontaneously breathing  Level of Consciousness: awake  Oxygen Supplementation: face mask  Level of Supplemental Oxygen (L/min / FiO2): 6  Independent Airway: airway patency satisfactory and stable  Dentition: dentition unchanged  Vital Signs Stable: post-procedure vital signs reviewed and stable  Report to RN Given: handoff report given  Patient transferred to: PACU    Handoff Report: Identifed the Patient, Identified the Reponsible Provider, Reviewed the pertinent medical history, Discussed the surgical course, Reviewed Intra-OP anesthesia mangement and issues during anesthesia, Set expectations for post-procedure period and Allowed opportunity for questions and acknowledgement of understanding      Vitals:  Vitals Value Taken Time   /77 09/14/22 1420   Temp     Pulse 78 09/14/22 1424   Resp 21 09/14/22 1424   SpO2 100 % 09/14/22 1424   Vitals shown include unvalidated device data.    Electronically Signed By: Rashi Darling MD  September 14, 2022  2:25 PM

## 2022-09-14 NOTE — ANESTHESIA POSTPROCEDURE EVALUATION
Patient: Artie Burger    Procedure: Procedure(s):  ENDOSCOPIC RETROGRADE CHOLANGIOPANCREATOGRAPHY, WITH pancreatic stone removal, biliary stent removal, pancreatic stent exchange       Anesthesia Type:  General    Note:  Disposition: Outpatient   Postop Pain Control: Uneventful            Sign Out: Well controlled pain   PONV: No   Neuro/Psych: Uneventful            Sign Out: Acceptable/Baseline neuro status   Airway/Respiratory: Uneventful            Sign Out: Acceptable/Baseline resp. status   CV/Hemodynamics: Uneventful            Sign Out: Acceptable CV status; No obvious hypovolemia; No obvious fluid overload   Other NRE: NONE   DID A NON-ROUTINE EVENT OCCUR? No           Last vitals:  Vitals Value Taken Time   /65 09/14/22 1450   Temp 37  C (98.6  F) 09/14/22 1450   Pulse 64 09/14/22 1455   Resp 11 09/14/22 1455   SpO2 99 % 09/14/22 1455   Vitals shown include unvalidated device data.    Electronically Signed By: Roberto Caballero MD  September 14, 2022  2:57 PM

## 2022-09-20 ENCOUNTER — PATIENT OUTREACH (OUTPATIENT)
Dept: GASTROENTEROLOGY | Facility: CLINIC | Age: 27
End: 2022-09-20

## 2022-09-20 DIAGNOSIS — K86.1 OTHER CHRONIC PANCREATITIS (H): ICD-10-CM

## 2022-09-20 DIAGNOSIS — R10.84 ABDOMINAL PAIN, GENERALIZED: Primary | ICD-10-CM

## 2022-09-20 NOTE — PROGRESS NOTES
Post ERCP on 22 with Dr. Campuzano: Follow-up     Post procedure recommendations:        -Will recommend repeat ERCP in 8-10 weeks to                          re-evaluate the biliary and pancreatic strictures and                          for stent exchange as needed                          -If she has persistent pain, will recommend pain                          clinic follow up                          - Will also recommend genetic  visit to                          evaluate for possible genetic mutations which can also                          be implicated for pancreatitis     Orders placed:   Please assist in scheduling:     Procedure/Imaging/Clinic: ERCP  Physician: Justen  Timin-10 weeks  Procedure length:Provider average  Anesthesia:General  Dx: biliary and pancreatic strictures  Tier:2  Location: [facility]    Pain referral: Placed.   Genetics referral: Placed.     Patient states:   Patient is feeling well. Denies pain at this time. Eating and drinking well. No concerns. She is interested in pursuing recommended pain and genetics referrals; orders placed.     Discussed plan for ERCP in 8-10 weeks. Explained that our  would reach out to schedule and that she would need a repeat pre-op assessment and covid test prior to procedure. Patient articulates understanding of plan.     Clinic contact and scheduling numbers verified for future questions/concerns.     Melvi Humphreys, RN Care Coordinator

## 2022-09-21 ENCOUNTER — PREP FOR PROCEDURE (OUTPATIENT)
Dept: GASTROENTEROLOGY | Facility: CLINIC | Age: 27
End: 2022-09-21

## 2022-09-21 DIAGNOSIS — K83.1 BILIARY STRICTURE (H): Primary | ICD-10-CM

## 2022-09-21 DIAGNOSIS — K86.89 PANCREATIC DUCT STRICTURE: ICD-10-CM

## 2022-09-21 NOTE — PROGRESS NOTES
Procedure/Imaging/Clinic: ERCP  Physician: Justen  Timin-10 weeks  Procedure length:Provider average  Anesthesia:General  Dx: biliary and pancreatic strictures  Tier:2  Location: [facility]

## 2022-09-22 ENCOUNTER — TELEPHONE (OUTPATIENT)
Dept: CONSULT | Facility: CLINIC | Age: 27
End: 2022-09-22

## 2022-10-03 ENCOUNTER — HEALTH MAINTENANCE LETTER (OUTPATIENT)
Age: 27
End: 2022-10-03

## 2022-11-03 ENCOUNTER — OFFICE VISIT (OUTPATIENT)
Dept: PALLIATIVE MEDICINE | Facility: CLINIC | Age: 27
End: 2022-11-03
Payer: COMMERCIAL

## 2022-11-03 VITALS — HEART RATE: 77 BPM | DIASTOLIC BLOOD PRESSURE: 68 MMHG | SYSTOLIC BLOOD PRESSURE: 103 MMHG

## 2022-11-03 DIAGNOSIS — K86.1 CHRONIC PANCREATITIS, UNSPECIFIED PANCREATITIS TYPE (H): ICD-10-CM

## 2022-11-03 DIAGNOSIS — R10.84 ABDOMINAL PAIN, GENERALIZED: Primary | ICD-10-CM

## 2022-11-03 PROCEDURE — 99204 OFFICE O/P NEW MOD 45 MIN: CPT

## 2022-11-03 ASSESSMENT — PAIN SCALES - GENERAL: PAINLEVEL: MODERATE PAIN (4)

## 2022-11-03 NOTE — PROGRESS NOTES
[facility] Pain Management     Date of visit: 11/3/2022    Assessment:  Artie Burger is a 27 year old female with a past medical history significant for ETOH use, pancreatitis, GERD who presents with complaints of abdominal pain.     1. Abdominal pain - Her pain is likely secondary to chronic pancreatitis, though possible some other underlying GI process present. Of note, symptoms are intermittent and seem to be relieved with bowel movements, which may be suggestive of IBS. She has been using ibuprofen and acetaminophen PRN and OTC cannabis, which she finds helpful for symptoms and appetite. I think it is reasonable to continue conservative measures, though we may consider injections in the future if symptoms worsen/persist.       Visit diagnoses:   1. Abdominal pain, generalized    2. Chronic pancreatitis, unspecified pancreatitis type (H)        Plan:  The following recommendations were given to the patient. Diagnosis, treatment options, risks, benefits, and alternatives were discussed, and all questions were answered. The patient expressed understanding of the plan for management.     I am recommending a multidisciplinary treatment plan to help this patient better manage her pain.  This includes:      1.  Physical Therapy:  NO   She is active and works full time. Continue activity as tolerated at home.    2.  Pain Psychologist to address relaxation, behavioral change, coping style, and other factors important to improvement.  NO   3.  Diagnostic Studies:  None    4.  Medication Management: She is currently using cannabis for pain control and finds this helpful. [place] am cert[place] medical [place] program today so she can continue exploring the benefits of cannabis within a structured, regulated program. We discussed that cannabis is not FDA approved, though the [place] program has regulatory standards in place for production, which makes it a safer option from the hard reduction approach.    5.   Potential procedures: Defer - we may consider celiac plexus or splanchnic blocks in the future     6.  Referrals:   7.  Follow up with ROSIE Park CNP in 2-4 weeks after first visit with medical cannabis dispensary. Video visit is okay, may schedule video appt at [facility] or [place].     Review of Electronic Chart: Today I have also reviewed available medical information in the patient's medical record at [facility] ([facility]) and Care Everywhere (if available), including relevant provider notes, laboratory work, and imaging.     Jayde Del Toro DNP, ROSIE, AGNP-C  [facility] Pain Management         -------------------------------------------------------------------    Subjective     Reason for consultation:    Artie Burger is a 27 year old female who is seen in consultation today at the request of Guru Sabino Campuzano MD for evaluation of her pain issues and recommendations for management, with specific emphasis on  R10.84 (ICD-10-CM) - Abdominal pain, generalized    Please see the [facility] Pain Management Solo health questionnaire which the patient completed and reviewed with me in detail (if available).     Review of [place] Prescription Monitoring Program (): No concern for abuse or misuse of controlled medications based on this report. Reviewed - appears appropriate.     Review of Electronic Chart: Today I have also reviewed available medical information in the patient's medical record at [facility] ([facility]), including relevant provider notes, laboratory work, and imaging.     Pain medications are being prescribed by N/A.     Chief Complaint:    Chief Complaint   Patient presents with     Pain         HPI:     Artie Burger is a 27 year old female presents with a chief complaint of abdominal pain, epigastric.     The pain has been present for over one year .    The pain is Moderate Pain (4) in severity.    Severity/Intensity: 2/10 at best, 9/10 at worst, 4/10 on average  The  pain is described as aching, stabbing, .   The pain is alleviated by meds and BM, lying on left side.    It is exacerbated by standing, lifting, fatty foods.    Modalities that have been utilized in the past which were helpful include OTC pain meds, cannabis, GI procedures.    Things that were not helpful, but tried ,include tramadol.    The patient has never tried pain procedures (celiac, splanchnic, TAP block).    Artie Burger has not been seen at a pain clinic in the past.      -She has abdominal pain, mostly epigastric.   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   -She went to treatment, used to go to .   -Denies person or family h/o of schizophrenia, no major cardiac or hepatic conditions.       Current Pain Treatments:    1. Medications:    Tylenol 1000 mg    Ibuprofen 800 mg    Gabapentin 600 mg TID   Medical cannabis         Current Outpatient Medications   Medication     acetaminophen (TYLENOL) 500 MG tablet     busPIRone (BUSPAR) 10 MG tablet     cetirizine (ZYRTEC) 10 MG tablet     escitalopram (LEXAPRO) 20 MG tablet     famotidine (PEPCID) 20 MG tablet     gabapentin (NEURONTIN) 300 MG capsule     ibuprofen (ADVIL/MOTRIN) 200 MG capsule     Multiple Vitamin (TAB-A-FABI) TABS     ondansetron (ZOFRAN-ODT) 4 MG ODT tab     pancrelipase, lip-prot-amyl, 3000 UNITS (CREON) CPEP     propranolol (INDERAL) 10 MG tablet     QUEtiapine (SEROQUEL) 25 MG tablet     nicotine (NICODERM CQ) 21 MG/24HR 24 hr patch     No current facility-administered medications for this visit.     No Known Allergies   Past Medical History:   Diagnosis Date     Gastroesophageal reflux disease      Pancreatic disease      PONV (postoperative nausea and vomiting)      Past Surgical History:    Procedure Laterality Date     ENDOSCOPIC RETROGRADE CHOLANGIOPANCREATOGRAM COMPLEX N/A 9/13/2021    Procedure: ENDOSCOPIC RETROGRADE CHOLANGIOPANCREATOGRAPHY with pancreatic sphincterotomy, dilation, stone removal, stent placement;  Surgeon: Guru Sabino Campuzano MD;  [facility]     ENDOSCOPIC RETROGRADE CHOLANGIOPANCREATOGRAM WITH DIRECT VISUALIZATION SYSTEM AND GENERATOR N/A 11/1/2021    Procedure: ENDOSCOPIC RETROGRADE CHOLANGIOPANCREATOGRAPHY, dilation and debridement sludge removal, pancreatic duct stent placement;  Surgeon: Guru Sabino Campuzano MD;  [facility]     ENDOSCOPIC RETROGRADE CHOLANGIOPANCREATOGRAM WITH SPYGLASS N/A 10/6/2021    Procedure: ENDOSCOPIC RETROGRADE CHOLANGIOPANCREATOGRAPHY, WITH DIRECT DUCT VISUALIZATION, USING PANCREATICOBILIARY FIBEROPTIC PROBE, BILE DUCT STENT EXCHANGE, LITHOTRIPSY OF PANCREATIC STONE, SPHINCTEROTOMY, BALLOON DILATION OF PANCREATIC DUCT AND STONE EXTRACTION;  Surgeon: Guru Sabino Campuzano MD;  [facility]     ENDOSCOPIC RETROGRADE CHOLANGIOPANCREATOGRAM WITH SPYGLASS N/A 12/13/2021    Procedure: ENDOSCOPIC RETROGRADE CHOLANGIOPANCREATOGRAPHY, with pancreatic stent exchange, ballon dilation, stone removal, with spyglass direct visualization;  Surgeon: Guru Sabino Campuzano MD;  [facility]     ENDOSCOPIC RETROGRADE CHOLANGIOPANCREATOGRAPHY, EXCHANGE TUBE/STENT N/A 2/16/2022    Procedure: ENDOSCOPIC RETROGRADE CHOLANGIOPANCREATOGRAPH with pancreatic dilation, debris removal and stent exchange.;  Surgeon: Guru Sabino Campuzano MD;  [facility]     ENDOSCOPIC RETROGRADE CHOLANGIOPANCREATOGRAPHY, EXCHANGE TUBE/STENT N/A 4/4/2022    Procedure: ENDOSCOPIC RETROGRADE CHOLANGIOPANCREATOGRAPHY, WITH REPLACEMENT OF pancreatic STENT, stone removal;  Surgeon: Guru Sabino Campuzano MD;  [facility]     ENDOSCOPIC RETROGRADE CHOLANGIOPANCREATOGRAPHY, EXCHANGE  TUBE/STENT N/A 7/13/2022    Procedure: ENDOSCOPIC RETROGRADE CHOLANGIOPANCREATOGRAPHY, WITH pancreatic duct dilation and stent exchange, biliary stent placement, debris removal;  Surgeon: Guru Sabino Campuzano MD;  [facility]     ENDOSCOPIC RETROGRADE CHOLANGIOPANCREATOGRAPHY, EXCHANGE TUBE/STENT N/A 9/14/2022    Procedure: ENDOSCOPIC RETROGRADE CHOLANGIOPANCREATOGRAPHY, WITH pancreatic stone removal, biliary stent removal, pancreatic stent exchange;  Surgeon: Guru Sabino Campuzano MD;  [facility]     No family history on file.  Social History     Socioeconomic History     Marital status: Single     Spouse name: None     Number of children: None     Years of education: None     Highest education level: None   Tobacco Use     Smoking status: Former     Types: Cigarettes     Smokeless tobacco: Never   Substance and Sexual Activity     Alcohol use: Not Currently     Drug use: Yes     Types: Marijuana     Comment: smokes daily      ROS: 10 point ROS neg other than the symptoms noted above in the HPI.      Objective      Diagnostic Testing - Imaging/Labs:  Reviewed recent labs - CMP, amylase, lipase 9/2022, all WNL       Physical Exam  HENT:      Head: Normocephalic.   Pulmonary:      Effort: Pulmonary effort is normal.   Musculoskeletal:         General: Normal range of motion.   Neurological:      Mental Status: She is alert.   Psychiatric:         Mood and Affect: Mood normal.            BILLING TIME DOCUMENTATION:   The total TIME spent on this patient on the date of the encounter/appointment was 35 minutes.      TOTAL TIME includes:   Time spent preparing to see the patient (reviewing records and tests)   Time spent face to face (or over the phone) with the patient   Time spent ordering tests, medications, procedures and referrals   Time spent Referring and communicating with other healthcare professionals   Time spent documenting clinical information in Epic

## 2022-11-03 NOTE — PATIENT INSTRUCTIONS
1.  Physical Therapy:  NO   She is active and works full time. Continue activity as tolerated at home.    2.  Pain Psychologist to address relaxation, behavioral change, coping style, and other factors important to improvement.  NO   3.  Diagnostic Studies:  None    4.  Medication Management: She is currently using cannabis for pain control and finds this helpful. [place] today so she can continue exploring the benefits of cannabis within a structured, regulated program.    5.  Potential procedures: Defer - we may consider celiac plexus or splanchnic blocks in the future     6.  Referrals:   7.  Follow up with ROSIE Park CNP in 2-4 weeks after first visit with medical cannabis dispensary. Video visit is okay, may schedule video appt at [facility] or [place].       ----------------------------------------------------------------  Clinic Number:  [phone]   Call with any questions about your care and for scheduling assistance.   Calls are returned Monday through Friday between 8 AM and 4:30 PM. We usually get back to you within 2 business days depending on the issue/request.    If we are prescribing your medications:  For opioid medication refills, call the clinic or send a Nomios message 7 days in advance.  Please include:  Name of requested medication  Name of the pharmacy.  For non-opioid medications, call your pharmacy directly to request a refill. Please allow 3-4 days to be processed.   Per [place] State Law:  All controlled substance prescriptions must be filled within 30 days of being written.    For those controlled substances allowing refills, pickup must occur within 30 days of last fill.      We believe regular attendance is key to your success in our program!    Any time you are unable to keep your appointment we ask that you call us at least 24 hours in advance to cancel.This will allow us to offer the appointment time to another patient.   Multiple missed  appointments may lead to dismissal from the clinic.

## 2022-11-11 ENCOUNTER — VIRTUAL VISIT (OUTPATIENT)
Dept: CONSULT | Facility: CLINIC | Age: 27
End: 2022-11-11
Attending: INTERNAL MEDICINE
Payer: COMMERCIAL

## 2022-11-11 DIAGNOSIS — Z80.0 FAMILY HISTORY OF PANCREATIC CANCER: ICD-10-CM

## 2022-11-11 DIAGNOSIS — K86.0 ALCOHOL-INDUCED CHRONIC PANCREATITIS (H): ICD-10-CM

## 2022-11-11 DIAGNOSIS — K86.1 OTHER CHRONIC PANCREATITIS (H): Primary | ICD-10-CM

## 2022-11-11 DIAGNOSIS — Z80.3 FAMILY HISTORY OF BREAST CANCER: ICD-10-CM

## 2022-11-11 DIAGNOSIS — Z71.83 ENCOUNTER FOR NONPROCREATIVE GENETIC COUNSELING: ICD-10-CM

## 2022-11-11 DIAGNOSIS — K86.1 CHRONIC CALCIFIC PANCREATITIS (H): ICD-10-CM

## 2022-11-11 PROCEDURE — 96040 HC GENETIC COUNSELING, EACH 30 MINUTES: CPT | Mod: TEL,95 | Performed by: GENETIC COUNSELOR, MS

## 2022-11-11 NOTE — Clinical Note
11/11/2022      RE: Artie Burger  [address]  [facility] [address]     Dear Colleague,    Thank you for the opportunity to participate in the care of your patient, Artie Burger, at the [facility] at [facility]. Please see a copy of my visit note below.    Orlando is a 27 year old who is being evaluated via a billable telephone visit.      Patient denies any changes since echeck-in regarding medication and allergies and states all information entered during echeck-in remains accurate.    What phone number would you like to be contacted at? [phone]  How would you like to obtain your AVS? MyChart      Genetic Counseling Consultation  This note is a summary of Artie Burger s virtual visit to [facility] on November 11, 2022. She was referred to our clinic by Dr. Guru Campuzano (GI) for genetic testing due to a history of recurrent acute and chronic pancreatitis. Genetic testing and a genetic counseling consultation was recommended to aid in better understanding the cause of pancreatitis, as well as provide additional information helpful in medical management and genetic risks for family members.     Summary of visit:  1. Genetics of pancreatitis were discussed, including known involved genes, inheritance patterns, and impact on family members.    2. Genetic testing options were discussed, and the Chronic Pancreatitis Panel was ordered through [facility]. They will complete an insurance benefits investigation. Blood will be drawn during Artie's upcoming GI procedure (unless her GI care team notes this is not possible, in which case I will update Artie).  3. Once her sample is obtained and sent to the lab, results will take 2-3 weeks. I will call Artie at that time. Follow up will be pending results.  4. Contact information was given for future questions or concerns that  "arise.    Personal Medical History:  Artie is a 27 year old female with a history of acute recurrent pancreatitis which has progressed to chronic calcific pancreatitis. Her history dates back to 02/2018. 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 Lipase levels reached up to 7000 per GI notes. She had been abstinent for 10 months at the time of her initial GI clinic visit (no alcohol for over 2 years now per Artie). Per past GI notes: \"During the initial clinic visit, a CT scan on 05/2021 showed multiple stones in the pancreatic head, most likely either side branch, but a stone obstructing the main duct and multiple side branch stones. She had 6/10 abdominal pain, although not on narcotics. She was started on Creon and she underwent an ERCP 04/13/2021. 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 Initially, we were able to only place a 4-Mexican Zimmon stent in September. Subsequently, ERCP in October, we were able to perform SpyGlass pancreatoscopy with EHL, which was successful in fragmenting the stones. She had subsequent ERCP in November. During this ERCP, we were able to get a 5-Mexican stent across the PD stricture and patient was improving clinically with every ERCP. During the December ERCP, we were able to get two 5-Mexican stents and pancreatoscopy showed minimal stone burden and stricture was a more dominant problem. During the February ERCP, she had three 5-Mexican stents, and during the April ERCP, we decided to give her a stent-free trial as the PD stricture had improved. She had significant number of side branches in the head, which was consistent with chronic calcific pancreatitis. Pancreatic duct stent spontaneously had fallen off as noted on the 05/17 x-ray. Subsequently, she developed an episode of " "acute pancreatitis for which she was admitted at [facility] between 06/11/2022 with acute pancreatitis. CT scan during this hospital stay 06/09/2022 which showed a dilated pancreatic duct at the level of the neck measuring 4 mm and mild fat stranding around the pancreatic head. No pancreatic fluid or necrotic collections were noted on the CT. She subsequently had an MRCP which confirmed diffusely dilated pancreatic duct with small side branch noted again and minor pancreatic interstitial edema was seen. There were no pancreatic duct filling defects. She reports daily ongoing pain and has much worse quality of life since the stents have been removed.\" Artie notes she is on Creon, and notes lost weight. See Dr. Campuzano's notes for full GI history details.    Artie has above past history of alcohol usage/etiology, though not for over 2 years now. She smoked cigarettes for 11 years, but quit (vapes now). Other known triggering factors were denied.     Additional history includes possible IBS (per her PCP), mental health diagnoses, heartburn (with pancreatitis), and being on iron. She did have bronchitis and pneumonia as a child but not as an adult. She notes her liver was inflamed with some jaundice previously.    Family History:  A three generation pedigree was obtained today and sent to be scanned into the EMR. The family history was significant for the following:    Artie has no children at this time.    Artie has one sister, who is 34 years old. She has a history of problems gaining weight, but is otherwise healthy. She has three children, who have allergies (one requires nebs occasionally).    Artie's mother is healthy. She has one sister, who has diabetes. Artie's maternal first cousins are healthy.    Artie's maternal grandmother passed for breast cancer diagnosed in her 60's. Her mother (Artie's great grandmother) also passed from breast cancer diagnosed at over 50 years. Recommended seeing " if her grandmother had any cancer genetic testing previously, and at minimum that the family let their providers know about this family history of cancer so they can get appropriate screening. Artie's maternal grandfather's passing/health history is unknown.     Artie's father passed at 54 years from Hep C. He had a history of getting his stomach stapled with issues after that, mental health diagnoses, and back surgery. He had two brothers. One passed in his 40's; Artie was unsure of cause of passing today. If additional details are determined, this would be helpful information to share. His other brother has some lung issues and a history of smoking. Artie's paternal first cousins are healthy.    Artie's paternal grandmother passed in 2020. She has kidney problems and possible gallbladder problems (full details unknown). Artie's paternal grandfather's health history is unknown.    A distant paternal relative for Artie reportedly has pancreatic cancer. It is unknown if they had any cancer genetic testing.     The family history was negative for known individuals with diagnosed pancreatitis, known genetic conditions, and cystic fibrosis/CF symptoms.    Artie is of  ancestry. Consanguinity was denied.    General Genetic Background Information  Genes are the instruction code for our body, and tell our body how to grow and function. Our genes are in every cell of our body, and are packaged in our chromosomes.  Genes and chromosomes come in pairs. We inherit one copy out of each pair from our mother and one copy of each pair from our father. No one has control over which copy they pass on to their child since it is a random process.   Every person has two copies of each gene.     Genetics of Pancreatitis  There are many known factors for pancreatitis, including non-genetic (for example drinking, smoking, autoimmune, etc) and genetic (underlying genetic changes that increase risk). There are also  likely unknown genetic and/or non-genetic factors that influence pancreatitis, which we may know more about with time. Some individuals with pancreatitis likely have a multifactorial inheritance (many factors contributing to their history) instead of just one or two large factors.     Knowns and unknowns about these causes of pancreatitis, focusing on hereditary pancreatitis, were discussed today. Hereditary pancreatitis can be defined based on family history criteria or on genetic findings in an individual. One of the genes that is causative and associated with high risk for pancreatitis is PRSS1. This gene can also cause an increased risk for pancreatic cancer.  Many other genes, such as SPINK1, CTRC, CPA1, CaSR, and others are thought to be moderate risk genes and have an association with pancreatitis. Additionally idiopathic pancreatitis has been found to be associated with changes in the gene for cystic fibrosis (CFTR) as well. The variability in the inheritance of genes associated with hereditary pancreatitis was discussed.      Typical hereditary pancreatitis is a disease that is primarily inherited in an autosomal dominant manner, meaning that a change in one copy of a gene, like PRSS1, is needed to develop the condition. One other gene known to be associated with hereditary pancreatitis, CFTR, typically causes disease when inherited in an autosomal recessive manner, meaning that an individual must inherit a change in the CFTR gene from both their mother and father. The risk factor genes are also inherited in an autosomal dominant fashion, but with these genes most individuals that carry a change do not get pancreatitis. We discussed the complexity of genetic testing for hereditary pancreatitis and that further discussion would be needed when we have the results.    If a known disease causing change was found, this would carry up to a 50% potential risk for future children and siblings to inherit the genetic  change, and a 50% chance of not inheriting it. Not all individuals with genetic changes in these genes go on to develop pancreatitis, and each gene is associated with a different risk for the development of pancreatitis.     If a change is found in the CFTR gene, this result would have additional implications (such as chance for cystic fibrosis in an individual and/or their children) and they will be discussed further if applicable. Brief information about cystic fibrosis, its associated symptoms (lung infections and complications, pancreatitis, sinus disease, and/or male infertility), and its variability were discussed today.     If a specific genetic variant in a family can be identified, more information about risk for other family members and familial testing would be available.    Genetic Testing  There are genetic saliva or blood tests available that can help determine if an individual has changes in genes associated with pancreatitis (including PRSS1, SPINK1, CTRC, and CFTR genes). As a genetic factor for Dinoras pancreatitis possible, comprehensive genetic testing will give useful information to aid in directing medical management, reproductive risks, and family member risks. Specifically, if a full underlying explanation for Artie's pancreatis can be found, it may give more information about how to appropriate manage her or give information about what to expect. In addition, it is possible an underlying genetic cause may predispose Artie to other health risks. Knowing about these additional health risks can help us stay ahead of her healthcare to more appropriately screen for and potentially prevent other complications. Lastly, discovering an underlying reason may help predict the chance for other family members to have pancreatitis and/or other genetic conditions, such as cystic fibrosis. Given the above, Artie's young age of onset for her pancreatitis (continued even with alcohol abstinence), and  the family history of pancreatic cancer, genetic testing assessing hereditary pancreatitis factors is beneficial and medically necessary for Artie.    Limitations and risks of genetic testing were also discussed, including that our genetic testing in 2022 is only as good as our current knowledge of genetics and genes. Therefore, a negative test result does not rule out all genetic changes and causes of pancreatitis. Other follow up may be recommended in the future.    We discussed benefits and limitations of testing both through [facility] vs [facility] today. This included information about testing coverage/genes tested, cost, and testing protocol. Of note, [facility] includes testing for additional genetic minor risk factors for pancreatitis. [facility] panel includes the CASR, CFTR, CPA1, CTRC, PRSS1, and SPINK1 genes.     Artie elected to proceed with testing through [facility], pending coverage. Consent was received verbally. Artie elected to proceed with testing via a blood sample drawn when she is in for her GI procedure later this month; I will connect with her GI care team to confirm this is possible. [facility] will conduct a benefits investigation. Artie will be contacted by [facility] directly with benefits investigation information and billing options (insurance vs self pay) if estimated OOP is >$100. Results will take approximately 2-3 weeks once the sample is received, at which time I will call her. Follow up for Artie and her family would depend on results.    Lab results may be automatically released via [facility].  Department protocol is to hold genetic testing results until we have reviewed them. We will then contact the family directly to disclose the results and ensure they receive a copy of the report. This protocol was reviewed with Artie, who was in agreement to hold the results for genetics review and direct contact.    I encouraged Artie to contact me if she wishes to change  testing labs/plans based on coverage information shared by the lab.    Result Outcomes  Once results are available, we discussed the following possibilities:  1. One disease causing mutation found: Individuals with typical hereditary pancreatitis typically have one disease causing change detected.  Depending on the change found and other factors, individuals are at risk for development of pancreatitis, and other family members would be at risk.  2. One or two possibly contributory mutation found:  This result has various implications depending the specific change found.  The change(s) may contribute to disease.   3. Two mutations in the gene for CF:  Individuals with cystic fibrosis or cystic fibrosis-related disorder usually have two mutations in this gene, one inherited from their mother and one from their father. Depending on the changes in the gene and other factors, some people may have respiratory, GI symptoms, and/or infertility issues.   4. No mutations in these genes: No genetic evidence to support hereditary pancreatitis at this time. It is possible we will learn more about the genetics of pancreatitis in the future and be able to offer more comprehensive genetic testing.   5. The finding of an unknown change: This happens when the laboratory detects a change but they do not know if it is found only in individuals with the condition, or if the change is found in individuals without pancreatitis as well. If you have this type of result, we will discuss it further at that time.    Plan  1. Consent was obtained and orders were placed for [facility] Chronic Pancreatitis Panel (CASR, CFTR, CPA1, CTRC, PRSS1, SPINK1 genes). They will complete a benefits investigation. Blood will be drawn during Artie's upcoming GI procedure (unless her GI care team notes this is not possible, in which case I will update Artie)  2.  Results will be returned to us 2-3 weeks after Artie's sample is received by the lab, and  I will notify Artie of the results as soon as we receive them. This test can detect many changes in the known genes associated with hereditary pancreatitis; however, there remain genetic contributions that are not well understood.   3. Additional medical management recommendations or family member recommendations will be discussed in the future based on results        It was a pleasure to meet with Artie virtually today. she had no additional questions. My contact information was given for future questions or concerns that arise.     Tanya Bazan MS, [facility]  Genetic Counseling  Genetics and Metabolism Division  [facility]   Phone: [phone]  Pager: [phone]        CC: Dr. Campuzano; PCP    Virtual-Visit Details  Type of service:  Telephone Visit  Call Length: 44 min  Originating Location (pt. Location): Home  Distant Location (provider location):  Off-site  Mode of Communication:  Phone  Physician has received verbal consent for a Virtual Visit from the patient? Yes                Please do not hesitate to contact me if you have any questions/concerns.     Sincerely,       Brittany Bazan, GC

## 2022-11-11 NOTE — LETTER
11/11/2022      RE: Artie Burger  [address]  [facility] [address]       Orlando is a 27 year old who is being evaluated via a billable telephone visit.      Patient denies any changes since echeck-in regarding medication and allergies and states all information entered during echeck-in remains accurate.  t      Genetic Counseling Consultation  This note is a summary of Artie Burger s virtual visit to [facility] on November 11, 2022. She was referred to our clinic by Dr. Guru Campuzano (GI) for genetic testing due to a history of recurrent acute and chronic pancreatitis. Genetic testing and a genetic counseling consultation was recommended to aid in better understanding the cause of pancreatitis, as well as provide additional information helpful in medical management and genetic risks for family members.     Summary of visit:  1. Genetics of pancreatitis were discussed, including known involved genes, inheritance patterns, and impact on family members.    2. Genetic testing options were discussed, and the Chronic Pancreatitis Panel was ordered through "[employer] They will complete an insurance benefits investigation. Blood will be drawn during Artie's upcoming GI procedure (unless her GI care team notes this is not possible, in which case I will update Artie).  3. Once her sample is obtained and sent to the lab, results will take 2-3 weeks. I will call Artie at that time. Follow up will be pending results.  4. Contact information was given for future questions or concerns that arise.    Personal Medical History:  Artie is a 27 year old female with a history of acute recurrent pancreatitis which has progressed to chronic calcific pancreatitis. Her history dates back to 02/2018. 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 Lipase levels reached up to 7000 per GI notes. She had been abstinent for 10 months at the time of her initial GI clinic visit  "(no alcohol for over 2 years now per Artie). Per past GI notes: \"During the initial clinic visit, a CT scan on 05/2021 showed multiple stones in the pancreatic head, most likely either side branch, but a stone obstructing the main duct and multiple side branch stones. She had 6/10 abdominal pain, although not on narcotics. She was started on Creon and she underwent an ERCP 04/13/2021. 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 Initially, we were able to only place a 4-St Helenian Zimmon stent in September. Subsequently, ERCP in October, we were able to perform SpyGlass pancreatoscopy with EHL, which was successful in fragmenting the stones. She had subsequent ERCP in November. During this ERCP, we were able to get a 5-St Helenian stent across the PD stricture and patient was improving clinically with every ERCP. During the December ERCP, we were able to get two 5-St Helenian stents and pancreatoscopy showed minimal stone burden and stricture was a more dominant problem. During the February ERCP, she had three 5-St Helenian stents, and during the April ERCP, we decided to give her a stent-free trial as the PD stricture had improved. She had significant number of side branches in the head, which was consistent with chronic calcific pancreatitis. Pancreatic duct stent spontaneously had fallen off as noted on the 05/17 x-ray. Subsequently, she developed an episode of acute pancreatitis for which she was admitted at [facility] between 06/11/2022 with acute pancreatitis. CT scan during this hospital stay 06/09/2022 which showed a dilated pancreatic duct at the level of the neck measuring 4 mm and mild fat stranding around the pancreatic head. No pancreatic fluid or necrotic collections were noted on the CT. She subsequently had an MRCP which confirmed diffusely " "dilated pancreatic duct with small side branch noted again and minor pancreatic interstitial edema was seen. There were no pancreatic duct filling defects. She reports daily ongoing pain and has much worse quality of life since the stents have been removed.\" Artie notes she is on Creon, and notes lost weight. See Dr. Campuzano's notes for full GI history details.    Artie has above past history of alcohol usage/etiology, though not for over 2 years now. She smoked cigarettes for 11 years, but quit (vapes now). Other known triggering factors were denied.     Additional history includes possible IBS (per her PCP), mental health diagnoses, heartburn (with pancreatitis), and being on iron. She did have bronchitis and pneumonia as a child but not as an adult. She notes her liver was inflamed with some jaundice previously.    Family History:  A three generation pedigree was obtained today and sent to be scanned into the EMR. The family history was significant for the following:    Artie has no children at this time.    Artie has one sister, who is 34 years old. She has a history of problems gaining weight, but is otherwise healthy. She has three children, who have allergies (one requires nebs occasionally).    Artie's mother is healthy. She has one sister, who has diabetes. Artie's maternal first cousins are healthy.    Artie's maternal grandmother passed for breast cancer diagnosed in her 60's. Her mother (Artie's great grandmother) also passed from breast cancer diagnosed at over 50 years. Recommended seeing if her grandmother had any cancer genetic testing previously, and at minimum that the family let their providers know about this family history of cancer so they can get appropriate screening. Artie's maternal grandfather's passing/health history is unknown.     Artie's father passed at 54 years from Hep C. He had a history of getting his stomach stapled with issues after that, mental health " diagnoses, and back surgery. He had two brothers. One passed in his 40's; Artie was unsure of cause of passing today. If additional details are determined, this would be helpful information to share. His other brother has some lung issues and a history of smoking. Artie's paternal first cousins are healthy.    Artie's paternal grandmother passed in 2020. She has kidney problems and possible gallbladder problems (full details unknown). Artie's paternal grandfather's health history is unknown.    A distant paternal relative for Artie reportedly has pancreatic cancer. It is unknown if they had any cancer genetic testing.     The family history was negative for known individuals with diagnosed pancreatitis, known genetic conditions, and cystic fibrosis/CF symptoms.    Artie is of  ancestry. Consanguinity was denied.    General Genetic Background Information  Genes are the instruction code for our body, and tell our body how to grow and function. Our genes are in every cell of our body, and are packaged in our chromosomes.  Genes and chromosomes come in pairs. We inherit one copy out of each pair from our mother and one copy of each pair from our father. No one has control over which copy they pass on to their child since it is a random process.   Every person has two copies of each gene.     Genetics of Pancreatitis  There are many known factors for pancreatitis, including non-genetic (for example drinking, smoking, autoimmune, etc) and genetic (underlying genetic changes that increase risk). There are also likely unknown genetic and/or non-genetic factors that influence pancreatitis, which we may know more about with time. Some individuals with pancreatitis likely have a multifactorial inheritance (many factors contributing to their history) instead of just one or two large factors.     Knowns and unknowns about these causes of pancreatitis, focusing on hereditary pancreatitis, were discussed  today. Hereditary pancreatitis can be defined based on family history criteria or on genetic findings in an individual. One of the genes that is causative and associated with high risk for pancreatitis is PRSS1. This gene can also cause an increased risk for pancreatic cancer.  Many other genes, such as SPINK1, CTRC, CPA1, CaSR, and others are thought to be moderate risk genes and have an association with pancreatitis. Additionally idiopathic pancreatitis has been found to be associated with changes in the gene for cystic fibrosis (CFTR) as well. The variability in the inheritance of genes associated with hereditary pancreatitis was discussed.      Typical hereditary pancreatitis is a disease that is primarily inherited in an autosomal dominant manner, meaning that a change in one copy of a gene, like PRSS1, is needed to develop the condition. One other gene known to be associated with hereditary pancreatitis, CFTR, typically causes disease when inherited in an autosomal recessive manner, meaning that an individual must inherit a change in the CFTR gene from both their mother and father. The risk factor genes are also inherited in an autosomal dominant fashion, but with these genes most individuals that carry a change do not get pancreatitis. We discussed the complexity of genetic testing for hereditary pancreatitis and that further discussion would be needed when we have the results.    If a known disease causing change was found, this would carry up to a 50% potential risk for future children and siblings to inherit the genetic change, and a 50% chance of not inheriting it. Not all individuals with genetic changes in these genes go on to develop pancreatitis, and each gene is associated with a different risk for the development of pancreatitis.     If a change is found in the CFTR gene, this result would have additional implications (such as chance for cystic fibrosis in an individual and/or their children) and  they will be discussed further if applicable. Brief information about cystic fibrosis, its associated symptoms (lung infections and complications, pancreatitis, sinus disease, and/or male infertility), and its variability were discussed today.     If a specific genetic variant in a family can be identified, more information about risk for other family members and familial testing would be available.    Genetic Testing  There are genetic saliva or blood tests available that can help determine if an individual has changes in genes associated with pancreatitis (including PRSS1, SPINK1, CTRC, and CFTR genes). As a genetic factor for Dinoras pancreatitis possible, comprehensive genetic testing will give useful information to aid in directing medical management, reproductive risks, and family member risks. Specifically, if a full underlying explanation for Dinoras pancreatis can be found, it may give more information about how to appropriate manage her or give information about what to expect. In addition, it is possible an underlying genetic cause may predispose Artie to other health risks. Knowing about these additional health risks can help us stay ahead of her healthcare to more appropriately screen for and potentially prevent other complications. Lastly, discovering an underlying reason may help predict the chance for other family members to have pancreatitis and/or other genetic conditions, such as cystic fibrosis. Given the above, Artie's young age of onset for her pancreatitis (continued even with alcohol abstinence), and the family history of pancreatic cancer, genetic testing assessing hereditary pancreatitis factors is beneficial and medically necessary for Artie.    Limitations and risks of genetic testing were also discussed, including that our genetic testing in 2022 is only as good as our current knowledge of genetics and genes. Therefore, a negative test result does not rule out all genetic  changes and causes of pancreatitis. Other follow up may be recommended in the future.    We discussed benefits and limitations of testing both through [facility] vs [facility] today. This included information about testing coverage/genes tested, cost, and testing protocol. Of note, [facility] includes testing for additional genetic minor risk factors for pancreatitis. [facility] panel includes the CASR, CFTR, CPA1, CTRC, PRSS1, and SPINK1 genes.     Artie elected to proceed with testing through [facility], pending coverage. Consent was received verbally. Artie elected to proceed with testing via a blood sample drawn when she is in for her GI procedure later this month; I will connect with her GI care team to confirm this is possible. [facility] will conduct a benefits investigation. Artie will be contacted by [facility] directly with benefits investigation information and billing options (insurance vs self pay) if estimated OOP is >$100. Results will take approximately 2-3 weeks once the sample is received, at which time I will call her. Follow up for Artie and her family would depend on results.    Lab results may be automatically released via [employer].  Department protocol is to hold genetic testing results until we have reviewed them. We will then contact the family directly to disclose the results and ensure they receive a copy of the report. This protocol was reviewed with Artie, who was in agreement to hold the results for genetics review and direct contact.    I encouraged Artie to contact me if she wishes to change testing labs/plans based on coverage information shared by the lab.    Result Outcomes  Once results are available, we discussed the following possibilities:  1. One disease causing mutation found: Individuals with typical hereditary pancreatitis typically have one disease causing change detected.  Depending on the change found and other factors, individuals are at risk for  development of pancreatitis, and other family members would be at risk.  2. One or two possibly contributory mutation found:  This result has various implications depending the specific change found.  The change(s) may contribute to disease.   3. Two mutations in the gene for CF:  Individuals with cystic fibrosis or cystic fibrosis-related disorder usually have two mutations in this gene, one inherited from their mother and one from their father. Depending on the changes in the gene and other factors, some people may have respiratory, GI symptoms, and/or infertility issues.   4. No mutations in these genes: No genetic evidence to support hereditary pancreatitis at this time. It is possible we will learn more about the genetics of pancreatitis in the future and be able to offer more comprehensive genetic testing.   5. The finding of an unknown change: This happens when the laboratory detects a change but they do not know if it is found only in individuals with the condition, or if the change is found in individuals without pancreatitis as well. If you have this type of result, we will discuss it further at that time.    Plan  1. Consent was obtained and orders were placed for [facility] Chronic Pancreatitis Panel (CASR, CFTR, CPA1, CTRC, PRSS1, SPINK1 genes). They will complete a benefits investigation. Blood will be drawn during Artie's upcoming GI procedure (unless her GI care team notes this is not possible, in which case I will update Artie)  2.  Results will be returned to us 2-3 weeks after Artie's sample is received by the lab, and I will notify Artie of the results as soon as we receive them. This test can detect many changes in the known genes associated with hereditary pancreatitis; however, there remain genetic contributions that are not well understood.   3. Additional medical management recommendations or family member recommendations will be discussed in the future based on results        It  was a pleasure to meet with Artie aguirre today. she had no additional questions. My contact information was given for future questions or concerns that arise.     Tanya Bazan MS, [facility]  Genetic Counseling  Genetics and Metabolism Division  [facility]   Phone: [phone]  Pager: [phone]        CC: Dr. Campuzano; PCP    Virtual-Visit Details  Type of service:  Telephone Visit  Call Length: 44 min  Originating Location (pt. Location): Home  Distant Location (provider location):  Off-site  Mode of Communication:  Phone  Physician has received verbal consent for a Virtual Visit from the patient? Yes

## 2022-11-11 NOTE — PROGRESS NOTES
Orlando is a 27 year old who is being evaluated via a billable telephone visit.      Patient denies any changes since echeck-in regarding medication and allergies and states all information entered during echeck-in remains accurate.    What phone number would you like to be contacted at? [phone]  How would you like to obtain your AVS? [employer]

## 2022-11-16 ENCOUNTER — PATIENT OUTREACH (OUTPATIENT)
Dept: GASTROENTEROLOGY | Facility: CLINIC | Age: 27
End: 2022-11-16

## 2022-11-16 ENCOUNTER — TELEPHONE (OUTPATIENT)
Dept: CONSULT | Facility: CLINIC | Age: 27
End: 2022-11-16

## 2022-11-16 NOTE — TELEPHONE ENCOUNTER
I called Artie to follow up about her pancreatitis genetic testing sample after her recent genetic counseling visit. Specifically, her GI team did believe they could draw blood for this testing when she was in for her procedure on the 21st. We will plan on that, but I let Artie know I would reach out if there were any issues on my end (in which case a mailed cheek swab sample would be an alternative option). She had no questions.      Tanya Bazan MS, [facility]  Genetic Counseling  Genetics and Metabolism Division  [facility]   Phone: [phone]  Pager: [phone]  Email: [email or web address]

## 2022-11-17 RX ORDER — DESOGESTREL AND ETHINYL ESTRADIOL 0.15-0.03
1 KIT ORAL DAILY
COMMUNITY
End: 2023-07-03

## 2022-11-18 ENCOUNTER — PATIENT OUTREACH (OUTPATIENT)
Dept: GASTROENTEROLOGY | Facility: CLINIC | Age: 27
End: 2022-11-18

## 2022-11-18 NOTE — PROGRESS NOTES
Patient returned my call. She was not able to make today's appointment due to work. States she has scheduled an appointment tomorrow in [facility] at 8AM. Confirmed fax number to send visit.     Melvi Humphreys RN Care Coordinator

## 2022-11-18 NOTE — PROGRESS NOTES
Called Artie to discuss pre-op assessment prior to OR on 11/21. No indication of previously scheduled pre-op visit for today available in [facility]. M with contact information.     Melvi Humphreys RN Care Coordinator

## 2022-11-19 ENCOUNTER — TRANSFERRED RECORDS (OUTPATIENT)
Dept: HEALTH INFORMATION MANAGEMENT | Facility: CLINIC | Age: 27
End: 2022-11-19

## 2022-11-20 ENCOUNTER — ANESTHESIA EVENT (OUTPATIENT)
Dept: SURGERY | Facility: CLINIC | Age: 27
End: 2022-11-20
Payer: COMMERCIAL

## 2022-11-21 ENCOUNTER — HOSPITAL ENCOUNTER (OUTPATIENT)
Facility: CLINIC | Age: 27
Discharge: HOME | End: 2022-11-21
Attending: INTERNAL MEDICINE | Admitting: INTERNAL MEDICINE
Payer: COMMERCIAL

## 2022-11-21 ENCOUNTER — ANESTHESIA (OUTPATIENT)
Dept: SURGERY | Facility: CLINIC | Age: 27
End: 2022-11-21
Payer: COMMERCIAL

## 2022-11-21 ENCOUNTER — APPOINTMENT (OUTPATIENT)
Dept: GENERAL RADIOLOGY | Facility: CLINIC | Age: 27
End: 2022-11-21
Attending: INTERNAL MEDICINE
Payer: COMMERCIAL

## 2022-11-21 VITALS
TEMPERATURE: 98.2 F | SYSTOLIC BLOOD PRESSURE: 109 MMHG | DIASTOLIC BLOOD PRESSURE: 74 MMHG | HEART RATE: 56 BPM | WEIGHT: 135.36 LBS | BODY MASS INDEX: 24.91 KG/M2 | OXYGEN SATURATION: 99 % | RESPIRATION RATE: 14 BRPM | HEIGHT: 62 IN

## 2022-11-21 DIAGNOSIS — K86.0 ALCOHOL-INDUCED CHRONIC PANCREATITIS (H): ICD-10-CM

## 2022-11-21 DIAGNOSIS — Z80.0 FAMILY HISTORY OF PANCREATIC CANCER: ICD-10-CM

## 2022-11-21 DIAGNOSIS — K86.1 OTHER CHRONIC PANCREATITIS (H): ICD-10-CM

## 2022-11-21 DIAGNOSIS — K86.1 CHRONIC CALCIFIC PANCREATITIS (H): ICD-10-CM

## 2022-11-21 LAB
ALBUMIN SERPL BCG-MCNC: 3.9 G/DL (ref 3.5–5.2)
ALP SERPL-CCNC: 49 U/L (ref 35–104)
ALT SERPL W P-5'-P-CCNC: 10 U/L (ref 10–35)
AMYLASE SERPL-CCNC: 68 U/L (ref 28–100)
ANION GAP SERPL CALCULATED.3IONS-SCNC: 10 MMOL/L (ref 7–15)
AST SERPL W P-5'-P-CCNC: 17 U/L (ref 10–35)
BILIRUB SERPL-MCNC: 0.2 MG/DL
BUN SERPL-MCNC: 12.6 MG/DL (ref 6–20)
CALCIUM SERPL-MCNC: 9.1 MG/DL (ref 8.6–10)
CHLORIDE SERPL-SCNC: 106 MMOL/L (ref 98–107)
CREAT SERPL-MCNC: 0.84 MG/DL (ref 0.51–0.95)
DEPRECATED HCO3 PLAS-SCNC: 21 MMOL/L (ref 22–29)
ERCP: NORMAL
ERYTHROCYTE [DISTWIDTH] IN BLOOD BY AUTOMATED COUNT: 13.7 % (ref 10–15)
GFR SERPL CREATININE-BSD FRML MDRD: >90 ML/MIN/1.73M2
GLUCOSE BLDC GLUCOMTR-MCNC: 117 MG/DL (ref 70–99)
GLUCOSE SERPL-MCNC: 119 MG/DL (ref 70–99)
HCT VFR BLD AUTO: 31 % (ref 35–47)
HGB BLD-MCNC: 10 G/DL (ref 11.7–15.7)
INR PPP: 0.88 (ref 0.85–1.15)
LIPASE SERPL-CCNC: 33 U/L (ref 13–60)
MCH RBC QN AUTO: 29.3 PG (ref 26.5–33)
MCHC RBC AUTO-ENTMCNC: 32.3 G/DL (ref 31.5–36.5)
MCV RBC AUTO: 91 FL (ref 78–100)
PLATELET # BLD AUTO: 438 10E3/UL (ref 150–450)
POTASSIUM SERPL-SCNC: 3.9 MMOL/L (ref 3.4–5.3)
PROT SERPL-MCNC: 6.4 G/DL (ref 6.4–8.3)
RBC # BLD AUTO: 3.41 10E6/UL (ref 3.8–5.2)
SODIUM SERPL-SCNC: 137 MMOL/L (ref 136–145)
WBC # BLD AUTO: 8.8 10E3/UL (ref 4–11)

## 2022-11-21 PROCEDURE — 85610 PROTHROMBIN TIME: CPT | Performed by: INTERNAL MEDICINE

## 2022-11-21 PROCEDURE — 999N000181 XR SURGERY CARM FLUORO GREATER THAN 5 MIN W STILLS: Mod: TC

## 2022-11-21 PROCEDURE — 272N000001 HC OR GENERAL SUPPLY STERILE: Performed by: INTERNAL MEDICINE

## 2022-11-21 PROCEDURE — 250N000011 HC RX IP 250 OP 636: Performed by: ANESTHESIOLOGY

## 2022-11-21 PROCEDURE — 80053 COMPREHEN METABOLIC PANEL: CPT | Performed by: INTERNAL MEDICINE

## 2022-11-21 PROCEDURE — 255N000002 HC RX 255 OP 636: Performed by: INTERNAL MEDICINE

## 2022-11-21 PROCEDURE — 250N000011 HC RX IP 250 OP 636: Performed by: NURSE ANESTHETIST, CERTIFIED REGISTERED

## 2022-11-21 PROCEDURE — 85027 COMPLETE CBC AUTOMATED: CPT | Performed by: INTERNAL MEDICINE

## 2022-11-21 PROCEDURE — 258N000003 HC RX IP 258 OP 636: Performed by: NURSE ANESTHETIST, CERTIFIED REGISTERED

## 2022-11-21 PROCEDURE — 999N000141 HC STATISTIC PRE-PROCEDURE NURSING ASSESSMENT: Performed by: INTERNAL MEDICINE

## 2022-11-21 PROCEDURE — 360N000082 HC SURGERY LEVEL 2 W/ FLUORO, PER MIN: Performed by: INTERNAL MEDICINE

## 2022-11-21 PROCEDURE — 710N000012 HC RECOVERY PHASE 2, PER MINUTE: Performed by: INTERNAL MEDICINE

## 2022-11-21 PROCEDURE — 250N000009 HC RX 250: Performed by: INTERNAL MEDICINE

## 2022-11-21 PROCEDURE — 250N000009 HC RX 250: Performed by: NURSE ANESTHETIST, CERTIFIED REGISTERED

## 2022-11-21 PROCEDURE — 710N000010 HC RECOVERY PHASE 1, LEVEL 2, PER MIN: Performed by: INTERNAL MEDICINE

## 2022-11-21 PROCEDURE — C1877 STENT, NON-COAT/COV W/O DEL: HCPCS | Performed by: INTERNAL MEDICINE

## 2022-11-21 PROCEDURE — 82962 GLUCOSE BLOOD TEST: CPT

## 2022-11-21 PROCEDURE — 250N000025 HC SEVOFLURANE, PER MIN: Performed by: INTERNAL MEDICINE

## 2022-11-21 PROCEDURE — 370N000017 HC ANESTHESIA TECHNICAL FEE, PER MIN: Performed by: INTERNAL MEDICINE

## 2022-11-21 PROCEDURE — 82150 ASSAY OF AMYLASE: CPT | Performed by: INTERNAL MEDICINE

## 2022-11-21 PROCEDURE — 36415 COLL VENOUS BLD VENIPUNCTURE: CPT | Performed by: INTERNAL MEDICINE

## 2022-11-21 PROCEDURE — C1769 GUIDE WIRE: HCPCS | Performed by: INTERNAL MEDICINE

## 2022-11-21 PROCEDURE — C1726 CATH, BAL DIL, NON-VASCULAR: HCPCS | Performed by: INTERNAL MEDICINE

## 2022-11-21 PROCEDURE — 83690 ASSAY OF LIPASE: CPT | Performed by: INTERNAL MEDICINE

## 2022-11-21 DEVICE — STENT ZIMMON PANCREA 5FRX10CM SGL PIGTAIL G22362
Type: IMPLANTABLE DEVICE | Site: PANCREATIC DUCT | Status: NON-FUNCTIONAL
Removed: 2023-01-18

## 2022-11-21 DEVICE — STENT ZIMMON PANCREA 5FRX12CM SGL PIGTAIL G22363
Type: IMPLANTABLE DEVICE | Site: PANCREATIC DUCT | Status: NON-FUNCTIONAL
Removed: 2023-01-18

## 2022-11-21 RX ORDER — INDOMETHACIN 50 MG/1
SUPPOSITORY RECTAL PRN
Status: DISCONTINUED | OUTPATIENT
Start: 2022-11-21 | End: 2022-11-21 | Stop reason: HOSPADM

## 2022-11-21 RX ORDER — DEXAMETHASONE SODIUM PHOSPHATE 4 MG/ML
INJECTION, SOLUTION INTRA-ARTICULAR; INTRALESIONAL; INTRAMUSCULAR; INTRAVENOUS; SOFT TISSUE PRN
Status: DISCONTINUED | OUTPATIENT
Start: 2022-11-21 | End: 2022-11-21

## 2022-11-21 RX ORDER — MEPERIDINE HYDROCHLORIDE 25 MG/ML
12.5 INJECTION INTRAMUSCULAR; INTRAVENOUS; SUBCUTANEOUS
Status: DISCONTINUED | OUTPATIENT
Start: 2022-11-21 | End: 2022-11-28 | Stop reason: HOSPADM

## 2022-11-21 RX ORDER — HYDROMORPHONE HYDROCHLORIDE 1 MG/ML
0.2 INJECTION, SOLUTION INTRAMUSCULAR; INTRAVENOUS; SUBCUTANEOUS EVERY 5 MIN PRN
Status: DISCONTINUED | OUTPATIENT
Start: 2022-11-21 | End: 2022-11-21 | Stop reason: HOSPADM

## 2022-11-21 RX ORDER — NALOXONE HYDROCHLORIDE 0.4 MG/ML
0.2 INJECTION, SOLUTION INTRAMUSCULAR; INTRAVENOUS; SUBCUTANEOUS
Status: DISCONTINUED | OUTPATIENT
Start: 2022-11-21 | End: 2022-11-28 | Stop reason: HOSPADM

## 2022-11-21 RX ORDER — ONDANSETRON 4 MG/1
4 TABLET, ORALLY DISINTEGRATING ORAL EVERY 30 MIN PRN
Status: DISCONTINUED | OUTPATIENT
Start: 2022-11-21 | End: 2022-11-28 | Stop reason: HOSPADM

## 2022-11-21 RX ORDER — ONDANSETRON 2 MG/ML
4 INJECTION INTRAMUSCULAR; INTRAVENOUS EVERY 30 MIN PRN
Status: DISCONTINUED | OUTPATIENT
Start: 2022-11-21 | End: 2022-11-28 | Stop reason: HOSPADM

## 2022-11-21 RX ORDER — SODIUM CHLORIDE, SODIUM LACTATE, POTASSIUM CHLORIDE, CALCIUM CHLORIDE 600; 310; 30; 20 MG/100ML; MG/100ML; MG/100ML; MG/100ML
INJECTION, SOLUTION INTRAVENOUS CONTINUOUS PRN
Status: DISCONTINUED | OUTPATIENT
Start: 2022-11-21 | End: 2022-11-21

## 2022-11-21 RX ORDER — SODIUM CHLORIDE, SODIUM LACTATE, POTASSIUM CHLORIDE, CALCIUM CHLORIDE 600; 310; 30; 20 MG/100ML; MG/100ML; MG/100ML; MG/100ML
INJECTION, SOLUTION INTRAVENOUS CONTINUOUS
Status: DISCONTINUED | OUTPATIENT
Start: 2022-11-21 | End: 2022-11-21 | Stop reason: HOSPADM

## 2022-11-21 RX ORDER — FENTANYL CITRATE 50 UG/ML
50 INJECTION, SOLUTION INTRAMUSCULAR; INTRAVENOUS EVERY 5 MIN PRN
Status: DISCONTINUED | OUTPATIENT
Start: 2022-11-21 | End: 2022-11-21 | Stop reason: HOSPADM

## 2022-11-21 RX ORDER — IOPAMIDOL 510 MG/ML
INJECTION, SOLUTION INTRAVASCULAR PRN
Status: DISCONTINUED | OUTPATIENT
Start: 2022-11-21 | End: 2022-11-21 | Stop reason: HOSPADM

## 2022-11-21 RX ORDER — LIDOCAINE 40 MG/G
CREAM TOPICAL
Status: DISCONTINUED | OUTPATIENT
Start: 2022-11-21 | End: 2022-11-21 | Stop reason: HOSPADM

## 2022-11-21 RX ORDER — ONDANSETRON 2 MG/ML
4 INJECTION INTRAMUSCULAR; INTRAVENOUS EVERY 6 HOURS PRN
Status: DISCONTINUED | OUTPATIENT
Start: 2022-11-21 | End: 2022-11-28 | Stop reason: HOSPADM

## 2022-11-21 RX ORDER — PROPOFOL 10 MG/ML
INJECTION, EMULSION INTRAVENOUS PRN
Status: DISCONTINUED | OUTPATIENT
Start: 2022-11-21 | End: 2022-11-21

## 2022-11-21 RX ORDER — ONDANSETRON 4 MG/1
4 TABLET, ORALLY DISINTEGRATING ORAL EVERY 6 HOURS PRN
Status: DISCONTINUED | OUTPATIENT
Start: 2022-11-21 | End: 2022-11-28 | Stop reason: HOSPADM

## 2022-11-21 RX ORDER — HYDROMORPHONE HYDROCHLORIDE 1 MG/ML
0.4 INJECTION, SOLUTION INTRAMUSCULAR; INTRAVENOUS; SUBCUTANEOUS EVERY 5 MIN PRN
Status: DISCONTINUED | OUTPATIENT
Start: 2022-11-21 | End: 2022-11-21 | Stop reason: HOSPADM

## 2022-11-21 RX ORDER — NALOXONE HYDROCHLORIDE 0.4 MG/ML
0.4 INJECTION, SOLUTION INTRAMUSCULAR; INTRAVENOUS; SUBCUTANEOUS
Status: DISCONTINUED | OUTPATIENT
Start: 2022-11-21 | End: 2022-11-28 | Stop reason: HOSPADM

## 2022-11-21 RX ORDER — FENTANYL CITRATE 50 UG/ML
25-50 INJECTION, SOLUTION INTRAMUSCULAR; INTRAVENOUS
Status: DISCONTINUED | OUTPATIENT
Start: 2022-11-21 | End: 2022-11-28 | Stop reason: HOSPADM

## 2022-11-21 RX ORDER — SODIUM CHLORIDE, SODIUM LACTATE, POTASSIUM CHLORIDE, CALCIUM CHLORIDE 600; 310; 30; 20 MG/100ML; MG/100ML; MG/100ML; MG/100ML
INJECTION, SOLUTION INTRAVENOUS CONTINUOUS
Status: DISCONTINUED | OUTPATIENT
Start: 2022-11-21 | End: 2022-11-28 | Stop reason: HOSPADM

## 2022-11-21 RX ORDER — FENTANYL CITRATE 50 UG/ML
INJECTION, SOLUTION INTRAMUSCULAR; INTRAVENOUS PRN
Status: DISCONTINUED | OUTPATIENT
Start: 2022-11-21 | End: 2022-11-21

## 2022-11-21 RX ORDER — LEVOFLOXACIN 5 MG/ML
INJECTION, SOLUTION INTRAVENOUS PRN
Status: DISCONTINUED | OUTPATIENT
Start: 2022-11-21 | End: 2022-11-21

## 2022-11-21 RX ORDER — FLUMAZENIL 0.1 MG/ML
0.2 INJECTION, SOLUTION INTRAVENOUS
Status: ACTIVE | OUTPATIENT
Start: 2022-11-21 | End: 2022-11-21

## 2022-11-21 RX ORDER — LIDOCAINE HYDROCHLORIDE 20 MG/ML
INJECTION, SOLUTION INFILTRATION; PERINEURAL PRN
Status: DISCONTINUED | OUTPATIENT
Start: 2022-11-21 | End: 2022-11-21

## 2022-11-21 RX ORDER — SCOLOPAMINE TRANSDERMAL SYSTEM 1 MG/1
1 PATCH, EXTENDED RELEASE TRANSDERMAL
Status: DISCONTINUED | OUTPATIENT
Start: 2022-11-21 | End: 2022-11-21 | Stop reason: HOSPADM

## 2022-11-21 RX ORDER — ONDANSETRON 2 MG/ML
INJECTION INTRAMUSCULAR; INTRAVENOUS PRN
Status: DISCONTINUED | OUTPATIENT
Start: 2022-11-21 | End: 2022-11-21

## 2022-11-21 RX ORDER — FENTANYL CITRATE 50 UG/ML
25 INJECTION, SOLUTION INTRAMUSCULAR; INTRAVENOUS EVERY 5 MIN PRN
Status: DISCONTINUED | OUTPATIENT
Start: 2022-11-21 | End: 2022-11-21 | Stop reason: HOSPADM

## 2022-11-21 RX ORDER — HYDRALAZINE HYDROCHLORIDE 20 MG/ML
2.5-5 INJECTION INTRAMUSCULAR; INTRAVENOUS EVERY 10 MIN PRN
Status: DISCONTINUED | OUTPATIENT
Start: 2022-11-21 | End: 2022-11-21 | Stop reason: HOSPADM

## 2022-11-21 RX ADMIN — MIDAZOLAM 2 MG: 1 INJECTION INTRAMUSCULAR; INTRAVENOUS at 07:27

## 2022-11-21 RX ADMIN — Medication 50 MG: at 07:35

## 2022-11-21 RX ADMIN — FENTANYL CITRATE 25 MCG: 50 INJECTION, SOLUTION INTRAMUSCULAR; INTRAVENOUS at 09:07

## 2022-11-21 RX ADMIN — FENTANYL CITRATE 25 MCG: 50 INJECTION, SOLUTION INTRAMUSCULAR; INTRAVENOUS at 09:14

## 2022-11-21 RX ADMIN — LIDOCAINE HYDROCHLORIDE 100 MG: 20 INJECTION, SOLUTION INFILTRATION; PERINEURAL at 07:34

## 2022-11-21 RX ADMIN — SUGAMMADEX 200 MG: 100 INJECTION, SOLUTION INTRAVENOUS at 08:42

## 2022-11-21 RX ADMIN — LEVOFLOXACIN 500 MG: 5 INJECTION, SOLUTION INTRAVENOUS at 07:41

## 2022-11-21 RX ADMIN — FENTANYL CITRATE 50 MCG: 50 INJECTION, SOLUTION INTRAMUSCULAR; INTRAVENOUS at 07:34

## 2022-11-21 RX ADMIN — ONDANSETRON 4 MG: 2 INJECTION INTRAMUSCULAR; INTRAVENOUS at 07:47

## 2022-11-21 RX ADMIN — SODIUM CHLORIDE, POTASSIUM CHLORIDE, SODIUM LACTATE AND CALCIUM CHLORIDE: 600; 310; 30; 20 INJECTION, SOLUTION INTRAVENOUS at 07:31

## 2022-11-21 RX ADMIN — DEXAMETHASONE SODIUM PHOSPHATE 6 MG: 4 INJECTION, SOLUTION INTRA-ARTICULAR; INTRALESIONAL; INTRAMUSCULAR; INTRAVENOUS; SOFT TISSUE at 07:34

## 2022-11-21 RX ADMIN — PROPOFOL 200 MG: 10 INJECTION, EMULSION INTRAVENOUS at 07:34

## 2022-11-21 RX ADMIN — FENTANYL CITRATE 50 MCG: 50 INJECTION, SOLUTION INTRAMUSCULAR; INTRAVENOUS at 07:27

## 2022-11-21 RX ADMIN — HYDROMORPHONE HYDROCHLORIDE 0.5 MG: 1 INJECTION, SOLUTION INTRAMUSCULAR; INTRAVENOUS; SUBCUTANEOUS at 08:01

## 2022-11-21 RX ADMIN — PHENYLEPHRINE HYDROCHLORIDE 100 MCG: 10 INJECTION INTRAVENOUS at 07:49

## 2022-11-21 ASSESSMENT — ACTIVITIES OF DAILY LIVING (ADL)
ADLS_ACUITY_SCORE: 35

## 2022-11-21 ASSESSMENT — LIFESTYLE VARIABLES: TOBACCO_USE: 1

## 2022-11-21 NOTE — BRIEF OP NOTE
[facility]    Brief Operative Note  Artie Burger is a 27 year old female with a PMHx of depression, panic attacks, alcoholism, currently in remission for the last 22 months, with recurrent bouts of pancreatitis, which progressed to chronic calcific pancreatitis from alcohol abuse, status post multiple ERCPs. On ERCP in April she was noted to have a high-grade PD stricture and stone. The stones have all been removed. The stricture was treated with multiple stents and those stents were all removed. Developed an attack of pancreatitis after stent removal and underwent ERCP with placement of 5 Fr and 4 Fr stents across the PD stricture. Most recent ERCP was 9/14/2022 in which she was noted to have diffuse changes of chronic pancreatitis (dilation, irregularity, side branches, stenosis). Stricture in head appeared mildly improved. She had two 5 Fr (Ruiz and Zimmon) and 4 Fr Zimmon stent was placed in PD. She presents for repeat ERCP for further revaluation.     Pre-operative diagnosis: Biliary stricture [K83.1]  Pancreatic duct stricture [K86.89]  Post-operative diagnosis Same as pre-operative diagnosis    Procedure: Procedure(s):  ENDOSCOPIC RETROGRADE CHOLANGIOPANCREATOGRAPHY WITH PANCREATIC DUCT STENT EXCHANGE, DILATION, AND DEBRIS REMOVAL  Surgeon: Surgeon(s) and Role:     * Guru Sabino Campuzano MD - Primary     * Tricia Dumont MD - Fellow - Assisting  Anesthesia: General   Estimated Blood Loss: None    Drains: None  Specimens:   ID Type Source Tests Collected by Time Destination   A : Other Laboratory; [facility]; Chronic Pancreatitis Panel, test code 11062, genetic testing (Laboratory Miscellaneous Order) [[id number]] (Order [id number]) Blood Hand, Left LABORATORY MISCELLANEOUS ORDER Guru Sabino Campuzano MD 11/21/2022  8:41 AM      Findings:     - Moderate amount food was encountered in the stomach and second portion of the  duodenum   - The 5 Fr x 12 cm Ruiz pancreatic stent migrated out of the pancreatic duct   - Two pancreatic Zimmon stents were removed from the ventral pancreatic duct   - The ventral pancreatic duct was selectively cannulated   - Pancreaticogram showed mild ventral pancreatic duct stenosis which appears mildly improved compared to prior  - The pancreatic duct was dilated to 4 mm and swept with extraction of some debris   - Two 5 Fr x 12 cm Zimmon pancreatic stents and one 5 Fr x 10 cm Zimmon pancreatic stent was placed into the ventral pancreatic duct     Complications: None.  Implants:   Implant Name Type Inv. Item Serial No.  Lot No. LRB No. Used Action   STENT ZIMMON PANCREA [id number] SGL PIGTAIL [id number] - [id number] Stent STENT ZIMMON PANCREA [id number] SGL PIGTAIL [id number]  [employer] [id number] N/A 1 Explanted   Pancreatic Stent--Walter Flexi-stent 5fr x 12cm     [id number] N/A 1 Explanted   STENT ZIMMON PANCREA [id number] SGL PIGTAIL [id number] - [id number] Stent STENT ZIMMON PANCREA [id number] SGL PIGTAIL [id number]  [employer] [id number] N/A 1 Explanted   STENT ZIMMON PANCREA [id number] SGL PIGTAIL [id number] - [id number] Stent STENT ZIMMON PANCREA [id number] SGL PIGTAIL [id number]  [employer] [id number] N/A 1 Implanted   STENT ZIMMON PANCREA [id number] SGL PIGTAIL [id number] - [id number] Stent STENT ZIMMON PANCREA [id number] SGL PIGTAIL [id number]  [employer] [id number] N/A 1 Implanted   STENT ZIMMON PANCREA [id number] SGL PIGTAIL [id number] - [id number] Stent STENT ZIMMON PANCREA [id number] SGL PIGTAIL [id number]  [employer] [id number] N/A 1 Implanted     Recommendations  - Observe patient in the same day observation unit for ongoing care with plans for discharge to home later today   - Will plan for repeat ERCP in 10 weeks  - Will send for chronic pancreatitis genetic testing panel. Further recommendations pending on the results  - The findings and recommendations were discussed with the patient's  family

## 2022-11-21 NOTE — ANESTHESIA POSTPROCEDURE EVALUATION
Patient: Artie Burger    Procedure: Procedure(s):  ENDOSCOPIC RETROGRADE CHOLANGIOPANCREATOGRAPHY WITH PANCREATIC DUCT STENT EXCHANGE, DILATION, AND DEBRIS REMOVAL       Anesthesia Type:  General    Note:  Disposition: Outpatient   Postop Pain Control: Uneventful            Sign Out: Well controlled pain   PONV: No   Neuro/Psych: Uneventful            Sign Out: Acceptable/Baseline neuro status   Airway/Respiratory: Uneventful            Sign Out: Acceptable/Baseline resp. status   CV/Hemodynamics: Uneventful            Sign Out: Acceptable CV status; No obvious hypovolemia; No obvious fluid overload   Other NRE: NONE   DID A NON-ROUTINE EVENT OCCUR? No           Last vitals:  Vitals Value Taken Time   BP 99/68 11/21/22 0945   Temp 36.3  C (97.4  F) 11/21/22 0945   Pulse 67 11/21/22 0945   Resp 18 11/21/22 0945   SpO2 98 % 11/21/22 0948   Vitals shown include unvalidated device data.    Electronically Signed By: Jackson Noble MD  November 21, 2022  10:05 AM

## 2022-11-21 NOTE — ANESTHESIA PREPROCEDURE EVALUATION
Anesthesia Pre-Procedure Evaluation    Patient: Artie Burger   MRN: [id number] : 1995        Procedure : Procedure(s):  ENDOSCOPIC RETROGRADE CHOLANGIOPANCREATOGRAPHY WITH PANCREATIC DUCT STENT EXCHANGE, DILATION, AND DEBRIS REMOVAL          Past Medical History:   Diagnosis Date     Gastroesophageal reflux disease      Pancreatic disease      PONV (postoperative nausea and vomiting)       Past Surgical History:   Procedure Laterality Date     ENDOSCOPIC RETROGRADE CHOLANGIOPANCREATOGRAM COMPLEX N/A 2021    Procedure: ENDOSCOPIC RETROGRADE CHOLANGIOPANCREATOGRAPHY with pancreatic sphincterotomy, dilation, stone removal, stent placement;  Surgeon: Guru Sabino Campuzano MD;  Location: [facility]     ENDOSCOPIC RETROGRADE CHOLANGIOPANCREATOGRAM WITH DIRECT VISUALIZATION SYSTEM AND GENERATOR N/A 2021    Procedure: ENDOSCOPIC RETROGRADE CHOLANGIOPANCREATOGRAPHY, dilation and debridement sludge removal, pancreatic duct stent placement;  Surgeon: Guru Sabino Campuzano MD;  Location: [facility]     ENDOSCOPIC RETROGRADE CHOLANGIOPANCREATOGRAM WITH SPYGLASS N/A 10/6/2021    Procedure: ENDOSCOPIC RETROGRADE CHOLANGIOPANCREATOGRAPHY, WITH DIRECT DUCT VISUALIZATION, USING PANCREATICOBILIARY FIBEROPTIC PROBE, BILE DUCT STENT EXCHANGE, LITHOTRIPSY OF PANCREATIC STONE, SPHINCTEROTOMY, BALLOON DILATION OF PANCREATIC DUCT AND STONE EXTRACTION;  Surgeon: Guru Sabino Campuzano MD;  Location: [facility]     ENDOSCOPIC RETROGRADE CHOLANGIOPANCREATOGRAM WITH SPYGLASS N/A 2021    Procedure: ENDOSCOPIC RETROGRADE CHOLANGIOPANCREATOGRAPHY, with pancreatic stent exchange, ballon dilation, stone removal, with spyglass direct visualization;  Surgeon: Guru Sabino Campuzano MD;  Location: [facility]     ENDOSCOPIC RETROGRADE CHOLANGIOPANCREATOGRAPHY, EXCHANGE TUBE/STENT N/A 2022    Procedure: ENDOSCOPIC RETROGRADE CHOLANGIOPANCREATOGRAPH with pancreatic dilation, debris  removal and stent exchange.;  Surgeon: Guru Sabino Campuzano MD;  Location: [facility]     ENDOSCOPIC RETROGRADE CHOLANGIOPANCREATOGRAPHY, EXCHANGE TUBE/STENT N/A 4/4/2022    Procedure: ENDOSCOPIC RETROGRADE CHOLANGIOPANCREATOGRAPHY, WITH REPLACEMENT OF pancreatic STENT, stone removal;  Surgeon: Guru Sabino Campuzano MD;  Location: [facility]     ENDOSCOPIC RETROGRADE CHOLANGIOPANCREATOGRAPHY, EXCHANGE TUBE/STENT N/A 7/13/2022    Procedure: ENDOSCOPIC RETROGRADE CHOLANGIOPANCREATOGRAPHY, WITH pancreatic duct dilation and stent exchange, biliary stent placement, debris removal;  Surgeon: Guru Sabnio Campuzano MD;  Location: [facility]     ENDOSCOPIC RETROGRADE CHOLANGIOPANCREATOGRAPHY, EXCHANGE TUBE/STENT N/A 9/14/2022    Procedure: ENDOSCOPIC RETROGRADE CHOLANGIOPANCREATOGRAPHY, WITH pancreatic stone removal, biliary stent removal, pancreatic stent exchange;  Surgeon: Guru Sabino Campuzano MD;  Location: [facility]      No Known Allergies   Social History     Tobacco Use     Smoking status: Former     Types: Cigarettes     Smokeless tobacco: Current     Tobacco comments:     Vapes daily   Substance Use Topics     Alcohol use: Not Currently      Wt Readings from Last 1 Encounters:   11/21/22 61.4 kg (135 lb 5.8 oz)        Anesthesia Evaluation   Pt has had prior anesthetic.     History of anesthetic complications  - PONV.      ROS/MED HX  ENT/Pulmonary:     (+) tobacco use (vapes currently (no tobacco)),     Neurologic:  - neg neurologic ROS     Cardiovascular:       METS/Exercise Tolerance: >4 METS    Hematologic:       Musculoskeletal:       GI/Hepatic: Comment: Chronic pancreatitis    (+) GERD,     Renal/Genitourinary:       Endo:       Psychiatric/Substance Use:       Infectious Disease:       Malignancy:       Other:            Physical Exam    Airway        Mallampati: I   TM distance: > 3 FB   Neck ROM: full   Mouth opening: > 3 cm    Respiratory Devices and Support          Dental  no notable dental history         Cardiovascular          Rhythm and rate: regular and normal     Pulmonary           breath sounds clear to auscultation           OUTSIDE LABS:  CBC:   Lab Results   Component Value Date    WBC 8.8 11/21/2022    WBC 9.0 09/14/2022    HGB 10.0 (L) 11/21/2022    HGB 11.1 (L) 09/14/2022    HCT 31.0 (L) 11/21/2022    HCT 34.1 (L) 09/14/2022     11/21/2022     09/14/2022     BMP:   Lab Results   Component Value Date     11/21/2022     09/14/2022    POTASSIUM 3.9 11/21/2022    POTASSIUM 3.8 09/14/2022    CHLORIDE 106 11/21/2022    CHLORIDE 106 09/14/2022    CO2 21 (L) 11/21/2022    CO2 23 09/14/2022    BUN 12.6 11/21/2022    BUN 16.9 09/14/2022    CR 0.84 11/21/2022    CR 0.89 09/14/2022     (H) 11/21/2022     (H) 11/21/2022     COAGS:   Lab Results   Component Value Date    INR 0.88 11/21/2022     POC:   Lab Results   Component Value Date    HCG Negative 07/13/2022    HCGS Negative 04/04/2022     HEPATIC:   Lab Results   Component Value Date    ALBUMIN 3.9 11/21/2022    PROTTOTAL 6.4 11/21/2022    ALT 10 11/21/2022    AST 17 11/21/2022    ALKPHOS 49 11/21/2022    BILITOTAL 0.2 11/21/2022     OTHER:   Lab Results   Component Value Date    MURTAZA 9.1 11/21/2022    LIPASE 33 11/21/2022    AMYLASE 68 11/21/2022       Anesthesia Plan    ASA Status:  2   NPO Status:  NPO Appropriate    Anesthesia Type: General.     - Airway: ETT              Consents    Anesthesia Plan(s) and associated risks, benefits, and realistic alternatives discussed. Questions answered and patient/representative(s) expressed understanding.    - Discussed:     - Discussed with:  Patient      - Extended Intubation/Ventilatory Support Discussed: No.      - Patient is DNR/DNI Status: No    Use of blood products discussed: No .     Postoperative Care            Comments:                Jackson Noble MD

## 2022-11-21 NOTE — ANESTHESIA CARE TRANSFER NOTE
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    Anesthesia Type:   No value filed.     Note:    Oropharynx: oropharynx clear of all foreign objects and spontaneously breathing  Level of Consciousness: awake  Oxygen Supplementation: nasal cannula  Level of Supplemental Oxygen (L/min / FiO2): 3  Independent Airway: airway patency satisfactory and stable  Dentition: dentition unchanged  Vital Signs Stable: post-procedure vital signs reviewed and stable  Report to RN Given: handoff report given  Patient transferred to: PACU    Handoff Report: Identifed the Patient, Identified the Reponsible Provider, Reviewed the pertinent medical history, Discussed the surgical course, Reviewed Intra-OP anesthesia mangement and issues during anesthesia, Set expectations for post-procedure period and Allowed opportunity for questions and acknowledgement of understanding      Vitals:  Vitals Value Taken Time   /72 11/21/22 0846   Temp     Pulse 76 11/21/22 0847   Resp     SpO2 100 % 11/21/22 0847   Vitals shown include unvalidated device data.    Electronically Signed By: ROSIE Jay CRNA  November 21, 2022  8:48 AM

## 2022-11-21 NOTE — ANESTHESIA PROCEDURE NOTES
Airway       Patient location during procedure: OR       Procedure Start/Stop Times: 11/21/2022 7:37 AM  Staff -        CRNA: Marquez Varner APRN CRNA       Performed By: CRNA  Consent for Airway        Urgency: elective  Indications and Patient Condition       Indications for airway management: rolando-procedural       Induction type:intravenous       Mask difficulty assessment: 1 - vent by mask    Final Airway Details       Final airway type: endotracheal airway       Successful airway: ETT - single  Endotracheal Airway Details        ETT size (mm): 7.0       Cuffed: yes       Successful intubation technique: direct laryngoscopy       DL Blade Type: Marsh 2       Grade View of Cords: 1       Adjucts: stylet       Position: Right       Measured from: lips       Secured at (cm): 20       Bite block used: None    Post intubation assessment        Placement verified by: capnometry, equal breath sounds and chest rise        Number of attempts at approach: 1       Number of other approaches attempted: 0       Secured with: pink tape       Ease of procedure: easy       Dentition: Intact and Unchanged    Medication(s) Administered   Medication Administration Time: 11/21/2022 7:37 AM

## 2022-11-21 NOTE — DISCHARGE INSTRUCTIONS
[facility], [place] // Same-Day Surgery // Adult Discharge Orders & Instructions     Take it easy when you get home.  Remember, same day surgery DOES NOT MEAN SAME DAY RECOVERY! Healing is a gradual process.   You will need some time to recover- you may be more tired than you realize at first.  Rest and relax for at least the first 24 hours at home.  You'll feel better and heal faster if you take good care of yourself.     ANESTHESIA RECOVERY -   For 24 hours after surgery    Get plenty of rest.  A responsible adult must stay with you for at least 24 hours after you leave the hospital.   Do not drive or use heavy equipment.  If you have weakness or tingling, don't drive or use heavy equipment until this feeling goes away.  Do not drink alcohol.  Avoid strenuous or risky activities.  Ask for help when climbing stairs.   You may feel lightheaded.  IF so, sit for a few minutes before standing.  Have someone help you get up.   If you have nausea (feel sick to your stomach): Drink only clear liquids such as apple juice, ginger ale, broth or 7-Up.  Rest may also help.  Be sure to drink enough fluids.  Move to a regular diet as you feel able.  You may have a slight fever. Call the doctor if your fever is over 100 F (37.7 C) (taken under the tongue) or lasts longer than 24 hours.  You may have a dry mouth, a sore throat, muscle aches or trouble sleeping.  These should go away after 24 hours.  Do not make important or legal decisions.     Call your doctor for any of the following:  Chest pain, and/or shortness of breath  Abdominal  pain, bloating or cramping that has not improved or does not respond to pain reliving medications (Tylenol or narcotics if prescribed)   Difficulty swallowing or feeling as though food or liquids are stuck in your throat   Sore throat lasting more than 2 days or pain that has worsened over time   Black or tarry stools   Nausea and/or vomiting that is not resolving or  has not responded to anti-nausea medications prescribed to you   It has been over 8 to 10 hours since surgery and you are still not able to urinate (pass water)   Headache for over 24 hours   Fever over 100.5 F (38 C) lasting more than 24 hours after the procedure   Signs of jaundice or blockage (fever, chills, abdominal pain, yellowing of the whites of your eyes, yellowing of your skin, and/or passing darker than normal urine)     To contact a doctor, call:  Dr. Guru Campuzano @ [phone] (GI Clinic) or [phone] and ask for the resident on call for Gastroenterology (answered 24 hours a day)  Emergency Department: [facility]: [phone] (TTY for hearing impaired: [phone])

## 2022-11-22 ASSESSMENT — ACTIVITIES OF DAILY LIVING (ADL)
ADLS_ACUITY_SCORE: 35

## 2022-11-23 ASSESSMENT — ACTIVITIES OF DAILY LIVING (ADL)
ADLS_ACUITY_SCORE: 35

## 2022-11-24 ASSESSMENT — ACTIVITIES OF DAILY LIVING (ADL)
ADLS_ACUITY_SCORE: 35

## 2022-11-25 ENCOUNTER — PATIENT OUTREACH (OUTPATIENT)
Dept: GASTROENTEROLOGY | Facility: CLINIC | Age: 27
End: 2022-11-25

## 2022-11-25 ENCOUNTER — PREP FOR PROCEDURE (OUTPATIENT)
Dept: GASTROENTEROLOGY | Facility: CLINIC | Age: 27
End: 2022-11-25

## 2022-11-25 DIAGNOSIS — K86.89 PANCREATIC DUCT STRICTURE: Primary | ICD-10-CM

## 2022-11-25 ASSESSMENT — ACTIVITIES OF DAILY LIVING (ADL)
ADLS_ACUITY_SCORE: 35

## 2022-11-25 NOTE — PROGRESS NOTES
Follow up: Post ERCP on 11/21 with Dr. Campuzano.      Post procedure recommendations:   - Will plan for repeat ERCP in 10-12 weeks reevaluation +/- stent exchange    - Will send for chronic pancreatitis genetic testing   panel. Further recommendations pending on the results. If she has genetic mutations, will involve our pancreatic surgeons as well.    Patient states: Feeling okay. Reports pain is a little better than before procedure. Nausea improved; eating and drinking okay.     Orders placed:     Procedure/Imaging/Clinic: ERCP re-evaluation with possible stent exchange  Physician: Justen  Timing: 10-12 weeks  Procedure length: Provider average  Anesthesia: General  Dx: pancreatic stricture  Tier:2  Location: [facility]     Comments: Next ERCP due approximately 1/30/22 (10 weeks). Patient agreeable to date for procedure. Hold placed. Will touch base closer to date with details.     Clinic contact and scheduling numbers verified for future questions/concerns.     Melvi Humphreys, RN Care Coordinator

## 2022-11-25 NOTE — PROGRESS NOTES
Procedure/Imaging/Clinic: ERCP re-evaluation with possible stent exchange  Physician: Justen  Timing: 10-12 weeks  Procedure length: Provider average  Anesthesia: General  Dx: pancreatic duct stricture  Tier:2  Location: [facility]

## 2022-11-26 ASSESSMENT — ACTIVITIES OF DAILY LIVING (ADL)
ADLS_ACUITY_SCORE: 35

## 2022-11-27 ASSESSMENT — ACTIVITIES OF DAILY LIVING (ADL)
ADLS_ACUITY_SCORE: 35

## 2022-11-28 NOTE — PROGRESS NOTES
"Genetic Counseling Consultation  This note is a summary of Artie Burger s virtual visit to [facility] on November 11, 2022. She was referred to our clinic by Dr. Guru Campuzano (GI) for genetic testing due to a history of recurrent acute and chronic pancreatitis. Genetic testing and a genetic counseling consultation was recommended to aid in better understanding the cause of pancreatitis, as well as provide additional information helpful in medical management and genetic risks for family members.     Summary of visit:  1. Genetics of pancreatitis were discussed, including known involved genes, inheritance patterns, and impact on family members.    2. Genetic testing options were discussed, and the Chronic Pancreatitis Panel was ordered through [facility]. They will complete an insurance benefits investigation. Blood will be drawn during Artie's upcoming GI procedure (unless her GI care team notes this is not possible, in which case I will update Artie).  3. Once her sample is obtained and sent to the lab, results will take 2-3 weeks. I will call Artie at that time. Follow up will be pending results.  4. Contact information was given for future questions or concerns that arise.    Personal Medical History:  Artie is a 27 year old female with a history of acute recurrent pancreatitis which has progressed to chronic calcific pancreatitis. Her history dates back to 02/2018. 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 Lipase levels reached up to 7000 per GI notes. She had been abstinent for 10 months at the time of her initial GI clinic visit (no alcohol for over 2 years now per Artie). Per past GI notes: \"During the initial clinic visit, a CT scan on 05/2021 showed multiple stones in the pancreatic head, most likely either side branch, but a stone obstructing the main duct and multiple side branch stones. She had 6/10 abdominal pain, although not on narcotics. She " "was started on Creon and she underwent an ERCP 04/13/2021. 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 Initially, we were able to only place a 4-Divehi Zimmon stent in September. Subsequently, ERCP in October, we were able to perform SpyGlass pancreatoscopy with EHL, which was successful in fragmenting the stones. She had subsequent ERCP in November. During this ERCP, we were able to get a 5-Divehi stent across the PD stricture and patient was improving clinically with every ERCP. During the December ERCP, we were able to get two 5-Divehi stents and pancreatoscopy showed minimal stone burden and stricture was a more dominant problem. During the February ERCP, she had three 5-Divehi stents, and during the April ERCP, we decided to give her a stent-free trial as the PD stricture had improved. She had significant number of side branches in the head, which was consistent with chronic calcific pancreatitis. Pancreatic duct stent spontaneously had fallen off as noted on the 05/17 x-ray. Subsequently, she developed an episode of acute pancreatitis for which she was admitted at [facility] between 06/11/2022 with acute pancreatitis. CT scan during this hospital stay 06/09/2022 which showed a dilated pancreatic duct at the level of the neck measuring 4 mm and mild fat stranding around the pancreatic head. No pancreatic fluid or necrotic collections were noted on the CT. She subsequently had an MRCP which confirmed diffusely dilated pancreatic duct with small side branch noted again and minor pancreatic interstitial edema was seen. There were no pancreatic duct filling defects. She reports daily ongoing pain and has much worse quality of life since the stents have been removed.\" Artie notes she is on Creon, and notes lost weight. See  " Justen's notes for full GI history details.    Artie has above past history of alcohol usage/etiology, though not for over 2 years now. She smoked cigarettes for 11 years, but quit (vapes now). Other known triggering factors were denied.     Additional history includes possible IBS (per her PCP), mental health diagnoses, heartburn (with pancreatitis), and being on iron. She did have bronchitis and pneumonia as a child but not as an adult. She notes her liver was inflamed with some jaundice previously.    Family History:  A three generation pedigree was obtained today and sent to be scanned into the EMR. The family history was significant for the following:    Artie has no children at this time.    Artie has one sister, who is 34 years old. She has a history of problems gaining weight, but is otherwise healthy. She has three children, who have allergies (one requires nebs occasionally).    Artie's mother is healthy. She has one sister, who has diabetes. Artie's maternal first cousins are healthy.    Artie's maternal grandmother passed for breast cancer diagnosed in her 60's. Her mother (Artie's great grandmother) also passed from breast cancer diagnosed at over 50 years. Recommended seeing if her grandmother had any cancer genetic testing previously, and at minimum that the family let their providers know about this family history of cancer so they can get appropriate screening. Artie's maternal grandfather's passing/health history is unknown.     Artie's father passed at 54 years from Hep C. He had a history of getting his stomach stapled with issues after that, mental health diagnoses, and back surgery. He had two brothers. One passed in his 40's; Artie was unsure of cause of passing today. If additional details are determined, this would be helpful information to share. His other brother has some lung issues and a history of smoking. Artie's paternal first cousins are  healthy.    Artie's paternal grandmother passed in 2020. She has kidney problems and possible gallbladder problems (full details unknown). Artie's paternal grandfather's health history is unknown.    A distant paternal relative for Artie reportedly has pancreatic cancer. It is unknown if they had any cancer genetic testing.     The family history was negative for known individuals with diagnosed pancreatitis, known genetic conditions, and cystic fibrosis/CF symptoms.    Artie is of  ancestry. Consanguinity was denied.    General Genetic Background Information  Genes are the instruction code for our body, and tell our body how to grow and function. Our genes are in every cell of our body, and are packaged in our chromosomes.  Genes and chromosomes come in pairs. We inherit one copy out of each pair from our mother and one copy of each pair from our father. No one has control over which copy they pass on to their child since it is a random process.   Every person has two copies of each gene.     Genetics of Pancreatitis  There are many known factors for pancreatitis, including non-genetic (for example drinking, smoking, autoimmune, etc) and genetic (underlying genetic changes that increase risk). There are also likely unknown genetic and/or non-genetic factors that influence pancreatitis, which we may know more about with time. Some individuals with pancreatitis likely have a multifactorial inheritance (many factors contributing to their history) instead of just one or two large factors.     Knowns and unknowns about these causes of pancreatitis, focusing on hereditary pancreatitis, were discussed today. Hereditary pancreatitis can be defined based on family history criteria or on genetic findings in an individual. One of the genes that is causative and associated with high risk for pancreatitis is PRSS1. This gene can also cause an increased risk for pancreatic cancer.  Many other genes, such as  SPINK1, CTRC, CPA1, CaSR, and others are thought to be moderate risk genes and have an association with pancreatitis. Additionally idiopathic pancreatitis has been found to be associated with changes in the gene for cystic fibrosis (CFTR) as well. The variability in the inheritance of genes associated with hereditary pancreatitis was discussed.      Typical hereditary pancreatitis is a disease that is primarily inherited in an autosomal dominant manner, meaning that a change in one copy of a gene, like PRSS1, is needed to develop the condition. One other gene known to be associated with hereditary pancreatitis, CFTR, typically causes disease when inherited in an autosomal recessive manner, meaning that an individual must inherit a change in the CFTR gene from both their mother and father. The risk factor genes are also inherited in an autosomal dominant fashion, but with these genes most individuals that carry a change do not get pancreatitis. We discussed the complexity of genetic testing for hereditary pancreatitis and that further discussion would be needed when we have the results.    If a known disease causing change was found, this would carry up to a 50% potential risk for future children and siblings to inherit the genetic change, and a 50% chance of not inheriting it. Not all individuals with genetic changes in these genes go on to develop pancreatitis, and each gene is associated with a different risk for the development of pancreatitis.     If a change is found in the CFTR gene, this result would have additional implications (such as chance for cystic fibrosis in an individual and/or their children) and they will be discussed further if applicable. Brief information about cystic fibrosis, its associated symptoms (lung infections and complications, pancreatitis, sinus disease, and/or male infertility), and its variability were discussed today.     If a specific genetic variant in a family can be  identified, more information about risk for other family members and familial testing would be available.    Genetic Testing  There are genetic saliva or blood tests available that can help determine if an individual has changes in genes associated with pancreatitis (including PRSS1, SPINK1, CTRC, and CFTR genes). As a genetic factor for Dinoras pancreatitis possible, comprehensive genetic testing will give useful information to aid in directing medical management, reproductive risks, and family member risks. Specifically, if a full underlying explanation for Dinoras pancreatis can be found, it may give more information about how to appropriate manage her or give information about what to expect. In addition, it is possible an underlying genetic cause may predispose Artie to other health risks. Knowing about these additional health risks can help us stay ahead of her healthcare to more appropriately screen for and potentially prevent other complications. Lastly, discovering an underlying reason may help predict the chance for other family members to have pancreatitis and/or other genetic conditions, such as cystic fibrosis. Given the above, Artie's young age of onset for her pancreatitis (continued even with alcohol abstinence), and the family history of pancreatic cancer, genetic testing assessing hereditary pancreatitis factors is beneficial and medically necessary for Artie.    Limitations and risks of genetic testing were also discussed, including that our genetic testing in 2022 is only as good as our current knowledge of genetics and genes. Therefore, a negative test result does not rule out all genetic changes and causes of pancreatitis. Other follow up may be recommended in the future.    We discussed benefits and limitations of testing both through [facility] vs [facility] today. This included information about testing coverage/genes tested, cost, and testing protocol. Of note, [facility]  includes testing for additional genetic minor risk factors for pancreatitis. [facility] panel includes the CASR, CFTR, CPA1, CTRC, PRSS1, and SPINK1 genes.     Artie elected to proceed with testing through [email or web address], pending coverage. Consent was received verbally. Artie elected to proceed with testing via a blood sample drawn when she is in for her GI procedure later this month; I will connect with her GI care team to confirm this is possible. [email or web address] will conduct a benefits investigation. Artie will be contacted by [facility] directly with benefits investigation information and billing options (insurance vs self pay) if estimated OOP is >$100. Results will take approximately 2-3 weeks once the sample is received, at which time I will call her. Follow up for Artie and her family would depend on results.    Lab results may be automatically released via [employer] [facility].  Department protocol is to hold genetic testing results until we have reviewed them. We will then contact the family directly to disclose the results and ensure they receive a copy of the report. This protocol was reviewed with Artie, who was in agreement to hold the results for genetics review and direct contact.    I encouraged Artie to contact me if she wishes to change testing labs/plans based on coverage information shared by the lab.    Result Outcomes  Once results are available, we discussed the following possibilities:  1. One disease causing mutation found: Individuals with typical hereditary pancreatitis typically have one disease causing change detected.  Depending on the change found and other factors, individuals are at risk for development of pancreatitis, and other family members would be at risk.  2. One or two possibly contributory mutation found:  This result has various implications depending the specific change found.  The change(s) may contribute to disease.   3. Two mutations in the gene for CF:  Individuals with cystic  fibrosis or cystic fibrosis-related disorder usually have two mutations in this gene, one inherited from their mother and one from their father. Depending on the changes in the gene and other factors, some people may have respiratory, GI symptoms, and/or infertility issues.   4. No mutations in these genes: No genetic evidence to support hereditary pancreatitis at this time. It is possible we will learn more about the genetics of pancreatitis in the future and be able to offer more comprehensive genetic testing.   5. The finding of an unknown change: This happens when the laboratory detects a change but they do not know if it is found only in individuals with the condition, or if the change is found in individuals without pancreatitis as well. If you have this type of result, we will discuss it further at that time.    Plan  1. Consent was obtained and orders were placed for [facility] Chronic Pancreatitis Panel (CASR, CFTR, CPA1, CTRC, PRSS1, SPINK1 genes). They will complete a benefits investigation. Blood will be drawn during Artie's upcoming GI procedure (unless her GI care team notes this is not possible, in which case I will update Artie)  2.  Results will be returned to us 2-3 weeks after Artie's sample is received by the lab, and I will notify Artie of the results as soon as we receive them. This test can detect many changes in the known genes associated with hereditary pancreatitis; however, there remain genetic contributions that are not well understood.   3. Additional medical management recommendations or family member recommendations will be discussed in the future based on results        It was a pleasure to meet with Artie virtually today. she had no additional questions. My contact information was given for future questions or concerns that arise.     Tanya Bazan MS, [facility]  Genetic Counseling  Genetics and Metabolism Division  [facility]   Phone:  [phone]  Pager: [phone]        CC: Dr. Campuzano; PCP    Virtual-Visit Details  Type of service:  Telephone Visit  Call Length: 44 min  Originating Location (pt. Location): Home  Distant Location (provider location):  Off-site  Mode of Communication:  Phone  Physician has received verbal consent for a Virtual Visit from the patient? Yes

## 2022-11-29 NOTE — PROGRESS NOTES
Called patient to confirm date of ERCP with Dr. Campuzano. Patient scheduled for 1/30/23 as previously discussed.     Pre-op plan: PCP within 30 days.   Anticoagulation: No  Diabetes: No    Confirmed clinic contact for further questions/concerns.     Melvi Humphreys RN Care Coordinator

## 2022-12-05 ENCOUNTER — TELEPHONE (OUTPATIENT)
Dept: CONSULT | Facility: CLINIC | Age: 27
End: 2022-12-05

## 2022-12-05 LAB — SCANNED LAB RESULT: NORMAL

## 2022-12-05 NOTE — TELEPHONE ENCOUNTER
I called Artie to discuss her pancreatitis genetic testing, which has returned. This genetic testing was the 6 gene Chronic Pancreatitis Panel (CASR, CFTR, CPA1, CTRC, PRSS1, SPINK1 genes) from [facility].     We discussed this genetic testing returned negative/normal, meaning no pathogenic variants were identified in these genes. No suspicious variants of uncertain significance were identified either. Therefore, a single genetic explanation for Artie's pancreatitis was NOT found on this genetic testing.      While this result reduces the chance of a single genetic condition/cause for Dinoras pancreatitis and increases the chance that it is instead due to non-genetic or multifactorial (many small genetic and nongenetic factors together) causes instead, we reviewed that all genetic testing has limitations based on our current knowledge of genes and genetics. Updated genetic testing therefore may be available in the future. Artie would be recommended to keep the team updated with any family history of pancreatitis or major health history changes that occur going forward.     These results will be shared with Artie's GI team for her on-going pancreatitis management. I defer next steps to GI.    Lastly, I very briefly reviewed that Artie should make sure her PCP is aware of the family history of cancer (breast cancer on her maternal side and pancreatic cancer in a distant paternal relative) for any screening changes, especially if a cancer genetic risk factor was ever found in the family.    Artie had no other questions at this time and was appreciative of the update. My number was previously given for questions/concerns. A copy of results and summary of our discussion will be sent by mail.        Tanya Bazan MS, [facility]  Genetic Counseling  Genetics and Metabolism Division  [facility]   Phone: [phone]  Pager: [phone]

## 2022-12-13 ENCOUNTER — PATIENT OUTREACH (OUTPATIENT)
Dept: GASTROENTEROLOGY | Facility: CLINIC | Age: 27
End: 2022-12-13

## 2022-12-13 NOTE — PROGRESS NOTES
"Reached out to patient following Mychart messge reporting symptoms. Artie states that she has generalized abdominal pain, rating 7-8 of 10. Taking tylenol and ibuprofen to manage. Is concerned about constipation despite daily miralax, stating she has not had a significant bowel movement in \"a couple weeks\". Reports daily small stools; last BM 12/12. Denies gas. Drinking a lot of water. Clear urine. Reports emesis on Saturday. Nausea has resolved. Tolerated toast and soup for dinner last evening.  Denies fever.     Encouraged to continue to hydrate and eat small, gentle to digest foods as she has been doing. Advised to supplement miralax with mild bowel medication such as OTC dulcolax to get bowels moving. Also discussed ambulation to stimulate bowels.     Provided patient with clinic contact information and discussed symptoms to report. Will follow up with her tomorrow. Advised to seek care if she develops a fever, if pain becomes unmanageable or if she is unable to tolerate fluids; ideally at [facility].     Melvi Humphreys RN Care Coordinator    "

## 2022-12-14 NOTE — PROGRESS NOTES
"Called patient to check in on symptoms. Reports that she took a senna last evening and had a small soft stool this morning. Reports that stomach is grumbling. Pain is slightly improved. \"Coming and going in waves.\" Tolerated soup and crackers last evening but had a small emesis this morning. Denies fevers. Will continue to monitor.     Melvi Humphreys RN Care Coordinator    "

## 2022-12-15 NOTE — PROGRESS NOTES
Attempted to reach to see how patient is feeling. LVM with clinic contact if she has further concerns.     Melvi Humphreys RN Care Coordinator

## 2022-12-26 ENCOUNTER — PATIENT OUTREACH (OUTPATIENT)
Dept: GASTROENTEROLOGY | Facility: CLINIC | Age: 27
End: 2022-12-26

## 2022-12-26 NOTE — PROGRESS NOTES
Called Artie to discuss symptoms reported via MyChart. States that she feels she is now having loose stools; no longer believes she is constipated. Bloating continues. Intermittent nausea managed with zofran.     Minimal appetite. Tries to eat two meals per day. Eating easy to digest foods overall. Notes that she ate more than usual for [place] yesterday and is more uncomfortable today. Staying hydrated.     Abdominal pain in mid upper abdomen. States that pain is similar to what it has been following ERCPs in the past.     Advised to maintain low fat, easy to digest foods and focus on hydration.     Will route message to Dr. Campuzano, and update patient to any recommendations.     Melvi Humphreys, RN Care Coordinator

## 2022-12-28 NOTE — PROGRESS NOTES
Following review of symptoms, per Dr. Campuzano:  Please move up procedure and make sure she is on Creon     Called Artie to discuss earlier available procedure date. Offered 1/18/23. Patient will discuss with her support system and let me know if she is able to re-schedule for that date.     Patient has creon, but reports that she has not been taking it. Advised to start and take as prescribed. Denied need for assistance.     Melvi Humphreys RN Care Coordinator

## 2023-01-06 ENCOUNTER — PATIENT OUTREACH (OUTPATIENT)
Dept: GASTROENTEROLOGY | Facility: CLINIC | Age: 28
End: 2023-01-06

## 2023-01-06 NOTE — PROGRESS NOTES
Called Artie to discuss symptoms reported via MyChart. She states that she is having ongoing pain, managing with tylenol and ibuprofen. Reports that she has tried to be adherent to creon, per Dr. Campuzano's recommendations, but she is not eating much. Denies nausea. States that she is keeping fluids down and believes she is staying hydrated. Advised to reach out to PCP for assistance with pain management prior to reporting to ED. If pain becomes unmanageable or she is unable to keep down fluids, advised to seek care, preferably at [facility] ED.     Melvi Humphreys RN Care Coordinator

## 2023-01-17 ENCOUNTER — PATIENT OUTREACH (OUTPATIENT)
Dept: GASTROENTEROLOGY | Facility: CLINIC | Age: 28
End: 2023-01-17
Payer: COMMERCIAL

## 2023-01-17 ENCOUNTER — ANESTHESIA EVENT (OUTPATIENT)
Dept: SURGERY | Facility: CLINIC | Age: 28
End: 2023-01-17
Payer: COMMERCIAL

## 2023-01-17 NOTE — PROGRESS NOTES
Called to notify Artie of time change for procedure. Scheduled for 0935 with 0730 arrival. Patient articulated an understanding.     Melvi Humphreys RN Care Coordinator

## 2023-01-18 ENCOUNTER — APPOINTMENT (OUTPATIENT)
Dept: GENERAL RADIOLOGY | Facility: CLINIC | Age: 28
End: 2023-01-18
Attending: INTERNAL MEDICINE
Payer: COMMERCIAL

## 2023-01-18 ENCOUNTER — HOSPITAL ENCOUNTER (OUTPATIENT)
Facility: CLINIC | Age: 28
Discharge: HOME | End: 2023-01-18
Attending: INTERNAL MEDICINE | Admitting: INTERNAL MEDICINE
Payer: COMMERCIAL

## 2023-01-18 ENCOUNTER — ANESTHESIA (OUTPATIENT)
Dept: SURGERY | Facility: CLINIC | Age: 28
End: 2023-01-18
Payer: COMMERCIAL

## 2023-01-18 VITALS
WEIGHT: 130.07 LBS | OXYGEN SATURATION: 98 % | RESPIRATION RATE: 18 BRPM | SYSTOLIC BLOOD PRESSURE: 109 MMHG | TEMPERATURE: 97.9 F | HEART RATE: 67 BPM | BODY MASS INDEX: 23.94 KG/M2 | DIASTOLIC BLOOD PRESSURE: 72 MMHG | HEIGHT: 62 IN

## 2023-01-18 DIAGNOSIS — K86.0 ALCOHOL-INDUCED CHRONIC PANCREATITIS (H): Primary | ICD-10-CM

## 2023-01-18 DIAGNOSIS — R10.84 ABDOMINAL PAIN, GENERALIZED: Primary | ICD-10-CM

## 2023-01-18 LAB
ALBUMIN SERPL BCG-MCNC: 4.1 G/DL (ref 3.5–5.2)
ALP SERPL-CCNC: 44 U/L (ref 35–104)
ALT SERPL W P-5'-P-CCNC: 21 U/L (ref 10–35)
AMYLASE SERPL-CCNC: 63 U/L (ref 28–100)
ANION GAP SERPL CALCULATED.3IONS-SCNC: 13 MMOL/L (ref 7–15)
AST SERPL W P-5'-P-CCNC: 20 U/L (ref 10–35)
BILIRUB SERPL-MCNC: 0.2 MG/DL
BUN SERPL-MCNC: 17.8 MG/DL (ref 6–20)
CALCIUM SERPL-MCNC: 9 MG/DL (ref 8.6–10)
CHLORIDE SERPL-SCNC: 107 MMOL/L (ref 98–107)
CREAT SERPL-MCNC: 0.76 MG/DL (ref 0.51–0.95)
DEPRECATED HCO3 PLAS-SCNC: 17 MMOL/L (ref 22–29)
ERCP: NORMAL
ERYTHROCYTE [DISTWIDTH] IN BLOOD BY AUTOMATED COUNT: 13.5 % (ref 10–15)
GFR SERPL CREATININE-BSD FRML MDRD: >90 ML/MIN/1.73M2
GLUCOSE BLDC GLUCOMTR-MCNC: 117 MG/DL (ref 70–99)
GLUCOSE SERPL-MCNC: 112 MG/DL (ref 70–99)
HCG SERPL QL: NEGATIVE
HCT VFR BLD AUTO: 33.7 % (ref 35–47)
HGB BLD-MCNC: 11.2 G/DL (ref 11.7–15.7)
INR PPP: 0.88 (ref 0.85–1.15)
LIPASE SERPL-CCNC: 25 U/L (ref 13–60)
MCH RBC QN AUTO: 29.9 PG (ref 26.5–33)
MCHC RBC AUTO-ENTMCNC: 33.2 G/DL (ref 31.5–36.5)
MCV RBC AUTO: 90 FL (ref 78–100)
PLATELET # BLD AUTO: 464 10E3/UL (ref 150–450)
POTASSIUM SERPL-SCNC: 4.3 MMOL/L (ref 3.4–5.3)
PROT SERPL-MCNC: 6.7 G/DL (ref 6.4–8.3)
RBC # BLD AUTO: 3.75 10E6/UL (ref 3.8–5.2)
SODIUM SERPL-SCNC: 137 MMOL/L (ref 136–145)
WBC # BLD AUTO: 7.5 10E3/UL (ref 4–11)

## 2023-01-18 PROCEDURE — 82962 GLUCOSE BLOOD TEST: CPT

## 2023-01-18 PROCEDURE — 272N000001 HC OR GENERAL SUPPLY STERILE: Performed by: INTERNAL MEDICINE

## 2023-01-18 PROCEDURE — C1726 CATH, BAL DIL, NON-VASCULAR: HCPCS | Performed by: INTERNAL MEDICINE

## 2023-01-18 PROCEDURE — 250N000011 HC RX IP 250 OP 636: Performed by: NURSE ANESTHETIST, CERTIFIED REGISTERED

## 2023-01-18 PROCEDURE — 82150 ASSAY OF AMYLASE: CPT | Performed by: INTERNAL MEDICINE

## 2023-01-18 PROCEDURE — 710N000010 HC RECOVERY PHASE 1, LEVEL 2, PER MIN: Performed by: INTERNAL MEDICINE

## 2023-01-18 PROCEDURE — 80053 COMPREHEN METABOLIC PANEL: CPT | Performed by: INTERNAL MEDICINE

## 2023-01-18 PROCEDURE — 85610 PROTHROMBIN TIME: CPT | Performed by: INTERNAL MEDICINE

## 2023-01-18 PROCEDURE — 80321 ALCOHOLS BIOMARKERS 1OR 2: CPT | Performed by: INTERNAL MEDICINE

## 2023-01-18 PROCEDURE — 710N000012 HC RECOVERY PHASE 2, PER MINUTE: Performed by: INTERNAL MEDICINE

## 2023-01-18 PROCEDURE — 370N000017 HC ANESTHESIA TECHNICAL FEE, PER MIN: Performed by: INTERNAL MEDICINE

## 2023-01-18 PROCEDURE — C1877 STENT, NON-COAT/COV W/O DEL: HCPCS | Performed by: INTERNAL MEDICINE

## 2023-01-18 PROCEDURE — 250N000009 HC RX 250: Performed by: INTERNAL MEDICINE

## 2023-01-18 PROCEDURE — 250N000011 HC RX IP 250 OP 636: Performed by: STUDENT IN AN ORGANIZED HEALTH CARE EDUCATION/TRAINING PROGRAM

## 2023-01-18 PROCEDURE — 258N000003 HC RX IP 258 OP 636: Performed by: NURSE ANESTHETIST, CERTIFIED REGISTERED

## 2023-01-18 PROCEDURE — 999N000181 XR SURGERY CARM FLUORO GREATER THAN 5 MIN W STILLS: Mod: TC

## 2023-01-18 PROCEDURE — 255N000002 HC RX 255 OP 636: Performed by: INTERNAL MEDICINE

## 2023-01-18 PROCEDURE — C2617 STENT, NON-COR, TEM W/O DEL: HCPCS | Performed by: INTERNAL MEDICINE

## 2023-01-18 PROCEDURE — 250N000011 HC RX IP 250 OP 636: Performed by: ANESTHESIOLOGY

## 2023-01-18 PROCEDURE — 85027 COMPLETE CBC AUTOMATED: CPT | Performed by: INTERNAL MEDICINE

## 2023-01-18 PROCEDURE — 250N000013 HC RX MED GY IP 250 OP 250 PS 637: Performed by: STUDENT IN AN ORGANIZED HEALTH CARE EDUCATION/TRAINING PROGRAM

## 2023-01-18 PROCEDURE — 360N000082 HC SURGERY LEVEL 2 W/ FLUORO, PER MIN: Performed by: INTERNAL MEDICINE

## 2023-01-18 PROCEDURE — 250N000009 HC RX 250: Performed by: ANESTHESIOLOGY

## 2023-01-18 PROCEDURE — 84703 CHORIONIC GONADOTROPIN ASSAY: CPT | Performed by: ANESTHESIOLOGY

## 2023-01-18 PROCEDURE — C1769 GUIDE WIRE: HCPCS | Performed by: INTERNAL MEDICINE

## 2023-01-18 PROCEDURE — 250N000025 HC SEVOFLURANE, PER MIN: Performed by: INTERNAL MEDICINE

## 2023-01-18 PROCEDURE — 83690 ASSAY OF LIPASE: CPT | Performed by: INTERNAL MEDICINE

## 2023-01-18 PROCEDURE — 36415 COLL VENOUS BLD VENIPUNCTURE: CPT | Performed by: INTERNAL MEDICINE

## 2023-01-18 PROCEDURE — 250N000009 HC RX 250: Performed by: NURSE ANESTHETIST, CERTIFIED REGISTERED

## 2023-01-18 PROCEDURE — 999N000141 HC STATISTIC PRE-PROCEDURE NURSING ASSESSMENT: Performed by: INTERNAL MEDICINE

## 2023-01-18 DEVICE — STENT ZIMMON PANCREA 5FRX12CM SGL PIGTAIL G22363
Type: IMPLANTABLE DEVICE | Site: PANCREATIC DUCT | Status: NON-FUNCTIONAL
Removed: 2023-03-13

## 2023-01-18 DEVICE — STENT FREEMAN PANCREA FLEX 5FRX9CM SGL PIGTAIL
Type: IMPLANTABLE DEVICE | Site: PANCREATIC DUCT | Status: NON-FUNCTIONAL
Removed: 2023-03-13

## 2023-01-18 RX ORDER — OXYCODONE HYDROCHLORIDE 5 MG/1
5 TABLET ORAL ONCE
Status: COMPLETED | OUTPATIENT
Start: 2023-01-18 | End: 2023-01-18

## 2023-01-18 RX ORDER — OXYCODONE HYDROCHLORIDE 5 MG/1
5 TABLET ORAL EVERY 6 HOURS PRN
Qty: 20 TABLET | Refills: 0 | Status: SHIPPED | OUTPATIENT
Start: 2023-01-18 | End: 2023-01-23

## 2023-01-18 RX ORDER — IOPAMIDOL 510 MG/ML
INJECTION, SOLUTION INTRAVASCULAR PRN
Status: DISCONTINUED | OUTPATIENT
Start: 2023-01-18 | End: 2023-01-18 | Stop reason: HOSPADM

## 2023-01-18 RX ORDER — PROPOFOL 10 MG/ML
INJECTION, EMULSION INTRAVENOUS PRN
Status: DISCONTINUED | OUTPATIENT
Start: 2023-01-18 | End: 2023-01-18

## 2023-01-18 RX ORDER — ONDANSETRON 2 MG/ML
4 INJECTION INTRAMUSCULAR; INTRAVENOUS EVERY 30 MIN PRN
Status: DISCONTINUED | OUTPATIENT
Start: 2023-01-18 | End: 2023-01-18 | Stop reason: HOSPADM

## 2023-01-18 RX ORDER — ONDANSETRON 4 MG/1
4 TABLET, ORALLY DISINTEGRATING ORAL EVERY 6 HOURS PRN
Status: DISCONTINUED | OUTPATIENT
Start: 2023-01-18 | End: 2023-01-18 | Stop reason: HOSPADM

## 2023-01-18 RX ORDER — HYDROMORPHONE HCL IN WATER/PF 6 MG/30 ML
0.2 PATIENT CONTROLLED ANALGESIA SYRINGE INTRAVENOUS EVERY 5 MIN PRN
Status: DISCONTINUED | OUTPATIENT
Start: 2023-01-18 | End: 2023-01-18 | Stop reason: HOSPADM

## 2023-01-18 RX ORDER — FLUMAZENIL 0.1 MG/ML
0.2 INJECTION, SOLUTION INTRAVENOUS
Status: DISCONTINUED | OUTPATIENT
Start: 2023-01-18 | End: 2023-01-18 | Stop reason: HOSPADM

## 2023-01-18 RX ORDER — NALOXONE HYDROCHLORIDE 0.4 MG/ML
0.4 INJECTION, SOLUTION INTRAMUSCULAR; INTRAVENOUS; SUBCUTANEOUS
Status: DISCONTINUED | OUTPATIENT
Start: 2023-01-18 | End: 2023-01-18 | Stop reason: HOSPADM

## 2023-01-18 RX ORDER — FENTANYL CITRATE 50 UG/ML
50 INJECTION, SOLUTION INTRAMUSCULAR; INTRAVENOUS EVERY 5 MIN PRN
Status: DISCONTINUED | OUTPATIENT
Start: 2023-01-18 | End: 2023-01-18 | Stop reason: HOSPADM

## 2023-01-18 RX ORDER — HYDROMORPHONE HCL IN WATER/PF 6 MG/30 ML
0.4 PATIENT CONTROLLED ANALGESIA SYRINGE INTRAVENOUS EVERY 5 MIN PRN
Status: DISCONTINUED | OUTPATIENT
Start: 2023-01-18 | End: 2023-01-18 | Stop reason: HOSPADM

## 2023-01-18 RX ORDER — FENTANYL CITRATE 50 UG/ML
INJECTION, SOLUTION INTRAMUSCULAR; INTRAVENOUS PRN
Status: DISCONTINUED | OUTPATIENT
Start: 2023-01-18 | End: 2023-01-18

## 2023-01-18 RX ORDER — LIDOCAINE 40 MG/G
CREAM TOPICAL
Status: DISCONTINUED | OUTPATIENT
Start: 2023-01-18 | End: 2023-01-18 | Stop reason: HOSPADM

## 2023-01-18 RX ORDER — FENTANYL CITRATE 50 UG/ML
25 INJECTION, SOLUTION INTRAMUSCULAR; INTRAVENOUS EVERY 5 MIN PRN
Status: DISCONTINUED | OUTPATIENT
Start: 2023-01-18 | End: 2023-01-18 | Stop reason: HOSPADM

## 2023-01-18 RX ORDER — LIDOCAINE HYDROCHLORIDE 20 MG/ML
INJECTION, SOLUTION INFILTRATION; PERINEURAL PRN
Status: DISCONTINUED | OUTPATIENT
Start: 2023-01-18 | End: 2023-01-18

## 2023-01-18 RX ORDER — ONDANSETRON 2 MG/ML
4 INJECTION INTRAMUSCULAR; INTRAVENOUS EVERY 6 HOURS PRN
Status: DISCONTINUED | OUTPATIENT
Start: 2023-01-18 | End: 2023-01-18 | Stop reason: HOSPADM

## 2023-01-18 RX ORDER — LEVOFLOXACIN 5 MG/ML
INJECTION, SOLUTION INTRAVENOUS PRN
Status: DISCONTINUED | OUTPATIENT
Start: 2023-01-18 | End: 2023-01-18

## 2023-01-18 RX ORDER — SODIUM CHLORIDE, SODIUM LACTATE, POTASSIUM CHLORIDE, CALCIUM CHLORIDE 600; 310; 30; 20 MG/100ML; MG/100ML; MG/100ML; MG/100ML
INJECTION, SOLUTION INTRAVENOUS CONTINUOUS PRN
Status: DISCONTINUED | OUTPATIENT
Start: 2023-01-18 | End: 2023-01-18

## 2023-01-18 RX ORDER — NALOXONE HYDROCHLORIDE 0.4 MG/ML
0.2 INJECTION, SOLUTION INTRAMUSCULAR; INTRAVENOUS; SUBCUTANEOUS
Status: DISCONTINUED | OUTPATIENT
Start: 2023-01-18 | End: 2023-01-18 | Stop reason: HOSPADM

## 2023-01-18 RX ORDER — ONDANSETRON 4 MG/1
4 TABLET, ORALLY DISINTEGRATING ORAL EVERY 30 MIN PRN
Status: DISCONTINUED | OUTPATIENT
Start: 2023-01-18 | End: 2023-01-18 | Stop reason: HOSPADM

## 2023-01-18 RX ORDER — SODIUM CHLORIDE, SODIUM LACTATE, POTASSIUM CHLORIDE, CALCIUM CHLORIDE 600; 310; 30; 20 MG/100ML; MG/100ML; MG/100ML; MG/100ML
INJECTION, SOLUTION INTRAVENOUS CONTINUOUS
Status: DISCONTINUED | OUTPATIENT
Start: 2023-01-18 | End: 2023-01-18 | Stop reason: HOSPADM

## 2023-01-18 RX ORDER — DEXAMETHASONE SODIUM PHOSPHATE 4 MG/ML
INJECTION, SOLUTION INTRA-ARTICULAR; INTRALESIONAL; INTRAMUSCULAR; INTRAVENOUS; SOFT TISSUE PRN
Status: DISCONTINUED | OUTPATIENT
Start: 2023-01-18 | End: 2023-01-18

## 2023-01-18 RX ORDER — INDOMETHACIN 50 MG/1
SUPPOSITORY RECTAL PRN
Status: DISCONTINUED | OUTPATIENT
Start: 2023-01-18 | End: 2023-01-18 | Stop reason: HOSPADM

## 2023-01-18 RX ADMIN — DEXAMETHASONE SODIUM PHOSPHATE 4 MG: 4 INJECTION, SOLUTION INTRA-ARTICULAR; INTRALESIONAL; INTRAMUSCULAR; INTRAVENOUS; SOFT TISSUE at 09:48

## 2023-01-18 RX ADMIN — LIDOCAINE HYDROCHLORIDE 60 MG: 20 INJECTION, SOLUTION INFILTRATION; PERINEURAL at 09:33

## 2023-01-18 RX ADMIN — ONDANSETRON 4 MG: 2 INJECTION INTRAMUSCULAR; INTRAVENOUS at 11:26

## 2023-01-18 RX ADMIN — HYDROMORPHONE HYDROCHLORIDE 0.2 MG: 0.2 INJECTION, SOLUTION INTRAMUSCULAR; INTRAVENOUS; SUBCUTANEOUS at 11:25

## 2023-01-18 RX ADMIN — MIDAZOLAM 2 MG: 1 INJECTION INTRAMUSCULAR; INTRAVENOUS at 09:25

## 2023-01-18 RX ADMIN — SUGAMMADEX 200 MG: 100 INJECTION, SOLUTION INTRAVENOUS at 10:42

## 2023-01-18 RX ADMIN — GLUCAGON 0.4 MG: KIT at 10:05

## 2023-01-18 RX ADMIN — PROPOFOL 120 MG: 10 INJECTION, EMULSION INTRAVENOUS at 09:34

## 2023-01-18 RX ADMIN — PHENYLEPHRINE HYDROCHLORIDE 100 MCG: 10 INJECTION INTRAVENOUS at 09:59

## 2023-01-18 RX ADMIN — FENTANYL CITRATE 100 MCG: 50 INJECTION, SOLUTION INTRAMUSCULAR; INTRAVENOUS at 09:33

## 2023-01-18 RX ADMIN — HYDROMORPHONE HYDROCHLORIDE 0.2 MG: 0.2 INJECTION, SOLUTION INTRAMUSCULAR; INTRAVENOUS; SUBCUTANEOUS at 11:15

## 2023-01-18 RX ADMIN — LEVOFLOXACIN 500 MG: 5 INJECTION, SOLUTION INTRAVENOUS at 09:43

## 2023-01-18 RX ADMIN — OXYCODONE HYDROCHLORIDE 5 MG: 5 TABLET ORAL at 11:25

## 2023-01-18 RX ADMIN — SODIUM CHLORIDE, POTASSIUM CHLORIDE, SODIUM LACTATE AND CALCIUM CHLORIDE: 600; 310; 30; 20 INJECTION, SOLUTION INTRAVENOUS at 09:30

## 2023-01-18 RX ADMIN — HYDROMORPHONE HYDROCHLORIDE 0.2 MG: 0.2 INJECTION, SOLUTION INTRAMUSCULAR; INTRAVENOUS; SUBCUTANEOUS at 11:05

## 2023-01-18 RX ADMIN — PHENYLEPHRINE HYDROCHLORIDE 50 MCG: 10 INJECTION INTRAVENOUS at 09:53

## 2023-01-18 RX ADMIN — Medication 50 MG: at 09:35

## 2023-01-18 ASSESSMENT — ACTIVITIES OF DAILY LIVING (ADL)
ADLS_ACUITY_SCORE: 20

## 2023-01-18 NOTE — ANESTHESIA PROCEDURE NOTES
Airway       Patient location during procedure: OR       Procedure Start/Stop Times: 1/18/2023 9:37 AM  Staff -        CRNA: Zuleyma Caballero APRN CRNA       Performed By: CRNA  Consent for Airway        Urgency: elective  Indications and Patient Condition       Indications for airway management: rolando-procedural       Induction type:intravenous       Mask difficulty assessment: 1 - vent by mask    Final Airway Details       Final airway type: endotracheal airway       Successful airway: ETT - single and Oral  Endotracheal Airway Details        ETT size (mm): 7.0       Cuffed: yes       Cuff volume (mL): 8       Successful intubation technique: direct laryngoscopy       DL Blade Type: MAC 3       Grade View of Cords: 1       Adjucts: stylet       Position: Right       Measured from: gums/teeth       Secured at (cm): 21       Bite Block used: padded endo bite block.    Post intubation assessment        Placement verified by: capnometry, equal breath sounds and chest rise        Number of attempts at approach: 1       Secured with: silk tape       Ease of procedure: easy       Dentition: Intact and Unchanged    Medication(s) Administered   Medication Administration Time: 1/18/2023 9:37 AM

## 2023-01-18 NOTE — ANESTHESIA CARE TRANSFER NOTE
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    Anesthesia Type:   General     Note:    Oropharynx: oropharynx clear of all foreign objects and spontaneously breathing  Level of Consciousness: awake  Oxygen Supplementation: nasal cannula  Level of Supplemental Oxygen (L/min / FiO2): 2  Independent Airway: airway patency satisfactory and stable  Dentition: dentition unchanged  Vital Signs Stable: post-procedure vital signs reviewed and stable  Report to RN Given: handoff report given  Patient transferred to: PACU    Handoff Report: Identifed the Patient, Identified the Reponsible Provider, Reviewed the pertinent medical history, Discussed the surgical course, Reviewed Intra-OP anesthesia mangement and issues during anesthesia, Set expectations for post-procedure period and Allowed opportunity for questions and acknowledgement of understanding      Vitals:  Vitals Value Taken Time   /78    Temp 98.3    Pulse 82    Resp 17    SpO2 100        Electronically Signed By: ROSIE Garcia CRNA  January 18, 2023  11:00 AM

## 2023-01-18 NOTE — OR NURSING
Discharge instructions to pt and responsible adult.- Yisel             All questions regarding discharge information answered.  Pt and responsible adult verbalized understanding of all discharge instruction information given.  Printed copy of AVS sent with pt upon discharge.

## 2023-01-18 NOTE — ANESTHESIA POSTPROCEDURE EVALUATION
Patient: Artie Burger    Procedure: Procedure(s):  ENDOSCOPIC RETROGRADE CHOLANGIOPANCREATOGRAPHY stent exchange, dilation, sludge and stone removal       Anesthesia Type:  General    Note:  Disposition: Outpatient   Postop Pain Control: Uneventful            Sign Out: Well controlled pain   PONV: No   Neuro/Psych: Uneventful            Sign Out: Acceptable/Baseline neuro status   Airway/Respiratory: Uneventful            Sign Out: Acceptable/Baseline resp. status   CV/Hemodynamics: Uneventful            Sign Out: Acceptable CV status; No obvious hypovolemia; No obvious fluid overload   Other NRE: NONE   DID A NON-ROUTINE EVENT OCCUR? No           Last vitals:  Vitals Value Taken Time   /77 01/18/23 1120   Temp     Pulse 69 01/18/23 1128   Resp 5 01/18/23 1114   SpO2 98 % 01/18/23 1128   Vitals shown include unvalidated device data.    Electronically Signed By: Meg Escalante MD  January 18, 2023  11:28 AM

## 2023-01-18 NOTE — ANESTHESIA PREPROCEDURE EVALUATION
Anesthesia Pre-Procedure Evaluation    Patient: Artie Burger   MRN: [id number] : 1995        Procedure : Procedure(s):  ENDOSCOPIC RETROGRADE CHOLANGIOPANCREATOGRAPHY re-evaluation with possible stent exchange          Past Medical History:   Diagnosis Date     Gastroesophageal reflux disease      Pancreatic disease      PONV (postoperative nausea and vomiting)       Past Surgical History:   Procedure Laterality Date     ENDOSCOPIC RETROGRADE CHOLANGIOPANCREATOGRAM COMPLEX N/A 2021    Procedure: ENDOSCOPIC RETROGRADE CHOLANGIOPANCREATOGRAPHY with pancreatic sphincterotomy, dilation, stone removal, stent placement;  Surgeon: Guru Sabino Campuzano MD;  Location: [facility]     ENDOSCOPIC RETROGRADE CHOLANGIOPANCREATOGRAM WITH DIRECT VISUALIZATION SYSTEM AND GENERATOR N/A 2021    Procedure: ENDOSCOPIC RETROGRADE CHOLANGIOPANCREATOGRAPHY, dilation and debridement sludge removal, pancreatic duct stent placement;  Surgeon: Guru Sabino Campuzano MD;  Location: [facility]     ENDOSCOPIC RETROGRADE CHOLANGIOPANCREATOGRAM WITH SPYGLASS N/A 10/6/2021    Procedure: ENDOSCOPIC RETROGRADE CHOLANGIOPANCREATOGRAPHY, WITH DIRECT DUCT VISUALIZATION, USING PANCREATICOBILIARY FIBEROPTIC PROBE, BILE DUCT STENT EXCHANGE, LITHOTRIPSY OF PANCREATIC STONE, SPHINCTEROTOMY, BALLOON DILATION OF PANCREATIC DUCT AND STONE EXTRACTION;  Surgeon: Guru Sabino Campuzano MD;  Location: [facility]     ENDOSCOPIC RETROGRADE CHOLANGIOPANCREATOGRAM WITH SPYGLASS N/A 2021    Procedure: ENDOSCOPIC RETROGRADE CHOLANGIOPANCREATOGRAPHY, with pancreatic stent exchange, ballon dilation, stone removal, with spyglass direct visualization;  Surgeon: Guru Sabino Campuzano MD;  Location: [facility]     ENDOSCOPIC RETROGRADE CHOLANGIOPANCREATOGRAPHY, EXCHANGE TUBE/STENT N/A 2022    Procedure: ENDOSCOPIC RETROGRADE CHOLANGIOPANCREATOGRAPH with pancreatic dilation, debris removal and stent  exchange.;  Surgeon: Guru Sabino Campuzano MD;  Location: [facility]     ENDOSCOPIC RETROGRADE CHOLANGIOPANCREATOGRAPHY, EXCHANGE TUBE/STENT N/A 4/4/2022    Procedure: ENDOSCOPIC RETROGRADE CHOLANGIOPANCREATOGRAPHY, WITH REPLACEMENT OF pancreatic STENT, stone removal;  Surgeon: Guru Sabino Campuzano MD;  Location: [facility]     ENDOSCOPIC RETROGRADE CHOLANGIOPANCREATOGRAPHY, EXCHANGE TUBE/STENT N/A 7/13/2022    Procedure: ENDOSCOPIC RETROGRADE CHOLANGIOPANCREATOGRAPHY, WITH pancreatic duct dilation and stent exchange, biliary stent placement, debris removal;  Surgeon: Guru Sabino Campuzano MD;  Location: [facility]     ENDOSCOPIC RETROGRADE CHOLANGIOPANCREATOGRAPHY, EXCHANGE TUBE/STENT N/A 9/14/2022    Procedure: ENDOSCOPIC RETROGRADE CHOLANGIOPANCREATOGRAPHY, WITH pancreatic stone removal, biliary stent removal, pancreatic stent exchange;  Surgeon: Guru Sabino Campuzano MD;  Location: [facility]     ENDOSCOPIC RETROGRADE CHOLANGIOPANCREATOGRAPHY, EXCHANGE TUBE/STENT N/A 11/21/2022    Procedure: ENDOSCOPIC RETROGRADE CHOLANGIOPANCREATOGRAPHY WITH PANCREATIC DUCT STENT EXCHANGE, DILATION, AND DEBRIS REMOVAL;  Surgeon: Guru Sabino Campuzano MD;  Location: [facility]      No Known Allergies   Social History     Tobacco Use     Smoking status: Former     Types: Cigarettes     Smokeless tobacco: Current     Tobacco comments:     Vapes daily   Substance Use Topics     Alcohol use: Not Currently      Wt Readings from Last 1 Encounters:   01/18/23 59 kg (130 lb 1.1 oz)        Anesthesia Evaluation   Pt has had prior anesthetic. Type: General.        ROS/MED HX  ENT/Pulmonary:  - neg pulmonary ROS     Neurologic:  - neg neurologic ROS     Cardiovascular:       METS/Exercise Tolerance: >4 METS    Hematologic:  - neg hematologic  ROS     Musculoskeletal:  - neg musculoskeletal ROS     GI/Hepatic: Comment: Chronic Pancreatitis, s/p multiple ERCP + Stent placement    (+) GERD,  Asymptomatic on medication,     Renal/Genitourinary:  - neg Renal ROS     Endo:  - neg endo ROS     Psychiatric/Substance Use:  - neg psychiatric ROS     Infectious Disease:  - neg infectious disease ROS     Malignancy:  - neg malignancy ROS     Other:            Physical Exam    Airway        Mallampati: II   TM distance: > 3 FB   Neck ROM: full   Mouth opening: > 3 cm    Respiratory Devices and Support         Dental       (+) Completely normal teeth      Cardiovascular   cardiovascular exam normal          Pulmonary   pulmonary exam normal                OUTSIDE LABS:  CBC:   Lab Results   Component Value Date    WBC 7.5 01/18/2023    WBC 8.8 11/21/2022    HGB 11.2 (L) 01/18/2023    HGB 10.0 (L) 11/21/2022    HCT 33.7 (L) 01/18/2023    HCT 31.0 (L) 11/21/2022     (H) 01/18/2023     11/21/2022     BMP:   Lab Results   Component Value Date     01/18/2023     11/21/2022    POTASSIUM 4.3 01/18/2023    POTASSIUM 3.9 11/21/2022    CHLORIDE 107 01/18/2023    CHLORIDE 106 11/21/2022    CO2 17 (L) 01/18/2023    CO2 21 (L) 11/21/2022    BUN 17.8 01/18/2023    BUN 12.6 11/21/2022    CR 0.76 01/18/2023    CR 0.84 11/21/2022     (H) 01/18/2023     (H) 01/18/2023     COAGS:   Lab Results   Component Value Date    INR 0.88 01/18/2023     POC:   Lab Results   Component Value Date    HCG Negative 07/13/2022    HCGS Negative 01/18/2023     HEPATIC:   Lab Results   Component Value Date    ALBUMIN 4.1 01/18/2023    PROTTOTAL 6.7 01/18/2023    ALT 21 01/18/2023    AST 20 01/18/2023    ALKPHOS 44 01/18/2023    BILITOTAL 0.2 01/18/2023     OTHER:   Lab Results   Component Value Date    MURTAZA 9.0 01/18/2023    LIPASE 25 01/18/2023    AMYLASE 63 01/18/2023       Anesthesia Plan    ASA Status:  2      Anesthesia Type: General.     - Airway: ETT   Induction: Intravenous.   Maintenance: Balanced.        Consents    Anesthesia Plan(s) and associated risks, benefits, and realistic alternatives discussed.  Questions answered and patient/representative(s) expressed understanding.    - Discussed:     - Discussed with:  Patient      - Extended Intubation/Ventilatory Support Discussed: Yes.      - Patient is DNR/DNI Status: No    Use of blood products discussed: No .     Postoperative Care    Pain management: Multi-modal analgesia.   PONV prophylaxis: Ondansetron (or other 5HT-3), Dexamethasone or Solumedrol     Comments:              PAC Discussion and Assessment    ASA Classification: 2    Anesthetic techniques and relevant risks discussed: GA  Invasive monitoring and risk discussed: Yes    Possibility and Risk of blood transfusion discussed: No  NPO instructions given: NPO after midnight    Needs early admission to pre-op area: No                                             Meg Escalante MD

## 2023-01-18 NOTE — DISCHARGE INSTRUCTIONS
[facility]  [facility]  Same-Day Surgery   Adult Discharge Orders & Instructions     For 24 hours after surgery    Get plenty of rest.  A responsible adult must stay with you for at least 24 hours after you leave the hospital.   Do not drive or use heavy equipment.  If you have weakness or tingling, don't drive or use heavy equipment until this feeling goes away.  Do not drink alcohol.  Avoid strenuous or risky activities.  Ask for help when climbing stairs.   You may feel lightheaded.  IF so, sit for a few minutes before standing.  Have someone help you get up.   If you have nausea (feel sick to your stomach): Drink only clear liquids such as apple juice, ginger ale, broth or 7-Up.  Rest may also help.  Be sure to drink enough fluids.  Move to a regular diet as you feel able.  You may have a slight fever. Call the doctor if your fever is over 100 F (37.7 C) (taken under the tongue) or lasts longer than 24 hours.  You may have a dry mouth, a sore throat, muscle aches or trouble sleeping.  These should go away after 24 hours.  Do not make important or legal decisions.   Call your doctor for any of the followin.  Signs of infection (fever, growing tenderness at the surgery site, a large amount of drainage or bleeding, severe pain, foul-smelling drainage, redness, swelling).    2. It has been over 8 to 10 hours since surgery and you are still not able to urinate (pass water).    3.  Headache for over 24 hours.    4.  Numbness, tingling or weakness the day after surgery (if you had spinal anesthesia).  To contact a doctor, call Dr. Guru Campuzano @ [phone] (GI Clinic) or [phone]  or:    '   [phone] and ask for the resident on call for   Gastroenterology (GI) (answered 24 hours a day)  '   Emergency Department:    [facility]: [phone]       (TTY for hearing impaired: [phone])         [facility]  Same-Day  Surgery ERCP Procedure   Adult Discharge Orders & Instructions     You had a procedure known as an Endoscopic Retrograde Cholangiopancreatography (ERCP). Your healthcare provider performed the ERCP to look at your bile or pancreatic ducts, and to locate and/or treat blockages (dilation, stenting, removal, etc.) in these ducts. Often biopsies, small samples of tissue, are taken to help diagnose and/or classify stages of disease growth. This procedure is used to diagnose diseases of the pancreas, bile ducts, pancreatic duct, liver, and gallbladder.     After your procedure   Make sure to clarify with your healthcare provider any diet restrictions (For example, clear liquid, low fat, no caffeine, etc.)   Do NOT take aspirin containing medications or any other blood-thinning medicines (anticoagulants) until your healthcare provider says it's OK.   You MAY be prescribed antibiotics, depending on what was done and/or found during your EUS, make sure to take antibiotics as prescribed by your healthcare provider    For 24 hours after surgery  Get plenty of rest.  A responsible adult must stay with you for at least 24 hours after you leave the hospital.   Do not drive or use heavy equipment.  If you have weakness or tingling, don't drive or use heavy equipment until this feeling goes away.  Do not drink alcohol.  Avoid strenuous or risky activities (gym, yoga, cycling, etc.).  Ask for help when climbing stairs.   You may feel lightheaded.  IF so, sit for a few minutes before standing.  Have someone help you get up.   If you have nausea (feel sick to your stomach): Drink only clear liquids such as apple juice, ginger ale, broth or 7-Up.  Rest may also help.  Be sure to drink enough fluids.  Move to a regular diet as you feel able.  If you feel bloated or have too much gas, use a heating pad on your belly to help reduce the discomfort. This should help you feel better.   You may have a slight fever. This is normal for the  first 24 hours.   You may have a dry mouth, a sore throat, muscle aches or trouble sleeping.  These are normal and will go away after 24 hours. A sore throat is most common. Use lozenges or gargle with salt water to ease the discomfort.   Do not make important or legal decisions.      Call your doctor for any of the following:  Chest pain, and/or shortness of breath  Abdominal  pain, bloating or cramping that has not improved or does not respond to pain reliving medications (Tylenol or narcotics if prescribed)   Difficulty swallowing or feeling as though food or liquids are stuck in your throat   Sore throat lasting more than 2 days or pain that has worsened over time   Black or tarry stools   Nausea and/or vomiting that is not resolving or has not responded to anti-nausea medications prescribed to you   It has been over 8 to 10 hours since surgery and you are still not able to urinate (pass water)   Headache for over 24 hours   Fever over 100.5 F (38 C) lasting more than 24 hours after the procedure   Signs of jaundice or blockage (fever, chills, abdominal pain, yellowing of the whites of your eyes, yellowing of your skin, and/or passing darker than normal urine)     To contact a doctor, call:   [ ] ____see above ___ (Monday thru Friday 8:00am to 4:30pm)   [ ] [phone] and ask for the ___see above____ resident on call (answered 24 hours a day)       Take it easy when you get home.  Remember, same day surgery DOES NOT MEAN SAME DAY RECOVERY!  Healing is a gradual process.  You will need some time to recover - you may be more tired than you realize at first.  Rest and relax for at least the first 24 hours at home.  You'll feel better and heal faster if you take good care of yourself.

## 2023-01-21 LAB
PLPETH BLD-MCNC: <10 NG/ML
POPETH BLD-MCNC: <10 NG/ML

## 2023-01-25 ENCOUNTER — PATIENT OUTREACH (OUTPATIENT)
Dept: GASTROENTEROLOGY | Facility: CLINIC | Age: 28
End: 2023-01-25
Payer: COMMERCIAL

## 2023-01-25 ENCOUNTER — PREP FOR PROCEDURE (OUTPATIENT)
Dept: GASTROENTEROLOGY | Facility: CLINIC | Age: 28
End: 2023-01-25
Payer: COMMERCIAL

## 2023-01-25 DIAGNOSIS — R10.84 ABDOMINAL PAIN, GENERALIZED: Primary | ICD-10-CM

## 2023-01-25 DIAGNOSIS — K86.89 PANCREATIC DUCT STRICTURE: Primary | ICD-10-CM

## 2023-01-25 NOTE — PROGRESS NOTES
"Follow up: Post ERCP on 23 with Dr. Campuzano.      Post procedure recommendations:   - Will recommend repeat ERCP in 8-10 weeks to re-evaluate the PD stricture   - Given the solid food in the stomach, will recommend   a gastric emptying study to evaluate for gastroparesis as a cause for her abdominal pain   - Will recommend a PETH test today and if negative,   will discuss regarding a pancreas surgery consultation    as she is very uncomfortable following last procedure    - The findings and recommendations were discussed with the patient.     Patient states: \"I'm doing okay.\" Reports she is feeling a little better than she was prior to the procedure, but continues to have ongoing dull pain. Able to eat and drink.    Orders placed:   Gastric emptying study; discussed with patient.     Procedure/Imaging/Clinic: ERCP with stent exchange  Physician: Justen  Timin-10 weeks  Procedure length: Provider average  Anesthesia: General  Dx: Pancreatic duct stricture  Tier: 2  Location: [facility]    Comments: Offer 3/13; patient agreeable to schedule.     Pre-op plan: Patient will arrange with PCP  Advised to contact clinic in the event of COVID symptoms or exposure 14 days prior to procedure.  Anticoagulation: None  Diabetes: None    Reviewed post procedure recommendations.    Discussed symptoms to report to our clinic as outlined in Dr. Campuzano's post procedure recommendations. Patient articulated understanding.     Clinic contact and scheduling numbers verified for future questions/concerns.     Melvi Humphreys RN Care Coordinator      "

## 2023-01-25 NOTE — PROGRESS NOTES
Procedure/Imaging/Clinic: ERCP with stent exchange  Physician: Justen  Timin-10 weeks  Procedure length: Provider average  Anesthesia: General  Dx: Pancreatic duct stricture  Tier: 2  Location: [facility]

## 2023-02-06 ENCOUNTER — HOSPITAL ENCOUNTER (OUTPATIENT)
Dept: NUCLEAR MEDICINE | Facility: CLINIC | Age: 28
Setting detail: NUCLEAR MEDICINE
Discharge: HOME | End: 2023-02-06
Attending: INTERNAL MEDICINE | Admitting: INTERNAL MEDICINE
Payer: COMMERCIAL

## 2023-02-06 DIAGNOSIS — R10.84 ABDOMINAL PAIN, GENERALIZED: ICD-10-CM

## 2023-02-06 PROCEDURE — 78264 GASTRIC EMPTYING IMG STUDY: CPT | Mod: 26 | Performed by: RADIOLOGY

## 2023-02-06 PROCEDURE — 78264 GASTRIC EMPTYING IMG STUDY: CPT

## 2023-02-06 PROCEDURE — A9541 TC99M SULFUR COLLOID: HCPCS | Performed by: INTERNAL MEDICINE

## 2023-02-06 PROCEDURE — 343N000001 HC RX 343: Performed by: INTERNAL MEDICINE

## 2023-02-06 RX ADMIN — Medication 1.9 MILLICURIE: at 08:30

## 2023-02-08 NOTE — RESULT ENCOUNTER NOTE
The gastric emptying study suggests no evidence of gastroparesis although the delaying was slightly delayed

## 2023-02-09 DIAGNOSIS — K86.1 OTHER CHRONIC PANCREATITIS (H): Primary | ICD-10-CM

## 2023-02-09 NOTE — PROGRESS NOTES
Following gastric emptying study, per Dr. Campuzano:   The gastric emptying study suggests no evidence of gastroparesis although the delaying was slightly delayed     Pancreas surgery consultation requested as PETH and gastric emptying studies are negative per post ERCP recommendations on 1/18/23.    Melvi Humphreys, RN Care Coordinator

## 2023-02-14 ENCOUNTER — VIRTUAL VISIT (OUTPATIENT)
Dept: PALLIATIVE MEDICINE | Facility: CLINIC | Age: 28
End: 2023-02-14
Payer: COMMERCIAL

## 2023-02-14 DIAGNOSIS — R10.13 ABDOMINAL PAIN, EPIGASTRIC: Primary | ICD-10-CM

## 2023-02-14 DIAGNOSIS — K86.1 CHRONIC PANCREATITIS, UNSPECIFIED PANCREATITIS TYPE (H): ICD-10-CM

## 2023-02-14 PROCEDURE — 99215 OFFICE O/P EST HI 40 MIN: CPT | Mod: VID

## 2023-02-14 ASSESSMENT — PAIN SCALES - PAIN ENJOYMENT GENERAL ACTIVITY SCALE (PEG)
INTERFERED_GENERAL_ACTIVITY: 8
PEG_TOTALSCORE: 9
AVG_PAIN_PASTWEEK: 9
PEG_TOTALSCORE: 9
INTERFERED_ENJOYMENT_LIFE: 10 - COMPLETELY INTERFERES
AVG_PAIN_PASTWEEK: 9
INTERFERED_GENERAL_ACTIVITY: 8
INTERFERED_ENJOYMENT_LIFE: 10

## 2023-02-14 ASSESSMENT — ANXIETY QUESTIONNAIRES
7. FEELING AFRAID AS IF SOMETHING AWFUL MIGHT HAPPEN: MORE THAN HALF THE DAYS
IF YOU CHECKED OFF ANY PROBLEMS ON THIS QUESTIONNAIRE, HOW DIFFICULT HAVE THESE PROBLEMS MADE IT FOR YOU TO DO YOUR WORK, TAKE CARE OF THINGS AT HOME, OR GET ALONG WITH OTHER PEOPLE: EXTREMELY DIFFICULT
5. BEING SO RESTLESS THAT IT IS HARD TO SIT STILL: SEVERAL DAYS
6. BECOMING EASILY ANNOYED OR IRRITABLE: MORE THAN HALF THE DAYS
1. FEELING NERVOUS, ANXIOUS, OR ON EDGE: NEARLY EVERY DAY
3. WORRYING TOO MUCH ABOUT DIFFERENT THINGS: NEARLY EVERY DAY
7. FEELING AFRAID AS IF SOMETHING AWFUL MIGHT HAPPEN: MORE THAN HALF THE DAYS
8. IF YOU CHECKED OFF ANY PROBLEMS, HOW DIFFICULT HAVE THESE MADE IT FOR YOU TO DO YOUR WORK, TAKE CARE OF THINGS AT HOME, OR GET ALONG WITH OTHER PEOPLE?: EXTREMELY DIFFICULT
GAD7 TOTAL SCORE: 17
GAD7 TOTAL SCORE: 17
4. TROUBLE RELAXING: NEARLY EVERY DAY
2. NOT BEING ABLE TO STOP OR CONTROL WORRYING: NEARLY EVERY DAY

## 2023-02-14 ASSESSMENT — PAIN SCALES - GENERAL: PAINLEVEL: EXTREME PAIN (9)

## 2023-02-14 NOTE — Clinical Note
Hey - this is the celiac plexus block case I mentioned earlier today. 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 At initial visit on 11/3 she reported epigastric pain more intermittent at that time, increased pain with meals and worse lying down. She opted to start more conservative approach and was specifically interested in medical cannabis. Her pain was worse in between visits, and she is now interested in celiac plexus block. Do you think this is reasonable? Any input would also be appreciated.   Thanks, Jayde

## 2023-02-14 NOTE — PATIENT INSTRUCTIONS
1.  Physical Therapy:  NO   She is active and works full time. Continue activity as tolerated at home.               2.  Pain Psychologist to address relaxation, behavioral change, coping style, and other factors important to improvement.  NO              3.  Diagnostic Studies:  None               4.  Medication Management: She has found cannabis beneficial for pain, appetite, and nausea, was certified at last visit for [place] Medical Cannabis Program. She has established the dispensary, uses flower and vape products that are somewhat helpful for pain, also notes improvements with nausea and appetite. She also started long acting tablet (CBD dominant) that is most helpful overall. She will be going back to the dispensary soon, encouraged her to continue exploring other available products for pain.               5.  Potential procedures: At last visit, we discussed celiac plexus block to target epigastric pain. However, she preferred more conservative approach initially, opted to trial [place] medical cannabis program first. Since last visit her pain has been somewhat worse. She is interested in pursuing a celiac plexus block to target epigastric pain. She will be contacted to schedule will my colleague, Dr. Pitts.               6.  Referrals:              7.  Follow up with ROSIE Park CNP in 2-4 weeks after celiac plexus block.     ----------------------------------------------------------------  Clinic Number:  [phone]   Call with any questions about your care and for scheduling assistance.   Calls are returned Monday through Friday between 8 AM and 4:30 PM. We usually get back to you within 2 business days depending on the issue/request.    If we are prescribing your medications:  For opioid medication refills, call the clinic or send a [facility] message 7 days in advance.  Please include:  Name of requested medication  Name of the pharmacy.  For non-opioid medications, call your pharmacy directly  to request a refill. Please allow 3-4 days to be processed.   Per [place] State Law:  All controlled substance prescriptions must be filled within 30 days of being written.    For those controlled substances allowing refills, pickup must occur within 30 days of last fill.      We believe regular attendance is key to your success in our program!    Any time you are unable to keep your appointment we ask that you call us at least 24 hours in advance to cancel.This will allow us to offer the appointment time to another patient.   Multiple missed appointments may lead to dismissal from the clinic.

## 2023-02-14 NOTE — PROGRESS NOTES
Video-Visit Details    Type of service:  Video Visit     Originating Location (pt. Location): Home    Distant Location (provider location):  On-site   Platform used for Video Visit: [facility]     Date of visit: 2/14/2023      Assessment:   Artie Burger is a 27 year old female with a past medical history significant for ETOH use, pancreatitis, GERD who presents with complaints of abdominal pain.      1. Abdominal pain - Her pain is likely secondary to chronic pancreatitis, though possible some other underlying GI process present. Of note, symptoms are intermittent and seem to be relieved with bowel movements, which may be suggestive of IBS. She has been using ibuprofen and acetaminophen PRN and medical cannabis, which she finds helpful for symptoms and appetite. She has not appreciated much improvement in pain with more conservative measures and is interested in procedure. Her pain is primarily located over epigastric region, more severe after means. Pain seems more visceral at this point as opposed to abdominal wall, recommend celiac plexus block.     Visit Diagnoses:  1. Abdominal pain, epigastric    2. Chronic pancreatitis, unspecified pancreatitis type (H)        Plan:  Diagnosis reviewed, treatment option addressed, and risk/benifits discussed.  Self-care instructions given.  I am recommending a multidisciplinary treatment plan to help this patient better manage their pain.                  1.  Physical Therapy:  NO   She is active and works full time. Continue activity as tolerated at home.               2.  Pain Psychologist to address relaxation, behavioral change, coping style, and other factors important to improvement.  NO              3.  Diagnostic Studies:  None               4.  Medication Management: She has found cannabis beneficial for pain, appetite, and nausea, was certified at last visit for [place] Medical Cannabis Program. She has established the dispensary,  uses flower and vape products that are somewhat helpful for pain, also notes improvements with nausea and appetite. She also started long acting tablet (CBD dominant) that is most helpful overall. She will be going back to the dispensary soon, encouraged her to continue exploring other available products for pain.               5.  Potential procedures: At last visit, we discussed celiac plexus block to target epigastric pain. However, she preferred more conservative approach initially, opted to trial [place] medical cannabis program first. Since last visit her pain has been somewhat worse. She is interested in pursuing a celiac plexus block to target epigastric pain. She will be contacted to schedule will my colleague, Dr. Pitts.               6.  Referrals:              7.  Follow up with ROSIE Park CNP in 2-4 weeks after celiac plexus block.     Other:  I will route chart to Dr. Pitts so that he may review the case.     Review of Electronic Chart: Today I have also reviewed available medical information in the patient's medical record at [facility] ([facility]) and Care Everywhere (if available), including relevant provider notes, laboratory work, and imaging.     Jayde Del Toro, MO, APRN, AGNP-C  [facility]     -------------------------------------------------    Subjective:    Chief complaint:   Chief Complaint   Patient presents with     Pain       Interval history:  Artie Burger is a 27 year old female last seen on 11/3/22.  They are a patient of mine seen in follow up.     Recommendations/plan at the last visit included:                 1.  Physical Therapy:  NO   She is active and works full time. Continue activity as tolerated at home.               2.  Pain Psychologist to address relaxation, behavioral change, coping style, and other factors important to improvement.  NO              3.  Diagnostic Studies:  None               4.  Medication Management: She is currently  "using cannabis for pain control and finds this helpful. [place] medical [place] program today so she can continue exploring the benefits of cannabis within a structured, regulated program. We discussed that cannabis is not FDA approved, though the [place] program has regulatory standards in place for production, which makes it a safer option from the hard reduction approach.               5.  Potential procedures: Defer - we may consider celiac plexus or splanchnic blocks in the future                6.  Referrals:              7.  Follow up with ROSIE Park CNP in 2-4 weeks after first visit with medical cannabis dispensary. Video visit is okay, may schedule video appt at [facility] or [place].     Since her last visit, Artie Burger reports:  -Her pain has increased since last visit, reported 4/10 at last visit and 9/10 today.  -She was certified on medical cannabis at last visit, has found this helpful with nausea and appetite but not so much for pain.   -Medical cannabis products: flower and cartridges, also using pills and these are most helpful overall.   -She has plans to go to dispensary sometime in the next week and will explore other products available.   -She is interested in pursuing injection.   -Celiac plexus block was discussed at prior visit.   -Pain is mostly in epigastric area, \"where rib cage meets,\" describes pain as deeper, increase in pain when lying down.   -She inquired about gabapentin max dosage of 900 mg TID, which was suggested by one of her other providers.       HPI from initial visit with me on 11/3/22:  Artie Burger is a 27 year old female presents with a chief complaint of abdominal pain, epigastric.      The pain has been present for over one year .    The pain is Moderate Pain (4) in severity.    Severity/Intensity: 2/10 at best, 9/10 at worst, 4/10 on average  The pain is described as aching, stabbing, .   The pain is alleviated by meds and BM, lying on left side.  "   It is exacerbated by standing, lifting, fatty foods.    Modalities that have been utilized in the past which were helpful include OTC pain meds, cannabis, GI procedures.    Things that were not helpful, but tried ,include tramadol.    The patient has never tried pain procedures (celiac, splanchnic, TAP block).     Artie Burger has not been seen at a pain clinic in the past.       -She has abdominal pain, mostly epigastric.   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   -She went to treatment, used to go to .   -Denies person or family h/o of schizophrenia, no major cardiac or hepatic conditions.        Pain Information:   Pain rating: averages 9/10 on a 0-10 scale.      Current Pain Treatments:    1. Medications:               Tylenol 1000 mg               Ibuprofen 800 mg               Gabapentin 600 mg TID   Oxycodone PRN (small quantity from GI)              Medical cannabis       Current MME: 0    Review of [place] Prescription Monitoring Program (): No concern for abuse or misuse of controlled medications based on this report.       Medications:  Current Outpatient Medications   Medication Sig Dispense Refill     acetaminophen (TYLENOL) 500 MG tablet Take 500-1,000 mg by mouth every 6 hours as needed for mild pain       busPIRone (BUSPAR) 10 MG tablet Take 10 mg by mouth 3 times daily        cetirizine (ZYRTEC) 10 MG tablet Take 10 mg by mouth daily as needed       desogestrel-ethinyl estradiol (APRI) 0.15-30 MG-MCG tablet Take 1 tablet by mouth daily       escitalopram (LEXAPRO) 20 MG tablet Take 20 mg by mouth daily       famotidine (PEPCID) 20 MG tablet Take 20 mg by mouth daily as needed       gabapentin (NEURONTIN) 300 MG capsule Take 300 mg by mouth 3  times daily       ibuprofen (ADVIL/MOTRIN) 200 MG capsule Take 800 mg by mouth once       ondansetron (ZOFRAN-ODT) 4 MG ODT tab Place 4-8 mg under the tongue every 6 hours as needed       pancrelipase, lip-prot-amyl, 3000 UNITS (CREON) CPEP Take 2 capsules by mouth 3 times daily (with meals)       propranolol (INDERAL) 10 MG tablet Take 10 mg by mouth 3 times daily as needed       QUEtiapine (SEROQUEL) 25 MG tablet 25-50mg HS PRN       Multiple Vitamin (TAB-A-FABI) TABS Take 1 tablet by mouth daily (Patient not taking: Reported on 2/14/2023)         Medical History: any changes in medical history since they were last seen? No      Objective:    Physical Exam:  Last menstrual period 01/16/2023, not currently breastfeeding.  GENERAL: Healthy, alert and no distress  EYES: Eyes grossly normal to inspection.  No discharge or erythema, or obvious scleral/conjunctival abnormalities.  RESP: No audible wheeze, cough, or visible cyanosis.  No visible retractions or increased work of breathing.    SKIN: Visible skin clear. No significant rash, abnormal pigmentation or lesions.  NEURO: Cranial nerves grossly intact.  Mentation and speech appropriate for age.  PSYCH: Mentation appears normal, affect normal/bright, judgement and insight intact, normal speech and appearance well-groomed.    Imaging:  None     BILLING TIME DOCUMENTATION:   The total TIME spent on this patient on the date of the encounter/appointment was 40 minutes.      TOTAL TIME includes:   Time spent preparing to see the patient (reviewing records and tests)   Time spent face to face (or over the phone) with the patient   Time spent ordering tests, medications, procedures and referrals   Time spent Referring and communicating with other healthcare professionals   Time spent documenting clinical information in Epic

## 2023-02-14 NOTE — PROGRESS NOTES
Is Pt currently in [place]? Yes    NOTE:  If Pt is not in [place], Appointment needs to be canceled and rescheduled.  Artie is a 27 year old who is being evaluated via a billable video visit.      How would you like to obtain your AVS? MyChart  If the video visit is dropped, the invitation should be resent by: Text to cell phone: [phone]  Will anyone else be joining your video visit? No      Jessenia Garcia CMA ([facility])

## 2023-02-16 ENCOUNTER — TELEPHONE (OUTPATIENT)
Dept: PALLIATIVE MEDICINE | Facility: CLINIC | Age: 28
End: 2023-02-16

## 2023-02-16 NOTE — TELEPHONE ENCOUNTER
Screening Questions for Radiology Injections:    Injection to be done at which interventional clinic site? [facility] [place]    Procedure ordered by Jayde Del Toro    Procedure ordered? celiac plexus block    Transforaminal Cervical SAY - Send to [facility] ([facility]) - No [facility] providers perform this procedure    What insurance would patient like us to bill for this procedure? Blue Plus     Worker's comp or MVA (motor vehicle accident) -Any injection DO NOT SCHEDULE and route to Deirdre Millan.      [employer] insurance - For SI joint injections, DO NOT SCHEDULE and route to Meenu Yoandy.       ALL BCBS, [employer] and [employer] - DO NOT SCHEDULE and route to Meenu Pitt    MEDICA- facet joint injections, route to Meenu Yoandy    IF SCHEDULING IN [place] PLEASE SCHEDULE AT LEAST 7-10 BUSINESS DAYS OUT SO A PA CAN BE OBTAINED    Is an  needed? No     Patient has a  home? (Review Grid) YES: Informed     Any chance of pregnancy? NO   If YES, do NOT schedule and route to RN pool  - Dr. El route to Daina Plascencia and PM&R Nurse  [[id number]]      Is patient actively being treated for cancer or immunocompromised? No  If YES, do NOT schedule and route to RN pool/ Dr. El's Team    Does the patient have a bleeding or clotting disorder? No     If YES, okay to schedule AND route to RN nurse raji/ Dr. El's Team     (For any patients with platelet count <100, RN must forward to provider)    Is patient taking any Blood Thinners OR Antiplatelet medication?  No   If hold needed, do NOT schedule, route to RN pool/ Dr. El's Team    Examples:   o Blood Thinners: (Coumadin, Warfarin, Jantoven, Pradaxa, Xarelto, Eliquis, Edoxaban, Enoxaparin, Lovenox, Heparin, Arixtra, Fondaparinux or Fragmin)  o Antiplatelet Medications: (Plavix, Brilinta or Effient)     Is patient taking any aspirin products (includes Excedrin and Fiorinal)? No     If more than 325mg/day, OK to schedule;  Instruct Pt to decrease to less than 325 mg for 7 days AND route to RN pool/ Dr. El's Team     For CERVICAL procedures, hold all aspirin products for 6 days.     Tell Pt that if aspirin product is not held for 6 days, the procedure WILL BE cancelled.     Any allergies to contrast dye, iodine, shellfish, or numbing and steroid medications? No    If YES, schedule and add allergy information to appointment notes AND route to the RN pool/ Dr. El's Team    If SAY and Contrast Dye / Iodine Allergy? DO NOT SCHEDULE, route to RN pool/ Dr. El's Team    Allergies: Patient has no known allergies.     Does patient have an active infection or treated for one within the past week? No    Is patient currently taking any antibiotics or steroid medications?  No     For patients on chronic, preventative, or prophylactic antibiotics, procedures may be scheduled.     For patients on antibiotics for active or recent infection, schedule 4 days after completed.    For patients on steroid medications, schedule 4 days after completed.     Has the patient had a flu shot or any other vaccinations within the past 7 days? No  If yes, explain that for the vaccine to work best they need to:       wait 1 week before and 1 week after getting any Vaccine    wait 1 week before and 2 weeks after getting Covid Vaccine #2 or BOOSTER    If patient has concerns about the timing, send to RN pool/ Dr. El's Team    Does patient have an MRI/CT?  NO Include Date and Check Procedure Scheduling Grid to see if required.    Was the MRI/CT done within the last 3 years?  NA     If no route to RN Pool/ Dr. Masons Team    If yes, where was the MRI/CT done?      Refer to PACS Transmissions list for approved external locations and route to RN Pool High Priority/ Dr. Masons Team    If MRI was not done at approved external location do NOT schedule and route to RN pool/ Dr. El's Team      If patient has an imaging disc, the injection MAY be scheduled  but patient must bring disc to appt or appt will be cancelled.    Is patient able to transfer to a procedure table with minimal or no assistance? Yes     If no, do NOT schedule and route to RN Pool/ Dr. El's Team    Procedure Specific Instructions:    If celiac plexus block, informed patient NPO for 6 hours and that it is okay to take medications with sips of water, especially blood pressure medications YES: Informed          If this is for a cervical procedure, informed patient that aspirin needs to be held for 6 days.   YES: Informed       Sedation, If Sedation is ordered for any procedure, patient must be NPO for 6 hours prior to procedure Not Applicable      If IV needed:    Do not schedule procedures requiring IV placement in the first appointment of the day or first appointment after lunch. Do NOT schedule at 0745, 0815 or 1245.       Instructed patient to arrive 30 minutes early for IV start if required. (Check Procedure Scheduling Grid)  YES: Informed     Reminders:    If you are started on any steroids or antibiotics between now and your appointment, you must contact us because the procedure may need to be cancelled.  Yes      As a reminder, receiving steroids can decrease your body's ability to fight infection.   Would you still like to move forward with scheduling the injection?  Yes      IV Sedation is not provided for procedures. If oral anti-anxiety medication is needed, the patient should request this from their referring provider.      Instruct patient to arrive as directed prior to the scheduled appointment time:  If IV needed 30 minutes before appointment time       For patients 85 or older we recommend having an adult stay w/ them for the remainder of the day.       If the patient is Diabetic, remind them to bring their glucometer.      Does the patient have any questions?  NO  Geneva Brady  [facility]

## 2023-02-17 NOTE — TELEPHONE ENCOUNTER
There isn't a left or a right side for this procedure.      This CANNOT be scheduled in the morning if there is an radiofrequency ablation OR late in the afternoon.  Best time would be a 115, 145 or a 215 w/ a 30 minute arrival for IV/fluids.    Routed to Meenu/schedulers.    Tara, RN-BSN  [facility]

## 2023-02-17 NOTE — TELEPHONE ENCOUNTER
PA pending additional information - is this going to be right or left?        Meenu ORTEGA    [place] [facility]

## 2023-02-20 NOTE — TELEPHONE ENCOUNTER
No PA required, okay to schedule                  Meenu ORTEGA    [place] [facility]

## 2023-02-21 ENCOUNTER — TRANSFERRED RECORDS (OUTPATIENT)
Dept: HEALTH INFORMATION MANAGEMENT | Facility: CLINIC | Age: 28
End: 2023-02-21
Payer: COMMERCIAL

## 2023-02-21 NOTE — TELEPHONE ENCOUNTER
3/16/23: celiac plexus block scheduled for 3/16/23.      Per patient myChart message:  From: Artie Burger      Created: 2/21/2023 1:32 PM      Hello. I ve got an ERCP scheduled for 3/13. That won t affect my upcoming injection on the 16th will it?           Routed to provider.     POONAM Pacheco-BSN  [facility]

## 2023-02-22 NOTE — TELEPHONE ENCOUNTER
Americo sent to pt:  From: Tara Encarnacion RN      Created: 2/22/2023 10:15 AM      Mansoor Alva,  I am so glad you asked.  Dr. Pitts reviewed and is recommending waiting at least 2 weeks after the ERCP before doing the celiac plexus block.   I have NOT cancelled the injection appointment yet so let me know what you want to do.        POONAM Pacheco-BSN  [facility]

## 2023-03-08 RX ORDER — PREGABALIN 50 MG/1
50 CAPSULE ORAL
COMMUNITY
Start: 2023-03-01 | End: 2023-08-19

## 2023-03-13 ENCOUNTER — ANESTHESIA EVENT (OUTPATIENT)
Dept: SURGERY | Facility: CLINIC | Age: 28
End: 2023-03-13
Payer: COMMERCIAL

## 2023-03-13 ENCOUNTER — ANESTHESIA (OUTPATIENT)
Dept: SURGERY | Facility: CLINIC | Age: 28
End: 2023-03-13
Payer: COMMERCIAL

## 2023-03-13 ENCOUNTER — HOSPITAL ENCOUNTER (OUTPATIENT)
Facility: CLINIC | Age: 28
Discharge: HOME | End: 2023-03-13
Attending: INTERNAL MEDICINE | Admitting: INTERNAL MEDICINE
Payer: COMMERCIAL

## 2023-03-13 ENCOUNTER — APPOINTMENT (OUTPATIENT)
Dept: GENERAL RADIOLOGY | Facility: CLINIC | Age: 28
End: 2023-03-13
Attending: INTERNAL MEDICINE
Payer: COMMERCIAL

## 2023-03-13 VITALS
WEIGHT: 132.72 LBS | BODY MASS INDEX: 24.42 KG/M2 | RESPIRATION RATE: 14 BRPM | DIASTOLIC BLOOD PRESSURE: 81 MMHG | OXYGEN SATURATION: 100 % | TEMPERATURE: 97.7 F | HEIGHT: 62 IN | HEART RATE: 71 BPM | SYSTOLIC BLOOD PRESSURE: 112 MMHG

## 2023-03-13 DIAGNOSIS — K86.0 ALCOHOL-INDUCED CHRONIC PANCREATITIS (H): Primary | ICD-10-CM

## 2023-03-13 LAB
ALBUMIN SERPL BCG-MCNC: 3.9 G/DL (ref 3.5–5.2)
ALP SERPL-CCNC: 42 U/L (ref 35–104)
ALT SERPL W P-5'-P-CCNC: 14 U/L (ref 10–35)
AMYLASE SERPL-CCNC: 59 U/L (ref 28–100)
ANION GAP SERPL CALCULATED.3IONS-SCNC: 11 MMOL/L (ref 7–15)
AST SERPL W P-5'-P-CCNC: 22 U/L (ref 10–35)
BILIRUB SERPL-MCNC: 0.2 MG/DL
BUN SERPL-MCNC: 16.3 MG/DL (ref 6–20)
CALCIUM SERPL-MCNC: 9.4 MG/DL (ref 8.6–10)
CHLORIDE SERPL-SCNC: 105 MMOL/L (ref 98–107)
CREAT SERPL-MCNC: 0.84 MG/DL (ref 0.51–0.95)
DEPRECATED HCO3 PLAS-SCNC: 21 MMOL/L (ref 22–29)
ERCP: NORMAL
ERYTHROCYTE [DISTWIDTH] IN BLOOD BY AUTOMATED COUNT: 14.6 % (ref 10–15)
GFR SERPL CREATININE-BSD FRML MDRD: >90 ML/MIN/1.73M2
GLUCOSE BLDC GLUCOMTR-MCNC: 109 MG/DL (ref 70–99)
GLUCOSE SERPL-MCNC: 119 MG/DL (ref 70–99)
HCT VFR BLD AUTO: 32.5 % (ref 35–47)
HGB BLD-MCNC: 10.6 G/DL (ref 11.7–15.7)
INR PPP: 0.93 (ref 0.85–1.15)
LIPASE SERPL-CCNC: 29 U/L (ref 13–60)
MCH RBC QN AUTO: 29.9 PG (ref 26.5–33)
MCHC RBC AUTO-ENTMCNC: 32.6 G/DL (ref 31.5–36.5)
MCV RBC AUTO: 92 FL (ref 78–100)
PLATELET # BLD AUTO: 417 10E3/UL (ref 150–450)
POTASSIUM SERPL-SCNC: 4.4 MMOL/L (ref 3.4–5.3)
PROT SERPL-MCNC: 6.4 G/DL (ref 6.4–8.3)
RBC # BLD AUTO: 3.55 10E6/UL (ref 3.8–5.2)
SODIUM SERPL-SCNC: 137 MMOL/L (ref 136–145)
WBC # BLD AUTO: 7.6 10E3/UL (ref 4–11)

## 2023-03-13 PROCEDURE — 710N000009 HC RECOVERY PHASE 1, LEVEL 1, PER MIN: Performed by: INTERNAL MEDICINE

## 2023-03-13 PROCEDURE — 82150 ASSAY OF AMYLASE: CPT | Performed by: INTERNAL MEDICINE

## 2023-03-13 PROCEDURE — 85027 COMPLETE CBC AUTOMATED: CPT | Performed by: INTERNAL MEDICINE

## 2023-03-13 PROCEDURE — 360N000082 HC SURGERY LEVEL 2 W/ FLUORO, PER MIN: Performed by: INTERNAL MEDICINE

## 2023-03-13 PROCEDURE — 272N000001 HC OR GENERAL SUPPLY STERILE: Performed by: INTERNAL MEDICINE

## 2023-03-13 PROCEDURE — C1726 CATH, BAL DIL, NON-VASCULAR: HCPCS | Performed by: INTERNAL MEDICINE

## 2023-03-13 PROCEDURE — 83690 ASSAY OF LIPASE: CPT | Performed by: INTERNAL MEDICINE

## 2023-03-13 PROCEDURE — 250N000025 HC SEVOFLURANE, PER MIN: Performed by: INTERNAL MEDICINE

## 2023-03-13 PROCEDURE — 80053 COMPREHEN METABOLIC PANEL: CPT | Performed by: INTERNAL MEDICINE

## 2023-03-13 PROCEDURE — 250N000009 HC RX 250: Performed by: NURSE ANESTHETIST, CERTIFIED REGISTERED

## 2023-03-13 PROCEDURE — 370N000017 HC ANESTHESIA TECHNICAL FEE, PER MIN: Performed by: INTERNAL MEDICINE

## 2023-03-13 PROCEDURE — 250N000009 HC RX 250: Performed by: INTERNAL MEDICINE

## 2023-03-13 PROCEDURE — 999N000179 XR SURGERY CARM FLUORO LESS THAN 5 MIN W STILLS: Mod: TC

## 2023-03-13 PROCEDURE — 82962 GLUCOSE BLOOD TEST: CPT

## 2023-03-13 PROCEDURE — 999N000141 HC STATISTIC PRE-PROCEDURE NURSING ASSESSMENT: Performed by: INTERNAL MEDICINE

## 2023-03-13 PROCEDURE — 250N000011 HC RX IP 250 OP 636: Performed by: NURSE ANESTHETIST, CERTIFIED REGISTERED

## 2023-03-13 PROCEDURE — 710N000012 HC RECOVERY PHASE 2, PER MINUTE: Performed by: INTERNAL MEDICINE

## 2023-03-13 PROCEDURE — 85610 PROTHROMBIN TIME: CPT | Performed by: INTERNAL MEDICINE

## 2023-03-13 PROCEDURE — 36415 COLL VENOUS BLD VENIPUNCTURE: CPT | Performed by: INTERNAL MEDICINE

## 2023-03-13 PROCEDURE — C1877 STENT, NON-COAT/COV W/O DEL: HCPCS | Performed by: INTERNAL MEDICINE

## 2023-03-13 PROCEDURE — 250N000011 HC RX IP 250 OP 636: Performed by: ANESTHESIOLOGY

## 2023-03-13 PROCEDURE — C2617 STENT, NON-COR, TEM W/O DEL: HCPCS | Performed by: INTERNAL MEDICINE

## 2023-03-13 PROCEDURE — C1769 GUIDE WIRE: HCPCS | Performed by: INTERNAL MEDICINE

## 2023-03-13 DEVICE — STENT ZIMMON PANCREA 5FRX10CM SGL PIGTAIL G22362
Type: IMPLANTABLE DEVICE | Site: PANCREATIC DUCT | Status: NON-FUNCTIONAL
Removed: 2023-05-10

## 2023-03-13 DEVICE — STENT FREEMAN PANCREA FLEX 5FRX9CM SGL PIGTAIL
Type: IMPLANTABLE DEVICE | Site: PANCREATIC DUCT | Status: NON-FUNCTIONAL
Removed: 2023-05-10

## 2023-03-13 DEVICE — STENT FREEMAN PANCREA FLEX 7FRX7CM SGL PIGTAIL
Type: IMPLANTABLE DEVICE | Site: BILE DUCT | Status: NON-FUNCTIONAL
Removed: 2023-05-10

## 2023-03-13 RX ORDER — NALOXONE HYDROCHLORIDE 0.4 MG/ML
0.2 INJECTION, SOLUTION INTRAMUSCULAR; INTRAVENOUS; SUBCUTANEOUS
Status: DISCONTINUED | OUTPATIENT
Start: 2023-03-13 | End: 2023-03-20 | Stop reason: HOSPADM

## 2023-03-13 RX ORDER — SODIUM CHLORIDE, SODIUM GLUCONATE, SODIUM ACETATE, POTASSIUM CHLORIDE AND MAGNESIUM CHLORIDE 526; 502; 368; 37; 30 MG/100ML; MG/100ML; MG/100ML; MG/100ML; MG/100ML
INJECTION, SOLUTION INTRAVENOUS CONTINUOUS PRN
Status: DISCONTINUED | OUTPATIENT
Start: 2023-03-13 | End: 2023-03-13

## 2023-03-13 RX ORDER — LEVOFLOXACIN 5 MG/ML
INJECTION, SOLUTION INTRAVENOUS PRN
Status: DISCONTINUED | OUTPATIENT
Start: 2023-03-13 | End: 2023-03-13

## 2023-03-13 RX ORDER — ONDANSETRON 4 MG/1
4 TABLET, ORALLY DISINTEGRATING ORAL EVERY 6 HOURS PRN
Status: DISCONTINUED | OUTPATIENT
Start: 2023-03-13 | End: 2023-03-20 | Stop reason: HOSPADM

## 2023-03-13 RX ORDER — HYDROMORPHONE HCL IN WATER/PF 6 MG/30 ML
0.4 PATIENT CONTROLLED ANALGESIA SYRINGE INTRAVENOUS EVERY 5 MIN PRN
Status: DISCONTINUED | OUTPATIENT
Start: 2023-03-13 | End: 2023-03-13 | Stop reason: HOSPADM

## 2023-03-13 RX ORDER — ONDANSETRON 2 MG/ML
4 INJECTION INTRAMUSCULAR; INTRAVENOUS EVERY 6 HOURS PRN
Status: DISCONTINUED | OUTPATIENT
Start: 2023-03-13 | End: 2023-03-20 | Stop reason: HOSPADM

## 2023-03-13 RX ORDER — NALOXONE HYDROCHLORIDE 0.4 MG/ML
0.4 INJECTION, SOLUTION INTRAMUSCULAR; INTRAVENOUS; SUBCUTANEOUS
Status: DISCONTINUED | OUTPATIENT
Start: 2023-03-13 | End: 2023-03-20 | Stop reason: HOSPADM

## 2023-03-13 RX ORDER — EPHEDRINE SULFATE 50 MG/ML
INJECTION, SOLUTION INTRAMUSCULAR; INTRAVENOUS; SUBCUTANEOUS PRN
Status: DISCONTINUED | OUTPATIENT
Start: 2023-03-13 | End: 2023-03-13

## 2023-03-13 RX ORDER — LIDOCAINE HYDROCHLORIDE 20 MG/ML
INJECTION, SOLUTION INFILTRATION; PERINEURAL PRN
Status: DISCONTINUED | OUTPATIENT
Start: 2023-03-13 | End: 2023-03-13

## 2023-03-13 RX ORDER — LIDOCAINE 40 MG/G
CREAM TOPICAL
Status: DISCONTINUED | OUTPATIENT
Start: 2023-03-13 | End: 2023-03-13 | Stop reason: HOSPADM

## 2023-03-13 RX ORDER — OXYCODONE HYDROCHLORIDE 5 MG/1
5 TABLET ORAL EVERY 6 HOURS PRN
Qty: 12 TABLET | Refills: 0 | Status: SHIPPED | OUTPATIENT
Start: 2023-03-13 | End: 2023-03-16

## 2023-03-13 RX ORDER — ONDANSETRON 4 MG/1
4 TABLET, ORALLY DISINTEGRATING ORAL EVERY 30 MIN PRN
Status: DISCONTINUED | OUTPATIENT
Start: 2023-03-13 | End: 2023-03-13 | Stop reason: HOSPADM

## 2023-03-13 RX ORDER — FENTANYL CITRATE 50 UG/ML
INJECTION, SOLUTION INTRAMUSCULAR; INTRAVENOUS PRN
Status: DISCONTINUED | OUTPATIENT
Start: 2023-03-13 | End: 2023-03-13

## 2023-03-13 RX ORDER — FLUMAZENIL 0.1 MG/ML
0.2 INJECTION, SOLUTION INTRAVENOUS
Status: ACTIVE | OUTPATIENT
Start: 2023-03-13 | End: 2023-03-13

## 2023-03-13 RX ORDER — FENTANYL CITRATE 50 UG/ML
50 INJECTION, SOLUTION INTRAMUSCULAR; INTRAVENOUS EVERY 5 MIN PRN
Status: DISCONTINUED | OUTPATIENT
Start: 2023-03-13 | End: 2023-03-13 | Stop reason: HOSPADM

## 2023-03-13 RX ORDER — ONDANSETRON 4 MG/1
4 TABLET, ORALLY DISINTEGRATING ORAL EVERY 6 HOURS PRN
Status: CANCELLED | OUTPATIENT
Start: 2023-03-13

## 2023-03-13 RX ORDER — FENTANYL CITRATE 50 UG/ML
25 INJECTION, SOLUTION INTRAMUSCULAR; INTRAVENOUS EVERY 5 MIN PRN
Status: DISCONTINUED | OUTPATIENT
Start: 2023-03-13 | End: 2023-03-13 | Stop reason: HOSPADM

## 2023-03-13 RX ORDER — ONDANSETRON 2 MG/ML
4 INJECTION INTRAMUSCULAR; INTRAVENOUS EVERY 30 MIN PRN
Status: DISCONTINUED | OUTPATIENT
Start: 2023-03-13 | End: 2023-03-13 | Stop reason: HOSPADM

## 2023-03-13 RX ORDER — ONDANSETRON 2 MG/ML
INJECTION INTRAMUSCULAR; INTRAVENOUS PRN
Status: DISCONTINUED | OUTPATIENT
Start: 2023-03-13 | End: 2023-03-13

## 2023-03-13 RX ORDER — FLUMAZENIL 0.1 MG/ML
0.2 INJECTION, SOLUTION INTRAVENOUS
Status: CANCELLED | OUTPATIENT
Start: 2023-03-13 | End: 2023-03-14

## 2023-03-13 RX ORDER — HYDROMORPHONE HCL IN WATER/PF 6 MG/30 ML
0.2 PATIENT CONTROLLED ANALGESIA SYRINGE INTRAVENOUS EVERY 5 MIN PRN
Status: DISCONTINUED | OUTPATIENT
Start: 2023-03-13 | End: 2023-03-13 | Stop reason: HOSPADM

## 2023-03-13 RX ORDER — PROPOFOL 10 MG/ML
INJECTION, EMULSION INTRAVENOUS PRN
Status: DISCONTINUED | OUTPATIENT
Start: 2023-03-13 | End: 2023-03-13

## 2023-03-13 RX ORDER — INDOMETHACIN 50 MG/1
SUPPOSITORY RECTAL PRN
Status: DISCONTINUED | OUTPATIENT
Start: 2023-03-13 | End: 2023-03-13 | Stop reason: HOSPADM

## 2023-03-13 RX ORDER — SODIUM CHLORIDE, SODIUM LACTATE, POTASSIUM CHLORIDE, CALCIUM CHLORIDE 600; 310; 30; 20 MG/100ML; MG/100ML; MG/100ML; MG/100ML
INJECTION, SOLUTION INTRAVENOUS CONTINUOUS
Status: DISCONTINUED | OUTPATIENT
Start: 2023-03-13 | End: 2023-03-13 | Stop reason: HOSPADM

## 2023-03-13 RX ORDER — ONDANSETRON 2 MG/ML
4 INJECTION INTRAMUSCULAR; INTRAVENOUS EVERY 6 HOURS PRN
Status: CANCELLED | OUTPATIENT
Start: 2023-03-13

## 2023-03-13 RX ORDER — DEXAMETHASONE SODIUM PHOSPHATE 4 MG/ML
INJECTION, SOLUTION INTRA-ARTICULAR; INTRALESIONAL; INTRAMUSCULAR; INTRAVENOUS; SOFT TISSUE PRN
Status: DISCONTINUED | OUTPATIENT
Start: 2023-03-13 | End: 2023-03-13

## 2023-03-13 RX ADMIN — SUGAMMADEX 200 MG: 100 INJECTION, SOLUTION INTRAVENOUS at 10:33

## 2023-03-13 RX ADMIN — FENTANYL CITRATE 25 MCG: 50 INJECTION, SOLUTION INTRAMUSCULAR; INTRAVENOUS at 10:57

## 2023-03-13 RX ADMIN — Medication 40 MG: at 09:29

## 2023-03-13 RX ADMIN — LEVOFLOXACIN 500 MG: 5 INJECTION, SOLUTION INTRAVENOUS at 09:35

## 2023-03-13 RX ADMIN — FENTANYL CITRATE 50 MCG: 50 INJECTION, SOLUTION INTRAMUSCULAR; INTRAVENOUS at 10:42

## 2023-03-13 RX ADMIN — ONDANSETRON 4 MG: 2 INJECTION INTRAMUSCULAR; INTRAVENOUS at 10:26

## 2023-03-13 RX ADMIN — FENTANYL CITRATE 25 MCG: 50 INJECTION, SOLUTION INTRAMUSCULAR; INTRAVENOUS at 11:25

## 2023-03-13 RX ADMIN — MIDAZOLAM 2 MG: 1 INJECTION INTRAMUSCULAR; INTRAVENOUS at 09:19

## 2023-03-13 RX ADMIN — LIDOCAINE HYDROCHLORIDE 80 MG: 20 INJECTION, SOLUTION INFILTRATION; PERINEURAL at 09:28

## 2023-03-13 RX ADMIN — FENTANYL CITRATE 50 MCG: 50 INJECTION, SOLUTION INTRAMUSCULAR; INTRAVENOUS at 09:28

## 2023-03-13 RX ADMIN — DEXAMETHASONE SODIUM PHOSPHATE 8 MG: 4 INJECTION, SOLUTION INTRA-ARTICULAR; INTRALESIONAL; INTRAMUSCULAR; INTRAVENOUS; SOFT TISSUE at 09:42

## 2023-03-13 RX ADMIN — FENTANYL CITRATE 50 MCG: 50 INJECTION, SOLUTION INTRAMUSCULAR; INTRAVENOUS at 10:14

## 2023-03-13 RX ADMIN — SODIUM CHLORIDE, SODIUM GLUCONATE, SODIUM ACETATE, POTASSIUM CHLORIDE AND MAGNESIUM CHLORIDE: 526; 502; 368; 37; 30 INJECTION, SOLUTION INTRAVENOUS at 10:43

## 2023-03-13 RX ADMIN — PROPOFOL 120 MG: 10 INJECTION, EMULSION INTRAVENOUS at 09:28

## 2023-03-13 RX ADMIN — Medication 5 MG: at 09:53

## 2023-03-13 RX ADMIN — FENTANYL CITRATE 50 MCG: 50 INJECTION, SOLUTION INTRAMUSCULAR; INTRAVENOUS at 10:37

## 2023-03-13 RX ADMIN — SODIUM CHLORIDE, SODIUM GLUCONATE, SODIUM ACETATE, POTASSIUM CHLORIDE AND MAGNESIUM CHLORIDE: 526; 502; 368; 37; 30 INJECTION, SOLUTION INTRAVENOUS at 09:19

## 2023-03-13 ASSESSMENT — ACTIVITIES OF DAILY LIVING (ADL)
ADLS_ACUITY_SCORE: 35

## 2023-03-13 NOTE — OR NURSING
Dr. Amaro saw patient in 3C, discharge analgesic script written and sent to discharge pharmacy. Patient and her mom will  script on way out of hospital.

## 2023-03-13 NOTE — ANESTHESIA PROCEDURE NOTES
Airway       Patient location during procedure: OR       Procedure Start/Stop Times: 3/13/2023 9:31 AM  Staff -        CRNA: Khushbu Moreno APRN CRNA       Performed By: CRNA  Consent for Airway        Urgency: elective  Indications and Patient Condition       Indications for airway management: rolando-procedural       Induction type:intravenous       Mask difficulty assessment: 1 - vent by mask    Final Airway Details       Final airway type: endotracheal airway       Successful airway: ETT - single  Endotracheal Airway Details        ETT size (mm): 7.0       Cuffed: yes       Successful intubation technique: direct laryngoscopy       DL Blade Type: MAC 3       Grade View of Cords: 1       Adjucts: stylet       Position: Right       Measured from: lips       Secured at (cm): 22       Bite block used: Soft (GI bite block)    Post intubation assessment        Placement verified by: capnometry, equal breath sounds and chest rise        Number of attempts at approach: 1       Secured with: silk tape       Ease of procedure: easy       Dentition: Unchanged    Medication(s) Administered   Medication Administration Time: 3/13/2023 9:31 AM

## 2023-03-13 NOTE — OR NURSING
Lisa Vasquez in PACU x 2, to see the patient and patient was requesting some pain medication at discharge. Patient received 50 mcg IV fentanyl in PACU for upper abdominal pain with relief, patient rates her abdominal pain tolerable 4/10. Vital signs stable, room air, tolerating ice chips.

## 2023-03-13 NOTE — ANESTHESIA POSTPROCEDURE EVALUATION
Patient: Artie Burger    Procedure: Procedure(s):  ENDOSCOPIC RETROGRADE CHOLANGIOPANCREATOGRAPHY, WITH replacement of pancreatic stents, bile duct stent placed,sphinterotomy of bile duct ampulla, balloon sweep of bile duct for sludge,       Anesthesia Type:  General    Note:  Disposition: Outpatient   Postop Pain Control: Uneventful            Sign Out: Well controlled pain   PONV: No   Neuro/Psych: Uneventful            Sign Out: Acceptable/Baseline neuro status   Airway/Respiratory: Uneventful            Sign Out: Acceptable/Baseline resp. status   CV/Hemodynamics: Uneventful            Sign Out: Acceptable CV status; No obvious hypovolemia; No obvious fluid overload   Other NRE: NONE   DID A NON-ROUTINE EVENT OCCUR? No           Last vitals:  Vitals Value Taken Time   /78 03/13/23 1041   Temp 36.7  C (98  F) 03/13/23 1041   Pulse 82 03/13/23 1044   Resp 16 03/13/23 1041   SpO2 100 % 03/13/23 1044   Vitals shown include unvalidated device data.    Electronically Signed By: Howard Goyal MD  March 13, 2023  10:45 AM

## 2023-03-13 NOTE — ANESTHESIA CARE TRANSFER NOTE
Patient: Artie Burger    Procedure: Procedure(s):  ENDOSCOPIC RETROGRADE CHOLANGIOPANCREATOGRAPHY, WITH replacement of pancreatic stents, bile duct stent placed,sphinterotomy of bile duct ampulla, balloon sweep of bile duct for sludge,       Diagnosis: Pancreatic duct stricture [K86.89]  Diagnosis Additional Information: No value filed.    Anesthesia Type:   General     Note:    Oropharynx: oropharynx clear of all foreign objects and spontaneously breathing  Level of Consciousness: awake  Oxygen Supplementation: face mask  Level of Supplemental Oxygen (L/min / FiO2): 10  Independent Airway: airway patency satisfactory and stable  Dentition: dentition unchanged  Vital Signs Stable: post-procedure vital signs reviewed and stable  Report to RN Given: handoff report given  Patient transferred to: PACU    Handoff Report: Identifed the Patient, Identified the Reponsible Provider, Reviewed the pertinent medical history, Discussed the surgical course, Reviewed Intra-OP anesthesia mangement and issues during anesthesia, Set expectations for post-procedure period and Allowed opportunity for questions and acknowledgement of understanding      Vitals:  Vitals Value Taken Time   /78 03/13/23 1041   Temp 36.7  C (98  F) 03/13/23 1041   Pulse 71 03/13/23 1043   Resp 16    SpO2 100 % 03/13/23 1043   Vitals shown include unvalidated device data.    Electronically Signed By: Khushbu Moreno CRNA, ROSIE GREEN  March 13, 2023  10:44 AM

## 2023-03-13 NOTE — ANESTHESIA PREPROCEDURE EVALUATION
Anesthesia Pre-Procedure Evaluation    Patient: Artie Burger   MRN: [id number] : 1995        Procedure : Procedure(s):  ENDOSCOPIC RETROGRADE CHOLANGIOPANCREATOGRAPHY, WITH REPLACEMENT OF TUBE OR STENT          Past Medical History:   Diagnosis Date     Gastroesophageal reflux disease      Pancreatic disease      PONV (postoperative nausea and vomiting)       Past Surgical History:   Procedure Laterality Date     ENDOSCOPIC RETROGRADE CHOLANGIOPANCREATOGRAM COMPLEX N/A 2021    Procedure: ENDOSCOPIC RETROGRADE CHOLANGIOPANCREATOGRAPHY with pancreatic sphincterotomy, dilation, stone removal, stent placement;  Surgeon: Guru Sabino Campuzano MD;  [facility]     ENDOSCOPIC RETROGRADE CHOLANGIOPANCREATOGRAM WITH DIRECT VISUALIZATION SYSTEM AND GENERATOR N/A 2021    Procedure: ENDOSCOPIC RETROGRADE CHOLANGIOPANCREATOGRAPHY, dilation and debridement sludge removal, pancreatic duct stent placement;  Surgeon: Guru Sabino Campuzano MD;  [facility]     ENDOSCOPIC RETROGRADE CHOLANGIOPANCREATOGRAM WITH SPYGLASS N/A 10/6/2021    Procedure: ENDOSCOPIC RETROGRADE CHOLANGIOPANCREATOGRAPHY, WITH DIRECT DUCT VISUALIZATION, USING PANCREATICOBILIARY FIBEROPTIC PROBE, BILE DUCT STENT EXCHANGE, LITHOTRIPSY OF PANCREATIC STONE, SPHINCTEROTOMY, BALLOON DILATION OF PANCREATIC DUCT AND STONE EXTRACTION;  Surgeon: Guru Sabino Campuzano MD;  [facility]     ENDOSCOPIC RETROGRADE CHOLANGIOPANCREATOGRAM WITH SPYGLASS N/A 2021    Procedure: ENDOSCOPIC RETROGRADE CHOLANGIOPANCREATOGRAPHY, with pancreatic stent exchange, ballon dilation, stone removal, with spyglass direct visualization;  Surgeon: Guru Sabino Campuzano MD;  [facility]     ENDOSCOPIC RETROGRADE CHOLANGIOPANCREATOGRAPHY, EXCHANGE TUBE/STENT N/A 2022    Procedure: ENDOSCOPIC RETROGRADE CHOLANGIOPANCREATOGRAPH with pancreatic dilation, debris removal and stent exchange.;   Surgeon: Guru Sabino Campuzano MD;  [facility]     ENDOSCOPIC RETROGRADE CHOLANGIOPANCREATOGRAPHY, EXCHANGE TUBE/STENT N/A 2022    Procedure: ENDOSCOPIC RETROGRADE CHOLANGIOPANCREATOGRAPHY, WITH REPLACEMENT OF pancreatic STENT, stone removal;  Surgeon: Guru Sabino Campuzano MD;  [facility]     ENDOSCOPIC RETROGRADE CHOLANGIOPANCREATOGRAPHY, EXCHANGE TUBE/STENT N/A 2022    Procedure: ENDOSCOPIC RETROGRADE CHOLANGIOPANCREATOGRAPHY, WITH pancreatic duct dilation and stent exchange, biliary stent placement, debris removal;  Surgeon: Guru Sabino Campuzano MD;  [facility]     ENDOSCOPIC RETROGRADE CHOLANGIOPANCREATOGRAPHY, EXCHANGE TUBE/STENT N/A 2022    Procedure: ENDOSCOPIC RETROGRADE CHOLANGIOPANCREATOGRAPHY, WITH pancreatic stone removal, biliary stent removal, pancreatic stent exchange;  Surgeon: Guru Sabino Campuzano MD;  [facility]     ENDOSCOPIC RETROGRADE CHOLANGIOPANCREATOGRAPHY, EXCHANGE TUBE/STENT N/A 2022    Procedure: ENDOSCOPIC RETROGRADE CHOLANGIOPANCREATOGRAPHY WITH PANCREATIC DUCT STENT EXCHANGE, DILATION, AND DEBRIS REMOVAL;  Surgeon: Guru Sabino Campuzano MD;  [facility]     ENDOSCOPIC RETROGRADE CHOLANGIOPANCREATOGRAPHY, EXCHANGE TUBE/STENT N/A 2023    Procedure: ENDOSCOPIC RETROGRADE CHOLANGIOPANCREATOGRAPHY stent exchange, dilation, sludge and stone removal;  Surgeon: Guru Sabino Campuzano MD;  Location[facility]      No Known Allergies   Social History     Tobacco Use     Smoking status: Former     Types: Cigarettes     Quit date: 2022     Years since quittin.8     Smokeless tobacco: Current     Tobacco comments:     Vapes daily   Substance Use Topics     Alcohol use: Not Currently      Wt Readings from Last 1 Encounters:   23 59 kg (130 lb 1.1 oz)        Anesthesia Evaluation   Pt has had prior anesthetic. Type: General.    History of anesthetic  complications       ROS/MED HX  ENT/Pulmonary:  - neg pulmonary ROS     Neurologic:  - neg neurologic ROS     Cardiovascular:       METS/Exercise Tolerance: >4 METS    Hematologic:  - neg hematologic  ROS     Musculoskeletal:  - neg musculoskeletal ROS     GI/Hepatic: Comment: Chronic Pancreatitis, s/p multiple ERCP + Stent placement    (+) GERD, Asymptomatic on medication,     Renal/Genitourinary:  - neg Renal ROS     Endo:  - neg endo ROS     Psychiatric/Substance Use:  - neg psychiatric ROS     Infectious Disease:  - neg infectious disease ROS     Malignancy:  - neg malignancy ROS     Other:            Physical Exam    Airway        Mallampati: I   TM distance: > 3 FB   Neck ROM: full   Mouth opening: > 3 cm    Respiratory Devices and Support         Dental       (+) Completely normal teeth      Cardiovascular   cardiovascular exam normal          Pulmonary   pulmonary exam normal                OUTSIDE LABS:  CBC:   Lab Results   Component Value Date    WBC 7.5 01/18/2023    WBC 8.8 11/21/2022    HGB 11.2 (L) 01/18/2023    HGB 10.0 (L) 11/21/2022    HCT 33.7 (L) 01/18/2023    HCT 31.0 (L) 11/21/2022     (H) 01/18/2023     11/21/2022     BMP:   Lab Results   Component Value Date     01/18/2023     11/21/2022    POTASSIUM 4.3 01/18/2023    POTASSIUM 3.9 11/21/2022    CHLORIDE 107 01/18/2023    CHLORIDE 106 11/21/2022    CO2 17 (L) 01/18/2023    CO2 21 (L) 11/21/2022    BUN 17.8 01/18/2023    BUN 12.6 11/21/2022    CR 0.76 01/18/2023    CR 0.84 11/21/2022     (H) 01/18/2023     (H) 01/18/2023     COAGS:   Lab Results   Component Value Date    INR 0.88 01/18/2023     POC:   Lab Results   Component Value Date    HCG Negative 07/13/2022    HCGS Negative 01/18/2023     HEPATIC:   Lab Results   Component Value Date    ALBUMIN 4.1 01/18/2023    PROTTOTAL 6.7 01/18/2023    ALT 21 01/18/2023    AST 20 01/18/2023    ALKPHOS 44 01/18/2023    BILITOTAL 0.2 01/18/2023     OTHER:   Lab  Results   Component Value Date    MURTAZA 9.0 01/18/2023    LIPASE 25 01/18/2023    AMYLASE 63 01/18/2023       Anesthesia Plan    ASA Status:  3   NPO Status:  NPO Appropriate    Anesthesia Type: General.     - Airway: ETT   Induction: Intravenous.   Maintenance: Balanced.        Consents    Anesthesia Plan(s) and associated risks, benefits, and realistic alternatives discussed. Questions answered and patient/representative(s) expressed understanding.     - Discussed: Risks, Benefits and Alternatives for BOTH SEDATION and the PROCEDURE were discussed     - Discussed with:  Patient      - Extended Intubation/Ventilatory Support Discussed: No.      - Patient is DNR/DNI Status: No    Use of blood products discussed: No .     Postoperative Care       PONV prophylaxis: Ondansetron (or other 5HT-3), Dexamethasone or Solumedrol, Promethazine or metoclopramide, Droperidol or Haldol     Comments:                Howard Goyal MD

## 2023-03-13 NOTE — DISCHARGE INSTRUCTIONS
[facility]  Same-Day Surgery   Adult Discharge Orders & Instructions     For 24 hours after surgery    Get plenty of rest.  A responsible adult must stay with you for at least 24 hours after you leave the hospital.   Do not drive or use heavy equipment.  If you have weakness or tingling, don't drive or use heavy equipment until this feeling goes away.  Do not drink alcohol.  Avoid strenuous or risky activities.  Ask for help when climbing stairs.   You may feel lightheaded.  IF so, sit for a few minutes before standing.  Have someone help you get up.   If you have nausea (feel sick to your stomach): Drink only clear liquids such as apple juice, ginger ale, broth or 7-Up.  Rest may also help.  Be sure to drink enough fluids.  Move to a regular diet as you feel able.  You may have a slight fever. Call the doctor if your fever is over 100 F (37.7 C) (taken under the tongue) or lasts longer than 24 hours.  You may have a dry mouth, a sore throat, muscle aches or trouble sleeping.  These should go away after 24 hours.  Do not make important or legal decisions.   Call your doctor for any of the followin.  Signs of infection (fever, growing tenderness at the surgery site, a large amount of drainage or bleeding, severe pain, foul-smelling drainage, redness, swelling).    2. It has been over 8 to 10 hours since surgery and you are still not able to urinate (pass water).    3.  Headache for over 24 hours.    4.  Numbness, tingling or weakness the day after surgery (if you had spinal anesthesia).  To contact a doctor, call ____Dr. Campuzano at the Gastroenterology Clinic @ [phone] _____ or:    '   [phone] and ask for the resident on call for   ___Gastroenterology_________ (answered 24 hours a day)  '   Emergency Department:    [facility]: [phone]       (TTY for hearing impaired: [phone])    [facility]: [phone]       (TTY for  hearing impaired: [phone])

## 2023-03-14 ASSESSMENT — ACTIVITIES OF DAILY LIVING (ADL)
ADLS_ACUITY_SCORE: 35

## 2023-03-15 ASSESSMENT — ACTIVITIES OF DAILY LIVING (ADL)
ADLS_ACUITY_SCORE: 35

## 2023-03-16 ASSESSMENT — ACTIVITIES OF DAILY LIVING (ADL)
ADLS_ACUITY_SCORE: 35

## 2023-03-17 ASSESSMENT — ACTIVITIES OF DAILY LIVING (ADL)
ADLS_ACUITY_SCORE: 35

## 2023-03-18 ASSESSMENT — ACTIVITIES OF DAILY LIVING (ADL)
ADLS_ACUITY_SCORE: 35

## 2023-03-19 ASSESSMENT — ACTIVITIES OF DAILY LIVING (ADL)
ADLS_ACUITY_SCORE: 35

## 2023-03-20 ENCOUNTER — PREP FOR PROCEDURE (OUTPATIENT)
Dept: GASTROENTEROLOGY | Facility: CLINIC | Age: 28
End: 2023-03-20
Payer: COMMERCIAL

## 2023-03-20 ENCOUNTER — PATIENT OUTREACH (OUTPATIENT)
Dept: GASTROENTEROLOGY | Facility: CLINIC | Age: 28
End: 2023-03-20
Payer: COMMERCIAL

## 2023-03-20 DIAGNOSIS — K86.89 PANCREATIC DUCT STRICTURE: Primary | ICD-10-CM

## 2023-03-20 NOTE — PROGRESS NOTES
Procedure/Imaging/Clinic: ERCP to re-evaluate pancreatic duct stricture  Physician: Justen  Timin weeks  Procedure length: Provider average  Anesthesia:General  Dx: Pancreatic duct stricture  Tier:2  [facility]: [facility]

## 2023-03-20 NOTE — PROGRESS NOTES
Follow up: Post ERCP on 3/13/23 with Dr. Campuzano.      Post procedure recommendations:   -Will recommend repeat ERCP in 8 weeks to re-evaluate the PD stricture    - Will recommend follow up with Dr Patel for further surgical management of PD stricture (Jaye +Pancreaticojejunostomy)   - Will recommend strict avoidance of Aspirin and anti-coagulation for 3 days to prevent post-sphincterotomy bleeding                      Patient states: States she is feeling okay. Doing a little better this week than last. Eating and drinking alright.     Orders placed:   -Scheduled for clinic with Dr. Patel 3/23/23 @ 3:15 PM    Procedure/Imaging/Clinic: ERCP to re-evaluate pancreatic duct stricture  Physician: Justen  Timin weeks  Procedure length: Provider average  Anesthesia:General  Dx: Pancreatic duct stricture  Tier:2  [facility]: [facility]    Comments: Offer 5/10; patient is agreeable.   Pre-op: PCP; fax visit  Anticoagulation: none  Diabetes: none    Reviewed post procedure recommendations and discussed symptoms to report to our clinic. Patient articulated understanding.     Clinic contact and scheduling numbers verified for future questions/concerns.     Melvi Humphreys, RN Care Coordinator

## 2023-03-23 ENCOUNTER — VIRTUAL VISIT (OUTPATIENT)
Dept: TRANSPLANT | Facility: CLINIC | Age: 28
End: 2023-03-23
Attending: SURGERY
Payer: COMMERCIAL

## 2023-03-23 DIAGNOSIS — K86.0 ALCOHOL-INDUCED CHRONIC PANCREATITIS (H): Primary | ICD-10-CM

## 2023-03-23 PROCEDURE — 99204 OFFICE O/P NEW MOD 45 MIN: CPT | Mod: VID | Performed by: SURGERY

## 2023-03-23 NOTE — LETTER
3/23/2023         RE: Artie Burger  [address]  [place] [address]        Dear Colleague,    Thank you for referring your patient, Arite Burger, to the [facility]. Please see a copy of my visit note below.    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 Video visit to discuss surgical management options for recurrent pancreatitis.     Surgical approach: per Dr. 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 He has successfully dilated and stented with relief in the past, but she has failed stent free trial. She is referred for consideration of drainage (Jaye operation). On my review of CT, her duct is somewhat dilated but not impressively so (~4mm), though this is limited by lack of contrast. Will need to repeat CT and reassess. Also note large pancreatic head mass with multiple calcifications. Pending imaging to determine optimal surgical management. Will also need CEA, CA 19-9. If she is interested in proceeding with surgery, she will need an in person visit for assessment and to discuss further.       Analgesia: previously not on narcotics, but currently using Oxycodone 10s ~6 times daily, with more for breakthrough. Also uses other adjuncts. Has some control of pain but not really able to function. Does have a primary care and pain team who manage these medications; will need to establish postoperative management plan with them prior to undertaking surgery.       Pancreatic function: not diabetic, no overt indication of exocrine insufficiency. Has tried creon  in the past with no real benefit.     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 She had been losing weight earlier but has since stabilized.     Alcohol/Tobacco cessation: negative PeTH in chart, reports multiple years of abstinence which is consistent with this. She also reports vaping currently but no longer smoking cigarettes. Will need to verify cessation of alcohol and tobacco prior to any surgery. Tobacco cessation especially important in duodenal resecting surgeries.     Medical Decision Making: High  Consult 32517 high complexity, 110 minutes      Faculty: Junito Patel MD  __________________________________________________________________  Transplant History: pancreatitis consult  N/A, Postoperative day:      Interval History: History is obtained from the patient  Artie Burger is a very pleasant 26 yo F with a history of acute on chronic alcoholic pancreatitis. She has an extensive history of alcohol and tobacco use going back about ten years, with her first episodes of pancreatitis following shortly thereafter. She was hospitalized at [facility] with recurrent episodes of pancreatitis. She initially abstained from drinking but did have relapses, corresponding with further episodes of pancreatitis.     Dr. Campuzano has known her for a long period over which he has engaged in numerous successful endoscopic interventions for her. For full details of this, please see his excellent notes. In short, he has been able to clear her duct of stones and did note that she had a very significant stricture in the pancreatic neck with moderate upstream dilation, as well as calcific changes and side branch calcification in the pancreatic head. She has ultimately failed trials of stent free intervals and is referred for surgery.    She says she has now developed daily pain, where previously it was only  intermittent. It is in her central abdomen with radiation to the right and into her back, not really to her left. It doesn't have accompanying nausea, but she does have pain with eating and that previously made her food avoidant. She is able to maintain her weight relatively well now, though eating is still difficult. She works in [employer] at a [employer] and is unable to complete her (admittedly very busy, physical) shifts due to pain. When she is able to work, she spends the next day resting and recovering due to her fatigue.       Social History     Tobacco Use    Smoking status: Former     Types: Cigarettes     Quit date: 2022     Years since quittin.9    Smokeless tobacco: Current    Tobacco comments:     Vapes daily   Vaping Use    Vaping status: Every Day     Substances: Nicotine   Substance Use Topics    Alcohol use: Not Currently    Drug use: Yes     Types: Marijuana     Comment: smokes daily     ROS:   A 10-point review of systems was negative except as noted above.    Curent Meds:      Physical Exam:     Admit      Current Vitals:   LMP 2023 (Exact Date)          Vital sign ranges:    BP: ()/()   Arterial Line BP: ()/()   No data found.  General Appearance: in no apparent distress.   Skin: normal appearance  Heart: deferred-video visit  Lungs: without wheezes, nonlabored respirations  Abdomen: The abdomen is flat, she is thin. No evident incision or hernia  Extremities: edema: appears absent    Data:   CMP@LABRCNTIPR(na:2,potassium:2,chloride:2,co2:2,g,bun:2,cr:2,gfrestimated:2,gfrestblack:2,mark:2,icapoc:2,icaw:2,ma,phos:2,amylase:2,lipase:2,uamy24:3,albumin:2,bilitotal:2,biliconj:2,bilidelta:2,alkphos:2,ast:2,alt:2,fbili:1)@  CBC@LABRCNTIPR(hgb:2,wbc:2,a,plt:2,a1c:1)@  Coags@LABRCNTIPR(inr:2,ptt:2,Xa:2)@   Urinalysis  No lab results found.     Prior CT personally reviewed. Note multifocal inflammatory calcification of the pancreatic head with evident dilated upstream pancreatic  duct, though this measures ~4mm to me. Limited by lack of IV contrast.       Sincerely,        Junito Patel MD

## 2023-03-24 NOTE — TELEPHONE ENCOUNTER
Pt is scheduled for a celiac plexus block on 3/28/23.  Per recent staff Guru Sabino Luevano MD does not recommend this procedure.  Dr. Pitts reviewed and will have Jayde Del Toro DNP, APRN, AGNP-C review if it should proceed.    Routed to Jayde to review.    POONAM Pacheco-BSN  [facility]

## 2023-03-24 NOTE — TELEPHONE ENCOUNTER
Reviewed with Jayde Del Toro, DNP, APRN, AGNP-C: cancel the block. Pt can f/u w/ her for further options.    Called pt and relayed the above.  She was advised to call and make a f/u appointment with savage Pacheco, RN-BSN  [facility]

## 2023-04-03 ENCOUNTER — TELEPHONE (OUTPATIENT)
Dept: PALLIATIVE MEDICINE | Facility: CLINIC | Age: 28
End: 2023-04-03
Payer: COMMERCIAL

## 2023-04-03 NOTE — TELEPHONE ENCOUNTER
[facility] Call Center    Phone Message    May a detailed message be left on voicemail: yes     Reason for Call: Other: Artie is requesting that her appointment scheduled for tomorrow to be changed to virtual due to her 2 hour drive.     Action Taken: Message routed to:  Other: [facility]    Travel Screening: Not Applicable

## 2023-04-03 NOTE — TELEPHONE ENCOUNTER
Reviewed.  Okay to change to virtual.  This was changed and pt was notified via [employer].    Tara RN-BSN  [facility]

## 2023-04-04 ENCOUNTER — VIRTUAL VISIT (OUTPATIENT)
Dept: PALLIATIVE MEDICINE | Facility: CLINIC | Age: 28
End: 2023-04-04
Payer: COMMERCIAL

## 2023-04-04 DIAGNOSIS — R10.9 ABDOMINAL PAIN, UNSPECIFIED ABDOMINAL LOCATION: ICD-10-CM

## 2023-04-04 DIAGNOSIS — K86.1 CHRONIC PANCREATITIS, UNSPECIFIED PANCREATITIS TYPE (H): Primary | ICD-10-CM

## 2023-04-04 PROCEDURE — 99214 OFFICE O/P EST MOD 30 MIN: CPT | Mod: VID

## 2023-04-04 ASSESSMENT — COLUMBIA-SUICIDE SEVERITY RATING SCALE - C-SSRS
2. IN THE PAST MONTH, HAVE YOU ACTUALLY HAD ANY THOUGHTS OF KILLING YOURSELF?: NO
6. HAVE YOU EVER DONE ANYTHING, STARTED TO DO ANYTHING, OR PREPARED TO DO ANYTHING TO END YOUR LIFE?: NO
1. WITHIN THE PAST MONTH, HAVE YOU WISHED YOU WERE DEAD OR WISHED YOU COULD GO TO SLEEP AND NOT WAKE UP?: YES

## 2023-04-04 ASSESSMENT — PAIN SCALES - GENERAL: PAINLEVEL: EXTREME PAIN (9)

## 2023-04-04 ASSESSMENT — PATIENT HEALTH QUESTIONNAIRE - PHQ9: SUM OF ALL RESPONSES TO PHQ QUESTIONS 1-9: 17

## 2023-04-04 NOTE — PROGRESS NOTES
Artie is a 27 year old who is being evaluated via a billable video visit.      How would you like to obtain your AVS? MyChart  If the video visit is dropped, the invitation should be resent by: Send to e-mail at: [email or web address]  Will anyone else be joining your video visit? No   Is Pt currently in [place]? Yes    Niurka Mcginnis MA    NOTE:  If Pt is not in [place], Appointment needs to be canceled and rescheduled.        Video-Visit Details    Type of service:  Video Visit     Originating Location (pt. Location): Home    Distant Location (provider location):  On-site  Platform used for Video Visit: [facility]     Date of visit: 4/4/2023      Assessment:   Artie Burger is a 27 year old female with a past medical history significant for ETOH use, pancreatitis, GERD who presents with complaints of abdominal pain.      1. Abdominal pain - Her pain is likely secondary to chronic pancreatitis, though possible some other underlying GI process present. Of note, symptoms are intermittent and seem to be relieved with bowel movements, which may be suggestive of IBS. She has been using ibuprofen and acetaminophen PRN and medical cannabis, which she finds helpful for symptoms and appetite. She has not appreciated much improvement in pain with more conservative measures and is interested in procedure. Her pain is primarily located over epigastric region, more severe after means. Pain seems more visceral at this point as opposed to abdominal wall, recommend celiac plexus block.     Visit Diagnoses:  1. Chronic pancreatitis, unspecified pancreatitis type (H)    2. Abdominal pain, unspecified abdominal location        Plan:  Diagnosis reviewed, treatment option addressed, and risk/benifits discussed.  Self-care instructions given.  I am recommending a multidisciplinary treatment plan to help this patient better manage their pain.      Physical therapy - continue activity as tolerated at home.  "    Pain Psychologist to address relaxation, behavioral change, coping style, and other factors important to improvement.  NO - Let me know if you are interested in exploring pain psychology to support chronic pain experience.     Diagnostic Studies:  None     Medication Management:     Discussed buprenorphine today as possible medication option for pain control. She would like to think about it and do some research, which is reasonable. Advised she will need to schedule in person appointment with me, if she decides to pursue this treatment option. See information attached to after visit summary for more information about this drug.     Continue working with [place] medical cannabis program to optimize pain benefit from cannabis products.     Potential procedures:     None     Other Orders/Referrals:     None     Follow up with ROSIE Park CNP as needed.      Visit discussion: A PHQ9 was administered during the rooming process, and Artie initially answered \"yes\" to question #9 regarding SI. Clinic RNCC contacted patient prior to visit, and I also discussed with her during the visit. She states that she is does not have active thoughts of self-harm, and she misunderstood the question with how it was worded. Of note, she sees  provider for counseling and medications. It was determined that she is safe at this time, no concerns for active SI.     Review of Electronic Chart: Today I have also reviewed available medical information in the patient's medical record at [facility] ([facility]) and Care Everywhere (if available), including relevant provider notes, laboratory work, and imaging.     Jayde Del Toro DNP, APRN, AGNP-C  [facility]     -------------------------------------------------    Subjective:    Chief complaint:   Chief Complaint   Patient presents with     Pain       Interval history:  Artie Burger is a 27 year old female last seen on 2/14/23.  They are a patient of mine seen in " follow up.     Recommendations/plan at the last visit included:              1.  Physical Therapy:  NO   She is active and works full time. Continue activity as tolerated at home.               2.  Pain Psychologist to address relaxation, behavioral change, coping style, and other factors important to improvement.  NO              3.  Diagnostic Studies:  None               4.  Medication Management: She has found cannabis beneficial for pain, appetite, and nausea, was certified at last visit for [place] Medical Cannabis Program. She has established the dispensary, uses flower and vape products that are somewhat helpful for pain, also notes improvements with nausea and appetite. She also started long acting tablet (CBD dominant) that is most helpful overall. She will be going back to the dispensary soon, encouraged her to continue exploring other available products for pain.               5.  Potential procedures: At last visit, we discussed celiac plexus block to target epigastric pain. However, she preferred more conservative approach initially, opted to trial [place] medical cannabis program first. Since last visit her pain has been somewhat worse. She is interested in pursuing a celiac plexus block to target epigastric pain. She will be contacted to schedule will my colleague, Dr. Pitts.               6.  Referrals:              7.  Follow up with ROSIE Park CNP in 2-4 weeks after celiac plexus block.     Since her last visit, Artie Burger reports:  -her pain is about the same as it was at last visit.   -PHQ9 was positive for SI, though she states that she is not having active thoughts of self harm.   -She states the way the PHQ9 question stated was confusing.   -She works with mental health provider, counseling and medications.   -She was certified at prior visit for [place] medical cannabis, still working with products to find good combination.   -She is struggling with pain, has ERCPs periodically to tx  "stricture, does not feel like most recent procedure on 3/13 was as helpful as it was previously.   -She saw a new surgeon a couple of weeks ago, she is considered a different procedure that is supposed to helped with pain.   -She would possibly be interested in buprenorphine, will consider this option for the future.   -She is most interested in possibly surgery to improve pain.       Pain Information:   Pain rating: averages 9/10 on a 0-10 scale.      Interval history from last visit on 2/14/23:  -Her pain has increased since last visit, reported 4/10 at last visit and 9/10 today.  -She was certified on medical cannabis at last visit, has found this helpful with nausea and appetite but not so much for pain.   -Medical cannabis products: flower and cartridges, also using pills and these are most helpful overall.   -She has plans to go to dispensary sometime in the next week and will explore other products available.   -She is interested in pursuing injection.   -Celiac plexus block was discussed at prior visit.   -Pain is mostly in epigastric area, \"where rib cage meets,\" describes pain as deeper, increase in pain when lying down.   -She inquired about gabapentin max dosage of 900 mg TID, which was suggested by one of her other providers.         HPI from initial visit with me on 11/3/22:  Artie Burger is a 27 year old female presents with a chief complaint of abdominal pain, epigastric.      The pain has been present for over one year .    The pain is Moderate Pain (4) in severity.    Severity/Intensity: 2/10 at best, 9/10 at worst, 4/10 on average  The pain is described as aching, stabbing, .   The pain is alleviated by meds and BM, lying on left side.    It is exacerbated by standing, lifting, fatty foods.    Modalities that have been utilized in the past which were helpful include OTC pain meds, cannabis, GI procedures.    Things that were not helpful, but tried ,include tramadol.    The patient has never " tried pain procedures (celiac, splanchnic, TAP block).     Artie Burger has not been seen at a pain clinic in the past.       -She has abdominal pain, mostly epigastric.   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   -She went to treatment, used to go to .   -Denies person or family h/o of schizophrenia, no major cardiac or hepatic conditions.       Current Pain Treatments:    1. Medications:               Tylenol 1000 mg               Ibuprofen 800 mg               Gabapentin 600 mg TID              Oxycodone PRN (small quantity from GI)              Medical cannabis         Current MME: 0     Review of [place] Prescription Monitoring Program (): No concern for abuse or misuse of controlled medications based on this report. Reviewed - appears appropriate.     Past pain treatments:        Medications:  Current Outpatient Medications   Medication Sig Dispense Refill     acetaminophen (TYLENOL) 500 MG tablet Take 500-1,000 mg by mouth every 6 hours as needed for mild pain       busPIRone (BUSPAR) 10 MG tablet Take 10 mg by mouth 3 times daily        cetirizine (ZYRTEC) 10 MG tablet Take 10 mg by mouth daily as needed       desogestrel-ethinyl estradiol (APRI) 0.15-30 MG-MCG tablet Take 1 tablet by mouth daily       escitalopram (LEXAPRO) 20 MG tablet Take 20 mg by mouth daily       famotidine (PEPCID) 20 MG tablet Take 20 mg by mouth daily as needed       ibuprofen (ADVIL/MOTRIN) 200 MG capsule Take 800 mg by mouth once       ondansetron (ZOFRAN-ODT) 4 MG ODT tab Place 4-8 mg under the tongue every 6 hours as needed       pancrelipase, lip-prot-amyl, 3000 UNITS (CREON) CPEP Take 2 capsules by mouth 3 times daily (with meals)       pregabalin (LYRICA) 50 MG capsule  Take 50 mg by mouth       gabapentin (NEURONTIN) 300 MG capsule Take 300 mg by mouth 3 times daily (Patient not taking: Reported on 4/4/2023)       Multiple Vitamin (TAB-A-FABI) TABS Take 1 tablet by mouth daily (Patient not taking: Reported on 4/4/2023)       propranolol (INDERAL) 10 MG tablet Take 10 mg by mouth 3 times daily as needed (Patient not taking: Reported on 4/4/2023)       QUEtiapine (SEROQUEL) 25 MG tablet 25-50mg HS PRN (Patient not taking: Reported on 4/4/2023)         Medical History: any changes in medical history since they were last seen? No      Objective:    Physical Exam:  Last menstrual period 02/16/2023, not currently breastfeeding.  GENERAL: Healthy, alert and no distress  EYES: Eyes grossly normal to inspection.  No discharge or erythema, or obvious scleral/conjunctival abnormalities.  RESP: No audible wheeze, cough, or visible cyanosis.  No visible retractions or increased work of breathing.    SKIN: Visible skin clear. No significant rash, abnormal pigmentation or lesions.  NEURO: Cranial nerves grossly intact.  Mentation and speech appropriate for age.  PSYCH: Mentation appears normal, affect normal/bright, judgement and insight intact, normal speech and appearance well-groomed.    Diagnostic Tests/Imaging/Labs:  None     BILLING TIME DOCUMENTATION:   The total TIME spent on this patient on the date of the encounter/appointment was 36 minutes.      TOTAL TIME includes:   Time spent preparing to see the patient (reviewing records and tests)   Time spent face to face (or over the phone) with the patient   Time spent ordering tests, medications, procedures and referrals   Time spent Referring and communicating with other healthcare professionals   Time spent documenting clinical information in Epic

## 2023-04-04 NOTE — PATIENT INSTRUCTIONS
Physical therapy - continue activity as tolerated at home.     Pain Psychologist to address relaxation, behavioral change, coping style, and other factors important to improvement.  NO - Let me know if you are interested in exploring pain psychology to support chronic pain experience.     Diagnostic Studies:  None     Medication Management:   Discussed buprenorphine today as possible medication option for pain control. She would like to think about it and do some research, which is reasonable. Advised she will need to schedule in person appointment with me, if she decides to pursue this treatment option. See information attached to after visit summary for more information about this drug.   Continue working [place] medical [place] program to optimize pain benefit from cannabis products.     Potential procedures:   None     Other Orders/Referrals:   None     Follow up with ROSIE Park CNP as needed.

## 2023-04-04 NOTE — NURSING NOTE
Depression Response    Patient completed the PHQ-9 assessment for depression and scored >9? Yes  Question 9 on the PHQ-9 was positive for suicidality? Yes  Does patient have current mental health provider?     Is this a virtual visit? Yes   Does patient have suicidal ideation (positive question 9)? Yes (adult) - transfer to [facility] Triage ([phone]) Patient declined transfer.  Notify provider.     I personally notified the following: provider- Jayde Del Toro APRN CNP

## 2023-04-04 NOTE — NURSING NOTE
Called pt.  She admits that yes she does sometimes feel she is better off dead.  This is not a new feeling stating that she feels down about herself due to the drinking and that this is what caused her chronic pain and where she is at right now in her lift.   Plan: no plan.  Is not actively suicidal  Mental health provider: she has therapy every other Tuesday.   Meds: buspar and lexapro.  Taking them as prescribed     Jayde Del Toro DNP, ROSIE, SHADC informed of the above.  She will call pt for visit.      Depression Screening Follow-up      4/4/2023    10:27 AM   PHQ   PHQ-9 Total Score 17   Q9: Thoughts of better off dead/self-harm past 2 weeks Several days           4/4/2023    11:12 AM   C-SSRS (Brief [place])   Within the last month, have you wished you were dead or wished you could go to sleep and not wake up? Yes   Within the last month, have you had any actual thoughts of killing yourself? No   Within the last month, have you ever done anything, started to do anything, or prepared to do anything to end your life? No       Follow Up Actions Taken:Jayde Del Toro DNP, APRN, AGNP-C will discuss w/ pt the need to get this addressed w/ her current mental health provider.       Tara RN-BSN  [facility] Center[facility]

## 2023-04-11 DIAGNOSIS — K86.1 CHRONIC PANCREATITIS (H): Primary | ICD-10-CM

## 2023-04-18 ENCOUNTER — MEDICAL CORRESPONDENCE (OUTPATIENT)
Dept: HEALTH INFORMATION MANAGEMENT | Facility: CLINIC | Age: 28
End: 2023-04-18
Payer: COMMERCIAL

## 2023-04-20 ENCOUNTER — OFFICE VISIT (OUTPATIENT)
Dept: TRANSPLANT | Facility: CLINIC | Age: 28
End: 2023-04-20
Attending: SURGERY
Payer: COMMERCIAL

## 2023-04-20 VITALS
HEART RATE: 65 BPM | SYSTOLIC BLOOD PRESSURE: 94 MMHG | WEIGHT: 133.6 LBS | OXYGEN SATURATION: 100 % | BODY MASS INDEX: 24.44 KG/M2 | DIASTOLIC BLOOD PRESSURE: 62 MMHG

## 2023-04-20 DIAGNOSIS — K86.0 ALCOHOL-INDUCED CHRONIC PANCREATITIS (H): Primary | ICD-10-CM

## 2023-04-20 PROCEDURE — 99214 OFFICE O/P EST MOD 30 MIN: CPT | Performed by: SURGERY

## 2023-04-20 PROCEDURE — G0463 HOSPITAL OUTPT CLINIC VISIT: HCPCS | Performed by: SURGERY

## 2023-04-20 RX ORDER — OXYCODONE HYDROCHLORIDE 5 MG/1
TABLET ORAL
Status: ON HOLD | COMMUNITY
Start: 2023-04-19 | End: 2023-05-10

## 2023-04-20 NOTE — LETTER
4/20/2023         RE: Artie Burger  [address]  [place] [address]        Dear Colleague,    Thank you for referring your patient, Artie Burger, to the [facility]. Please see a copy of my visit note below.    Pancreatitis Service - Consult Note  03/23/2023    Assessment & Plan: 28 yo F with a history of alcohol induced pancreatitis, now with ~2 years of sobriety. In person visit to discuss surgical management options for recurrent pancreatitis.     Surgical approach: per Dr. 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 He has successfully dilated and stented with relief in the past, but she has failed stent free trial. She is referred for consideration of drainage (Jaye operation). On my review of noncon CT, her duct is somewhat dilated but not impressively so (~4mm), though this is limited by lack of contrast. Will need to repeat CT and reassess. Also note large pancreatic head mass with multiple calcifications. She had an updated IV contrast CT that demonstrates a large inflammatory calcified head mass and moderately dilated upstream duct (2-4mm, 5-6mm with stent). The upstream gland is not significantly calcified. Unclear if her optimal surgery would be Jaye vs Whipple or TPIAT. I did not have this imaging available at the time of visit but did discuss that the duct would need to be large enough to facilitate a Jaye, and also what a TPIAT might entail. She is interested in surgery but less interested in TPIAT. I will discuss in CPWG to see if she is felt to be suitable for drainage, or if TPIAT or whipple might be more suitable surgeries up front. I have told her that, if the surgical approach is recommended to be TPIAT or Whipple, we should rediscuss and ensure she is comfortable going forward.     Analgesia: previously not  on narcotics, but currently using Oxycodone 10s ~6 times daily, with more for breakthrough. Also uses other adjuncts. Has some control of pain but not really able to function. Does have a primary care and pain team who manage these medications; will need to establish postoperative management plan with them prior to undertaking surgery.       Pancreatic function: not diabetic, no overt indication of exocrine insufficiency. Has tried creon in the past with no real benefit.     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 She had been losing weight earlier but has since stabilized.     Alcohol/Tobacco cessation: negative PeTH in chart, reports multiple years of abstinence which is consistent with this. She also reports vaping currently but no longer smoking cigarettes. Will need to verify cessation of alcohol and tobacco prior to any surgery. Tobacco cessation especially important in duodenal resecting surgeries (TPIAT, Whipple). Vaping is ok, but not ideal. NSAID cessation also essential, which she understands.     Medical Decision Making: High  Consult [id number] high complexity, 110 minutes      Faculty: Junito Patel MD  __________________________________________________________________  Transplant History: pancreatitis consult  N/A, Postoperative day:      Interval History: History is obtained from the patient  Artie Burger is a very pleasant 26 yo F with a history of acute on chronic alcoholic pancreatitis. She has an extensive history of alcohol and tobacco use going back about ten years, with her first episodes of pancreatitis following shortly thereafter. She was hospitalized at [facility] with recurrent episodes of pancreatitis. She initially abstained from drinking but did have relapses, corresponding with further episodes of pancreatitis.     Dr. Campuzano has known her for a long period over which he has  engaged in numerous successful endoscopic interventions for her. For full details of this, please see his excellent notes. In short, he has been able to clear her duct of stones and did note that she had a very significant stricture in the pancreatic neck with moderate upstream dilation, as well as calcific changes and side branch calcification in the pancreatic head. She has ultimately failed trials of stent free intervals and is referred for surgery.    She says she has now developed daily pain, where previously it was only intermittent. It is in her central abdomen with radiation to the right and into her back, not really to her left. It doesn't have accompanying nausea, but she does have pain with eating and that previously made her food avoidant. She is able to maintain her weight relatively well now, though eating is still difficult. She works in [employer] at a [employer] and is unable to complete her (admittedly very busy, physical) shifts due to pain. When she is able to work, she spends the next day resting and recovering due to her fatigue.     Since our last visit, she had a stent exchange with Dr. Campuzano, but she says that this has not helped her pain at all. She is otherwise in her usual state of health.       Social History     Tobacco Use    Smoking status: Former     Types: Cigarettes     Quit date: 2022     Years since quittin.9    Smokeless tobacco: Current    Tobacco comments:     Vapes daily   Vaping Use    Vaping status: Every Day     Substances: Nicotine   Substance Use Topics    Alcohol use: Not Currently    Drug use: Yes     Types: Marijuana     Comment: smokes daily     ROS:   A 10-point review of systems was negative except as noted above.    Curent Meds:      Physical Exam:     Admit      Current Vitals:   BP 94/62   Pulse 65   Wt 60.6 kg (133 lb 9.6 oz)   SpO2 100%   BMI 24.44 kg/m           Vital sign ranges:       No data found.  General Appearance: in no apparent  distress.   Skin: normal appearance, warm and dry  Heart: RRR, no murmur  Lungs: without wheezes, nonlabored respirations  Abdomen: The abdomen is flat, she is thin. No evident incision or hernia. Narrow costal angle. She has a supraumbilical piercing that she would prefer to maintain if at all possible.   Extremities: edema: appears absent    Data:   CMP@LABRCNTIPR(na:2,potassium:2,chloride:2,co2:2,g,bun:2,cr:2,gfrestimated:2,gfrestblack:2,mark:2,icapoc:2,icaw:2,ma,phos:2,amylase:2,lipase:2,uamy24:3,albumin:2,bilitotal:2,biliconj:2,bilidelta:2,alkphos:2,ast:2,alt:2,fbili:1)@  CBC@LABRCNTIPR(hgb:2,wbc:2,a,plt:2,a1c:1)@  Coags@LABRCNTIPR(inr:2,ptt:2,Xa:2)@   Urinalysis  No lab results found.     Prior CT personally reviewed. Note multifocal inflammatory calcification of the pancreatic head with evident dilated upstream pancreatic duct, though this measures ~4mm to me. Limited by lack of IV contrast.     Updated CT with IV contrast also personally reviewed. Note conventional hepatic arterial anatomy. Small calcifications in gland at body/tail, more significant inflammatory calcification in pancreatic head. Stents in duct without evident stones or debris.       Sincerely,        Junito Patel MD

## 2023-04-21 NOTE — PROGRESS NOTES
Pancreatitis Service - Consult Note  03/23/2023  Video visit:  Medium: [employer]  Patient location: home  Provider location: [facility]  Start time: 3:15PM  End time: 3:40PM      Assessment & Plan: 28 yo F with a history of alcohol induced pancreatitis, now with ~2 years of sobriety. Video visit to discuss surgical management options for recurrent pancreatitis.     Surgical approach: per Dr. 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 He has successfully dilated and stented with relief in the past, but she has failed stent free trial. She is referred for consideration of drainage (Jaye operation). On my review of CT, her duct is somewhat dilated but not impressively so (~4mm), though this is limited by lack of contrast. Will need to repeat CT and reassess. Also note large pancreatic head mass with multiple calcifications. Pending imaging to determine optimal surgical management. Will also need CEA, CA 19-9. If she is interested in proceeding with surgery, she will need an in person visit for assessment and to discuss further.       Analgesia: previously not on narcotics, but currently using Oxycodone 10s ~6 times daily, with more for breakthrough. Also uses other adjuncts. Has some control of pain but not really able to function. Does have a primary care and pain team who manage these medications; will need to establish postoperative management plan with them prior to undertaking surgery.       Pancreatic function: not diabetic, no overt indication of exocrine insufficiency. Has tried creon in the past with no real benefit.     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 She had been losing weight earlier but has since stabilized.     Alcohol/Tobacco  cessation: negative PeTH in chart, reports multiple years of abstinence which is consistent with this. She also reports vaping currently but no longer smoking cigarettes. Will need to verify cessation of alcohol and tobacco prior to any surgery. Tobacco cessation especially important in duodenal resecting surgeries.     Medical Decision Making: High  Consult 52829 high complexity, 110 minutes      Faculty: Junito Patel MD  __________________________________________________________________  Transplant History: pancreatitis consult  N/A, Postoperative day:      Interval History: History is obtained from the patient  Artie Burger is a very pleasant 26 yo F with a history of acute on chronic alcoholic pancreatitis. She has an extensive history of alcohol and tobacco use going back about ten years, with her first episodes of pancreatitis following shortly thereafter. She was hospitalized at [facility] with recurrent episodes of pancreatitis. She initially abstained from drinking but did have relapses, corresponding with further episodes of pancreatitis.     Dr. Campuzano has known her for a long period over which he has engaged in numerous successful endoscopic interventions for her. For full details of this, please see his excellent notes. In short, he has been able to clear her duct of stones and did note that she had a very significant stricture in the pancreatic neck with moderate upstream dilation, as well as calcific changes and side branch calcification in the pancreatic head. She has ultimately failed trials of stent free intervals and is referred for surgery.    She says she has now developed daily pain, where previously it was only intermittent. It is in her central abdomen with radiation to the right and into her back, not really to her left. It doesn't have accompanying nausea, but she does have pain with eating and that previously made her food avoidant. She is able to maintain her  weight relatively well now, though eating is still difficult. She works in [employer] at a [employer] and is unable to complete her (admittedly very busy, physical) shifts due to pain. When she is able to work, she spends the next day resting and recovering due to her fatigue.       Social History     Tobacco Use     Smoking status: Former     Types: Cigarettes     Quit date: 2022     Years since quittin.9     Smokeless tobacco: Current     Tobacco comments:     Vapes daily   Vaping Use     Vaping status: Every Day     Substances: Nicotine   Substance Use Topics     Alcohol use: Not Currently     Drug use: Yes     Types: Marijuana     Comment: smokes daily     ROS:   A 10-point review of systems was negative except as noted above.    Curent Meds:      Physical Exam:     Admit      Current Vitals:   LMP 2023 (Exact Date)          Vital sign ranges:    BP: ()/()   Arterial Line BP: ()/()   No data found.  General Appearance: in no apparent distress.   Skin: normal appearance  Heart: deferred-video visit  Lungs: without wheezes, nonlabored respirations  Abdomen: The abdomen is flat, she is thin. No evident incision or hernia  Extremities: edema: appears absent    Data:   CMP@LABRCNTIPR(na:2,potassium:2,chloride:2,co2:2,g,bun:2,cr:2,gfrestimated:2,gfrestblack:2,mark:2,icapoc:2,icaw:2,ma,phos:2,amylase:2,lipase:2,uamy24:3,albumin:2,bilitotal:2,biliconj:2,bilidelta:2,alkphos:2,ast:2,alt:2,fbili:1)@  CBC@LABRCNTIPR(hgb:2,wbc:2,a,plt:2,a1c:1)@  Coags@LABRCNTIPR(inr:2,ptt:2,Xa:2)@   Urinalysis  No lab results found.     Prior CT personally reviewed. Note multifocal inflammatory calcification of the pancreatic head with evident dilated upstream pancreatic duct, though this measures ~4mm to me. Limited by lack of IV contrast.

## 2023-04-22 NOTE — PROGRESS NOTES
Pancreatitis Service - Consult Note  03/23/2023    Assessment & Plan: 28 yo F with a history of alcohol induced pancreatitis, now with ~2 years of sobriety. In person visit to discuss surgical management options for recurrent pancreatitis.     Surgical approach: per Dr. 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 He has successfully dilated and stented with relief in the past, but she has failed stent free trial. She is referred for consideration of drainage (Jaye operation). On my review of noncon CT, her duct is somewhat dilated but not impressively so (~4mm), though this is limited by lack of contrast. Will need to repeat CT and reassess. Also note large pancreatic head mass with multiple calcifications. She had an updated IV contrast CT that demonstrates a large inflammatory calcified head mass and moderately dilated upstream duct (2-4mm, 5-6mm with stent). The upstream gland is not significantly calcified. Unclear if her optimal surgery would be Jaye vs Whipple or TPIAT. I did not have this imaging available at the time of visit but did discuss that the duct would need to be large enough to facilitate a Jaye, and also what a TPIAT might entail. She is interested in surgery but less interested in TPIAT. I will discuss in CPWG to see if she is felt to be suitable for drainage, or if TPIAT or whipple might be more suitable surgeries up front. I have told her that, if the surgical approach is recommended to be TPIAT or Whipple, we should rediscuss and ensure she is comfortable going forward.     Analgesia: previously not on narcotics, but currently using Oxycodone 10s ~6 times daily, with more for breakthrough. Also uses other adjuncts. Has some control of pain but not really able to function. Does have a primary care and pain team who manage these medications; will need  to establish postoperative management plan with them prior to undertaking surgery.       Pancreatic function: not diabetic, no overt indication of exocrine insufficiency. Has tried creon in the past with no real benefit.     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 She had been losing weight earlier but has since stabilized.     Alcohol/Tobacco cessation: negative PeTH in chart, reports multiple years of abstinence which is consistent with this. She also reports vaping currently but no longer smoking cigarettes. Will need to verify cessation of alcohol and tobacco prior to any surgery. Tobacco cessation especially important in duodenal resecting surgeries (TPIAT, Whipple). Vaping is ok, but not ideal. NSAID cessation also essential, which she understands.     Medical Decision Making: High  Consult [id number] high complexity, 110 minutes      Faculty: Junito Patel MD  __________________________________________________________________  Transplant History: pancreatitis consult  N/A, Postoperative day:      Interval History: History is obtained from the patient  Artie Burger is a very pleasant 26 yo F with a history of acute on chronic alcoholic pancreatitis. She has an extensive history of alcohol and tobacco use going back about ten years, with her first episodes of pancreatitis following shortly thereafter. She was hospitalized at [facility] with recurrent episodes of pancreatitis. She initially abstained from drinking but did have relapses, corresponding with further episodes of pancreatitis.     Dr. Campuzano has known her for a long period over which he has engaged in numerous successful endoscopic interventions for her. For full details of this, please see his excellent notes. In short, he has been able to clear her duct of stones and did note that she had a very significant stricture in the pancreatic neck  with moderate upstream dilation, as well as calcific changes and side branch calcification in the pancreatic head. She has ultimately failed trials of stent free intervals and is referred for surgery.    She says she has now developed daily pain, where previously it was only intermittent. It is in her central abdomen with radiation to the right and into her back, not really to her left. It doesn't have accompanying nausea, but she does have pain with eating and that previously made her food avoidant. She is able to maintain her weight relatively well now, though eating is still difficult. She works in [employer] at [employer] and is unable to complete her (admittedly very busy, physical) shifts due to pain. When she is able to work, she spends the next day resting and recovering due to her fatigue.     Since our last visit, she had a stent exchange with Dr. Campuzano, but she says that this has not helped her pain at all. She is otherwise in her usual state of health.       Social History     Tobacco Use     Smoking status: Former     Types: Cigarettes     Quit date: 2022     Years since quittin.9     Smokeless tobacco: Current     Tobacco comments:     Vapes daily   Vaping Use     Vaping status: Every Day     Substances: Nicotine   Substance Use Topics     Alcohol use: Not Currently     Drug use: Yes     Types: Marijuana     Comment: smokes daily     ROS:   A 10-point review of systems was negative except as noted above.    Curent Meds:      Physical Exam:     Admit      Current Vitals:   BP 94/62   Pulse 65   Wt 60.6 kg (133 lb 9.6 oz)   SpO2 100%   BMI 24.44 kg/m           Vital sign ranges:       No data found.  General Appearance: in no apparent distress.   Skin: normal appearance, warm and dry  Heart: RRR, no murmur  Lungs: without wheezes, nonlabored respirations  Abdomen: The abdomen is flat, she is thin. No evident incision or hernia. Narrow costal angle. She has a supraumbilical piercing  that she would prefer to maintain if at all possible.   Extremities: edema: appears absent    Data:   CMP@LABRCNTIPR(na:2,potassium:2,chloride:2,co2:2,g,bun:2,cr:2,gfrestimated:2,gfrestblack:2,mark:2,icapoc:2,icaw:2,ma,phos:2,amylase:2,lipase:2,uamy24:3,albumin:2,bilitotal:2,biliconj:2,bilidelta:2,alkphos:2,ast:2,alt:2,fbili:1)@  CBC@LABRCNTIPR(hgb:2,wbc:2,a,plt:2,a1c:1)@  Coags@LABRCNTIPR(inr:2,ptt:2,Xa:2)@   Urinalysis  No lab results found.     Prior CT personally reviewed. Note multifocal inflammatory calcification of the pancreatic head with evident dilated upstream pancreatic duct, though this measures ~4mm to me. Limited by lack of IV contrast.     Updated CT with IV contrast also personally reviewed. Note conventional hepatic arterial anatomy. Small calcifications in gland at body/tail, more significant inflammatory calcification in pancreatic head. Stents in duct without evident stones or debris.

## 2023-05-09 ENCOUNTER — ANESTHESIA EVENT (OUTPATIENT)
Dept: SURGERY | Facility: CLINIC | Age: 28
End: 2023-05-09
Payer: COMMERCIAL

## 2023-05-10 ENCOUNTER — HOSPITAL ENCOUNTER (OUTPATIENT)
Facility: CLINIC | Age: 28
Discharge: HOME | End: 2023-05-10
Attending: INTERNAL MEDICINE | Admitting: INTERNAL MEDICINE
Payer: COMMERCIAL

## 2023-05-10 ENCOUNTER — ANESTHESIA (OUTPATIENT)
Dept: SURGERY | Facility: CLINIC | Age: 28
End: 2023-05-10
Payer: COMMERCIAL

## 2023-05-10 ENCOUNTER — APPOINTMENT (OUTPATIENT)
Dept: GENERAL RADIOLOGY | Facility: CLINIC | Age: 28
End: 2023-05-10
Attending: INTERNAL MEDICINE
Payer: COMMERCIAL

## 2023-05-10 VITALS
WEIGHT: 132.5 LBS | HEIGHT: 62 IN | DIASTOLIC BLOOD PRESSURE: 70 MMHG | OXYGEN SATURATION: 98 % | BODY MASS INDEX: 24.38 KG/M2 | SYSTOLIC BLOOD PRESSURE: 109 MMHG | HEART RATE: 64 BPM | TEMPERATURE: 98 F | RESPIRATION RATE: 16 BRPM

## 2023-05-10 DIAGNOSIS — K86.0 ALCOHOL-INDUCED CHRONIC PANCREATITIS (H): Primary | ICD-10-CM

## 2023-05-10 LAB
ALBUMIN SERPL BCG-MCNC: 3.9 G/DL (ref 3.5–5.2)
ALP SERPL-CCNC: 58 U/L (ref 35–104)
ALT SERPL W P-5'-P-CCNC: 8 U/L (ref 10–35)
AMYLASE SERPL-CCNC: 54 U/L (ref 28–100)
ANION GAP SERPL CALCULATED.3IONS-SCNC: 11 MMOL/L (ref 7–15)
AST SERPL W P-5'-P-CCNC: 21 U/L (ref 10–35)
BILIRUB SERPL-MCNC: 0.4 MG/DL
BUN SERPL-MCNC: 14.3 MG/DL (ref 6–20)
CALCIUM SERPL-MCNC: 8.9 MG/DL (ref 8.6–10)
CHLORIDE SERPL-SCNC: 109 MMOL/L (ref 98–107)
CREAT SERPL-MCNC: 0.72 MG/DL (ref 0.51–0.95)
DEPRECATED HCO3 PLAS-SCNC: 18 MMOL/L (ref 22–29)
ERCP: NORMAL
ERYTHROCYTE [DISTWIDTH] IN BLOOD BY AUTOMATED COUNT: 13.7 % (ref 10–15)
GFR SERPL CREATININE-BSD FRML MDRD: >90 ML/MIN/1.73M2
GLUCOSE SERPL-MCNC: 107 MG/DL (ref 70–99)
HCT VFR BLD AUTO: 30.5 % (ref 35–47)
HGB BLD-MCNC: 10.3 G/DL (ref 11.7–15.7)
INR PPP: 1 (ref 0.85–1.15)
LIPASE SERPL-CCNC: 29 U/L (ref 13–60)
MCH RBC QN AUTO: 29.9 PG (ref 26.5–33)
MCHC RBC AUTO-ENTMCNC: 33.8 G/DL (ref 31.5–36.5)
MCV RBC AUTO: 89 FL (ref 78–100)
PLATELET # BLD AUTO: 290 10E3/UL (ref 150–450)
POTASSIUM SERPL-SCNC: 4 MMOL/L (ref 3.4–5.3)
PROT SERPL-MCNC: 6.5 G/DL (ref 6.4–8.3)
RBC # BLD AUTO: 3.44 10E6/UL (ref 3.8–5.2)
SODIUM SERPL-SCNC: 138 MMOL/L (ref 136–145)
WBC # BLD AUTO: 9 10E3/UL (ref 4–11)

## 2023-05-10 PROCEDURE — 83690 ASSAY OF LIPASE: CPT | Performed by: INTERNAL MEDICINE

## 2023-05-10 PROCEDURE — 999N000179 XR SURGERY CARM FLUORO LESS THAN 5 MIN W STILLS: Mod: TC

## 2023-05-10 PROCEDURE — 250N000009 HC RX 250: Performed by: INTERNAL MEDICINE

## 2023-05-10 PROCEDURE — 999N000141 HC STATISTIC PRE-PROCEDURE NURSING ASSESSMENT: Performed by: INTERNAL MEDICINE

## 2023-05-10 PROCEDURE — 360N000082 HC SURGERY LEVEL 2 W/ FLUORO, PER MIN: Performed by: INTERNAL MEDICINE

## 2023-05-10 PROCEDURE — C2617 STENT, NON-COR, TEM W/O DEL: HCPCS | Performed by: INTERNAL MEDICINE

## 2023-05-10 PROCEDURE — 250N000011 HC RX IP 250 OP 636: Performed by: ANESTHESIOLOGY

## 2023-05-10 PROCEDURE — 710N000009 HC RECOVERY PHASE 1, LEVEL 1, PER MIN: Performed by: INTERNAL MEDICINE

## 2023-05-10 PROCEDURE — 250N000025 HC SEVOFLURANE, PER MIN: Performed by: INTERNAL MEDICINE

## 2023-05-10 PROCEDURE — 370N000017 HC ANESTHESIA TECHNICAL FEE, PER MIN: Performed by: INTERNAL MEDICINE

## 2023-05-10 PROCEDURE — 258N000003 HC RX IP 258 OP 636: Performed by: ANESTHESIOLOGY

## 2023-05-10 PROCEDURE — 272N000001 HC OR GENERAL SUPPLY STERILE: Performed by: INTERNAL MEDICINE

## 2023-05-10 PROCEDURE — 85027 COMPLETE CBC AUTOMATED: CPT | Performed by: INTERNAL MEDICINE

## 2023-05-10 PROCEDURE — C1769 GUIDE WIRE: HCPCS | Performed by: INTERNAL MEDICINE

## 2023-05-10 PROCEDURE — 250N000009 HC RX 250: Performed by: ANESTHESIOLOGY

## 2023-05-10 PROCEDURE — 250N000013 HC RX MED GY IP 250 OP 250 PS 637: Performed by: ANESTHESIOLOGY

## 2023-05-10 PROCEDURE — 82150 ASSAY OF AMYLASE: CPT | Performed by: INTERNAL MEDICINE

## 2023-05-10 PROCEDURE — 36415 COLL VENOUS BLD VENIPUNCTURE: CPT | Performed by: INTERNAL MEDICINE

## 2023-05-10 PROCEDURE — 250N000012 HC RX MED GY IP 250 OP 636 PS 637: Performed by: ANESTHESIOLOGY

## 2023-05-10 PROCEDURE — 255N000002 HC RX 255 OP 636: Performed by: INTERNAL MEDICINE

## 2023-05-10 PROCEDURE — 80053 COMPREHEN METABOLIC PANEL: CPT | Performed by: INTERNAL MEDICINE

## 2023-05-10 PROCEDURE — 710N000012 HC RECOVERY PHASE 2, PER MINUTE: Performed by: INTERNAL MEDICINE

## 2023-05-10 PROCEDURE — 85610 PROTHROMBIN TIME: CPT | Performed by: INTERNAL MEDICINE

## 2023-05-10 PROCEDURE — C1726 CATH, BAL DIL, NON-VASCULAR: HCPCS | Performed by: INTERNAL MEDICINE

## 2023-05-10 DEVICE — STENT ZIMMON PANCREA 5FRX10CM SGL PIGTAIL G22362
Type: IMPLANTABLE DEVICE | Site: PANCREATIC DUCT | Status: NON-FUNCTIONAL
Removed: 2023-07-12

## 2023-05-10 RX ORDER — OXYCODONE HYDROCHLORIDE 10 MG/1
10 TABLET ORAL
Status: CANCELLED | OUTPATIENT
Start: 2023-05-10

## 2023-05-10 RX ORDER — DIMENHYDRINATE 50 MG/ML
25 INJECTION, SOLUTION INTRAMUSCULAR; INTRAVENOUS
Status: DISCONTINUED | OUTPATIENT
Start: 2023-05-10 | End: 2023-05-10 | Stop reason: HOSPADM

## 2023-05-10 RX ORDER — FENTANYL CITRATE 50 UG/ML
50 INJECTION, SOLUTION INTRAMUSCULAR; INTRAVENOUS EVERY 5 MIN PRN
Status: DISCONTINUED | OUTPATIENT
Start: 2023-05-10 | End: 2023-05-10 | Stop reason: HOSPADM

## 2023-05-10 RX ORDER — ONDANSETRON 2 MG/ML
INJECTION INTRAMUSCULAR; INTRAVENOUS PRN
Status: DISCONTINUED | OUTPATIENT
Start: 2023-05-10 | End: 2023-05-10

## 2023-05-10 RX ORDER — APREPITANT 40 MG/1
40 CAPSULE ORAL DAILY
Status: DISCONTINUED | OUTPATIENT
Start: 2023-05-10 | End: 2023-05-10 | Stop reason: HOSPADM

## 2023-05-10 RX ORDER — NALOXONE HYDROCHLORIDE 0.4 MG/ML
0.4 INJECTION, SOLUTION INTRAMUSCULAR; INTRAVENOUS; SUBCUTANEOUS
Status: DISCONTINUED | OUTPATIENT
Start: 2023-05-10 | End: 2023-05-10 | Stop reason: HOSPADM

## 2023-05-10 RX ORDER — ONDANSETRON 2 MG/ML
4 INJECTION INTRAMUSCULAR; INTRAVENOUS EVERY 30 MIN PRN
Status: DISCONTINUED | OUTPATIENT
Start: 2023-05-10 | End: 2023-05-10 | Stop reason: HOSPADM

## 2023-05-10 RX ORDER — PROPOFOL 10 MG/ML
INJECTION, EMULSION INTRAVENOUS PRN
Status: DISCONTINUED | OUTPATIENT
Start: 2023-05-10 | End: 2023-05-10

## 2023-05-10 RX ORDER — SODIUM CHLORIDE, SODIUM LACTATE, POTASSIUM CHLORIDE, CALCIUM CHLORIDE 600; 310; 30; 20 MG/100ML; MG/100ML; MG/100ML; MG/100ML
INJECTION, SOLUTION INTRAVENOUS CONTINUOUS
Status: DISCONTINUED | OUTPATIENT
Start: 2023-05-10 | End: 2023-05-10 | Stop reason: HOSPADM

## 2023-05-10 RX ORDER — DEXAMETHASONE SODIUM PHOSPHATE 4 MG/ML
4 INJECTION, SOLUTION INTRA-ARTICULAR; INTRALESIONAL; INTRAMUSCULAR; INTRAVENOUS; SOFT TISSUE
Status: DISCONTINUED | OUTPATIENT
Start: 2023-05-10 | End: 2023-05-10 | Stop reason: HOSPADM

## 2023-05-10 RX ORDER — LEVOFLOXACIN 5 MG/ML
INJECTION, SOLUTION INTRAVENOUS PRN
Status: DISCONTINUED | OUTPATIENT
Start: 2023-05-10 | End: 2023-05-10

## 2023-05-10 RX ORDER — ACETAMINOPHEN 325 MG/1
975 TABLET ORAL
Status: COMPLETED | OUTPATIENT
Start: 2023-05-10 | End: 2023-05-10

## 2023-05-10 RX ORDER — FLUMAZENIL 0.1 MG/ML
0.2 INJECTION, SOLUTION INTRAVENOUS
Status: DISCONTINUED | OUTPATIENT
Start: 2023-05-10 | End: 2023-05-10 | Stop reason: HOSPADM

## 2023-05-10 RX ORDER — IOPAMIDOL 510 MG/ML
INJECTION, SOLUTION INTRAVASCULAR PRN
Status: DISCONTINUED | OUTPATIENT
Start: 2023-05-10 | End: 2023-05-10 | Stop reason: HOSPADM

## 2023-05-10 RX ORDER — KETAMINE HYDROCHLORIDE 10 MG/ML
INJECTION INTRAMUSCULAR; INTRAVENOUS PRN
Status: DISCONTINUED | OUTPATIENT
Start: 2023-05-10 | End: 2023-05-10

## 2023-05-10 RX ORDER — INDOMETHACIN 50 MG/1
SUPPOSITORY RECTAL PRN
Status: DISCONTINUED | OUTPATIENT
Start: 2023-05-10 | End: 2023-05-10 | Stop reason: HOSPADM

## 2023-05-10 RX ORDER — ONDANSETRON 2 MG/ML
4 INJECTION INTRAMUSCULAR; INTRAVENOUS EVERY 6 HOURS PRN
Status: DISCONTINUED | OUTPATIENT
Start: 2023-05-10 | End: 2023-05-10 | Stop reason: HOSPADM

## 2023-05-10 RX ORDER — ONDANSETRON 4 MG/1
4 TABLET, ORALLY DISINTEGRATING ORAL EVERY 30 MIN PRN
Status: DISCONTINUED | OUTPATIENT
Start: 2023-05-10 | End: 2023-05-10 | Stop reason: HOSPADM

## 2023-05-10 RX ORDER — LIDOCAINE 40 MG/G
CREAM TOPICAL
Status: DISCONTINUED | OUTPATIENT
Start: 2023-05-10 | End: 2023-05-10 | Stop reason: HOSPADM

## 2023-05-10 RX ORDER — HYDRALAZINE HYDROCHLORIDE 20 MG/ML
2.5-5 INJECTION INTRAMUSCULAR; INTRAVENOUS EVERY 10 MIN PRN
Status: DISCONTINUED | OUTPATIENT
Start: 2023-05-10 | End: 2023-05-10 | Stop reason: HOSPADM

## 2023-05-10 RX ORDER — SODIUM CHLORIDE, SODIUM LACTATE, POTASSIUM CHLORIDE, CALCIUM CHLORIDE 600; 310; 30; 20 MG/100ML; MG/100ML; MG/100ML; MG/100ML
INJECTION, SOLUTION INTRAVENOUS CONTINUOUS PRN
Status: DISCONTINUED | OUTPATIENT
Start: 2023-05-10 | End: 2023-05-10

## 2023-05-10 RX ORDER — OXYCODONE AND ACETAMINOPHEN 5; 325 MG/1; MG/1
1 TABLET ORAL EVERY 6 HOURS PRN
Qty: 20 TABLET | Refills: 0 | Status: SHIPPED | OUTPATIENT
Start: 2023-05-10 | End: 2023-05-15

## 2023-05-10 RX ORDER — OXYCODONE HYDROCHLORIDE 5 MG/1
5 TABLET ORAL
Status: CANCELLED | OUTPATIENT
Start: 2023-05-10

## 2023-05-10 RX ORDER — HYDROMORPHONE HCL IN WATER/PF 6 MG/30 ML
0.2 PATIENT CONTROLLED ANALGESIA SYRINGE INTRAVENOUS EVERY 5 MIN PRN
Status: DISCONTINUED | OUTPATIENT
Start: 2023-05-10 | End: 2023-05-10 | Stop reason: HOSPADM

## 2023-05-10 RX ORDER — NALOXONE HYDROCHLORIDE 0.4 MG/ML
0.2 INJECTION, SOLUTION INTRAMUSCULAR; INTRAVENOUS; SUBCUTANEOUS
Status: DISCONTINUED | OUTPATIENT
Start: 2023-05-10 | End: 2023-05-10 | Stop reason: HOSPADM

## 2023-05-10 RX ORDER — FENTANYL CITRATE 50 UG/ML
INJECTION, SOLUTION INTRAMUSCULAR; INTRAVENOUS PRN
Status: DISCONTINUED | OUTPATIENT
Start: 2023-05-10 | End: 2023-05-10

## 2023-05-10 RX ORDER — HYDROXYZINE HYDROCHLORIDE 25 MG/1
25 TABLET, FILM COATED ORAL EVERY 6 HOURS PRN
Status: DISCONTINUED | OUTPATIENT
Start: 2023-05-10 | End: 2023-05-10 | Stop reason: HOSPADM

## 2023-05-10 RX ORDER — LABETALOL HYDROCHLORIDE 5 MG/ML
10 INJECTION, SOLUTION INTRAVENOUS
Status: DISCONTINUED | OUTPATIENT
Start: 2023-05-10 | End: 2023-05-10 | Stop reason: HOSPADM

## 2023-05-10 RX ORDER — LIDOCAINE HYDROCHLORIDE 20 MG/ML
INJECTION, SOLUTION INFILTRATION; PERINEURAL PRN
Status: DISCONTINUED | OUTPATIENT
Start: 2023-05-10 | End: 2023-05-10

## 2023-05-10 RX ORDER — HYDROMORPHONE HCL IN WATER/PF 6 MG/30 ML
0.4 PATIENT CONTROLLED ANALGESIA SYRINGE INTRAVENOUS EVERY 5 MIN PRN
Status: DISCONTINUED | OUTPATIENT
Start: 2023-05-10 | End: 2023-05-10 | Stop reason: HOSPADM

## 2023-05-10 RX ORDER — FENTANYL CITRATE 50 UG/ML
25 INJECTION, SOLUTION INTRAMUSCULAR; INTRAVENOUS EVERY 5 MIN PRN
Status: DISCONTINUED | OUTPATIENT
Start: 2023-05-10 | End: 2023-05-10 | Stop reason: HOSPADM

## 2023-05-10 RX ORDER — DEXAMETHASONE SODIUM PHOSPHATE 4 MG/ML
INJECTION, SOLUTION INTRA-ARTICULAR; INTRALESIONAL; INTRAMUSCULAR; INTRAVENOUS; SOFT TISSUE PRN
Status: DISCONTINUED | OUTPATIENT
Start: 2023-05-10 | End: 2023-05-10

## 2023-05-10 RX ORDER — ONDANSETRON 4 MG/1
4 TABLET, ORALLY DISINTEGRATING ORAL EVERY 6 HOURS PRN
Status: DISCONTINUED | OUTPATIENT
Start: 2023-05-10 | End: 2023-05-10 | Stop reason: HOSPADM

## 2023-05-10 RX ADMIN — APREPITANT 40 MG: 40 CAPSULE ORAL at 07:11

## 2023-05-10 RX ADMIN — Medication 20 MG: at 08:00

## 2023-05-10 RX ADMIN — ACETAMINOPHEN 975 MG: 325 TABLET ORAL at 09:26

## 2023-05-10 RX ADMIN — FAMOTIDINE 20 MG: 10 INJECTION, SOLUTION INTRAVENOUS at 07:19

## 2023-05-10 RX ADMIN — DEXAMETHASONE SODIUM PHOSPHATE 8 MG: 4 INJECTION, SOLUTION INTRA-ARTICULAR; INTRALESIONAL; INTRAMUSCULAR; INTRAVENOUS; SOFT TISSUE at 07:50

## 2023-05-10 RX ADMIN — Medication 40 MG: at 07:48

## 2023-05-10 RX ADMIN — PROPOFOL 200 MG: 10 INJECTION, EMULSION INTRAVENOUS at 07:47

## 2023-05-10 RX ADMIN — FENTANYL CITRATE 50 MCG: 50 INJECTION, SOLUTION INTRAMUSCULAR; INTRAVENOUS at 08:19

## 2023-05-10 RX ADMIN — FENTANYL CITRATE 25 MCG: 50 INJECTION, SOLUTION INTRAMUSCULAR; INTRAVENOUS at 09:18

## 2023-05-10 RX ADMIN — PROPOFOL 20 MCG/KG/MIN: 10 INJECTION, EMULSION INTRAVENOUS at 08:00

## 2023-05-10 RX ADMIN — FENTANYL CITRATE 25 MCG: 50 INJECTION, SOLUTION INTRAMUSCULAR; INTRAVENOUS at 09:00

## 2023-05-10 RX ADMIN — LEVOFLOXACIN 500 MG: 5 INJECTION, SOLUTION INTRAVENOUS at 07:32

## 2023-05-10 RX ADMIN — FENTANYL CITRATE 50 MCG: 50 INJECTION, SOLUTION INTRAMUSCULAR; INTRAVENOUS at 07:42

## 2023-05-10 RX ADMIN — SODIUM CHLORIDE, POTASSIUM CHLORIDE, SODIUM LACTATE AND CALCIUM CHLORIDE: 600; 310; 30; 20 INJECTION, SOLUTION INTRAVENOUS at 07:33

## 2023-05-10 RX ADMIN — ONDANSETRON 4 MG: 2 INJECTION INTRAMUSCULAR; INTRAVENOUS at 08:03

## 2023-05-10 RX ADMIN — SUGAMMADEX 200 MG: 100 INJECTION, SOLUTION INTRAVENOUS at 08:39

## 2023-05-10 RX ADMIN — PHENYLEPHRINE HYDROCHLORIDE 100 MCG: 10 INJECTION INTRAVENOUS at 08:05

## 2023-05-10 RX ADMIN — LIDOCAINE HYDROCHLORIDE 100 MG: 20 INJECTION, SOLUTION INFILTRATION; PERINEURAL at 07:47

## 2023-05-10 RX ADMIN — MIDAZOLAM 2 MG: 1 INJECTION INTRAMUSCULAR; INTRAVENOUS at 07:29

## 2023-05-10 RX ADMIN — FENTANYL CITRATE 50 MCG: 50 INJECTION, SOLUTION INTRAMUSCULAR; INTRAVENOUS at 08:55

## 2023-05-10 ASSESSMENT — ACTIVITIES OF DAILY LIVING (ADL)
ADLS_ACUITY_SCORE: 35

## 2023-05-10 ASSESSMENT — LIFESTYLE VARIABLES: TOBACCO_USE: 1

## 2023-05-10 NOTE — DISCHARGE INSTRUCTIONS
[facility]  Same-Day Surgery   Adult Discharge Orders & Instructions     For 24 hours after surgery    Get plenty of rest.  A responsible adult must stay with you for at least 24 hours after you leave the hospital.   Do not drive or use heavy equipment.  If you have weakness or tingling, don't drive or use heavy equipment until this feeling goes away.  Do not drink alcohol.  Avoid strenuous or risky activities.  Ask for help when climbing stairs.   You may feel lightheaded.  IF so, sit for a few minutes before standing.  Have someone help you get up.   If you have nausea (feel sick to your stomach): Drink only clear liquids such as apple juice, ginger ale, broth or 7-Up.  Rest may also help.  Be sure to drink enough fluids.  Move to a regular diet as you feel able.  You may have a slight fever. Call the doctor if your fever is over 100 F (37.7 C) (taken under the tongue) or lasts longer than 24 hours.  You may have a dry mouth, a sore throat, muscle aches or trouble sleeping.  These should go away after 24 hours.  Do not make important or legal decisions.   Call your doctor for any of the followin.  Signs of infection (fever, growing tenderness at the surgery site, a large amount of drainage or bleeding, severe pain, foul-smelling drainage, redness, swelling).    2. It has been over 8 to 10 hours since surgery and you are still not able to urinate (pass water).    3.  Headache for over 24 hours.    4.  Numbness, tingling or weakness the day after surgery (if you had spinal anesthesia).  To contact a doctor, call _Dr. Guru Campuzano @ [phone] (GI Clinic) or [phone] or:    '   [phone] and ask for the resident on call for   ______________________________________________ (answered 24 hours a day)  '   Emergency Department:    [facility]: [phone]       (TTY for hearing impaired: [phone])    [facility]: [phone]       (TTY for  hearing impaired: [phone])

## 2023-05-10 NOTE — ANESTHESIA CARE TRANSFER NOTE
Patient: Artie Burger    Procedure: Procedure(s):  ENDOSCOPIC RETROGRADE CHOLANGIOPANCREATOGRAPHY with pancreatic stents exchange and stone extraction       Diagnosis: Pancreatic duct stricture [K86.89]  Diagnosis Additional Information: No value filed.    Anesthesia Type:   General     Note:    Oropharynx: oropharynx clear of all foreign objects  Level of Consciousness: awake  Oxygen Supplementation: nasal cannula  Level of Supplemental Oxygen (L/min / FiO2): 3  Independent Airway: airway patency satisfactory and stable  Dentition: dentition unchanged  Vital Signs Stable: post-procedure vital signs reviewed and stable  Report to RN Given: handoff report given  Patient transferred to: PACU    Handoff Report: Identifed the Patient, Identified the Reponsible Provider, Reviewed the pertinent medical history, Discussed the surgical course, Reviewed Intra-OP anesthesia mangement and issues during anesthesia, Set expectations for post-procedure period and Allowed opportunity for questions and acknowledgement of understanding      Vitals:  Vitals Value Taken Time   BP     Temp     Pulse     Resp 9 05/10/23 0849   SpO2 99 % 05/10/23 0849   Vitals shown include unvalidated device data.    Electronically Signed By: ROSIE Turpin CRNA  May 10, 2023  8:50 AM

## 2023-05-10 NOTE — ANESTHESIA PROCEDURE NOTES
Airway       Patient location during procedure: OR       Procedure Start/Stop Times: 5/10/2023 7:50 AM  Staff -        CRNA: Riley Coe APRN CRNA       Performed By: CRNA  Consent for Airway        Urgency: elective  Indications and Patient Condition       Indications for airway management: rolando-procedural       Induction type:intravenous       Mask difficulty assessment: 1 - vent by mask    Final Airway Details       Final airway type: endotracheal airway       Successful airway: ETT - single  Endotracheal Airway Details        ETT size (mm): 7.0       Cuffed: yes       Successful intubation technique: direct laryngoscopy       DL Blade Type: Marsh 2       Grade View of Cords: 1       Adjucts: stylet       Position: Right       Measured from: lips       Secured at (cm): 21       Bite block used: None    Post intubation assessment        Placement verified by: capnometry, equal breath sounds and chest rise        Number of attempts at approach: 1       Number of other approaches attempted: 0       Secured with: plastic tape       Ease of procedure: easy       Dentition: Intact and Unchanged    Medication(s) Administered   Medication Administration Time: 5/10/2023 7:50 AM

## 2023-05-10 NOTE — ANESTHESIA PREPROCEDURE EVALUATION
Anesthesia Pre-Procedure Evaluation    Patient: Artie Burger   MRN: [id number] : 1995        Procedure : Procedure(s):  ENDOSCOPIC RETROGRADE CHOLANGIOPANCREATOGRAPHY to re-evaluate pancreatic duct stricture          Past Medical History:   Diagnosis Date     Alcohol-induced chronic pancreatitis (H)      Anxiety      Depression      Gastroesophageal reflux disease      Pancreatic disease      PONV (postoperative nausea and vomiting)       Past Surgical History:   Procedure Laterality Date     ENDOSCOPIC RETROGRADE CHOLANGIOPANCREATOGRAM COMPLEX N/A 2021    Procedure: ENDOSCOPIC RETROGRADE CHOLANGIOPANCREATOGRAPHY with pancreatic sphincterotomy, dilation, stone removal, stent placement;  Surgeon: Guru Sabino Campuzano MD;  Location: [facility]     ENDOSCOPIC RETROGRADE CHOLANGIOPANCREATOGRAM WITH DIRECT VISUALIZATION SYSTEM AND GENERATOR N/A 2021    Procedure: ENDOSCOPIC RETROGRADE CHOLANGIOPANCREATOGRAPHY, dilation and debridement sludge removal, pancreatic duct stent placement;  Surgeon: Guru Sabino Campuzano MD;  Location: [facility]     ENDOSCOPIC RETROGRADE CHOLANGIOPANCREATOGRAM WITH SPYGLASS N/A 10/6/2021    Procedure: ENDOSCOPIC RETROGRADE CHOLANGIOPANCREATOGRAPHY, WITH DIRECT DUCT VISUALIZATION, USING PANCREATICOBILIARY FIBEROPTIC PROBE, BILE DUCT STENT EXCHANGE, LITHOTRIPSY OF PANCREATIC STONE, SPHINCTEROTOMY, BALLOON DILATION OF PANCREATIC DUCT AND STONE EXTRACTION;  Surgeon: Guru Sabino Campuzano MD;  Location: [facility]     ENDOSCOPIC RETROGRADE CHOLANGIOPANCREATOGRAM WITH SPYGLASS N/A 2021    Procedure: ENDOSCOPIC RETROGRADE CHOLANGIOPANCREATOGRAPHY, with pancreatic stent exchange, ballon dilation, stone removal, with spyglass direct visualization;  Surgeon: Guru Sabino Campuzano MD;  Location[facility]     ENDOSCOPIC RETROGRADE CHOLANGIOPANCREATOGRAPHY, EXCHANGE TUBE/STENT N/A 2022    Procedure: ENDOSCOPIC RETROGRADE  CHOLANGIOPANCREATOGRAPH with pancreatic dilation, debris removal and stent exchange.;  Surgeon: Guru Sabino Campuzano MD;  Location: [facility]     ENDOSCOPIC RETROGRADE CHOLANGIOPANCREATOGRAPHY, EXCHANGE TUBE/STENT N/A 4/4/2022    Procedure: ENDOSCOPIC RETROGRADE CHOLANGIOPANCREATOGRAPHY, WITH REPLACEMENT OF pancreatic STENT, stone removal;  Surgeon: Guru Sabino Campuzano MD;  [facility]     ENDOSCOPIC RETROGRADE CHOLANGIOPANCREATOGRAPHY, EXCHANGE TUBE/STENT N/A 7/13/2022    Procedure: ENDOSCOPIC RETROGRADE CHOLANGIOPANCREATOGRAPHY, WITH pancreatic duct dilation and stent exchange, biliary stent placement, debris removal;  Surgeon: Guru Sabino Campuzano MD;  [facility]     ENDOSCOPIC RETROGRADE CHOLANGIOPANCREATOGRAPHY, EXCHANGE TUBE/STENT N/A 9/14/2022    Procedure: ENDOSCOPIC RETROGRADE CHOLANGIOPANCREATOGRAPHY, WITH pancreatic stone removal, biliary stent removal, pancreatic stent exchange;  Surgeon: Guru Sabino Campuzano MD;  [facility]     ENDOSCOPIC RETROGRADE CHOLANGIOPANCREATOGRAPHY, EXCHANGE TUBE/STENT N/A 11/21/2022    Procedure: ENDOSCOPIC RETROGRADE CHOLANGIOPANCREATOGRAPHY WITH PANCREATIC DUCT STENT EXCHANGE, DILATION, AND DEBRIS REMOVAL;  Surgeon: Guru Sabino Campuzano MD;  [facility]     ENDOSCOPIC RETROGRADE CHOLANGIOPANCREATOGRAPHY, EXCHANGE TUBE/STENT N/A 1/18/2023    Procedure: ENDOSCOPIC RETROGRADE CHOLANGIOPANCREATOGRAPHY stent exchange, dilation, sludge and stone removal;  Surgeon: Guru Sabino Campuzano MD;  [facility]     ENDOSCOPIC RETROGRADE CHOLANGIOPANCREATOGRAPHY, EXCHANGE TUBE/STENT N/A 3/13/2023    Procedure: ENDOSCOPIC RETROGRADE CHOLANGIOPANCREATOGRAPHY, WITH replacement of pancreatic stents, bile duct stent placed,sphinterotomy of bile duct ampulla, balloon sweep of bile duct for sludge,;  Surgeon: Guru Sabino Campuzano MD;  Location: [facility]      No Known  "Allergies   Social History     Tobacco Use     Smoking status: Former     Types: Cigarettes     Quit date: 2022     Years since quittin.0     Smokeless tobacco: Current     Tobacco comments:     Vapes daily   Vaping Use     Vaping status: Every Day     Substances: Nicotine   Substance Use Topics     Alcohol use: Not Currently      Wt Readings from Last 1 Encounters:   23 60.6 kg (133 lb 9.6 oz)        Medications Prior to Admission   Medication Sig Dispense Refill Last Dose     acetaminophen (TYLENOL) 500 MG tablet Take 500-1,000 mg by mouth every 6 hours as needed for mild pain   Past Month     busPIRone (BUSPAR) 10 MG tablet Take 10 mg by mouth 3 times daily    5/10/2023     cetirizine (ZYRTEC) 10 MG tablet Take 10 mg by mouth daily as needed   Past Month     escitalopram (LEXAPRO) 20 MG tablet Take 20 mg by mouth daily   5/10/2023 at 0500     famotidine (PEPCID) 20 MG tablet Take 20 mg by mouth daily as needed   Past Month     ibuprofen (ADVIL/MOTRIN) 200 MG capsule Take 800 mg by mouth once   2023 at 1200     ondansetron (ZOFRAN-ODT) 4 MG ODT tab Place 4-8 mg under the tongue every 6 hours as needed   Past Month     pregabalin (LYRICA) 50 MG capsule Take 50 mg by mouth   5/10/2023 at 0500     desogestrel-ethinyl estradiol (APRI) 0.15-30 MG-MCG tablet Take 1 tablet by mouth daily (Patient not taking: Reported on 2023)        Multiple Vitamin (TAB-A-FABI) TABS Take 1 tablet by mouth daily   2023 at 0800     pancrelipase, lip-prot-amyl, 3000 UNITS (CREON) CPEP Take 2 capsules by mouth 3 times daily (with meals)   2023 at 1900     Blood pressure 98/64, pulse 63, temperature 36.8  C (98.3  F), temperature source Oral, resp. rate 16, height 1.575 m (5' 2\"), weight 60.1 kg (132 lb 7.9 oz), last menstrual period 05/10/2023, SpO2 100 %, not currently breastfeeding.      Anesthesia Evaluation   Pt has had prior anesthetic. Type: General.    History of anesthetic complications       ROS/MED " HX  ENT/Pulmonary: Comment: Vaped today - neg pulmonary ROS   (+) tobacco use, Current use,     Neurologic:  - neg neurologic ROS     Cardiovascular:       METS/Exercise Tolerance: >4 METS    Hematologic:  - neg hematologic  ROS     Musculoskeletal:  - neg musculoskeletal ROS     GI/Hepatic: Comment: Chronic Pancreatitis, s/p multiple ERCP + Stent placement    (+) GERD, Asymptomatic on medication,     Renal/Genitourinary:  - neg Renal ROS     Endo:  - neg endo ROS     Psychiatric/Substance Use:  - neg psychiatric ROS   (+) Recreational drug usage: Cannabis (Daily use).    Infectious Disease:  - neg infectious disease ROS     Malignancy:  - neg malignancy ROS     Other:            Physical Exam    Airway        Mallampati: I   TM distance: > 3 FB   Neck ROM: full   Mouth opening: > 3 cm    Respiratory Devices and Support         Dental       (+) Completely normal teeth      Cardiovascular   cardiovascular exam normal          Pulmonary   pulmonary exam normal                OUTSIDE LABS:  CBC:   Lab Results   Component Value Date    WBC 7.6 03/13/2023    WBC 7.5 01/18/2023    HGB 10.6 (L) 03/13/2023    HGB 11.2 (L) 01/18/2023    HCT 32.5 (L) 03/13/2023    HCT 33.7 (L) 01/18/2023     03/13/2023     (H) 01/18/2023     BMP:   Lab Results   Component Value Date     03/13/2023     01/18/2023    POTASSIUM 4.4 03/13/2023    POTASSIUM 4.3 01/18/2023    CHLORIDE 105 03/13/2023    CHLORIDE 107 01/18/2023    CO2 21 (L) 03/13/2023    CO2 17 (L) 01/18/2023    BUN 16.3 03/13/2023    BUN 17.8 01/18/2023    CR 0.84 03/13/2023    CR 0.76 01/18/2023     (H) 03/13/2023     (H) 03/13/2023     COAGS:   Lab Results   Component Value Date    INR 0.93 03/13/2023     POC:   Lab Results   Component Value Date    HCG Negative 07/13/2022    HCGS Negative 01/18/2023     HEPATIC:   Lab Results   Component Value Date    ALBUMIN 3.9 03/13/2023    PROTTOTAL 6.4 03/13/2023    ALT 14 03/13/2023    AST 22  03/13/2023    ALKPHOS 42 03/13/2023    BILITOTAL 0.2 03/13/2023     OTHER:   Lab Results   Component Value Date    MURTAZA 9.4 03/13/2023    LIPASE 29 03/13/2023    AMYLASE 59 03/13/2023       Anesthesia Plan    ASA Status:  3   NPO Status:  NPO Appropriate    Anesthesia Type: General.     - Airway: ETT   Induction: Intravenous.   Maintenance: Balanced.        Consents    Anesthesia Plan(s) and associated risks, benefits, and realistic alternatives discussed. Questions answered and patient/representative(s) expressed understanding.     - Discussed: Risks, Benefits and Alternatives for BOTH SEDATION and the PROCEDURE were discussed     - Discussed with:  Patient      - Extended Intubation/Ventilatory Support Discussed: No.      - Patient is DNR/DNI Status: No    Use of blood products discussed: No .     Postoperative Care    Pain management: IV analgesics, Oral pain medications.   PONV prophylaxis: Ondansetron (or other 5HT-3), Dexamethasone or Solumedrol, Promethazine or metoclopramide, Droperidol or Haldol, Aprepitant     Comments:                Erica Izaguirre MD

## 2023-05-10 NOTE — ANESTHESIA POSTPROCEDURE EVALUATION
Patient: Artie Burger    Procedure: Procedure(s):  ENDOSCOPIC RETROGRADE CHOLANGIOPANCREATOGRAPHY with pancreatic stents exchange and stone extraction       Anesthesia Type:  General    Note:  Disposition: Outpatient   Postop Pain Control: Uneventful            Sign Out: Well controlled pain   PONV: No   Neuro/Psych: Uneventful            Sign Out: Acceptable/Baseline neuro status   Airway/Respiratory: Uneventful            Sign Out: Acceptable/Baseline resp. status   CV/Hemodynamics: Uneventful            Sign Out: Acceptable CV status; No obvious hypovolemia; No obvious fluid overload   Other NRE: NONE   DID A NON-ROUTINE EVENT OCCUR? No           Last vitals:  Vitals Value Taken Time   /68 05/10/23 0930   Temp 36.2  C (97.2  F) 05/10/23 0847   Pulse 70 05/10/23 0943   Resp 13 05/10/23 0943   SpO2 95 % 05/10/23 0943   Vitals shown include unvalidated device data.    Electronically Signed By: Lizbeth Gonzalez MD  May 10, 2023  9:45 AM

## 2023-05-11 ENCOUNTER — VIRTUAL VISIT (OUTPATIENT)
Dept: TRANSPLANT | Facility: CLINIC | Age: 28
End: 2023-05-11
Attending: SURGERY
Payer: COMMERCIAL

## 2023-05-11 DIAGNOSIS — K86.0 ALCOHOL-INDUCED CHRONIC PANCREATITIS (H): Primary | ICD-10-CM

## 2023-05-11 PROCEDURE — 99215 OFFICE O/P EST HI 40 MIN: CPT | Mod: VID | Performed by: SURGERY

## 2023-05-11 NOTE — LETTER
5/11/2023         RE: Artie Burger  [address]  [place] [address]        Dear Colleague,    Thank you for referring your patient, Artie Burger, to the [facility]. Please see a copy of my visit note below.    Pancreatitis Service - Consult Note    Video visit:  Patient location: home  Provider location: [facility]  Platform: MyColorScreen  Start: 2:40  End: 3:15    Assessment & Plan: 28 yo F with a history of alcohol induced pancreatitis, now with ~2 years of sobriety. In person visit to discuss surgical management options for recurrent pancreatitis.     Surgical approach: per Dr. 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 He has successfully dilated and stented with relief in the past, but she has failed stent free trial. She is referred for consideration of drainage (Jaye operation). On my review of noncon CT, her duct is somewhat dilated but not impressively so (~4mm), though this is limited by lack of contrast. Will need to repeat CT and reassess. Also note large pancreatic head mass with multiple calcifications. She had an updated IV contrast CT that demonstrates a large inflammatory calcified head mass and moderately dilated upstream duct (2-4mm, 5-6mm with stent). The upstream gland is not significantly calcified. Unclear if her optimal surgery would be Jaye vs Whipple or TPIAT. I did not have this imaging available at the time of visit but did discuss that the duct would need to be large enough to facilitate a Jaye, and also what a TPIAT might entail. She is interested in surgery but less interested in TPIAT. I will discuss in [facility] to see if she is felt to be suitable for drainage, or if TPIAT or whipple might be more suitable surgeries up front. I have told her that, if the surgical approach is recommended to be  TPIAT or Whipple, we should rediscuss and ensure she is comfortable going forward.     I did discuss her case in [facility]- she was not felt to be an appropriate Jaye/drainage candidate. Consensus opinion was that she would likely achieve subpar analgesic results from that operation and instead would be better served with pancreaticoduodenectomy or total pancreatectomy. I discussed this with her at length, and she was very tearful and upset at the prospect of a more involved surgery, though she understands my reluctance to move forward with an intervention that I don't think will be of help.    We will facilitate having her talk to our former patients to explain what their experience with TPIAT was, good or bad. We will also arrange for her to come back for in person evaluation for islet testing. We can further discuss the choice of whipple vs TPIAT at that time. She is amenable to that plan.     Analgesia: previously not on narcotics, but currently using Oxycodone 10s ~6 times daily, with more for breakthrough. Also uses other adjuncts. Has some control of pain but not really able to function. Does have a primary care and pain team who manage these medications; will need to establish postoperative management plan with them prior to undertaking surgery.       Pancreatic function: not diabetic, no overt indication of exocrine insufficiency. Has tried creon in the past with no real benefit.     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 She had been losing weight earlier but has since stabilized.     Alcohol/Tobacco cessation: negative PeTH in chart, reports multiple years of abstinence which is consistent with this. She also reports vaping currently but no longer smoking cigarettes. Will need to verify cessation of alcohol and tobacco prior to any surgery. Tobacco cessation especially important in duodenal resecting surgeries (TPIAT,  Moni). Vaping is ok, but not ideal. NSAID cessation also essential, which she understands.     Medical Decision Making: High  Consult [id number] high complexity, 110 minutes      Faculty: Junito Patel MD  __________________________________________________________________  Transplant History: pancreatitis consult  N/A, Postoperative day:      Interval History: History is obtained from the patient  Artie Burger is a very pleasant 26 yo F with a history of acute on chronic alcoholic pancreatitis. She has an extensive history of alcohol and tobacco use going back about ten years, with her first episodes of pancreatitis following shortly thereafter. She was hospitalized at [facility] with recurrent episodes of pancreatitis. She initially abstained from drinking but did have relapses, corresponding with further episodes of pancreatitis.     Dr. Campuzano has known her for a long period over which he has engaged in numerous successful endoscopic interventions for her. For full details of this, please see his excellent notes. In short, he has been able to clear her duct of stones and did note that she had a very significant stricture in the pancreatic neck with moderate upstream dilation, as well as calcific changes and side branch calcification in the pancreatic head. She has ultimately failed trials of stent free intervals and is referred for surgery.    She says she has now developed daily pain, where previously it was only intermittent. It is in her central abdomen with radiation to the right and into her back, not really to her left. It doesn't have accompanying nausea, but she does have pain with eating and that previously made her food avoidant. She is able to maintain her weight relatively well now, though eating is still difficult. She works in [employer] at [employer] and is unable to complete her (admittedly very busy, physical) shifts due to pain. When she is able to work, she spends the  next day resting and recovering due to her fatigue.     Since our last visit, she had a stent exchange with Dr. Campuzano, but she says that this has not helped her pain at all. She is otherwise in her usual state of health.       Social History     Tobacco Use    Smoking status: Former     Types: Cigarettes     Quit date: 2022     Years since quittin.0    Smokeless tobacco: Current    Tobacco comments:     Vapes daily   Vaping Use    Vaping status: Every Day     Substances: Nicotine   Substance Use Topics    Alcohol use: Not Currently    Drug use: Yes     Types: Marijuana     Comment: smokes daily     ROS:   A 10-point review of systems was negative except as noted above.    Curent Meds:      Physical Exam:     Admit      Current Vitals:   LMP 05/10/2023 (Exact Date)          Vital sign ranges:       No data found.  General Appearance: in no apparent distress.   Skin: normal appearance, warm and dry  Heart: deferred  Lungs: without wheezes, nonlabored respirations  Abdomen: The abdomen is flat, she is thin. No evident incision or hernia. Narrow costal angle. She has a supraumbilical piercing that she would prefer to maintain if at all possible.   Extremities: edema: appears absent    Data:   CMP@LABRCNTIPR(na:2,potassium:2,chloride:2,co2:2,g,bun:2,cr:2,gfrestimated:2,gfrestblack:2,mark:2,icapoc:2,icaw:2,ma,phos:2,amylase:2,lipase:2,uamy24:3,albumin:2,bilitotal:2,biliconj:2,bilidelta:2,alkphos:2,ast:2,alt:2,fbili:1)@  CBC@LABRCNTIPR(hgb:2,wbc:2,a,plt:2,a1c:1)@  Coags@LABRCNTIPR(inr:2,ptt:2,Xa:2)@   Urinalysis  No lab results found.     Prior CT personally reviewed. Note multifocal inflammatory calcification of the pancreatic head with evident dilated upstream pancreatic duct, though this measures ~4mm to me. Limited by lack of IV contrast.     Updated CT with IV contrast also personally reviewed. Note conventional hepatic arterial anatomy. Small calcifications in gland at body/tail, more  significant inflammatory calcification in pancreatic head. Stents in duct without evident stones or debris.       Junito Patel MD

## 2023-05-16 ENCOUNTER — PREP FOR PROCEDURE (OUTPATIENT)
Dept: GASTROENTEROLOGY | Facility: CLINIC | Age: 28
End: 2023-05-16
Payer: COMMERCIAL

## 2023-05-16 ENCOUNTER — PATIENT OUTREACH (OUTPATIENT)
Dept: GASTROENTEROLOGY | Facility: CLINIC | Age: 28
End: 2023-05-16
Payer: COMMERCIAL

## 2023-05-16 DIAGNOSIS — K86.1 CHRONIC PANCREATITIS (H): Primary | ICD-10-CM

## 2023-05-16 NOTE — PROGRESS NOTES
"Follow up: Post ERCP on 5/10/23 with Dr. Campuzano.      Post procedure recommendations:   - Observe patient in same day observation unit for ongoing care.   - Patient being reviewed by Dr Patel regarding possible surgery (TPIAT vs Jaye)   - Will recommend repeat ERCP in 8-10 weeks if surgery  is not scheduled   - If patient develops worsening abdominal pain before scheduled ERCP, will recommend patient to call us immediately for consideration of an earlier urgent ERCP   - The findings and recommendations were discussed with the patient's family.     Patient states: \"I'm okay.\" Doing better for a couple days, now pain has returned. Had clinic visit with Dr. Patel last week. Patient reports feeling overwhelmed with the discussion regarding TPIAT work up, but it is under consideration. Artie is agreeable to schedule next ERCP and understands any surgical intervention will take a fair amount of time to arrange.     Agreeable to schedule ERCP for 23.    Procedure/Imaging/Clinic: ERCP with stent exchange  Physician: Justen  [email or web address]  Procedure length: Provider average  Anesthesia:General  Dx: Chronic pancreatitis  Tier: 2  Location: [facility]    Pre-op plan: Patient will arrange with PCP.  Anticoagulation: None  Diabetes: None    Clinic contact and scheduling numbers verified for future questions/concerns.     Melvi Humphreys RN Care Coordinator      "

## 2023-05-16 NOTE — PROGRESS NOTES
Procedure/Imaging/Clinic: ERCP with stent exchange  Physician: Justen  [email or web address]  Procedure length: Provider average  Anesthesia:General  Dx: Chronic pancreatitis  Tier: 2  Location: [facility]

## 2023-05-21 NOTE — PROGRESS NOTES
Pancreatitis Service - Consult Note    Video visit:  Patient location: home  Provider location: [facility]  Platform: [id number]  Start: 2:40  End: 3:15    Assessment & Plan: 28 yo F with a history of alcohol induced pancreatitis, now with ~2 years of sobriety. In person visit to discuss surgical management options for recurrent pancreatitis.     Surgical approach: per Dr. 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 He has successfully dilated and stented with relief in the past, but she has failed stent free trial. She is referred for consideration of drainage (Jaye operation). On my review of noncon CT, her duct is somewhat dilated but not impressively so (~4mm), though this is limited by lack of contrast. Will need to repeat CT and reassess. Also note large pancreatic head mass with multiple calcifications. She had an updated IV contrast CT that demonstrates a large inflammatory calcified head mass and moderately dilated upstream duct (2-4mm, 5-6mm with stent). The upstream gland is not significantly calcified. Unclear if her optimal surgery would be Jaye vs Whipple or TPIAT. I did not have this imaging available at the time of visit but did discuss that the duct would need to be large enough to facilitate a Jaye, and also what a TPIAT might entail. She is interested in surgery but less interested in TPIAT. I will discuss in [facility] to see if she is felt to be suitable for drainage, or if TPIAT or whipple might be more suitable surgeries up front. I have told her that, if the surgical approach is recommended to be TPIAT or Whipple, we should rediscuss and ensure she is comfortable going forward.     I did discuss her case in [facility]- she was not felt to be an appropriate Jaye/drainage candidate. Consensus opinion was that she would likely achieve subpar analgesic  results from that operation and instead would be better served with pancreaticoduodenectomy or total pancreatectomy. I discussed this with her at length, and she was very tearful and upset at the prospect of a more involved surgery, though she understands my reluctance to move forward with an intervention that I don't think will be of help.    We will facilitate having her talk to our former patients to explain what their experience with TPIAT was, good or bad. We will also arrange for her to come back for in person evaluation for islet testing. We can further discuss the choice of whipple vs TPIAT at that time. She is amenable to that plan.     Analgesia: previously not on narcotics, but currently using Oxycodone 10s ~6 times daily, with more for breakthrough. Also uses other adjuncts. Has some control of pain but not really able to function. Does have a primary care and pain team who manage these medications; will need to establish postoperative management plan with them prior to undertaking surgery.       Pancreatic function: not diabetic, no overt indication of exocrine insufficiency. Has tried creon in the past with no real benefit.     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 She had been losing weight earlier but has since stabilized.     Alcohol/Tobacco cessation: negative PeTH in chart, reports multiple years of abstinence which is consistent with this. She also reports vaping currently but no longer smoking cigarettes. Will need to verify cessation of alcohol and tobacco prior to any surgery. Tobacco cessation especially important in duodenal resecting surgeries (TPIAT, Whipple). Vaping is ok, but not ideal. NSAID cessation also essential, which she understands.     Medical Decision Making: High  Consult [id number] high complexity, 110 minutes      Faculty: Junito Patel,  MD  __________________________________________________________________  Transplant History: pancreatitis consult  N/A, Postoperative day:      Interval History: History is obtained from the patient  Artie Burger is a very pleasant 26 yo F with a history of acute on chronic alcoholic pancreatitis. She has an extensive history of alcohol and tobacco use going back about ten years, with her first episodes of pancreatitis following shortly thereafter. She was hospitalized at [facility] with recurrent episodes of pancreatitis. She initially abstained from drinking but did have relapses, corresponding with further episodes of pancreatitis.     Dr. Campuzano has known her for a long period over which he has engaged in numerous successful endoscopic interventions for her. For full details of this, please see his excellent notes. In short, he has been able to clear her duct of stones and did note that she had a very significant stricture in the pancreatic neck with moderate upstream dilation, as well as calcific changes and side branch calcification in the pancreatic head. She has ultimately failed trials of stent free intervals and is referred for surgery.    She says she has now developed daily pain, where previously it was only intermittent. It is in her central abdomen with radiation to the right and into her back, not really to her left. It doesn't have accompanying nausea, but she does have pain with eating and that previously made her food avoidant. She is able to maintain her weight relatively well now, though eating is still difficult. 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 When she is able to work, she spends the next day resting and recovering due to her fatigue.     Since our last visit, she had a stent exchange with Dr. Campuzano, but she says that this has not helped her pain at all. She is otherwise in her  usual state of health.       Social History     Tobacco Use     Smoking status: Former     Types: Cigarettes     Quit date: 2022     Years since quittin.0     Smokeless tobacco: Current     Tobacco comments:     Vapes daily   Vaping Use     Vaping status: Every Day     Substances: Nicotine   Substance Use Topics     Alcohol use: Not Currently     Drug use: Yes     Types: Marijuana     Comment: smokes daily     ROS:   A 10-point review of systems was negative except as noted above.    Curent Meds:      Physical Exam:     Admit      Current Vitals:   LMP 05/10/2023 (Exact Date)          Vital sign ranges:       No data found.  General Appearance: in no apparent distress.   Skin: normal appearance, warm and dry  Heart: deferred  Lungs: without wheezes, nonlabored respirations  Abdomen: The abdomen is flat, she is thin. No evident incision or hernia. Narrow costal angle. She has a supraumbilical piercing that she would prefer to maintain if at all possible.   Extremities: edema: appears absent    Data:   CMP@LABRCNTIPR(na:2,potassium:2,chloride:2,co2:2,g,bun:2,cr:2,gfrestimated:2,gfrestblack:2,mark:2,icapoc:2,icaw:2,ma,phos:2,amylase:2,lipase:2,uamy24:3,albumin:2,bilitotal:2,biliconj:2,bilidelta:2,alkphos:2,ast:2,alt:2,fbili:1)@  CBC@LABRCNTIPR(hgb:2,wbc:2,a,plt:2,a1c:1)@  Coags@LABRCNTIPR(inr:2,ptt:2,Xa:2)@   Urinalysis  No lab results found.     Prior CT personally reviewed. Note multifocal inflammatory calcification of the pancreatic head with evident dilated upstream pancreatic duct, though this measures ~4mm to me. Limited by lack of IV contrast.     Updated CT with IV contrast also personally reviewed. Note conventional hepatic arterial anatomy. Small calcifications in gland at body/tail, more significant inflammatory calcification in pancreatic head. Stents in duct without evident stones or debris.

## 2023-05-23 ENCOUNTER — REFERRAL (OUTPATIENT)
Dept: TRANSPLANT | Facility: CLINIC | Age: 28
End: 2023-05-23
Payer: COMMERCIAL

## 2023-05-23 DIAGNOSIS — K85.20 ALCOHOL-INDUCED PANCREATITIS: Primary | ICD-10-CM

## 2023-06-02 ENCOUNTER — TELEPHONE (OUTPATIENT)
Dept: TRANSPLANT | Facility: CLINIC | Age: 28
End: 2023-06-02
Payer: COMMERCIAL

## 2023-06-02 DIAGNOSIS — K86.1 CHRONIC PANCREATITIS (H): ICD-10-CM

## 2023-06-02 DIAGNOSIS — K86.0 ALCOHOL-INDUCED CHRONIC PANCREATITIS (H): Primary | ICD-10-CM

## 2023-06-02 NOTE — TELEPHONE ENCOUNTER
Talked to Artie and she would like to come in to for the full TOTAL PANCREATECTOMY-ISLET AUTO TRANSPLANT evaluation. Will scheduled evaluation for June 28th and 29th.

## 2023-06-02 NOTE — TELEPHONE ENCOUNTER
Called Artie to talk about her conversation with Dr Patel and her surgical options.  Asked for a call back

## 2023-06-22 ASSESSMENT — ANXIETY QUESTIONNAIRES
IF YOU CHECKED OFF ANY PROBLEMS ON THIS QUESTIONNAIRE, HOW DIFFICULT HAVE THESE PROBLEMS MADE IT FOR YOU TO DO YOUR WORK, TAKE CARE OF THINGS AT HOME, OR GET ALONG WITH OTHER PEOPLE: VERY DIFFICULT
2. NOT BEING ABLE TO STOP OR CONTROL WORRYING: NEARLY EVERY DAY
3. WORRYING TOO MUCH ABOUT DIFFERENT THINGS: NEARLY EVERY DAY
GAD7 TOTAL SCORE: 12
6. BECOMING EASILY ANNOYED OR IRRITABLE: SEVERAL DAYS
5. BEING SO RESTLESS THAT IT IS HARD TO SIT STILL: SEVERAL DAYS
1. FEELING NERVOUS, ANXIOUS, OR ON EDGE: SEVERAL DAYS
4. TROUBLE RELAXING: MORE THAN HALF THE DAYS
7. FEELING AFRAID AS IF SOMETHING AWFUL MIGHT HAPPEN: SEVERAL DAYS
GAD7 TOTAL SCORE: 12

## 2023-06-27 NOTE — PROGRESS NOTES
[facility] Solid Organ Transplant  Outpatient MNT: TP AIT Evaluation    Current BMI: 24.3 (HT 62 in,  lbs/60 kg)  Goal BMI for TP AIT <30     Time Spent: 15 minutes  Visit Type: Initial   Referring Physician: Amanda   Pt accompanied by: self     Nutrition Assessment  Pt reports there are a few days she doesn't eat during a flare. Flares occur a few times/month.  She avoids raw veggies, red meat, fatty/fried foods.     Symptoms:  - Pain: chronic   - N/V: w/ flare   - Bloating: sometimes   - D/C: back & forth    Vitamins, Supplements, Pertinent Meds: MVI, probiotic   Herbal Medicines/Supplements: none      PERT: Creon 3000 x 2-3 with meals (only provides  ul/kg/meal)- below therapeutic range of 500 ul/kg/meal Minimum  - How enzymes are taken in relation to meal: with food   - Duration of PERT: 1 year  - Improvements seen since starting: no- reports not taking them consistently as they don't seem they are working   - Oily/floaty stools (steatorrhea): yes, sometimes   - Tried other enzyme brands in the past: no     Weight hx: down to 104 lbs -->130s  Wt Readings from Last 10 Encounters:   05/10/23 60.1 kg (132 lb 7.9 oz)   04/20/23 60.6 kg (133 lb 9.6 oz)   03/13/23 60.2 kg (132 lb 11.5 oz)   01/18/23 59 kg (130 lb 1.1 oz)   11/21/22 61.4 kg (135 lb 5.8 oz)   09/14/22 54.4 kg (119 lb 14.9 oz)   07/13/22 56.4 kg (124 lb 5.4 oz)   06/23/22 55.8 kg (123 lb)   04/04/22 55.8 kg (123 lb 0.3 oz)   02/16/22 54.4 kg (119 lb 14.9 oz)     Diet Recall  Breakfast    Lunch    Dinner Chicken w/ veggies    Snacks Throughout the day- crackers; some junk food HS    Beverages Coffee (creamer), some water, some pop/energy drink (a few times/week)   Alcohol None    Dining out 2x/week      Physical Activity  Active at job- [employer]     Nutrition Diagnosis  No nutrition dx identified at this time    Nutrition Intervention  Nutrition education provided:  Reviewed current diet. Minimal variety/PO intakes in general.  Discussed considering doing a protein shake if able to tolerate one to provide more substance during the day.     Reviewed future test for pancreatic fecal elastase. If enzymes are indicated, recommend Creon 36000 x 1 with food to provide 5600 ul/kg/meal (within therapeutic range).     Brief overview of what to expect from nutrition standpoint post TP AIT:   -- Enteral nutrition regimen and anticipated diet advancement   -- Ongoing need for PERT  -- Vitamin/mineral needs, assessing for fat-soluble vitamin deficiencies  -- Need for DM education and brief overview of cho counting    Patient Understanding: Pt verbalized understanding of education provided.  Expected Engagement: Good  Follow-Up Plans: PRN     Nutrition Goals  No nutrition goals identified at this time     Betzaida Combs, RD, LD, CCTD

## 2023-06-28 ENCOUNTER — ALLIED HEALTH/NURSE VISIT (OUTPATIENT)
Dept: TRANSPLANT | Facility: CLINIC | Age: 28
End: 2023-06-28
Attending: SURGERY
Payer: COMMERCIAL

## 2023-06-28 ENCOUNTER — LAB (OUTPATIENT)
Dept: LAB | Facility: CLINIC | Age: 28
End: 2023-06-28
Payer: COMMERCIAL

## 2023-06-28 ENCOUNTER — OFFICE VISIT (OUTPATIENT)
Dept: PSYCHOLOGY | Facility: CLINIC | Age: 28
End: 2023-06-28
Payer: COMMERCIAL

## 2023-06-28 ENCOUNTER — THERAPY VISIT (OUTPATIENT)
Dept: TRANSPLANT | Facility: CLINIC | Age: 28
End: 2023-06-28
Attending: SURGERY
Payer: COMMERCIAL

## 2023-06-28 DIAGNOSIS — K86.1 CHRONIC PANCREATITIS, UNSPECIFIED PANCREATITIS TYPE (H): ICD-10-CM

## 2023-06-28 DIAGNOSIS — K86.1 CHRONIC PANCREATITIS (H): ICD-10-CM

## 2023-06-28 DIAGNOSIS — F41.1 GAD (GENERALIZED ANXIETY DISORDER): Primary | ICD-10-CM

## 2023-06-28 DIAGNOSIS — K86.1 CHRONIC PANCREATITIS (H): Primary | ICD-10-CM

## 2023-06-28 DIAGNOSIS — K86.0 ALCOHOL-INDUCED CHRONIC PANCREATITIS (H): ICD-10-CM

## 2023-06-28 DIAGNOSIS — F33.40 RECURRENT MAJOR DEPRESSIVE DISORDER, IN REMISSION (H): ICD-10-CM

## 2023-06-28 LAB
ALBUMIN SERPL BCG-MCNC: 4.5 G/DL (ref 3.5–5.2)
ALP SERPL-CCNC: 58 U/L (ref 35–104)
ALT SERPL W P-5'-P-CCNC: 10 U/L (ref 0–50)
AMYLASE SERPL-CCNC: 62 U/L (ref 28–100)
ANION GAP SERPL CALCULATED.3IONS-SCNC: 10 MMOL/L (ref 7–15)
AST SERPL W P-5'-P-CCNC: 16 U/L (ref 0–45)
BASOPHILS # BLD AUTO: 0.1 10E3/UL (ref 0–0.2)
BASOPHILS NFR BLD AUTO: 1 %
BILIRUB SERPL-MCNC: 0.2 MG/DL
BUN SERPL-MCNC: 17.4 MG/DL (ref 6–20)
C PEPTIDE SERPL-MCNC: 2.1 NG/ML (ref 0.9–6.9)
C PEPTIDE SERPL-MCNC: 4.4 NG/ML (ref 0.9–6.9)
C PEPTIDE SERPL-MCNC: 6.7 NG/ML (ref 0.9–6.9)
CALCIUM SERPL-MCNC: 10 MG/DL (ref 8.6–10)
CEA SERPL-MCNC: 1 NG/ML
CHLORIDE SERPL-SCNC: 107 MMOL/L (ref 98–107)
CHOLEST SERPL-MCNC: 131 MG/DL
CREAT SERPL-MCNC: 0.77 MG/DL (ref 0.51–0.95)
DEPRECATED HCO3 PLAS-SCNC: 21 MMOL/L (ref 22–29)
EOSINOPHIL # BLD AUTO: 0.3 10E3/UL (ref 0–0.7)
EOSINOPHIL NFR BLD AUTO: 3 %
ERYTHROCYTE [DISTWIDTH] IN BLOOD BY AUTOMATED COUNT: 14.5 % (ref 10–15)
FASTING STATUS PATIENT QL REPORTED: ABNORMAL
FASTING STATUS PATIENT QL REPORTED: ABNORMAL
FASTING STATUS PATIENT QL REPORTED: YES
FERRITIN SERPL-MCNC: 23 NG/ML (ref 6–175)
GFR SERPL CREATININE-BSD FRML MDRD: >90 ML/MIN/1.73M2
GLUCOSE SERPL-MCNC: 104 MG/DL (ref 70–99)
GLUCOSE SERPL-MCNC: 110 MG/DL (ref 70–99)
GLUCOSE SERPL-MCNC: 113 MG/DL (ref 70–99)
GLUCOSE SERPL-MCNC: 113 MG/DL (ref 70–99)
HBA1C MFR BLD: 5.3 %
HCT VFR BLD AUTO: 34.6 % (ref 35–47)
HCV AB SERPL QL IA: NONREACTIVE
HDLC SERPL-MCNC: 52 MG/DL
HGB BLD-MCNC: 11.8 G/DL (ref 11.7–15.7)
IMM GRANULOCYTES # BLD: 0 10E3/UL
IMM GRANULOCYTES NFR BLD: 0 %
IRON BINDING CAPACITY (ROCHE): 289 UG/DL (ref 240–430)
IRON SATN MFR SERPL: 16 % (ref 15–46)
IRON SERPL-MCNC: 46 UG/DL (ref 37–145)
LDLC SERPL CALC-MCNC: 65 MG/DL
LIPASE SERPL-CCNC: 29 U/L (ref 13–60)
LYMPHOCYTES # BLD AUTO: 2.9 10E3/UL (ref 0.8–5.3)
LYMPHOCYTES NFR BLD AUTO: 31 %
MCH RBC QN AUTO: 30 PG (ref 26.5–33)
MCHC RBC AUTO-ENTMCNC: 34.1 G/DL (ref 31.5–36.5)
MCV RBC AUTO: 88 FL (ref 78–100)
MONOCYTES # BLD AUTO: 0.8 10E3/UL (ref 0–1.3)
MONOCYTES NFR BLD AUTO: 9 %
NEUTROPHILS # BLD AUTO: 5.2 10E3/UL (ref 1.6–8.3)
NEUTROPHILS NFR BLD AUTO: 56 %
NONHDLC SERPL-MCNC: 79 MG/DL
NRBC # BLD AUTO: 0 10E3/UL
NRBC BLD AUTO-RTO: 0 /100
PLATELET # BLD AUTO: 352 10E3/UL (ref 150–450)
POTASSIUM SERPL-SCNC: 4.2 MMOL/L (ref 3.4–5.3)
PROT SERPL-MCNC: 7.1 G/DL (ref 6.4–8.3)
RBC # BLD AUTO: 3.93 10E6/UL (ref 3.8–5.2)
SODIUM SERPL-SCNC: 138 MMOL/L (ref 136–145)
TRIGL SERPL-MCNC: 72 MG/DL
WBC # BLD AUTO: 9.2 10E3/UL (ref 4–11)

## 2023-06-28 PROCEDURE — 90791 PSYCH DIAGNOSTIC EVALUATION: CPT | Performed by: STUDENT IN AN ORGANIZED HEALTH CARE EDUCATION/TRAINING PROGRAM

## 2023-06-28 PROCEDURE — 99000 SPECIMEN HANDLING OFFICE-LAB: CPT | Performed by: PATHOLOGY

## 2023-06-28 PROCEDURE — 86301 IMMUNOASSAY TUMOR CA 19-9: CPT | Mod: 90 | Performed by: PATHOLOGY

## 2023-06-28 PROCEDURE — 80061 LIPID PANEL: CPT | Performed by: PATHOLOGY

## 2023-06-28 PROCEDURE — 82306 VITAMIN D 25 HYDROXY: CPT | Performed by: PEDIATRICS

## 2023-06-28 PROCEDURE — 86803 HEPATITIS C AB TEST: CPT | Performed by: PEDIATRICS

## 2023-06-28 PROCEDURE — 83550 IRON BINDING TEST: CPT | Performed by: PATHOLOGY

## 2023-06-28 PROCEDURE — 86341 ISLET CELL ANTIBODY: CPT | Mod: 90 | Performed by: PATHOLOGY

## 2023-06-28 PROCEDURE — 83690 ASSAY OF LIPASE: CPT | Performed by: PATHOLOGY

## 2023-06-28 PROCEDURE — 83036 HEMOGLOBIN GLYCOSYLATED A1C: CPT | Performed by: PEDIATRICS

## 2023-06-28 PROCEDURE — 86337 INSULIN ANTIBODIES: CPT | Mod: 90 | Performed by: PATHOLOGY

## 2023-06-28 PROCEDURE — 85025 COMPLETE CBC W/AUTO DIFF WBC: CPT | Performed by: PATHOLOGY

## 2023-06-28 PROCEDURE — 82378 CARCINOEMBRYONIC ANTIGEN: CPT | Performed by: PEDIATRICS

## 2023-06-28 PROCEDURE — 80053 COMPREHEN METABOLIC PANEL: CPT | Performed by: PATHOLOGY

## 2023-06-28 PROCEDURE — 97802 MEDICAL NUTRITION INDIV IN: CPT | Performed by: DIETITIAN, REGISTERED

## 2023-06-28 PROCEDURE — 82150 ASSAY OF AMYLASE: CPT | Performed by: PATHOLOGY

## 2023-06-28 PROCEDURE — 36415 COLL VENOUS BLD VENIPUNCTURE: CPT | Performed by: PATHOLOGY

## 2023-06-28 PROCEDURE — 82728 ASSAY OF FERRITIN: CPT | Performed by: PATHOLOGY

## 2023-06-28 PROCEDURE — 84590 ASSAY OF VITAMIN A: CPT | Mod: 90 | Performed by: PATHOLOGY

## 2023-06-28 PROCEDURE — 84446 ASSAY OF VITAMIN E: CPT | Mod: 90 | Performed by: PATHOLOGY

## 2023-06-28 PROCEDURE — 84681 ASSAY OF C-PEPTIDE: CPT | Performed by: PEDIATRICS

## 2023-06-28 PROCEDURE — 83540 ASSAY OF IRON: CPT | Performed by: PATHOLOGY

## 2023-06-28 NOTE — PROGRESS NOTES
Psychosocial Assessment For Total Pancreatectomy and Auto Islet Cell Transplant  Patient Name/ Age: Artie Burger 27 year old   Medical Record #: [id number]  Duration of Interview:     30 min  Process:   Face-to-Face Interview                (counseling < 50%)   Present at Appointment: Artie in person         : HORTENSIA Guan  Date:  June 28, 2023            Current Living Situation    Location:   [address] [place] [address]   [facility] [id number]  With Whom: lives with their family and lives alone       Family/ Social Support:    Artie resides in [place], [place] ([place] hours from [facility]). She has been in a relationship with NICA Bryan for about 6 months. She does not have any children. Her father passed away several years ago. Her mother Yisel and her SO Darien live near her. She has one sister named Sandra (lives in [facility]) whom is .     Plan to stay at sister's home with support of sister and brother-in-law.  available, helpful   Committed relationship:    Relationship with Bryan  stable/supportive   Other supports:   Artie reports that she has several friends and extended family members. She feels that she has a large support system.    available, helpful       Activities/ Functional Ability    Current level: independent with ADL's     Transportation drives self       Vocational/Employment/Financial     Employment   full time   Job Description  Artie works full time as a  for boats, cars, etc.   She notes that her employer is aware of her illness and she does have access to short term disability for her TPIAT.       Income   salary/wages   Insurance    Holds BCBS through employer     At this time, patient can afford medication costs:  Yes  private insurance       Medical Status    Complications None noted        Behavioral    Tobacco Use No Chemical Dependency Yes    Artie previously smoked tobacco. She has not used in over a year. She denies any cravings/withdraw symptoms. SW noted  "that patients cannot smoke at all pre surgery.       Start date: 5/21/2012     Quit date: 5/21/2022    Artie reports that she previously abused alcohol. She notes that she has not used in 3 years. Per chart review, her Alcohol abuse, in remission 07/25/2018, 8/21/2020. She attended inpatient treatment for 35 days at [facility] in [place], [place] (3 years ago). Her last Peth tests were negative. She is not able to use any alcohol with her illness.     Artie does utilize marijuana. She has a medical card. She uses via smoking route about 3 puffs/daily.      Psychiatric Impairment Yes     Artie has recurrent major depressive disorder  and generalized anxiety disorder. She sees a therapist, Valeria, on a weekly basis (has been seeing therapist for 2 years, enjoys visits). She recently switched medications. She now taks citalopram (CeleXA) 40 mg tablet.     She notes that about 1-2 months ago, she went through a \"rough patch,\" since switching mental health medications and her birth control. During that point, she scored a 17 on the PHQ-9. She reports that symptoms are more stable now and she continues to work on positive coping techniques. She denies any psych hospitalizations or suicidal ideation.      Reading Ability Good\  Education Level: Recent Legal History No      Coping Style/Strategies: reading, coloring, walking        Ability to Adhere to Complex Medical Regime: Yes     Adherence History:        Education  _X_ Rehabilitation  _X_ Community resources  _X_ Post discharge physician (Pain Specialist, Endocrinologist)  _X_ Post discharge housing  _X_ Financial resources  _X_ Medical insurance options  _X_ Psych adjustment  _X_ Family adjustment  _X_ Health Care Directive ?        Notable Items:   Discussed the need to remain locally for up to four weeks post discharge from the hospital and what lodging options are available. Also discussed availability and cost of weekly and monthly parking " passes if needed. Provided patient with the Pre TP-IAT Cheat Sheet.       Final Evaluation/Assessment   Patient seemed to process information well. Appeared well informed, motivated and able to follow post transplant requirements. Behavior was appropriate during interview. Has adequate income and insurance coverage. Adequate social support. No major contraindications noted for transplant.       Recommendation  Acceptable       Signature: HORTENSIA Guan    Title: Clinical

## 2023-06-28 NOTE — NURSING NOTE
Visit for boost test    Glucose 113  Weight 61.6 kg    Confirmed Artie is fasting, reminded her to remain fasting until after the last lab draw in 120 min, she verbally understood and agreed.    She is not on insulin and usually does not take her enzymes so did not with the boost.     61.6x6=

## 2023-06-28 NOTE — NURSING NOTE
61.6x6=369.6    360 mL boost given which was drank in 3 min.    Boost finished at 8:20, next lab draw at 9:20 and final lab draw at 10:20, informed pt to arrive to lab 10 min prior to  this, she verbally understood and agreed.    Called lab with draw times.    Matilda Laureano, CMA

## 2023-06-28 NOTE — PROGRESS NOTES
Health Psychology                                                                                                                          Lynn Villar, Ph.D., L.P [phone]  Rehana Ly, Ph.D., L.P. [phone]  Erica Bright, Ph.D, L..P. [phone]  Jacklyn Lawson, Ph.D., L.P. [phone]  Gerald Scott, Ph.D., A.B.P.P., L.P. [phone]         Jenifer Burger, Ph.D., L.P. [phone]       Grecia Daniel, Ph.D., A.B.P.P., LP [phone]           [facility], 3rd Floor  [address] [place] [address]      Confidential Summary of Standard Psychodiagnostic Evaluation*    Referral Source:  TPIAT Team    Reason for Referral:  Complete preoperative surgery psychological evaluation to determine if psychological factors might interfere with the patient's ability to comply with presurgical procedures and rigorous postsurgical behavioral guidelines.     Sources of Information:  Information was obtained from a clinical interview with the patient, review of available medical records, and administration of psychological assessments.     History of Presenting Concerns:  Artie Burger is a 27 year old female with history of pancreatitis considering her surgical intervention options.    Artie shared her experiences with diagnosis of pancreatitis and managingsymptoms over the past years.  Pancreatitis episodes began in 2018, multiple acute pancreatitis episodes. She has a history of alcohol misuse and tobacco use; she smoked cigarettes for many years. She has been following with GI [facility] since [facility], initially seeing Dr. Campuzano. Treatments she has had: ERCPs for stone removal.     Currently she is having daily pain although has found benefit with stone removal. She is no longer drinking or smoking.    Regarding surgery, she has a good understanding of the procedure as she has met with Dr. Amanda francisco surgical options earlier this year.  Patient Education       External Ear Infection (Child)  Your child has an infection in the ear canal. This problem is also known as external otitis, otitis externa, or “swimmer’s ear.” It is usually caused by bacteria or fungus. It can occur if water is trapped in the ear canal (from swimming or bathing). Putting cotton swabs or other objects in the ear can also damage the skin in the ear canal and make this problem more likely.  Your child may have pain, itching, redness, drainage, or swelling of the ear canal. He or she may also have temporary hearing loss. In most cases, symptoms resolve within a week.  Home care  Follow these guidelines when caring for your child at home:  · Don’t try to clean the ear canal. This may push pus and bacteria deeper into the canal.  · Use prescribed eardrops as directed. These help reduce swelling and fight the infection. If an ear wick was placed in the ear canal, apply drops right onto the end of the wick. The wick will draw the medicine into the ear canal even if it is swollen closed.  · A cotton ball may be loosely placed in the outer ear to absorb any drainage.  · Don’t allow water to get into your child’s ear when he or she bathing. Also, don’t allow your child to go swimming for at least 7 to10 days after starting treatment.  · You may give your child acetaminophen to control pain, unless another pain medicine was prescribed. In children older than 6 months, you may use ibuprofen instead of acetaminophen. If your child has chronic liver or kidney disease, talk with the provider before using these medicines. Also talk with the provider if your child has had a stomach ulcer or gastrointestinal bleeding. Don’t give aspirin to a child younger than 18 years old who is ill with a fever. It may cause severe liver damage.  Prevention  · Don’t clean the inside of your child’s ears. Also, caution your child not to stick objects inside his or her ears.  · Have your child wear earplugs  She was initially reluctant to have a more invasive surgery but is now understanding that this would be the most appropriate option to give some sort of relief in symptoms.     Pain managed by PCP, this is consistent. She also manages Shilyjalen's psychotropic medications.       Medical History:    Past Medical History:   Diagnosis Date     Alcohol-induced chronic pancreatitis (H)      Anxiety      Depression      Gastroesophageal reflux disease      Pancreatic disease      PONV (postoperative nausea and vomiting)        Past Surgical History:   Procedure Laterality Date     ENDOSCOPIC RETROGRADE CHOLANGIOPANCREATOGRAM COMPLEX N/A 9/13/2021    Procedure: ENDOSCOPIC RETROGRADE CHOLANGIOPANCREATOGRAPHY with pancreatic sphincterotomy, dilation, stone removal, stent placement;  Surgeon: Guru Sabino Campuzano MD;  Location: [facility] OR     ENDOSCOPIC RETROGRADE CHOLANGIOPANCREATOGRAM WITH DIRECT VISUALIZATION SYSTEM AND GENERATOR N/A 11/1/2021    Procedure: ENDOSCOPIC RETROGRADE CHOLANGIOPANCREATOGRAPHY, dilation and debridement sludge removal, pancreatic duct stent placement;  Surgeon: Guru Sabino Campuzano MD;  Location: [facility]     ENDOSCOPIC RETROGRADE CHOLANGIOPANCREATOGRAM WITH SPYGLASS N/A 10/6/2021    Procedure: ENDOSCOPIC RETROGRADE CHOLANGIOPANCREATOGRAPHY, WITH DIRECT DUCT VISUALIZATION, USING PANCREATICOBILIARY FIBEROPTIC PROBE, BILE DUCT STENT EXCHANGE, LITHOTRIPSY OF PANCREATIC STONE, SPHINCTEROTOMY, BALLOON DILATION OF PANCREATIC DUCT AND STONE EXTRACTION;  Surgeon: Guru Sabino Campuzano MD;  Location: [facility] OR     ENDOSCOPIC RETROGRADE CHOLANGIOPANCREATOGRAM WITH SPYGLASS N/A 12/13/2021    Procedure: ENDOSCOPIC RETROGRADE CHOLANGIOPANCREATOGRAPHY, with pancreatic stent exchange, ballon dilation, stone removal, with spyglass direct visualization;  Surgeon: Guru Sabino Campuzano MD;  Location: [facility] OR     ENDOSCOPIC RETROGRADE CHOLANGIOPANCREATOGRAPHY, EXCHANGE  when swimming.  · After exiting water, have your child turn his or her head to the side to drain any excess water from the ears. Ears should be dried well with a towel. A hair dryer may be used to dry the ears, but it needs to be on a low or cool setting and about 12 inches away from the ears.  · If your child feels water trapped in the ears, use ear drops right away. You can get these drops over the counter at most drugstores. They work by removing water from the ear canal.  Follow-up care  Follow up with your child’s healthcare provider, or as directed.  When to seek medical advice  Call your child's provider right away if any of these occur:  · Fever (see Fever and children, below)  · Symptoms worsen or do not get better after 3 days of treatment  · New symptoms appear  · Outer ear becomes red, warm, or swollen     Fever and children  Always use a digital thermometer to check your child’s temperature. Never use a mercury thermometer.  For infants and toddlers, be sure to use a rectal thermometer correctly. A rectal thermometer may accidentally poke a hole in (perforate) the rectum. It may also pass on germs from the stool. Always follow the product maker’s directions for proper use. If you don’t feel comfortable taking a rectal temperature, use another method. When you talk to your child’s healthcare provider, tell him or her which method you used to take your child’s temperature.  Here are guidelines for fever temperature. Ear temperatures aren’t accurate before 6 months of age. Don’t take an oral temperature until your child is at least 4 years old.  Infant under 3 months old:  · Ask your child’s healthcare provider how you should take the temperature.  · Rectal or forehead (temporal artery) temperature of 100.4°F (38°C) or higher, or as directed by the provider  · Armpit temperature of 99°F (37.2°C) or higher, or as directed by the provider  Child age 3 to 36 months:  · Rectal, forehead (temporal artery), or  TUBE/STENT N/A 2/16/2022    Procedure: ENDOSCOPIC RETROGRADE CHOLANGIOPANCREATOGRAPH with pancreatic dilation, debris removal and stent exchange.;  Surgeon: Guru Sabino Campuzano MD;  Location: [facility]     ENDOSCOPIC RETROGRADE CHOLANGIOPANCREATOGRAPHY, EXCHANGE TUBE/STENT N/A 4/4/2022    Procedure: ENDOSCOPIC RETROGRADE CHOLANGIOPANCREATOGRAPHY, WITH REPLACEMENT OF pancreatic STENT, stone removal;  Surgeon: Guru Sabino Campuzano MD;  [facility]: [facility]     ENDOSCOPIC RETROGRADE CHOLANGIOPANCREATOGRAPHY, EXCHANGE TUBE/STENT N/A 7/13/2022    Procedure: ENDOSCOPIC RETROGRADE CHOLANGIOPANCREATOGRAPHY, WITH pancreatic duct dilation and stent exchange, biliary stent placement, debris removal;  Surgeon: Guru Sabino Campuzano MD;  [facility]     ENDOSCOPIC RETROGRADE CHOLANGIOPANCREATOGRAPHY, EXCHANGE TUBE/STENT N/A 9/14/2022    Procedure: ENDOSCOPIC RETROGRADE CHOLANGIOPANCREATOGRAPHY, WITH pancreatic stone removal, biliary stent removal, pancreatic stent exchange;  Surgeon: Guru Sabino Campuzano MD;  Location: [facility]     ENDOSCOPIC RETROGRADE CHOLANGIOPANCREATOGRAPHY, EXCHANGE TUBE/STENT N/A 11/21/2022    Procedure: ENDOSCOPIC RETROGRADE CHOLANGIOPANCREATOGRAPHY WITH PANCREATIC DUCT STENT EXCHANGE, DILATION, AND DEBRIS REMOVAL;  Surgeon: Guru Sabino Cmapuzano MD;  [facility]: [facility]     ENDOSCOPIC RETROGRADE CHOLANGIOPANCREATOGRAPHY, EXCHANGE TUBE/STENT N/A 1/18/2023    Procedure: ENDOSCOPIC RETROGRADE CHOLANGIOPANCREATOGRAPHY stent exchange, dilation, sludge and stone removal;  Surgeon: Guru Sabino Campuzano MD;  Location: [facility]     ENDOSCOPIC RETROGRADE CHOLANGIOPANCREATOGRAPHY, EXCHANGE TUBE/STENT N/A 3/13/2023    Procedure: ENDOSCOPIC RETROGRADE CHOLANGIOPANCREATOGRAPHY, WITH replacement of pancreatic stents, bile duct stent placed,sphinterotomy of bile duct ampulla, balloon sweep of bile duct for sludge,;  Surgeon: Justen  ear temperature of 102°F (38.9°C) or higher, or as directed by the provider  · Armpit temperature of 101°F (38.3°C) or higher, or as directed by the provider  Child of any age:  · Repeated temperature of 104°F (40°C) or higher, or as directed by the provider  · Fever that lasts more than 24 hours in a child under 2 years old. Or a fever that lasts for 3 days in a child 2 years or older.      Date Last Reviewed: 6/2/2017  © 2934-0807 [email or web address]. [address] [place] [address]. All rights reserved. This information is not intended as a substitute for professional medical care. Always follow your healthcare professional's instructions.          --------------------------------------------------------------------------------------------------------------  [facility]    Monday - Friday 8 a.m. - 8 p.m.  Saturday  8 a.m. - 4 p.m.  Sunday   Closed  -*-*-*-*-*-*-*-  The Urgent Care at [facility] is open on SUNDAYS: 8 a.m. to 4 p.m.  [address] [place]  -*-*-*-*-*-*-*-  [email or web address]  See current wait times for [facility] in real-time!  Reserve your waiting-room spot in line!   Receive text/email messages if our wait times are long,  so that you can do your waiting at home or work, instead of in our waiting room.     Note: This system does not guarantee an \"appointment time\" to see a provider. Also, patients may be called out of the waiting room \"ahead of turn\" in emergency situations, at discretion of Urgent Care staff.  ----------------  Thank you for choosing [facility] today.  We hope you had a pleasant experience and we look forward to serving your future needs.  If you receive a survey in the mail about today's services, we hope that you will take a few minutes to let us know about your experience.    · If you have any questions about your VISIT, please call [phone]    · If you have any questions about your BILL, please  Guru Sabino Ayala MD;  Location: [facility]     ENDOSCOPIC RETROGRADE CHOLANGIOPANCREATOGRAPHY, EXCHANGE TUBE/STENT N/A 5/10/2023    Procedure: ENDOSCOPIC RETROGRADE CHOLANGIOPANCREATOGRAPHY with pancreatic stents exchange and stone extraction;  Surgeon: Guru Sabino Campuzano MD;  [facility]     ENDOSCOPIC RETROGRADE CHOLANGIOPANCREATOGRAPHY, EXCHANGE TUBE/STENT N/A 7/12/2023    Procedure: ENDOSCOPIC RETROGRADE CHOLANGIOPANCREATOGRAPHY, WITH REPLACEMENT OF STENT X3 AND BALLOON SWEEP OF BILE DUCT FOR STONES;  Surgeon: Guru Sabino Campuzano MD;  Location: [facility]       Current Outpatient Medications   Medication     acetaminophen (TYLENOL) 500 MG tablet     busPIRone (BUSPAR) 10 MG tablet     cetirizine (ZYRTEC) 10 MG tablet     citalopram (CELEXA) 40 MG tablet     famotidine (PEPCID) 20 MG tablet     gabapentin (NEURONTIN) 300 MG capsule     hydrOXYzine (ATARAX) 25 MG tablet     ibuprofen (ADVIL/MOTRIN) 200 MG capsule     medroxyPROGESTERone (DEPO-PROVERA) 150 MG/ML IM injection     Multiple Vitamin (TAB-A-FABI) TABS     ondansetron (ZOFRAN-ODT) 4 MG ODT tab     oxyCODONE IR (ROXICODONE) 10 MG tablet     pancrelipase, lip-prot-amyl, 3000 UNITS (CREON) CPEP     pregabalin (LYRICA) 50 MG capsule     No current facility-administered medications for this visit.         Psychiatric History:  Artie has a history of symptoms of anxiety depression beginning when she was an adolescent. Symptoms consistent with a major depressive disorder, recurrent, moderate. At times she had passive suicidal ideation/thoughts of self-harm, not so in the last years. No history of inpatient psychiatry stays. Anxiety symptoms persistent, consistent with generalized anxiety disorder.     Currently Artie is prescribed Celexa and Atarax to manage symptoms of depression and anxiety. She finds them effective to a certain degree.     She meets with a therapist regularly named Valeria, they have a long-term  call [phone]    · If you need a copy of your MEDICAL RECORD, please call [phone]    --------------------------------------------------------------------------------------------------------------  UNLESS OTHERWISE INSTRUCTED BY YOUR URGENT CARE PROVIDER TODAY, all follow-up for your medical issues should be managed by your primary care provider. The Urgent Care does not manage chronic medical issues or refill medications. You are responsible for scheduling and keeping any necessary follow-up visits with your primary care provider after this visit today.   --------------------------------------------------------------------------------------------------------------  IF YOU WERE PRESCRIBED AN ANTIBIOTIC TODAY: We recommend taking an over-the-counter probiotic (Such as Florajen 3 for adults, or [id number] for children -- AVAILABLE IN THE [facility] and other local pharmacies too) once a day for the entire duration of your antibiotics, and continuing it for 2 weeks after the antibiotics are finished. This will help reduce your chance of developing antibiotic-related diarrhea and/or yeast infections.  --------------------------------------------------------------------------------------------------------------  IF YOU HAVE LAB RESULTS or XRAY REPORTS STILL PENDING: We typically call patients back only if (1) the results are \"significantly abnormal\", (2) we need to change your treatment plan based on the results, or (3) the report is different than what we told you during your visit. If we have not called you about your results 48 hours after your visit, you can call us at [phone] to check on the status.  --------------------------------------------------------------------------------------------------------------                therapeutic relaxation and finds this helpful.     Most recent depressive episode earlier this year due to stressors, medication changes. Currently stable.     She has used medical cannabis to manage symptoms of anxiety, less effective for pain.     Substance Use History:  Artie has a history of alcohol use disorder and has been in remission for years now. She participated in formal residential treatment at [facility]. She has an appropriate plan for maintaining sobriety. Quit smoking cigarettes in 2022, over a year.     Social History:  Artie lives in [place] and works part to full time as she is able, works detailing multiple kinds of vehicles. She said her employer is supportive of her medical needs. She is in a relationship with someone, they've been together about 6 months, she describes him as supportive emotionally and supportive of her sobriety. She has support from her mother and sister as well, her sister lives locally.     Psychological Assessment:  The patient completed the following battery of assessments during this psychological evaluation: PROMIS-10, Patient Health Questionnaire-9 (PHQ-9), Generalized Anxiety Disorder-7 screener (ALAN-7), CAGE Questionnaire Adapted to Include Drugs (CAGE-AID), and the [place] Multiphasic Personality Hqroymcsg-5-Vdytrbidywms Form (MMPI-2-RF).     PROMIS-10 completed on [employer]; cannot pull scores here.      The PHQ-9 is an instrument for screening, diagnosing, monitoring and measuring the severity of depression. Scores of 5, 10, 15, and 20 represent cutpoints for mild, moderate, moderately severe and severe depressive symptoms, respectively.       7/22/2021     7:41 AM 4/4/2023    10:27 AM   PHQ-9 SCORE   PHQ-9 Total Score 12 17       The ALAN-7 is an instrument for screening, diagnosing, monitoring and measuring the severity of anxiety. Scores of 5, 10, and 15 represent cutpoints for mild, moderate, and severe anxiety symptoms, respectively.       2/14/2023     8:51 AM 6/22/2023     4:54 PM   ALAN-7 SCORE   Total Score 17 (severe anxiety) 12 (moderate anxiety)   Total Score 17 12       The CAGE-AID questionnaire is used to screen for alcohol or drug abuse and dependence in adults. A CAGE-AID score  > 1 is a positive screen, suggesting further discussion is needed to determine if evaluation for alcohol or substance abuse is appropriate. A score > 2 is considered clinically significant, suggesting further evaluation of alcohol or substance-related problems is indicated.      6/22/2023     4:56 PM   CAGE-AID Total Score   Total Score 1   Total Score MyChart 1 (A total score of 2 or greater is considered clinically significant)       The MMPI-2-RF is a 338-item self-report instrument designed to be a broadband assessment of psychological functioning. The patient generated a profile that was valid. There were no clinical elevations or evidence of rajni, or psychosis.  Despite some positive impression management, which is commonly seen in preoperative surgery psychological evaluations, there were no obvious contraindications to surgery in the patient s MMPI-2 profile.    Mental Status Examination:  Appearance/Behavior/Orientation: Patient was on time, appropriately groomed and dressed, and demonstrated good eye contact. Alert and oriented to person, place, time, and situation. No evidence of psychomotor agitation.     Cooperation/Reliability: Patient was open and cooperative.   Speech/Language: Speech was clear, coherent, and of normal rate, rhythm and volume.    Thought Form: Overall logical and organized.   Thought Content: Appropriate to interview and situation  Cognition/Memory: Not formally assessed, but no difficulties apparent upon interview.   Attention/Concentration: Good throughout interview.    Fund of knowledge: Consistent with age and level of education.    Abstract reasoning: Not assessed.   Judgment: Intact.    Mood/Affect: Mood fair; appropriate  range of affect.    Insight/Motivation: Good insight into influence of emotions and behavior on pain and QOL. Motivation for surgery appears moderate to high  Suicide/Assault: Patient denies suicidal or assaultive ideation, plan, or intent    Impression:  Artie Burger is a 27 year old female with chronic pancreatitis considering TPIAT surgery. She has a history of depression and anxiety currently managed with medications and psychotherapy. She also has a history of alcohol use disorder and has been in remission for three years.     Diagnosis:  Chronic pancreatitis  Major depressive disorder, recurrent, moderate  Generalized anxiety disorder        Recommendation/Plan:  Artie Burger presents as a reasonably good candidate for surgery at this time and is recommended for surgery. No mental health follow-up required before surgery. The patient may follow-up with Health Psychology as needed to enhance preparedness for surgery or to discuss postoperative adjustment.     Additional ways to support Artie:  -Due to anxiety she benefits from having specific expectations for treatment, time for questions  -She is worried about being away from home for so long and learning she would need to be local for multiple weeks was somewhat of a shock. She will have emotional support from her sister and she may benefit from support from Health Psychology while local.        Erica Bright, PhD,   Clinical Health Psychologist  Phone: [phone]  Pager: [phone]      *In accordance with the Rules of the [place] Board of [employer], it is noted that psychological descriptions and scientific procedures underlying psychological evaluations have limitations.  Absolute predictions cannot be made based on information in this report.    Billing to include:  1 unit of 26385   1 unit of 68520  Over 35 minutes was spent administering, scoring and interpreting the MMPI-2-RF and report writing.    Visit start time: 10:30  Visit end  time: 11:30

## 2023-06-29 ENCOUNTER — OFFICE VISIT (OUTPATIENT)
Dept: TRANSPLANT | Facility: CLINIC | Age: 28
End: 2023-06-29
Attending: PEDIATRICS
Payer: COMMERCIAL

## 2023-06-29 ENCOUNTER — OFFICE VISIT (OUTPATIENT)
Dept: TRANSPLANT | Facility: CLINIC | Age: 28
End: 2023-06-29
Attending: SURGERY
Payer: COMMERCIAL

## 2023-06-29 VITALS
WEIGHT: 133.1 LBS | SYSTOLIC BLOOD PRESSURE: 118 MMHG | HEART RATE: 73 BPM | DIASTOLIC BLOOD PRESSURE: 80 MMHG | BODY MASS INDEX: 24.34 KG/M2 | OXYGEN SATURATION: 100 %

## 2023-06-29 DIAGNOSIS — K86.0 ALCOHOL-INDUCED CHRONIC PANCREATITIS (H): Primary | ICD-10-CM

## 2023-06-29 PROCEDURE — 99000 SPECIMEN HANDLING OFFICE-LAB: CPT | Performed by: PATHOLOGY

## 2023-06-29 PROCEDURE — 82653 EL-1 FECAL QUANTITATIVE: CPT | Performed by: PEDIATRICS

## 2023-06-29 PROCEDURE — 99205 OFFICE O/P NEW HI 60 MIN: CPT | Performed by: PEDIATRICS

## 2023-06-29 PROCEDURE — G0463 HOSPITAL OUTPT CLINIC VISIT: HCPCS | Performed by: PEDIATRICS

## 2023-06-29 PROCEDURE — 99214 OFFICE O/P EST MOD 30 MIN: CPT | Performed by: SURGERY

## 2023-06-29 RX ORDER — GABAPENTIN 300 MG/1
300-900 CAPSULE ORAL PRN
Status: ON HOLD | COMMUNITY
Start: 2023-06-23 | End: 2023-08-24

## 2023-06-29 NOTE — LETTER
6/29/2023         RE: Artie Burger  [address]  [facility] [address]        Dear Colleague,    Thank you for referring your patient, Artie Burger, to the [facility]. Please see a copy of my visit note below.    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 In person visit to discuss surgical management options for recurrent pancreatitis.     Surgical approach: per Dr. 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 He has successfully dilated and stented with relief in the past, but she has failed stent free trial. She is referred for consideration of drainage (Jaye operation). On my review of noncon CT, her duct is somewhat dilated but not impressively so (~4mm), though this is limited by lack of contrast. Will need to repeat CT and reassess. Also note large pancreatic head mass with multiple calcifications. She had an updated IV contrast CT that demonstrates a large inflammatory calcified head mass and moderately dilated upstream duct (2-4mm, 5-6mm with stent). The upstream gland is not significantly calcified. Unclear if her optimal surgery would be Jaye vs Whipple or TPIAT. I did not have this imaging available at the time of visit but did discuss that the duct would need to be large enough to facilitate a Jaye, and also what a TPIAT might entail. She is interested in surgery but less interested in TPIAT. I will discuss in CPW[facility] to see if she is felt to be suitable for drainage, or if TPIAT or whipple might be more suitable surgeries up front. I have told her that, if the surgical approach is recommended to be TPIAT or Whipple, we should rediscuss and ensure she is comfortable going forward.     I did discuss her case in [facility]- she  was not felt to be an appropriate Jaye/drainage candidate. Consensus opinion was that she would likely achieve subpar analgesic results from that operation and instead would be better served with pancreaticoduodenectomy or total pancreatectomy. I discussed this with her at length, and today she is ready to proceed with full TPIAT evaluation. She has her mother present with her for support.    Analgesia: previously not on narcotics, but currently using dilaudid 2-4 6 times daily with other non-narcotic adjuncts      Pancreatic function: not diabetic, no overt indication of exocrine insufficiency. Has tried creon in the past with no real benefit.     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 She had been losing weight earlier but has since stabilized.     Alcohol/Tobacco cessation: negative PeTH in chart, reports multiple years of abstinence which is consistent with this. She also reports vaping currently but no longer smoking cigarettes. Will need to verify cessation of alcohol and tobacco prior to any surgery. Tobacco cessation especially important in duodenal resecting surgeries (TPIAT, Whipple). Vaping is ok, but not ideal. NSAID cessation also essential, which she understands.     Medical Decision Making: High  Consult [id number] high complexity, 110 minutes      Faculty: Junito Patel MD  __________________________________________________________________  Transplant History: pancreatitis consult  9/22/2023 ([facility]), Postoperative day:      Interval History: History is obtained from the patient  Artie Burger is a very pleasant 26 yo F with a history of acute on chronic alcoholic pancreatitis. She has an extensive history of alcohol and tobacco use going back about ten years, with her first episodes of pancreatitis following shortly thereafter. She was hospitalized at [facility] with recurrent episodes of  pancreatitis. She initially abstained from drinking but did have relapses, corresponding with further episodes of pancreatitis.     Dr. Campuzano has known her for a long period over which he has engaged in numerous successful endoscopic interventions for her. For full details of this, please see his excellent notes. In short, he has been able to clear her duct of stones and did note that she had a very significant stricture in the pancreatic neck with moderate upstream dilation, as well as calcific changes and side branch calcification in the pancreatic head. She has ultimately failed trials of stent free intervals and is referred for surgery.    She says she has now developed daily pain, where previously it was only intermittent. It is in her central abdomen with radiation to the right and into her back, not really to her left. It doesn't have accompanying nausea, but she does have pain with eating and that previously made her food avoidant. She is able to maintain her weight relatively well now, though eating is still difficult. She works in [employer] at a [employer] and is unable to complete her (admittedly very busy, physical) shifts due to pain. When she is able to work, she spends the next day resting and recovering due to her fatigue.     Since our last visit, she had a stent exchange with Dr. Campuzano, but she says that this has not helped her pain at all. She is otherwise in her usual state of health.     On today's visit, she remains with pain and malaise symptoms as characterized before. She is also quite anxious as she is undergoing full TPIAT evaluation. She and her mother (present) are asking very thoughtful questions about the plan for surgery and expected recovery.     Social History     Tobacco Use    Smoking status: Former     Types: Cigarettes     Quit date: 2022     Years since quittin.5    Smokeless tobacco: Current    Tobacco comments:     Vapes daily   Vaping Use    Vaping  Use: Every day    Substances: Nicotine   Substance Use Topics    Alcohol use: Not Currently    Drug use: Yes     Types: Marijuana     Comment: smokes daily     ROS:   A 10-point review of systems was negative except as noted above.    Curent Meds:      Physical Exam:     Admit      Current Vitals:   LMP 05/10/2023 (Exact Date)          Vital sign ranges:       No data found.  General Appearance: in no apparent distress.   Skin: normal appearance, warm and dry  Heart: deferred  Lungs: without wheezes, nonlabored respirations  Abdomen: The abdomen is flat, she is thin. No evident incision or hernia. Narrow costal angle. She has a supraumbilical piercing that she would prefer to maintain if at all possible.   Extremities: edema: appears absent    Data:   CMP@LABRCNTIPR(na:2,potassium:2,chloride:2,co2:2,g,bun:2,cr:2,gfrestimated:2,gfrestblack:2,mark:2,icapoc:2,icaw:2,ma,phos:2,amylase:2,lipase:2,uamy24:3,albumin:2,bilitotal:2,biliconj:2,bilidelta:2,alkphos:2,ast:2,alt:2,fbili:1)@  CBC@LABRCNTIPR(hgb:2,wbc:2,a,plt:2,a1c:1)@  Coags@LABRCNTIPR(inr:2,ptt:2,Xa:2)@   Urinalysis  Recent Labs   Lab Test 23  1614   COLOR Yellow   APPEARANCE Slightly Cloudy*   URINEGLC Negative   URINEBILI Negative   URINEKETONE Negative   SG 1.021   UBLD Negative   URINEPH 7.0   PROTEIN 10*   NITRITE Negative   LEUKEST Negative   RBCU 2   WBCU <1        Prior CT personally reviewed. Note multifocal inflammatory calcification of the pancreatic head with evident dilated upstream pancreatic duct, though this measures ~4mm to me. Limited by lack of IV contrast.     Updated CT with IV contrast also personally reviewed. Note conventional hepatic arterial anatomy. Small calcifications in gland at body/tail, more significant inflammatory calcification in pancreatic head. Stents in duct without evident stones or debris.       Again, thank you for allowing me to participate in the care of your patient.        Sincerely,        Junito ARITA  MD Amanda

## 2023-06-29 NOTE — LETTER
6/29/2023         RE: Artie Burger  [address]  [facility] [address]        Dear Colleague,    Thank you for referring your patient, Artie Burger, to the [facility] TRANSPLANT [facility]. Please see a copy of my visit note below.    Pediatric Endocrinology/ [facility]let Autotransplant  Chronic Pancreatitis Consult    Patient Active Problem List   Diagnosis     Chronic pancreatitis (H)       Assessment/Plan:  1.  Chronic pancreatitis    Artie is a 27 year old with pancreatitis, considering surgical management.  Glycemic testing from a mixed meal test currently suggests pre-DM by glucose criteria although A1c is normal range.  She is also wearing a blinded CGM.    I think she is a reasonable candidate for TPIAT.  We did spend quite a bit of time discussing realistic expectations for diabetes (likely to be on insulin given imaging and lab findings but expect to have some islet function most likely) and pain recovery and likelihood of having some residual neuropathic pain.    The primary purpose of today's visit was to review the patient's endocrine history and provide counseling on islet autotransplantation.  We discussed the procedure of islet autotransplant, the post-operative management, and the prognosis.  We specifically discussed that all patients are on insulin after this procedure, typically for at least several months.  About 30% of patients will become insulin independent.  However, there remains a very high lifetime risk for diabetes.  Of the patients who require life long insulin therapy, most will have some graft function and a significant portion can maintain reasonable glycemic control on once injection per day of basal insulin.  However, about  30-40% of patients will require 4-6 injections per day or an insulin pump for management.  Only 10% have no or minimal islet function;  These patients are at higher risk for a labile form of diabetes mellitus that can be difficult to manage.  The  main goal of the islet autotransplant procedure is to preserve enough beta cell mass to make diabetes manageable.  Because of the high risk of diabetes mellitus, all patients must be willing to accept diabetes and insulin injections as a trade off for relief from pancreatic pain.    All surgical consults are reviewed by our multi-disciplinary team to determine if surgery is an appropriate next option.  I      Dr. Tiera Casey MD  [facility]  Director of Research, [facility] Auto-transplant Program  Phone:  [phone]  Electronically signed: July 3, 2023 at 9:50 AM        Review of the result(s) of each unique test - as below  60 minutes spent by me on the date of the encounter doing chart review, history and exam, documentation and further activities per the note          CC:  Chronic pancreatitis, surgical evaluation    HPI:  Artie Burger is a 27 year old female referred by Dr. Guru Campuzano for new patient consultation of endocrine function in the setting of chronic pancreatitis.    Pancreatitis history is as follows:  Artie has a history of pancreatitis, alcohol abuse in remission, depression, alcoholic hepatitis (resolved), who presents for TPIAT evaluation.  She had onset of pancreatitis in 2/2018 with well documented acute pancreatitis episodes.  These were associated with heavy alcohol drinking.  She was also a smoker for many years. She eventually saw Dr. Campuzano in 2021 at which time she had a CT with multiple pancreatic stones and duct obstruction. She has since undergone a series of ERCPs including SpyGlass for stone removal with some benefit but has chronic daily pain now and pancreatitis is impacting QoL and ability to do normal life activities including work.  She is abstinent from alcohol and is not smoking.        Evaluation/ imaging/ treatments:  Elevated amylase and lipase by history:  YES  Etiology of disease: alcohol related  (now abstinent from EtOH).  Has negative genetic testing.   Recent imaging studies:   4/18/23 CT  The pancreatic head is enlarged and heterogeneous.  There are innumerable   calcifications throughout the pancreas.  There is a pancreatic duct stent in   place.  The enlarged pancreatic head causes significant mass effect on the   duodenum, narrowing the duodenal lumen.      Medical treatment(s): ERCP and supportive management  ERCP procedures:Yes multiple ERCPs well documented here (12 total here since 2021).  Gets some benefit with these, though short lived  Prior pancreatic surgery: no  NO Nerve block   NO cholecystectomy    Endocrine history:  No known BG issues.     Other:  Low fecal elastase here    EXAM:  NM GASTRIC EMPTYING, 2/6/2023 1:58 PM  HISTORY: Evaluate for gastroparesis as a cause for ongoing abdominal  pain; Abdominal pain, generalized     TECHNIQUE: The patient ingested 1.9 mCi of Tc-99m sulfur colloid in 2  eggs, one toast with jelly, water. Static images were acquired at  approximately 1 hour intervals out through 4 hours using a dual head  gamma camera in an anterior and posterior position. The calculation of  emptying was based on dual head imaging with geometric mean  calculations.  A Linear square fit was calculated to the emptying  curve to determine the amount of residual activity at each time point.     FINDINGS:   Percent retention at 60 min = 100%.  Percent retention at 120 min = 44%.  Percent retention at 180 min = 22%.  Percent retention at 240 min = 11%.     T 1/2 emptying time =  56 mins.                                                                      IMPRESSION: Upper limits of normal gastric emptying (see normal ranges  below).         Review of Systems:  A comprehensive 10 point review of systems was performed and was negative except as noted in the HPI above.    Past Medical History:  Past Medical History:   Diagnosis Date     Alcohol-induced chronic pancreatitis (H)       Anxiety      Depression      Gastroesophageal reflux disease      Pancreatic disease      PONV (postoperative nausea and vomiting)      Past Surgical History:   Procedure Laterality Date     ENDOSCOPIC RETROGRADE CHOLANGIOPANCREATOGRAM COMPLEX N/A 9/13/2021    Procedure: ENDOSCOPIC RETROGRADE CHOLANGIOPANCREATOGRAPHY with pancreatic sphincterotomy, dilation, stone removal, stent placement;  Surgeon: Guru Sabino Campuzano MD;  Location: [facility] OR     ENDOSCOPIC RETROGRADE CHOLANGIOPANCREATOGRAM WITH DIRECT VISUALIZATION SYSTEM AND GENERATOR N/A 11/1/2021    Procedure: ENDOSCOPIC RETROGRADE CHOLANGIOPANCREATOGRAPHY, dilation and debridement sludge removal, pancreatic duct stent placement;  Surgeon: Guru Sabino Campuzano MD;  Location: [facility] OR     ENDOSCOPIC RETROGRADE CHOLANGIOPANCREATOGRAM WITH SPYGLASS N/A 10/6/2021    Procedure: ENDOSCOPIC RETROGRADE CHOLANGIOPANCREATOGRAPHY, WITH DIRECT DUCT VISUALIZATION, USING PANCREATICOBILIARY FIBEROPTIC PROBE, BILE DUCT STENT EXCHANGE, LITHOTRIPSY OF PANCREATIC STONE, SPHINCTEROTOMY, BALLOON DILATION OF PANCREATIC DUCT AND STONE EXTRACTION;  Surgeon: Guru Sabino Campuzano MD;  Location: [facility] OR     ENDOSCOPIC RETROGRADE CHOLANGIOPANCREATOGRAM WITH SPYGLASS N/A 12/13/2021    Procedure: ENDOSCOPIC RETROGRADE CHOLANGIOPANCREATOGRAPHY, with pancreatic stent exchange, ballon dilation, stone removal, with spyglass direct visualization;  Surgeon: Guru Sabino Campuzano MD;  Location: [facility]     ENDOSCOPIC RETROGRADE CHOLANGIOPANCREATOGRAPHY, EXCHANGE TUBE/STENT N/A 2/16/2022    Procedure: ENDOSCOPIC RETROGRADE CHOLANGIOPANCREATOGRAPH with pancreatic dilation, debris removal and stent exchange.;  Surgeon: Guru Sabino Campuzano MD;  Location: [facility]     ENDOSCOPIC RETROGRADE CHOLANGIOPANCREATOGRAPHY, EXCHANGE TUBE/STENT N/A 4/4/2022    Procedure: ENDOSCOPIC RETROGRADE CHOLANGIOPANCREATOGRAPHY, WITH REPLACEMENT OF  pancreatic STENT, stone removal;  Surgeon: Guru Sabino Campuzano MD;  Location: [facility]     ENDOSCOPIC RETROGRADE CHOLANGIOPANCREATOGRAPHY, EXCHANGE TUBE/STENT N/A 7/13/2022    Procedure: ENDOSCOPIC RETROGRADE CHOLANGIOPANCREATOGRAPHY, WITH pancreatic duct dilation and stent exchange, biliary stent placement, debris removal;  Surgeon: Guru Sabino Campuzano MD;  Location: [facility]     ENDOSCOPIC RETROGRADE CHOLANGIOPANCREATOGRAPHY, EXCHANGE TUBE/STENT N/A 9/14/2022    Procedure: ENDOSCOPIC RETROGRADE CHOLANGIOPANCREATOGRAPHY, WITH pancreatic stone removal, biliary stent removal, pancreatic stent exchange;  Surgeon: Guru Sabino Campuzano MD;  Location: [facility]     ENDOSCOPIC RETROGRADE CHOLANGIOPANCREATOGRAPHY, EXCHANGE TUBE/STENT N/A 11/21/2022    Procedure: ENDOSCOPIC RETROGRADE CHOLANGIOPANCREATOGRAPHY WITH PANCREATIC DUCT STENT EXCHANGE, DILATION, AND DEBRIS REMOVAL;  Surgeon: Guru Sabino aCmpuzano MD;  Location: [facility]     ENDOSCOPIC RETROGRADE CHOLANGIOPANCREATOGRAPHY, EXCHANGE TUBE/STENT N/A 1/18/2023    Procedure: ENDOSCOPIC RETROGRADE CHOLANGIOPANCREATOGRAPHY stent exchange, dilation, sludge and stone removal;  Surgeon: Guru Sabino Campuzano MD;  Location: [facility]     ENDOSCOPIC RETROGRADE CHOLANGIOPANCREATOGRAPHY, EXCHANGE TUBE/STENT N/A 3/13/2023    Procedure: ENDOSCOPIC RETROGRADE CHOLANGIOPANCREATOGRAPHY, WITH replacement of pancreatic stents, bile duct stent placed,sphinterotomy of bile duct ampulla, balloon sweep of bile duct for sludge,;  Surgeon: Guru Sabino Campuzano MD;  Location: [facility]     ENDOSCOPIC RETROGRADE CHOLANGIOPANCREATOGRAPHY, EXCHANGE TUBE/STENT N/A 5/10/2023    Procedure: ENDOSCOPIC RETROGRADE CHOLANGIOPANCREATOGRAPHY with pancreatic stents exchange and stone extraction;  Surgeon: Guru Sabino Campuzano MD;  Location: [facility] OR       Current medications:  Current Outpatient Medications   Medication  Sig Dispense Refill     acetaminophen (TYLENOL) 500 MG tablet Take 500-1,000 mg by mouth every 6 hours as needed for mild pain       busPIRone (BUSPAR) 10 MG tablet Take 10 mg by mouth 3 times daily        cetirizine (ZYRTEC) 10 MG tablet Take 10 mg by mouth daily as needed       famotidine (PEPCID) 20 MG tablet Take 20 mg by mouth daily as needed       gabapentin (NEURONTIN) 300 MG capsule Take 300-600 mg by mouth as needed       ibuprofen (ADVIL/MOTRIN) 200 MG capsule Take 800 mg by mouth once       Multiple Vitamin (TAB-A-FABI) TABS Take 1 tablet by mouth daily       ondansetron (ZOFRAN-ODT) 4 MG ODT tab Place 4-8 mg under the tongue every 6 hours as needed       pancrelipase, lip-prot-amyl, 3000 UNITS (CREON) CPEP Take 2 capsules by mouth 3 times daily (with meals)       desogestrel-ethinyl estradiol (APRI) 0.15-30 MG-MCG tablet Take 1 tablet by mouth daily (Patient not taking: Reported on 4/20/2023)       escitalopram (LEXAPRO) 20 MG tablet Take 20 mg by mouth daily       pregabalin (LYRICA) 50 MG capsule Take 50 mg by mouth (Patient not taking: Reported on 6/29/2023)         Family History:  The family history was reviewed with particular attention to diabetes and pancreatitis history, and updated by me as pertinent, and is as documented below.    A paternal GM has diabetes and an uncle on the paternal side (but no DM in father).    Negative for pancreatitis history.     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  Social support from family.  No longer drinking and not smoking.      Physical Exam:  Vitals: /80   Pulse 73   Wt 60.4 kg (133 lb 1.6 oz)   LMP 05/10/2023 (Exact Date)   SpO2 100%   BMI 24.34 kg/m    BMI= Body mass index is 24.34 kg/m .  General:  Appearance is normal, no acute distress  HEENT:  NC/AT, sclera appear white  Neck:  No obvious thyromegaly  CV/Lungs:  Non distressed breathing  Skin:  No apparent rashes  Neuro:  Normal mental status  Psych:  Normal  affect      Results:  Hemoglobin A1c levels (this and prior visits):  Lab Results   Component Value Date    A1C 5.3 06/28/2023       Mixed meal test results (from [facility]):  C Peptide   Date Value Ref Range Status   06/28/2023 4.4 0.9 - 6.9 ng/mL Final   06/28/2023 6.7 0.9 - 6.9 ng/mL Final   06/28/2023 2.1 0.9 - 6.9 ng/mL Final     Glucose   Date Value Ref Range Status   06/28/2023 104 (H) 70 - 99 mg/dL Final   06/28/2023 110 (H) 70 - 99 mg/dL Final   06/28/2023 113 (H) 70 - 99 mg/dL Final   06/28/2023 113 (H) 70 - 99 mg/dL Final   05/10/2023 107 (H) 70 - 99 mg/dL Final   04/04/2022 110 (H) 70 - 99 mg/dL Final   02/16/2022 105 (H) 70 - 99 mg/dL Final   12/13/2021 109 (H) 70 - 99 mg/dL Final   11/01/2021 72 70 - 99 mg/dL Final   10/06/2021 103 (H) 70 - 99 mg/dL Final     GLUCOSE BY METER POCT   Date Value Ref Range Status   03/13/2023 109 (H) 70 - 99 mg/dL Final   01/18/2023 117 (H) 70 - 99 mg/dL Final   11/21/2022 117 (H) 70 - 99 mg/dL Final   09/14/2022 106 (H) 70 - 99 mg/dL Final   07/13/2022 100 (H) 70 - 99 mg/dL Final         Liver enzymes:  AST   Date Value Ref Range Status   06/28/2023 16 0 - 45 U/L Final     Comment:     Reference intervals for this test were updated on 6/12/2023 to more accurately reflect our healthy population. There may be differences in the flagging of prior results with similar values performed with this method. Interpretation of those prior results can be made in the context of the updated reference intervals.     ALT   Date Value Ref Range Status   06/28/2023 10 0 - 50 U/L Final     Comment:     Reference intervals for this test were updated on 6/12/2023 to more accurately reflect our healthy population. There may be differences in the flagging of prior results with similar values performed with this method. Interpretation of those prior results can be made in the context of the updated reference intervals.       No results found for: BILICONJ   Bilirubin Total   Date Value Ref Range  Status   06/28/2023 0.2 <=1.2 mg/dL Final     Albumin   Date Value Ref Range Status   06/28/2023 4.5 3.5 - 5.2 g/dL Final   04/04/2022 3.2 (L) 3.4 - 5.0 g/dL Final     Protein Total   Date Value Ref Range Status   06/28/2023 7.1 6.4 - 8.3 g/dL Final      Alkaline Phosphatase   Date Value Ref Range Status   06/28/2023 58 35 - 104 U/L Final       Creatinine:  Creatinine   Date Value Ref Range Status   06/28/2023 0.77 0.51 - 0.95 mg/dL Final   ]    Complete Blood Count:  CBC RESULTS:   Recent Labs   Lab Test 06/28/23  0724   WBC 9.2   RBC 3.93   HGB 11.8   HCT 34.6*   MCV 88   MCH 30.0   MCHC 34.1   RDW 14.5                  Again, thank you for allowing me to participate in the care of your patient.        Sincerely,        Tiera Casey MD

## 2023-06-30 LAB
CANCER AG19-9 SERPL IA-ACNC: 5 U/ML
ELASTASE PANC STL-MCNT: 30.1 UG/G
GAD65 AB SER IA-ACNC: <5 IU/ML
PANC ISLET CELL AB TITR SER: NORMAL {TITER}

## 2023-07-01 LAB
A-TOCOPHEROL VIT E SERPL-MCNC: 6.8 MG/L
ANNOTATION COMMENT IMP: NORMAL
BETA+GAMMA TOCOPHEROL SERPL-MCNC: 1.9 MG/L
INSULIN AB SER IA-ACNC: <0.4 U/ML
RETINYL PALMITATE SERPL-MCNC: <0.02 MG/L
VIT A SERPL-MCNC: 0.58 MG/L

## 2023-07-03 LAB
DEPRECATED CALCIDIOL+CALCIFEROL SERPL-MC: <63 UG/L (ref 20–75)
VITAMIN D2 SERPL-MCNC: <5 UG/L
VITAMIN D3 SERPL-MCNC: 58 UG/L

## 2023-07-03 NOTE — PROGRESS NOTES
Pediatric Endocrinology/ Islet Autotransplant  Chronic Pancreatitis Consult    Patient Active Problem List   Diagnosis     Chronic pancreatitis (H)       Assessment/Plan:  1.  Chronic pancreatitis    Artie is a 27 year old with pancreatitis, considering surgical management.  Glycemic testing from a mixed meal test currently suggests pre-DM by glucose criteria although A1c is normal range.  She is also wearing a blinded CGM.    I think she is a reasonable candidate for TPIAT.  We did spend quite a bit of time discussing realistic expectations for diabetes (likely to be on insulin given imaging and lab findings but expect to have some islet function most likely) and pain recovery and likelihood of having some residual neuropathic pain.    The primary purpose of today's visit was to review the patient's endocrine history and provide counseling on islet autotransplantation.  We discussed the procedure of islet autotransplant, the post-operative management, and the prognosis.  We specifically discussed that all patients are on insulin after this procedure, typically for at least several months.  About 30% of patients will become insulin independent.  However, there remains a very high lifetime risk for diabetes.  Of the patients who require life long insulin therapy, most will have some graft function and a significant portion can maintain reasonable glycemic control on once injection per day of basal insulin.  However, about  30-40% of patients will require 4-6 injections per day or an insulin pump for management.  Only 10% have no or minimal islet function;  These patients are at higher risk for a labile form of diabetes mellitus that can be difficult to manage.  The main goal of the islet autotransplant procedure is to preserve enough beta cell mass to make diabetes manageable.  Because of the high risk of diabetes mellitus, all patients must be willing to accept diabetes and insulin injections as a trade off for  relief from pancreatic pain.    All surgical consults are reviewed by our multi-disciplinary team to determine if surgery is an appropriate next option.  I      Dr. Tiera Casey MD  [facility]  Director of Research, Islet Auto-transplant Program  Phone:  [phone]  Electronically signed: July 3, 2023 at 9:50 AM        Review of the result(s) of each unique test - as below  60 minutes spent by me on the date of the encounter doing chart review, history and exam, documentation and further activities per the note          CC:  Chronic pancreatitis, surgical evaluation    HPI:  Artie Burger is a 27 year old female referred by Dr. Guru Campuzano for new patient consultation of endocrine function in the setting of chronic pancreatitis.    Pancreatitis history is as follows:  Artie has a history of pancreatitis, alcohol abuse in remission, depression, alcoholic hepatitis (resolved), who presents for TPIAT evaluation.  She had onset of pancreatitis in 2/2018 with well documented acute pancreatitis episodes.  These were associated with heavy alcohol drinking.  She was also a smoker for many years. She eventually saw Dr. Campuzano in 2021 at which time she had a CT with multiple pancreatic stones and duct obstruction. She has since undergone a series of ERCPs including SpyGlass for stone removal with some benefit but has chronic daily pain now and pancreatitis is impacting QoL and ability to do normal life activities including work.  She is abstinent from alcohol and is not smoking.        Evaluation/ imaging/ treatments:  Elevated amylase and lipase by history:  YES  Etiology of disease: alcohol related (now abstinent from EtOH).  Has negative genetic testing.   Recent imaging studies:   4/18/23 CT  The pancreatic head is enlarged and heterogeneous.  There are innumerable   calcifications throughout the pancreas.  There is a pancreatic duct stent in    place.  The enlarged pancreatic head causes significant mass effect on the   duodenum, narrowing the duodenal lumen.      Medical treatment(s): ERCP and supportive management  ERCP procedures:Yes multiple ERCPs well documented here (12 total here since 2021).  Gets some benefit with these, though short lived  Prior pancreatic surgery: no  NO Nerve block   NO cholecystectomy    Endocrine history:  No known BG issues.     Other:  Low fecal elastase here    EXAM:  NM GASTRIC EMPTYING, 2/6/2023 1:58 PM  HISTORY: Evaluate for gastroparesis as a cause for ongoing abdominal  pain; Abdominal pain, generalized     TECHNIQUE: The patient ingested 1.9 mCi of Tc-99m sulfur colloid in 2  eggs, one toast with jelly, water. Static images were acquired at  approximately 1 hour intervals out through 4 hours using a dual head  gamma camera in an anterior and posterior position. The calculation of  emptying was based on dual head imaging with geometric mean  calculations.  A Linear square fit was calculated to the emptying  curve to determine the amount of residual activity at each time point.     FINDINGS:   Percent retention at 60 min = 100%.  Percent retention at 120 min = 44%.  Percent retention at 180 min = 22%.  Percent retention at 240 min = 11%.     T 1/2 emptying time =  56 mins.                                                                      IMPRESSION: Upper limits of normal gastric emptying (see normal ranges  below).         Review of Systems:  A comprehensive 10 point review of systems was performed and was negative except as noted in the HPI above.    Past Medical History:  Past Medical History:   Diagnosis Date     Alcohol-induced chronic pancreatitis (H)      Anxiety      Depression      Gastroesophageal reflux disease      Pancreatic disease      PONV (postoperative nausea and vomiting)      Past Surgical History:   Procedure Laterality Date     ENDOSCOPIC RETROGRADE CHOLANGIOPANCREATOGRAM COMPLEX N/A  9/13/2021    Procedure: ENDOSCOPIC RETROGRADE CHOLANGIOPANCREATOGRAPHY with pancreatic sphincterotomy, dilation, stone removal, stent placement;  Surgeon: Guru Sabino Campuzano MD;  [facility]     ENDOSCOPIC RETROGRADE CHOLANGIOPANCREATOGRAM WITH DIRECT VISUALIZATION SYSTEM AND GENERATOR N/A 11/1/2021    Procedure: ENDOSCOPIC RETROGRADE CHOLANGIOPANCREATOGRAPHY, dilation and debridement sludge removal, pancreatic duct stent placement;  Surgeon: Guru Sabino Campuzano MD;  [facility]     ENDOSCOPIC RETROGRADE CHOLANGIOPANCREATOGRAM WITH SPYGLASS N/A 10/6/2021    Procedure: ENDOSCOPIC RETROGRADE CHOLANGIOPANCREATOGRAPHY, WITH DIRECT DUCT VISUALIZATION, USING PANCREATICOBILIARY FIBEROPTIC PROBE, BILE DUCT STENT EXCHANGE, LITHOTRIPSY OF PANCREATIC STONE, SPHINCTEROTOMY, BALLOON DILATION OF PANCREATIC DUCT AND STONE EXTRACTION;  Surgeon: Guru Sabino Campuzano MD;  [facility]     ENDOSCOPIC RETROGRADE CHOLANGIOPANCREATOGRAM WITH SPYGLASS N/A 12/13/2021    Procedure: ENDOSCOPIC RETROGRADE CHOLANGIOPANCREATOGRAPHY, with pancreatic stent exchange, ballon dilation, stone removal, with spyglass direct visualization;  Surgeon: Guru Sabino Campuzano MD;  [facility]     ENDOSCOPIC RETROGRADE CHOLANGIOPANCREATOGRAPHY, EXCHANGE TUBE/STENT N/A 2/16/2022    Procedure: ENDOSCOPIC RETROGRADE CHOLANGIOPANCREATOGRAPH with pancreatic dilation, debris removal and stent exchange.;  Surgeon: Guru Sabino Campuzano MD;  [facility]     ENDOSCOPIC RETROGRADE CHOLANGIOPANCREATOGRAPHY, EXCHANGE TUBE/STENT N/A 4/4/2022    Procedure: ENDOSCOPIC RETROGRADE CHOLANGIOPANCREATOGRAPHY, WITH REPLACEMENT OF pancreatic STENT, stone removal;  Surgeon: Guru Sabino Campuzano MD;  [facility]     ENDOSCOPIC RETROGRADE CHOLANGIOPANCREATOGRAPHY, EXCHANGE TUBE/STENT N/A 7/13/2022    Procedure: ENDOSCOPIC RETROGRADE CHOLANGIOPANCREATOGRAPHY, WITH  pancreatic duct dilation and stent exchange, biliary stent placement, debris removal;  Surgeon: Guru Sabino Campuzano MD;  [facility]     ENDOSCOPIC RETROGRADE CHOLANGIOPANCREATOGRAPHY, EXCHANGE TUBE/STENT N/A 9/14/2022    Procedure: ENDOSCOPIC RETROGRADE CHOLANGIOPANCREATOGRAPHY, WITH pancreatic stone removal, biliary stent removal, pancreatic stent exchange;  Surgeon: Guru Sabino Campuzano MD;  [facility]     ENDOSCOPIC RETROGRADE CHOLANGIOPANCREATOGRAPHY, EXCHANGE TUBE/STENT N/A 11/21/2022    Procedure: ENDOSCOPIC RETROGRADE CHOLANGIOPANCREATOGRAPHY WITH PANCREATIC DUCT STENT EXCHANGE, DILATION, AND DEBRIS REMOVAL;  Surgeon: Guru Sabino Campuzano MD;  [facility]     ENDOSCOPIC RETROGRADE CHOLANGIOPANCREATOGRAPHY, EXCHANGE TUBE/STENT N/A 1/18/2023    Procedure: ENDOSCOPIC RETROGRADE CHOLANGIOPANCREATOGRAPHY stent exchange, dilation, sludge and stone removal;  Surgeon: Guru Sabino Campuzano MD;  [facility]     ENDOSCOPIC RETROGRADE CHOLANGIOPANCREATOGRAPHY, EXCHANGE TUBE/STENT N/A 3/13/2023    Procedure: ENDOSCOPIC RETROGRADE CHOLANGIOPANCREATOGRAPHY, WITH replacement of pancreatic stents, bile duct stent placed,sphinterotomy of bile duct ampulla, balloon sweep of bile duct for sludge,;  Surgeon: Guru Sabino Campuzano MD;  [facility]     ENDOSCOPIC RETROGRADE CHOLANGIOPANCREATOGRAPHY, EXCHANGE TUBE/STENT N/A 5/10/2023    Procedure: ENDOSCOPIC RETROGRADE CHOLANGIOPANCREATOGRAPHY with pancreatic stents exchange and stone extraction;  Surgeon: Guru Sabino Campuzano MD;  [facility]       Current medications:  Current Outpatient Medications   Medication Sig Dispense Refill     acetaminophen (TYLENOL) 500 MG tablet Take 500-1,000 mg by mouth every 6 hours as needed for mild pain       busPIRone (BUSPAR) 10 MG tablet Take 10 mg by mouth 3 times daily        cetirizine (ZYRTEC) 10 MG tablet Take 10 mg by  mouth daily as needed       famotidine (PEPCID) 20 MG tablet Take 20 mg by mouth daily as needed       gabapentin (NEURONTIN) 300 MG capsule Take 300-600 mg by mouth as needed       ibuprofen (ADVIL/MOTRIN) 200 MG capsule Take 800 mg by mouth once       Multiple Vitamin (TAB-A-FABI) TABS Take 1 tablet by mouth daily       ondansetron (ZOFRAN-ODT) 4 MG ODT tab Place 4-8 mg under the tongue every 6 hours as needed       pancrelipase, lip-prot-amyl, 3000 UNITS (CREON) CPEP Take 2 capsules by mouth 3 times daily (with meals)       desogestrel-ethinyl estradiol (APRI) 0.15-30 MG-MCG tablet Take 1 tablet by mouth daily (Patient not taking: Reported on 4/20/2023)       escitalopram (LEXAPRO) 20 MG tablet Take 20 mg by mouth daily       pregabalin (LYRICA) 50 MG capsule Take 50 mg by mouth (Patient not taking: Reported on 6/29/2023)         Family History:  The family history was reviewed with particular attention to diabetes and pancreatitis history, and updated by me as pertinent, and is as documented below.    A paternal GM has diabetes and an uncle on the paternal side (but no DM in father).    Negative for pancreatitis history.     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  Social support from family.  No longer drinking and not smoking.      Physical Exam:  Vitals: /80   Pulse 73   Wt 60.4 kg (133 lb 1.6 oz)   LMP 05/10/2023 (Exact Date)   SpO2 100%   BMI 24.34 kg/m    BMI= Body mass index is 24.34 kg/m .  General:  Appearance is normal, no acute distress  HEENT:  NC/AT, sclera appear white  Neck:  No obvious thyromegaly  CV/Lungs:  Non distressed breathing  Skin:  No apparent rashes  Neuro:  Normal mental status  Psych:  Normal affect      Results:  Hemoglobin A1c levels (this and prior visits):  Lab Results   Component Value Date    A1C 5.3 06/28/2023       Mixed meal test results (from [facility] ):  C Peptide   Date Value Ref Range Status   06/28/2023 4.4 0.9 - 6.9 ng/mL  Final   06/28/2023 6.7 0.9 - 6.9 ng/mL Final   06/28/2023 2.1 0.9 - 6.9 ng/mL Final     Glucose   Date Value Ref Range Status   06/28/2023 104 (H) 70 - 99 mg/dL Final   06/28/2023 110 (H) 70 - 99 mg/dL Final   06/28/2023 113 (H) 70 - 99 mg/dL Final   06/28/2023 113 (H) 70 - 99 mg/dL Final   05/10/2023 107 (H) 70 - 99 mg/dL Final   04/04/2022 110 (H) 70 - 99 mg/dL Final   02/16/2022 105 (H) 70 - 99 mg/dL Final   12/13/2021 109 (H) 70 - 99 mg/dL Final   11/01/2021 72 70 - 99 mg/dL Final   10/06/2021 103 (H) 70 - 99 mg/dL Final     GLUCOSE BY METER POCT   Date Value Ref Range Status   03/13/2023 109 (H) 70 - 99 mg/dL Final   01/18/2023 117 (H) 70 - 99 mg/dL Final   11/21/2022 117 (H) 70 - 99 mg/dL Final   09/14/2022 106 (H) 70 - 99 mg/dL Final   07/13/2022 100 (H) 70 - 99 mg/dL Final         Liver enzymes:  AST   Date Value Ref Range Status   06/28/2023 16 0 - 45 U/L Final     Comment:     Reference intervals for this test were updated on 6/12/2023 to more accurately reflect our healthy population. There may be differences in the flagging of prior results with similar values performed with this method. Interpretation of those prior results can be made in the context of the updated reference intervals.     ALT   Date Value Ref Range Status   06/28/2023 10 0 - 50 U/L Final     Comment:     Reference intervals for this test were updated on 6/12/2023 to more accurately reflect our healthy population. There may be differences in the flagging of prior results with similar values performed with this method. Interpretation of those prior results can be made in the context of the updated reference intervals.       No results found for: BILICONJ   Bilirubin Total   Date Value Ref Range Status   06/28/2023 0.2 <=1.2 mg/dL Final     Albumin   Date Value Ref Range Status   06/28/2023 4.5 3.5 - 5.2 g/dL Final   04/04/2022 3.2 (L) 3.4 - 5.0 g/dL Final     Protein Total   Date Value Ref Range Status   06/28/2023 7.1 6.4 - 8.3 g/dL  Final      Alkaline Phosphatase   Date Value Ref Range Status   06/28/2023 58 35 - 104 U/L Final       Creatinine:  Creatinine   Date Value Ref Range Status   06/28/2023 0.77 0.51 - 0.95 mg/dL Final   ]    Complete Blood Count:  CBC RESULTS:   Recent Labs   Lab Test 06/28/23  0724   WBC 9.2   RBC 3.93   HGB 11.8   HCT 34.6*   MCV 88   MCH 30.0   MCHC 34.1   RDW 14.5

## 2023-07-07 RX ORDER — CITALOPRAM HYDROBROMIDE 40 MG/1
1 TABLET ORAL DAILY
Status: ON HOLD | COMMUNITY
Start: 2023-06-02 | End: 2023-10-03

## 2023-07-11 ENCOUNTER — ANESTHESIA EVENT (OUTPATIENT)
Dept: SURGERY | Facility: CLINIC | Age: 28
End: 2023-07-11
Payer: COMMERCIAL

## 2023-07-11 ASSESSMENT — LIFESTYLE VARIABLES: TOBACCO_USE: 1

## 2023-07-11 NOTE — ANESTHESIA PREPROCEDURE EVALUATION
Anesthesia Pre-Procedure Evaluation    Patient: Artie Burger   MRN: [id number] : 1995        Procedure : Procedure(s):  ENDOSCOPIC RETROGRADE CHOLANGIOPANCREATOGRAPHY, WITH REPLACEMENT OF TUBE OR STENT          Past Medical History:   Diagnosis Date     Alcohol-induced chronic pancreatitis (H)      Anxiety      Depression      Gastroesophageal reflux disease      Pancreatic disease      PONV (postoperative nausea and vomiting)       Past Surgical History:   Procedure Laterality Date     ENDOSCOPIC RETROGRADE CHOLANGIOPANCREATOGRAM COMPLEX N/A 2021    Procedure: ENDOSCOPIC RETROGRADE CHOLANGIOPANCREATOGRAPHY with pancreatic sphincterotomy, dilation, stone removal, stent placement;  Surgeon: Guru Sabino Campuzano MD;  Location[facility]     ENDOSCOPIC RETROGRADE CHOLANGIOPANCREATOGRAM WITH DIRECT VISUALIZATION SYSTEM AND GENERATOR N/A 2021    Procedure: ENDOSCOPIC RETROGRADE CHOLANGIOPANCREATOGRAPHY, dilation and debridement sludge removal, pancreatic duct stent placement;  Surgeon: Guru Sabino Campuzano MD;  [facility]     ENDOSCOPIC RETROGRADE CHOLANGIOPANCREATOGRAM WITH SPYGLASS N/A 10/6/2021    Procedure: ENDOSCOPIC RETROGRADE CHOLANGIOPANCREATOGRAPHY, WITH DIRECT DUCT VISUALIZATION, USING PANCREATICOBILIARY FIBEROPTIC PROBE, BILE DUCT STENT EXCHANGE, LITHOTRIPSY OF PANCREATIC STONE, SPHINCTEROTOMY, BALLOON DILATION OF PANCREATIC DUCT AND STONE EXTRACTION;  Surgeon: Guru Sabino Campuzano MD;  [facility]     ENDOSCOPIC RETROGRADE CHOLANGIOPANCREATOGRAM WITH SPYGLASS N/A 2021    Procedure: ENDOSCOPIC RETROGRADE CHOLANGIOPANCREATOGRAPHY, with pancreatic stent exchange, ballon dilation, stone removal, with spyglass direct visualization;  Surgeon: Guru Sabino Campuzano MD;  [facility]     ENDOSCOPIC RETROGRADE CHOLANGIOPANCREATOGRAPHY, EXCHANGE TUBE/STENT N/A 2022    Procedure: ENDOSCOPIC RETROGRADE  CHOLANGIOPANCREATOGRAPH with pancreatic dilation, debris removal and stent exchange.;  Surgeon: Guru Sabino Campuzano MD;  [facility]     ENDOSCOPIC RETROGRADE CHOLANGIOPANCREATOGRAPHY, EXCHANGE TUBE/STENT N/A 4/4/2022    Procedure: ENDOSCOPIC RETROGRADE CHOLANGIOPANCREATOGRAPHY, WITH REPLACEMENT OF pancreatic STENT, stone removal;  Surgeon: Guru Sabino Campuzano MD;  [facility]     ENDOSCOPIC RETROGRADE CHOLANGIOPANCREATOGRAPHY, EXCHANGE TUBE/STENT N/A 7/13/2022    Procedure: ENDOSCOPIC RETROGRADE CHOLANGIOPANCREATOGRAPHY, WITH pancreatic duct dilation and stent exchange, biliary stent placement, debris removal;  Surgeon: Guru Sabino Campuzano MD;  [facility]     ENDOSCOPIC RETROGRADE CHOLANGIOPANCREATOGRAPHY, EXCHANGE TUBE/STENT N/A 9/14/2022    Procedure: ENDOSCOPIC RETROGRADE CHOLANGIOPANCREATOGRAPHY, WITH pancreatic stone removal, biliary stent removal, pancreatic stent exchange;  Surgeon: Guru Sabino Campuzano MD;  [facility]     ENDOSCOPIC RETROGRADE CHOLANGIOPANCREATOGRAPHY, EXCHANGE TUBE/STENT N/A 11/21/2022    Procedure: ENDOSCOPIC RETROGRADE CHOLANGIOPANCREATOGRAPHY WITH PANCREATIC DUCT STENT EXCHANGE, DILATION, AND DEBRIS REMOVAL;  Surgeon: Guru Sabino Campuzano MD;  [facility]     ENDOSCOPIC RETROGRADE CHOLANGIOPANCREATOGRAPHY, EXCHANGE TUBE/STENT N/A 1/18/2023    Procedure: ENDOSCOPIC RETROGRADE CHOLANGIOPANCREATOGRAPHY stent exchange, dilation, sludge and stone removal;  Surgeon: Guru Sabino Campuzano MD;  [facility]     ENDOSCOPIC RETROGRADE CHOLANGIOPANCREATOGRAPHY, EXCHANGE TUBE/STENT N/A 3/13/2023    Procedure: ENDOSCOPIC RETROGRADE CHOLANGIOPANCREATOGRAPHY, WITH replacement of pancreatic stents, bile duct stent placed,sphinterotomy of bile duct ampulla, balloon sweep of bile duct for sludge,;  Surgeon: Guru Sabino Campuzano MD;  [facility]     ENDOSCOPIC  RETROGRADE CHOLANGIOPANCREATOGRAPHY, EXCHANGE TUBE/STENT N/A 5/10/2023    Procedure: ENDOSCOPIC RETROGRADE CHOLANGIOPANCREATOGRAPHY with pancreatic stents exchange and stone extraction;  Surgeon: Guru Sabino Campuzano MD;  Location:  OR      No Known Allergies   Social History     Tobacco Use     Smoking status: Former     Types: Cigarettes     Quit date: 2022     Years since quittin.1     Smokeless tobacco: Current     Tobacco comments:     Vapes daily   Substance Use Topics     Alcohol use: Not Currently      Wt Readings from Last 1 Encounters:   23 60.4 kg (133 lb 1.6 oz)        Anesthesia Evaluation            ROS/MED HX  ENT/Pulmonary:     (+) tobacco use, Current use,     Neurologic:  - neg neurologic ROS     Cardiovascular:  - neg cardiovascular ROS     METS/Exercise Tolerance:     Hematologic:  - neg hematologic  ROS     Musculoskeletal:  - neg musculoskeletal ROS     GI/Hepatic: Comment: Chr. pancreatitis      Renal/Genitourinary:  - neg Renal ROS     Endo:  - neg endo ROS     Psychiatric/Substance Use:     (+) psychiatric history anxiety and depression alcohol abuse Recreational drug usage: Cannabis.    Infectious Disease:  - neg infectious disease ROS     Malignancy:  - neg malignancy ROS     Other:            Physical Exam    Airway        Mallampati: II   TM distance: > 3 FB   Neck ROM: full   Mouth opening: > 3 cm    Respiratory Devices and Support         Dental       (+) Completely normal teeth      Cardiovascular   cardiovascular exam normal       Rhythm and rate: regular and normal     Pulmonary   pulmonary exam normal        breath sounds clear to auscultation           OUTSIDE LABS:  CBC:   Lab Results   Component Value Date    WBC 9.2 2023    WBC 9.0 05/10/2023    HGB 11.8 2023    HGB 10.3 (L) 05/10/2023    HCT 34.6 (L) 2023    HCT 30.5 (L) 05/10/2023     2023     05/10/2023     BMP:   Lab Results   Component Value Date    NA  138 06/28/2023     05/10/2023    POTASSIUM 4.2 06/28/2023    POTASSIUM 4.0 05/10/2023    CHLORIDE 107 06/28/2023    CHLORIDE 109 (H) 05/10/2023    CO2 21 (L) 06/28/2023    CO2 18 (L) 05/10/2023    BUN 17.4 06/28/2023    BUN 14.3 05/10/2023    CR 0.77 06/28/2023    CR 0.72 05/10/2023     (H) 06/28/2023     (H) 06/28/2023     COAGS:   Lab Results   Component Value Date    INR 1.00 05/10/2023     POC:   Lab Results   Component Value Date    HCG Negative 07/13/2022    HCGS Negative 01/18/2023     HEPATIC:   Lab Results   Component Value Date    ALBUMIN 4.5 06/28/2023    PROTTOTAL 7.1 06/28/2023    ALT 10 06/28/2023    AST 16 06/28/2023    ALKPHOS 58 06/28/2023    BILITOTAL 0.2 06/28/2023     OTHER:   Lab Results   Component Value Date    A1C 5.3 06/28/2023    MURTAZA 10.0 06/28/2023    LIPASE 29 06/28/2023    AMYLASE 62 06/28/2023       Anesthesia Plan    ASA Status:  2      Anesthesia Type: General.     - Airway: ETT   Induction: Intravenous.   Maintenance: Balanced.        Consents    Anesthesia Plan(s) and associated risks, benefits, and realistic alternatives discussed. Questions answered and patient/representative(s) expressed understanding.    - Discussed:     - Discussed with:  Patient         Postoperative Care    Pain management: IV analgesics.   PONV prophylaxis: Ondansetron (or other 5HT-3)     Comments:                ALMAZAN MD

## 2023-07-12 ENCOUNTER — HOSPITAL ENCOUNTER (OUTPATIENT)
Facility: CLINIC | Age: 28
Discharge: HOME | End: 2023-07-12
Attending: INTERNAL MEDICINE | Admitting: INTERNAL MEDICINE
Payer: COMMERCIAL

## 2023-07-12 ENCOUNTER — APPOINTMENT (OUTPATIENT)
Dept: GENERAL RADIOLOGY | Facility: CLINIC | Age: 28
End: 2023-07-12
Attending: INTERNAL MEDICINE
Payer: COMMERCIAL

## 2023-07-12 ENCOUNTER — ANESTHESIA (OUTPATIENT)
Dept: SURGERY | Facility: CLINIC | Age: 28
End: 2023-07-12
Payer: COMMERCIAL

## 2023-07-12 VITALS
HEART RATE: 58 BPM | HEIGHT: 62 IN | SYSTOLIC BLOOD PRESSURE: 105 MMHG | WEIGHT: 136.02 LBS | DIASTOLIC BLOOD PRESSURE: 67 MMHG | OXYGEN SATURATION: 100 % | RESPIRATION RATE: 20 BRPM | TEMPERATURE: 98.4 F | BODY MASS INDEX: 25.03 KG/M2

## 2023-07-12 LAB
ALBUMIN SERPL BCG-MCNC: 4.1 G/DL (ref 3.5–5.2)
ALP SERPL-CCNC: 58 U/L (ref 35–104)
ALT SERPL W P-5'-P-CCNC: 9 U/L (ref 0–50)
ANION GAP SERPL CALCULATED.3IONS-SCNC: 10 MMOL/L (ref 7–15)
AST SERPL W P-5'-P-CCNC: 18 U/L (ref 0–45)
BILIRUB SERPL-MCNC: 0.3 MG/DL
BUN SERPL-MCNC: 12.5 MG/DL (ref 6–20)
CALCIUM SERPL-MCNC: 8.9 MG/DL (ref 8.6–10)
CHLORIDE SERPL-SCNC: 109 MMOL/L (ref 98–107)
CREAT SERPL-MCNC: 0.79 MG/DL (ref 0.51–0.95)
DEPRECATED HCO3 PLAS-SCNC: 21 MMOL/L (ref 22–29)
ERCP: NORMAL
ERYTHROCYTE [DISTWIDTH] IN BLOOD BY AUTOMATED COUNT: 13.9 % (ref 10–15)
GFR SERPL CREATININE-BSD FRML MDRD: >90 ML/MIN/1.73M2
GLUCOSE SERPL-MCNC: 96 MG/DL (ref 70–99)
HCT VFR BLD AUTO: 31.8 % (ref 35–47)
HGB BLD-MCNC: 10.6 G/DL (ref 11.7–15.7)
INR PPP: 1.06 (ref 0.85–1.15)
MCH RBC QN AUTO: 29.7 PG (ref 26.5–33)
MCHC RBC AUTO-ENTMCNC: 33.3 G/DL (ref 31.5–36.5)
MCV RBC AUTO: 89 FL (ref 78–100)
PLATELET # BLD AUTO: 400 10E3/UL (ref 150–450)
POTASSIUM SERPL-SCNC: 3.8 MMOL/L (ref 3.4–5.3)
PROT SERPL-MCNC: 6.5 G/DL (ref 6.4–8.3)
RBC # BLD AUTO: 3.57 10E6/UL (ref 3.8–5.2)
SODIUM SERPL-SCNC: 140 MMOL/L (ref 136–145)
WBC # BLD AUTO: 9.9 10E3/UL (ref 4–11)

## 2023-07-12 PROCEDURE — 85027 COMPLETE CBC AUTOMATED: CPT | Performed by: INTERNAL MEDICINE

## 2023-07-12 PROCEDURE — 272N000001 HC OR GENERAL SUPPLY STERILE: Performed by: INTERNAL MEDICINE

## 2023-07-12 PROCEDURE — C2617 STENT, NON-COR, TEM W/O DEL: HCPCS | Performed by: INTERNAL MEDICINE

## 2023-07-12 PROCEDURE — 710N000010 HC RECOVERY PHASE 1, LEVEL 2, PER MIN: Performed by: INTERNAL MEDICINE

## 2023-07-12 PROCEDURE — C1769 GUIDE WIRE: HCPCS | Performed by: INTERNAL MEDICINE

## 2023-07-12 PROCEDURE — 360N000082 HC SURGERY LEVEL 2 W/ FLUORO, PER MIN: Performed by: INTERNAL MEDICINE

## 2023-07-12 PROCEDURE — 36415 COLL VENOUS BLD VENIPUNCTURE: CPT | Performed by: INTERNAL MEDICINE

## 2023-07-12 PROCEDURE — 250N000011 HC RX IP 250 OP 636: Mod: JZ | Performed by: NURSE ANESTHETIST, CERTIFIED REGISTERED

## 2023-07-12 PROCEDURE — 370N000017 HC ANESTHESIA TECHNICAL FEE, PER MIN: Performed by: INTERNAL MEDICINE

## 2023-07-12 PROCEDURE — 710N000012 HC RECOVERY PHASE 2, PER MINUTE: Performed by: INTERNAL MEDICINE

## 2023-07-12 PROCEDURE — 250N000025 HC SEVOFLURANE, PER MIN: Performed by: INTERNAL MEDICINE

## 2023-07-12 PROCEDURE — C1726 CATH, BAL DIL, NON-VASCULAR: HCPCS | Performed by: INTERNAL MEDICINE

## 2023-07-12 PROCEDURE — 85610 PROTHROMBIN TIME: CPT | Performed by: INTERNAL MEDICINE

## 2023-07-12 PROCEDURE — 255N000002 HC RX 255 OP 636: Mod: JZ | Performed by: INTERNAL MEDICINE

## 2023-07-12 PROCEDURE — 250N000009 HC RX 250: Performed by: NURSE ANESTHETIST, CERTIFIED REGISTERED

## 2023-07-12 PROCEDURE — 250N000009 HC RX 250: Performed by: INTERNAL MEDICINE

## 2023-07-12 PROCEDURE — 258N000003 HC RX IP 258 OP 636: Performed by: ANESTHESIOLOGY

## 2023-07-12 PROCEDURE — 999N000181 XR SURGERY CARM FLUORO GREATER THAN 5 MIN W STILLS: Mod: TC

## 2023-07-12 PROCEDURE — 999N000141 HC STATISTIC PRE-PROCEDURE NURSING ASSESSMENT: Performed by: INTERNAL MEDICINE

## 2023-07-12 PROCEDURE — 250N000011 HC RX IP 250 OP 636: Mod: JZ | Performed by: ANESTHESIOLOGY

## 2023-07-12 PROCEDURE — 80053 COMPREHEN METABOLIC PANEL: CPT | Performed by: INTERNAL MEDICINE

## 2023-07-12 PROCEDURE — 258N000003 HC RX IP 258 OP 636: Performed by: NURSE ANESTHETIST, CERTIFIED REGISTERED

## 2023-07-12 DEVICE — STENT ZIMMON PANCREA 5FRX09CM SGL PIGTAIL G22981
Type: IMPLANTABLE DEVICE | Site: PANCREATIC DUCT | Status: NON-FUNCTIONAL
Removed: 2023-09-22

## 2023-07-12 DEVICE — STENT ZIMMON PANCREA 5FRX10CM SGL PIGTAIL G22362
Type: IMPLANTABLE DEVICE | Site: PANCREATIC DUCT | Status: NON-FUNCTIONAL
Removed: 2023-09-22

## 2023-07-12 RX ORDER — OXYCODONE HYDROCHLORIDE 5 MG/1
5 TABLET ORAL
Status: DISCONTINUED | OUTPATIENT
Start: 2023-07-12 | End: 2023-07-12 | Stop reason: HOSPADM

## 2023-07-12 RX ORDER — OXYCODONE HYDROCHLORIDE 10 MG/1
10 TABLET ORAL EVERY 6 HOURS PRN
Status: ON HOLD | COMMUNITY
End: 2023-08-24

## 2023-07-12 RX ORDER — FLUMAZENIL 0.1 MG/ML
0.2 INJECTION, SOLUTION INTRAVENOUS
Status: DISCONTINUED | OUTPATIENT
Start: 2023-07-12 | End: 2023-07-12 | Stop reason: HOSPADM

## 2023-07-12 RX ORDER — NALOXONE HYDROCHLORIDE 0.4 MG/ML
0.4 INJECTION, SOLUTION INTRAMUSCULAR; INTRAVENOUS; SUBCUTANEOUS
Status: DISCONTINUED | OUTPATIENT
Start: 2023-07-12 | End: 2023-07-12 | Stop reason: HOSPADM

## 2023-07-12 RX ORDER — DEXAMETHASONE SODIUM PHOSPHATE 4 MG/ML
INJECTION, SOLUTION INTRA-ARTICULAR; INTRALESIONAL; INTRAMUSCULAR; INTRAVENOUS; SOFT TISSUE PRN
Status: DISCONTINUED | OUTPATIENT
Start: 2023-07-12 | End: 2023-07-12

## 2023-07-12 RX ORDER — ONDANSETRON 4 MG/1
4 TABLET, ORALLY DISINTEGRATING ORAL EVERY 30 MIN PRN
Status: DISCONTINUED | OUTPATIENT
Start: 2023-07-12 | End: 2023-07-12 | Stop reason: HOSPADM

## 2023-07-12 RX ORDER — SODIUM CHLORIDE, SODIUM LACTATE, POTASSIUM CHLORIDE, CALCIUM CHLORIDE 600; 310; 30; 20 MG/100ML; MG/100ML; MG/100ML; MG/100ML
INJECTION, SOLUTION INTRAVENOUS CONTINUOUS PRN
Status: DISCONTINUED | OUTPATIENT
Start: 2023-07-12 | End: 2023-07-12

## 2023-07-12 RX ORDER — FENTANYL CITRATE 50 UG/ML
50 INJECTION, SOLUTION INTRAMUSCULAR; INTRAVENOUS EVERY 5 MIN PRN
Status: DISCONTINUED | OUTPATIENT
Start: 2023-07-12 | End: 2023-07-12 | Stop reason: HOSPADM

## 2023-07-12 RX ORDER — SODIUM CHLORIDE, SODIUM LACTATE, POTASSIUM CHLORIDE, CALCIUM CHLORIDE 600; 310; 30; 20 MG/100ML; MG/100ML; MG/100ML; MG/100ML
INJECTION, SOLUTION INTRAVENOUS CONTINUOUS
Status: DISCONTINUED | OUTPATIENT
Start: 2023-07-12 | End: 2023-07-12 | Stop reason: HOSPADM

## 2023-07-12 RX ORDER — ONDANSETRON 4 MG/1
4 TABLET, ORALLY DISINTEGRATING ORAL EVERY 6 HOURS PRN
Status: DISCONTINUED | OUTPATIENT
Start: 2023-07-12 | End: 2023-07-12 | Stop reason: HOSPADM

## 2023-07-12 RX ORDER — OXYCODONE HYDROCHLORIDE 10 MG/1
10 TABLET ORAL
Status: DISCONTINUED | OUTPATIENT
Start: 2023-07-12 | End: 2023-07-12 | Stop reason: HOSPADM

## 2023-07-12 RX ORDER — ONDANSETRON 2 MG/ML
4 INJECTION INTRAMUSCULAR; INTRAVENOUS EVERY 30 MIN PRN
Status: DISCONTINUED | OUTPATIENT
Start: 2023-07-12 | End: 2023-07-12 | Stop reason: HOSPADM

## 2023-07-12 RX ORDER — NALOXONE HYDROCHLORIDE 0.4 MG/ML
0.2 INJECTION, SOLUTION INTRAMUSCULAR; INTRAVENOUS; SUBCUTANEOUS
Status: DISCONTINUED | OUTPATIENT
Start: 2023-07-12 | End: 2023-07-12 | Stop reason: HOSPADM

## 2023-07-12 RX ORDER — ONDANSETRON 2 MG/ML
INJECTION INTRAMUSCULAR; INTRAVENOUS PRN
Status: DISCONTINUED | OUTPATIENT
Start: 2023-07-12 | End: 2023-07-12

## 2023-07-12 RX ORDER — FENTANYL CITRATE 50 UG/ML
INJECTION, SOLUTION INTRAMUSCULAR; INTRAVENOUS PRN
Status: DISCONTINUED | OUTPATIENT
Start: 2023-07-12 | End: 2023-07-12

## 2023-07-12 RX ORDER — LEVOFLOXACIN 5 MG/ML
INJECTION, SOLUTION INTRAVENOUS PRN
Status: DISCONTINUED | OUTPATIENT
Start: 2023-07-12 | End: 2023-07-12

## 2023-07-12 RX ORDER — INDOMETHACIN 50 MG/1
SUPPOSITORY RECTAL PRN
Status: DISCONTINUED | OUTPATIENT
Start: 2023-07-12 | End: 2023-07-12 | Stop reason: HOSPADM

## 2023-07-12 RX ORDER — FENTANYL CITRATE 50 UG/ML
25 INJECTION, SOLUTION INTRAMUSCULAR; INTRAVENOUS EVERY 5 MIN PRN
Status: DISCONTINUED | OUTPATIENT
Start: 2023-07-12 | End: 2023-07-12 | Stop reason: HOSPADM

## 2023-07-12 RX ORDER — PROPOFOL 10 MG/ML
INJECTION, EMULSION INTRAVENOUS PRN
Status: DISCONTINUED | OUTPATIENT
Start: 2023-07-12 | End: 2023-07-12

## 2023-07-12 RX ORDER — ONDANSETRON 2 MG/ML
4 INJECTION INTRAMUSCULAR; INTRAVENOUS EVERY 6 HOURS PRN
Status: DISCONTINUED | OUTPATIENT
Start: 2023-07-12 | End: 2023-07-12 | Stop reason: HOSPADM

## 2023-07-12 RX ORDER — LIDOCAINE HYDROCHLORIDE 20 MG/ML
INJECTION, SOLUTION INFILTRATION; PERINEURAL PRN
Status: DISCONTINUED | OUTPATIENT
Start: 2023-07-12 | End: 2023-07-12

## 2023-07-12 RX ORDER — HYDROXYZINE HYDROCHLORIDE 25 MG/1
25-50 TABLET, FILM COATED ORAL
Status: ON HOLD | COMMUNITY
End: 2023-10-03

## 2023-07-12 RX ORDER — HYDROMORPHONE HCL IN WATER/PF 6 MG/30 ML
0.2 PATIENT CONTROLLED ANALGESIA SYRINGE INTRAVENOUS ONCE
Status: COMPLETED | OUTPATIENT
Start: 2023-07-12 | End: 2023-07-12

## 2023-07-12 RX ORDER — INDOMETHACIN 50 MG/1
100 SUPPOSITORY RECTAL
Status: DISCONTINUED | OUTPATIENT
Start: 2023-07-12 | End: 2023-07-12 | Stop reason: HOSPADM

## 2023-07-12 RX ORDER — MEDROXYPROGESTERONE ACETATE 150 MG/ML
150 INJECTION, SUSPENSION INTRAMUSCULAR
COMMUNITY
End: 2024-01-10 | Stop reason: ALTCHOICE

## 2023-07-12 RX ORDER — IOPAMIDOL 510 MG/ML
INJECTION, SOLUTION INTRAVASCULAR PRN
Status: DISCONTINUED | OUTPATIENT
Start: 2023-07-12 | End: 2023-07-12 | Stop reason: HOSPADM

## 2023-07-12 RX ORDER — LIDOCAINE 40 MG/G
CREAM TOPICAL
Status: DISCONTINUED | OUTPATIENT
Start: 2023-07-12 | End: 2023-07-12 | Stop reason: HOSPADM

## 2023-07-12 RX ADMIN — FENTANYL CITRATE 25 MCG: 50 INJECTION, SOLUTION INTRAMUSCULAR; INTRAVENOUS at 08:51

## 2023-07-12 RX ADMIN — PROPOFOL 150 MG: 10 INJECTION, EMULSION INTRAVENOUS at 07:35

## 2023-07-12 RX ADMIN — DEXAMETHASONE SODIUM PHOSPHATE 8 MG: 4 INJECTION, SOLUTION INTRA-ARTICULAR; INTRALESIONAL; INTRAMUSCULAR; INTRAVENOUS; SOFT TISSUE at 07:45

## 2023-07-12 RX ADMIN — SODIUM CHLORIDE, POTASSIUM CHLORIDE, SODIUM LACTATE AND CALCIUM CHLORIDE: 600; 310; 30; 20 INJECTION, SOLUTION INTRAVENOUS at 09:14

## 2023-07-12 RX ADMIN — PHENYLEPHRINE HYDROCHLORIDE 100 MCG: 10 INJECTION INTRAVENOUS at 07:59

## 2023-07-12 RX ADMIN — FENTANYL CITRATE 25 MCG: 50 INJECTION, SOLUTION INTRAMUSCULAR; INTRAVENOUS at 08:56

## 2023-07-12 RX ADMIN — Medication 50 MG: at 07:36

## 2023-07-12 RX ADMIN — FENTANYL CITRATE 50 MCG: 50 INJECTION, SOLUTION INTRAMUSCULAR; INTRAVENOUS at 09:03

## 2023-07-12 RX ADMIN — LEVOFLOXACIN 500 MG: 5 INJECTION, SOLUTION INTRAVENOUS at 07:40

## 2023-07-12 RX ADMIN — LIDOCAINE HYDROCHLORIDE 40 MG: 20 INJECTION, SOLUTION INFILTRATION; PERINEURAL at 07:35

## 2023-07-12 RX ADMIN — PHENYLEPHRINE HYDROCHLORIDE 100 MCG: 10 INJECTION INTRAVENOUS at 07:55

## 2023-07-12 RX ADMIN — FENTANYL CITRATE 50 MCG: 50 INJECTION, SOLUTION INTRAMUSCULAR; INTRAVENOUS at 08:02

## 2023-07-12 RX ADMIN — SUGAMMADEX 200 MG: 100 INJECTION, SOLUTION INTRAVENOUS at 08:30

## 2023-07-12 RX ADMIN — HYDROMORPHONE HYDROCHLORIDE 0.2 MG: 0.2 INJECTION, SOLUTION INTRAMUSCULAR; INTRAVENOUS; SUBCUTANEOUS at 09:13

## 2023-07-12 RX ADMIN — MIDAZOLAM 2 MG: 1 INJECTION INTRAMUSCULAR; INTRAVENOUS at 07:25

## 2023-07-12 RX ADMIN — ONDANSETRON 4 MG: 2 INJECTION INTRAMUSCULAR; INTRAVENOUS at 07:45

## 2023-07-12 RX ADMIN — SODIUM CHLORIDE, POTASSIUM CHLORIDE, SODIUM LACTATE AND CALCIUM CHLORIDE: 600; 310; 30; 20 INJECTION, SOLUTION INTRAVENOUS at 07:30

## 2023-07-12 RX ADMIN — FENTANYL CITRATE 50 MCG: 50 INJECTION, SOLUTION INTRAMUSCULAR; INTRAVENOUS at 07:35

## 2023-07-12 RX ADMIN — GLUCAGON 0.4 MG: KIT at 08:07

## 2023-07-12 RX ADMIN — PHENYLEPHRINE HYDROCHLORIDE 100 MCG: 10 INJECTION INTRAVENOUS at 08:07

## 2023-07-12 ASSESSMENT — ACTIVITIES OF DAILY LIVING (ADL)
ADLS_ACUITY_SCORE: 35
ADLS_ACUITY_SCORE: 35

## 2023-07-12 NOTE — DISCHARGE INSTRUCTIONS
[facility], [place]  Same-Day Surgery   Adult Discharge Orders & Instructions     For 24 hours after surgery    Get plenty of rest.  A responsible adult must stay with you for at least 24 hours after you leave the hospital.   Do not drive or use heavy equipment.  If you have weakness or tingling, don't drive or use heavy equipment until this feeling goes away.  Do not drink alcohol.  Avoid strenuous or risky activities.  Ask for help when climbing stairs.   You may feel lightheaded.  IF so, sit for a few minutes before standing.  Have someone help you get up.   If you have nausea (feel sick to your stomach): Drink only clear liquids such as apple juice, ginger ale, broth or 7-Up.  Rest may also help.  Be sure to drink enough fluids.  Move to a regular diet as you feel able.  You may have a slight fever. Call the doctor if your fever is over 100 F (37.7 C) (taken under the tongue) or lasts longer than 24 hours.  You may have a dry mouth, a sore throat, muscle aches or trouble sleeping.  These should go away after 24 hours.  Do not make important or legal decisions.   Call your doctor for any of the followin.  Signs of infection (fever, growing tenderness at the surgery site, a large amount of drainage or bleeding, severe pain, foul-smelling drainage, redness, swelling).    2. It has been over 8 to 10 hours since surgery and you are still not able to urinate (pass water).    3.  Headache for over 24 hours.    4.  Numbness, tingling or weakness the day after surgery (if you had spinal anesthesia).  To contact a doctor, call Dr. Campuzano [phone] before 430 pm  or     [phone] and ask for the resident on call for   Gastroenterology (answered 24 hours a day)

## 2023-07-12 NOTE — BRIEF OP NOTE
Procedure:             ERCP   Indications:           Stent change                          27 year old white female with a PMHx of depression,                          panicÂ attacks, alcoholism, currently in remission with                          recurrent bouts of pancreatitis, which progressed to                          chronic calcific                          pancreatitis fromÂ alcohol abuse, status post multiple                          ERCPs. On ERCP in April 2022 she was noted to have a                          igh-grade PD strictureÂ and stone. The stones have                          allÂ been removed. TheÂ stricture was treated with                          multiple stents and those stents were all removed.                          Developed an attack of pancreatitis after stent                          removal and hasÂ undergone serial ERCPs last in 11/21                          with placement of three 5 Fr stents across the PD                          stricture. She developed a pain flare following last                          procedure and has had a poor quality of life since                          12/22.She presents for repeat ERCP for further                          revaluation. Her Peth test is Ã Â negative and gastric                          emptying study was unremarkableÃ Â for gastroparesis.                          She is currently considered for TPIAT surgeryERCP with                          stent exchange followed by Surgery   Providers:             GURU KVNG FAIRCHILD, Jennifer Vanderheyden   Referring MD:          KIN BLISS   Medicines:             General Anesthesia   Complications:         No immediate complications.   _______________________________________________________________________________   Procedure:             Pre-Anesthesia Assessment:                          - Prior to the procedure, a History and  Physical was                          performed, and patient medications and allergies were                          reviewed. The patient is competent. The risks and                          benefits of the procedure and the sedation options and                          risks were discussed with the patient. All questions                          were answered and informed consent was obtained.                          Patient identification and proposed procedure were                          verified by the physician and the nurse in the                          pre-procedure area. Mental Status Examination: alert                          and oriented. Airway Examination: normal oropharyngeal                          airway and neck mobility. Respiratory Examination:                          clear to auscultation. CV Examination: normal.                          Prophylactic Antibiotics: The patient requires                          prophylactic antibiotics Pancreatic endotherapy. Prior                          Anticoagulants: The patient has taken no anticoagulant                          or antiplatelet agents. ASA Grade Assessment: II - A                          patient with mild systemic disease. After reviewing                          the risks and benefits, the patient was deemed in                          satisfactory condition to undergo the procedure. The                          anesthesia plan was to use general anesthesia.                          Immediately prior to administration of medications,                          the patient was re-assessed for adequacy to receive                          sedatives. The heart rate, respiratory rate, oxygen                          saturations, blood pressure, adequacy of pulmonary                          ventilation, and response to care were monitored                          throughout the procedure. The physical status of the                           patient was re-assessed after the procedure.                          After obtaining informed consent, the scope was passed                          under direct vision. Throughout the procedure, the                          patient's blood pressure, pulse, and oxygen                          saturations were monitored continuously. The                          Duodenoscope was introduced through the mouth, and                          used to inject contrast into and used to inject                          contrast into the bile duct and ventral pancreatic                          duct.                                                                                     Findings:        Three pancreatic stents were visible on the  film. The scope was        advanced to a normal major papilla in the descending duodenum.        Examination of the pharynx, larynx and associated structures, and upper        GI tract was normal. Three temporary plastic pancreatic stents        originating in the pancreatic duct were emerging from the major papilla.        The stents were partially occluded. The ventral pancreatic duct was        deeply cannulated with the short-nosed traction sphincterotome. Contrast        was injected. I personally interpreted the pancreatic duct images.        Ductal flow of contrast was adequate. Image quality was adequate.        Contrast extended to the pancreatic duct. Diffuse changes, including        dilation of the main pancreatic duct, irregularity of the main        pancreatic duct, abnormal side branches, irregularity of side branches        and ductal stenosis, were seen in the main pancreatic duct, consistent        with marked chronic pancreatitis. A 0.025 inch x 270 cm straight        Visiglide wire was passed into the ventral pancreatic duct. A 0.025 inch        x 270 cm straight Visiglide wire was passed into the biliary tree. To        remove the object(s) the ventral  pancreatic duct was swept with a 12 mm        balloon starting at the pancreatic duct in the body of the pancreas.        Debris was swept from the duct. All stones were removed. Two 5 Fr by 10        cm temporary plastic pancreatic stents with a 3/4 external pigtail and a        single internal flap were placed 10 cm into the ventral pancreatic duct.        Clear fluid flowed through the stents. The stents were in good position.        One 5 Fr by 9 cm temporary plastic pancreatic stent with a 3/4 external        pigtail and a single internal flap was placed 8.5 cm into the ventral        pancreatic duct. Clear fluid flowed through the stent. The stent was in        good position.                                                                                     Impression:            - Three partially occluded stents from the pancreatic                          duct were seen in the major papilla and were removed.                          - Prior pancreatic sphincterotomy appeared open and                          selective ventral duct cannulation was performed.                          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                          - Pancreatic stones were found. Complete removal was                          accomplished.                          -Three 5 Fr single pigtailed temporary stents were                          placed into the ventral pancreatic duct   Recommendation:        - Observe patient in same day observation unit for                          ongoing care.                          - Will recommend repeat ERCP in 10 weeks if surgery is                          not scheduled.                          -If patient develops worsening abdominal pain before                           scheduled ERCP, will recommend patient to call us                          immediately for consideration of an earlier urgent ERCP                          - The findings and recommendations were discussed with                          the patient's family.

## 2023-07-12 NOTE — ANESTHESIA CARE TRANSFER NOTE
Patient: Artie Burger    Procedure: Procedure(s):  ENDOSCOPIC RETROGRADE CHOLANGIOPANCREATOGRAPHY, WITH REPLACEMENT OF STENT X3 AND BALLOON SWEEP OF BILE DUCT FOR STONES       Diagnosis: Chronic pancreatitis (H) [K86.1]  Diagnosis Additional Information: No value filed.    Anesthesia Type:   General     Note:    Oropharynx: oropharynx clear of all foreign objects and spontaneously breathing  Level of Consciousness: awake  Oxygen Supplementation: face mask  Level of Supplemental Oxygen (L/min / FiO2): 3  Independent Airway: airway patency satisfactory and stable  Dentition: dentition unchanged  Vital Signs Stable: post-procedure vital signs reviewed and stable    Patient transferred to: PACU    Handoff Report: Identifed the Patient, Identified the Reponsible Provider, Reviewed the pertinent medical history, Discussed the surgical course, Reviewed Intra-OP anesthesia mangement and issues during anesthesia, Set expectations for post-procedure period and Allowed opportunity for questions and acknowledgement of understanding      Vitals:  Vitals Value Taken Time   /81 07/12/23 0841   Temp     Pulse 78 07/12/23 0843   Resp 19 07/12/23 0843   SpO2 100 % 07/12/23 0843   Vitals shown include unvalidated device data.    Electronically Signed By: Jessie Bonilla, APRN CRNA  July 12, 2023  8:44 AM

## 2023-07-12 NOTE — ANESTHESIA POSTPROCEDURE EVALUATION
Patient: Artie Burger    Procedure: Procedure(s):  ENDOSCOPIC RETROGRADE CHOLANGIOPANCREATOGRAPHY, WITH REPLACEMENT OF STENT X3 AND BALLOON SWEEP OF BILE DUCT FOR STONES       Anesthesia Type:  General    Note:  Disposition: Outpatient   Postop Pain Control: Uneventful            Sign Out: Well controlled pain   PONV: No   Neuro/Psych: Uneventful            Sign Out: Acceptable/Baseline neuro status   Airway/Respiratory: Uneventful            Sign Out: Acceptable/Baseline resp. status   CV/Hemodynamics: Uneventful            Sign Out: Acceptable CV status; No obvious hypovolemia; No obvious fluid overload   Other NRE: NONE   DID A NON-ROUTINE EVENT OCCUR? No           Last vitals:  Vitals Value Taken Time   /81 07/12/23 0845   Temp 36.3  C (97.3  F) 07/12/23 0845   Pulse 74 07/12/23 0845   Resp 21 07/12/23 0845   SpO2 100 % 07/12/23 0845   Vitals shown include unvalidated device data.    Electronically Signed By: ALMAZAN MD  July 12, 2023  8:47 AM

## 2023-07-12 NOTE — ANESTHESIA PROCEDURE NOTES
Airway       Patient location during procedure: OR       Procedure Start/Stop Times: 7/12/2023 7:40 AM  Staff -        CRNA: Jessie Bonilla APRN CRNA       Performed By: CRNA  Consent for Airway        Urgency: elective  Indications and Patient Condition       Indications for airway management: rolando-procedural       Induction type:intravenous       Mask difficulty assessment: 1 - vent by mask    Final Airway Details       Final airway type: endotracheal airway       Successful airway: ETT - single  Endotracheal Airway Details        ETT size (mm): 7.0       Cuffed: yes       Successful intubation technique: direct laryngoscopy       DL Blade Type: Marsh 2       Grade View of Cords: 1       Adjucts: stylet       Position: Right       Measured from: gums/teeth       Secured at (cm): 21       Bite block used: Oral Airway    Post intubation assessment        Placement verified by: capnometry, equal breath sounds and chest rise        Number of attempts at approach: 1       Secured with: silk tape       Ease of procedure: easy       Dentition: Intact and Unchanged    Medication(s) Administered   Medication Administration Time: 7/12/2023 7:40 AM

## 2023-07-17 ENCOUNTER — PATIENT OUTREACH (OUTPATIENT)
Dept: GASTROENTEROLOGY | Facility: CLINIC | Age: 28
End: 2023-07-17
Payer: COMMERCIAL

## 2023-07-17 ENCOUNTER — PREP FOR PROCEDURE (OUTPATIENT)
Dept: GASTROENTEROLOGY | Facility: CLINIC | Age: 28
End: 2023-07-17
Payer: COMMERCIAL

## 2023-07-17 DIAGNOSIS — K86.1 CHRONIC CALCIFIC PANCREATITIS (H): Primary | ICD-10-CM

## 2023-07-17 NOTE — PROGRESS NOTES
Follow up: Post ERCP on 7/12/23 with Dr. Campuzano.      Post procedure recommendations:   - Observe patient in same day observation unit for ongoing care.   - Will recommend repeat ERCP in 10 weeks if surgery is  not scheduled.   -If patient develops worsening abdominal pain before scheduled ERCP, will recommend patient to call us immediately for consideration of an earlier urgent ERCP   - The findings and recommendations were discussed with  the patient's family.     Patient states: Patient reports feeling sore for a few days but better now. Eating and drinking well. No concerns at this time.     Orders placed:   Please assist in scheduling:     Procedure/Imaging/Clinic: ERCP  Physician: Justen  Timing: 10 weeks  Scope time: Provider average  Anesthesia: General  Dx: Chronic calcific pancreatitis  Tier: 3  Location: [facility]  Patient communication letter header: ERCP (Endoscopic Retrograde Cholangiopancreatography)     Comments: Offer 9/11/23; patient agreeable to schedule. Will contact clinic if she is scheduled for surgery with Dr. Patel.     Pre-op plan: Pt will arrange with PCP  Anticoagulation: None  Diabetes: None    Reviewed post procedure recommendations and discussed symptoms to report to our clinic. Patient articulated understanding.     Clinic contact and scheduling numbers verified for future questions/concerns.     Melvi Humphreys, RN Care Coordinator

## 2023-07-17 NOTE — PROGRESS NOTES
Procedure/Imaging/Clinic: ERCP  Physician: Justen  Timing: 10 weeks  Scope time: Provider average  Anesthesia: General  Dx: Chronic calcific pancreatitis  Tier: 3  Location: [facility]  Patient communication letter header: ERCP (Endoscopic Retrograde Cholangiopancreatography)

## 2023-07-18 ENCOUNTER — HOSPITAL ENCOUNTER (OUTPATIENT)
Facility: CLINIC | Age: 28
End: 2023-07-18
Attending: INTERNAL MEDICINE | Admitting: INTERNAL MEDICINE
Payer: COMMERCIAL

## 2023-07-21 ENCOUNTER — MYC MEDICAL ADVICE (OUTPATIENT)
Dept: TRANSPLANT | Facility: CLINIC | Age: 28
End: 2023-07-21
Payer: COMMERCIAL

## 2023-07-21 NOTE — TELEPHONE ENCOUNTER
Americo message forwarded from Transplant RN to Beny. Appears Beny has never seen patient, may be reasonable for patient to complete an MTM appt to assess. Will defer to Beny who is back in clinic Monday 7/24.

## 2023-08-12 ENCOUNTER — HEALTH MAINTENANCE LETTER (OUTPATIENT)
Age: 28
End: 2023-08-12

## 2023-08-14 ENCOUNTER — MYC MEDICAL ADVICE (OUTPATIENT)
Dept: OTHER | Age: 28
End: 2023-08-14

## 2023-08-18 ENCOUNTER — HOSPITAL ENCOUNTER (INPATIENT)
Facility: CLINIC | Age: 28
LOS: 6 days | Discharge: HOME | End: 2023-08-24
Attending: EMERGENCY MEDICINE | Admitting: HOSPITALIST
Payer: COMMERCIAL

## 2023-08-18 ENCOUNTER — APPOINTMENT (OUTPATIENT)
Dept: CT IMAGING | Facility: CLINIC | Age: 28
End: 2023-08-18
Attending: EMERGENCY MEDICINE
Payer: COMMERCIAL

## 2023-08-18 ENCOUNTER — APPOINTMENT (OUTPATIENT)
Dept: ULTRASOUND IMAGING | Facility: CLINIC | Age: 28
End: 2023-08-18
Attending: EMERGENCY MEDICINE
Payer: COMMERCIAL

## 2023-08-18 DIAGNOSIS — K86.1 ACUTE ON CHRONIC PANCREATITIS (H): ICD-10-CM

## 2023-08-18 DIAGNOSIS — K85.90 ACUTE ON CHRONIC PANCREATITIS (H): ICD-10-CM

## 2023-08-18 LAB
ALBUMIN SERPL BCG-MCNC: 4 G/DL (ref 3.5–5.2)
ALP SERPL-CCNC: 61 U/L (ref 35–104)
ALT SERPL W P-5'-P-CCNC: 21 U/L (ref 0–50)
ANION GAP SERPL CALCULATED.3IONS-SCNC: 13 MMOL/L (ref 7–15)
AST SERPL W P-5'-P-CCNC: 22 U/L (ref 0–45)
BASOPHILS # BLD AUTO: 0.1 10E3/UL (ref 0–0.2)
BASOPHILS NFR BLD AUTO: 1 %
BILIRUB SERPL-MCNC: 0.2 MG/DL
BUN SERPL-MCNC: 10.3 MG/DL (ref 6–20)
CALCIUM SERPL-MCNC: 9.5 MG/DL (ref 8.6–10)
CHLORIDE SERPL-SCNC: 105 MMOL/L (ref 98–107)
CREAT SERPL-MCNC: 0.74 MG/DL (ref 0.51–0.95)
DEPRECATED HCO3 PLAS-SCNC: 22 MMOL/L (ref 22–29)
EOSINOPHIL # BLD AUTO: 0.3 10E3/UL (ref 0–0.7)
EOSINOPHIL NFR BLD AUTO: 3 %
ERYTHROCYTE [DISTWIDTH] IN BLOOD BY AUTOMATED COUNT: 12.8 % (ref 10–15)
GFR SERPL CREATININE-BSD FRML MDRD: >90 ML/MIN/1.73M2
GLUCOSE SERPL-MCNC: 76 MG/DL (ref 70–99)
HCG SERPL QL: NEGATIVE
HCT VFR BLD AUTO: 29.2 % (ref 35–47)
HGB BLD-MCNC: 10.2 G/DL (ref 11.7–15.7)
HOLD SPECIMEN: NORMAL
IMM GRANULOCYTES # BLD: 0 10E3/UL
IMM GRANULOCYTES NFR BLD: 0 %
LIPASE SERPL-CCNC: 11 U/L (ref 13–60)
LYMPHOCYTES # BLD AUTO: 3.9 10E3/UL (ref 0.8–5.3)
LYMPHOCYTES NFR BLD AUTO: 36 %
MAGNESIUM SERPL-MCNC: 1.7 MG/DL (ref 1.7–2.3)
MCH RBC QN AUTO: 30.2 PG (ref 26.5–33)
MCHC RBC AUTO-ENTMCNC: 34.9 G/DL (ref 31.5–36.5)
MCV RBC AUTO: 86 FL (ref 78–100)
MONOCYTES # BLD AUTO: 0.8 10E3/UL (ref 0–1.3)
MONOCYTES NFR BLD AUTO: 7 %
NEUTROPHILS # BLD AUTO: 5.9 10E3/UL (ref 1.6–8.3)
NEUTROPHILS NFR BLD AUTO: 53 %
NRBC # BLD AUTO: 0 10E3/UL
NRBC BLD AUTO-RTO: 0 /100
PLATELET # BLD AUTO: 412 10E3/UL (ref 150–450)
POTASSIUM SERPL-SCNC: 3.4 MMOL/L (ref 3.4–5.3)
PROT SERPL-MCNC: 6.7 G/DL (ref 6.4–8.3)
RBC # BLD AUTO: 3.38 10E6/UL (ref 3.8–5.2)
SODIUM SERPL-SCNC: 140 MMOL/L (ref 136–145)
WBC # BLD AUTO: 11 10E3/UL (ref 4–11)

## 2023-08-18 PROCEDURE — 84443 ASSAY THYROID STIM HORMONE: CPT | Performed by: HOSPITALIST

## 2023-08-18 PROCEDURE — 83690 ASSAY OF LIPASE: CPT | Performed by: EMERGENCY MEDICINE

## 2023-08-18 PROCEDURE — 83735 ASSAY OF MAGNESIUM: CPT | Performed by: EMERGENCY MEDICINE

## 2023-08-18 PROCEDURE — 96361 HYDRATE IV INFUSION ADD-ON: CPT | Performed by: EMERGENCY MEDICINE

## 2023-08-18 PROCEDURE — 80053 COMPREHEN METABOLIC PANEL: CPT | Performed by: EMERGENCY MEDICINE

## 2023-08-18 PROCEDURE — 99285 EMERGENCY DEPT VISIT HI MDM: CPT | Mod: 25 | Performed by: EMERGENCY MEDICINE

## 2023-08-18 PROCEDURE — 74177 CT ABD & PELVIS W/CONTRAST: CPT

## 2023-08-18 PROCEDURE — 85025 COMPLETE CBC W/AUTO DIFF WBC: CPT | Performed by: EMERGENCY MEDICINE

## 2023-08-18 PROCEDURE — 36415 COLL VENOUS BLD VENIPUNCTURE: CPT | Performed by: EMERGENCY MEDICINE

## 2023-08-18 PROCEDURE — 258N000003 HC RX IP 258 OP 636: Performed by: EMERGENCY MEDICINE

## 2023-08-18 PROCEDURE — 96375 TX/PRO/DX INJ NEW DRUG ADDON: CPT | Performed by: EMERGENCY MEDICINE

## 2023-08-18 PROCEDURE — 96376 TX/PRO/DX INJ SAME DRUG ADON: CPT | Performed by: EMERGENCY MEDICINE

## 2023-08-18 PROCEDURE — 80321 ALCOHOLS BIOMARKERS 1OR 2: CPT | Performed by: STUDENT IN AN ORGANIZED HEALTH CARE EDUCATION/TRAINING PROGRAM

## 2023-08-18 PROCEDURE — 99223 1ST HOSP IP/OBS HIGH 75: CPT | Performed by: HOSPITALIST

## 2023-08-18 PROCEDURE — 120N000002 HC R&B MED SURG/OB UMMC

## 2023-08-18 PROCEDURE — 76705 ECHO EXAM OF ABDOMEN: CPT | Mod: 26 | Performed by: STUDENT IN AN ORGANIZED HEALTH CARE EDUCATION/TRAINING PROGRAM

## 2023-08-18 PROCEDURE — 96374 THER/PROPH/DIAG INJ IV PUSH: CPT | Mod: 59 | Performed by: EMERGENCY MEDICINE

## 2023-08-18 PROCEDURE — 74177 CT ABD & PELVIS W/CONTRAST: CPT | Mod: 26 | Performed by: RADIOLOGY

## 2023-08-18 PROCEDURE — 83550 IRON BINDING TEST: CPT | Performed by: HOSPITALIST

## 2023-08-18 PROCEDURE — 76705 ECHO EXAM OF ABDOMEN: CPT

## 2023-08-18 PROCEDURE — 99285 EMERGENCY DEPT VISIT HI MDM: CPT | Performed by: EMERGENCY MEDICINE

## 2023-08-18 PROCEDURE — 84703 CHORIONIC GONADOTROPIN ASSAY: CPT | Performed by: EMERGENCY MEDICINE

## 2023-08-18 PROCEDURE — 250N000011 HC RX IP 250 OP 636: Performed by: EMERGENCY MEDICINE

## 2023-08-18 PROCEDURE — 82607 VITAMIN B-12: CPT | Performed by: HOSPITALIST

## 2023-08-18 PROCEDURE — 250N000011 HC RX IP 250 OP 636: Mod: JZ | Performed by: EMERGENCY MEDICINE

## 2023-08-18 RX ORDER — ONDANSETRON 2 MG/ML
4 INJECTION INTRAMUSCULAR; INTRAVENOUS ONCE
Status: COMPLETED | OUTPATIENT
Start: 2023-08-18 | End: 2023-08-18

## 2023-08-18 RX ORDER — MORPHINE SULFATE 4 MG/ML
6 INJECTION, SOLUTION INTRAMUSCULAR; INTRAVENOUS ONCE
Status: COMPLETED | OUTPATIENT
Start: 2023-08-18 | End: 2023-08-18

## 2023-08-18 RX ORDER — MORPHINE SULFATE 4 MG/ML
4 INJECTION, SOLUTION INTRAMUSCULAR; INTRAVENOUS ONCE
Status: COMPLETED | OUTPATIENT
Start: 2023-08-18 | End: 2023-08-18

## 2023-08-18 RX ORDER — OXYCODONE HYDROCHLORIDE 5 MG/1
5 TABLET ORAL ONCE
Status: CANCELLED | OUTPATIENT
Start: 2023-08-18 | End: 2023-08-18

## 2023-08-18 RX ORDER — IOPAMIDOL 755 MG/ML
77 INJECTION, SOLUTION INTRAVASCULAR ONCE
Status: COMPLETED | OUTPATIENT
Start: 2023-08-18 | End: 2023-08-18

## 2023-08-18 RX ADMIN — MORPHINE SULFATE 6 MG: 4 INJECTION INTRAVENOUS at 23:07

## 2023-08-18 RX ADMIN — ONDANSETRON 4 MG: 2 INJECTION INTRAMUSCULAR; INTRAVENOUS at 17:48

## 2023-08-18 RX ADMIN — HYDROMORPHONE HYDROCHLORIDE 1 MG: 1 INJECTION, SOLUTION INTRAMUSCULAR; INTRAVENOUS; SUBCUTANEOUS at 20:31

## 2023-08-18 RX ADMIN — HYDROMORPHONE HYDROCHLORIDE 1 MG: 1 INJECTION, SOLUTION INTRAMUSCULAR; INTRAVENOUS; SUBCUTANEOUS at 21:23

## 2023-08-18 RX ADMIN — SODIUM CHLORIDE, POTASSIUM CHLORIDE, SODIUM LACTATE AND CALCIUM CHLORIDE 1000 ML: 600; 310; 30; 20 INJECTION, SOLUTION INTRAVENOUS at 21:18

## 2023-08-18 RX ADMIN — SODIUM CHLORIDE, POTASSIUM CHLORIDE, SODIUM LACTATE AND CALCIUM CHLORIDE 1000 ML: 600; 310; 30; 20 INJECTION, SOLUTION INTRAVENOUS at 17:48

## 2023-08-18 RX ADMIN — IOPAMIDOL 77 ML: 755 INJECTION, SOLUTION INTRAVENOUS at 22:05

## 2023-08-18 RX ADMIN — MORPHINE SULFATE 4 MG: 4 INJECTION INTRAVENOUS at 17:49

## 2023-08-18 RX ADMIN — SODIUM CHLORIDE, POTASSIUM CHLORIDE, SODIUM LACTATE AND CALCIUM CHLORIDE 1000 ML: 600; 310; 30; 20 INJECTION, SOLUTION INTRAVENOUS at 20:30

## 2023-08-18 ASSESSMENT — ACTIVITIES OF DAILY LIVING (ADL)
ADLS_ACUITY_SCORE: 35

## 2023-08-18 NOTE — ED TRIAGE NOTES
"Triage Assessment & Note:    /66   Pulse 98   Temp 98.9  F (37.2  C) (Oral)   Resp 16   Ht 1.575 m (5' 2\")   Wt 57.2 kg (126 lb)   SpO2 98%   BMI 23.05 kg/m      Patient presents with: PT c/o abd pain with a hx of pancreatitis and increased pain x 6 days.     Home Treatments/Remedies: Prescribed meds     Febrile / Afebrile? Afebrile     Duration of C/o:  1 week    Isaac Tavares RN  August 18, 2023       Triage Assessment       Row Name 08/18/23 1611       Triage Assessment (Adult)    Airway WDL WDL       Respiratory WDL    Respiratory WDL WDL                    "

## 2023-08-18 NOTE — ED PROVIDER NOTES
[place] EMERGENCY DEPARTMENT ([facility])    8/18/23       ED PROVIDER NOTE  [facility]      History     Chief Complaint   Patient presents with    Abdominal Pain     HPI  Artie Burger is a 27 year old female with a history of acute on chronic pancreatitis who presents to the emergency department after 5 days of pancreatitis treatment at home.  Patient has been taking oral Zofran and 10 mg of oxycodone every 4hours, along with clear liquid diet for multiple days.  She reports having epigastric pain along with nausea.  No vomiting or diarrhea.  No blood in the stool.  No fever or cough.    This part of the medical record was transcribed by Charles Rivero, Medical Scribe, from a dictation done by Jim Woodruff MD.       Past Medical History  Past Medical History:   Diagnosis Date    Alcohol-induced chronic pancreatitis (H)     Anxiety     Depression     Gastroesophageal reflux disease     Pancreatic disease     PONV (postoperative nausea and vomiting)      Past Surgical History:   Procedure Laterality Date    ENDOSCOPIC RETROGRADE CHOLANGIOPANCREATOGRAM COMPLEX N/A 9/13/2021    Procedure: ENDOSCOPIC RETROGRADE CHOLANGIOPANCREATOGRAPHY with pancreatic sphincterotomy, dilation, stone removal, stent placement;  Surgeon: Guru Sabino Campuzano MD;  [facility]    ENDOSCOPIC RETROGRADE CHOLANGIOPANCREATOGRAM WITH DIRECT VISUALIZATION SYSTEM AND GENERATOR N/A 11/1/2021    Procedure: ENDOSCOPIC RETROGRADE CHOLANGIOPANCREATOGRAPHY, dilation and debridement sludge removal, pancreatic duct stent placement;  Surgeon: Guru Sabino Campuzano MD;  Location: [facility]    ENDOSCOPIC RETROGRADE CHOLANGIOPANCREATOGRAM WITH SPYGLASS N/A 10/6/2021    Procedure: ENDOSCOPIC RETROGRADE CHOLANGIOPANCREATOGRAPHY, WITH DIRECT DUCT VISUALIZATION, USING PANCREATICOBILIARY FIBEROPTIC PROBE, BILE DUCT STENT EXCHANGE, LITHOTRIPSY OF PANCREATIC STONE, SPHINCTEROTOMY, BALLOON DILATION OF PANCREATIC DUCT AND STONE  EXTRACTION;  Surgeon: Guru Sabino Campuzano MD;  [facility]    ENDOSCOPIC RETROGRADE CHOLANGIOPANCREATOGRAM WITH SPYGLASS N/A 12/13/2021    Procedure: ENDOSCOPIC RETROGRADE CHOLANGIOPANCREATOGRAPHY, with pancreatic stent exchange, ballon dilation, stone removal, with spyglass direct visualization;  Surgeon: Guru Sabino Campuzano MD;  [facility]    ENDOSCOPIC RETROGRADE CHOLANGIOPANCREATOGRAPHY, EXCHANGE TUBE/STENT N/A 2/16/2022    Procedure: ENDOSCOPIC RETROGRADE CHOLANGIOPANCREATOGRAPH with pancreatic dilation, debris removal and stent exchange.;  Surgeon: Guru Sabino Campuzano MD;  [facility]    ENDOSCOPIC RETROGRADE CHOLANGIOPANCREATOGRAPHY, EXCHANGE TUBE/STENT N/A 4/4/2022    Procedure: ENDOSCOPIC RETROGRADE CHOLANGIOPANCREATOGRAPHY, WITH REPLACEMENT OF pancreatic STENT, stone removal;  Surgeon: Guru Sabino Campuzano MD;  [facility]    ENDOSCOPIC RETROGRADE CHOLANGIOPANCREATOGRAPHY, EXCHANGE TUBE/STENT N/A 7/13/2022    Procedure: ENDOSCOPIC RETROGRADE CHOLANGIOPANCREATOGRAPHY, WITH pancreatic duct dilation and stent exchange, biliary stent placement, debris removal;  Surgeon: Guru Sabino Campuzano MD;  [facility]    ENDOSCOPIC RETROGRADE CHOLANGIOPANCREATOGRAPHY, EXCHANGE TUBE/STENT N/A 9/14/2022    Procedure: ENDOSCOPIC RETROGRADE CHOLANGIOPANCREATOGRAPHY, WITH pancreatic stone removal, biliary stent removal, pancreatic stent exchange;  Surgeon: Guru Sabino Campuzano MD;  [facility]    ENDOSCOPIC RETROGRADE CHOLANGIOPANCREATOGRAPHY, EXCHANGE TUBE/STENT N/A 11/21/2022    Procedure: ENDOSCOPIC RETROGRADE CHOLANGIOPANCREATOGRAPHY WITH PANCREATIC DUCT STENT EXCHANGE, DILATION, AND DEBRIS REMOVAL;  Surgeon: Guru Sabino Campuzano MD;  [facility]    ENDOSCOPIC RETROGRADE CHOLANGIOPANCREATOGRAPHY, EXCHANGE TUBE/STENT N/A 1/18/2023    Procedure: ENDOSCOPIC RETROGRADE  CHOLANGIOPANCREATOGRAPHY stent exchange, dilation, sludge and stone removal;  Surgeon: Guru Sabino Campuzano MD;  [facility]    ENDOSCOPIC RETROGRADE CHOLANGIOPANCREATOGRAPHY, EXCHANGE TUBE/STENT N/A 3/13/2023    Procedure: ENDOSCOPIC RETROGRADE CHOLANGIOPANCREATOGRAPHY, WITH replacement of pancreatic stents, bile duct stent placed,sphinterotomy of bile duct ampulla, balloon sweep of bile duct for sludge,;  Surgeon: Guru Sabino Campuzano MD;  [facility]    ENDOSCOPIC RETROGRADE CHOLANGIOPANCREATOGRAPHY, EXCHANGE TUBE/STENT N/A 5/10/2023    Procedure: ENDOSCOPIC RETROGRADE CHOLANGIOPANCREATOGRAPHY with pancreatic stents exchange and stone extraction;  Surgeon: Guru Sabino Campuzano MD;  [facility]    ENDOSCOPIC RETROGRADE CHOLANGIOPANCREATOGRAPHY, EXCHANGE TUBE/STENT N/A 2023    Procedure: ENDOSCOPIC RETROGRADE CHOLANGIOPANCREATOGRAPHY, WITH REPLACEMENT OF STENT X3 AND BALLOON SWEEP OF BILE DUCT FOR STONES;  Surgeon: Guru Sabino Campuzano MD;  Location: [facility]     acetaminophen (TYLENOL) 500 MG tablet  busPIRone (BUSPAR) 10 MG tablet  cetirizine (ZYRTEC) 10 MG tablet  citalopram (CELEXA) 40 MG tablet  famotidine (PEPCID) 20 MG tablet  gabapentin (NEURONTIN) 300 MG capsule  hydrOXYzine (ATARAX) 25 MG tablet  ibuprofen (ADVIL/MOTRIN) 200 MG capsule  medroxyPROGESTERone (DEPO-PROVERA) 150 MG/ML IM injection  Multiple Vitamin (TAB-A-FABI) TABS  ondansetron (ZOFRAN-ODT) 4 MG ODT tab  oxyCODONE IR (ROXICODONE) 10 MG tablet  pancrelipase, lip-prot-amyl, 3000 UNITS (CREON) CPEP  pregabalin (LYRICA) 50 MG capsule      No Known Allergies  Family History  No family history on file.  Social History   Social History     Tobacco Use    Smoking status: Former     Types: Cigarettes     Quit date: 2022     Years since quittin.2    Smokeless tobacco: Current    Tobacco comments:     Vapes daily   Vaping Use    Vaping Use: Every day    Substances:  "Nicotine   Substance Use Topics    Alcohol use: Not Currently    Drug use: Yes     Types: Marijuana     Comment: smokes daily      Past medical history, past surgical history, medications, allergies, family history, and social history were reviewed with the patient. No additional pertinent items.      A medically appropriate review of systems was performed with pertinent positives and negatives noted in the HPI, and all other systems negative.    Physical Exam   BP: 103/66  Pulse: 98  Temp: 98.9  F (37.2  C)  Resp: 16  Height: 157.5 cm (5' 2\")  Weight: 57.2 kg (126 lb)  SpO2: 98 %  Physical Exam    Patient appears mildly uncomfortable but is in no acute distress.    Breathing comfortably.  Lower quadrants of abdomen nontender.    Tenderness in epigastrium.    This part of the medical record was transcribed by Charles Rivero Medical Scribe, from a dictation done by Jim Woodruff MD.       ED Course, Procedures, & Data      Procedures                     Results for orders placed or performed during the hospital encounter of 08/18/23   [place] Draw     Status: None (In process)    Narrative    The following orders were created for panel order [place] Draw.  Procedure                               Abnormality         Status                     ---------                               -----------         ------                     Extra Blue Top Tube[[id number]]                              In process                 Extra Red Top Tube[[id number]]                               In process                 Extra Green Top (Lithium...[[id number]]                      In process                 Extra Purple Top Tube[[id number]]                            In process                   Please view results for these tests on the individual orders.   Lipase     Status: Abnormal   Result Value Ref Range    Lipase 11 (L) 13 - 60 U/L   Comprehensive metabolic panel     Status: Normal   Result Value Ref Range    Sodium 140 136 - 145 mmol/L "    Potassium 3.4 3.4 - 5.3 mmol/L    Chloride 105 98 - 107 mmol/L    Carbon Dioxide (CO2) 22 22 - 29 mmol/L    Anion Gap 13 7 - 15 mmol/L    Urea Nitrogen 10.3 6.0 - 20.0 mg/dL    Creatinine 0.74 0.51 - 0.95 mg/dL    Calcium 9.5 8.6 - 10.0 mg/dL    Glucose 76 70 - 99 mg/dL    Alkaline Phosphatase 61 35 - 104 U/L    AST 22 0 - 45 U/L    ALT 21 0 - 50 U/L    Protein Total 6.7 6.4 - 8.3 g/dL    Albumin 4.0 3.5 - 5.2 g/dL    Bilirubin Total 0.2 <=1.2 mg/dL    GFR Estimate >90 >60 mL/min/1.73m2   Magnesium     Status: Normal   Result Value Ref Range    Magnesium 1.7 1.7 - 2.3 mg/dL   CBC with platelets and differential     Status: Abnormal   Result Value Ref Range    WBC Count 11.0 4.0 - 11.0 10e3/uL    RBC Count 3.38 (L) 3.80 - 5.20 10e6/uL    Hemoglobin 10.2 (L) 11.7 - 15.7 g/dL    Hematocrit 29.2 (L) 35.0 - 47.0 %    MCV 86 78 - 100 fL    MCH 30.2 26.5 - 33.0 pg    MCHC 34.9 31.5 - 36.5 g/dL    RDW 12.8 10.0 - 15.0 %    Platelet Count 412 150 - 450 10e3/uL    % Neutrophils 53 %    % Lymphocytes 36 %    % Monocytes 7 %    % Eosinophils 3 %    % Basophils 1 %    % Immature Granulocytes 0 %    NRBCs per 100 WBC 0 <1 /100    Absolute Neutrophils 5.9 1.6 - 8.3 10e3/uL    Absolute Lymphocytes 3.9 0.8 - 5.3 10e3/uL    Absolute Monocytes 0.8 0.0 - 1.3 10e3/uL    Absolute Eosinophils 0.3 0.0 - 0.7 10e3/uL    Absolute Basophils 0.1 0.0 - 0.2 10e3/uL    Absolute Immature Granulocytes 0.0 <=0.4 10e3/uL    Absolute NRBCs 0.0 10e3/uL   CBC with platelets differential     Status: Abnormal    Narrative    The following orders were created for panel order CBC with platelets differential.  Procedure                               Abnormality         Status                     ---------                               -----------         ------                     CBC with platelets and d...[[id number]]  Abnormal            Final result                 Please view results for these tests on the individual orders.     Medications   lactated  ringers BOLUS 1,000 mL (has no administration in time range)   lactated ringers BOLUS 1,000 mL (1,000 mLs Intravenous $New Bag 8/18/23 1748)   ondansetron (ZOFRAN) injection 4 mg (4 mg Intravenous $Given 8/18/23 1748)   morphine (PF) injection 4 mg (4 mg Intravenous $Given 8/18/23 1749)     Labs Ordered and Resulted from Time of ED Arrival to Time of ED Departure   LIPASE - Abnormal       Result Value    Lipase 11 (*)    CBC WITH PLATELETS AND DIFFERENTIAL - Abnormal    WBC Count 11.0      RBC Count 3.38 (*)     Hemoglobin 10.2 (*)     Hematocrit 29.2 (*)     MCV 86      MCH 30.2      MCHC 34.9      RDW 12.8      Platelet Count 412      % Neutrophils 53      % Lymphocytes 36      % Monocytes 7      % Eosinophils 3      % Basophils 1      % Immature Granulocytes 0      NRBCs per 100 WBC 0      Absolute Neutrophils 5.9      Absolute Lymphocytes 3.9      Absolute Monocytes 0.8      Absolute Eosinophils 0.3      Absolute Basophils 0.1      Absolute Immature Granulocytes 0.0      Absolute NRBCs 0.0     COMPREHENSIVE METABOLIC PANEL - Normal    Sodium 140      Potassium 3.4      Chloride 105      Carbon Dioxide (CO2) 22      Anion Gap 13      Urea Nitrogen 10.3      Creatinine 0.74      Calcium 9.5      Glucose 76      Alkaline Phosphatase 61      AST 22      ALT 21      Protein Total 6.7      Albumin 4.0      Bilirubin Total 0.2      GFR Estimate >90     MAGNESIUM - Normal    Magnesium 1.7     HCG QUALITATIVE PREGNANCY     No orders to display          Critical care was not performed.     Medical Decision Making  The patient's presentation was of high complexity (a chronic illness severe exacerbation, progression, or side effect of treatment).    The patient's evaluation involved:  ordering and/or review of 3+ test(s) in this encounter (see separate area of note for details)  review of 1 test result(s) ordered prior to this encounter (see separate area of note for details)    The patient's management necessitated high  risk (a parenteral controlled substance).    Assessment & Plan        Acute on chronic pancreatitis flare. I reviewed the GI notes showing the patient has a September 12, 2023 plan for a complex pancreatectomy and islet transplant. Here, plan will be IV fluids, IV Zofran and morphine. Labs including lipase and likely reevaluate for disposition.    730p  Reevaluation: Patient still having epigastric pain will increase to IV Dilaudid 1 mg and continue IV fluids.  Nausea has improved after IV Zofran  Lipase abnormally low which is not uncommon for acute on chronic pancreatitis      11p  CT negative for acute findings.  Continued pain will return to IV morphine and admit for IV fluids and IV pain and nausea control,     This part of the medical record was transcribed by Charles Rivero Medical Scribe, from a dictation done by Jim Woodruff MD.       I have reviewed the nursing notes. I have reviewed the findings, diagnosis, plan and need for follow up with the patient.    New Prescriptions    No medications on file       Final diagnoses:   None       ICharles, am serving as a trained medical scribe to document services personally performed by Jim Woodruff MD based on the provider's statements to me on August 18, 2023.  This document has been checked and approved by the attending provider.    I, Jim Woodruff MD, was physically present and have reviewed and verified the accuracy of this note documented by Charles Rivero medical scribe.      Jim Woodruff MD  [facility] EMERGENCY DEPARTMENT  August 18, 2023       Jim Woodruff MD  08/18/23 1952       Jim Woodruff MD  08/18/23 [id number]

## 2023-08-19 LAB
ALBUMIN SERPL BCG-MCNC: 3.3 G/DL (ref 3.5–5.2)
ALP SERPL-CCNC: 49 U/L (ref 35–104)
ALT SERPL W P-5'-P-CCNC: 13 U/L (ref 0–50)
ANION GAP SERPL CALCULATED.3IONS-SCNC: 10 MMOL/L (ref 7–15)
AST SERPL W P-5'-P-CCNC: 17 U/L (ref 0–45)
BILIRUB SERPL-MCNC: 0.2 MG/DL
BUN SERPL-MCNC: 5.9 MG/DL (ref 6–20)
CALCIUM SERPL-MCNC: 8.7 MG/DL (ref 8.6–10)
CHLORIDE SERPL-SCNC: 105 MMOL/L (ref 98–107)
CREAT SERPL-MCNC: 0.61 MG/DL (ref 0.51–0.95)
DEPRECATED HCO3 PLAS-SCNC: 22 MMOL/L (ref 22–29)
ERYTHROCYTE [DISTWIDTH] IN BLOOD BY AUTOMATED COUNT: 12.9 % (ref 10–15)
FOLATE SERPL-MCNC: 18.8 NG/ML (ref 4.6–34.8)
GFR SERPL CREATININE-BSD FRML MDRD: >90 ML/MIN/1.73M2
GLUCOSE SERPL-MCNC: 78 MG/DL (ref 70–99)
HCT VFR BLD AUTO: 26.3 % (ref 35–47)
HGB BLD-MCNC: 9 G/DL (ref 11.7–15.7)
INR PPP: 1.19 (ref 0.85–1.15)
IRON BINDING CAPACITY (ROCHE): 209 UG/DL (ref 240–430)
IRON SATN MFR SERPL: 9 % (ref 15–46)
IRON SERPL-MCNC: 18 UG/DL (ref 37–145)
MCH RBC QN AUTO: 29.9 PG (ref 26.5–33)
MCHC RBC AUTO-ENTMCNC: 34.2 G/DL (ref 31.5–36.5)
MCV RBC AUTO: 87 FL (ref 78–100)
PLATELET # BLD AUTO: 341 10E3/UL (ref 150–450)
POTASSIUM SERPL-SCNC: 3.5 MMOL/L (ref 3.4–5.3)
POTASSIUM SERPL-SCNC: 3.9 MMOL/L (ref 3.4–5.3)
PROT SERPL-MCNC: 5.3 G/DL (ref 6.4–8.3)
RBC # BLD AUTO: 3.01 10E6/UL (ref 3.8–5.2)
SODIUM SERPL-SCNC: 137 MMOL/L (ref 136–145)
TSH SERPL DL<=0.005 MIU/L-ACNC: 1.68 UIU/ML (ref 0.3–4.2)
VIT B12 SERPL-MCNC: 888 PG/ML (ref 232–1245)
WBC # BLD AUTO: 10 10E3/UL (ref 4–11)

## 2023-08-19 PROCEDURE — 99222 1ST HOSP IP/OBS MODERATE 55: CPT | Mod: GC | Performed by: INTERNAL MEDICINE

## 2023-08-19 PROCEDURE — 250N000013 HC RX MED GY IP 250 OP 250 PS 637: Performed by: HOSPITALIST

## 2023-08-19 PROCEDURE — 99207 PR APP CREDIT; MD BILLING SHARED VISIT: CPT | Performed by: NURSE PRACTITIONER

## 2023-08-19 PROCEDURE — 85014 HEMATOCRIT: CPT | Performed by: HOSPITALIST

## 2023-08-19 PROCEDURE — 82746 ASSAY OF FOLIC ACID SERUM: CPT | Performed by: HOSPITALIST

## 2023-08-19 PROCEDURE — 120N000002 HC R&B MED SURG/OB UMMC

## 2023-08-19 PROCEDURE — 99233 SBSQ HOSP IP/OBS HIGH 50: CPT | Mod: FS | Performed by: STUDENT IN AN ORGANIZED HEALTH CARE EDUCATION/TRAINING PROGRAM

## 2023-08-19 PROCEDURE — 84132 ASSAY OF SERUM POTASSIUM: CPT | Performed by: HOSPITALIST

## 2023-08-19 PROCEDURE — 258N000003 HC RX IP 258 OP 636: Performed by: HOSPITALIST

## 2023-08-19 PROCEDURE — 36415 COLL VENOUS BLD VENIPUNCTURE: CPT | Performed by: HOSPITALIST

## 2023-08-19 PROCEDURE — 999N000128 HC STATISTIC PERIPHERAL IV START W/O US GUIDANCE

## 2023-08-19 PROCEDURE — 85610 PROTHROMBIN TIME: CPT | Performed by: HOSPITALIST

## 2023-08-19 PROCEDURE — 250N000013 HC RX MED GY IP 250 OP 250 PS 637: Performed by: NURSE PRACTITIONER

## 2023-08-19 PROCEDURE — 80053 COMPREHEN METABOLIC PANEL: CPT | Performed by: HOSPITALIST

## 2023-08-19 PROCEDURE — 250N000011 HC RX IP 250 OP 636: Mod: JZ | Performed by: HOSPITALIST

## 2023-08-19 RX ORDER — AMOXICILLIN 250 MG
1 CAPSULE ORAL 2 TIMES DAILY PRN
Status: DISCONTINUED | OUTPATIENT
Start: 2023-08-19 | End: 2023-08-24 | Stop reason: HOSPADM

## 2023-08-19 RX ORDER — POTASSIUM CHLORIDE 750 MG/1
40 TABLET, EXTENDED RELEASE ORAL ONCE
Status: COMPLETED | OUTPATIENT
Start: 2023-08-19 | End: 2023-08-19

## 2023-08-19 RX ORDER — CITALOPRAM HYDROBROMIDE 40 MG/1
40 TABLET ORAL DAILY
Status: DISCONTINUED | OUTPATIENT
Start: 2023-08-19 | End: 2023-08-19

## 2023-08-19 RX ORDER — AMOXICILLIN 250 MG
2 CAPSULE ORAL 2 TIMES DAILY PRN
Status: DISCONTINUED | OUTPATIENT
Start: 2023-08-19 | End: 2023-08-24 | Stop reason: HOSPADM

## 2023-08-19 RX ORDER — BUSPIRONE HYDROCHLORIDE 5 MG/1
10 TABLET ORAL 2 TIMES DAILY
Status: DISCONTINUED | OUTPATIENT
Start: 2023-08-19 | End: 2023-08-24 | Stop reason: HOSPADM

## 2023-08-19 RX ORDER — PREGABALIN 50 MG/1
50 CAPSULE ORAL 2 TIMES DAILY
Status: DISCONTINUED | OUTPATIENT
Start: 2023-08-19 | End: 2023-08-19

## 2023-08-19 RX ORDER — LIDOCAINE 40 MG/G
CREAM TOPICAL
Status: DISCONTINUED | OUTPATIENT
Start: 2023-08-19 | End: 2023-08-24 | Stop reason: HOSPADM

## 2023-08-19 RX ORDER — BUSPIRONE HYDROCHLORIDE 10 MG/1
10 TABLET ORAL 3 TIMES DAILY
Status: DISCONTINUED | OUTPATIENT
Start: 2023-08-19 | End: 2023-08-19

## 2023-08-19 RX ORDER — GABAPENTIN 300 MG/1
600 CAPSULE ORAL 2 TIMES DAILY
Status: DISCONTINUED | OUTPATIENT
Start: 2023-08-19 | End: 2023-08-19

## 2023-08-19 RX ORDER — FAMOTIDINE 20 MG/1
20 TABLET, FILM COATED ORAL DAILY
Status: DISCONTINUED | OUTPATIENT
Start: 2023-08-19 | End: 2023-08-19

## 2023-08-19 RX ORDER — GABAPENTIN 300 MG/1
600 CAPSULE ORAL 3 TIMES DAILY
Status: DISCONTINUED | OUTPATIENT
Start: 2023-08-19 | End: 2023-08-24 | Stop reason: HOSPADM

## 2023-08-19 RX ORDER — SODIUM CHLORIDE, SODIUM LACTATE, POTASSIUM CHLORIDE, CALCIUM CHLORIDE 600; 310; 30; 20 MG/100ML; MG/100ML; MG/100ML; MG/100ML
INJECTION, SOLUTION INTRAVENOUS CONTINUOUS
Status: DISCONTINUED | OUTPATIENT
Start: 2023-08-19 | End: 2023-08-21

## 2023-08-19 RX ORDER — ONDANSETRON 4 MG/1
4 TABLET, ORALLY DISINTEGRATING ORAL EVERY 6 HOURS PRN
Status: DISCONTINUED | OUTPATIENT
Start: 2023-08-19 | End: 2023-08-24 | Stop reason: HOSPADM

## 2023-08-19 RX ORDER — HYDROMORPHONE HYDROCHLORIDE 1 MG/ML
.5-1 INJECTION, SOLUTION INTRAMUSCULAR; INTRAVENOUS; SUBCUTANEOUS
Status: DISCONTINUED | OUTPATIENT
Start: 2023-08-19 | End: 2023-08-21

## 2023-08-19 RX ORDER — MORPHINE SULFATE 4 MG/ML
6 INJECTION, SOLUTION INTRAMUSCULAR; INTRAVENOUS ONCE
Status: COMPLETED | OUTPATIENT
Start: 2023-08-19 | End: 2023-08-19

## 2023-08-19 RX ORDER — OXYCODONE HYDROCHLORIDE 10 MG/1
10 TABLET ORAL EVERY 4 HOURS PRN
Status: DISCONTINUED | OUTPATIENT
Start: 2023-08-19 | End: 2023-08-21

## 2023-08-19 RX ORDER — CITALOPRAM HYDROBROMIDE 20 MG/1
40 TABLET ORAL DAILY
Status: DISCONTINUED | OUTPATIENT
Start: 2023-08-20 | End: 2023-08-24 | Stop reason: HOSPADM

## 2023-08-19 RX ORDER — ONDANSETRON 2 MG/ML
4 INJECTION INTRAMUSCULAR; INTRAVENOUS EVERY 6 HOURS PRN
Status: DISCONTINUED | OUTPATIENT
Start: 2023-08-19 | End: 2023-08-24 | Stop reason: HOSPADM

## 2023-08-19 RX ORDER — PROCHLORPERAZINE MALEATE 5 MG
10 TABLET ORAL EVERY 6 HOURS PRN
Status: DISCONTINUED | OUTPATIENT
Start: 2023-08-19 | End: 2023-08-24 | Stop reason: HOSPADM

## 2023-08-19 RX ORDER — HYDROXYZINE HYDROCHLORIDE 25 MG/1
25 TABLET, FILM COATED ORAL DAILY
Status: DISCONTINUED | OUTPATIENT
Start: 2023-08-19 | End: 2023-08-24 | Stop reason: HOSPADM

## 2023-08-19 RX ORDER — PROCHLORPERAZINE 25 MG
25 SUPPOSITORY, RECTAL RECTAL EVERY 12 HOURS PRN
Status: DISCONTINUED | OUTPATIENT
Start: 2023-08-19 | End: 2023-08-24 | Stop reason: HOSPADM

## 2023-08-19 RX ADMIN — OXYCODONE HYDROCHLORIDE 10 MG: 10 TABLET ORAL at 09:04

## 2023-08-19 RX ADMIN — HYDROMORPHONE HYDROCHLORIDE 1 MG: 1 INJECTION, SOLUTION INTRAMUSCULAR; INTRAVENOUS; SUBCUTANEOUS at 20:42

## 2023-08-19 RX ADMIN — HYDROMORPHONE HYDROCHLORIDE 1 MG: 1 INJECTION, SOLUTION INTRAMUSCULAR; INTRAVENOUS; SUBCUTANEOUS at 06:25

## 2023-08-19 RX ADMIN — SODIUM CHLORIDE, POTASSIUM CHLORIDE, SODIUM LACTATE AND CALCIUM CHLORIDE: 600; 310; 30; 20 INJECTION, SOLUTION INTRAVENOUS at 02:07

## 2023-08-19 RX ADMIN — OXYCODONE HYDROCHLORIDE 10 MG: 10 TABLET ORAL at 12:58

## 2023-08-19 RX ADMIN — HYDROMORPHONE HYDROCHLORIDE 1 MG: 1 INJECTION, SOLUTION INTRAMUSCULAR; INTRAVENOUS; SUBCUTANEOUS at 02:06

## 2023-08-19 RX ADMIN — OXYCODONE HYDROCHLORIDE 10 MG: 10 TABLET ORAL at 16:54

## 2023-08-19 RX ADMIN — OXYCODONE HYDROCHLORIDE 10 MG: 10 TABLET ORAL at 21:26

## 2023-08-19 RX ADMIN — GABAPENTIN 600 MG: 300 CAPSULE ORAL at 13:53

## 2023-08-19 RX ADMIN — HYDROMORPHONE HYDROCHLORIDE 1 MG: 1 INJECTION, SOLUTION INTRAMUSCULAR; INTRAVENOUS; SUBCUTANEOUS at 13:53

## 2023-08-19 RX ADMIN — HYDROXYZINE HYDROCHLORIDE 25 MG: 25 TABLET ORAL at 08:37

## 2023-08-19 RX ADMIN — POTASSIUM CHLORIDE 40 MEQ: 750 TABLET, EXTENDED RELEASE ORAL at 04:57

## 2023-08-19 RX ADMIN — ONDANSETRON 4 MG: 2 INJECTION INTRAMUSCULAR; INTRAVENOUS at 16:41

## 2023-08-19 RX ADMIN — GABAPENTIN 600 MG: 300 CAPSULE ORAL at 08:37

## 2023-08-19 RX ADMIN — MORPHINE SULFATE 6 MG: 4 INJECTION INTRAVENOUS at 01:23

## 2023-08-19 RX ADMIN — BUSPIRONE HYDROCHLORIDE 10 MG: 5 TABLET ORAL at 20:42

## 2023-08-19 RX ADMIN — SODIUM CHLORIDE, POTASSIUM CHLORIDE, SODIUM LACTATE AND CALCIUM CHLORIDE: 600; 310; 30; 20 INJECTION, SOLUTION INTRAVENOUS at 10:11

## 2023-08-19 RX ADMIN — HYDROMORPHONE HYDROCHLORIDE 1 MG: 1 INJECTION, SOLUTION INTRAMUSCULAR; INTRAVENOUS; SUBCUTANEOUS at 08:39

## 2023-08-19 RX ADMIN — HYDROMORPHONE HYDROCHLORIDE 1 MG: 1 INJECTION, SOLUTION INTRAMUSCULAR; INTRAVENOUS; SUBCUTANEOUS at 22:54

## 2023-08-19 RX ADMIN — HYDROMORPHONE HYDROCHLORIDE 1 MG: 1 INJECTION, SOLUTION INTRAMUSCULAR; INTRAVENOUS; SUBCUTANEOUS at 16:13

## 2023-08-19 RX ADMIN — SODIUM CHLORIDE, POTASSIUM CHLORIDE, SODIUM LACTATE AND CALCIUM CHLORIDE: 600; 310; 30; 20 INJECTION, SOLUTION INTRAVENOUS at 17:56

## 2023-08-19 RX ADMIN — GABAPENTIN 600 MG: 300 CAPSULE ORAL at 20:42

## 2023-08-19 RX ADMIN — HYDROMORPHONE HYDROCHLORIDE 1 MG: 1 INJECTION, SOLUTION INTRAMUSCULAR; INTRAVENOUS; SUBCUTANEOUS at 18:32

## 2023-08-19 RX ADMIN — HYDROMORPHONE HYDROCHLORIDE 1 MG: 1 INJECTION, SOLUTION INTRAMUSCULAR; INTRAVENOUS; SUBCUTANEOUS at 04:23

## 2023-08-19 ASSESSMENT — ACTIVITIES OF DAILY LIVING (ADL)
ADLS_ACUITY_SCORE: 31
ADLS_ACUITY_SCORE: 35
ADLS_ACUITY_SCORE: 31
ADLS_ACUITY_SCORE: 35
ADLS_ACUITY_SCORE: 22
DRESSING/BATHING_DIFFICULTY: NO
DIFFICULTY_EATING/SWALLOWING: NO
WALKING_OR_CLIMBING_STAIRS_DIFFICULTY: NO
ADLS_ACUITY_SCORE: 35
WEAR_GLASSES_OR_BLIND: YES
CHANGE_IN_FUNCTIONAL_STATUS_SINCE_ONSET_OF_CURRENT_ILLNESS/INJURY: NO
ADLS_ACUITY_SCORE: 35
ADLS_ACUITY_SCORE: 35
TOILETING_ISSUES: NO
ADLS_ACUITY_SCORE: 35
DOING_ERRANDS_INDEPENDENTLY_DIFFICULTY: YES
CONCENTRATING,_REMEMBERING_OR_MAKING_DECISIONS_DIFFICULTY: NO
FALL_HISTORY_WITHIN_LAST_SIX_MONTHS: NO
ADLS_ACUITY_SCORE: 35

## 2023-08-19 NOTE — H&P
[facility]    History and Physical - Hospitalist Service, GOLD TEAM        Date of Admission:  8/18/2023    Assessment & Plan      Artie Burger is a 27 year old female admitted on 8/18/2023. She   27 year old white female with a PMHx of depression, panic attacks, alcoholism, currently in remission. She is presenting with abdominal pain, nausea and vomiting c/w her pancreatitis.     Acute on chronic pancreatitis flare.   Recurrent bouts of pancreatitis, which progressed to chronic calcific pancreatitis from alcohol abuse, status post multiple ERCPs. On ERCP in 4/22 noted to have a high-grade PD stricture and stone. The stones have all been removed. The stricture was treated with multiple stents and those stents were all removed. Developed an attack of pancreatitis after stent removal and has undergone serial ERCPs last in 11/21 with placement of three 5 Fr stents across the PD stricture. She has had a poor quality of life since 12/22. Last stent change performed 7/12/23.  She is currently considered for Total Pancreatectomy with Islet Autotransplantation surgery in 9/23.  Today patient called GI service and advised to seek medical attention in ED. Vitals stable. Electrolytes satisfactory. No leukocytosis or fever. Is still having abdominal pain.  - IV dilaudid 0.5-1 mg every 2 hours and deescalate  - Zofran and Compazine for nausea  - IVF  - GI consulted, appreciate recommendations      Anemia  Bright red Blood per rectum  Suspect BPR constipation related. Hgb stable from last admission. Anemia suspect nutritional  - Workup anemia. Iron saturation, B12, Folate and TSH  - Monitor Hgb if trend down will need further GI bleed work-up    Depression  Panic attacks  Alcoholism, currently in remission  Per patient on buspar 10 TID and Celexa 40 daily. Pharmacy consulted for reconciliation. Resume after reconciliation.         Diet:   NPO  DVT Prophylaxis: Ambulate every  shift  Padron Catheter: Not present  Lines: None     Cardiac Monitoring: None  Code Status:  Full Code    Clinically Significant Risk Factors Present on Admission                                  Disposition Plan      Expected Discharge Date: 08/19/2023                  Danya Malone MD  Hospitalist Service, [facility]  Securely message with [employer] (more info)  Text page via [facility] Paging/Directory   See signed in provider for up to date coverage information    ______________________________________________________________________    Chief Complaint   Abdominal pain and nausea.    History is obtained from the patient    History of Present Illness   Artie Burger is a 27 year old female with a history of acute on chronic pancreatitis who presents to the emergency department after 5 days of pancreatitis treatment at home. Patient has been taking oral Zofran and 10 mg of oxycodone every 4hours, along with clear liquid diet for multiple days.  She reports having epigastric pain along with nausea.  No vomiting or diarrhea. Patient had fresh blood in stool after constipation. No melena. No fever or cough.       Past Medical History    Past Medical History:   Diagnosis Date    Alcohol-induced chronic pancreatitis (H)     Anxiety     Depression     Gastroesophageal reflux disease     Pancreatic disease     PONV (postoperative nausea and vomiting)        Past Surgical History   Past Surgical History:   Procedure Laterality Date    ENDOSCOPIC RETROGRADE CHOLANGIOPANCREATOGRAM COMPLEX N/A 9/13/2021    Procedure: ENDOSCOPIC RETROGRADE CHOLANGIOPANCREATOGRAPHY with pancreatic sphincterotomy, dilation, stone removal, stent placement;  Surgeon: Guru Sabino Campuzano MD;  Location: [facility]    ENDOSCOPIC RETROGRADE CHOLANGIOPANCREATOGRAM WITH DIRECT VISUALIZATION SYSTEM AND GENERATOR N/A 11/1/2021    Procedure: ENDOSCOPIC RETROGRADE CHOLANGIOPANCREATOGRAPHY,  dilation and debridement sludge removal, pancreatic duct stent placement;  Surgeon: Guru Sabino Campuzano MD;  Location: [facility]    ENDOSCOPIC RETROGRADE CHOLANGIOPANCREATOGRAM WITH SPYGLASS N/A 10/6/2021    Procedure: ENDOSCOPIC RETROGRADE CHOLANGIOPANCREATOGRAPHY, WITH DIRECT DUCT VISUALIZATION, USING PANCREATICOBILIARY FIBEROPTIC PROBE, BILE DUCT STENT EXCHANGE, LITHOTRIPSY OF PANCREATIC STONE, SPHINCTEROTOMY, BALLOON DILATION OF PANCREATIC DUCT AND STONE EXTRACTION;  Surgeon: Guru Sabino Campuzano MD;  Location: [facility]    ENDOSCOPIC RETROGRADE CHOLANGIOPANCREATOGRAM WITH SPYGLASS N/A 12/13/2021    Procedure: ENDOSCOPIC RETROGRADE CHOLANGIOPANCREATOGRAPHY, with pancreatic stent exchange, ballon dilation, stone removal, with spyglass direct visualization;  Surgeon: Guru Sabino Campuzano MD;  Location: [facility]    ENDOSCOPIC RETROGRADE CHOLANGIOPANCREATOGRAPHY, EXCHANGE TUBE/STENT N/A 2/16/2022    Procedure: ENDOSCOPIC RETROGRADE CHOLANGIOPANCREATOGRAPH with pancreatic dilation, debris removal and stent exchange.;  Surgeon: Guru Sabino Campuzano MD;  Location: [facility]    ENDOSCOPIC RETROGRADE CHOLANGIOPANCREATOGRAPHY, EXCHANGE TUBE/STENT N/A 4/4/2022    Procedure: ENDOSCOPIC RETROGRADE CHOLANGIOPANCREATOGRAPHY, WITH REPLACEMENT OF pancreatic STENT, stone removal;  Surgeon: Guru Sabino Campuzano MD;  Location: [facility]    ENDOSCOPIC RETROGRADE CHOLANGIOPANCREATOGRAPHY, EXCHANGE TUBE/STENT N/A 7/13/2022    Procedure: ENDOSCOPIC RETROGRADE CHOLANGIOPANCREATOGRAPHY, WITH pancreatic duct dilation and stent exchange, biliary stent placement, debris removal;  Surgeon: Guru Sabino Campuzano MD;  Location: [facility]    ENDOSCOPIC RETROGRADE CHOLANGIOPANCREATOGRAPHY, EXCHANGE TUBE/STENT N/A 9/14/2022    Procedure: ENDOSCOPIC RETROGRADE CHOLANGIOPANCREATOGRAPHY, WITH pancreatic stone removal, biliary stent removal, pancreatic stent exchange;  Surgeon:  Guru Sabino Campuzano MD;  Location: [facility]    ENDOSCOPIC RETROGRADE CHOLANGIOPANCREATOGRAPHY, EXCHANGE TUBE/STENT N/A 11/21/2022    Procedure: ENDOSCOPIC RETROGRADE CHOLANGIOPANCREATOGRAPHY WITH PANCREATIC DUCT STENT EXCHANGE, DILATION, AND DEBRIS REMOVAL;  Surgeon: Guru Sabino Campuzano MD;  Location: [facility]    ENDOSCOPIC RETROGRADE CHOLANGIOPANCREATOGRAPHY, EXCHANGE TUBE/STENT N/A 1/18/2023    Procedure: ENDOSCOPIC RETROGRADE CHOLANGIOPANCREATOGRAPHY stent exchange, dilation, sludge and stone removal;  Surgeon: Guru Sabino Campuzano MD;  Location: [facility]    ENDOSCOPIC RETROGRADE CHOLANGIOPANCREATOGRAPHY, EXCHANGE TUBE/STENT N/A 3/13/2023    Procedure: ENDOSCOPIC RETROGRADE CHOLANGIOPANCREATOGRAPHY, WITH replacement of pancreatic stents, bile duct stent placed,sphinterotomy of bile duct ampulla, balloon sweep of bile duct for sludge,;  Surgeon: Guru Sabino Campuzano MD;  Location: [facility]    ENDOSCOPIC RETROGRADE CHOLANGIOPANCREATOGRAPHY, EXCHANGE TUBE/STENT N/A 5/10/2023    Procedure: ENDOSCOPIC RETROGRADE CHOLANGIOPANCREATOGRAPHY with pancreatic stents exchange and stone extraction;  Surgeon: Guru Sabino Campuzano MD;  Location: [facility]    ENDOSCOPIC RETROGRADE CHOLANGIOPANCREATOGRAPHY, EXCHANGE TUBE/STENT N/A 7/12/2023    Procedure: ENDOSCOPIC RETROGRADE CHOLANGIOPANCREATOGRAPHY, WITH REPLACEMENT OF STENT X3 AND BALLOON SWEEP OF BILE DUCT FOR STONES;  Surgeon: Guru Sabino Campuzano MD;  Location: [facility]       Prior to Admission Medications   Prior to Admission Medications   Prescriptions Last Dose Informant Patient Reported? Taking?   Multiple Vitamin (TAB-A-FABI) TABS   Yes No   Sig: Take 1 tablet by mouth daily   acetaminophen (TYLENOL) 500 MG tablet   Yes No   Sig: Take 500-1,000 mg by mouth every 6 hours as needed for mild pain   busPIRone (BUSPAR) 10 MG tablet   Yes No   Sig: Take 10 mg by mouth 3 times daily    cetirizine  (ZYRTEC) 10 MG tablet   Yes No   Sig: Take 10 mg by mouth daily as needed   citalopram (CELEXA) 40 MG tablet   Yes No   Sig: Take 1 tablet by mouth daily   famotidine (PEPCID) 20 MG tablet   Yes No   Sig: Take 20 mg by mouth daily as needed   gabapentin (NEURONTIN) 300 MG capsule   Yes No   Sig: Take 300-600 mg by mouth as needed   hydrOXYzine (ATARAX) 25 MG tablet   Yes No   Sig: Take 25 mg by mouth daily   ibuprofen (ADVIL/MOTRIN) 200 MG capsule   Yes No   Sig: Take 800 mg by mouth once   medroxyPROGESTERone (DEPO-PROVERA) 150 MG/ML IM injection   Yes No   Sig: Inject 150 mg into the muscle every 3 months   ondansetron (ZOFRAN-ODT) 4 MG ODT tab   Yes No   Sig: Place 4-8 mg under the tongue every 6 hours as needed   oxyCODONE IR (ROXICODONE) 10 MG tablet   Yes No   Sig: Take 10 mg by mouth every 6 hours as needed for severe pain   pancrelipase, lip-prot-amyl, 3000 UNITS (CREON) CPEP   Yes No   Sig: Take 2 capsules by mouth 3 times daily (with meals)   pregabalin (LYRICA) 50 MG capsule   Yes No   Sig: Take 50 mg by mouth      Facility-Administered Medications: None        Review of Systems    The 10 point Review of Systems is negative other than noted in the HPI or here.     Social History   I have reviewed this patient's social history and updated it with pertinent information if needed.  Social History     Tobacco Use    Smoking status: Former     Types: Cigarettes     Quit date: 2022     Years since quittin.3    Smokeless tobacco: Current    Tobacco comments:     Vapes daily   Vaping Use    Vaping Use: Every day    Substances: Nicotine   Substance Use Topics    Alcohol use: Not Currently    Drug use: Yes     Types: Marijuana     Comment: smokes daily         Family History     No significant family history.      Allergies   No Known Allergies     Physical Exam   Vital Signs: Temp: 98.6  F (37  C) Temp src: Oral BP: 110/78 Pulse: 56   Resp: 18 SpO2: 99 % O2 Device: None (Room air)    Weight: 126 lbs 0  oz  General Appearance: Pleasant, NAD  Eyes: PERRLA  HEENT: ATNC  Respiratory: CTA B/L, no crackles or wheezing  Cardiovascular: S1, S2, no gallop  GI: Soft, Generalized tenderness, more in epigastric area, no rebound or guarding, ND, Bowel sounds normal in all quadrants  Lymph/Hematologic: Normal  Genitourinary: Normal  Skin: No rashes  Musculoskeletal: Normal  Neurologic: Gross motor normal  Psychiatric: AAA X 3, normal mood and affect      Medical Decision Making       80 MINUTES SPENT BY ME on the date of service doing chart review, history, exam, documentation & further activities per the note.          Data   Imaging results reviewed over the past 24 hrs:   Recent Results (from the past 24 hour(s))   US Abdomen Limited (RUQ)    Impression    RESIDENT PRELIMINARY INTERPRETATION  IMPRESSION:   1.  Findings consistent with chronic pancreatitis with pancreatic  stent in place.  2.  Dilated common bile duct with intrahepatic biliary ductal  dilatation. No obstructing lesion identified.  3.  1.8 cm right hepatic hemangioma.   CT Abdomen Pelvis w Contrast    Narrative    EXAM: CT ABDOMEN PELVIS W CONTRAST  LOCATION: [facility]  DATE: 8/18/2023    INDICATION: RUQ epigastric pain w biliary stent  COMPARISON: 04/18/2023  TECHNIQUE: CT scan of the abdomen and pelvis was performed following injection of IV contrast. Multiplanar reformats were obtained. Dose reduction techniques were used.  CONTRAST: 77 mL of Isovue-370    FINDINGS:   LOWER CHEST: Normal.    HEPATOBILIARY: Stable changes since 04/18/2023 with again seen 2 hypervascular lesions in the right lobe most typical for hemangiomas with the largest measuring approximately 1.3 x 1.4 cm. The gallbladder and bile ducts are normal.  The portal veins are patent.    PANCREAS: There are changes most typical for chronic pancreatitis with numerable calcifications scattered in the pancreas. There is enlargement of the pancreatic  head region. There is a stent seen which extends from the mid pancreatic tail into the   duodenum. There is no evidence for pancreatic duct dilatation. No significant stranding of the fat or evidence for pseudocyst formation to suggest active findings of pancreatitis.    SPLEEN: Normal.    ADRENAL GLANDS: Normal.    KIDNEYS/BLADDER: Normal.    BOWEL: There is thickening involving the proximal duodenum likely secondary to the above-mentioned pancreatic duct stent. The appendix is normal.    LYMPH NODES: Normal.    VASCULATURE: Unremarkable.    PELVIC ORGANS: Normal.    MUSCULOSKELETAL: Normal.      Impression    IMPRESSION:   1.  No evidence for pancreatic duct stent bowel function.    2.  There is some thickening involving the proximal jejunum which appears stable since 04/18/2023 likely secondary to irritation from the pancreatic stent.    3.  2 small hemangiomas in the liver.    4.  Sequelae of pancreatitis. No pancreatic duct or bile duct dilatation.     Recent Labs   Lab 08/18/23  1726   WBC 11.0   HGB 10.2*   MCV 86         POTASSIUM 3.4   CHLORIDE 105   CO2 22   BUN 10.3   CR 0.74   ANIONGAP 13   MURTAZA 9.5   GLC 76   ALBUMIN 4.0   PROTTOTAL 6.7   BILITOTAL 0.2   ALKPHOS 61   ALT 21   AST 22   LIPASE 11*

## 2023-08-19 NOTE — PROGRESS NOTES
Medication Scribe Admission Medication History    Admission medication history is complete. The information provided in this note is only as accurate as the sources available at the time of the update.    Medication reconciliation/reorder completed by provider prior to medication history? No    Information Source(s): Patient via in-person    Pertinent Information:  Spoke with patient in person and completed medication hx.     Patient states that's he is no longer taking Pregabalin 50MG Cap.     Patient states that she is still taking the Depo-Provera 150MG/ML IM Injection and her last injection date was 07/11/2023.     Changes made to PTA medication list:  Added: None  Deleted: Pregabalin 50MG Cap  Changed: Buspirone 10MG Tab : 1 Tab BID    Gabapentin 300 MG Cap : 1-3 Tabs (300-900 MG) PRN    Medication Affordability:  Not including over the counter (OTC) medications, was there a time in the past 3 months when you did not take your medications as prescribed because of cost?: No    Allergies reviewed with patient and updates made in EHR: no    Medication History Completed By: Flakita Calix 8/19/2023 8:48 AM    Prior to Admission medications    Medication Sig Last Dose Taking? Auth Provider Long Term End Date   acetaminophen (TYLENOL) 500 MG tablet Take 500-1,000 mg by mouth every 6 hours as needed for mild pain 8/18/2023 at 12PM Yes Reported, Patient     busPIRone (BUSPAR) 10 MG tablet Take 10 mg by mouth 2 times daily 8/18/2023 at 12PM Yes Reported, Patient Yes    cetirizine (ZYRTEC) 10 MG tablet Take 10 mg by mouth daily as needed More than a month Yes Reported, Patient     citalopram (CELEXA) 40 MG tablet Take 1 tablet by mouth daily 8/18/2023 at AM Yes Reported, Patient Yes    famotidine (PEPCID) 20 MG tablet Take 20 mg by mouth daily as needed More than a month Yes Reported, Patient     gabapentin (NEURONTIN) 300 MG capsule Take 300-900 mg by mouth as needed 8/18/2023 at 3PM Yes Reported, Patient      hydrOXYzine (ATARAX) 25 MG tablet Take 25 mg by mouth daily Past Week Yes Reported, Patient     medroxyPROGESTERone (DEPO-PROVERA) 150 MG/ML IM injection Inject 150 mg into the muscle every 3 months  Yes Reported, Patient Yes    Multiple Vitamin (TAB-A-FABI) TABS Take 1 tablet by mouth daily Past Week at AM Yes Reported, Patient     ondansetron (ZOFRAN-ODT) 4 MG ODT tab Place 4-8 mg under the tongue every 6 hours as needed 8/15/2023 Yes Reported, Patient     oxyCODONE IR (ROXICODONE) 10 MG tablet Take 10 mg by mouth every 6 hours as needed for severe pain 8/18/2023 at 12PM Yes Reported, Patient     pancrelipase, lip-prot-amyl, 3000 UNITS (CREON) CPEP Take 2 capsules by mouth 3 times daily (with meals) More than a month Yes Reported, Patient     ibuprofen (ADVIL/MOTRIN) 200 MG capsule Take 800 mg by mouth once 8/17/2023  Reported, Patient

## 2023-08-19 NOTE — PROGRESS NOTES
[facility]    Medicine Progress Note - Hospitalist Service, GOLD TEAM 6    Date of Admission:  8/18/2023    Assessment & Plan     Artie Burger is a 27 year old female with past medical history significant for acute on chronic pancreatitis, depression, panic attacks, and alcohol use disorder in remission admitted for abdominal pain, nausea, and vomiting x 1 week.      # Acute on chronic pancreatitis   # Hx chronic calcific pancreatitis   Reports ongoing pain for the last 1-1.5 weeks.  Has been unable to manage symptoms at home and presented to the ED at the recommendation of her GI team.  Recurrent bouts of pancreatitis likely 2/2 hx AUD.  S/p multiple ERCPs in the past.  On ERCP in 4/22 noted to have a high-grade PD stricture and stone. The stones have all been removed. The stricture was treated with multiple stents and those stents were all removed. Developed an attack of pancreatitis after stent removal and has undergone serial ERCPs last in 11/21 with placement of three 5 Fr stents across the PD stricture. Last stent exchange 7/12/23.  Afebrile, no leukocytosis.    - GI/Panc Bili consulted, okay to advance diet as tolerated, if pain ongoing by 8/21 will repeat ERCP    - Currently undergoing workup for TPIAT (tentative 9/2023)   - Continue anti-emetics PRN with Zofran and Compazine   - Continue IVFs while NPO  - Pain mgmt: Continue Dilaudid IV 0.5-1mg Q2H, alternate w/ PTA Oxycodone 10mg as will be longer lasting for pain control; continue PTA Gabapentin         # Anemia - No c/o bleeding.  Hgb 10.2 on admission, drift to 9.0 today.  Likely 2/2 dilutional given IVF resuscitation.    - Monitor CBC   - Transfuse Hgb < 7     # Depression  # Panic attacks   # Hx AUD in remission   Mood stable.   - Continue PTA Buspar   - Continue PTA Celexa        Diet: NPO per Anesthesia Guidelines for Procedure/Surgery Except for: Meds    DVT Prophylaxis: Pneumatic Compression Devices  and Ambulate every shift  Padron Catheter: Not present  Lines: None     Cardiac Monitoring: None  Code Status: Full Code      Clinically Significant Risk Factors Present on Admission              # Hypoalbuminemia: Lowest albumin = 3.3 g/dL at 8/19/2023  7:42 AM, will monitor as appropriate  # Coagulation Defect: INR = 1.19 (Ref range: 0.85 - 1.15) and/or PTT = N/A, will monitor for bleeding                    Disposition Plan      Expected Discharge Date: 08/21/2023                The patient's care was discussed with the Attending Physician, Dr. Yonny Castillo .    Genie Mon, [facility]  Hospitalist Service, [address]  Securely message with [employer] (more info)  Text page via [facility] Paging/Directory   See signed in provider for up to date coverage information  ______________________________________________________________________    Interval History   Artie is seen in the ED.  She reports ongoing abdominal pain for the last 1 to 1.5 weeks and had been trying to manage symptoms at home with ibuprofen, tylenol, gabapentin, and oxycodone.  Has not been able to eat anything since Wednesday and has been trying to follow clear liquid diet.  She called her GI team and they advised her to come in to the ED.  She has not vomiting since admission.  Reports pain 8/10 currently, epigastric and RUQ areas.  Denies chest pain, dyspnea, dysuria, fevers, and chills.       Physical Exam   Vital Signs: Temp: 98.4  F (36.9  C) Temp src: Oral BP: 105/61 Pulse: 63   Resp: 20 SpO2: 98 % O2 Device: None (Room air)    Weight: 126 lbs 0 oz    GENERAL: Alert and oriented x 3. Well nourished, well developed.  No acute distress.    HEENT: Normocephalic, atraumatic. Anicteric sclera. Mucous membranes moist.   CV: RRR. S1, S2. No murmurs appreciated.   RESPIRATORY: Effort normal on room air. Lungs CTAB with no wheezing, rales, or rhonchi.   GI: Abdomen soft and non distended, bowel sounds  present x all 4 quadrants. TTP over RUQ and epigastric area.  NEUROLOGICAL: No focal deficits. Follows commands.    MUSCULOSKELETAL: No joint swelling or tenderness. Moves all extremities.   EXTREMITIES: No gross deformities. No peripheral edema.   SKIN: Grossly warm, dry, and intact. No jaundice. No rashes.       Medical Decision Making       50 MINUTES SPENT BY ME on the date of service doing chart review, history, exam, documentation & further activities per the note.      Data     I have personally reviewed the following data over the past 24 hrs:    10.0  \   9.0 (L)   / 341     137 105 5.9 (L) /  78   3.5 22 0.61 \     ALT: 13 AST: 17 AP: 49 TBILI: 0.2   ALB: 3.3 (L) TOT PROTEIN: 5.3 (L) LIPASE: 11 (L)     TSH: 1.68 T4: N/A A1C: N/A     INR:  1.19 (H) PTT:  N/A   D-dimer:  N/A Fibrinogen:  N/A       Imaging results reviewed over the past 24 hrs:   Recent Results (from the past 24 hour(s))   US Abdomen Limited (RUQ)    Narrative    EXAMINATION: Limited Abdominal Ultrasound, 8/18/2023 10:03 PM     COMPARISON: Outside CT 4/18/2023.    HISTORY: RUQ/epigastric pain w biliary stent    FINDINGS:   Fluid: No evidence of ascites or pleural effusions.    Liver: The liver demonstrates normal echotexture, measuring 11.7 cm in  craniocaudal dimension. Round hyperechoic lesion measuring 1.8 x 1.4 x  1.5 cm and the right hepatic lobe, likely hemangioma.     Gallbladder: There is no wall thickening, pericholecystic fluid,  positive sonographic Estrada's sign or evidence for cholelithiasis.    Bile Ducts: Intrahepatic biliary ductal dilatation.  The common bile  duct measures 8 mm in diameter.    Pancreas: Pancreatic stent in place. Heterogeneous hyperechoic  pancreatic parenchyma with coarse calcifications most pronounced in  the pancreatic head.     Kidney: The right kidney measures 9.9 cm long. There is no  hydronephrosis or hydroureter, no shadowing renal calculi, cystic  lesion or mass.       Impression    IMPRESSION:      1.  Heterogenous pancreatic parenchyma with calcifications suggestive  of chronic pancreatitis with pancreatic stent in place.  2.  Dilated common bile duct and central intrahepatic biliary ductal  dilatation. No obstructing lesion identified.  3.  Echogenic observation in the liver may represent hemangioma   CT Abdomen Pelvis w Contrast    Narrative    EXAM: CT ABDOMEN PELVIS W CONTRAST  LOCATION: [facility]  DATE: 8/18/2023    INDICATION: RUQ epigastric pain w biliary stent  COMPARISON: 04/18/2023  TECHNIQUE: CT scan of the abdomen and pelvis was performed following injection of IV contrast. Multiplanar reformats were obtained. Dose reduction techniques were used.  CONTRAST: 77 mL of Isovue-370    FINDINGS:   LOWER CHEST: Normal.    HEPATOBILIARY: Stable changes since 04/18/2023 with again seen 2 hypervascular lesions in the right lobe most typical for hemangiomas with the largest measuring approximately 1.3 x 1.4 cm. The gallbladder and bile ducts are normal.  The portal veins are patent.    PANCREAS: There are changes most typical for chronic pancreatitis with numerable calcifications scattered in the pancreas. There is enlargement of the pancreatic head region. There is a stent seen which extends from the mid pancreatic tail into the   duodenum. There is no evidence for pancreatic duct dilatation. No significant stranding of the fat or evidence for pseudocyst formation to suggest active findings of pancreatitis.    SPLEEN: Normal.    ADRENAL GLANDS: Normal.    KIDNEYS/BLADDER: Normal.    BOWEL: There is thickening involving the proximal duodenum likely secondary to the above-mentioned pancreatic duct stent. The appendix is normal.    LYMPH NODES: Normal.    VASCULATURE: Unremarkable.    PELVIC ORGANS: Normal.    MUSCULOSKELETAL: Normal.      Impression    IMPRESSION:   1.  No evidence for pancreatic duct stent bowel function.    2.  There is some thickening  involving the proximal jejunum which appears stable since 04/18/2023 likely secondary to irritation from the pancreatic stent.    3.  2 small hemangiomas in the liver.    4.  Sequelae of pancreatitis. No pancreatic duct or bile duct dilatation.

## 2023-08-19 NOTE — CONSULTS
GASTROENTEROLOGY CONSULTATION    Date of Admission:  8/18/2023       ASSESSMENT AND RECOMMENDATIONS:   27 year old female with chronic calcific pancreatitisdepression, history of alcohol use in remission, who presents with acute on chronic abdominal pain.     # Chronic calcific pancreatitis, alcohol-associated  # Pancreatic duct stricture related to chronic pancreatitis, high-grade  Pain consistent with acute on chronic pancreatitis. Last ERCP was 7/12/23 with plan to repeat an ERCP in 10 weeks or sooner if recurs of pain. Plan overall is that she would require surgery (TPIAT) which is planned for hopefully September. ERCP would only help her temporary given unsuccessful attempts in the past and calcific pathology. Noted PETH has been negative, and normal gastric emptying study. Not on anticoagulation.     RECOMMENDATIONS  - Peth ordered for you for checking the alcohol use  - Full liquid diet today. OK to advance as tolerates.  - Pain control  - IV LR for hydration  - Continue Creon when eating  - If conservative measures over the weekend (pain control, advance diet as tolerates), will repeat ERCP on Monday 8/21/23.    Gastroenterology follow up recommendations: TBD    Thank you for involving us in this patient's care. Please do not hesitate to contact the GI service with any questions or concerns.     Patient care plan discussed with Dr. Avalos, GI staff physician.    Praneeth Aponte MD  GI fellow          Chief Complaint:   We were asked to evaluate this patient with chronic pancreatitis  History is obtained from the patient and the medical record.          History of Present Illness:   27 year old female with chronic calcific pancreatitis complicated by pancreatic duct stricture s/p multiple ERCPs, depression, history of alcohol use in remission, who presents with acute on chronic abdominal pain.     Since her last ERCP with Dr. Campuzano 7/12/23, then pain improved for a week, then had the pain  again. Pain is epigastric, constant. Pain score was 5/10 at baseline, then for the past 7 days had increased to 9/10. Has nausea, which has been taking Zofran and oxycodone.   Constipated. No fever/chills. She has been on clear liquid diet since last week, and was unable to advance to solid diet.     Overall plan is for surgery, TPIAT later this year.    Prior endoscopy:    Last ERCP 7/12/23  - Three partially occluded stents from the pancreatic                          duct were seen in the major papilla and were removed.                          - Prior pancreatic sphincterotomy appeared open and                          selective ventral duct cannulation was performed.                          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                          - Pancreatic stones were found. Complete removal was                          accomplished.                          -Three 5 Fr single pigtailed temporary stents were                          placed into the ventral pancreatic duct   Planned was to repeat the ERCP in 10 weeks or earlier if pain.          Past Medical History:   Reviewed and edited as appropriate  Past Medical History:   Diagnosis Date    Alcohol-induced chronic pancreatitis (H)     Anxiety     Depression     Gastroesophageal reflux disease     Pancreatic disease     PONV (postoperative nausea and vomiting)             Past Surgical History:   Reviewed and edited as appropriate   Past Surgical History:   Procedure Laterality Date    ENDOSCOPIC RETROGRADE CHOLANGIOPANCREATOGRAM COMPLEX N/A 9/13/2021    Procedure: ENDOSCOPIC RETROGRADE CHOLANGIOPANCREATOGRAPHY with pancreatic sphincterotomy, dilation, stone removal, stent placement;  Surgeon: Guru Sabino Campuzano MD;   Location: [facility]    ENDOSCOPIC RETROGRADE CHOLANGIOPANCREATOGRAM WITH DIRECT VISUALIZATION SYSTEM AND GENERATOR N/A 11/1/2021    Procedure: ENDOSCOPIC RETROGRADE CHOLANGIOPANCREATOGRAPHY, dilation and debridement sludge removal, pancreatic duct stent placement;  Surgeon: Guru Sabino Campuzano MD;  Location: [facility]    ENDOSCOPIC RETROGRADE CHOLANGIOPANCREATOGRAM WITH SPYGLASS N/A 10/6/2021    Procedure: ENDOSCOPIC RETROGRADE CHOLANGIOPANCREATOGRAPHY, WITH DIRECT DUCT VISUALIZATION, USING PANCREATICOBILIARY FIBEROPTIC PROBE, BILE DUCT STENT EXCHANGE, LITHOTRIPSY OF PANCREATIC STONE, SPHINCTEROTOMY, BALLOON DILATION OF PANCREATIC DUCT AND STONE EXTRACTION;  Surgeon: Guru Sabino Campuzano MD;  Location: [facility]    ENDOSCOPIC RETROGRADE CHOLANGIOPANCREATOGRAM WITH SPYGLASS N/A 12/13/2021    Procedure: ENDOSCOPIC RETROGRADE CHOLANGIOPANCREATOGRAPHY, with pancreatic stent exchange, ballon dilation, stone removal, with spyglass direct visualization;  Surgeon: Guru Sabino Campuzano MD;  Location: [facility]    ENDOSCOPIC RETROGRADE CHOLANGIOPANCREATOGRAPHY, EXCHANGE TUBE/STENT N/A 2/16/2022    Procedure: ENDOSCOPIC RETROGRADE CHOLANGIOPANCREATOGRAPH with pancreatic dilation, debris removal and stent exchange.;  Surgeon: Guru Sabino Campuzano MD;  Location: [facility]    ENDOSCOPIC RETROGRADE CHOLANGIOPANCREATOGRAPHY, EXCHANGE TUBE/STENT N/A 4/4/2022    Procedure: ENDOSCOPIC RETROGRADE CHOLANGIOPANCREATOGRAPHY, WITH REPLACEMENT OF pancreatic STENT, stone removal;  Surgeon: Guru Sabino Campuzano MD;  Location: [facility]    ENDOSCOPIC RETROGRADE CHOLANGIOPANCREATOGRAPHY, EXCHANGE TUBE/STENT N/A 7/13/2022    Procedure: ENDOSCOPIC RETROGRADE CHOLANGIOPANCREATOGRAPHY, WITH pancreatic duct dilation and stent exchange, biliary stent placement, debris removal;  Surgeon: Guru Sabino Campuzano MD;  Location: [facility]    ENDOSCOPIC RETROGRADE CHOLANGIOPANCREATOGRAPHY,  EXCHANGE TUBE/STENT N/A 9/14/2022    Procedure: ENDOSCOPIC RETROGRADE CHOLANGIOPANCREATOGRAPHY, WITH pancreatic stone removal, biliary stent removal, pancreatic stent exchange;  Surgeon: Guru Sabino Campuzano MD;  Location: [facility]    ENDOSCOPIC RETROGRADE CHOLANGIOPANCREATOGRAPHY, EXCHANGE TUBE/STENT N/A 11/21/2022    Procedure: ENDOSCOPIC RETROGRADE CHOLANGIOPANCREATOGRAPHY WITH PANCREATIC DUCT STENT EXCHANGE, DILATION, AND DEBRIS REMOVAL;  Surgeon: Guru Sabino Campuzano MD;  Location: [facility]    ENDOSCOPIC RETROGRADE CHOLANGIOPANCREATOGRAPHY, EXCHANGE TUBE/STENT N/A 1/18/2023    Procedure: ENDOSCOPIC RETROGRADE CHOLANGIOPANCREATOGRAPHY stent exchange, dilation, sludge and stone removal;  Surgeon: Guru Sabino Campuzano MD;  Location: [facility]    ENDOSCOPIC RETROGRADE CHOLANGIOPANCREATOGRAPHY, EXCHANGE TUBE/STENT N/A 3/13/2023    Procedure: ENDOSCOPIC RETROGRADE CHOLANGIOPANCREATOGRAPHY, WITH replacement of pancreatic stents, bile duct stent placed,sphinterotomy of bile duct ampulla, balloon sweep of bile duct for sludge,;  Surgeon: Guru Sabino Campuzano MD;  Location: [facility]    ENDOSCOPIC RETROGRADE CHOLANGIOPANCREATOGRAPHY, EXCHANGE TUBE/STENT N/A 5/10/2023    Procedure: ENDOSCOPIC RETROGRADE CHOLANGIOPANCREATOGRAPHY with pancreatic stents exchange and stone extraction;  Surgeon: Guru Sabino Campuzano MD;  Location: [facility]    ENDOSCOPIC RETROGRADE CHOLANGIOPANCREATOGRAPHY, EXCHANGE TUBE/STENT N/A 7/12/2023    Procedure: ENDOSCOPIC RETROGRADE CHOLANGIOPANCREATOGRAPHY, WITH REPLACEMENT OF STENT X3 AND BALLOON SWEEP OF BILE DUCT FOR STONES;  Surgeon: Guru Sabino Campuzano MD;  Location: [facility]            Social History:   Reviewed and edited as appropriate  Social History     Socioeconomic History    Marital status: Single     Spouse name: Not on file    Number of children: Not on file    Years of education: Not on file    Highest  "education level: Not on file   Occupational History    Not on file   Tobacco Use    Smoking status: Former     Types: Cigarettes     Quit date: 2022     Years since quittin.3    Smokeless tobacco: Current    Tobacco comments:     Vapes daily   Vaping Use    Vaping Use: Every day    Substances: Nicotine   Substance and Sexual Activity    Alcohol use: Not Currently    Drug use: Yes     Types: Marijuana     Comment: smokes daily    Sexual activity: Yes   Other Topics Concern    Not on file   Social History Narrative    Not on file     Social Determinants of Health     Financial Resource Strain: Not on file   Food Insecurity: Not on file   Transportation Needs: Not on file   Physical Activity: Not on file   Stress: Not on file   Social Connections: Not on file   Intimate Partner Violence: Not on file   Housing Stability: Not on file            Family History:   Reviewed and edited as appropriate  No known history of gastrointestinal/liver disease or  gastrointestinal malignancies       Allergies:   Reviewed and edited as appropriate   No Known Allergies         Medications:   (Not in a hospital admission)            Review of Systems:     A complete review of systems was performed and is negative except as noted in the HPI           Physical Exam:   BP 98/65   Pulse 63   Temp 97.9  F (36.6  C) (Oral)   Resp 18   Ht 1.575 m (5' 2\")   Wt 57.2 kg (126 lb)   LMP 2023   SpO2 98%   BMI 23.05 kg/m    Wt:   Wt Readings from Last 2 Encounters:   23 57.2 kg (126 lb)   23 61.7 kg (136 lb 0.4 oz)      Constitutional: in distress/pain, thin  HEENT: Sclera anicteric  CV: No edema  Respiratory: Unlabored breathing  Abdomen: Non-distended, tender at epigastriu,, no peritoneal signs  Skin: warm, perfused  Neuro: AAO x 3, No asterixis         Data:   Labs and imaging below were independently reviewed and interpreted    Imaging: Reviewed.    "

## 2023-08-20 LAB
MAGNESIUM SERPL-MCNC: 1.4 MG/DL (ref 1.7–2.3)
MAGNESIUM SERPL-MCNC: 2.4 MG/DL (ref 1.7–2.3)
PHOSPHATE SERPL-MCNC: 2.9 MG/DL (ref 2.5–4.5)
POTASSIUM SERPL-SCNC: 3.9 MMOL/L (ref 3.4–5.3)

## 2023-08-20 PROCEDURE — 36415 COLL VENOUS BLD VENIPUNCTURE: CPT | Performed by: STUDENT IN AN ORGANIZED HEALTH CARE EDUCATION/TRAINING PROGRAM

## 2023-08-20 PROCEDURE — 84132 ASSAY OF SERUM POTASSIUM: CPT | Performed by: STUDENT IN AN ORGANIZED HEALTH CARE EDUCATION/TRAINING PROGRAM

## 2023-08-20 PROCEDURE — 83735 ASSAY OF MAGNESIUM: CPT | Performed by: HOSPITALIST

## 2023-08-20 PROCEDURE — 250N000013 HC RX MED GY IP 250 OP 250 PS 637: Performed by: HOSPITALIST

## 2023-08-20 PROCEDURE — 84100 ASSAY OF PHOSPHORUS: CPT | Performed by: NURSE PRACTITIONER

## 2023-08-20 PROCEDURE — 120N000002 HC R&B MED SURG/OB UMMC

## 2023-08-20 PROCEDURE — 99207 PR APP CREDIT; MD BILLING SHARED VISIT: CPT | Performed by: NURSE PRACTITIONER

## 2023-08-20 PROCEDURE — 83735 ASSAY OF MAGNESIUM: CPT | Performed by: STUDENT IN AN ORGANIZED HEALTH CARE EDUCATION/TRAINING PROGRAM

## 2023-08-20 PROCEDURE — 250N000013 HC RX MED GY IP 250 OP 250 PS 637: Performed by: NURSE PRACTITIONER

## 2023-08-20 PROCEDURE — 250N000011 HC RX IP 250 OP 636: Performed by: HOSPITALIST

## 2023-08-20 PROCEDURE — 258N000003 HC RX IP 258 OP 636: Performed by: HOSPITALIST

## 2023-08-20 PROCEDURE — 250N000011 HC RX IP 250 OP 636: Mod: JZ | Performed by: STUDENT IN AN ORGANIZED HEALTH CARE EDUCATION/TRAINING PROGRAM

## 2023-08-20 PROCEDURE — 99233 SBSQ HOSP IP/OBS HIGH 50: CPT | Mod: FS | Performed by: STUDENT IN AN ORGANIZED HEALTH CARE EDUCATION/TRAINING PROGRAM

## 2023-08-20 PROCEDURE — 36415 COLL VENOUS BLD VENIPUNCTURE: CPT | Performed by: HOSPITALIST

## 2023-08-20 RX ORDER — MAGNESIUM SULFATE HEPTAHYDRATE 40 MG/ML
4 INJECTION, SOLUTION INTRAVENOUS ONCE
Status: COMPLETED | OUTPATIENT
Start: 2023-08-20 | End: 2023-08-20

## 2023-08-20 RX ADMIN — HYDROXYZINE HYDROCHLORIDE 25 MG: 25 TABLET ORAL at 08:29

## 2023-08-20 RX ADMIN — PANCRELIPASE 2 CAPSULE: 24000; 76000; 120000 CAPSULE, DELAYED RELEASE PELLETS ORAL at 17:57

## 2023-08-20 RX ADMIN — SODIUM CHLORIDE, POTASSIUM CHLORIDE, SODIUM LACTATE AND CALCIUM CHLORIDE: 600; 310; 30; 20 INJECTION, SOLUTION INTRAVENOUS at 19:53

## 2023-08-20 RX ADMIN — HYDROMORPHONE HYDROCHLORIDE 1 MG: 1 INJECTION, SOLUTION INTRAMUSCULAR; INTRAVENOUS; SUBCUTANEOUS at 19:37

## 2023-08-20 RX ADMIN — BUSPIRONE HYDROCHLORIDE 10 MG: 5 TABLET ORAL at 08:29

## 2023-08-20 RX ADMIN — ONDANSETRON 4 MG: 2 INJECTION INTRAMUSCULAR; INTRAVENOUS at 08:25

## 2023-08-20 RX ADMIN — OXYCODONE HYDROCHLORIDE 10 MG: 10 TABLET ORAL at 06:38

## 2023-08-20 RX ADMIN — HYDROMORPHONE HYDROCHLORIDE 1 MG: 1 INJECTION, SOLUTION INTRAMUSCULAR; INTRAVENOUS; SUBCUTANEOUS at 23:47

## 2023-08-20 RX ADMIN — HYDROMORPHONE HYDROCHLORIDE 1 MG: 1 INJECTION, SOLUTION INTRAMUSCULAR; INTRAVENOUS; SUBCUTANEOUS at 02:27

## 2023-08-20 RX ADMIN — GABAPENTIN 600 MG: 300 CAPSULE ORAL at 14:47

## 2023-08-20 RX ADMIN — SODIUM CHLORIDE, POTASSIUM CHLORIDE, SODIUM LACTATE AND CALCIUM CHLORIDE: 600; 310; 30; 20 INJECTION, SOLUTION INTRAVENOUS at 02:34

## 2023-08-20 RX ADMIN — OXYCODONE HYDROCHLORIDE 10 MG: 10 TABLET ORAL at 15:50

## 2023-08-20 RX ADMIN — HYDROMORPHONE HYDROCHLORIDE 1 MG: 1 INJECTION, SOLUTION INTRAMUSCULAR; INTRAVENOUS; SUBCUTANEOUS at 06:02

## 2023-08-20 RX ADMIN — CITALOPRAM HYDROBROMIDE 40 MG: 20 TABLET ORAL at 08:29

## 2023-08-20 RX ADMIN — MAGNESIUM SULFATE HEPTAHYDRATE 4 G: 40 INJECTION, SOLUTION INTRAVENOUS at 11:00

## 2023-08-20 RX ADMIN — HYDROMORPHONE HYDROCHLORIDE 1 MG: 1 INJECTION, SOLUTION INTRAMUSCULAR; INTRAVENOUS; SUBCUTANEOUS at 14:46

## 2023-08-20 RX ADMIN — GABAPENTIN 600 MG: 300 CAPSULE ORAL at 19:39

## 2023-08-20 RX ADMIN — BUSPIRONE HYDROCHLORIDE 10 MG: 5 TABLET ORAL at 19:39

## 2023-08-20 RX ADMIN — HYDROMORPHONE HYDROCHLORIDE 1 MG: 1 INJECTION, SOLUTION INTRAMUSCULAR; INTRAVENOUS; SUBCUTANEOUS at 17:04

## 2023-08-20 RX ADMIN — HYDROMORPHONE HYDROCHLORIDE 1 MG: 1 INJECTION, SOLUTION INTRAMUSCULAR; INTRAVENOUS; SUBCUTANEOUS at 08:25

## 2023-08-20 RX ADMIN — OXYCODONE HYDROCHLORIDE 10 MG: 10 TABLET ORAL at 10:59

## 2023-08-20 RX ADMIN — GABAPENTIN 600 MG: 300 CAPSULE ORAL at 08:29

## 2023-08-20 RX ADMIN — PANCRELIPASE 1 CAPSULE: 24000; 76000; 120000 CAPSULE, DELAYED RELEASE PELLETS ORAL at 15:51

## 2023-08-20 RX ADMIN — OXYCODONE HYDROCHLORIDE 10 MG: 10 TABLET ORAL at 20:38

## 2023-08-20 RX ADMIN — HYDROMORPHONE HYDROCHLORIDE 1 MG: 1 INJECTION, SOLUTION INTRAMUSCULAR; INTRAVENOUS; SUBCUTANEOUS at 21:50

## 2023-08-20 ASSESSMENT — ACTIVITIES OF DAILY LIVING (ADL)
ADLS_ACUITY_SCORE: 22

## 2023-08-20 NOTE — PLAN OF CARE
"Goal Outcome Evaluation:    Plan of Care Reviewed With: patient  Overall Patient Progress: no changeOverall Patient Progress: no change    /77 (BP Location: Right arm)   Pulse 53   Temp 98.4  F (36.9  C) (Oral)   Resp 16   Ht 1.575 m (5' 2\")   Wt 57.2 kg (126 lb)   LMP 08/16/2023   SpO2 97%   BMI 23.05 kg/m       Neuro: WDL.    GI/: Abdominal discomfort. +bm. Voiding.     Diet: Full liquid diet. No meals ordered this shift. No c/o nausea. Reports poor appetite.     Incisions/Drains: None.     IV Access: PIV infusing.     Labs: Reviewed. Mag replaced.     Activity: Up independently in room. Independent with ADLS.     Pain: Using both IV and po pain medication.     New changes this shift: NPO after MN.     Plan: Continue with current POC.      "

## 2023-08-20 NOTE — PLAN OF CARE
"Time: 2330-0730  Status: Acute on chronic Pancreatis.   Neuros: A&Ox4, calm & cooperative  Cardiac: Kael HR 54 at rest, denies cardiac chest pain.   Respiratory: Sats >95% on RA, denies SOB.   Pain: Abdominal pain managed with prn IV dilaudid x2 & oxycodone x1 overnight with relief.   Nausea: Denies N/V this shift.   Diet: FLD  GI/: Voiding spontaneously. +BS, +flatus, no BM this shift. LBM 8/19 per pt.   Skin: No new skin deficits noted.   Lines: L PIV infusing LR at 125 ml/hr.   Drains: N/A  Labs: Reviewed  Activity: Up independently  New Changes this Shift: No acute changes.   Plan: Pain management, continue POC.     Problem: Plan of Care - These are the overarching goals to be used throughout the patient stay.    Goal: Plan of Care Review  Description: The Plan of Care Review/Shift note should be completed every shift.  The Outcome Evaluation is a brief statement about your assessment that the patient is improving, declining, or no change.  This information will be displayed automatically on your shift note.  Outcome: Progressing  Flowsheets (Taken 8/20/2023 1561)  Plan of Care Reviewed With: patient  Overall Patient Progress: no change  Goal: Patient-Specific Goal (Individualized)  Description: You can add care plan individualizations to a care plan. Examples of Individualization might be:  \"Parent requests to be called daily at 9am for status\", \"I have a hard time hearing out of my right ear\", or \"Do not touch me to wake me up as it startles me\".  Outcome: Progressing  Goal: Absence of Hospital-Acquired Illness or Injury  Outcome: Progressing  Intervention: Identify and Manage Fall Risk  Recent Flowsheet Documentation  Taken 8/19/2023 2351 by Santos Buitrago, RN  Safety Promotion/Fall Prevention:   nonskid shoes/slippers when out of bed   clutter free environment maintained   safety round/check completed  Intervention: Prevent Skin Injury  Recent Flowsheet Documentation  Taken 8/19/2023 2351 by " Santos Buitrago RN  Body Position: position changed independently  Goal: Optimal Comfort and Wellbeing  Outcome: Progressing  Intervention: Monitor Pain and Promote Comfort  Recent Flowsheet Documentation  Taken 8/20/2023 0227 by Santos Buitrago RN  Pain Management Interventions: medication (see MAR)  Taken 8/19/2023 2351 by Santos Buitrago RN  Pain Management Interventions: rest  Goal: Readiness for Transition of Care  Outcome: Progressing     Problem: Pain Acute  Goal: Optimal Pain Control and Function  Outcome: Progressing  Intervention: Develop Pain Management Plan  Recent Flowsheet Documentation  Taken 8/20/2023 0227 by Santos Buitrago RN  Pain Management Interventions: medication (see MAR)  Taken 8/19/2023 2351 by Santos Buitrago RN  Pain Management Interventions: rest  Intervention: Prevent or Manage Pain  Recent Flowsheet Documentation  Taken 8/19/2023 2351 by Santos Buitrago RN  Medication Review/Management: medications reviewed     Problem: Pancreatitis  Goal: Fluid and Electrolyte Balance  Outcome: Progressing  Goal: Absence of Infection Signs and Symptoms  Outcome: Progressing  Goal: Optimal Nutrition Delivery  Outcome: Progressing  Goal: Optimal Pain Control and Function  Outcome: Progressing  Intervention: Monitor and Manage Pain  Recent Flowsheet Documentation  Taken 8/20/2023 0227 by Santos Buitrago RN  Pain Management Interventions: medication (see MAR)  Taken 8/19/2023 2351 by Santos Buitrago RN  Pain Management Interventions: rest  Goal: Effective Oxygenation and Ventilation  Outcome: Progressing  Intervention: Optimize Oxygenation and Ventilation  Recent Flowsheet Documentation  Taken 8/19/2023 2351 by Santos Buitrago RN  Cough And Deep Breathing: done independently per patient  Activity Management:   up ad zechariah   ambulated to bathroom  Head of Bed (HOB) Positioning: HOB at 20-30 degrees   Goal Outcome Evaluation:      Plan of Care Reviewed With: patient    Overall  Patient Progress: no changeOverall Patient Progress: no change

## 2023-08-20 NOTE — PROGRESS NOTES
CLINICAL NUTRITION SERVICES - ASSESSMENT NOTE     Nutrition Prescription    RECOMMENDATIONS FOR MDs/PROVIDERS TO ORDER:  Once diet advances >> NPO/CL diet status, may begin pancreatic enzymes:   Start with Creon 24,000 x 2 capsule with each meals TID (provides 842 units lipase/kg/meal, which is within guidelines of 500-2500 units of lipase /kg/meal and within appropriate guidelines for prevention fibrosing colonopathy of <2,500 units lipase/kg/meal. Currently patient with no BM.     - If patient has increase in loose stool with higher fat foods,  can gradually increase to Creon 83885, 3 capsules with each meals ( 1263 units of lipase / kg/meal).     Malnutrition Status:    Severe malnutrition in the context of chronic illness     Recommendations already ordered by Registered Dietitian (RD):  None today due to NPO status     Future/Additional Recommendations:  Once diet advances beyond NPO/CL , suggest to order ensure max protein BID       REASON FOR ASSESSMENT  Artie Burger is a/an 27 year old female assessed by the dietitian for Provider Order - Pt w/ pancreatic insuffiencey - need for increase in enzymes?    Chart reviewed:  PMH: Acute on chronic pancreatitis, depression, panic attacks, and alcohol use disorder in remission admitted for abdominal pain, nausea, and vomiting x 1 week.       # Acute on chronic pancreatitis   # Hx chronic calcific pancreatitis   # History of AUD in remission.      - S/p ERCP in 4/22 noted to have a high-grade PD stricture and stone. The stones have all been removed. The stricture was treated with multiple stents and those stents were all removed.   - ERCPs last in 11/21 with placement of three 5 Fr stents across the PD stricture. Last stent exchange 7/12/23.  Afebrile, no leukocytosis.    - GI/Panc Bili: if pain ongoing by 8/21 will repeat ERCP      - Currently undergoing workup for TPIAT (tentative 9/2023).       NUTRITION HISTORY  Patient sound asleep.  Information was obtained  "from MD note:   - Reports ongoing pain for the last 1-1.5 weeks.   - Has not been able to eat anything since Wednesday and has been trying to follow clear liquid diet.   - Having some loose stools.      Information obtained from outpatient RD note:   - Patient is currently undergoing workup for TPIAT (tentative 2023). Has met with outpatient transplant RD and has had education on dietary needs post total pancreatectomy with auto-islet transplant including need for diabetic diet education/ had brief review of carb counting diet ed + ongoing need for PERT.   During her visit with outpatient RD, Patient reported, \" she does not eat during pain flare ups few times per month. In general, patient avoids raw veggies, red meat and fatty fried foods . She takes MVI and probiotics\".     PERT: patient has been taking PERT for the past year with all foods.        CURRENT NUTRITION ORDERS  Diet: NPO  Intake/Tolerance: per above   Stool/GI: No stool since admit yesterday is recorded.       LABS  BUN:5.9 ( acute low)  B  CRP: N/A,WBC: 10 (normal)  Elastase fecal: 30.1 ( low on 23) -> indicates sever pancrease exocrine insufficiency      MEDICATIONS  Started on creon 73988, 2-3 capsules with meals     ANTHROPOMETRICS  Height: 157.5 cm (5' 2\")  Most Recent Weight: 57.2 kg (126 lb)admit wt on 23    IBW: 50 kg ( 114% IbW)  BMI: Normal BMI  Weight History: 7.3% wt loss over the past month   Wt Readings from Last 10 Encounters:   23 57.2 kg (126 lb)   23 61.7 kg (136 lb 0.4 oz)   23 60.4 kg (133 lb 1.6 oz)   05/10/23 60.1 kg (132 lb 7.9 oz)   23 60.6 kg (133 lb 9.6 oz)   23 60.2 kg (132 lb 11.5 oz)   23 59 kg (130 lb 1.1 oz)   22 61.4 kg (135 lb 5.8 oz)   22 54.4 kg (119 lb 14.9 oz)   22 56.4 kg (124 lb 5.4 oz)       Dosing Weight: 57 kg admit wt    ASSESSED NUTRITION NEEDS  Estimated Energy Needs: 1425- 1710 kcals/day (25 - 30 kcals/kg)  Justification: " Maintenance  Estimated Protein Needs: 68- 86 grams protein/day (1.2 - 1.5 grams of pro/kg)  Justification: Hypercatabolism with acute illness  Estimated Fluid Needs: (1 mL/kcal)   Justification: Maintenance    PHYSICAL FINDINGS  See malnutrition section below.    MALNUTRITION  % Intake: </= 50% for >/= 5 days (severe)  % Weight Loss: > 5% in 1 month (severe)  Subcutaneous Fat Loss: Unable to assess  Muscle Loss: Unable to assess  Fluid Accumulation/Edema: None noted  Malnutrition Diagnosis: Severe malnutrition in the context of chronic illness     NUTRITION DIAGNOSIS  Inadequate oral intake related to altered GI as evidenced by presentation with abdominal pain, kept NPO /Liquid status, significant wt loss over the past month       INTERVENTIONS  Implementation  Nutrition Education: deferred today.     Goals  Diet adv v nutrition support within 2-3 days.     Monitoring/Evaluation  Progress toward goals will be monitored and evaluated per protocol.  Kristen Higgins RD/MARIA ELENA  Weekend Pager [phone]

## 2023-08-20 NOTE — PROGRESS NOTES
[facility]    Medicine Progress Note - Hospitalist Service, GOLD TEAM 6    Date of Admission:  8/18/2023    Assessment & Plan     Artie Burger is a 27 year old female with past medical history significant for acute on chronic pancreatitis, depression, panic attacks, and alcohol use disorder in remission admitted for abdominal pain, nausea, and vomiting x 1 week.      # Acute on chronic pancreatitis   # Hx chronic calcific pancreatitis   # Suspected pancreatic insufficiency   Reports ongoing pain for the last 1-1.5 weeks.  Has been unable to manage symptoms at home and presented to the ED at the recommendation of her GI team.  S/p multiple ERCPs in the past.  On ERCP in 4/22 noted to have a high-grade PD stricture and stone, which have now all been removed. The stricture was treated with multiple stents, now all removed. Developed an attack of pancreatitis after stent removal and has undergone serial ERCPs, most recently in 11/21 with placement of three 5 Fr stents across the PD stricture. Last stent exchange 7/12/23.  Afebrile, no leukocytosis.    - GI/Panc Bili following, appreciate recs  - Continue full liquid day through tonight   - NPO midnight for possible ERCP AM 8/21   - Nutrition consult for dosing enzymes  - Currently undergoing workup for TPIAT (tentative 9/2023)   - Continue anti-emetics PRN with Zofran and Compazine   - Continue IVFs   - Pain mgmt: Continue Dilaudid IV 0.5-1mg Q2H, alternate w/ PTA Oxycodone 10mg as will be longer lasting for pain control; continue PTA Gabapentin         # Hypomagnesemia - Mag 1.4 today, likely r/t low PO intake, GI losses.    - Replacement protocol     # Anemia - No c/o bleeding.  Hgb 10.2 on admission, drift to 9.0  Likely 2/2 dilutional given IVF resuscitation.    - Monitor CBC in AM   - Transfuse Hgb < 7     # Depression  # Panic attacks   # Hx AUD in remission   Mood stable.   - Continue PTA Buspar   - Continue PTA  Celexa        Diet: Full Liquid Diet    DVT Prophylaxis: Pneumatic Compression Devices and Ambulate every shift  Padron Catheter: Not present  Lines: None     Cardiac Monitoring: None  Code Status: Full Code      Clinically Significant Risk Factors            # Hypomagnesemia: Lowest Mg = 1.4 mg/dL in last 2 days, will replace as needed   # Hypoalbuminemia: Lowest albumin = 3.3 g/dL at 8/19/2023  7:42 AM, will monitor as appropriate    # Coagulation Defect: INR = 1.19 (Ref range: 0.85 - 1.15) and/or PTT = N/A, will monitor for bleeding                      Disposition Plan     Expected Discharge Date: 08/21/2023                The patient's care was discussed with the Attending Physician, Dr. Yonny Castillo .    Genie Mon, [facility]  Hospitalist Service, GOLD TEAM [address] [place] [address]  Securely message with [employer])  Text page via [facility] Paging/Directory   See signed in provider for up to date coverage information  ______________________________________________________________________    Interval History   Artie is seen in her room.  Pain is better today, but still bothersome.  No vomiting today.  Having some loose stools.  Denies chest pain, dyspnea, dysuria, fevers, and chills.       Physical Exam   Vital Signs: Temp: 98.4  F (36.9  C) Temp src: Oral BP: 125/77 Pulse: 53   Resp: 16 SpO2: 97 % O2 Device: None (Room air)    Weight: 126 lbs 0 oz    GENERAL: Alert and oriented x 3. Well nourished, well developed.  No acute distress.    HEENT: Normocephalic, atraumatic. Anicteric sclera. Mucous membranes moist.   CV: RRR. S1, S2. No murmurs appreciated.   RESPIRATORY: Effort normal on room air. Lungs CTAB with no wheezing, rales, or rhonchi.   GI: Abdomen soft and non distended, bowel sounds present x all 4 quadrants. TTP over RUQ and epigastric area.  NEUROLOGICAL: No focal deficits. Follows commands.    MUSCULOSKELETAL: No joint swelling or tenderness. Moves all  extremities.   EXTREMITIES: No gross deformities. No peripheral edema.   SKIN: Grossly warm, dry, and intact. No jaundice. No rashes.       Medical Decision Making       40 MINUTES SPENT BY ME on the date of service doing chart review, history, exam, documentation & further activities per the note.      Data     I have personally reviewed the following data over the past 24 hrs:    N/A  \   N/A   / N/A     N/A N/A N/A /  N/A   3.9 N/A N/A \     Imaging results reviewed over the past 24 hrs:   No results found for this or any previous visit (from the past 24 hour(s)).

## 2023-08-20 NOTE — PROGRESS NOTES
"/66 (BP Location: Left arm)   Pulse 62   Temp 98.6  F (37  C) (Oral)   Resp 18   Ht 1.575 m (5' 2\")   Wt 57.2 kg (126 lb)   LMP 08/16/2023   SpO2 98%   BMI 23.05 kg/m     Admitting Diagnosis: Acute on chronic Pancreatis. Hx of pancreatitis.   Activity: Independent   Neuros:  A&O x4.   Cardiac:  WDL.   Respiratory:  WDL on RA. Denies SOB.  GI/:  Voiding spontaneously. +BS, passing flatus.   Diet: full liquid   Skin: WDL   Lines: L PIV infusing NS   Incisions/Drains: None   Pain/nausea: Pt  c/o Nausea, PRN Zofran was given, and it was effective.  C/O pain, rated 5-8/10. PRN Dilaudid and oxy given   New changes this shift: R PIV removed, was bad.   Plan:  Ongoing    "

## 2023-08-20 NOTE — PROGRESS NOTES
Brief GI note, patient is not seen. Chart reviewed.    Overall stable, but still requires oxycodone frequent dose total of 40 mg yesterday (10x4). Remains on full liquid diet.    - NPO after midnight  - GI will reassess for possible ERCP tomorrow (not yet scheduled).    Discussed with Dr. Avalos.  Praneeth Aponte MD  Gastroenterology/Hepatology Fellow

## 2023-08-21 LAB
ERYTHROCYTE [DISTWIDTH] IN BLOOD BY AUTOMATED COUNT: 12.4 % (ref 10–15)
HCT VFR BLD AUTO: 27.6 % (ref 35–47)
HGB BLD-MCNC: 9.4 G/DL (ref 11.7–15.7)
LABORATORY REPORT: NORMAL
MAGNESIUM SERPL-MCNC: 1.8 MG/DL (ref 1.7–2.3)
MCH RBC QN AUTO: 29.9 PG (ref 26.5–33)
MCHC RBC AUTO-ENTMCNC: 34.1 G/DL (ref 31.5–36.5)
MCV RBC AUTO: 88 FL (ref 78–100)
PHOSPHATE SERPL-MCNC: 3.5 MG/DL (ref 2.5–4.5)
PLATELET # BLD AUTO: 419 10E3/UL (ref 150–450)
PLPETH BLD-MCNC: <10 NG/ML
POPETH BLD-MCNC: <10 NG/ML
POTASSIUM SERPL-SCNC: 4.2 MMOL/L (ref 3.4–5.3)
RBC # BLD AUTO: 3.14 10E6/UL (ref 3.8–5.2)
WBC # BLD AUTO: 7.1 10E3/UL (ref 4–11)

## 2023-08-21 PROCEDURE — 84100 ASSAY OF PHOSPHORUS: CPT | Performed by: STUDENT IN AN ORGANIZED HEALTH CARE EDUCATION/TRAINING PROGRAM

## 2023-08-21 PROCEDURE — 250N000013 HC RX MED GY IP 250 OP 250 PS 637: Performed by: PHYSICIAN ASSISTANT

## 2023-08-21 PROCEDURE — 36415 COLL VENOUS BLD VENIPUNCTURE: CPT | Performed by: STUDENT IN AN ORGANIZED HEALTH CARE EDUCATION/TRAINING PROGRAM

## 2023-08-21 PROCEDURE — 250N000013 HC RX MED GY IP 250 OP 250 PS 637: Performed by: NURSE PRACTITIONER

## 2023-08-21 PROCEDURE — 80321 ALCOHOLS BIOMARKERS 1OR 2: CPT | Performed by: STUDENT IN AN ORGANIZED HEALTH CARE EDUCATION/TRAINING PROGRAM

## 2023-08-21 PROCEDURE — 250N000011 HC RX IP 250 OP 636: Performed by: PHYSICIAN ASSISTANT

## 2023-08-21 PROCEDURE — 99233 SBSQ HOSP IP/OBS HIGH 50: CPT | Mod: FS | Performed by: STUDENT IN AN ORGANIZED HEALTH CARE EDUCATION/TRAINING PROGRAM

## 2023-08-21 PROCEDURE — 250N000011 HC RX IP 250 OP 636: Performed by: HOSPITALIST

## 2023-08-21 PROCEDURE — 250N000013 HC RX MED GY IP 250 OP 250 PS 637: Performed by: HOSPITALIST

## 2023-08-21 PROCEDURE — 85027 COMPLETE CBC AUTOMATED: CPT | Performed by: NURSE PRACTITIONER

## 2023-08-21 PROCEDURE — 120N000002 HC R&B MED SURG/OB UMMC

## 2023-08-21 PROCEDURE — 83735 ASSAY OF MAGNESIUM: CPT | Performed by: STUDENT IN AN ORGANIZED HEALTH CARE EDUCATION/TRAINING PROGRAM

## 2023-08-21 PROCEDURE — 84132 ASSAY OF SERUM POTASSIUM: CPT | Performed by: STUDENT IN AN ORGANIZED HEALTH CARE EDUCATION/TRAINING PROGRAM

## 2023-08-21 PROCEDURE — 258N000003 HC RX IP 258 OP 636: Performed by: HOSPITALIST

## 2023-08-21 PROCEDURE — 99207 PR APP CREDIT; MD BILLING SHARED VISIT: CPT | Performed by: PHYSICIAN ASSISTANT

## 2023-08-21 RX ORDER — ACETAMINOPHEN 325 MG/1
975 TABLET ORAL 3 TIMES DAILY
Status: DISCONTINUED | OUTPATIENT
Start: 2023-08-21 | End: 2023-08-24 | Stop reason: HOSPADM

## 2023-08-21 RX ORDER — NALOXONE HYDROCHLORIDE 0.4 MG/ML
0.4 INJECTION, SOLUTION INTRAMUSCULAR; INTRAVENOUS; SUBCUTANEOUS
Status: DISCONTINUED | OUTPATIENT
Start: 2023-08-21 | End: 2023-08-24 | Stop reason: HOSPADM

## 2023-08-21 RX ORDER — NALOXONE HYDROCHLORIDE 0.4 MG/ML
0.2 INJECTION, SOLUTION INTRAMUSCULAR; INTRAVENOUS; SUBCUTANEOUS
Status: DISCONTINUED | OUTPATIENT
Start: 2023-08-21 | End: 2023-08-24 | Stop reason: HOSPADM

## 2023-08-21 RX ORDER — HYDROMORPHONE HYDROCHLORIDE 1 MG/ML
0.5 INJECTION, SOLUTION INTRAMUSCULAR; INTRAVENOUS; SUBCUTANEOUS
Status: DISCONTINUED | OUTPATIENT
Start: 2023-08-21 | End: 2023-08-22

## 2023-08-21 RX ORDER — KETOROLAC TROMETHAMINE 15 MG/ML
15 INJECTION, SOLUTION INTRAMUSCULAR; INTRAVENOUS EVERY 6 HOURS PRN
Status: DISPENSED | OUTPATIENT
Start: 2023-08-21 | End: 2023-08-24

## 2023-08-21 RX ORDER — HYDROMORPHONE HYDROCHLORIDE 4 MG/1
4 TABLET ORAL
Status: DISCONTINUED | OUTPATIENT
Start: 2023-08-21 | End: 2023-08-24 | Stop reason: HOSPADM

## 2023-08-21 RX ADMIN — SODIUM CHLORIDE, POTASSIUM CHLORIDE, SODIUM LACTATE AND CALCIUM CHLORIDE: 600; 310; 30; 20 INJECTION, SOLUTION INTRAVENOUS at 03:33

## 2023-08-21 RX ADMIN — ACETAMINOPHEN 975 MG: 325 TABLET, FILM COATED ORAL at 17:06

## 2023-08-21 RX ADMIN — GABAPENTIN 600 MG: 300 CAPSULE ORAL at 08:45

## 2023-08-21 RX ADMIN — HYDROMORPHONE HYDROCHLORIDE 1 MG: 1 INJECTION, SOLUTION INTRAMUSCULAR; INTRAVENOUS; SUBCUTANEOUS at 08:42

## 2023-08-21 RX ADMIN — PANCRELIPASE 2 CAPSULE: 24000; 76000; 120000 CAPSULE, DELAYED RELEASE PELLETS ORAL at 18:14

## 2023-08-21 RX ADMIN — CITALOPRAM HYDROBROMIDE 40 MG: 20 TABLET ORAL at 08:45

## 2023-08-21 RX ADMIN — HYDROMORPHONE HYDROCHLORIDE 4 MG: 4 TABLET ORAL at 20:26

## 2023-08-21 RX ADMIN — HYDROMORPHONE HYDROCHLORIDE 1 MG: 1 INJECTION, SOLUTION INTRAMUSCULAR; INTRAVENOUS; SUBCUTANEOUS at 01:50

## 2023-08-21 RX ADMIN — SODIUM CHLORIDE, POTASSIUM CHLORIDE, SODIUM LACTATE AND CALCIUM CHLORIDE: 600; 310; 30; 20 INJECTION, SOLUTION INTRAVENOUS at 11:51

## 2023-08-21 RX ADMIN — BUSPIRONE HYDROCHLORIDE 10 MG: 5 TABLET ORAL at 20:26

## 2023-08-21 RX ADMIN — HYDROMORPHONE HYDROCHLORIDE 0.5 MG: 1 INJECTION, SOLUTION INTRAMUSCULAR; INTRAVENOUS; SUBCUTANEOUS at 18:14

## 2023-08-21 RX ADMIN — HYDROMORPHONE HYDROCHLORIDE 4 MG: 4 TABLET ORAL at 23:58

## 2023-08-21 RX ADMIN — GABAPENTIN 600 MG: 300 CAPSULE ORAL at 20:26

## 2023-08-21 RX ADMIN — PANCRELIPASE 2 CAPSULE: 24000; 76000; 120000 CAPSULE, DELAYED RELEASE PELLETS ORAL at 12:08

## 2023-08-21 RX ADMIN — HYDROMORPHONE HYDROCHLORIDE 1 MG: 1 INJECTION, SOLUTION INTRAMUSCULAR; INTRAVENOUS; SUBCUTANEOUS at 10:42

## 2023-08-21 RX ADMIN — OXYCODONE HYDROCHLORIDE 10 MG: 10 TABLET ORAL at 09:20

## 2023-08-21 RX ADMIN — HYDROMORPHONE HYDROCHLORIDE 1 MG: 1 INJECTION, SOLUTION INTRAMUSCULAR; INTRAVENOUS; SUBCUTANEOUS at 12:42

## 2023-08-21 RX ADMIN — HYDROMORPHONE HYDROCHLORIDE 4 MG: 4 TABLET ORAL at 17:06

## 2023-08-21 RX ADMIN — ACETAMINOPHEN 975 MG: 325 TABLET, FILM COATED ORAL at 20:26

## 2023-08-21 RX ADMIN — OXYCODONE HYDROCHLORIDE 10 MG: 10 TABLET ORAL at 04:31

## 2023-08-21 RX ADMIN — KETOROLAC TROMETHAMINE 15 MG: 15 INJECTION INTRAMUSCULAR; INTRAVENOUS at 21:23

## 2023-08-21 RX ADMIN — GABAPENTIN 600 MG: 300 CAPSULE ORAL at 13:33

## 2023-08-21 RX ADMIN — HYDROMORPHONE HYDROCHLORIDE 1 MG: 1 INJECTION, SOLUTION INTRAMUSCULAR; INTRAVENOUS; SUBCUTANEOUS at 03:52

## 2023-08-21 RX ADMIN — BUSPIRONE HYDROCHLORIDE 10 MG: 5 TABLET ORAL at 08:45

## 2023-08-21 RX ADMIN — OXYCODONE HYDROCHLORIDE 10 MG: 10 TABLET ORAL at 00:33

## 2023-08-21 RX ADMIN — HYDROXYZINE HYDROCHLORIDE 25 MG: 25 TABLET ORAL at 08:45

## 2023-08-21 RX ADMIN — HYDROMORPHONE HYDROCHLORIDE 1 MG: 1 INJECTION, SOLUTION INTRAMUSCULAR; INTRAVENOUS; SUBCUTANEOUS at 06:09

## 2023-08-21 RX ADMIN — HYDROMORPHONE HYDROCHLORIDE 1 MG: 1 INJECTION, SOLUTION INTRAMUSCULAR; INTRAVENOUS; SUBCUTANEOUS at 14:44

## 2023-08-21 RX ADMIN — OXYCODONE HYDROCHLORIDE 10 MG: 10 TABLET ORAL at 13:34

## 2023-08-21 RX ADMIN — HYDROMORPHONE HYDROCHLORIDE 0.5 MG: 1 INJECTION, SOLUTION INTRAMUSCULAR; INTRAVENOUS; SUBCUTANEOUS at 21:23

## 2023-08-21 ASSESSMENT — ACTIVITIES OF DAILY LIVING (ADL)
ADLS_ACUITY_SCORE: 22

## 2023-08-21 NOTE — PROGRESS NOTES
[facility]    Medicine Progress Note - Hospitalist Service, GOLD TEAM 6    Date of Admission:  8/18/2023    Assessment & Plan   Artie Burger is a 27 year old woman with a history of chronic calcific pancreatitis, depression, panic attacks, and alcohol use disorder in remission admitted for abdominal pain, nausea, and vomiting x 1 week.       #Acute on Chronic Abdominal Pain vs. Acute on Chronic Pancreatitis.   #Chronic Calcific Pancreatitis, Alcohol Associated.   #Alcohol Use Disorder, in remission.   Came to ED at recommendation of GI team. S/p multiple ERCPs in the past, last 7/12/23 stent exchange w/ plan to repeat in 10 weeks or sooner if pain recurrence. Hopeful for TPIAT next month (not scheduled). Feels a bit stuck, neither worsening nor improving at this point.   - GI following. No ERCP planned.   - Continue full liquid diet w/ enzymes. Ok to stop IV fluids.   - PETH pending.   - Required 40 mg oxycodone and 8 mg IV Dilaudid yesterday. Does not feel oxycodone is helpful (has been on and off for the past year) - consider trial of opioid rotation to PO Dilaudid and would need clear taper schedule at discharge (states PCP has been prescribing opioids). Did not like prior [place] and not interested in buprenorphine. Discussed case w/ pain management but did not feel consult was warranted. Attending will also follow up.      #Depression.  #Panic attacks.   Mood stable.   - Continue PTA Buspar and Celexa.        Diet: Full Liquid Diet    DVT Prophylaxis: Ambulate every shift  Padron Catheter: Not present  Lines: None     Cardiac Monitoring: None  Code Status: Full Code      Clinically Significant Risk Factors            # Hypomagnesemia: Lowest Mg = 1.4 mg/dL in last 2 days, replaced to 1.8  # Hypoalbuminemia: Lowest albumin = 3.3 g/dL 8/19/2023, will monitor as appropriate    # Coagulation Defect: INR = 1.19 (Ref range: 0.85 - 1.15) and/or PTT = N/A, will monitor  for bleeding            # Severe Malnutrition: based on nutrition assessment, PRESENT ON ADMISSION          Disposition Plan      Expected Discharge Date: 08/23/2023                  RONI Easley  Hospitalist Service, GOLD TEAM 6  [facility]  Securely message with [employer] (more info)  Text page via [email or web address] Paging/Directory   See signed in provider for up to date coverage information  ______________________________________________________________________    Interval History   Continues to have abdominal pain. Does not feel oxycodone helps. Is not interested in buprenorphine d/t concern that more acute pain cannot be treated, does not like sublingual med, worried about being on it ahead of September TPAIT. No N/V. Tolerating full liquid diet. Has had some small loose BMs, not frequent. Voiding frequently; clear.     Physical Exam   Vital Signs: Temp: 98  F (36.7  C) Temp src: Oral BP: 121/77 Pulse: 55   Resp: 16 SpO2: 97 % O2 Device: None (Room air)    Weight: 126 lbs 0 oz    GENERAL: Alert and oriented x 3. Lying in bed, appears comfortable. Conversant.   HEENT: Anicteric sclera. Mucous membranes moist.   CV: RRR. S1, S2. No murmurs appreciated.   RESPIRATORY: Effort normal on room air. Lungs CTAB with no wheezing, rales, rhonchi.   GI: Abdomen soft and non distended, bowel sounds present. No tenderness, rebound, guarding.   NEUROLOGICAL: No focal deficits. Moves all extremities.   EXTREMITIES: No peripheral edema.   SKIN: No jaundice. No rashes.       Medical Decision Making             Data   Reviewed CBC, K, Mg, Phos

## 2023-08-21 NOTE — PLAN OF CARE
Vital signs:  Temp: 98  F (36.7  C) Temp src: Oral BP: 121/77 Pulse: 55   Resp: 16 SpO2: 97 % O2 Device: None (Room air)       Activity: Up ad zechariah.   Neuros: A&O x4.   Cardiac: WDL.   Respiratory: WDL on RA. Denies SOB.  GI/: Voiding spontaneously. +BS, passing flatus.   Diet: Full liquid.   Lines: Left PIV SL. IVMF discontinued.   Pain/nausea: Pain regimen modified. IV and PO dilaudid given with some relief. Denies nausea.

## 2023-08-21 NOTE — PROVIDER NOTIFICATION
7B Artie Burger room 21 Since patient is not having a procedure today can she have a diet order. Jose David

## 2023-08-21 NOTE — PROGRESS NOTES
Time: 7649-4517  Status: Acute on chronic Pancreatitis.   Neuros: A&Ox4, calm & cooperative  Cardiac: WNL denies cardiac chest pain.   Respiratory: Sats >97% on RA, denies SOB.   Pain: Abdominal pain managed with prn IV dilaudid x2 & oxycodone x1 this shift.   Nausea: Denies N/V this shift.   Diet: FLD.  NPO except medications after midnight.  GI/: Voiding spontaneously. +BS, +flatus, no BM this shift. LBM 8/19 per pt.   Skin: No new skin deficits noted.   Lines: L PIV infusing LR at 125 ml/hr.   Drains: N/A  Labs: Reviewed  Activity: Up independently  New Changes this Shift: No acute changes.   Plan: NPO at midnight for possible ERCP on 8/21.  Pain management, continue POC.

## 2023-08-21 NOTE — PROGRESS NOTES
"    GASTROENTEROLOGY PROGRESS NOTE  GI Panc/Bili Service    Date of Admission: 8/18/2023  Reason for Admission: Abdominal pain  PATIENT:  Artie Burger  MRN:         [id number]     Assessment:  27 year old female with chronic calcific pancreatitisdepression, history of alcohol use in remission, who presents with acute on chronic abdominal pain.      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 Last ERCP was 7/12/23 with plan to repeat an ERCP in 10 weeks or sooner if recurs of pain. Plan overall is that she would require surgery (TPIAT) which is planned for hopefully September 2023. ERCP would only help her temporarily given unsuccessful attempts in the past and calcific pathology. Noted PETH has been negative, and normal gastric emptying study. Not on anticoagulation.     RECOMMENDATIONS  - Fluid resuscitation with Lactated ringers at 1.5ml/kg/hr (\"Waterfall Trial\" N Engl J Med 2022; [id number]). Reassess and adjust fluid requirement q12 hours.   Stop IVFs if hypervolemic.  If normovolemic and tolerating oral diet for 8 hours, stop IVFs. If hypovolemic, give 10ml/kg bolus over 2 hours and then continue LR at 1.5ml/kg/hr.    -- ADAT when patient's IV dilaudid requirement is decreasing.   -- If pain not improving in the next 1-2 days, will add on for ERCP.   -- PETH pending    Thank you for involving us in this patient's care. Please do not hesitate to contact the GI service with any questions or concerns.  The patient was discussed and plan agreed upon with Dr. Avalos and Dr. Campuzano.     Leonardo Silva (Corey Olvera DO, MS  Gastroenterology Fellow  [facility]  Text Page     _______________________________________________________________      Subjective: Nursing notes and 24hr events reviewed.     Patient seen and examined " at 8am. Patient reports ongoing abd pain, responding to IV dilaudid.    ROS:   4 pt ROS negative unless noted in subjective.     Objective:  Temp: 97.7  F (36.5  C) Temp src: Oral BP: 111/62 Pulse: (!) 45   Resp: 16 SpO2: 97 % O2 Device: None (Room air)      General Appearance: NAD, pleasant  HEENT: EOMI  Respiratory: Breathing comfortably on RA  Cardiovascular:   GI: soft, + epigastric pain, no peritoneal sign  Extremities: No LE edema noted   Neuro: Moving all extremities   Skin: No jaundice rash on exposed areas   Psych: Alert and oriented, appropriate mood and affect, speech coherent / logical

## 2023-08-21 NOTE — PROVIDER NOTIFICATION
7B [facility] room 21 Oxy is not touching pain, Dilaudid IV works. Is there some other PO pain med you can prescribe. Jose David

## 2023-08-21 NOTE — PROGRESS NOTES
"Blood pressure 111/62, pulse (!) 45, temperature 97.7  F (36.5  C), temperature source Oral, resp. rate 16, height 1.575 m (5' 2\"), weight 57.2 kg (126 lb), last menstrual period 08/16/2023, SpO2 97 %, not currently breastfeeding.    Activity: Independent   Neuros:  AOX4, makes needs known  Cardiac: WDL  Respiratory: WDL  GI/: Voiding spontaneous  Diet: Full liquid, tolerating  Skin/Incisions/Drains: WDL  Lines: Lt PIV infusing LR 125hr  Pain: 5-8/10 IV Dilaurdid q2h, Oxy not effective  Plan: Continue with POC  "

## 2023-08-22 LAB
MAGNESIUM SERPL-MCNC: 1.7 MG/DL (ref 1.7–2.3)
PHOSPHATE SERPL-MCNC: 4.6 MG/DL (ref 2.5–4.5)
POTASSIUM SERPL-SCNC: 4 MMOL/L (ref 3.4–5.3)

## 2023-08-22 PROCEDURE — 250N000013 HC RX MED GY IP 250 OP 250 PS 637: Performed by: NURSE PRACTITIONER

## 2023-08-22 PROCEDURE — 36415 COLL VENOUS BLD VENIPUNCTURE: CPT | Performed by: INTERNAL MEDICINE

## 2023-08-22 PROCEDURE — 99233 SBSQ HOSP IP/OBS HIGH 50: CPT | Mod: FS | Performed by: STUDENT IN AN ORGANIZED HEALTH CARE EDUCATION/TRAINING PROGRAM

## 2023-08-22 PROCEDURE — 999N000128 HC STATISTIC PERIPHERAL IV START W/O US GUIDANCE

## 2023-08-22 PROCEDURE — 250N000011 HC RX IP 250 OP 636: Performed by: HOSPITALIST

## 2023-08-22 PROCEDURE — 250N000013 HC RX MED GY IP 250 OP 250 PS 637: Performed by: PHYSICIAN ASSISTANT

## 2023-08-22 PROCEDURE — 120N000002 HC R&B MED SURG/OB UMMC

## 2023-08-22 PROCEDURE — 99207 PR APP CREDIT; MD BILLING SHARED VISIT: CPT | Performed by: PHYSICIAN ASSISTANT

## 2023-08-22 PROCEDURE — 84100 ASSAY OF PHOSPHORUS: CPT | Performed by: INTERNAL MEDICINE

## 2023-08-22 PROCEDURE — 250N000013 HC RX MED GY IP 250 OP 250 PS 637: Performed by: HOSPITALIST

## 2023-08-22 PROCEDURE — 84132 ASSAY OF SERUM POTASSIUM: CPT | Performed by: INTERNAL MEDICINE

## 2023-08-22 PROCEDURE — 250N000011 HC RX IP 250 OP 636: Mod: JZ | Performed by: PHYSICIAN ASSISTANT

## 2023-08-22 PROCEDURE — 83735 ASSAY OF MAGNESIUM: CPT | Performed by: INTERNAL MEDICINE

## 2023-08-22 RX ORDER — HYDROMORPHONE HYDROCHLORIDE 1 MG/ML
0.5 INJECTION, SOLUTION INTRAMUSCULAR; INTRAVENOUS; SUBCUTANEOUS EVERY 4 HOURS PRN
Status: DISCONTINUED | OUTPATIENT
Start: 2023-08-22 | End: 2023-08-23

## 2023-08-22 RX ADMIN — HYDROMORPHONE HYDROCHLORIDE 0.5 MG: 1 INJECTION, SOLUTION INTRAMUSCULAR; INTRAVENOUS; SUBCUTANEOUS at 13:59

## 2023-08-22 RX ADMIN — HYDROMORPHONE HYDROCHLORIDE 4 MG: 4 TABLET ORAL at 15:44

## 2023-08-22 RX ADMIN — HYDROMORPHONE HYDROCHLORIDE 4 MG: 4 TABLET ORAL at 03:18

## 2023-08-22 RX ADMIN — PANCRELIPASE 2 CAPSULE: 24000; 76000; 120000 CAPSULE, DELAYED RELEASE PELLETS ORAL at 07:47

## 2023-08-22 RX ADMIN — HYDROMORPHONE HYDROCHLORIDE 0.5 MG: 1 INJECTION, SOLUTION INTRAMUSCULAR; INTRAVENOUS; SUBCUTANEOUS at 10:14

## 2023-08-22 RX ADMIN — HYDROMORPHONE HYDROCHLORIDE 0.5 MG: 1 INJECTION, SOLUTION INTRAMUSCULAR; INTRAVENOUS; SUBCUTANEOUS at 18:29

## 2023-08-22 RX ADMIN — ACETAMINOPHEN 975 MG: 325 TABLET, FILM COATED ORAL at 20:14

## 2023-08-22 RX ADMIN — BUSPIRONE HYDROCHLORIDE 10 MG: 5 TABLET ORAL at 20:14

## 2023-08-22 RX ADMIN — KETOROLAC TROMETHAMINE 15 MG: 15 INJECTION INTRAMUSCULAR; INTRAVENOUS at 03:19

## 2023-08-22 RX ADMIN — CITALOPRAM HYDROBROMIDE 40 MG: 20 TABLET ORAL at 07:46

## 2023-08-22 RX ADMIN — PANCRELIPASE 2 CAPSULE: 24000; 76000; 120000 CAPSULE, DELAYED RELEASE PELLETS ORAL at 18:30

## 2023-08-22 RX ADMIN — HYDROMORPHONE HYDROCHLORIDE 0.5 MG: 1 INJECTION, SOLUTION INTRAMUSCULAR; INTRAVENOUS; SUBCUTANEOUS at 02:10

## 2023-08-22 RX ADMIN — HYDROMORPHONE HYDROCHLORIDE 4 MG: 4 TABLET ORAL at 07:46

## 2023-08-22 RX ADMIN — GABAPENTIN 600 MG: 300 CAPSULE ORAL at 13:40

## 2023-08-22 RX ADMIN — GABAPENTIN 600 MG: 300 CAPSULE ORAL at 07:46

## 2023-08-22 RX ADMIN — ACETAMINOPHEN 975 MG: 325 TABLET, FILM COATED ORAL at 13:40

## 2023-08-22 RX ADMIN — ACETAMINOPHEN 975 MG: 325 TABLET, FILM COATED ORAL at 07:46

## 2023-08-22 RX ADMIN — PANCRELIPASE 2 CAPSULE: 24000; 76000; 120000 CAPSULE, DELAYED RELEASE PELLETS ORAL at 11:31

## 2023-08-22 RX ADMIN — ONDANSETRON 4 MG: 4 TABLET, ORALLY DISINTEGRATING ORAL at 13:55

## 2023-08-22 RX ADMIN — HYDROMORPHONE HYDROCHLORIDE 4 MG: 4 TABLET ORAL at 11:29

## 2023-08-22 RX ADMIN — HYDROMORPHONE HYDROCHLORIDE 0.5 MG: 1 INJECTION, SOLUTION INTRAMUSCULAR; INTRAVENOUS; SUBCUTANEOUS at 05:57

## 2023-08-22 RX ADMIN — HYDROMORPHONE HYDROCHLORIDE 4 MG: 4 TABLET ORAL at 20:04

## 2023-08-22 RX ADMIN — KETOROLAC TROMETHAMINE 15 MG: 15 INJECTION INTRAMUSCULAR; INTRAVENOUS at 21:39

## 2023-08-22 RX ADMIN — BUSPIRONE HYDROCHLORIDE 10 MG: 5 TABLET ORAL at 07:47

## 2023-08-22 RX ADMIN — PROCHLORPERAZINE EDISYLATE 10 MG: 5 INJECTION INTRAMUSCULAR; INTRAVENOUS at 20:04

## 2023-08-22 RX ADMIN — GABAPENTIN 600 MG: 300 CAPSULE ORAL at 20:14

## 2023-08-22 ASSESSMENT — ACTIVITIES OF DAILY LIVING (ADL)
ADLS_ACUITY_SCORE: 22

## 2023-08-22 NOTE — PROGRESS NOTES
[facility]    Medicine Progress Note - Hospitalist Service, GOLD TEAM 6    Date of Admission:  8/18/2023    Assessment & Plan   Artie Burger is a 27 year old woman with a history of chronic calcific pancreatitis, depression, panic attacks, and alcohol use disorder in remission admitted for abdominal pain, nausea, and vomiting x 1 week.       #Acute on Chronic Abdominal Pain vs. Acute on Chronic Pancreatitis.   #Chronic Calcific Pancreatitis, Alcohol Associated.   #Alcohol Use Disorder, in Remission.   Came to ED at recommendation of GI team. S/p multiple ERCPs in the past, last 7/12/23 stent exchange w/ plan to repeat in 10 weeks or sooner if pain recurrence. Hopeful for TPIAT next month (not scheduled). Recent PETh tests have been negative. Had previously normal gastric emptying study. Feels pain is ~50% improved from admit (and N/V resolved); better w/ opioid rotation yesterday.   - GI following. No ERCP planned - would like to hold off on further ERCPs until TPIAT surgery.   - Ok to cautiously trial regular diet (w/ enzymes); stop if worsening pain/nausea.   - Continue Dilaudid 4 mg PO Q3hr PRN. Anticipate sending w/ taper at discharge and will communicate w/ PCP who has most recently been prescribing opioids. Continue IV Dilaudid for breakthrough 0.5 mg but decrease from Q2 to Q4hr PRN (discussed w/ patient - anticipate additional step down tomorrow and possible discharge Thursday). Appreciate opioid stewardship reccs 8/21; pain team did not feel consult was warranted (has been seen in their clinic but patient is not willing to go back on buprenorphine for chronic pain - no hx opiate addiction).   - Continue scheduled Tylenol and gabapentin.   - Continue PRN IV Toradol.      #Depression.  #Panic Disorder.   Mood stable.   - Continue PTA Buspar and Celexa.        Diet: Regular Diet Adult    DVT Prophylaxis: Ambulate every shift - encouraged to walk outside of room  today  Padron Catheter: Not present  Lines: None     Cardiac Monitoring: None  Code Status: Full Code      Clinically Significant Risk Factors            # Hypomagnesemia: Lowest Mg = 1.4, replaced to 1.7  # Hypoalbuminemia: Lowest albumin = 3.3 g/dL 8/19/2023               # Severe Malnutrition: based on nutrition assessment, PRESENT ON ADMISSION          Disposition Plan      Expected Discharge Date: 08/24/2023        Discharge Comments: Pending taper off IV opioids          RONI Easley  Hospitalist Service, GOLD TEAM 6  [facility]  Securely message with [employer] (more info)  Text page via [facility] Paging/Directory   See signed in provider for up to date coverage information  ______________________________________________________________________    Interval History   Doing a little better today with change from oxycodone to Dilaudid. Abdominal pain persists but is ~50% better from time of admission. Does not worsen with eating. No N/V. Occasional small watery BMs. Discussed goal of further tapering off IV opioids.     Physical Exam   Vital Signs: Temp: 98  F (36.7  C) Temp src: Oral BP: 112/76 Pulse: 50   Resp: 16 SpO2: 97 % O2 Device: None (Room air)    Weight: 126 lbs 0 oz    GENERAL: Alert and oriented x 3. Lying in bed, appears comfortable. Conversant.   HEENT: Anicteric sclera. Mucous membranes moist.   CV: RRR. S1, S2. No murmurs appreciated.   RESPIRATORY: Effort normal on room air. Lungs CTAB.  GI: Abdomen soft and non distended, bowel sounds present. + epigastric tenderness. No rebound or guarding.   NEUROLOGICAL: No focal deficits. Moves all extremities.   EXTREMITIES: No peripheral edema.   SKIN: No jaundice. No rashes.     Medical Decision Making             Data   None

## 2023-08-22 NOTE — PLAN OF CARE
Problem: Plan of Care - These are the overarching goals to be used throughout the patient stay.    Goal: Optimal Comfort and Wellbeing  Outcome: Progressing   Goal Outcome Evaluation:    Temp: 98  F (36.7  C) Temp src: Oral BP: 121/77 Pulse: 55   Resp: 16 SpO2: 97 % O2 Device: None (Room air)         Time of care: 4602-8620  Reason for admission: acute on chronic pancreatitis     NEURO: A&Ox4  RESPIRATORY: denies SOB, sats 97% on RA  CARDIAC: vss, denies cardiac chest pain  GI/: voiding AUOP. LBM 8/21. Reports scant amount of vaginal bleeding/spotting.   DIET: regular  PAIN/NAUSEA: pain up to 7/10, using prn oxycodone, prn PO and IV dilaudid, IV toradol. Denies nausea   INCISION/DRAINS/SKIN: WDL  IV ACCESS: PIV replaced, SL  ACTIVITY: independent  LAB: reviewed  CHANGES: no acute major changes    PLAN: continue w/ POC.

## 2023-08-22 NOTE — PLAN OF CARE
Vitals:    08/21/23 1516 08/21/23 2331 08/22/23 0731 08/22/23 0831   BP: 121/77 90/60 112/76    BP Location: Right arm Right arm Right arm    Pulse: 55 (!) 49 (!) 45 50   Resp: 16 16 16    Temp: 98  F (36.7  C) 98.4  F (36.9  C) 98  F (36.7  C)    TempSrc: Oral Oral Oral    SpO2: 97% 97% 97%    Weight:       Height:        Reason for admission: acute on chronic pancreatitis      NEURO: A&Ox4    RESPIRATORY: denies SOB, sats 97% on RA    CARDIAC: vss, bradycardic 50, denies cardiac chest pain    GI/: voiding AUOP. LBM 8/21. Reports scant amount of vaginal spotting.     DIET: regular, tolerating good Zofran for nausea.    PAIN/NAUSEA: PO and IV dilaudid for pain with manage pain.    INCISION/DRAINS/SKIN: WDL    IV ACCESS: left PIV SL    ACTIVITY: independent, up and ambulated    CHANGES: no acute major changes this shift,

## 2023-08-22 NOTE — PROGRESS NOTES
"    GASTROENTEROLOGY PROGRESS NOTE  GI Panc/Bili Service    Date of Admission: 8/18/2023  Reason for Admission: Abdominal pain  PATIENT:  Artie Burger  MRN:         [id number]     Assessment:  27 year old female with chronic calcific pancreatitisdepression, history of alcohol use in remission, who presents with acute on chronic abdominal pain.      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 Last ERCP was 7/12/23 with plan to repeat an ERCP in 10 weeks or sooner if recurs of pain. Plan overall is that she would require surgery (TPIAT) which is planned for hopefully September 2023. ERCP would only help her temporarily given unsuccessful attempts in the past and calcific pathology. Noted PETH has been negative, and normal gastric emptying study. Not on anticoagulation.       RECOMMENDATIONS  - Fluid resuscitation with Lactated ringers at 1.5ml/kg/hr (\"Waterfall Trial\" N Engl J Med 2022; [id number]). Reassess and adjust fluid requirement q12 hours.   Stop IVFs if hypervolemic.  If normovolemic and tolerating oral diet for 8 hours, stop IVFs. If hypovolemic, give 10ml/kg bolus over 2 hours and then continue LR at 1.5ml/kg/hr.    -- ADAT when patient's IV dilaudid requirement is decreasing.   -- If pain not improving in the next 1-2 days, will add on for ERCP but Dr. Campuzano would like to hold off on further ERCP until TPAIT surgery in Sep 2023 if possible.     Thank you for involving us in this patient's care. Please do not hesitate to contact the GI service with any questions or concerns.  The patient was discussed and plan agreed upon with Dr. Avalos.     Leonardo Silva (Corey Olvera DO, MS  Gastroenterology Fellow  [facility] [place]  Text Page     _______________________________________________________________      Subjective: " Nursing notes and 24hr events reviewed.     Patient seen and examined at 9am. Patient reports ongoing abd pain, responding to IV dilaudid and switching to PO to see if she can tolerate.    ROS:   4 pt ROS negative unless noted in subjective.     Objective:  Temp: 98  F (36.7  C) Temp src: Oral BP: 112/76 Pulse: 50   Resp: 16 SpO2: 97 % O2 Device: None (Room air)      General Appearance: NAD, pleasant  HEENT: EOMI  Respiratory: Breathing comfortably on RA  Cardiovascular:   GI: soft, + epigastric pain, no peritoneal sign  Extremities: No LE edema noted   Neuro: Moving all extremities   Skin: No jaundice rash on exposed areas   Psych: Alert and oriented, appropriate mood and affect, speech coherent / logical

## 2023-08-23 LAB
ALBUMIN SERPL BCG-MCNC: 4.1 G/DL (ref 3.5–5.2)
ALP SERPL-CCNC: 59 U/L (ref 35–104)
ALT SERPL W P-5'-P-CCNC: 11 U/L (ref 0–50)
ANION GAP SERPL CALCULATED.3IONS-SCNC: 12 MMOL/L (ref 7–15)
AST SERPL W P-5'-P-CCNC: 16 U/L (ref 0–45)
BILIRUB SERPL-MCNC: 0.2 MG/DL
BUN SERPL-MCNC: 9.5 MG/DL (ref 6–20)
CALCIUM SERPL-MCNC: 10.3 MG/DL (ref 8.6–10)
CHLORIDE SERPL-SCNC: 105 MMOL/L (ref 98–107)
CREAT SERPL-MCNC: 0.91 MG/DL (ref 0.51–0.95)
DEPRECATED HCO3 PLAS-SCNC: 23 MMOL/L (ref 22–29)
GFR SERPL CREATININE-BSD FRML MDRD: 87 ML/MIN/1.73M2
GLUCOSE SERPL-MCNC: 88 MG/DL (ref 70–99)
POTASSIUM SERPL-SCNC: 4.7 MMOL/L (ref 3.4–5.3)
PROT SERPL-MCNC: 6.9 G/DL (ref 6.4–8.3)
SODIUM SERPL-SCNC: 140 MMOL/L (ref 136–145)

## 2023-08-23 PROCEDURE — 250N000013 HC RX MED GY IP 250 OP 250 PS 637: Performed by: NURSE PRACTITIONER

## 2023-08-23 PROCEDURE — 120N000002 HC R&B MED SURG/OB UMMC

## 2023-08-23 PROCEDURE — 36415 COLL VENOUS BLD VENIPUNCTURE: CPT | Performed by: PHYSICIAN ASSISTANT

## 2023-08-23 PROCEDURE — 250N000013 HC RX MED GY IP 250 OP 250 PS 637: Performed by: PHYSICIAN ASSISTANT

## 2023-08-23 PROCEDURE — 250N000013 HC RX MED GY IP 250 OP 250 PS 637: Performed by: HOSPITALIST

## 2023-08-23 PROCEDURE — 80053 COMPREHEN METABOLIC PANEL: CPT | Performed by: PHYSICIAN ASSISTANT

## 2023-08-23 PROCEDURE — 80321 ALCOHOLS BIOMARKERS 1OR 2: CPT | Performed by: PHYSICIAN ASSISTANT

## 2023-08-23 PROCEDURE — 99233 SBSQ HOSP IP/OBS HIGH 50: CPT | Mod: FS | Performed by: STUDENT IN AN ORGANIZED HEALTH CARE EDUCATION/TRAINING PROGRAM

## 2023-08-23 PROCEDURE — 99207 PR APP CREDIT; MD BILLING SHARED VISIT: CPT | Performed by: PHYSICIAN ASSISTANT

## 2023-08-23 PROCEDURE — 250N000011 HC RX IP 250 OP 636: Performed by: PHYSICIAN ASSISTANT

## 2023-08-23 RX ORDER — HYDROMORPHONE HYDROCHLORIDE 1 MG/ML
0.5 INJECTION, SOLUTION INTRAMUSCULAR; INTRAVENOUS; SUBCUTANEOUS EVERY 6 HOURS PRN
Status: DISCONTINUED | OUTPATIENT
Start: 2023-08-23 | End: 2023-08-24

## 2023-08-23 RX ADMIN — SENNOSIDES AND DOCUSATE SODIUM 1 TABLET: 50; 8.6 TABLET ORAL at 20:08

## 2023-08-23 RX ADMIN — GABAPENTIN 600 MG: 300 CAPSULE ORAL at 13:14

## 2023-08-23 RX ADMIN — PANCRELIPASE 2 CAPSULE: 24000; 76000; 120000 CAPSULE, DELAYED RELEASE PELLETS ORAL at 18:48

## 2023-08-23 RX ADMIN — ACETAMINOPHEN 975 MG: 325 TABLET, FILM COATED ORAL at 20:02

## 2023-08-23 RX ADMIN — GABAPENTIN 600 MG: 300 CAPSULE ORAL at 08:35

## 2023-08-23 RX ADMIN — BUSPIRONE HYDROCHLORIDE 10 MG: 5 TABLET ORAL at 20:02

## 2023-08-23 RX ADMIN — HYDROMORPHONE HYDROCHLORIDE 4 MG: 4 TABLET ORAL at 08:35

## 2023-08-23 RX ADMIN — ACETAMINOPHEN 975 MG: 325 TABLET, FILM COATED ORAL at 13:14

## 2023-08-23 RX ADMIN — KETOROLAC TROMETHAMINE 15 MG: 15 INJECTION INTRAMUSCULAR; INTRAVENOUS at 21:07

## 2023-08-23 RX ADMIN — HYDROMORPHONE HYDROCHLORIDE 0.5 MG: 1 INJECTION, SOLUTION INTRAMUSCULAR; INTRAVENOUS; SUBCUTANEOUS at 21:07

## 2023-08-23 RX ADMIN — HYDROMORPHONE HYDROCHLORIDE 4 MG: 4 TABLET ORAL at 03:21

## 2023-08-23 RX ADMIN — HYDROMORPHONE HYDROCHLORIDE 0.5 MG: 1 INJECTION, SOLUTION INTRAMUSCULAR; INTRAVENOUS; SUBCUTANEOUS at 13:11

## 2023-08-23 RX ADMIN — HYDROMORPHONE HYDROCHLORIDE 4 MG: 4 TABLET ORAL at 11:31

## 2023-08-23 RX ADMIN — BUSPIRONE HYDROCHLORIDE 10 MG: 5 TABLET ORAL at 08:35

## 2023-08-23 RX ADMIN — HYDROMORPHONE HYDROCHLORIDE 4 MG: 4 TABLET ORAL at 18:47

## 2023-08-23 RX ADMIN — CITALOPRAM HYDROBROMIDE 40 MG: 20 TABLET ORAL at 08:34

## 2023-08-23 RX ADMIN — KETOROLAC TROMETHAMINE 15 MG: 15 INJECTION INTRAMUSCULAR; INTRAVENOUS at 14:59

## 2023-08-23 RX ADMIN — GABAPENTIN 600 MG: 300 CAPSULE ORAL at 20:02

## 2023-08-23 RX ADMIN — HYDROMORPHONE HYDROCHLORIDE 4 MG: 4 TABLET ORAL at 14:58

## 2023-08-23 RX ADMIN — PANCRELIPASE 2 CAPSULE: 24000; 76000; 120000 CAPSULE, DELAYED RELEASE PELLETS ORAL at 13:12

## 2023-08-23 RX ADMIN — ACETAMINOPHEN 975 MG: 325 TABLET, FILM COATED ORAL at 08:35

## 2023-08-23 RX ADMIN — PANCRELIPASE 2 CAPSULE: 24000; 76000; 120000 CAPSULE, DELAYED RELEASE PELLETS ORAL at 08:35

## 2023-08-23 RX ADMIN — HYDROMORPHONE HYDROCHLORIDE 4 MG: 4 TABLET ORAL at 21:51

## 2023-08-23 ASSESSMENT — ACTIVITIES OF DAILY LIVING (ADL)
ADLS_ACUITY_SCORE: 22

## 2023-08-23 NOTE — PROGRESS NOTES
GASTROENTEROLOGY PROGRESS NOTE  GI Panc/Bili Service    Date of Admission: 8/18/2023  Reason for Admission: Abdominal pain  PATIENT:  Artie Burger  MRN:         [id number]     Assessment:  27 year old female with chronic calcific pancreatitisdepression, history of alcohol use in remission, who presents with acute on chronic abdominal pain.      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 Last ERCP was 7/12/23 with plan to repeat an ERCP in 10 weeks or sooner if recurs of pain. Plan overall is that she would require surgery (TPIAT) which is planned for hopefully September 2023. ERCP would only help her temporarily given unsuccessful attempts in the past and calcific pathology. Noted PETH has been negative, and normal gastric emptying study. Not on anticoagulation.       RECOMMENDATIONS  -- Agree with repeat PETH; likely lab error / false positive given it was negative on admission and patient denies drinking alcohol in the hospital.   In one study, the test was positive after a teetotaler washed hand with ethanol containing solution.   https://www.sciencedirect.com/science/article/abs/pii/J997252667463270B  -- Agree with conservative management per primary team.   -- If pain not improving in the next 1-2 days, will add on for ERCP but Dr. Campuzano would like to hold off on further ERCP until TPAIT surgery in Sep 2023 if possible.     Thank you for involving us in this patient's care. Please do not hesitate to contact the GI service with any questions or concerns.  The patient was discussed and plan agreed upon with Dr. Avalos.     Leonardo Silva (Corey Olvera DO, MS  Gastroenterology Fellow  [facility]  Text Page     _______________________________________________________________      Subjective: Nursing notes and 24hr  events reviewed.     Patient seen and examined at 9am. Patient reports ongoing abd pain, but tolerating diet on PO oxycodone and needed only a few doses of IV dilaudid.  Will try to go rest of today on PO only.     ROS:   4 pt ROS negative unless noted in subjective.     Objective:  Temp: 97.8  F (36.6  C) Temp src: Oral BP: 104/66 Pulse: 68   Resp: 16 SpO2: 98 % O2 Device: None (Room air)      General Appearance: NAD, pleasant  HEENT: EOMI  Respiratory: Breathing comfortably on RA  Cardiovascular:   GI: soft, + epigastric pain, no peritoneal sign  Extremities: No LE edema noted   Neuro: Moving all extremities   Skin: No jaundice rash on exposed areas   Psych: Alert and oriented, appropriate mood and affect, speech coherent / logical

## 2023-08-23 NOTE — PROGRESS NOTES
[facility]    Medicine Progress Note - Hospitalist Service, GOLD TEAM 6    Date of Admission:  8/18/2023    Assessment & Plan   Artie Burger is a 27 year old woman with a history of chronic calcific pancreatitis, depression, panic attacks, and alcohol use disorder in remission admitted for abdominal pain, nausea, and vomiting x 1 week.       #Acute on Chronic Abdominal Pain vs. Acute on Chronic Pancreatitis.   #Chronic Calcific Pancreatitis, Alcohol Associated.   #Alcohol Use Disorder, in Remission.   Came to ED at recommendation of GI team. S/p multiple ERCPs in the past, last 7/12/23 stent exchange w/ plan to repeat in 10 weeks or sooner if pain recurrence. Hopeful for TPIAT next month (not scheduled). Had previously normal gastric emptying study. Feels pain is at least 50% improved from admit (and N/V resolved); better since oxycodone to hydromorphone opioid rotation. Is not interested in buprenorphine for chronic pain as has been previously recommended by pain management. Of note, PETh tests 1/18/23 and 8/18/23 were negative, however was rechecked by GI while here on 8/21/23 and is elevated at 62 - no recent transfusions and patient is adamant that she just celebrated her 3 year sobriety anniversary. LFTs are wnl and there has been no e/o intoxication here.   - GI following. No ERCP planned - would like to hold off on further ERCPs until TPIAT surgery.   - Ok to cautiously continue regular diet (w/ enzymes); stop if worsening pain/nausea.   - Continue Dilaudid 4 mg PO Q3hr PRN. Anticipate sending w/ taper at discharge and will communicate w/ PCP who has most recently been prescribing opioids. Continue IV Dilaudid for breakthrough 0.5 mg but decrease from Q4 to Q6hr PRN (discussed w/ patient - anticipate possible discharge as early as tomorrow).   - Continue scheduled Tylenol and gabapentin.   - Continue PRN IV Toradol.   - Discussed w/ patient and sent additional  PETh test today w/ inconsistencies between 8/18 and 8/21 results ahead of TPIAT plans.      #Depression.  #Panic Disorder.   Mood stable.   - Continue PTA Buspar and Celexa.        Diet: Regular Diet Adult    DVT Prophylaxis: Ambulate every shift - encouraged to walk outside of room today  Padron Catheter: Not present  Lines: None     Cardiac Monitoring: None  Code Status: Full Code      Clinically Significant Risk Factors            # Hypomagnesemia: Lowest Mg = 1.4, replaced to 1.7  # Hypoalbuminemia: Lowest albumin = 3.3 g/dL 8/19/2023               # Severe Malnutrition: based on nutrition assessment           Disposition Plan      Expected Discharge Date: 08/24/2023        Discharge Comments: Pending taper off IV opioids          RONI Easley  Hospitalist Service, GOLD TEAM 6  [facility]  Securely message with [employer] (more info)  Text page via [facility] Paging/Directory   See signed in provider for up to date coverage information  ______________________________________________________________________    Interval History   Continues to feel the same/slightly better. Was able to mostly tolerate decrease in IV Dilaudid yesterday and willing to try again today. Was able to eat more solid food without increase in pain or nausea. We discussed the normal followed by abnormal PETh test. She is adamant that she just celebrated her 3 year sobriety anniversary on 8/21.     Physical Exam   Vital Signs: Temp: 98.1  F (36.7  C) Temp src: Oral BP: 113/67 Pulse: 56   Resp: 16 SpO2: 97 % O2 Device: None (Room air)    Weight: 126 lbs 0 oz    GENERAL: Alert and oriented x 3. Lying in bed, appears comfortable. Conversant.   HEENT: Anicteric sclera. Mucous membranes moist.   CV: RRR. S1, S2. No murmurs appreciated.   RESPIRATORY: Effort normal on room air. Lungs CTAB.  GI: Abdomen soft and non distended, bowel sounds present. + epigastric tenderness. No rebound or guarding.    NEUROLOGICAL: No focal deficits. Moves all extremities.   EXTREMITIES: No peripheral edema.   SKIN: No jaundice. No rashes.     Medical Decision Making             Data   Reviewed CMP

## 2023-08-23 NOTE — PLAN OF CARE
"/67 (BP Location: Right arm)   Pulse 55   Temp 98.2  F (36.8  C) (Oral)   Resp 16   Ht 1.575 m (5' 2\")   Wt 57.2 kg (126 lb)   LMP 08/16/2023   SpO2 98%   BMI 23.05 kg/m        Shift: [id number]  Status: Interstitial cystitis.   Neuro: A&O x 4. Denies numbness and tingling.   Resp: WDL. RA.   Cardiac: WDL. Bradycardic.   GI/: Voiding spontaneously. No BM this shift. Regular diet. Intermittent nausea-gave PRN compazine.   Skin: WDL.   Activity: Up ad zechariah.  Incision & Drainage: PIV SL.   Pain: 6/10 abdominal pain. Gave PRN PO dilaudid and PRN IV dilaudid.   Labs: Reviewed.   Changes: No acute changes. Slept well through the night.   Plan: Continue with POC.     Goal Outcome Evaluation:      Plan of Care Reviewed With: patient    Overall Patient Progress: no change           "

## 2023-08-23 NOTE — PLAN OF CARE
Vitals:    08/22/23 1532 08/22/23 2238 08/23/23 0720 08/23/23 1526   BP: 112/71 115/67 113/67 104/66   BP Location: Right arm Right arm Right arm Left arm   Pulse: 56 55 56 68   Resp: 15 16 16 16   Temp: 98  F (36.7  C) 98.2  F (36.8  C) 98.1  F (36.7  C) 97.8  F (36.6  C)   TempSrc: Oral Oral Oral Oral   SpO2:  98% 97% 98%   Weight:       Height:           Artie Burger is a 27 year old woman with a history of chronic calcific pancreatitis, depression, panic attacks, and alcohol use disorder in remission admitted for abdominal pain, nausea, and vomiting x 1 week.      VS:vitally stable, sating 98% on room air, non labor breathing     Cardiac: 68    Pain/Nausea: denies any nausea, complain  of pain, IV and oral dilaudid as well as Toradol with some relief,     Lines/Drains: left PIV SL,     GI/: up voiding not saving, audible BS no BM     Diet/Appetite: tolerating regular diet, had good lunch.    Activity: up independently ambulating     Plan: resting, continue with plan of care.

## 2023-08-24 VITALS
DIASTOLIC BLOOD PRESSURE: 66 MMHG | TEMPERATURE: 98.1 F | HEIGHT: 62 IN | SYSTOLIC BLOOD PRESSURE: 106 MMHG | WEIGHT: 126 LBS | HEART RATE: 54 BPM | RESPIRATION RATE: 16 BRPM | BODY MASS INDEX: 23.19 KG/M2 | OXYGEN SATURATION: 98 %

## 2023-08-24 LAB
LABORATORY REPORT: NORMAL
PLPETH BLD-MCNC: <10 NG/ML
POPETH BLD-MCNC: <10 NG/ML

## 2023-08-24 PROCEDURE — 250N000013 HC RX MED GY IP 250 OP 250 PS 637: Performed by: NURSE PRACTITIONER

## 2023-08-24 PROCEDURE — 250N000013 HC RX MED GY IP 250 OP 250 PS 637: Performed by: PHYSICIAN ASSISTANT

## 2023-08-24 PROCEDURE — 99207 PR APP CREDIT; MD BILLING SHARED VISIT: CPT | Performed by: STUDENT IN AN ORGANIZED HEALTH CARE EDUCATION/TRAINING PROGRAM

## 2023-08-24 PROCEDURE — 250N000013 HC RX MED GY IP 250 OP 250 PS 637: Performed by: HOSPITALIST

## 2023-08-24 PROCEDURE — 99239 HOSP IP/OBS DSCHRG MGMT >30: CPT | Mod: FS | Performed by: PHYSICIAN ASSISTANT

## 2023-08-24 RX ORDER — HYDROMORPHONE HYDROCHLORIDE 4 MG/1
TABLET ORAL
Qty: 27 TABLET | Refills: 0 | Status: SHIPPED | OUTPATIENT
Start: 2023-08-24 | End: 2023-08-30

## 2023-08-24 RX ORDER — GABAPENTIN 300 MG/1
600 CAPSULE ORAL 3 TIMES DAILY
COMMUNITY
Start: 2023-08-24 | End: 2023-10-16 | Stop reason: DRUGHIGH

## 2023-08-24 RX ADMIN — GABAPENTIN 600 MG: 300 CAPSULE ORAL at 13:45

## 2023-08-24 RX ADMIN — GABAPENTIN 600 MG: 300 CAPSULE ORAL at 07:26

## 2023-08-24 RX ADMIN — HYDROMORPHONE HYDROCHLORIDE 4 MG: 4 TABLET ORAL at 02:22

## 2023-08-24 RX ADMIN — PANCRELIPASE 2 CAPSULE: 24000; 76000; 120000 CAPSULE, DELAYED RELEASE PELLETS ORAL at 13:46

## 2023-08-24 RX ADMIN — ACETAMINOPHEN 975 MG: 325 TABLET, FILM COATED ORAL at 07:26

## 2023-08-24 RX ADMIN — HYDROMORPHONE HYDROCHLORIDE 4 MG: 4 TABLET ORAL at 10:44

## 2023-08-24 RX ADMIN — BUSPIRONE HYDROCHLORIDE 10 MG: 5 TABLET ORAL at 07:26

## 2023-08-24 RX ADMIN — PANCRELIPASE 2 CAPSULE: 24000; 76000; 120000 CAPSULE, DELAYED RELEASE PELLETS ORAL at 07:31

## 2023-08-24 RX ADMIN — HYDROMORPHONE HYDROCHLORIDE 4 MG: 4 TABLET ORAL at 07:10

## 2023-08-24 RX ADMIN — HYDROMORPHONE HYDROCHLORIDE 4 MG: 4 TABLET ORAL at 13:45

## 2023-08-24 RX ADMIN — ACETAMINOPHEN 975 MG: 325 TABLET, FILM COATED ORAL at 13:45

## 2023-08-24 RX ADMIN — CITALOPRAM HYDROBROMIDE 40 MG: 20 TABLET ORAL at 07:26

## 2023-08-24 ASSESSMENT — ACTIVITIES OF DAILY LIVING (ADL)
ADLS_ACUITY_SCORE: 22

## 2023-08-24 NOTE — PLAN OF CARE
"Goal Outcome Evaluation:      Plan of Care Reviewed With: patient    Overall Patient Progress: improvingOverall Patient Progress: improving    Outcome Evaluation: Patient states \"pain is improving\" on curent pain regimen      /56 (BP Location: Left arm)   Pulse 54   Temp 97.7  F (36.5  C) (Oral)   Resp 16   Ht 1.575 m (5' 2\")   Wt 57.2 kg (126 lb)   LMP 08/16/2023   SpO2 98%   BMI 23.05 kg/m        Activity: up ad zechariah   Neuros: alert and orientated x 4, calm and cooperative   Cardiac:  bradycardic in the 50's, otherwise WNL   Respiratory: O2 sats high 90's on RA, unlabored    GI/: abdomen soft/tender.  No BM this shift.  Voiding spont   Diet: regular as tolerated   Lines: PIV   Incisions/Drains: n/a    Labs: reviewed   Pain/nausea: \"adequate\" pain control on prn PO dilaudid and prn IV toradol and IV dilaudid break thru pain    New changes this shift: none   Plan: continue POC        "

## 2023-08-24 NOTE — PLAN OF CARE
Vitals:    08/23/23 0720 08/23/23 1526 08/23/23 2200 08/24/23 0802   BP: 113/67 104/66 103/56 106/66   BP Location: Right arm Left arm Left arm Left arm   Pulse: 56 68 54 54   Resp: 16 16 16 16   Temp: 98.1  F (36.7  C) 97.8  F (36.6  C) 97.7  F (36.5  C) 98.1  F (36.7  C)   TempSrc: Oral Oral Oral Oral   SpO2: 97% 98% 98% 98%   Weight:       Height:           Neuro: alert and oriented,     VS: afebrile vitally stable     Cardiac: bradycardic.    Pain/Nausea: denies nausea, po dilaudid for pain with relief     Lines/Drains: PIV discontinued,    GI/: up voiding, audible BS passing gas,     Diet/Appetite:good appetite, tolerating regular diet     Activity:up independently,    Plan: discharge script written, teaching has been done, a copy of discharge given to pt,           Pt discharged home.

## 2023-08-24 NOTE — DISCHARGE SUMMARY
[facility]    Internal Medicine Discharge Summary - Hospitalist Service, Gold 6    Date of Admission: 8/18/2023  Date of Discharge: 8/24/2023  Discharging Provider: Chata Lake PA-C/Misha Espinosa MD  Discharging Team: Gold 6    Follow-ups Needed After Discharge   PCP early next week     Discharge Diagnoses   #Acute on Chronic Abdominal Pain vs. Acute on Chronic Pancreatitis  #Chronic Calcific Pancreatitis, Alcohol Associated  #Alcohol Use Disorder, in Remission  #Depression  #Panic Disorder  #Severe Malnutrition     Hospital Course   Artie Burger is a 27 year old woman with a history of chronic calcific pancreatitis, depression, panic attacks, and alcohol use disorder in remission admitted for abdominal pain, nausea, and vomiting x 1 week.       The following problems were addressed during this hospitalization:    #Acute on Chronic Abdominal Pain vs. Acute on Chronic Pancreatitis.   #Chronic Calcific Pancreatitis, Alcohol Associated.   #Alcohol Use Disorder, in Remission.   Came to ED at recommendation of GI team. S/p multiple ERCPs in the past, last 7/12/23 stent exchange. Hopeful for TPIAT next month (? 9/22 per patient but not yet scheduled in system). Had previously normal gastric emptying study. GI followed here. Symptoms were initially slow to improve prompting consideration of ERCP, but ultimately GI would like to hold off on further ERCPs until TPIAT surgery and not felt to be helpful at this time. Initially was requiring frequent IV Dilaudid as PO oxycodone (prescribed by PCP PTA; on and off x ~1 year) was not helping. Trialed opioid rotation from PO oxycodone to PO Dilaudid with good results. Patient was able to wean off IV Dilaudid. Discharged with 6 day supply (tapering by 1 dose/day) of 4 mg Dilaudid tabs - discussed with patient and call placed to primary care clinic that she will need close follow up early next week for discussion of further taper vs.  "refill. Suspect she may need some degree of opioids leading up to her TPIAT with this recent prolonged flare, but encouraged her to work towards lowest amount ahead of major surgery. Did discuss starting buprenorphine for chronic pain (previously recommended by pain management who did not feel a consult was warranted this admission for patient), but she was not interested at this time. Encouraged continued use of non opioid pharmacologics as well - Tylenol, gabapentin (also used IV Toradol here). Is tolerating a regular diet and feels ready to go home. Of note, PETh tests 1/18/23 and 8/18/23 were negative, however was rechecked by GI while here on 8/21/23 and is elevated at 62 - no recent transfusions and patient is adamant that she just celebrated her 3 year sobriety anniversary. LFTs are wnl and there has been no e/o intoxication here. This was discussed with patient and a repeat PETh is pending which should be followed by her transplant team.     Consultations This Hospital Stay   GI    Physical Exam   Blood pressure 106/66, pulse 54, temperature 98.1  F (36.7  C), temperature source Oral, resp. rate 16, height 1.575 m (5' 2\"), weight 57.2 kg (126 lb), last menstrual period 08/16/2023, SpO2 98 %, not currently breastfeeding.  GENERAL: Alert and oriented x 3. Lying in bed, appears comfortable. Pleasant and conversant.   HEENT: Anicteric sclera. Mucous membranes moist.   CV: RRR. S1, S2. No murmurs appreciated.   RESPIRATORY: Effort normal on room air. Lungs CTAB.  GI: Abdomen soft and non distended, bowel sounds present. Mild epigastric tenderness. No rebound or guarding.   NEUROLOGICAL: No focal deficits. Moves all extremities.   EXTREMITIES: No peripheral edema.   SKIN: No jaundice. No rashes.      Significant Results and Procedures   CT A/P 8/18/23:  IMPRESSION:   1.  No evidence for pancreatic duct stent bowel function.  2.  There is some thickening involving the proximal jejunum which appears stable since " 04/18/2023 likely secondary to irritation from the pancreatic stent.  3.  2 small hemangiomas in the liver.  4.  Sequelae of pancreatitis. No pancreatic duct or bile duct dilatation.    RUQ US 8/18/23:  IMPRESSION:   1.  Heterogenous pancreatic parenchyma with calcifications suggestive  of chronic pancreatitis with pancreatic stent in place.  2.  Dilated common bile duct and central intrahepatic biliary ductal  dilatation. No obstructing lesion identified.  3.  Focal echogenic lesion in the liver, favor hemangioma    Pending Results   These results will be followed up by transplant surgery   Unresulted Labs Ordered in the Past 30 Days of this Admission       Date and Time Order Name Status Description    8/23/2023  9:09 AM Phosphatidylethanol (PEth), Whole Blood In process             Primary Care Physician   Lila Gallegos    Discharge Disposition   Discharged to home  Condition at discharge: Stable    Code Status   Full Code    Discharge Orders      Reason for your hospital stay    Admitted with acute on chronic abdominal pain/pancreatitis.     Activity    Your activity upon discharge: activity as tolerated     Follow Up and recommended labs and tests    Notified your primary care clinic of your discharge and Dilaudid prescription. They will try to assist with getting you into clinic for follow up early next week. Continue to follow with the transplant team as well ahead of potential surgery next month.     Diet    Follow this diet upon discharge: Regular       Discharge Medications   Current Discharge Medication List        START taking these medications    Details   HYDROmorphone (DILAUDID) 4 MG tablet Take 1 tablet (4 mg) by mouth every 3 hours as needed for moderate pain for 1 day, THEN 1 tablet (4 mg) every 4 hours as needed for moderate pain for 1 day, THEN 1 tablet (4 mg) every 6 hours as needed for moderate pain for 1 day, THEN 1 tablet (4 mg) every 8 hours as needed for moderate pain.  Qty: 27 tablet,  Refills: 0    Associated Diagnoses: Acute on chronic pancreatitis (H)           CONTINUE these medications which have CHANGED    Details   gabapentin (NEURONTIN) 300 MG capsule Take 2 capsules (600 mg) by mouth 3 times daily           CONTINUE these medications which have NOT CHANGED    Details   acetaminophen (TYLENOL) 500 MG tablet Take 500-1,000 mg by mouth every 6 hours as needed for mild pain      busPIRone (BUSPAR) 10 MG tablet Take 10 mg by mouth 2 times daily      cetirizine (ZYRTEC) 10 MG tablet Take 10 mg by mouth daily as needed      citalopram (CELEXA) 40 MG tablet Take 1 tablet by mouth daily      famotidine (PEPCID) 20 MG tablet Take 20 mg by mouth daily as needed      hydrOXYzine (ATARAX) 25 MG tablet Take 25 mg by mouth daily      medroxyPROGESTERone (DEPO-PROVERA) 150 MG/ML IM injection Inject 150 mg into the muscle every 3 months      Multiple Vitamin (TAB-A-FABI) TABS Take 1 tablet by mouth daily      ondansetron (ZOFRAN-ODT) 4 MG ODT tab Place 4-8 mg under the tongue every 6 hours as needed      pancrelipase, lip-prot-amyl, 3000 UNITS (CREON) CPEP Take 2 capsules by mouth 3 times daily (with meals)           STOP taking these medications       ibuprofen (ADVIL/MOTRIN) 200 MG capsule Comments:   Reason for Stopping:         oxyCODONE IR (ROXICODONE) 10 MG tablet Comments:   Reason for Stopping:               Time Spent on this Encounter   35 minutes spent in discharge     Patient was evaluated on day of discharge by attending physician who agrees with plan of care.     Chata Lake PA-C  Hospitalist [facility]  Pager: [phone]

## 2023-08-25 ENCOUNTER — PREP FOR PROCEDURE (OUTPATIENT)
Dept: TRANSPLANT | Facility: CLINIC | Age: 28
End: 2023-08-25
Payer: COMMERCIAL

## 2023-08-25 DIAGNOSIS — K86.1 CHRONIC PANCREATITIS (H): Primary | ICD-10-CM

## 2023-08-25 LAB
LABORATORY REPORT: NORMAL
PLPETH BLD-MCNC: <10 NG/ML
POPETH BLD-MCNC: <10 NG/ML

## 2023-09-08 ENCOUNTER — PATIENT OUTREACH (OUTPATIENT)
Dept: GASTROENTEROLOGY | Facility: CLINIC | Age: 28
End: 2023-09-08
Payer: COMMERCIAL

## 2023-09-08 ENCOUNTER — ANESTHESIA EVENT (OUTPATIENT)
Dept: SURGERY | Facility: CLINIC | Age: 28
End: 2023-09-08
Payer: COMMERCIAL

## 2023-09-08 RX ORDER — HYDROMORPHONE HYDROCHLORIDE 1 MG/ML
0.4 INJECTION, SOLUTION INTRAMUSCULAR; INTRAVENOUS; SUBCUTANEOUS EVERY 5 MIN PRN
Status: CANCELLED | OUTPATIENT
Start: 2023-09-08

## 2023-09-08 RX ORDER — SODIUM CHLORIDE, SODIUM LACTATE, POTASSIUM CHLORIDE, CALCIUM CHLORIDE 600; 310; 30; 20 MG/100ML; MG/100ML; MG/100ML; MG/100ML
INJECTION, SOLUTION INTRAVENOUS CONTINUOUS
Status: CANCELLED | OUTPATIENT
Start: 2023-09-08

## 2023-09-08 RX ORDER — ONDANSETRON 4 MG/1
4 TABLET, ORALLY DISINTEGRATING ORAL EVERY 30 MIN PRN
Status: CANCELLED | OUTPATIENT
Start: 2023-09-08

## 2023-09-08 RX ORDER — ONDANSETRON 2 MG/ML
4 INJECTION INTRAMUSCULAR; INTRAVENOUS EVERY 30 MIN PRN
Status: CANCELLED | OUTPATIENT
Start: 2023-09-08

## 2023-09-08 RX ORDER — LABETALOL HYDROCHLORIDE 5 MG/ML
10 INJECTION, SOLUTION INTRAVENOUS
Status: CANCELLED | OUTPATIENT
Start: 2023-09-08

## 2023-09-08 RX ORDER — HYDROMORPHONE HYDROCHLORIDE 1 MG/ML
0.2 INJECTION, SOLUTION INTRAMUSCULAR; INTRAVENOUS; SUBCUTANEOUS EVERY 5 MIN PRN
Status: CANCELLED | OUTPATIENT
Start: 2023-09-08

## 2023-09-08 RX ORDER — FENTANYL CITRATE 50 UG/ML
50 INJECTION, SOLUTION INTRAMUSCULAR; INTRAVENOUS EVERY 5 MIN PRN
Status: CANCELLED | OUTPATIENT
Start: 2023-09-08

## 2023-09-08 RX ORDER — OXYCODONE HYDROCHLORIDE 10 MG/1
10 TABLET ORAL
Status: CANCELLED | OUTPATIENT
Start: 2023-09-08

## 2023-09-08 RX ORDER — LIDOCAINE 40 MG/G
CREAM TOPICAL
Status: CANCELLED | OUTPATIENT
Start: 2023-09-08

## 2023-09-08 RX ORDER — FENTANYL CITRATE 50 UG/ML
25 INJECTION, SOLUTION INTRAMUSCULAR; INTRAVENOUS EVERY 5 MIN PRN
Status: CANCELLED | OUTPATIENT
Start: 2023-09-08

## 2023-09-08 RX ORDER — OXYCODONE HYDROCHLORIDE 5 MG/1
5 TABLET ORAL
Status: CANCELLED | OUTPATIENT
Start: 2023-09-08

## 2023-09-08 ASSESSMENT — LIFESTYLE VARIABLES: TOBACCO_USE: 1

## 2023-09-08 NOTE — PROGRESS NOTES
Attempted to reach Artie to discuss ERCP for Monday 9/11. Received message that Artie believed she was to cancel ERCP. Upon review of [employer] communication, I see that we had a misunderstanding. LVM requesting she call me back as soon as she is able.     Melvi Humphreys, RN Care Coordinator

## 2023-09-08 NOTE — ANESTHESIA PREPROCEDURE EVALUATION
Anesthesia Pre-Procedure Evaluation    Patient: Artie Burger   MRN: [id number] : 1995        Procedure : Procedure(s):  ENDOSCOPIC RETROGRADE CHOLANGIOPANCREATOGRAPHY          Past Medical History:   Diagnosis Date    Alcohol-induced chronic pancreatitis (H)     Anxiety     Depression     Gastroesophageal reflux disease     Pancreatic disease     PONV (postoperative nausea and vomiting)       Past Surgical History:   Procedure Laterality Date    ENDOSCOPIC RETROGRADE CHOLANGIOPANCREATOGRAM COMPLEX N/A 2021    Procedure: ENDOSCOPIC RETROGRADE CHOLANGIOPANCREATOGRAPHY with pancreatic sphincterotomy, dilation, stone removal, stent placement;  Surgeon: Guru Sabino Campuzano MD;  Location: [facility] OR    ENDOSCOPIC RETROGRADE CHOLANGIOPANCREATOGRAM WITH DIRECT VISUALIZATION SYSTEM AND GENERATOR N/A 2021    Procedure: ENDOSCOPIC RETROGRADE CHOLANGIOPANCREATOGRAPHY, dilation and debridement sludge removal, pancreatic duct stent placement;  Surgeon: Guru Sabino Campuzano MD;  Location: [facility] OR    ENDOSCOPIC RETROGRADE CHOLANGIOPANCREATOGRAM WITH SPYGLASS N/A 10/6/2021    Procedure: ENDOSCOPIC RETROGRADE CHOLANGIOPANCREATOGRAPHY, WITH DIRECT DUCT VISUALIZATION, USING PANCREATICOBILIARY FIBEROPTIC PROBE, BILE DUCT STENT EXCHANGE, LITHOTRIPSY OF PANCREATIC STONE, SPHINCTEROTOMY, BALLOON DILATION OF PANCREATIC DUCT AND STONE EXTRACTION;  Surgeon: Guru Sabino Campuzano MD;  Location: [facility] OR    ENDOSCOPIC RETROGRADE CHOLANGIOPANCREATOGRAM WITH SPYGLASS N/A 2021    Procedure: ENDOSCOPIC RETROGRADE CHOLANGIOPANCREATOGRAPHY, with pancreatic stent exchange, ballon dilation, stone removal, with spyglass direct visualization;  Surgeon: Guru Sabino Campuzano MD;  Location: [facility]    ENDOSCOPIC RETROGRADE CHOLANGIOPANCREATOGRAPHY, EXCHANGE TUBE/STENT N/A 2022    Procedure: ENDOSCOPIC RETROGRADE CHOLANGIOPANCREATOGRAPH with pancreatic dilation, debris  removal and stent exchange.;  Surgeon: Guru Sabino Campuzano MD;  Location: [facility]    ENDOSCOPIC RETROGRADE CHOLANGIOPANCREATOGRAPHY, EXCHANGE TUBE/STENT N/A 4/4/2022    Procedure: ENDOSCOPIC RETROGRADE CHOLANGIOPANCREATOGRAPHY, WITH REPLACEMENT OF pancreatic STENT, stone removal;  Surgeon: Guru Sabino Campuzano MD;  Location: [facility]    ENDOSCOPIC RETROGRADE CHOLANGIOPANCREATOGRAPHY, EXCHANGE TUBE/STENT N/A 7/13/2022    Procedure: ENDOSCOPIC RETROGRADE CHOLANGIOPANCREATOGRAPHY, WITH pancreatic duct dilation and stent exchange, biliary stent placement, debris removal;  Surgeon: Guru Sabino Campuzano MD;  Location: [facility]    ENDOSCOPIC RETROGRADE CHOLANGIOPANCREATOGRAPHY, EXCHANGE TUBE/STENT N/A 9/14/2022    Procedure: ENDOSCOPIC RETROGRADE CHOLANGIOPANCREATOGRAPHY, WITH pancreatic stone removal, biliary stent removal, pancreatic stent exchange;  Surgeon: Guru Sabino Campuzano MD;  [facility]: [facility]    ENDOSCOPIC RETROGRADE CHOLANGIOPANCREATOGRAPHY, EXCHANGE TUBE/STENT N/A 11/21/2022    Procedure: ENDOSCOPIC RETROGRADE CHOLANGIOPANCREATOGRAPHY WITH PANCREATIC DUCT STENT EXCHANGE, DILATION, AND DEBRIS REMOVAL;  Surgeon: Guru Sabino Campuzano MD;  [facility]: [facility]    ENDOSCOPIC RETROGRADE CHOLANGIOPANCREATOGRAPHY, EXCHANGE TUBE/STENT N/A 1/18/2023    Procedure: ENDOSCOPIC RETROGRADE CHOLANGIOPANCREATOGRAPHY stent exchange, dilation, sludge and stone removal;  Surgeon: Guru Sabino Campuzano MD;  [facility]: [facility]    ENDOSCOPIC RETROGRADE CHOLANGIOPANCREATOGRAPHY, EXCHANGE TUBE/STENT N/A 3/13/2023    Procedure: ENDOSCOPIC RETROGRADE CHOLANGIOPANCREATOGRAPHY, WITH replacement of pancreatic stents, bile duct stent placed,sphinterotomy of bile duct ampulla, balloon sweep of bile duct for sludge,;  Surgeon: Guru Sabnio Campuzano MD;  Location: [facility]    ENDOSCOPIC RETROGRADE CHOLANGIOPANCREATOGRAPHY, EXCHANGE TUBE/STENT N/A  5/10/2023    Procedure: ENDOSCOPIC RETROGRADE CHOLANGIOPANCREATOGRAPHY with pancreatic stents exchange and stone extraction;  Surgeon: Guru Sabino Campuzano MD;  Location: [facility]    ENDOSCOPIC RETROGRADE CHOLANGIOPANCREATOGRAPHY, EXCHANGE TUBE/STENT N/A 2023    Procedure: ENDOSCOPIC RETROGRADE CHOLANGIOPANCREATOGRAPHY, WITH REPLACEMENT OF STENT X3 AND BALLOON SWEEP OF BILE DUCT FOR STONES;  Surgeon: Guru Sabino Campuzano MD;  Location: [facility]      No Known Allergies   Social History     Tobacco Use    Smoking status: Former     Types: Cigarettes     Quit date: 2022     Years since quittin.3    Smokeless tobacco: Current    Tobacco comments:     Vapes daily   Substance Use Topics    Alcohol use: Not Currently      Wt Readings from Last 1 Encounters:   23 57.2 kg (126 lb)        Anesthesia Evaluation            ROS/MED HX  ENT/Pulmonary:     (+)                tobacco use, Current use,                      Neurologic:  - neg neurologic ROS     Cardiovascular:  - neg cardiovascular ROS     METS/Exercise Tolerance:     Hematologic:  - neg hematologic  ROS     Musculoskeletal:  - neg musculoskeletal ROS     GI/Hepatic: Comment: Chr. pancreatitis    (+) GERD,                   Renal/Genitourinary:  - neg Renal ROS     Endo:  - neg endo ROS     Psychiatric/Substance Use:     (+) psychiatric history anxiety and depression alcohol abuse  Recreational drug usage: Cannabis.    Infectious Disease:  - neg infectious disease ROS     Malignancy:  - neg malignancy ROS     Other:               OUTSIDE LABS:  CBC:   Lab Results   Component Value Date    WBC 7.1 2023    WBC 10.0 2023    HGB 9.4 (L) 2023    HGB 9.0 (L) 2023    HCT 27.6 (L) 2023    HCT 26.3 (L) 2023     2023     2023     BMP:   Lab Results   Component Value Date     2023     2023    POTASSIUM 4.7 2023    POTASSIUM 4.0 2023     CHLORIDE 105 08/23/2023    CHLORIDE 105 08/19/2023    CO2 23 08/23/2023    CO2 22 08/19/2023    BUN 9.5 08/23/2023    BUN 5.9 (L) 08/19/2023    CR 0.91 08/23/2023    CR 0.61 08/19/2023    GLC 88 08/23/2023    GLC 78 08/19/2023     COAGS:   Lab Results   Component Value Date    INR 1.19 (H) 08/19/2023     POC:   Lab Results   Component Value Date    HCG Negative 07/13/2022    HCGS Negative 08/18/2023     HEPATIC:   Lab Results   Component Value Date    ALBUMIN 4.1 08/23/2023    PROTTOTAL 6.9 08/23/2023    ALT 11 08/23/2023    AST 16 08/23/2023    ALKPHOS 59 08/23/2023    BILITOTAL 0.2 08/23/2023     OTHER:   Lab Results   Component Value Date    A1C 5.3 06/28/2023    MURTAZA 10.3 (H) 08/23/2023    PHOS 4.6 (H) 08/22/2023    MAG 1.7 08/22/2023    LIPASE 11 (L) 08/18/2023    AMYLASE 62 06/28/2023    TSH 1.68 08/18/2023       Anesthesia Plan    ASA Status:  2    NPO Status:  NPO Appropriate    Anesthesia Type: General.     - Airway: ETT   Induction: Intravenous, Propofol.   Maintenance: Balanced.   Techniques and Equipment:     - Lines/Monitors: BIS     Consents            Postoperative Care    Pain management: IV analgesics, Oral pain medications, Multi-modal analgesia.   PONV prophylaxis: Ondansetron (or other 5HT-3), Dexamethasone or Solumedrol     Comments:                Roberto Hopkins MD

## 2023-09-08 NOTE — PROGRESS NOTES
Patient returned my call. States that she has been feeling okay for the past few weeks and does not wish to pursue ERCP at this time. Will cancel procedure for Monday morning.     Melvi Humphreys RN Care Coordinator

## 2023-09-10 RX ORDER — INDOMETHACIN 50 MG/1
100 SUPPOSITORY RECTAL
Status: CANCELLED | OUTPATIENT
Start: 2023-09-10

## 2023-09-10 RX ORDER — LIDOCAINE 40 MG/G
CREAM TOPICAL
Status: CANCELLED | OUTPATIENT
Start: 2023-09-10

## 2023-09-11 ENCOUNTER — ANESTHESIA (OUTPATIENT)
Dept: SURGERY | Facility: CLINIC | Age: 28
End: 2023-09-11
Payer: COMMERCIAL

## 2023-09-14 DIAGNOSIS — K86.0 ALCOHOL-INDUCED CHRONIC PANCREATITIS (H): ICD-10-CM

## 2023-09-14 DIAGNOSIS — K86.1 CHRONIC PANCREATITIS (H): Primary | ICD-10-CM

## 2023-09-19 ENCOUNTER — TELEPHONE (OUTPATIENT)
Dept: EDUCATION SERVICES | Facility: CLINIC | Age: 28
End: 2023-09-19
Payer: COMMERCIAL

## 2023-09-19 PROBLEM — D50.8 IRON DEFICIENCY ANEMIA SECONDARY TO INADEQUATE DIETARY IRON INTAKE: Status: ACTIVE | Noted: 2021-10-28

## 2023-09-19 PROBLEM — F32.1 CURRENT MODERATE EPISODE OF MAJOR DEPRESSIVE DISORDER (H): Status: ACTIVE | Noted: 2019-01-24

## 2023-09-19 PROBLEM — F41.0 PANIC ATTACKS: Status: ACTIVE | Noted: 2019-01-24

## 2023-09-19 PROBLEM — G47.00 INSOMNIA: Status: ACTIVE | Noted: 2021-07-07

## 2023-09-19 PROBLEM — F10.11 ALCOHOL ABUSE, IN REMISSION: Status: ACTIVE | Noted: 2018-07-25

## 2023-09-19 PROBLEM — E34.9: Status: ACTIVE | Noted: 2023-09-19

## 2023-09-19 PROBLEM — R73.03 PRE-DIABETES: Status: ACTIVE | Noted: 2023-09-19

## 2023-09-19 PROBLEM — R10.84 GENERALIZED ABDOMINAL PAIN: Status: ACTIVE | Noted: 2022-06-09

## 2023-09-19 PROBLEM — M85.859 OSTEOPENIA OF HIP: Status: ACTIVE | Noted: 2021-08-10

## 2023-09-19 PROBLEM — E10.9 TYPE 1 DIABETES MELLITUS (H): Status: ACTIVE | Noted: 2023-09-19

## 2023-09-19 NOTE — TELEPHONE ENCOUNTER
FUTURE VISIT INFORMATION      SURGERY INFORMATION:  Date: 9/22/23  [facility]  Surgeon:  Junito Patel MD   Anesthesia Type:  General with Block  Procedure: PANCREATECTOMY, TOTAL, WITH AUTOLOGOUS PANCREATIC ISLET CELL TRANSPLANT, Cholecystectomy, Splenectomy, gastrojejunostomy placement, and possible incidental appendectomy     RECORDS REQUESTED FROM:       Primary Care Provider: Phan

## 2023-09-19 NOTE — TELEPHONE ENCOUNTER
Patient has questions for Drew Chiang.    Message forwarded to Drew.    Nalini Bush RN, Diabetes Educator  Diabetes Education Department  [facility], [place]  [phone]

## 2023-09-19 NOTE — TELEPHONE ENCOUNTER
[facility] Call Center    Phone Message    May a detailed message be left on voicemail: yes     Reason for Call: Other:   Per pt is wanting to schedule but have a few questions. Please call pt back before scheduling. Please and thank you!    Action Taken: Message routed to:  [facility] ENDO    Travel Screening: Not Applicable

## 2023-09-20 LAB
ABO/RH(D): NORMAL
ANTIBODY SCREEN: NEGATIVE
SPECIMEN EXPIRATION DATE: NORMAL

## 2023-09-20 NOTE — TELEPHONE ENCOUNTER
Cassandra wanted to know when to come to OR on Friday as they have not called yet, but I told her to come 2 hours before her surgery so 0530am will be her arrival time. No further questions

## 2023-09-21 ENCOUNTER — LAB (OUTPATIENT)
Dept: LAB | Facility: CLINIC | Age: 28
End: 2023-09-21
Payer: COMMERCIAL

## 2023-09-21 ENCOUNTER — PRE VISIT (OUTPATIENT)
Dept: SURGERY | Facility: CLINIC | Age: 28
End: 2023-09-21

## 2023-09-21 ENCOUNTER — OFFICE VISIT (OUTPATIENT)
Dept: TRANSPLANT | Facility: CLINIC | Age: 28
End: 2023-09-21
Attending: SURGERY
Payer: COMMERCIAL

## 2023-09-21 VITALS
RESPIRATION RATE: 18 BRPM | OXYGEN SATURATION: 98 % | HEIGHT: 62 IN | BODY MASS INDEX: 23.74 KG/M2 | HEART RATE: 99 BPM | WEIGHT: 129 LBS | SYSTOLIC BLOOD PRESSURE: 112 MMHG | TEMPERATURE: 98.8 F | DIASTOLIC BLOOD PRESSURE: 72 MMHG

## 2023-09-21 DIAGNOSIS — K86.0 ALCOHOL-INDUCED CHRONIC PANCREATITIS (H): ICD-10-CM

## 2023-09-21 DIAGNOSIS — K86.0 ALCOHOL-INDUCED CHRONIC PANCREATITIS (H): Primary | ICD-10-CM

## 2023-09-21 DIAGNOSIS — K86.1 CHRONIC PANCREATITIS (H): ICD-10-CM

## 2023-09-21 LAB
ALBUMIN UR-MCNC: 10 MG/DL
AMORPH CRY #/AREA URNS HPF: ABNORMAL /HPF
APPEARANCE UR: ABNORMAL
BASOPHILS # BLD AUTO: 0.1 10E3/UL (ref 0–0.2)
BASOPHILS NFR BLD AUTO: 1 %
BILIRUB UR QL STRIP: NEGATIVE
CHOLEST SERPL-MCNC: 144 MG/DL
COLOR UR AUTO: YELLOW
CREAT UR-MCNC: 163 MG/DL
EOSINOPHIL # BLD AUTO: 0.1 10E3/UL (ref 0–0.7)
EOSINOPHIL NFR BLD AUTO: 1 %
ERYTHROCYTE [DISTWIDTH] IN BLOOD BY AUTOMATED COUNT: 14 % (ref 10–15)
GLUCOSE UR STRIP-MCNC: NEGATIVE MG/DL
HCT VFR BLD AUTO: 35.2 % (ref 35–47)
HDLC SERPL-MCNC: 54 MG/DL
HGB BLD-MCNC: 11.9 G/DL (ref 11.7–15.7)
HGB UR QL STRIP: NEGATIVE
IMM GRANULOCYTES # BLD: 0 10E3/UL
IMM GRANULOCYTES NFR BLD: 0 %
INR PPP: 1.13 (ref 0.85–1.15)
KETONES UR STRIP-MCNC: NEGATIVE MG/DL
LDLC SERPL CALC-MCNC: 75 MG/DL
LEUKOCYTE ESTERASE UR QL STRIP: NEGATIVE
LYMPHOCYTES # BLD AUTO: 3 10E3/UL (ref 0.8–5.3)
LYMPHOCYTES NFR BLD AUTO: 22 %
MCH RBC QN AUTO: 29.6 PG (ref 26.5–33)
MCHC RBC AUTO-ENTMCNC: 33.8 G/DL (ref 31.5–36.5)
MCV RBC AUTO: 88 FL (ref 78–100)
MICROALBUMIN UR-MCNC: <12 MG/L
MICROALBUMIN/CREAT UR: NORMAL MG/G{CREAT}
MONOCYTES # BLD AUTO: 1.1 10E3/UL (ref 0–1.3)
MONOCYTES NFR BLD AUTO: 8 %
MUCOUS THREADS #/AREA URNS LPF: PRESENT /LPF
NEUTROPHILS # BLD AUTO: 9.8 10E3/UL (ref 1.6–8.3)
NEUTROPHILS NFR BLD AUTO: 68 %
NITRATE UR QL: NEGATIVE
NONHDLC SERPL-MCNC: 90 MG/DL
NRBC # BLD AUTO: 0 10E3/UL
NRBC BLD AUTO-RTO: 0 /100
PH UR STRIP: 7 [PH] (ref 5–7)
PLATELET # BLD AUTO: 431 10E3/UL (ref 150–450)
RBC # BLD AUTO: 4.02 10E6/UL (ref 3.8–5.2)
RBC URINE: 2 /HPF
SP GR UR STRIP: 1.02 (ref 1–1.03)
SQUAMOUS EPITHELIAL: 1 /HPF
TRIGL SERPL-MCNC: 73 MG/DL
UROBILINOGEN UR STRIP-MCNC: NORMAL MG/DL
WBC # BLD AUTO: 14.1 10E3/UL (ref 4–11)
WBC URINE: <1 /HPF

## 2023-09-21 PROCEDURE — 84590 ASSAY OF VITAMIN A: CPT | Mod: 90 | Performed by: PATHOLOGY

## 2023-09-21 PROCEDURE — 86850 RBC ANTIBODY SCREEN: CPT | Performed by: SURGERY

## 2023-09-21 PROCEDURE — 99214 OFFICE O/P EST MOD 30 MIN: CPT | Mod: 57 | Performed by: SURGERY

## 2023-09-21 PROCEDURE — 36415 COLL VENOUS BLD VENIPUNCTURE: CPT | Performed by: PATHOLOGY

## 2023-09-21 PROCEDURE — 86901 BLOOD TYPING SEROLOGIC RH(D): CPT | Performed by: SURGERY

## 2023-09-21 PROCEDURE — 82570 ASSAY OF URINE CREATININE: CPT | Performed by: SURGERY

## 2023-09-21 PROCEDURE — 82306 VITAMIN D 25 HYDROXY: CPT | Performed by: SURGERY

## 2023-09-21 PROCEDURE — 85610 PROTHROMBIN TIME: CPT | Performed by: PATHOLOGY

## 2023-09-21 PROCEDURE — 84446 ASSAY OF VITAMIN E: CPT | Mod: 90 | Performed by: PATHOLOGY

## 2023-09-21 PROCEDURE — 99000 SPECIMEN HANDLING OFFICE-LAB: CPT | Performed by: PATHOLOGY

## 2023-09-21 PROCEDURE — G0463 HOSPITAL OUTPT CLINIC VISIT: HCPCS | Performed by: SURGERY

## 2023-09-21 PROCEDURE — 81001 URINALYSIS AUTO W/SCOPE: CPT | Performed by: PATHOLOGY

## 2023-09-21 PROCEDURE — 84134 ASSAY OF PREALBUMIN: CPT | Performed by: SURGERY

## 2023-09-21 PROCEDURE — 85025 COMPLETE CBC W/AUTO DIFF WBC: CPT | Performed by: PATHOLOGY

## 2023-09-21 PROCEDURE — 80061 LIPID PANEL: CPT | Performed by: PATHOLOGY

## 2023-09-21 RX ORDER — HYDROMORPHONE HYDROCHLORIDE 4 MG/1
4 TABLET ORAL EVERY 8 HOURS PRN
Status: ON HOLD | COMMUNITY
Start: 2023-09-15 | End: 2023-10-03

## 2023-09-21 ASSESSMENT — PAIN SCALES - GENERAL: PAINLEVEL: MODERATE PAIN (5)

## 2023-09-21 ASSESSMENT — LIFESTYLE VARIABLES: TOBACCO_USE: 1

## 2023-09-21 NOTE — ANESTHESIA PREPROCEDURE EVALUATION
Anesthesia Pre-Procedure Evaluation    Patient: Artie Burger   MRN: [id number] : 1995        Procedure : Procedure(s):  PANCREATECTOMY, TOTAL, WITH AUTOLOGOUS PANCREATIC ISLET CELL TRANSPLANT, Cholecystectomy, Splenectomy, gastrojejunostomy placement, and possible incidental appendectomy          Past Medical History:   Diagnosis Date    Alcohol-induced chronic pancreatitis (H)     Anxiety     Depression     Gastroesophageal reflux disease     Pancreatic disease     PONV (postoperative nausea and vomiting)     Type 1 diabetes mellitus (H) 2023      Past Surgical History:   Procedure Laterality Date    ENDOSCOPIC RETROGRADE CHOLANGIOPANCREATOGRAM COMPLEX N/A 2021    Procedure: ENDOSCOPIC RETROGRADE CHOLANGIOPANCREATOGRAPHY with pancreatic sphincterotomy, dilation, stone removal, stent placement;  Surgeon: Guru Sabino Campuzano MD;  Location: [facility]    ENDOSCOPIC RETROGRADE CHOLANGIOPANCREATOGRAM WITH DIRECT VISUALIZATION SYSTEM AND GENERATOR N/A 2021    Procedure: ENDOSCOPIC RETROGRADE CHOLANGIOPANCREATOGRAPHY, dilation and debridement sludge removal, pancreatic duct stent placement;  Surgeon: Guru Sabino Campuzano MD;  Location: [facility]    ENDOSCOPIC RETROGRADE CHOLANGIOPANCREATOGRAM WITH SPYGLASS N/A 10/6/2021    Procedure: ENDOSCOPIC RETROGRADE CHOLANGIOPANCREATOGRAPHY, WITH DIRECT DUCT VISUALIZATION, USING PANCREATICOBILIARY FIBEROPTIC PROBE, BILE DUCT STENT EXCHANGE, LITHOTRIPSY OF PANCREATIC STONE, SPHINCTEROTOMY, BALLOON DILATION OF PANCREATIC DUCT AND STONE EXTRACTION;  Surgeon: Guru Sabino Campuzano MD;  Location: [facility]    ENDOSCOPIC RETROGRADE CHOLANGIOPANCREATOGRAM WITH SPYGLASS N/A 2021    Procedure: ENDOSCOPIC RETROGRADE CHOLANGIOPANCREATOGRAPHY, with pancreatic stent exchange, ballon dilation, stone removal, with spyglass direct visualization;  Surgeon: Guru Sabino Campuzano MD;  Location[facility]    ENDOSCOPIC  RETROGRADE CHOLANGIOPANCREATOGRAPHY, EXCHANGE TUBE/STENT N/A 2/16/2022    Procedure: ENDOSCOPIC RETROGRADE CHOLANGIOPANCREATOGRAPH with pancreatic dilation, debris removal and stent exchange.;  Surgeon: Guru Sabino Campuzano MD;  Location: [facility]    ENDOSCOPIC RETROGRADE CHOLANGIOPANCREATOGRAPHY, EXCHANGE TUBE/STENT N/A 4/4/2022    Procedure: ENDOSCOPIC RETROGRADE CHOLANGIOPANCREATOGRAPHY, WITH REPLACEMENT OF pancreatic STENT, stone removal;  Surgeon: Guru Sabino Campuzano MD;  Location[facility]    ENDOSCOPIC RETROGRADE CHOLANGIOPANCREATOGRAPHY, EXCHANGE TUBE/STENT N/A 7/13/2022    Procedure: ENDOSCOPIC RETROGRADE CHOLANGIOPANCREATOGRAPHY, WITH pancreatic duct dilation and stent exchange, biliary stent placement, debris removal;  Surgeon: Guru Sabino Campuzano MD;  [facility]    ENDOSCOPIC RETROGRADE CHOLANGIOPANCREATOGRAPHY, EXCHANGE TUBE/STENT N/A 9/14/2022    Procedure: ENDOSCOPIC RETROGRADE CHOLANGIOPANCREATOGRAPHY, WITH pancreatic stone removal, biliary stent removal, pancreatic stent exchange;  Surgeon: Guru Sabino Campuzano MD;  [facility]    ENDOSCOPIC RETROGRADE CHOLANGIOPANCREATOGRAPHY, EXCHANGE TUBE/STENT N/A 11/21/2022    Procedure: ENDOSCOPIC RETROGRADE CHOLANGIOPANCREATOGRAPHY WITH PANCREATIC DUCT STENT EXCHANGE, DILATION, AND DEBRIS REMOVAL;  Surgeon: Guru Sabino Campuzano MD;  [facility]    ENDOSCOPIC RETROGRADE CHOLANGIOPANCREATOGRAPHY, EXCHANGE TUBE/STENT N/A 1/18/2023    Procedure: ENDOSCOPIC RETROGRADE CHOLANGIOPANCREATOGRAPHY stent exchange, dilation, sludge and stone removal;  Surgeon: Guru Sabino Campuzano MD;  Location[facility]    ENDOSCOPIC RETROGRADE CHOLANGIOPANCREATOGRAPHY, EXCHANGE TUBE/STENT N/A 3/13/2023    Procedure: ENDOSCOPIC RETROGRADE CHOLANGIOPANCREATOGRAPHY, WITH replacement of pancreatic stents, bile duct stent placed,sphinterotomy of bile duct ampulla, balloon sweep of bile duct  for sludge,;  Surgeon: Guru Sabino Campuzano MD;  [facility]    ENDOSCOPIC RETROGRADE CHOLANGIOPANCREATOGRAPHY, EXCHANGE TUBE/STENT N/A 5/10/2023    Procedure: ENDOSCOPIC RETROGRADE CHOLANGIOPANCREATOGRAPHY with pancreatic stents exchange and stone extraction;  Surgeon: Guru Sabino Campuzano MD;  Location[facility]    ENDOSCOPIC RETROGRADE CHOLANGIOPANCREATOGRAPHY, EXCHANGE TUBE/STENT N/A 2023    Procedure: ENDOSCOPIC RETROGRADE CHOLANGIOPANCREATOGRAPHY, WITH REPLACEMENT OF STENT X3 AND BALLOON SWEEP OF BILE DUCT FOR STONES;  Surgeon: Guru Sabino Campuzano MD;  Location: [facility]      No Known Allergies   Social History     Tobacco Use    Smoking status: Former     Types: Cigarettes     Quit date: 2022     Years since quittin.3    Smokeless tobacco: Current    Tobacco comments:     Vapes daily   Substance Use Topics    Alcohol use: Not Currently      Wt Readings from Last 1 Encounters:   23 57.2 kg (126 lb)        Anesthesia Evaluation   Pt has had prior anesthetic. Type: General.    History of anesthetic complications  - PONV.      ROS/MED HX  ENT/Pulmonary:     (+)                tobacco use, Current use,                      Neurologic:  - neg neurologic ROS     Cardiovascular:  - neg cardiovascular ROS     METS/Exercise Tolerance:     Hematologic:  - neg hematologic  ROS     Musculoskeletal:  - neg musculoskeletal ROS     GI/Hepatic: Comment: Chr. pancreatitis    (+) GERD,                   Renal/Genitourinary:  - neg Renal ROS     Endo:  - neg endo ROS   (+)   Last HgA1c: 5.3, date: 26,                  Psychiatric/Substance Use:     (+) psychiatric history anxiety and depression alcohol abuse  Recreational drug usage: Cannabis.    Infectious Disease:  - neg infectious disease ROS     Malignancy:  - neg malignancy ROS     Other:            Physical Exam    Airway        Mallampati: I   TM distance: > 3 FB   Neck ROM: full   Mouth opening: > 3  cm    Respiratory Devices and Support         Dental       (+) Minor Abnormalities - some fillings, tiny chips      Cardiovascular   cardiovascular exam normal       Rhythm and rate: normal     Pulmonary   pulmonary exam normal        breath sounds clear to auscultation           OUTSIDE LABS:  CBC:   Lab Results   Component Value Date    WBC 7.1 08/21/2023    WBC 10.0 08/19/2023    HGB 9.4 (L) 08/21/2023    HGB 9.0 (L) 08/19/2023    HCT 27.6 (L) 08/21/2023    HCT 26.3 (L) 08/19/2023     08/21/2023     08/19/2023     BMP:   Lab Results   Component Value Date     08/23/2023     08/19/2023    POTASSIUM 4.7 08/23/2023    POTASSIUM 4.0 08/22/2023    CHLORIDE 105 08/23/2023    CHLORIDE 105 08/19/2023    CO2 23 08/23/2023    CO2 22 08/19/2023    BUN 9.5 08/23/2023    BUN 5.9 (L) 08/19/2023    CR 0.91 08/23/2023    CR 0.61 08/19/2023    GLC 88 08/23/2023    GLC 78 08/19/2023     COAGS:   Lab Results   Component Value Date    INR 1.19 (H) 08/19/2023     POC:   Lab Results   Component Value Date    HCG Negative 07/13/2022    HCGS Negative 08/18/2023     HEPATIC:   Lab Results   Component Value Date    ALBUMIN 4.1 08/23/2023    PROTTOTAL 6.9 08/23/2023    ALT 11 08/23/2023    AST 16 08/23/2023    ALKPHOS 59 08/23/2023    BILITOTAL 0.2 08/23/2023     OTHER:   Lab Results   Component Value Date    A1C 5.3 06/28/2023    MURTAZA 10.3 (H) 08/23/2023    PHOS 4.6 (H) 08/22/2023    MAG 1.7 08/22/2023    LIPASE 11 (L) 08/18/2023    AMYLASE 62 06/28/2023    TSH 1.68 08/18/2023       Anesthesia Plan    ASA Status:  3    NPO Status:  NPO Appropriate    Anesthesia Type: General.     - Airway: ETT   Induction: Intravenous, Propofol.   Maintenance: Balanced.   Techniques and Equipment:     - Lines/Monitors: 2nd IV, BIS, Arterial Line, Central Line     - Blood: T&S, T&C, PRBC     Consents    Anesthesia Plan(s) and associated risks, benefits, and realistic alternatives discussed. Questions answered and  patient/representative(s) expressed understanding.     - Discussed:     - Discussed with:  Patient      - Extended Intubation/Ventilatory Support Discussed: No.      - Patient is DNR/DNI Status: No     Use of blood products discussed: Yes.     Postoperative Care    Pain management: IV analgesics, Oral pain medications, Multi-modal analgesia.     - Plan for long acting post-op opioid use   PONV prophylaxis: Ondansetron (or other 5HT-3), Promethazine or metoclopramide     Comments:                Maritza Paul MD

## 2023-09-21 NOTE — LETTER
9/21/2023         RE: Artie Burger  [address]  [place] [address]        Dear Colleague,    Thank you for referring your patient, Artie Burger, to the [facility]. Please see a copy of my visit note below.    Seen today prior to TPIAT. No changes in health from prior visits. Pain generally controlled with total 4mg dilaudid PO daily. No baseline nausea. Minor vaping.    Plan TPIAT. Paravertebral catheters. Hold 2uPRBC given baseline anemia. Upper midline incision, will try to preserve piercing.    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 In person visit to discuss surgical management options for recurrent pancreatitis.      Surgical approach: plan for TPIAT tomorrow. Case discussed with her and she is comfortable (though understandably nervous) about proceeding.     Analgesia: pain controlled with PO dilaudid. Will receive paravertebral catheters in addition to PCA and likely other potent IV analgesia postoperatively      Pancreatic function: not diabetic, no overt indication of exocrine insufficiency. Has tried creon in the past with no real benefit.     Frailty: not frail, suitable surgical candidate    Alcohol/Tobacco cessation: negative PeTH in chart, reports multiple years of abstinence which is consistent with this. She also reports vaping currently but no longer smoking cigarettes. Will need to verify cessation of alcohol and tobacco prior to any surgery. Tobacco cessation especially important in duodenal resecting surgeries (TPIAT, Whipple). Vaping is ok, but not ideal. NSAID cessation also essential, which she understands.     Medical Decision Making: High  Consult [id number] high complexity, 110 minutes      Faculty: Junito Patel MD  __________________________________________________________________  Transplant History: pancreatitis consult  9/22/2023  ([place]), Postoperative day:      Interval History: History is obtained from the patient  Artie Burger is a very pleasant 28 yo F with a history of acute on chronic alcoholic pancreatitis. She has an extensive history of alcohol and tobacco use going back about ten years, with her first episodes of pancreatitis following shortly thereafter. She was hospitalized at [facility] with recurrent episodes of pancreatitis. She initially abstained from drinking but did have relapses, corresponding with further episodes of pancreatitis.     Dr. Campuzano has known her for a long period over which he has engaged in numerous successful endoscopic interventions for her. For full details of this, please see his excellent notes. In short, he has been able to clear her duct of stones and did note that she had a very significant stricture in the pancreatic neck with moderate upstream dilation, as well as calcific changes and side branch calcification in the pancreatic head. She has ultimately failed trials of stent free intervals and is referred for surgery.    She says she has now developed daily pain, where previously it was only intermittent. It is in her central abdomen with radiation to the right and into her back, not really to her left. It doesn't have accompanying nausea, but she does have pain with eating and that previously made her food avoidant. She is able to maintain her weight relatively well now, though eating is still difficult. She works in [employer] at a [employer] and is unable to complete her (admittedly very busy, physical) shifts due to pain. When she is able to work, she spends the next day resting and recovering due to her fatigue.     Since our last visit, she had a stent exchange with Dr. Campuzano, but she says that this has not helped her pain at all. She is otherwise in her usual state of health.     She is seen today for assessment prior to surgery. Plan for surgery discussed with  "her and reviewed, and she is ready to proceed.     Social History     Tobacco Use    Smoking status: Former     Types: Cigarettes     Quit date: 2022     Years since quittin.5    Smokeless tobacco: Current    Tobacco comments:     Vapes daily   Vaping Use    Vaping Use: Every day    Substances: Nicotine   Substance Use Topics    Alcohol use: Not Currently    Drug use: Yes     Types: Marijuana     Comment: smokes daily     ROS:   A 10-point review of systems was negative except as noted above.    Curent Meds:      Physical Exam:     Admit      Current Vitals:   /72 (BP Location: Right arm, Patient Position: Sitting, Cuff Size: Adult Small)   Pulse 99   Temp 98.8  F (37.1  C) (Oral)   Resp 18   Ht 1.575 m (5' 2.01\")   Wt 58.5 kg (129 lb)   LMP 2023 (Approximate)   SpO2 98%   BMI 23.59 kg/m           Vital sign ranges:       No data found.  General Appearance: in no apparent distress.   Skin: normal appearance, warm and dry  Heart: deferred  Lungs: without wheezes, nonlabored respirations  Abdomen: The abdomen is flat, she is thin. No evident incision or hernia. Narrow costal angle. She has a supraumbilical piercing that she would prefer to maintain if at all possible.   Extremities: edema: appears absent    Data:   CMP@LABRCNTIPR(na:2,potassium:2,chloride:2,co2:2,g,bun:2,cr:2,gfrestimated:2,gfrestblack:2,mark:2,icapoc:2,icaw:2,ma,phos:2,amylase:2,lipase:2,uamy24:3,albumin:2,bilitotal:2,biliconj:2,bilidelta:2,alkphos:2,ast:2,alt:2,fbili:1)@  CBC@LABRCNTIPR(hgb:2,wbc:2,a,plt:2,a1c:1)@  Coags@LABRCNTIPR(inr:2,ptt:2,Xa:2)@   Urinalysis  Recent Labs   Lab Test 23  1614   COLOR Yellow   APPEARANCE Slightly Cloudy*   URINEGLC Negative   URINEBILI Negative   URINEKETONE Negative   SG 1.021   UBLD Negative   URINEPH 7.0   PROTEIN 10*   NITRITE Negative   LEUKEST Negative   RBCU 2   WBCU <1        Prior CT personally reviewed. Note multifocal inflammatory calcification of the " pancreatic head with evident dilated upstream pancreatic duct, though this measures ~4mm to me. Limited by lack of IV contrast.     Updated CT with IV contrast also personally reviewed. Note conventional hepatic arterial anatomy. Small calcifications in gland at body/tail, more significant inflammatory calcification in pancreatic head. Stents in duct without evident stones or debris.         Junito Patel MD

## 2023-09-21 NOTE — PROGRESS NOTES
Seen today prior to TPIAT. No changes in health from prior visits. Pain generally controlled with total 4mg dilaudid PO daily. No baseline nausea. Minor vaping.    Plan TPIAT. Paravertebral catheters. Hold 2uPRBC given baseline anemia. Upper midline incision, will try to preserve piercing.    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 In person visit to discuss surgical management options for recurrent pancreatitis.      Surgical approach: plan for TPIAT tomorrow. Case discussed with her and she is comfortable (though understandably nervous) about proceeding.     Analgesia: pain controlled with PO dilaudid. Will receive paravertebral catheters in addition to PCA and likely other potent IV analgesia postoperatively      Pancreatic function: not diabetic, no overt indication of exocrine insufficiency. Has tried creon in the past with no real benefit.     Frailty: not frail, suitable surgical candidate    Alcohol/Tobacco cessation: negative PeTH in chart, reports multiple years of abstinence which is consistent with this. She also reports vaping currently but no longer smoking cigarettes. Will need to verify cessation of alcohol and tobacco prior to any surgery. Tobacco cessation especially important in duodenal resecting surgeries (TPIAT, Whipple). Vaping is ok, but not ideal. NSAID cessation also essential, which she understands.     Medical Decision Making: High  Consult [id number] high complexity, 110 minutes      Faculty: Junito Patel MD  __________________________________________________________________  Transplant History: pancreatitis consult  9/22/2023 ([place]), Postoperative day:      Interval History: History is obtained from the patient  Artie Burger is a very pleasant 28 yo F with a history of acute on chronic alcoholic pancreatitis. She has an extensive history of alcohol and tobacco use going back  about ten years, with her first episodes of pancreatitis following shortly thereafter. She was hospitalized at [facility] with recurrent episodes of pancreatitis. She initially abstained from drinking but did have relapses, corresponding with further episodes of pancreatitis.     Dr. Campuzano has known her for a long period over which he has engaged in numerous successful endoscopic interventions for her. For full details of this, please see his excellent notes. In short, he has been able to clear her duct of stones and did note that she had a very significant stricture in the pancreatic neck with moderate upstream dilation, as well as calcific changes and side branch calcification in the pancreatic head. She has ultimately failed trials of stent free intervals and is referred for surgery.    She says she has now developed daily pain, where previously it was only intermittent. It is in her central abdomen with radiation to the right and into her back, not really to her left. It doesn't have accompanying nausea, but she does have pain with eating and that previously made her food avoidant. She is able to maintain her weight relatively well now, though eating is still difficult. She works in [employer] at [employer] and is unable to complete her (admittedly very busy, physical) shifts due to pain. When she is able to work, she spends the next day resting and recovering due to her fatigue.     Since our last visit, she had a stent exchange with Dr. Campuzano, but she says that this has not helped her pain at all. She is otherwise in her usual state of health.     She is seen today for assessment prior to surgery. Plan for surgery discussed with her and reviewed, and she is ready to proceed.     Social History     Tobacco Use    Smoking status: Former     Types: Cigarettes     Quit date: 2022     Years since quittin.5    Smokeless tobacco: Current    Tobacco comments:     Vapes daily   Vaping  "Use    Vaping Use: Every day    Substances: Nicotine   Substance Use Topics    Alcohol use: Not Currently    Drug use: Yes     Types: Marijuana     Comment: smokes daily     ROS:   A 10-point review of systems was negative except as noted above.    Curent Meds:      Physical Exam:     Admit      Current Vitals:   /72 (BP Location: Right arm, Patient Position: Sitting, Cuff Size: Adult Small)   Pulse 99   Temp 98.8  F (37.1  C) (Oral)   Resp 18   Ht 1.575 m (5' 2.01\")   Wt 58.5 kg (129 lb)   LMP 2023 (Approximate)   SpO2 98%   BMI 23.59 kg/m           Vital sign ranges:       No data found.  General Appearance: in no apparent distress.   Skin: normal appearance, warm and dry  Heart: deferred  Lungs: without wheezes, nonlabored respirations  Abdomen: The abdomen is flat, she is thin. No evident incision or hernia. Narrow costal angle. She has a supraumbilical piercing that she would prefer to maintain if at all possible.   Extremities: edema: appears absent    Data:   CMP@LABRCNTIPR(na:2,potassium:2,chloride:2,co2:2,g,bun:2,cr:2,gfrestimated:2,gfrestblack:2,mark:2,icapoc:2,icaw:2,ma,phos:2,amylase:2,lipase:2,uamy24:3,albumin:2,bilitotal:2,biliconj:2,bilidelta:2,alkphos:2,ast:2,alt:2,fbili:1)@  CBC@LABRCNTIPR(hgb:2,wbc:2,a,plt:2,a1c:1)@  Coags@LABRCNTIPR(inr:2,ptt:2,Xa:2)@   Urinalysis  Recent Labs   Lab Test 23  1614   COLOR Yellow   APPEARANCE Slightly Cloudy*   URINEGLC Negative   URINEBILI Negative   URINEKETONE Negative   SG 1.021   UBLD Negative   URINEPH 7.0   PROTEIN 10*   NITRITE Negative   LEUKEST Negative   RBCU 2   WBCU <1        Prior CT personally reviewed. Note multifocal inflammatory calcification of the pancreatic head with evident dilated upstream pancreatic duct, though this measures ~4mm to me. Limited by lack of IV contrast.     Updated CT with IV contrast also personally reviewed. Note conventional hepatic arterial anatomy. Small calcifications in gland at " body/tail, more significant inflammatory calcification in pancreatic head. Stents in duct without evident stones or debris.

## 2023-09-21 NOTE — NURSING NOTE
"Chief Complaint   Patient presents with    Pre-Op Exam     Pre TPIAT      Vital signs:  Temp: 98.8  F (37.1  C) Temp src: Oral BP: 112/72 Pulse: 99   Resp: 18 SpO2: 98 %     Height: 157.5 cm (5' 2.01\") Weight: 58.5 kg (129 lb)  Estimated body mass index is 23.59 kg/m  as calculated from the following:    Height as of this encounter: 1.575 m (5' 2.01\").    Weight as of this encounter: 58.5 kg (129 lb).      Matilda Laureano, [facility]  9/21/2023 3:01 PM    "

## 2023-09-22 ENCOUNTER — APPOINTMENT (OUTPATIENT)
Dept: GENERAL RADIOLOGY | Facility: CLINIC | Age: 28
End: 2023-09-22
Attending: SURGERY
Payer: COMMERCIAL

## 2023-09-22 ENCOUNTER — HOSPITAL ENCOUNTER (INPATIENT)
Facility: CLINIC | Age: 28
LOS: 11 days | Discharge: HOME | End: 2023-10-03
Attending: SURGERY | Admitting: SURGERY
Payer: COMMERCIAL

## 2023-09-22 DIAGNOSIS — Z90.410 ACQUIRED TOTAL ABSENCE OF PANCREAS: Primary | ICD-10-CM

## 2023-09-22 PROBLEM — K86.1 PANCREATITIS, CHRONIC (H): Status: ACTIVE | Noted: 2023-09-22

## 2023-09-22 LAB
ALBUMIN SERPL BCG-MCNC: 3.3 G/DL (ref 3.5–5.2)
ALP SERPL-CCNC: 35 U/L (ref 35–104)
ALT SERPL W P-5'-P-CCNC: 41 U/L (ref 0–50)
ANION GAP SERPL CALCULATED.3IONS-SCNC: 13 MMOL/L (ref 7–15)
APTT PPP: 110 SECONDS (ref 22–38)
AST SERPL W P-5'-P-CCNC: 63 U/L (ref 0–45)
BASE EXCESS BLDA CALC-SCNC: -3.7 MMOL/L (ref -9.6–2)
BASE EXCESS BLDA CALC-SCNC: -3.9 MMOL/L (ref -9.6–2)
BASE EXCESS BLDA CALC-SCNC: -4.3 MMOL/L (ref -9.6–2)
BASE EXCESS BLDA CALC-SCNC: -4.4 MMOL/L (ref -9.6–2)
BASE EXCESS BLDA CALC-SCNC: -4.6 MMOL/L (ref -9.6–2)
BASE EXCESS BLDA CALC-SCNC: -5.1 MMOL/L (ref -9.6–2)
BASE EXCESS BLDA CALC-SCNC: -5.3 MMOL/L (ref -9.6–2)
BASE EXCESS BLDA CALC-SCNC: -5.3 MMOL/L (ref -9.6–2)
BASE EXCESS BLDA CALC-SCNC: -5.5 MMOL/L (ref -9.6–2)
BASE EXCESS BLDA CALC-SCNC: -5.8 MMOL/L (ref -9.6–2)
BASE EXCESS BLDA CALC-SCNC: -6 MMOL/L (ref -9.6–2)
BASE EXCESS BLDA CALC-SCNC: -6.3 MMOL/L (ref -9.6–2)
BASE EXCESS BLDA CALC-SCNC: -6.6 MMOL/L (ref -9.6–2)
BILIRUB SERPL-MCNC: 1.2 MG/DL
BLD PROD TYP BPU: NORMAL
BLD PROD TYP BPU: NORMAL
BLOOD COMPONENT TYPE: NORMAL
BLOOD COMPONENT TYPE: NORMAL
BUN SERPL-MCNC: 13.5 MG/DL (ref 6–20)
CA-I BLD-MCNC: 4.4 MG/DL (ref 4.4–5.2)
CA-I BLD-MCNC: 4.5 MG/DL (ref 4.4–5.2)
CA-I BLD-MCNC: 4.6 MG/DL (ref 4.4–5.2)
CA-I BLD-MCNC: 4.7 MG/DL (ref 4.4–5.2)
CA-I BLD-MCNC: 4.9 MG/DL (ref 4.4–5.2)
CA-I BLD-MCNC: 5 MG/DL (ref 4.4–5.2)
CA-I BLD-MCNC: 5.1 MG/DL (ref 4.4–5.2)
CALCIUM SERPL-MCNC: 8.7 MG/DL (ref 8.6–10)
CHLORIDE SERPL-SCNC: 108 MMOL/L (ref 98–107)
CODING SYSTEM: NORMAL
CODING SYSTEM: NORMAL
CREAT SERPL-MCNC: 0.95 MG/DL (ref 0.51–0.95)
CROSSMATCH: NORMAL
CROSSMATCH: NORMAL
DEPRECATED HCO3 PLAS-SCNC: 19 MMOL/L (ref 22–29)
EGFRCR SERPLBLD CKD-EPI 2021: 83 ML/MIN/1.73M2
ERYTHROCYTE [DISTWIDTH] IN BLOOD BY AUTOMATED COUNT: 14.3 % (ref 10–15)
FIBRINOGEN PPP-MCNC: 313 MG/DL (ref 170–490)
GLUCOSE BLD-MCNC: 117 MG/DL (ref 70–99)
GLUCOSE BLD-MCNC: 127 MG/DL (ref 70–99)
GLUCOSE BLD-MCNC: 134 MG/DL (ref 70–99)
GLUCOSE BLD-MCNC: 139 MG/DL (ref 70–99)
GLUCOSE BLD-MCNC: 191 MG/DL (ref 70–99)
GLUCOSE BLD-MCNC: 195 MG/DL (ref 70–99)
GLUCOSE BLD-MCNC: 88 MG/DL (ref 70–99)
GLUCOSE BLD-MCNC: 90 MG/DL (ref 70–99)
GLUCOSE BLD-MCNC: 93 MG/DL (ref 70–99)
GLUCOSE BLD-MCNC: 93 MG/DL (ref 70–99)
GLUCOSE BLD-MCNC: 96 MG/DL (ref 70–99)
GLUCOSE BLD-MCNC: 96 MG/DL (ref 70–99)
GLUCOSE BLD-MCNC: 97 MG/DL (ref 70–99)
GLUCOSE BLDC GLUCOMTR-MCNC: 122 MG/DL (ref 70–99)
GLUCOSE BLDC GLUCOMTR-MCNC: 144 MG/DL (ref 70–99)
GLUCOSE BLDC GLUCOMTR-MCNC: 159 MG/DL (ref 70–99)
GLUCOSE BLDC GLUCOMTR-MCNC: 195 MG/DL (ref 70–99)
GLUCOSE BLDC GLUCOMTR-MCNC: 203 MG/DL (ref 70–99)
GLUCOSE SERPL-MCNC: 149 MG/DL (ref 70–99)
GRAM STAIN RESULT: NORMAL
HCO3 BLDA-SCNC: 18 MMOL/L (ref 21–28)
HCO3 BLDA-SCNC: 19 MMOL/L (ref 21–28)
HCO3 BLDA-SCNC: 20 MMOL/L (ref 21–28)
HCO3 BLDA-SCNC: 21 MMOL/L (ref 21–28)
HCO3 BLDA-SCNC: 22 MMOL/L (ref 21–28)
HCT VFR BLD AUTO: 32.2 % (ref 35–47)
HGB BLD-MCNC: 10 G/DL (ref 11.7–15.7)
HGB BLD-MCNC: 10 G/DL (ref 11.7–15.7)
HGB BLD-MCNC: 10.2 G/DL (ref 11.7–15.7)
HGB BLD-MCNC: 10.2 G/DL (ref 11.7–15.7)
HGB BLD-MCNC: 10.4 G/DL (ref 11.7–15.7)
HGB BLD-MCNC: 10.7 G/DL (ref 11.7–15.7)
HGB BLD-MCNC: 11.2 G/DL (ref 11.7–15.7)
HGB BLD-MCNC: 11.4 G/DL (ref 11.7–15.7)
HGB BLD-MCNC: 11.5 G/DL (ref 11.7–15.7)
HGB BLD-MCNC: 11.5 G/DL (ref 11.7–15.7)
HGB BLD-MCNC: 12.5 G/DL (ref 11.7–15.7)
HGB BLD-MCNC: 12.6 G/DL (ref 11.7–15.7)
HGB BLD-MCNC: 12.6 G/DL (ref 11.7–15.7)
HGB BLD-MCNC: 9.2 G/DL (ref 11.7–15.7)
INR PPP: 1.34 (ref 0.85–1.15)
ISSUE DATE AND TIME: NORMAL
ISSUE DATE AND TIME: NORMAL
LACTATE BLD-SCNC: 0.8 MMOL/L
LACTATE BLD-SCNC: 1 MMOL/L
LACTATE BLD-SCNC: 1.1 MMOL/L
LACTATE BLD-SCNC: 1.2 MMOL/L
LACTATE BLD-SCNC: 1.4 MMOL/L
LACTATE BLD-SCNC: 1.6 MMOL/L
LACTATE BLD-SCNC: 1.6 MMOL/L
LACTATE BLD-SCNC: 1.8 MMOL/L
LACTATE BLD-SCNC: 2 MMOL/L
LACTATE BLD-SCNC: 2.5 MMOL/L
LACTATE BLD-SCNC: 2.6 MMOL/L
LACTATE BLD-SCNC: 2.7 MMOL/L
LACTATE BLD-SCNC: 3.1 MMOL/L
MCH RBC QN AUTO: 30.4 PG (ref 26.5–33)
MCHC RBC AUTO-ENTMCNC: 34.8 G/DL (ref 31.5–36.5)
MCV RBC AUTO: 87 FL (ref 78–100)
O2/TOTAL GAS SETTING VFR VENT: 32 %
O2/TOTAL GAS SETTING VFR VENT: 33 %
O2/TOTAL GAS SETTING VFR VENT: 40 %
O2/TOTAL GAS SETTING VFR VENT: 60 %
PCO2 BLDA: 32 MM HG (ref 35–45)
PCO2 BLDA: 33 MM HG (ref 35–45)
PCO2 BLDA: 36 MM HG (ref 35–45)
PCO2 BLDA: 36 MM HG (ref 35–45)
PCO2 BLDA: 37 MM HG (ref 35–45)
PCO2 BLDA: 38 MM HG (ref 35–45)
PCO2 BLDA: 38 MM HG (ref 35–45)
PCO2 BLDA: 39 MM HG (ref 35–45)
PCO2 BLDA: 39 MM HG (ref 35–45)
PCO2 BLDA: 40 MM HG (ref 35–45)
PCO2 BLDA: 41 MM HG (ref 35–45)
PH BLDA: 7.29 [PH] (ref 7.35–7.45)
PH BLDA: 7.32 [PH] (ref 7.35–7.45)
PH BLDA: 7.32 [PH] (ref 7.35–7.45)
PH BLDA: 7.33 [PH] (ref 7.35–7.45)
PH BLDA: 7.33 [PH] (ref 7.35–7.45)
PH BLDA: 7.34 [PH] (ref 7.35–7.45)
PH BLDA: 7.34 [PH] (ref 7.35–7.45)
PH BLDA: 7.35 [PH] (ref 7.35–7.45)
PH BLDA: 7.35 [PH] (ref 7.35–7.45)
PH BLDA: 7.37 [PH] (ref 7.35–7.45)
PH BLDA: 7.39 [PH] (ref 7.35–7.45)
PLATELET # BLD AUTO: 346 10E3/UL (ref 150–450)
PO2 BLDA: 120 MM HG (ref 80–105)
PO2 BLDA: 140 MM HG (ref 80–105)
PO2 BLDA: 141 MM HG (ref 80–105)
PO2 BLDA: 143 MM HG (ref 80–105)
PO2 BLDA: 146 MM HG (ref 80–105)
PO2 BLDA: 147 MM HG (ref 80–105)
PO2 BLDA: 147 MM HG (ref 80–105)
PO2 BLDA: 151 MM HG (ref 80–105)
PO2 BLDA: 152 MM HG (ref 80–105)
PO2 BLDA: 152 MM HG (ref 80–105)
PO2 BLDA: 154 MM HG (ref 80–105)
PO2 BLDA: 170 MM HG (ref 80–105)
PO2 BLDA: 248 MM HG (ref 80–105)
POTASSIUM BLD-SCNC: 3.2 MMOL/L (ref 3.5–5)
POTASSIUM BLD-SCNC: 3.2 MMOL/L (ref 3.5–5)
POTASSIUM BLD-SCNC: 3.5 MMOL/L (ref 3.5–5)
POTASSIUM BLD-SCNC: 3.6 MMOL/L (ref 3.5–5)
POTASSIUM BLD-SCNC: 3.7 MMOL/L (ref 3.5–5)
POTASSIUM BLD-SCNC: 3.8 MMOL/L (ref 3.5–5)
POTASSIUM BLD-SCNC: 3.9 MMOL/L (ref 3.5–5)
POTASSIUM BLD-SCNC: 3.9 MMOL/L (ref 3.5–5)
POTASSIUM BLD-SCNC: 4 MMOL/L (ref 3.5–5)
POTASSIUM BLD-SCNC: 4.2 MMOL/L (ref 3.5–5)
POTASSIUM BLD-SCNC: 4.3 MMOL/L (ref 3.5–5)
POTASSIUM SERPL-SCNC: 4.4 MMOL/L (ref 3.4–5.3)
PREALB SERPL IA-MCNC: 24 MG/DL (ref 15–45)
PROT SERPL-MCNC: 5.1 G/DL (ref 6.4–8.3)
RBC # BLD AUTO: 3.69 10E6/UL (ref 3.8–5.2)
SODIUM BLD-SCNC: 140 MMOL/L (ref 133–144)
SODIUM BLD-SCNC: 141 MMOL/L (ref 133–144)
SODIUM BLD-SCNC: 142 MMOL/L (ref 133–144)
SODIUM BLD-SCNC: 143 MMOL/L (ref 133–144)
SODIUM BLD-SCNC: 143 MMOL/L (ref 133–144)
SODIUM SERPL-SCNC: 140 MMOL/L (ref 136–145)
UNIT ABO/RH: NORMAL
UNIT ABO/RH: NORMAL
UNIT NUMBER: NORMAL
UNIT NUMBER: NORMAL
UNIT STATUS: NORMAL
UNIT STATUS: NORMAL
UNIT TYPE ISBT: 5100
UNIT TYPE ISBT: 5100
WBC # BLD AUTO: 22.6 10E3/UL (ref 4–11)

## 2023-09-22 PROCEDURE — 999N000065 XR CHEST PORT 1 VIEW

## 2023-09-22 PROCEDURE — 258N000003 HC RX IP 258 OP 636

## 2023-09-22 PROCEDURE — 250N000009 HC RX 250

## 2023-09-22 PROCEDURE — 0FTG0ZZ RESECTION OF PANCREAS, OPEN APPROACH: ICD-10-PCS | Performed by: SURGERY

## 2023-09-22 PROCEDURE — 87186 SC STD MICRODIL/AGAR DIL: CPT | Performed by: SURGERY

## 2023-09-22 PROCEDURE — 250N000011 HC RX IP 250 OP 636: Mod: JZ | Performed by: STUDENT IN AN ORGANIZED HEALTH CARE EDUCATION/TRAINING PROGRAM

## 2023-09-22 PROCEDURE — 200N000002 HC R&B ICU UMMC

## 2023-09-22 PROCEDURE — 250N000011 HC RX IP 250 OP 636: Performed by: STUDENT IN AN ORGANIZED HEALTH CARE EDUCATION/TRAINING PROGRAM

## 2023-09-22 PROCEDURE — 250N000011 HC RX IP 250 OP 636: Mod: JZ

## 2023-09-22 PROCEDURE — 250N000011 HC RX IP 250 OP 636

## 2023-09-22 PROCEDURE — 250N000011 HC RX IP 250 OP 636: Mod: JZ | Performed by: SURGERY

## 2023-09-22 PROCEDURE — C1751 CATH, INF, PER/CENT/MIDLINE: HCPCS | Performed by: SURGERY

## 2023-09-22 PROCEDURE — 85730 THROMBOPLASTIN TIME PARTIAL: CPT | Performed by: SURGERY

## 2023-09-22 PROCEDURE — 87205 SMEAR GRAM STAIN: CPT | Performed by: SURGERY

## 2023-09-22 PROCEDURE — 47785 FUSE BILE DUCTS AND BOWEL: CPT | Performed by: SURGERY

## 2023-09-22 PROCEDURE — 258N000003 HC RX IP 258 OP 636: Performed by: ANESTHESIOLOGY

## 2023-09-22 PROCEDURE — 0FT40ZZ RESECTION OF GALLBLADDER, OPEN APPROACH: ICD-10-PCS | Performed by: SURGERY

## 2023-09-22 PROCEDURE — 07TP0ZZ RESECTION OF SPLEEN, OPEN APPROACH: ICD-10-PCS | Performed by: SURGERY

## 2023-09-22 PROCEDURE — 99222 1ST HOSP IP/OBS MODERATE 55: CPT | Performed by: STUDENT IN AN ORGANIZED HEALTH CARE EDUCATION/TRAINING PROGRAM

## 2023-09-22 PROCEDURE — 85384 FIBRINOGEN ACTIVITY: CPT | Performed by: SURGERY

## 2023-09-22 PROCEDURE — 88307 TISSUE EXAM BY PATHOLOGIST: CPT | Mod: TC | Performed by: SURGERY

## 2023-09-22 PROCEDURE — 0DHA0UZ INSERTION OF FEEDING DEVICE INTO JEJUNUM, OPEN APPROACH: ICD-10-PCS | Performed by: SURGERY

## 2023-09-22 PROCEDURE — 3E030U0 INTRODUCTION OF AUTOLOGOUS PANCREATIC ISLET CELLS INTO PERIPHERAL VEIN, OPEN APPROACH: ICD-10-PCS | Performed by: SURGERY

## 2023-09-22 PROCEDURE — 84295 ASSAY OF SERUM SODIUM: CPT | Performed by: SURGERY

## 2023-09-22 PROCEDURE — 0DB90ZZ EXCISION OF DUODENUM, OPEN APPROACH: ICD-10-PCS | Performed by: SURGERY

## 2023-09-22 PROCEDURE — 999N000141 HC STATISTIC PRE-PROCEDURE NURSING ASSESSMENT: Performed by: SURGERY

## 2023-09-22 PROCEDURE — 47600 CHOLECYSTECTOMY: CPT | Performed by: SURGERY

## 2023-09-22 PROCEDURE — 250N000025 HC SEVOFLURANE, PER MIN: Performed by: SURGERY

## 2023-09-22 PROCEDURE — 82330 ASSAY OF CALCIUM: CPT

## 2023-09-22 PROCEDURE — 272N000001 HC OR GENERAL SUPPLY STERILE: Performed by: SURGERY

## 2023-09-22 PROCEDURE — 811N000005 HC ACQUISITON PANCREAS AUTOLOGOUS ISLET

## 2023-09-22 PROCEDURE — 370N000017 HC ANESTHESIA TECHNICAL FEE, PER MIN: Performed by: SURGERY

## 2023-09-22 PROCEDURE — 48160 PANCREAS REMOVAL/TRANSPLANT: CPT | Performed by: SURGERY

## 2023-09-22 PROCEDURE — 86923 COMPATIBILITY TEST ELECTRIC: CPT

## 2023-09-22 PROCEDURE — 85610 PROTHROMBIN TIME: CPT | Performed by: SURGERY

## 2023-09-22 PROCEDURE — 85027 COMPLETE CBC AUTOMATED: CPT | Performed by: SURGERY

## 2023-09-22 PROCEDURE — 83036 HEMOGLOBIN GLYCOSYLATED A1C: CPT | Performed by: SURGERY

## 2023-09-22 PROCEDURE — 250N000011 HC RX IP 250 OP 636: Mod: JZ | Performed by: ANESTHESIOLOGY

## 2023-09-22 PROCEDURE — 360N000077 HC SURGERY LEVEL 4, PER MIN: Performed by: SURGERY

## 2023-09-22 PROCEDURE — P9016 RBC LEUKOCYTES REDUCED: HCPCS

## 2023-09-22 PROCEDURE — 84295 ASSAY OF SERUM SODIUM: CPT

## 2023-09-22 PROCEDURE — 250N000013 HC RX MED GY IP 250 OP 250 PS 637

## 2023-09-22 PROCEDURE — 271N000002 HC RX 271: Performed by: STUDENT IN AN ORGANIZED HEALTH CARE EDUCATION/TRAINING PROGRAM

## 2023-09-22 PROCEDURE — 250N000013 HC RX MED GY IP 250 OP 250 PS 637: Performed by: SURGERY

## 2023-09-22 PROCEDURE — 710N000011 HC RECOVERY PHASE 1, LEVEL 3, PER MIN: Performed by: SURGERY

## 2023-09-22 PROCEDURE — 71045 X-RAY EXAM CHEST 1 VIEW: CPT | Mod: 26 | Performed by: STUDENT IN AN ORGANIZED HEALTH CARE EDUCATION/TRAINING PROGRAM

## 2023-09-22 RX ORDER — HYDROMORPHONE HCL IN WATER/PF 6 MG/30 ML
0.2 PATIENT CONTROLLED ANALGESIA SYRINGE INTRAVENOUS EVERY 5 MIN PRN
Status: DISCONTINUED | OUTPATIENT
Start: 2023-09-22 | End: 2023-09-22 | Stop reason: HOSPADM

## 2023-09-22 RX ORDER — LIDOCAINE HYDROCHLORIDE 20 MG/ML
INJECTION, SOLUTION INFILTRATION; PERINEURAL PRN
Status: DISCONTINUED | OUTPATIENT
Start: 2023-09-22 | End: 2023-09-22

## 2023-09-22 RX ORDER — AMOXICILLIN 250 MG
1 CAPSULE ORAL 2 TIMES DAILY
Status: DISCONTINUED | OUTPATIENT
Start: 2023-09-22 | End: 2023-09-22

## 2023-09-22 RX ORDER — ACETAMINOPHEN 325 MG/10.15ML
650 LIQUID ORAL EVERY 4 HOURS PRN
Status: DISCONTINUED | OUTPATIENT
Start: 2023-09-25 | End: 2023-09-22

## 2023-09-22 RX ORDER — VASOPRESSIN IN 0.9 % NACL 2 UNIT/2ML
SYRINGE (ML) INTRAVENOUS PRN
Status: DISCONTINUED | OUTPATIENT
Start: 2023-09-22 | End: 2023-09-22

## 2023-09-22 RX ORDER — METHOCARBAMOL 750 MG/1
750 TABLET, FILM COATED ORAL EVERY 6 HOURS PRN
Status: DISCONTINUED | OUTPATIENT
Start: 2023-09-22 | End: 2023-09-27

## 2023-09-22 RX ORDER — LABETALOL HYDROCHLORIDE 5 MG/ML
10 INJECTION, SOLUTION INTRAVENOUS
Status: DISCONTINUED | OUTPATIENT
Start: 2023-09-22 | End: 2023-09-22 | Stop reason: HOSPADM

## 2023-09-22 RX ORDER — ONDANSETRON 2 MG/ML
4 INJECTION INTRAMUSCULAR; INTRAVENOUS EVERY 6 HOURS PRN
Status: DISCONTINUED | OUTPATIENT
Start: 2023-09-22 | End: 2023-10-03 | Stop reason: HOSPADM

## 2023-09-22 RX ORDER — EPHEDRINE SULFATE 50 MG/ML
INJECTION, SOLUTION INTRAMUSCULAR; INTRAVENOUS; SUBCUTANEOUS PRN
Status: DISCONTINUED | OUTPATIENT
Start: 2023-09-22 | End: 2023-09-22

## 2023-09-22 RX ORDER — HYDRALAZINE HYDROCHLORIDE 20 MG/ML
2.5-5 INJECTION INTRAMUSCULAR; INTRAVENOUS EVERY 10 MIN PRN
Status: DISCONTINUED | OUTPATIENT
Start: 2023-09-22 | End: 2023-09-22 | Stop reason: HOSPADM

## 2023-09-22 RX ORDER — BISACODYL 10 MG
10 SUPPOSITORY, RECTAL RECTAL DAILY PRN
Status: DISCONTINUED | OUTPATIENT
Start: 2023-09-22 | End: 2023-10-03 | Stop reason: HOSPADM

## 2023-09-22 RX ORDER — KETOROLAC TROMETHAMINE 15 MG/ML
10 INJECTION, SOLUTION INTRAMUSCULAR; INTRAVENOUS EVERY 6 HOURS
Status: COMPLETED | OUTPATIENT
Start: 2023-09-22 | End: 2023-09-23

## 2023-09-22 RX ORDER — ONDANSETRON 2 MG/ML
4 INJECTION INTRAMUSCULAR; INTRAVENOUS EVERY 6 HOURS PRN
Status: DISCONTINUED | OUTPATIENT
Start: 2023-09-22 | End: 2023-09-22

## 2023-09-22 RX ORDER — ONDANSETRON 2 MG/ML
INJECTION INTRAMUSCULAR; INTRAVENOUS PRN
Status: DISCONTINUED | OUTPATIENT
Start: 2023-09-22 | End: 2023-09-22

## 2023-09-22 RX ORDER — DEXTROSE MONOHYDRATE 25 G/50ML
25-50 INJECTION, SOLUTION INTRAVENOUS
Status: DISCONTINUED | OUTPATIENT
Start: 2023-09-22 | End: 2023-10-03 | Stop reason: HOSPADM

## 2023-09-22 RX ORDER — PROCHLORPERAZINE MALEATE 5 MG
10 TABLET ORAL EVERY 6 HOURS PRN
Status: DISCONTINUED | OUTPATIENT
Start: 2023-09-22 | End: 2023-10-03 | Stop reason: HOSPADM

## 2023-09-22 RX ORDER — DEXTROSE MONOHYDRATE 100 MG/ML
INJECTION, SOLUTION INTRAVENOUS CONTINUOUS PRN
Status: DISCONTINUED | OUTPATIENT
Start: 2023-09-22 | End: 2023-10-03 | Stop reason: HOSPADM

## 2023-09-22 RX ORDER — NICOTINE POLACRILEX 4 MG
15-30 LOZENGE BUCCAL
Status: DISCONTINUED | OUTPATIENT
Start: 2023-09-22 | End: 2023-09-23

## 2023-09-22 RX ORDER — SODIUM CHLORIDE, SODIUM LACTATE, POTASSIUM CHLORIDE, CALCIUM CHLORIDE 600; 310; 30; 20 MG/100ML; MG/100ML; MG/100ML; MG/100ML
INJECTION, SOLUTION INTRAVENOUS CONTINUOUS
Status: DISCONTINUED | OUTPATIENT
Start: 2023-09-22 | End: 2023-09-22 | Stop reason: HOSPADM

## 2023-09-22 RX ORDER — HEPARIN SODIUM 1000 [USP'U]/ML
INJECTION, SOLUTION INTRAVENOUS; SUBCUTANEOUS PRN
Status: DISCONTINUED | OUTPATIENT
Start: 2023-09-22 | End: 2023-09-22

## 2023-09-22 RX ORDER — HYDROXYZINE HCL 10 MG/5 ML
25 SOLUTION, ORAL ORAL EVERY 6 HOURS PRN
Status: DISCONTINUED | OUTPATIENT
Start: 2023-09-22 | End: 2023-09-26

## 2023-09-22 RX ORDER — LIDOCAINE 40 MG/G
CREAM TOPICAL
Status: DISCONTINUED | OUTPATIENT
Start: 2023-09-22 | End: 2023-09-22 | Stop reason: HOSPADM

## 2023-09-22 RX ORDER — KETAMINE HYDROCHLORIDE 10 MG/ML
INJECTION INTRAMUSCULAR; INTRAVENOUS PRN
Status: DISCONTINUED | OUTPATIENT
Start: 2023-09-22 | End: 2023-09-22

## 2023-09-22 RX ORDER — SODIUM CHLORIDE, SODIUM LACTATE, POTASSIUM CHLORIDE, CALCIUM CHLORIDE 600; 310; 30; 20 MG/100ML; MG/100ML; MG/100ML; MG/100ML
INJECTION, SOLUTION INTRAVENOUS CONTINUOUS PRN
Status: DISCONTINUED | OUTPATIENT
Start: 2023-09-22 | End: 2023-09-22

## 2023-09-22 RX ORDER — ONDANSETRON 4 MG/1
4 TABLET, ORALLY DISINTEGRATING ORAL EVERY 30 MIN PRN
Status: DISCONTINUED | OUTPATIENT
Start: 2023-09-22 | End: 2023-09-22 | Stop reason: HOSPADM

## 2023-09-22 RX ORDER — CALCIUM CHLORIDE 100 MG/ML
INJECTION INTRAVENOUS; INTRAVENTRICULAR PRN
Status: DISCONTINUED | OUTPATIENT
Start: 2023-09-22 | End: 2023-09-22

## 2023-09-22 RX ORDER — GABAPENTIN 250 MG/5ML
600 SOLUTION ORAL EVERY 8 HOURS SCHEDULED
Status: DISCONTINUED | OUTPATIENT
Start: 2023-09-23 | End: 2023-09-26

## 2023-09-22 RX ORDER — ACETAMINOPHEN 325 MG/10.15ML
650 LIQUID ORAL EVERY 4 HOURS PRN
Status: DISCONTINUED | OUTPATIENT
Start: 2023-09-25 | End: 2023-09-26

## 2023-09-22 RX ORDER — ONDANSETRON 4 MG/1
4 TABLET, ORALLY DISINTEGRATING ORAL EVERY 6 HOURS PRN
Status: DISCONTINUED | OUTPATIENT
Start: 2023-09-22 | End: 2023-10-03 | Stop reason: HOSPADM

## 2023-09-22 RX ORDER — FENTANYL CITRATE 50 UG/ML
25-50 INJECTION, SOLUTION INTRAMUSCULAR; INTRAVENOUS
Status: DISCONTINUED | OUTPATIENT
Start: 2023-09-22 | End: 2023-09-22 | Stop reason: HOSPADM

## 2023-09-22 RX ORDER — POLYETHYLENE GLYCOL 3350 17 G/17G
17 POWDER, FOR SOLUTION ORAL DAILY
Status: DISCONTINUED | OUTPATIENT
Start: 2023-09-23 | End: 2023-09-26

## 2023-09-22 RX ORDER — NALOXONE HYDROCHLORIDE 0.4 MG/ML
0.4 INJECTION, SOLUTION INTRAMUSCULAR; INTRAVENOUS; SUBCUTANEOUS
Status: DISCONTINUED | OUTPATIENT
Start: 2023-09-22 | End: 2023-09-22 | Stop reason: HOSPADM

## 2023-09-22 RX ORDER — ACETAMINOPHEN 325 MG/1
975 TABLET ORAL ONCE
Status: DISCONTINUED | OUTPATIENT
Start: 2023-09-22 | End: 2023-09-22 | Stop reason: HOSPADM

## 2023-09-22 RX ORDER — PROCHLORPERAZINE MALEATE 10 MG
10 TABLET ORAL EVERY 6 HOURS PRN
Status: DISCONTINUED | OUTPATIENT
Start: 2023-09-22 | End: 2023-09-22

## 2023-09-22 RX ORDER — ERTAPENEM 1 G/1
1 INJECTION, POWDER, LYOPHILIZED, FOR SOLUTION INTRAMUSCULAR; INTRAVENOUS ONCE
Status: COMPLETED | OUTPATIENT
Start: 2023-09-23 | End: 2023-09-23

## 2023-09-22 RX ORDER — ONDANSETRON 2 MG/ML
4 INJECTION INTRAMUSCULAR; INTRAVENOUS EVERY 30 MIN PRN
Status: DISCONTINUED | OUTPATIENT
Start: 2023-09-22 | End: 2023-09-22 | Stop reason: HOSPADM

## 2023-09-22 RX ORDER — ESMOLOL HYDROCHLORIDE 10 MG/ML
INJECTION INTRAVENOUS PRN
Status: DISCONTINUED | OUTPATIENT
Start: 2023-09-22 | End: 2023-09-22

## 2023-09-22 RX ORDER — CITALOPRAM HYDROBROMIDE 20 MG/10ML
40 SOLUTION, ORAL ORAL DAILY
Status: DISCONTINUED | OUTPATIENT
Start: 2023-09-23 | End: 2023-09-26

## 2023-09-22 RX ORDER — PEDIATRIC MULTIVIT 61/D3/VIT K 1500-800
1.5 CAPSULE ORAL DAILY
Status: DISCONTINUED | OUTPATIENT
Start: 2023-09-24 | End: 2023-09-24

## 2023-09-22 RX ORDER — OXYCODONE HYDROCHLORIDE 10 MG/1
10 TABLET ORAL
Status: DISCONTINUED | OUTPATIENT
Start: 2023-09-22 | End: 2023-09-22 | Stop reason: HOSPADM

## 2023-09-22 RX ORDER — LIDOCAINE 40 MG/G
CREAM TOPICAL
Status: DISCONTINUED | OUTPATIENT
Start: 2023-09-22 | End: 2023-10-03 | Stop reason: HOSPADM

## 2023-09-22 RX ORDER — ACETAMINOPHEN 325 MG/10.15ML
975 LIQUID ORAL EVERY 8 HOURS
Status: COMPLETED | OUTPATIENT
Start: 2023-09-22 | End: 2023-09-25

## 2023-09-22 RX ORDER — DEXTROSE MONOHYDRATE 100 MG/ML
INJECTION, SOLUTION INTRAVENOUS CONTINUOUS PRN
Status: DISCONTINUED | OUTPATIENT
Start: 2023-09-22 | End: 2023-09-23

## 2023-09-22 RX ORDER — FENTANYL CITRATE 50 UG/ML
INJECTION, SOLUTION INTRAMUSCULAR; INTRAVENOUS PRN
Status: DISCONTINUED | OUTPATIENT
Start: 2023-09-22 | End: 2023-09-22

## 2023-09-22 RX ORDER — FENTANYL CITRATE 50 UG/ML
25 INJECTION, SOLUTION INTRAMUSCULAR; INTRAVENOUS EVERY 5 MIN PRN
Status: DISCONTINUED | OUTPATIENT
Start: 2023-09-22 | End: 2023-09-22 | Stop reason: HOSPADM

## 2023-09-22 RX ORDER — NICOTINE POLACRILEX 4 MG
15-30 LOZENGE BUCCAL
Status: DISCONTINUED | OUTPATIENT
Start: 2023-09-22 | End: 2023-10-03 | Stop reason: HOSPADM

## 2023-09-22 RX ORDER — ONDANSETRON 2 MG/ML
4 INJECTION INTRAMUSCULAR; INTRAVENOUS ONCE
Status: COMPLETED | OUTPATIENT
Start: 2023-09-22 | End: 2023-09-22

## 2023-09-22 RX ORDER — HYDROXYZINE HCL 10 MG/5 ML
50 SOLUTION, ORAL ORAL EVERY 6 HOURS PRN
Status: DISCONTINUED | OUTPATIENT
Start: 2023-09-22 | End: 2023-09-26

## 2023-09-22 RX ORDER — AMOXICILLIN 250 MG
2 CAPSULE ORAL 2 TIMES DAILY
Status: DISCONTINUED | OUTPATIENT
Start: 2023-09-22 | End: 2023-09-22

## 2023-09-22 RX ORDER — DEXTROSE MONOHYDRATE 25 G/50ML
25-50 INJECTION, SOLUTION INTRAVENOUS
Status: DISCONTINUED | OUTPATIENT
Start: 2023-09-22 | End: 2023-09-23

## 2023-09-22 RX ORDER — OXYCODONE HYDROCHLORIDE 5 MG/1
5 TABLET ORAL
Status: DISCONTINUED | OUTPATIENT
Start: 2023-09-22 | End: 2023-09-22 | Stop reason: HOSPADM

## 2023-09-22 RX ORDER — FENTANYL CITRATE 50 UG/ML
50 INJECTION, SOLUTION INTRAMUSCULAR; INTRAVENOUS EVERY 5 MIN PRN
Status: DISCONTINUED | OUTPATIENT
Start: 2023-09-22 | End: 2023-09-22 | Stop reason: HOSPADM

## 2023-09-22 RX ORDER — POLYETHYLENE GLYCOL 3350 17 G/17G
17 POWDER, FOR SOLUTION ORAL DAILY
Status: DISCONTINUED | OUTPATIENT
Start: 2023-09-23 | End: 2023-09-22

## 2023-09-22 RX ORDER — ACETAMINOPHEN 325 MG/1
975 TABLET ORAL ONCE
Status: COMPLETED | OUTPATIENT
Start: 2023-09-22 | End: 2023-09-22

## 2023-09-22 RX ORDER — ONDANSETRON 4 MG/1
4 TABLET, ORALLY DISINTEGRATING ORAL EVERY 6 HOURS PRN
Status: DISCONTINUED | OUTPATIENT
Start: 2023-09-22 | End: 2023-09-22

## 2023-09-22 RX ORDER — PROPOFOL 10 MG/ML
INJECTION, EMULSION INTRAVENOUS PRN
Status: DISCONTINUED | OUTPATIENT
Start: 2023-09-22 | End: 2023-09-22

## 2023-09-22 RX ORDER — HEPARIN SODIUM 10000 [USP'U]/100ML
200 INJECTION, SOLUTION INTRAVENOUS CONTINUOUS
Status: DISCONTINUED | OUTPATIENT
Start: 2023-09-23 | End: 2023-09-23

## 2023-09-22 RX ORDER — ROPIVACAINE HYDROCHLORIDE 5 MG/ML
INJECTION, SOLUTION EPIDURAL; INFILTRATION; PERINEURAL PRN
Status: DISCONTINUED | OUTPATIENT
Start: 2023-09-22 | End: 2023-09-22

## 2023-09-22 RX ORDER — FLUMAZENIL 0.1 MG/ML
0.2 INJECTION, SOLUTION INTRAVENOUS
Status: DISCONTINUED | OUTPATIENT
Start: 2023-09-22 | End: 2023-09-22 | Stop reason: HOSPADM

## 2023-09-22 RX ORDER — DEXAMETHASONE SODIUM PHOSPHATE 4 MG/ML
INJECTION, SOLUTION INTRA-ARTICULAR; INTRALESIONAL; INTRAMUSCULAR; INTRAVENOUS; SOFT TISSUE PRN
Status: DISCONTINUED | OUTPATIENT
Start: 2023-09-22 | End: 2023-09-22

## 2023-09-22 RX ORDER — NALOXONE HYDROCHLORIDE 0.4 MG/ML
0.2 INJECTION, SOLUTION INTRAMUSCULAR; INTRAVENOUS; SUBCUTANEOUS
Status: DISCONTINUED | OUTPATIENT
Start: 2023-09-22 | End: 2023-09-22 | Stop reason: HOSPADM

## 2023-09-22 RX ORDER — HYDROMORPHONE HCL IN WATER/PF 6 MG/30 ML
0.4 PATIENT CONTROLLED ANALGESIA SYRINGE INTRAVENOUS EVERY 5 MIN PRN
Status: DISCONTINUED | OUTPATIENT
Start: 2023-09-22 | End: 2023-09-22 | Stop reason: HOSPADM

## 2023-09-22 RX ORDER — PROCHLORPERAZINE 25 MG
25 SUPPOSITORY, RECTAL RECTAL EVERY 12 HOURS PRN
Status: DISCONTINUED | OUTPATIENT
Start: 2023-09-22 | End: 2023-10-03 | Stop reason: HOSPADM

## 2023-09-22 RX ORDER — DEXTROSE, SODIUM CHLORIDE, SODIUM LACTATE, POTASSIUM CHLORIDE, AND CALCIUM CHLORIDE 5; .6; .31; .03; .02 G/100ML; G/100ML; G/100ML; G/100ML; G/100ML
INJECTION, SOLUTION INTRAVENOUS CONTINUOUS
Status: DISCONTINUED | OUTPATIENT
Start: 2023-09-22 | End: 2023-09-25

## 2023-09-22 RX ORDER — PIPERACILLIN SODIUM, TAZOBACTAM SODIUM 3; .375 G/15ML; G/15ML
INJECTION, POWDER, LYOPHILIZED, FOR SOLUTION INTRAVENOUS PRN
Status: DISCONTINUED | OUTPATIENT
Start: 2023-09-22 | End: 2023-09-22

## 2023-09-22 RX ADMIN — ESMOLOL HYDROCHLORIDE 10 MG: 10 INJECTION, SOLUTION INTRAVENOUS at 14:51

## 2023-09-22 RX ADMIN — FENTANYL CITRATE 50 MCG: 50 INJECTION, SOLUTION INTRAMUSCULAR; INTRAVENOUS at 06:41

## 2023-09-22 RX ADMIN — SODIUM CHLORIDE, POTASSIUM CHLORIDE, SODIUM LACTATE AND CALCIUM CHLORIDE: 600; 310; 30; 20 INJECTION, SOLUTION INTRAVENOUS at 06:30

## 2023-09-22 RX ADMIN — SODIUM CHLORIDE, POTASSIUM CHLORIDE, SODIUM LACTATE AND CALCIUM CHLORIDE: 600; 310; 30; 20 INJECTION, SOLUTION INTRAVENOUS at 17:00

## 2023-09-22 RX ADMIN — Medication 5 ML/HR: at 12:31

## 2023-09-22 RX ADMIN — Medication 0.5 UNITS: at 18:02

## 2023-09-22 RX ADMIN — PROPOFOL 100 MG: 10 INJECTION, EMULSION INTRAVENOUS at 07:45

## 2023-09-22 RX ADMIN — PIPERACILLIN AND TAZOBACTAM 3.38 G: 3; .375 INJECTION, POWDER, FOR SOLUTION INTRAVENOUS at 17:15

## 2023-09-22 RX ADMIN — HYDROMORPHONE HYDROCHLORIDE 0.5 MG: 1 INJECTION, SOLUTION INTRAMUSCULAR; INTRAVENOUS; SUBCUTANEOUS at 11:15

## 2023-09-22 RX ADMIN — Medication 50 MG: at 07:45

## 2023-09-22 RX ADMIN — Medication 10 MG: at 14:55

## 2023-09-22 RX ADMIN — Medication 20 MG: at 17:05

## 2023-09-22 RX ADMIN — Medication 20 MG: at 15:14

## 2023-09-22 RX ADMIN — HYDROMORPHONE HYDROCHLORIDE 0.5 MG: 1 INJECTION, SOLUTION INTRAMUSCULAR; INTRAVENOUS; SUBCUTANEOUS at 12:35

## 2023-09-22 RX ADMIN — HYDROMORPHONE HYDROCHLORIDE 0.4 MG: 1 INJECTION, SOLUTION INTRAMUSCULAR; INTRAVENOUS; SUBCUTANEOUS at 20:15

## 2023-09-22 RX ADMIN — Medication 0.5 UNITS: at 19:34

## 2023-09-22 RX ADMIN — HYDROMORPHONE HYDROCHLORIDE 0.4 MG: 0.2 INJECTION, SOLUTION INTRAMUSCULAR; INTRAVENOUS; SUBCUTANEOUS at 21:09

## 2023-09-22 RX ADMIN — HYDROMORPHONE HYDROCHLORIDE 0.4 MG: 0.2 INJECTION, SOLUTION INTRAMUSCULAR; INTRAVENOUS; SUBCUTANEOUS at 20:48

## 2023-09-22 RX ADMIN — DEXAMETHASONE SODIUM PHOSPHATE 4 MG: 4 INJECTION, SOLUTION INTRA-ARTICULAR; INTRALESIONAL; INTRAMUSCULAR; INTRAVENOUS; SOFT TISSUE at 07:45

## 2023-09-22 RX ADMIN — ONDANSETRON 4 MG: 2 INJECTION INTRAMUSCULAR; INTRAVENOUS at 18:59

## 2023-09-22 RX ADMIN — Medication 30 MG: at 07:45

## 2023-09-22 RX ADMIN — PIPERACILLIN AND TAZOBACTAM 3.38 G: 3; .375 INJECTION, POWDER, FOR SOLUTION INTRAVENOUS at 13:15

## 2023-09-22 RX ADMIN — Medication 10 MG: at 14:28

## 2023-09-22 RX ADMIN — FENTANYL CITRATE 50 MCG: 50 INJECTION, SOLUTION INTRAMUSCULAR; INTRAVENOUS at 06:52

## 2023-09-22 RX ADMIN — ESMOLOL HYDROCHLORIDE 10 MG: 10 INJECTION, SOLUTION INTRAVENOUS at 14:21

## 2023-09-22 RX ADMIN — Medication 30 MG: at 11:05

## 2023-09-22 RX ADMIN — KETOROLAC TROMETHAMINE 10 MG: 15 INJECTION INTRAMUSCULAR; INTRAVENOUS at 23:35

## 2023-09-22 RX ADMIN — PHENYLEPHRINE HYDROCHLORIDE 50 MCG: 10 INJECTION INTRAVENOUS at 16:21

## 2023-09-22 RX ADMIN — Medication 10 MG: at 18:56

## 2023-09-22 RX ADMIN — HEPARIN SODIUM 200 UNITS/HR: 10000 INJECTION, SOLUTION INTRAVENOUS at 21:44

## 2023-09-22 RX ADMIN — Medication 10 MG: at 10:21

## 2023-09-22 RX ADMIN — SUGAMMADEX 200 MG: 100 INJECTION, SOLUTION INTRAVENOUS at 19:22

## 2023-09-22 RX ADMIN — ACETAMINOPHEN 975 MG: 325 TABLET ORAL at 06:18

## 2023-09-22 RX ADMIN — PIPERACILLIN AND TAZOBACTAM 3.38 G: 3; .375 INJECTION, POWDER, FOR SOLUTION INTRAVENOUS at 09:00

## 2023-09-22 RX ADMIN — LIDOCAINE HYDROCHLORIDE 40 MG: 20 INJECTION, SOLUTION INFILTRATION; PERINEURAL at 07:45

## 2023-09-22 RX ADMIN — HEPARIN SODIUM 2000 UNITS: 1000 INJECTION INTRAVENOUS; SUBCUTANEOUS at 18:17

## 2023-09-22 RX ADMIN — SODIUM CHLORIDE, SODIUM LACTATE, POTASSIUM CHLORIDE, CALCIUM CHLORIDE AND DEXTROSE MONOHYDRATE: 5; 600; 310; 30; 20 INJECTION, SOLUTION INTRAVENOUS at 21:19

## 2023-09-22 RX ADMIN — PHENYLEPHRINE HYDROCHLORIDE 50 MCG: 10 INJECTION INTRAVENOUS at 18:35

## 2023-09-22 RX ADMIN — PHENYLEPHRINE HYDROCHLORIDE 100 MCG: 10 INJECTION INTRAVENOUS at 17:33

## 2023-09-22 RX ADMIN — PHENYLEPHRINE HYDROCHLORIDE 100 MCG: 10 INJECTION INTRAVENOUS at 12:45

## 2023-09-22 RX ADMIN — Medication 10 MG: at 18:13

## 2023-09-22 RX ADMIN — HYDROMORPHONE HYDROCHLORIDE 0.5 MG: 1 INJECTION, SOLUTION INTRAMUSCULAR; INTRAVENOUS; SUBCUTANEOUS at 07:45

## 2023-09-22 RX ADMIN — PHENYLEPHRINE HYDROCHLORIDE 100 MCG: 10 INJECTION INTRAVENOUS at 18:06

## 2023-09-22 RX ADMIN — Medication 30 MG: at 12:41

## 2023-09-22 RX ADMIN — HYDROMORPHONE HYDROCHLORIDE 0.4 MG: 0.2 INJECTION, SOLUTION INTRAMUSCULAR; INTRAVENOUS; SUBCUTANEOUS at 20:37

## 2023-09-22 RX ADMIN — Medication 40 MG: at 23:28

## 2023-09-22 RX ADMIN — PHENYLEPHRINE HYDROCHLORIDE 50 MCG: 10 INJECTION INTRAVENOUS at 18:25

## 2023-09-22 RX ADMIN — CALCIUM CHLORIDE INJECTION 250 MG: 100 INJECTION, SOLUTION INTRAVENOUS at 17:48

## 2023-09-22 RX ADMIN — HYDROMORPHONE HYDROCHLORIDE 0.5 MG: 1 INJECTION, SOLUTION INTRAMUSCULAR; INTRAVENOUS; SUBCUTANEOUS at 09:18

## 2023-09-22 RX ADMIN — SODIUM CHLORIDE, POTASSIUM CHLORIDE, SODIUM LACTATE AND CALCIUM CHLORIDE: 600; 310; 30; 20 INJECTION, SOLUTION INTRAVENOUS at 15:30

## 2023-09-22 RX ADMIN — FENTANYL CITRATE 50 MCG: 50 INJECTION, SOLUTION INTRAMUSCULAR; INTRAVENOUS at 20:01

## 2023-09-22 RX ADMIN — Medication: at 21:15

## 2023-09-22 RX ADMIN — FENTANYL CITRATE 50 MCG: 50 INJECTION, SOLUTION INTRAMUSCULAR; INTRAVENOUS at 09:10

## 2023-09-22 RX ADMIN — FENTANYL CITRATE 50 MCG: 50 INJECTION, SOLUTION INTRAMUSCULAR; INTRAVENOUS at 20:20

## 2023-09-22 RX ADMIN — EPHEDRINE SULFATE 5 MG: 5 INJECTION INTRAVENOUS at 18:42

## 2023-09-22 RX ADMIN — Medication 10 MG: at 16:08

## 2023-09-22 RX ADMIN — PIPERACILLIN AND TAZOBACTAM 3.38 G: 3; .375 INJECTION, POWDER, FOR SOLUTION INTRAVENOUS at 15:13

## 2023-09-22 RX ADMIN — Medication 5 ML/HR: at 13:02

## 2023-09-22 RX ADMIN — ROPIVACAINE HYDROCHLORIDE 5 ML: 5 INJECTION, SOLUTION EPIDURAL; INFILTRATION; PERINEURAL at 19:46

## 2023-09-22 RX ADMIN — MIDAZOLAM 1 MG: 1 INJECTION INTRAMUSCULAR; INTRAVENOUS at 06:46

## 2023-09-22 RX ADMIN — FOSAPREPITANT 150 MG: 150 INJECTION, POWDER, LYOPHILIZED, FOR SOLUTION INTRAVENOUS at 15:42

## 2023-09-22 RX ADMIN — HYDROMORPHONE HYDROCHLORIDE 0.5 MG: 1 INJECTION, SOLUTION INTRAMUSCULAR; INTRAVENOUS; SUBCUTANEOUS at 15:33

## 2023-09-22 RX ADMIN — SODIUM CHLORIDE, POTASSIUM CHLORIDE, SODIUM LACTATE AND CALCIUM CHLORIDE: 600; 310; 30; 20 INJECTION, SOLUTION INTRAVENOUS at 07:40

## 2023-09-22 RX ADMIN — FENTANYL CITRATE 50 MCG: 50 INJECTION, SOLUTION INTRAMUSCULAR; INTRAVENOUS at 20:08

## 2023-09-22 RX ADMIN — HYDROMORPHONE HYDROCHLORIDE 0.4 MG: 0.2 INJECTION, SOLUTION INTRAMUSCULAR; INTRAVENOUS; SUBCUTANEOUS at 21:02

## 2023-09-22 RX ADMIN — PROPOFOL 70 MG: 10 INJECTION, EMULSION INTRAVENOUS at 19:25

## 2023-09-22 RX ADMIN — SODIUM CHLORIDE, POTASSIUM CHLORIDE, SODIUM LACTATE AND CALCIUM CHLORIDE: 600; 310; 30; 20 INJECTION, SOLUTION INTRAVENOUS at 07:00

## 2023-09-22 RX ADMIN — KETAMINE HYDROCHLORIDE 5 MG/HR: 10 INJECTION, SOLUTION INTRAMUSCULAR; INTRAVENOUS at 08:59

## 2023-09-22 RX ADMIN — INSULIN HUMAN 1 UNITS/HR: 1 INJECTION, SOLUTION INTRAVENOUS at 20:26

## 2023-09-22 RX ADMIN — CALCIUM CHLORIDE INJECTION 250 MG: 100 INJECTION, SOLUTION INTRAVENOUS at 17:43

## 2023-09-22 RX ADMIN — SODIUM CHLORIDE, POTASSIUM CHLORIDE, SODIUM LACTATE AND CALCIUM CHLORIDE: 600; 310; 30; 20 INJECTION, SOLUTION INTRAVENOUS at 18:40

## 2023-09-22 RX ADMIN — HYDROMORPHONE HYDROCHLORIDE 0.5 MG: 1 INJECTION, SOLUTION INTRAMUSCULAR; INTRAVENOUS; SUBCUTANEOUS at 17:03

## 2023-09-22 RX ADMIN — FENTANYL CITRATE 50 MCG: 50 INJECTION, SOLUTION INTRAMUSCULAR; INTRAVENOUS at 20:27

## 2023-09-22 RX ADMIN — KETAMINE HYDROCHLORIDE 10 MG/HR: 10 INJECTION INTRAMUSCULAR; INTRAVENOUS at 21:36

## 2023-09-22 RX ADMIN — EPHEDRINE SULFATE 5 MG: 5 INJECTION INTRAVENOUS at 18:19

## 2023-09-22 RX ADMIN — ONDANSETRON 4 MG: 2 INJECTION INTRAMUSCULAR; INTRAVENOUS at 06:42

## 2023-09-22 RX ADMIN — PHENYLEPHRINE HYDROCHLORIDE 100 MCG: 10 INJECTION INTRAVENOUS at 17:37

## 2023-09-22 RX ADMIN — DEXMEDETOMIDINE HYDROCHLORIDE 0.3 MCG/KG/HR: 100 INJECTION, SOLUTION INTRAVENOUS at 17:01

## 2023-09-22 RX ADMIN — MIDAZOLAM 1 MG: 1 INJECTION INTRAMUSCULAR; INTRAVENOUS at 06:41

## 2023-09-22 RX ADMIN — INSULIN HUMAN 3 UNITS/HR: 1 INJECTION, SOLUTION INTRAVENOUS at 09:39

## 2023-09-22 RX ADMIN — PIPERACILLIN AND TAZOBACTAM 3.38 G: 3; .375 INJECTION, POWDER, FOR SOLUTION INTRAVENOUS at 19:17

## 2023-09-22 RX ADMIN — PHENYLEPHRINE HYDROCHLORIDE 150 MCG: 10 INJECTION INTRAVENOUS at 15:36

## 2023-09-22 RX ADMIN — PHENYLEPHRINE HYDROCHLORIDE 100 MCG: 10 INJECTION INTRAVENOUS at 17:54

## 2023-09-22 RX ADMIN — FENTANYL CITRATE 50 MCG: 50 INJECTION, SOLUTION INTRAMUSCULAR; INTRAVENOUS at 07:45

## 2023-09-22 RX ADMIN — PHENYLEPHRINE HYDROCHLORIDE 100 MCG: 10 INJECTION INTRAVENOUS at 19:29

## 2023-09-22 RX ADMIN — PIPERACILLIN AND TAZOBACTAM 3.38 G: 3; .375 INJECTION, POWDER, FOR SOLUTION INTRAVENOUS at 11:18

## 2023-09-22 RX ADMIN — SENNOSIDES 5 ML: 8.8 LIQUID ORAL at 23:30

## 2023-09-22 RX ADMIN — Medication 1 UNITS: at 18:16

## 2023-09-22 RX ADMIN — DOCUSATE SODIUM 100 MG: 50 LIQUID ORAL at 23:35

## 2023-09-22 RX ADMIN — Medication 10 MG: at 10:37

## 2023-09-22 RX ADMIN — ACETAMINOPHEN 975 MG: 325 SOLUTION ORAL at 23:35

## 2023-09-22 ASSESSMENT — ACTIVITIES OF DAILY LIVING (ADL)
ADLS_ACUITY_SCORE: 35

## 2023-09-22 NOTE — H&P
SURGICAL ICU ADMISSION NOTE  09/22/2023      PRIMARY TEAM: Pancreas Transplant    PRIMARY PHYSICIAN: Dr. Patel   REASON FOR CRITICAL CARE ADMISSION:  Hemodynamic support post TPAIT    ADMITTING PHYSICIAN: Dr Ventura  Date of Service (when I saw the patient): 09/22/2023    ASSESSMENT:  Artie Burger is a 27 year old female who was admitted on 9/22/2023 s/p TPAIT with Dr Patel for chronic calcific pancreatitis.     EBL: 300cc   Products: 1u pRBC, crystalloid, albumin      Past medical notable for MDD with panic attacks, prior hx abuse of alcohol, insomnia, Iron deficiency anemia and chronic abdominal pain on dilaudid.     PLAN:    Neurological:  # Acute post operative pain   # Chronic pain on oral dilaudid ( PTA taking 4x per day)   # MDD with panic attacks   - Monitor neurological status.  - PTA medications:  Celexa 40mg daily, Dilaudid 4mg QID, Buspar 10 mg BID, Gabapentin 600mg TID,  and vistaril 25mg daily   - Pain:   - Bilateral Paravertebral catheters placed 9/22/23   - dPCA 0.2mg q10min  - tylenol 1g q8, toradol 10mg q6hrs  - Sedation plan: precedex     Pulmonary:   # cannabis use, current vaping, prior hx of tobacco use    - Extubated post-op, on RA  - Continue full vent support. PST when meets criteria.  Ventilatory bundle.  - Supplemental oxygen to keep saturation above 92 %.  - Incentive spirometer every 15- 30 minutes.     Cardiovascular:    # Shock-distributive vs hypovolaemic   - Monitor hemodynamic status.   - Off of pressors, maintaining BPs    Gastroenterology/Nutrition:  # GERD- on pta famotidine   # Chronic calcific pancreatitis now s/p TPIAT   - G-J tube in LUQ   - Defer drain management to Pancrease surgery team   - ppx: pantoprazole  - bowel regimen: senna, docusate, miralax      Fluids/Electrolytes:   # Hypovolaemia  - D5 LR for IV fluid hydration  - electrolyte replacement protocol Mg, Phos, K     Renal:  # Acute kidney injury    - resolved: Cr 0.85  - continue miner for now. Will  continue to monitor intake and output.     Endocrine:  # Concern for hyperglycemia due to stress and critical illness   # S/P TPIAT  - insulin gtt per TPIAT protocol    - do not stop insulin gtt, dextrose infusion to maintain glucose if needed   - Endocrine - DM to manage transition to subcutaneous insulin when appropriate     ID:  # Leukocytosis   - WCC 23.7  - no indications for antibiotics.     Positive cultures:  - Gram negative bacilli    Heme:     # Acute blood loss anemia due to surgical blood loss   # Iron deficiency anemia   - EBL: 300cc   - Transfuse if hgb <7.0 or signs/symptoms of hypoperfusion. Monitor and trend.   - heparin gtt   - Hgb 11.2    Musculoskeletal:  # hx of osteopenia   # deconditioning   - Physical and occupational therapy consult     General Cares/Prophylaxis:    DVT Prophylaxis: Pneumatic Compression Devices. Chemical prophylaxis when okay with surgical transplant team   GI Prophylaxis: PPI    Lines/ tubes/ drains:  - Raidal art line   - Right internal jugular   - Bilateral ES catheters   - Padron catheter   - 2 PIV  - NGT  - GJ tube  - Drain left abdomen    Disposition:  -  Surgical ICU    Patient seen, findings and plan discussed with surgical ICU staff, Dr. Ventura.    Helena Wright  [facility] General Surgery PGY2   - - - - - - - - - - - - - - - - - - - - - - - - - - - - - - - - - - - - - - - - - - - - - -   HISTORY PRESENTING ILLNESS: Artie Burger is a 27 year old female who was admitted on 9/22/2023 s/p TPAIT with Dr Patel for chronic calcific pancreatitis.  Past medical notable for MDD with panic attacks, prior hx abuse of alcohol,  daily cannibis and vaping use,  osteopenia Iron deficiency anemia and chronic abdominal pain on dilaudid.   REVIEW OF SYSTEMS: 10 point ROS neg other than the symptoms noted above in the HPI.    PAST MEDICAL HISTORY:   Past Medical History:   Diagnosis Date    Alcohol-induced chronic pancreatitis (H)     Anxiety     Depression     Gastroesophageal  reflux disease     Pancreatic disease     PONV (postoperative nausea and vomiting)     Type 1 diabetes mellitus (H) 9/19/2023     SURGICAL HISTORY:   Past Surgical History:   Procedure Laterality Date    ENDOSCOPIC RETROGRADE CHOLANGIOPANCREATOGRAM COMPLEX N/A 9/13/2021    Procedure: ENDOSCOPIC RETROGRADE CHOLANGIOPANCREATOGRAPHY with pancreatic sphincterotomy, dilation, stone removal, stent placement;  Surgeon: Guru Sabino Campuzano MD;  Location: [facility] OR    ENDOSCOPIC RETROGRADE CHOLANGIOPANCREATOGRAM WITH DIRECT VISUALIZATION SYSTEM AND GENERATOR N/A 11/1/2021    Procedure: ENDOSCOPIC RETROGRADE CHOLANGIOPANCREATOGRAPHY, dilation and debridement sludge removal, pancreatic duct stent placement;  Surgeon: Guru Sabino Campuzano MD;  Location: [facility]    ENDOSCOPIC RETROGRADE CHOLANGIOPANCREATOGRAM WITH SPYGLASS N/A 10/6/2021    Procedure: ENDOSCOPIC RETROGRADE CHOLANGIOPANCREATOGRAPHY, WITH DIRECT DUCT VISUALIZATION, USING PANCREATICOBILIARY FIBEROPTIC PROBE, BILE DUCT STENT EXCHANGE, LITHOTRIPSY OF PANCREATIC STONE, SPHINCTEROTOMY, BALLOON DILATION OF PANCREATIC DUCT AND STONE EXTRACTION;  Surgeon: Guru Sabino Campuzano MD;  Location: [facility]    ENDOSCOPIC RETROGRADE CHOLANGIOPANCREATOGRAM WITH SPYGLASS N/A 12/13/2021    Procedure: ENDOSCOPIC RETROGRADE CHOLANGIOPANCREATOGRAPHY, with pancreatic stent exchange, ballon dilation, stone removal, with spyglass direct visualization;  Surgeon: Guru Sabino Campuzano MD;  Location: [facility]    ENDOSCOPIC RETROGRADE CHOLANGIOPANCREATOGRAPHY, EXCHANGE TUBE/STENT N/A 2/16/2022    Procedure: ENDOSCOPIC RETROGRADE CHOLANGIOPANCREATOGRAPH with pancreatic dilation, debris removal and stent exchange.;  Surgeon: Guru Sabino Campuzano MD;  Location: [facility]    ENDOSCOPIC RETROGRADE CHOLANGIOPANCREATOGRAPHY, EXCHANGE TUBE/STENT N/A 4/4/2022    Procedure: ENDOSCOPIC RETROGRADE CHOLANGIOPANCREATOGRAPHY, WITH REPLACEMENT OF  pancreatic STENT, stone removal;  Surgeon: Guru Sabino Campuzano MD;  [facility]: [facility]    ENDOSCOPIC RETROGRADE CHOLANGIOPANCREATOGRAPHY, EXCHANGE TUBE/STENT N/A 7/13/2022    Procedure: ENDOSCOPIC RETROGRADE CHOLANGIOPANCREATOGRAPHY, WITH pancreatic duct dilation and stent exchange, biliary stent placement, debris removal;  Surgeon: Guru Sabino Campuzano MD;  [facility]: [facility]    ENDOSCOPIC RETROGRADE CHOLANGIOPANCREATOGRAPHY, EXCHANGE TUBE/STENT N/A 9/14/2022    Procedure: ENDOSCOPIC RETROGRADE CHOLANGIOPANCREATOGRAPHY, WITH pancreatic stone removal, biliary stent removal, pancreatic stent exchange;  Surgeon: Guru Sabino Campuzano MD;  [facility]: [facility]    ENDOSCOPIC RETROGRADE CHOLANGIOPANCREATOGRAPHY, EXCHANGE TUBE/STENT N/A 11/21/2022    Procedure: ENDOSCOPIC RETROGRADE CHOLANGIOPANCREATOGRAPHY WITH PANCREATIC DUCT STENT EXCHANGE, DILATION, AND DEBRIS REMOVAL;  Surgeon: Guru Sabino Campuzano MD;  [facility]    ENDOSCOPIC RETROGRADE CHOLANGIOPANCREATOGRAPHY, EXCHANGE TUBE/STENT N/A 1/18/2023    Procedure: ENDOSCOPIC RETROGRADE CHOLANGIOPANCREATOGRAPHY stent exchange, dilation, sludge and stone removal;  Surgeon: Guru Sabino Campuzano MD;  Location: [facility]    ENDOSCOPIC RETROGRADE CHOLANGIOPANCREATOGRAPHY, EXCHANGE TUBE/STENT N/A 3/13/2023    Procedure: ENDOSCOPIC RETROGRADE CHOLANGIOPANCREATOGRAPHY, WITH replacement of pancreatic stents, bile duct stent placed,sphinterotomy of bile duct ampulla, balloon sweep of bile duct for sludge,;  Surgeon: Guru Sabino Campuznao MD;  Location: [facility]    ENDOSCOPIC RETROGRADE CHOLANGIOPANCREATOGRAPHY, EXCHANGE TUBE/STENT N/A 5/10/2023    Procedure: ENDOSCOPIC RETROGRADE CHOLANGIOPANCREATOGRAPHY with pancreatic stents exchange and stone extraction;  Surgeon: Guru Sabino Campuzano MD;  Location: [facility]    ENDOSCOPIC RETROGRADE CHOLANGIOPANCREATOGRAPHY, EXCHANGE TUBE/STENT N/A  2023    Procedure: ENDOSCOPIC RETROGRADE CHOLANGIOPANCREATOGRAPHY, WITH REPLACEMENT OF STENT X3 AND BALLOON SWEEP OF BILE DUCT FOR STONES;  Surgeon: Guru Sabino Campuzano MD;  Location:  OR       SOCIAL HISTORY:   Social History     Socioeconomic History    Marital status: Single     Spouse name: None    Number of children: None    Years of education: None    Highest education level: None   Tobacco Use    Smoking status: Former     Types: Cigarettes     Quit date: 2022     Years since quittin.3    Smokeless tobacco: Current    Tobacco comments:     Vapes daily   Vaping Use    Vaping Use: Every day    Substances: Nicotine   Substance and Sexual Activity    Alcohol use: Not Currently    Drug use: Yes     Types: Marijuana     Comment: smokes daily    Sexual activity: Yes     FAMILY HISTORY: No bleeding/clotting disorders nor problems with anesthesia.     ALLERGIES:   No Known Allergies    MEDICATIONS:  No current facility-administered medications on file prior to encounter.  acetaminophen (TYLENOL) 500 MG tablet, Take 500-1,000 mg by mouth every 6 hours as needed for mild pain  busPIRone (BUSPAR) 10 MG tablet, Take 10 mg by mouth 2 times daily  cetirizine (ZYRTEC) 10 MG tablet, Take 10 mg by mouth daily as needed  citalopram (CELEXA) 40 MG tablet, Take 1 tablet by mouth daily  famotidine (PEPCID) 20 MG tablet, Take 20 mg by mouth daily as needed  gabapentin (NEURONTIN) 300 MG capsule, Take 2 capsules (600 mg) by mouth 3 times daily  medroxyPROGESTERone (DEPO-PROVERA) 150 MG/ML IM injection, Inject 150 mg into the muscle every 3 months  Multiple Vitamin (TAB-A-FABI) TABS, Take 1 tablet by mouth daily  hydrOXYzine (ATARAX) 25 MG tablet, Take 25 mg by mouth daily  ondansetron (ZOFRAN-ODT) 4 MG ODT tab, Place 4-8 mg under the tongue every 6 hours as needed  pancrelipase, lip-prot-amyl, 3000 UNITS (CREON) CPEP, Take 2 capsules by mouth 3 times daily (with meals)        PHYSICAL  EXAMINATION:  Temp:  [98.4  F (36.9  C)-98.8  F (37.1  C)] 98.4  F (36.9  C)  Pulse:  [61-99] 61  Resp:  [12-29] 20  BP: (112-124)/(70-85) 113/75  SpO2:  [93 %-100 %] 99 %  General: resting in bed, awake, alert, appears relatively comfortable  Neck: RIJ central line   Neuro: A&Ox3, NAD  Pulm/Resp: NLB on RA  CV: RRR  Abdomen: Soft, non-distended, non-focal tenderness to palpation, baudilio around midline incision, no rebound tenderness or guarding, no masses, upper midline incision with dermabond in place, GJ in LUQ, NINFA drain with thin serosang output in right abdomen  : miner catheter in place, urine yellow and clear  Skin: warm and well perfused  MSK/Extremities: no peripheral edema, moving all extremities, peripheral pulses intact, extremities well perfused    LABS: Reviewed.   Arterial Blood Gases   No lab results found in last 7 days.  Complete Blood Count   Recent Labs   Lab 09/21/23  1609   WBC 14.1*   HGB 11.9        Basic Metabolic Panel  Recent Labs   Lab 09/22/23  0611   *     Liver Function Tests  Recent Labs   Lab 09/21/23  1609   INR 1.13     Pancreatic Enzymes  No lab results found in last 7 days.  Coagulation Profile  Recent Labs   Lab 09/21/23  1609   INR 1.13       IMAGING:  Recent Results (from the past 24 hour(s))   POC US Guidance Needle Placement    Impression    Bilateral paravertebral blocks

## 2023-09-22 NOTE — ANESTHESIA PROCEDURE NOTES
Arterial Line Procedure Note    Pre-Procedure   Staff -        Anesthesiologist:  Ying Quezada MD       Performed By: anesthesiologist       Location: OR       Pre-Anesthestic Checklist: patient identified, IV checked and risks and benefits discussed  Timeout:       Correct Patient: Yes   Line Placement:   This line was placed Post Induction  Procedure   Procedure: arterial line       Laterality: right       Insertion Site: radial.  Sterile Prep        Standard elements of sterile barrier followed       Skin prep: Chloraprep  Insertion/Injection        Technique: Parmjit's test completed        Catheter Type/Size: 20 G, 1.75 in/4.5 cm quick cath (integral wire)  Narrative        Tegaderm dressing used.       Complications: None apparent,        Arterial waveform: Yes        IBP within 10% of NIBP: Yes

## 2023-09-22 NOTE — ANESTHESIA PROCEDURE NOTES
"Paravertebral Procedure Note    Pre-Procedure   Staff -        Anesthesiologist:  Jeremías Schumacher MD       Resident/Fellow: Serenity Chowdary MD       Performed By: resident       Location: pre-op       Procedure Start/Stop Times: 9/22/2023 6:35 AM and 9/22/2023 7:20 AM       Pre-Anesthestic Checklist: patient identified, IV checked, site marked, risks and benefits discussed, informed consent, monitors and equipment checked, pre-op evaluation, at physician/surgeon's request and post-op pain management  Timeout:       Correct Patient: Yes        Correct Procedure: Yes        Correct Site: Yes        Correct Position: Yes        Correct Laterality: Yes        Site Marked: Yes  Procedure Documentation  Procedure: Paravertebral       Laterality: bilateral       Patient Position: prone       Skin prep: Chloraprep       Local skin infiltrated with 10 mL of 1% lidocaine.        Insertion Site: T6-7.       Needle Gauge: 17.        Catheter: 19 G.          Catheter threaded easily.             Ultrasound guided       1. Ultrasound was used to identify targeted nerve, plexus, vascular marker, or fascial plane and place a needle adjacent to it in real-time.       2. Ultrasound was used to visualize the spread of anesthetic in close proximity to the above referenced structure.       3. A permanent image is entered into the patient's record.    Assessment/Narrative         The placement was negative for: blood aspirated, painful injection and site bleeding       Paresthesias: No.       Bolus given via. no blood aspirated via catheter.        Secured via Tegaderm.        Insertion/Infusion Method: Continuous Infusion       Complications: none    Medication(s) Administered   Medication Administration Time: 9/22/2023 6:35 AM      FOR [facility] ([facility]) ONLY:   Pain Team Contact information: please page the Pain Team Via [employer]. Search \"Pain\". During daytime hours, please page the attending first. At night please " page the resident first.

## 2023-09-22 NOTE — OR NURSING
Temp:  [98.4  F (36.9  C)-98.8  F (37.1  C)] 98.4  F (36.9  C)  Pulse:  [64-99] 65  Resp:  [12-29] 16  BP: (112-124)/(70-85) 113/75  SpO2:  [93 %-100 %] 100 %    Bilateral paravertebral catheter block performed without complications, 2mg versed, 100mcg fentanyl given.  NSR, VSS, RA.  Pt tolerated well.  Will continue to monitor.

## 2023-09-22 NOTE — ANESTHESIA PROCEDURE NOTES
Airway       Patient location during procedure: OR       Procedure Start/Stop Times: 9/22/2023 7:55 AM  Staff -        Anesthesiologist:  Ying Quezada MD       Resident/Fellow: Maritza Paul MD       Performed By: anesthesiologist and resident  Consent for Airway        Urgency: elective  Indications and Patient Condition       Indications for airway management: rolando-procedural       Induction type:intravenous       Mask difficulty assessment: 1 - vent by mask    Final Airway Details       Final airway type: endotracheal airway       Successful airway: ETT - single  Endotracheal Airway Details        ETT size (mm): 7.0       Cuffed: yes       Successful intubation technique: direct laryngoscopy       DL Blade Type: MAC 4       Grade View of Cords: 1       Adjucts: stylet       Position: Right       Measured from: gums/teeth       Secured at (cm): 21    Post intubation assessment        Placement verified by: capnometry, equal breath sounds and chest rise        Number of attempts at approach: 1       Secured with: pink tape       Ease of procedure: easy       Dentition: Intact and Unchanged    Medication(s) Administered   Medication Administration Time: 9/22/2023 7:55 AM

## 2023-09-22 NOTE — ANESTHESIA PROCEDURE NOTES
Central Line/PA Catheter Placement    Pre-Procedure   Staff -        Anesthesiologist:  Ying Quezada MD       Performed By: anesthesiologist       Location: OR       Pre-Anesthestic Checklist: patient identified, IV checked, site marked, risks and benefits discussed, informed consent, monitors and equipment checked, pre-op evaluation and at physician/surgeon's request  Timeout:       Correct Patient: Yes        Correct Procedure: Yes        Correct Site: Yes        Correct Position: Yes        Correct Laterality: Yes   Line Placement:   This line was placed Post Induction    Procedure   Procedure: central line and elective       Laterality: right       Insertion Site: internal jugular.       Patient Position: Trendelenburg  Sterile Prep        All elements of maximal sterile barrier technique followed       Patient Prep/Sterile Barriers: draped, hand hygiene, gloves , hat , mask , draped, gown, sterile gel and probe cover       Skin prep: Chloraprep  Insertion/Injection        Type: CVC       Catheter Size: 7 Fr       Number of Lumens: double lumen  Narrative         Secured by: suture       Tegaderm dressing used.       Complications: None apparent,        blood aspirated from all lumens,        All lumens flushed: Yes

## 2023-09-23 LAB
ALBUMIN SERPL BCG-MCNC: 3.3 G/DL (ref 3.5–5.2)
ALP SERPL-CCNC: 32 U/L (ref 35–104)
ALT SERPL W P-5'-P-CCNC: 38 U/L (ref 0–50)
AMYLASE SERPL-CCNC: 25 U/L (ref 28–100)
ANION GAP SERPL CALCULATED.3IONS-SCNC: 10 MMOL/L (ref 7–15)
APTT PPP: 32 SECONDS (ref 22–38)
AST SERPL W P-5'-P-CCNC: 63 U/L (ref 0–45)
BASOPHILS # BLD AUTO: 0 10E3/UL (ref 0–0.2)
BASOPHILS NFR BLD AUTO: 0 %
BILIRUB DIRECT SERPL-MCNC: <0.2 MG/DL (ref 0–0.3)
BILIRUB SERPL-MCNC: 0.4 MG/DL
BUN SERPL-MCNC: 12.6 MG/DL (ref 6–20)
CALCIUM SERPL-MCNC: 8.7 MG/DL (ref 8.6–10)
CHLORIDE SERPL-SCNC: 110 MMOL/L (ref 98–107)
CREAT SERPL-MCNC: 0.85 MG/DL (ref 0.51–0.95)
DEPRECATED HCO3 PLAS-SCNC: 22 MMOL/L (ref 22–29)
EGFRCR SERPLBLD CKD-EPI 2021: >90 ML/MIN/1.73M2
EOSINOPHIL # BLD AUTO: 0 10E3/UL (ref 0–0.7)
EOSINOPHIL NFR BLD AUTO: 0 %
ERYTHROCYTE [DISTWIDTH] IN BLOOD BY AUTOMATED COUNT: 14.2 % (ref 10–15)
GLUCOSE BLDC GLUCOMTR-MCNC: 107 MG/DL (ref 70–99)
GLUCOSE BLDC GLUCOMTR-MCNC: 114 MG/DL (ref 70–99)
GLUCOSE BLDC GLUCOMTR-MCNC: 122 MG/DL (ref 70–99)
GLUCOSE BLDC GLUCOMTR-MCNC: 126 MG/DL (ref 70–99)
GLUCOSE BLDC GLUCOMTR-MCNC: 127 MG/DL (ref 70–99)
GLUCOSE BLDC GLUCOMTR-MCNC: 128 MG/DL (ref 70–99)
GLUCOSE BLDC GLUCOMTR-MCNC: 130 MG/DL (ref 70–99)
GLUCOSE BLDC GLUCOMTR-MCNC: 132 MG/DL (ref 70–99)
GLUCOSE BLDC GLUCOMTR-MCNC: 132 MG/DL (ref 70–99)
GLUCOSE BLDC GLUCOMTR-MCNC: 133 MG/DL (ref 70–99)
GLUCOSE BLDC GLUCOMTR-MCNC: 134 MG/DL (ref 70–99)
GLUCOSE BLDC GLUCOMTR-MCNC: 136 MG/DL (ref 70–99)
GLUCOSE BLDC GLUCOMTR-MCNC: 136 MG/DL (ref 70–99)
GLUCOSE BLDC GLUCOMTR-MCNC: 143 MG/DL (ref 70–99)
GLUCOSE BLDC GLUCOMTR-MCNC: 154 MG/DL (ref 70–99)
GLUCOSE BLDC GLUCOMTR-MCNC: 156 MG/DL (ref 70–99)
GLUCOSE BLDC GLUCOMTR-MCNC: 164 MG/DL (ref 70–99)
GLUCOSE BLDC GLUCOMTR-MCNC: 183 MG/DL (ref 70–99)
GLUCOSE BLDC GLUCOMTR-MCNC: 249 MG/DL (ref 70–99)
GLUCOSE BLDC GLUCOMTR-MCNC: 54 MG/DL (ref 70–99)
GLUCOSE BLDC GLUCOMTR-MCNC: 64 MG/DL (ref 70–99)
GLUCOSE BLDC GLUCOMTR-MCNC: 76 MG/DL (ref 70–99)
GLUCOSE BLDC GLUCOMTR-MCNC: 78 MG/DL (ref 70–99)
GLUCOSE BLDC GLUCOMTR-MCNC: 93 MG/DL (ref 70–99)
GLUCOSE SERPL-MCNC: 77 MG/DL (ref 70–99)
HBA1C MFR BLD: 5.5 %
HCT VFR BLD AUTO: 30.1 % (ref 35–47)
HGB BLD-MCNC: 10.1 G/DL (ref 11.7–15.7)
HGB BLD-MCNC: 10.2 G/DL (ref 11.7–15.7)
IMM GRANULOCYTES # BLD: 0.2 10E3/UL
IMM GRANULOCYTES NFR BLD: 1 %
LIPASE SERPL-CCNC: 12 U/L (ref 13–60)
LYMPHOCYTES # BLD AUTO: 2.3 10E3/UL (ref 0.8–5.3)
LYMPHOCYTES NFR BLD AUTO: 10 %
MAGNESIUM SERPL-MCNC: 1.1 MG/DL (ref 1.7–2.3)
MCH RBC QN AUTO: 29.6 PG (ref 26.5–33)
MCHC RBC AUTO-ENTMCNC: 33.6 G/DL (ref 31.5–36.5)
MCV RBC AUTO: 88 FL (ref 78–100)
MONOCYTES # BLD AUTO: 1.2 10E3/UL (ref 0–1.3)
MONOCYTES NFR BLD AUTO: 5 %
NEUTROPHILS # BLD AUTO: 20 10E3/UL (ref 1.6–8.3)
NEUTROPHILS NFR BLD AUTO: 84 %
NRBC # BLD AUTO: 0 10E3/UL
NRBC BLD AUTO-RTO: 0 /100
PLATELET # BLD AUTO: 345 10E3/UL (ref 150–450)
POTASSIUM SERPL-SCNC: 3.3 MMOL/L (ref 3.4–5.3)
POTASSIUM SERPL-SCNC: 3.5 MMOL/L (ref 3.4–5.3)
PROT SERPL-MCNC: 4.9 G/DL (ref 6.4–8.3)
RBC # BLD AUTO: 3.41 10E6/UL (ref 3.8–5.2)
SODIUM SERPL-SCNC: 142 MMOL/L (ref 136–145)
WBC # BLD AUTO: 23.7 10E3/UL (ref 4–11)

## 2023-09-23 PROCEDURE — 99232 SBSQ HOSP IP/OBS MODERATE 35: CPT | Mod: GC | Performed by: SURGERY

## 2023-09-23 PROCEDURE — 99222 1ST HOSP IP/OBS MODERATE 55: CPT | Performed by: INTERNAL MEDICINE

## 2023-09-23 PROCEDURE — 01996 DLY HOSP MGMT EDRL RX ADMIN: CPT | Performed by: ANESTHESIOLOGY

## 2023-09-23 PROCEDURE — 250N000009 HC RX 250

## 2023-09-23 PROCEDURE — 250N000013 HC RX MED GY IP 250 OP 250 PS 637: Performed by: SURGERY

## 2023-09-23 PROCEDURE — 82248 BILIRUBIN DIRECT: CPT | Performed by: SURGERY

## 2023-09-23 PROCEDURE — 80048 BASIC METABOLIC PNL TOTAL CA: CPT | Performed by: SURGERY

## 2023-09-23 PROCEDURE — 85025 COMPLETE CBC W/AUTO DIFF WBC: CPT | Performed by: SURGERY

## 2023-09-23 PROCEDURE — 84132 ASSAY OF SERUM POTASSIUM: CPT | Performed by: SURGERY

## 2023-09-23 PROCEDURE — 250N000011 HC RX IP 250 OP 636: Mod: JZ

## 2023-09-23 PROCEDURE — 82150 ASSAY OF AMYLASE: CPT | Performed by: SURGERY

## 2023-09-23 PROCEDURE — 83735 ASSAY OF MAGNESIUM: CPT

## 2023-09-23 PROCEDURE — 258N000003 HC RX IP 258 OP 636: Performed by: SURGERY

## 2023-09-23 PROCEDURE — 85018 HEMOGLOBIN: CPT | Performed by: SURGERY

## 2023-09-23 PROCEDURE — 200N000002 HC R&B ICU UMMC

## 2023-09-23 PROCEDURE — 250N000012 HC RX MED GY IP 250 OP 636 PS 637: Performed by: SURGERY

## 2023-09-23 PROCEDURE — 85730 THROMBOPLASTIN TIME PARTIAL: CPT | Performed by: SURGERY

## 2023-09-23 PROCEDURE — 250N000011 HC RX IP 250 OP 636: Mod: JZ | Performed by: SURGERY

## 2023-09-23 PROCEDURE — 258N000003 HC RX IP 258 OP 636

## 2023-09-23 PROCEDURE — 83690 ASSAY OF LIPASE: CPT | Performed by: SURGERY

## 2023-09-23 PROCEDURE — 258N000001 HC RX 258: Performed by: SURGERY

## 2023-09-23 RX ORDER — MEROPENEM 1 G/1
1 INJECTION, POWDER, FOR SOLUTION INTRAVENOUS EVERY 8 HOURS
Status: DISCONTINUED | OUTPATIENT
Start: 2023-09-23 | End: 2023-09-24

## 2023-09-23 RX ORDER — NALOXONE HYDROCHLORIDE 0.4 MG/ML
0.2 INJECTION, SOLUTION INTRAMUSCULAR; INTRAVENOUS; SUBCUTANEOUS
Status: DISCONTINUED | OUTPATIENT
Start: 2023-09-23 | End: 2023-10-03 | Stop reason: HOSPADM

## 2023-09-23 RX ORDER — METHOCARBAMOL 100 MG/ML
1000 INJECTION, SOLUTION INTRAMUSCULAR; INTRAVENOUS ONCE
Status: COMPLETED | OUTPATIENT
Start: 2023-09-24 | End: 2023-09-24

## 2023-09-23 RX ORDER — POTASSIUM CHLORIDE 20MEQ/15ML
20 LIQUID (ML) ORAL ONCE
Status: COMPLETED | OUTPATIENT
Start: 2023-09-23 | End: 2023-09-23

## 2023-09-23 RX ORDER — VANCOMYCIN HYDROCHLORIDE 1 G/200ML
1000 INJECTION, SOLUTION INTRAVENOUS EVERY 12 HOURS
Status: DISCONTINUED | OUTPATIENT
Start: 2023-09-23 | End: 2023-09-24

## 2023-09-23 RX ORDER — PIPERACILLIN SODIUM, TAZOBACTAM SODIUM 3; .375 G/15ML; G/15ML
3.38 INJECTION, POWDER, LYOPHILIZED, FOR SOLUTION INTRAVENOUS EVERY 6 HOURS
Status: DISCONTINUED | OUTPATIENT
Start: 2023-09-23 | End: 2023-09-23

## 2023-09-23 RX ORDER — DEXTROSE MONOHYDRATE 100 MG/ML
INJECTION, SOLUTION INTRAVENOUS CONTINUOUS PRN
Status: DISCONTINUED | OUTPATIENT
Start: 2023-09-23 | End: 2023-10-03 | Stop reason: HOSPADM

## 2023-09-23 RX ORDER — NALOXONE HYDROCHLORIDE 0.4 MG/ML
0.4 INJECTION, SOLUTION INTRAMUSCULAR; INTRAVENOUS; SUBCUTANEOUS
Status: DISCONTINUED | OUTPATIENT
Start: 2023-09-23 | End: 2023-10-03 | Stop reason: HOSPADM

## 2023-09-23 RX ORDER — POTASSIUM CHLORIDE 1.5 G/1.58G
40 POWDER, FOR SOLUTION ORAL ONCE
Status: COMPLETED | OUTPATIENT
Start: 2023-09-23 | End: 2023-09-23

## 2023-09-23 RX ORDER — MAGNESIUM SULFATE HEPTAHYDRATE 40 MG/ML
4 INJECTION, SOLUTION INTRAVENOUS ONCE
Status: COMPLETED | OUTPATIENT
Start: 2023-09-23 | End: 2023-09-23

## 2023-09-23 RX ORDER — GUAIFENESIN 600 MG/1
15 TABLET, EXTENDED RELEASE ORAL DAILY
Status: DISCONTINUED | OUTPATIENT
Start: 2023-09-23 | End: 2023-09-23

## 2023-09-23 RX ORDER — DEXMEDETOMIDINE HYDROCHLORIDE 4 UG/ML
.1-1.2 INJECTION, SOLUTION INTRAVENOUS CONTINUOUS
Status: DISCONTINUED | OUTPATIENT
Start: 2023-09-23 | End: 2023-09-25

## 2023-09-23 RX ORDER — HEPARIN SODIUM 10000 [USP'U]/100ML
400 INJECTION, SOLUTION INTRAVENOUS CONTINUOUS
Status: DISCONTINUED | OUTPATIENT
Start: 2023-09-23 | End: 2023-09-27

## 2023-09-23 RX ADMIN — PIPERACILLIN AND TAZOBACTAM 3.38 G: 3; .375 INJECTION, POWDER, LYOPHILIZED, FOR SOLUTION INTRAVENOUS at 08:34

## 2023-09-23 RX ADMIN — HYDROXYZINE HYDROCHLORIDE 25 MG: 10 SOLUTION ORAL at 22:06

## 2023-09-23 RX ADMIN — KETAMINE HYDROCHLORIDE 10 MG/HR: 10 INJECTION INTRAMUSCULAR; INTRAVENOUS at 16:24

## 2023-09-23 RX ADMIN — SODIUM CHLORIDE 0.5 UNITS/HR: 9 INJECTION, SOLUTION INTRAVENOUS at 10:44

## 2023-09-23 RX ADMIN — DOCUSATE SODIUM 100 MG: 50 LIQUID ORAL at 08:06

## 2023-09-23 RX ADMIN — ACETAMINOPHEN 975 MG: 325 SOLUTION ORAL at 14:36

## 2023-09-23 RX ADMIN — ERTAPENEM SODIUM 1 G: 1 INJECTION, POWDER, LYOPHILIZED, FOR SOLUTION INTRAMUSCULAR; INTRAVENOUS at 02:23

## 2023-09-23 RX ADMIN — HYDROXYZINE HYDROCHLORIDE 25 MG: 10 SOLUTION ORAL at 08:41

## 2023-09-23 RX ADMIN — SENNOSIDES 5 ML: 8.8 LIQUID ORAL at 20:45

## 2023-09-23 RX ADMIN — GABAPENTIN 600 MG: 250 SUSPENSION ORAL at 14:35

## 2023-09-23 RX ADMIN — KETAMINE HYDROCHLORIDE 10 MG/HR: 10 INJECTION INTRAMUSCULAR; INTRAVENOUS at 06:12

## 2023-09-23 RX ADMIN — VANCOMYCIN HYDROCHLORIDE 1000 MG: 1 INJECTION, SOLUTION INTRAVENOUS at 23:50

## 2023-09-23 RX ADMIN — ACETAMINOPHEN 975 MG: 325 SOLUTION ORAL at 08:06

## 2023-09-23 RX ADMIN — SODIUM CHLORIDE, SODIUM LACTATE, POTASSIUM CHLORIDE, CALCIUM CHLORIDE AND DEXTROSE MONOHYDRATE: 5; 600; 310; 30; 20 INJECTION, SOLUTION INTRAVENOUS at 07:40

## 2023-09-23 RX ADMIN — ONDANSETRON 4 MG: 2 INJECTION INTRAMUSCULAR; INTRAVENOUS at 08:29

## 2023-09-23 RX ADMIN — HYDROXYZINE HYDROCHLORIDE 25 MG: 10 SOLUTION ORAL at 02:39

## 2023-09-23 RX ADMIN — DEXMEDETOMIDINE HYDROCHLORIDE 0.7 MCG/KG/HR: 400 INJECTION INTRAVENOUS at 09:15

## 2023-09-23 RX ADMIN — KETOROLAC TROMETHAMINE 10 MG: 15 INJECTION INTRAMUSCULAR; INTRAVENOUS at 17:52

## 2023-09-23 RX ADMIN — GABAPENTIN 600 MG: 250 SUSPENSION ORAL at 06:13

## 2023-09-23 RX ADMIN — VANCOMYCIN HYDROCHLORIDE 1250 MG: 10 INJECTION, POWDER, LYOPHILIZED, FOR SOLUTION INTRAVENOUS at 09:41

## 2023-09-23 RX ADMIN — MEROPENEM 1 G: 1 INJECTION, POWDER, FOR SOLUTION INTRAVENOUS at 15:37

## 2023-09-23 RX ADMIN — SODIUM CHLORIDE, SODIUM LACTATE, POTASSIUM CHLORIDE, CALCIUM CHLORIDE AND DEXTROSE MONOHYDRATE: 5; 600; 310; 30; 20 INJECTION, SOLUTION INTRAVENOUS at 18:52

## 2023-09-23 RX ADMIN — SENNOSIDES 5 ML: 8.8 LIQUID ORAL at 08:10

## 2023-09-23 RX ADMIN — DOCUSATE SODIUM 100 MG: 50 LIQUID ORAL at 20:45

## 2023-09-23 RX ADMIN — Medication: at 21:36

## 2023-09-23 RX ADMIN — DEXTROSE MONOHYDRATE 1000 ML: 100 INJECTION, SOLUTION INTRAVENOUS at 03:39

## 2023-09-23 RX ADMIN — GABAPENTIN 600 MG: 250 SUSPENSION ORAL at 22:06

## 2023-09-23 RX ADMIN — DEXTROSE MONOHYDRATE 50 ML: 25 INJECTION, SOLUTION INTRAVENOUS at 17:11

## 2023-09-23 RX ADMIN — Medication 40 MG: at 20:46

## 2023-09-23 RX ADMIN — HYDROXYZINE HYDROCHLORIDE 25 MG: 10 SOLUTION ORAL at 20:46

## 2023-09-23 RX ADMIN — POTASSIUM CHLORIDE 20 MEQ: 1.5 SOLUTION ORAL at 17:15

## 2023-09-23 RX ADMIN — KETOROLAC TROMETHAMINE 10 MG: 15 INJECTION INTRAMUSCULAR; INTRAVENOUS at 06:09

## 2023-09-23 RX ADMIN — POTASSIUM CHLORIDE 40 MEQ: 1.5 POWDER, FOR SOLUTION ORAL at 04:37

## 2023-09-23 RX ADMIN — CITALOPRAM 40 MG: 10 SOLUTION ORAL at 08:10

## 2023-09-23 RX ADMIN — MAGNESIUM SULFATE IN WATER 4 G: 40 INJECTION, SOLUTION INTRAVENOUS at 21:45

## 2023-09-23 RX ADMIN — KETOROLAC TROMETHAMINE 10 MG: 15 INJECTION INTRAMUSCULAR; INTRAVENOUS at 12:13

## 2023-09-23 RX ADMIN — Medication 40 MG: at 08:06

## 2023-09-23 RX ADMIN — DEXMEDETOMIDINE HYDROCHLORIDE 0.6 MCG/KG/HR: 400 INJECTION INTRAVENOUS at 18:29

## 2023-09-23 RX ADMIN — HYDROXYZINE HYDROCHLORIDE 25 MG: 10 SOLUTION ORAL at 14:49

## 2023-09-23 ASSESSMENT — ACTIVITIES OF DAILY LIVING (ADL)
ADLS_ACUITY_SCORE: 23
ADLS_ACUITY_SCORE: 37
HEARING_DIFFICULTY_OR_DEAF: NO
DIFFICULTY_COMMUNICATING: NO
DOING_ERRANDS_INDEPENDENTLY_DIFFICULTY: NO
DRESSING/BATHING_DIFFICULTY: NO
CHANGE_IN_FUNCTIONAL_STATUS_SINCE_ONSET_OF_CURRENT_ILLNESS/INJURY: NO
ADLS_ACUITY_SCORE: 37
CONCENTRATING,_REMEMBERING_OR_MAKING_DECISIONS_DIFFICULTY: NO
ADLS_ACUITY_SCORE: 22
ADLS_ACUITY_SCORE: 37
ADLS_ACUITY_SCORE: 37
ADLS_ACUITY_SCORE: 23
HEARING_DIFFICULTY_OR_DEAF: NO
FALL_HISTORY_WITHIN_LAST_SIX_MONTHS: NO
ADLS_ACUITY_SCORE: 26
HEARING_DIFFICULTY_OR_DEAF: NO
ADLS_ACUITY_SCORE: 37
WEAR_GLASSES_OR_BLIND: YES
TOILETING_ISSUES: NO
ADLS_ACUITY_SCORE: 26
WALKING_OR_CLIMBING_STAIRS_DIFFICULTY: NO
VISION_MANAGEMENT: GLASSES
ADLS_ACUITY_SCORE: 26
WEAR_GLASSES_OR_BLIND: YES
VISION_MANAGEMENT: GLASSES
ADLS_ACUITY_SCORE: 37
DIFFICULTY_EATING/SWALLOWING: NO
DIFFICULTY_COMMUNICATING: NO

## 2023-09-23 NOTE — PHARMACY-VANCOMYCIN DOSING SERVICE
Pharmacy Vancomycin Initial Note  Date of Service 2023  Patient's  1995  27 year old, female    Indication: Intra-abdominal infection    Current estimated CrCl = Estimated Creatinine Clearance: 89.9 mL/min (based on SCr of 0.85 mg/dL).    Creatinine for last 3 days  2023:  8:31 PM Creatinine 0.95 mg/dL  2023:  3:04 AM Creatinine 0.85 mg/dL    Recent Vancomycin Level(s) for last 3 days  No results found for requested labs within last 3 days.      Vancomycin IV Administrations (past 72 hours)                     vancomycin (VANCOCIN) 1,250 mg in 0.9% NaCl 250 mL intermittent infusion (mg) 1,250 mg New Bag 23 0941                  Nephrotoxins and other renal medications (From now, onward)      Start     Dose/Rate Route Frequency Ordered Stop    23 0800  vancomycin (VANCOCIN) 1,250 mg in 0.9% NaCl 250 mL intermittent infusion         1,250 mg  over 90 Minutes Intravenous ONCE 23 0722      23 2300  ketorolac (TORADOL) injection 10 mg         10 mg Intravenous EVERY 6 HOURS 23 2249 23 2259          Contrast Orders - past 72 hours (72h ago, onward)      None          [employer] Prediction of Planned Initial Vancomycin Regimen  Loading dose: N/A  Regimen: 1000 mg IV every 12 hours.  Start time: 21:41 on 2023  Exposure target: AUC24 (range)400-600 mg/L.hr   AUC24,ss: 555 mg/L.hr  Probability of AUC24 > 400: 81 %  Ctrough,ss: 16.7 mg/L  Probability of Ctrough,ss > 20: 36 %  Probability of nephrotoxicity (Lodise GUILHERME ): 12 %        Plan:  Give vancomycin 1250 mg load x1, then start vancomycin 1000 mg IV q12h.   Vancomycin monitoring method: AUC  Vancomycin therapeutic monitoring goal: 400-600 mg*h/L  Pharmacy will check vancomycin levels as appropriate in 1-3 Days.    Serum creatinine levels will be ordered daily for the first week of therapy and at least twice weekly for subsequent weeks.      Abimbola Vinson, [facility]

## 2023-09-23 NOTE — CONSULTS
NEW INPATIENT DIABETES MANAGEMENT CONSULT  Artie Burger  Age: 27 year old  MRN # [id number]   YOB: 1995    Chief Complaint:    27 year old with PMH significant for recurrent acute on chronic pancreatitis 2/2 alcoholic pancreatitis. Now s/p total pancreatectomy, islet cell autotransplant, splenectomy, cholecystectomy, gastrojejunostomy tube placement.  Endocrine consulted for postoperative hyperglycemia      Reason for Consult: Hyperglycemia      Assessment: #Postoperative hyperglycemia  #Tube feeds associated hyperglycemia  #History of chronic pancreatitis  #S/p total pancreatectomy  #Islet cell autotransplant    Patient had the procedure on 22 September 2023.  Preop A1c 5.5%  Patient was started on insulin drip on 22nd morning around 9:30 AM  Patient currently on algorithm two and roughly insulin requirements ranging between 0.5 units/h to 3 units/h.     Patient is on tube feeds continuous vital 1.5 at rate of  10 cc/h Target goal rate of 50 cc/h.  To be titrated 10 cc/h every 24 hours            Plan:    -Continue with regular insulin drip for now   -BG monitoring TID AC, HS   -hypoglycemia protocol      -Diabetes education needs will be assessed closer to discharge   -on discharge, will recommend outpatient follow up with [facility] Endocrinology service    Discussed plan of care with patient, nursing, and primary team  Thank you for this consult; Inpatient Diabetes will continue to follow.         Total daily insulin                          Ordered DM relevant regimen    Relevant Labs    128 lbs 8.45 oz  Recent Labs   Lab 09/22/23  2345   A1C 5.5     Recent Labs   Lab 09/23/23  0304   CR 0.85     Recent Labs   Lab 09/23/23  1719 09/23/23  1708 09/23/23  1518 09/23/23  1400 09/23/23  1315 09/23/23  1229   * 54* 143* 132* 164* 154*         Other Active Medical Problems:   Diabetes Mellitus Type: likely Pnacreatogenic     Diabetic Complications:   retinopathy - N/A,   Peripheral neuropathy -  "denied  Nephropathy -   Lab Results   Component Value Date/Time    CR 0.85 2023 03:04 AM    CR 0.95 2023 08:31 PM     No results found for: MICROCR  Prior to Admission Diabetes Regimen:      N/A    Lab Results   Component Value Date    A1C 5.5 2023    A1C 5.3 2023     History of DKA:N/A  Current Diet: Orders Placed This Encounter      NPO for Medical/Clinical Reasons Except for: Ice Chips      10 point ROS completed with pertinent positives and negatives noted in the HPI  Past medical, family and social histories are reviewed and updated.    Social History  Social History     Tobacco Use    Smoking status: Former     Types: Cigarettes     Quit date: 2022     Years since quittin.3    Smokeless tobacco: Current    Tobacco comments:     Vapes daily   Substance Use Topics    Alcohol use: Not Currently         Family History   History reviewed. No pertinent family history.    Physical Exam   BP 98/69   Pulse 68   Temp 97.6  F (36.4  C) (Oral)   Resp 24   Ht 1.575 m (5' 2\")   Wt 58.3 kg (128 lb 8.5 oz)   LMP 2023 (Approximate)   SpO2 92%   BMI 23.51 kg/m    General: pleasant, in no distress  HEENT: normocephalic, atraumatic. Oral mucous membranes moist.   Lungs: unlabored respiration, no cough  Skin: warm and dry, no obvious lesions  MSK:  moves all extremities  Lymp:  no LE edema   Mental status:  alert, oriented to self, place, time  Psych:  calm and appropriate interaction     Most Recent Laboratory Tests:  Recent Labs   Lab 23  1010   HGB 10.2*             To contact Endocrine Diabetes service:   From 8AM-4PM: page inpatient diabetes provider that is following the patient  For questions or updates from 4PM-8AM: page the diabetes job code for on call fellow: [phone]    Patient labs, chart, and imaging reviewed.   Discussed with oncall attending.     Yonny Redmond MD  Endocrine Fellow  [phone]     Attending tie-in note  I saw the patient with endocrine fellow  " Nyla  and directly examined patient and discussed. Agree above note and plan.       Gely Clarke MD  Staff Physician  Endocrinology and Metabolism  [facility]  License: [id number]  Pager: [phone]

## 2023-09-23 NOTE — PLAN OF CARE
Shift Events  Writer assumed cares [id number]  Tube feeds started per orders  Precedex weaned as tolerated  Padron removed  NG removed per orders  Applegate removed per orders  Heparin increased to 400u/hr  50cc D50 given for glucose of 54  Vape placed in med safe in room -MD aware patient had in bed with her.    Neuro- AAOx4. PERRL. Moves all extremities with equal strength.   CV- sinus rhythm, HR 60s-70s. BP /60s-80s  Pulm- lungs clear. On room air. Sats low to mid 90s  GI- tube feeds started at 10cc/hr with 30cc FWF q4hrs.  - Padron removed. Voiding without difficulty  Gtts- Precedex @ 0.6mcg/hr. iInsulin @ 0.2-4units/hr. Dilaudid PCA 0.2mg available q10min. Ketamine @ 10mg/hr. Heparin @ 400units/hr. D5LR @ 100cc/hr  Labs- K 3.5 -replaced per protocol, recheck ordered.   Pain- consistently rates pain at 6-8/10, appears to be resting comfortably in bed.  Activity- Up to bedside commode x2 with SBA  Drains- RLQ NINFA with 34cc serosanguinous output this shift, some drainage noted at site.  Social- family at bedside, updated about POC, questions answered.    See flow sheets for further details and assessments.    P: continue to monitor, notify MD of significant changes. Advance tube feeds per orders by 10cc/hr q24hrs.       Goal Outcome Evaluation:      Plan of Care Reviewed With: patient    Overall Patient Progress: improvingOverall Patient Progress: improving

## 2023-09-23 NOTE — PROGRESS NOTES
Pancreatitis Service - Daily Progress Note  09/23/2023    Assessment & Plan: Artie Burger is a 27 year old with PMH significant for recurrent acute on chronic pancreatitis 2/2 alcoholic pancreatitis.    Cardiorespiratory:   Hypotensive: SBP , Stable   Hypoxia: 2L NC, encourage IS/Pulm toilet.  GI/Nutrition: Her g tube is currently draining and her j tube is running Vital 1.5 +relizorb tube feeds at 20 cc/h (goal 50) .  Continue bowel regimen senna/colace  Fluid/Electrolytes: D5LR @100  Electrolyte replacements per ICU  : Padron removed POD #1, voiding.  Post-pancreatectomy diabetes: insulin gtt, appreciate Endocrine consult.  Infection: Afebrile   Leukocytosis, peaked at 23, trending down to 18  Panc preservation/islets : GNB+2/2 , GPC 1 of 2. Continue Meropenem, Vanco  Prophylaxis: PPI, Anticoagulation: Hep 400units/hr  Pain control: fair,    Epidural  Neurontin 600 Q8  Ketamine 10  Precedex 0.8  Dilaudid PCA 0.2 Q10  Robaxin IV Q8  Activity: OOB to chair. Ambulate with assist. PT consulted.   Anticipated LOS/Discharge: SICU, 7-10 days    Medical Decision Making: Medium  Subsequent visit 27209 (moderate level decision making)    JOSEFA/Fellow/Resident Provider: Therese Argueta NP     Faculty: Junito Patel MD    __________________________________________________________________  Transplant History: Admitted 9/22/2023 for Chronic pancreatitis (H) [K86.1]     9/22/2023 ([place]), Postoperative day: 1     Interval History: History is obtained from the patient  Overnight events: RTOB, reporting increased discomfort following bowel function. Notes cramping pain , radiating to back.     ROS:   A 10-point review of systems was negative except as noted above.    Curent Meds:   acetaminophen  975 mg Oral or Feeding Tube Q8H    busPIRone  10 mg Oral or Feeding Tube BID    citalopram  40 mg Oral or Feeding Tube Daily    sennosides  5-10 mL Oral or Feeding Tube BID    And    docusate   mg Oral or Feeding Tube  "BID    gabapentin  600 mg Oral or J tube Q8H LELO    ketorolac  10 mg Intravenous Q6H    [START ON 9/24/2023] mvw complete formulation  1.5 mL Oral or J tube Daily    pantoprazole  40 mg Oral or J tube BID    piperacillin-tazobactam  3.375 g Intravenous Q6H    polyethylene glycol  17 g Oral or Feeding Tube Daily    sodium chloride (PF)  3 mL Intracatheter Q8H    vancomycin  1,250 mg Intravenous Once       Physical Exam:     Admit Weight: 58.3 kg (128 lb 8.5 oz)    Current Vitals:   /81   Pulse 59   Temp 97.8  F (36.6  C) (Oral)   Resp 13   Ht 1.575 m (5' 2\")   Wt 58.3 kg (128 lb 8.5 oz)   LMP 08/16/2023 (Approximate)   SpO2 96%   BMI 23.51 kg/m           Vital sign ranges:    Temp:  [97.8  F (36.6  C)-97.9  F (36.6  C)] 97.8  F (36.6  C)  Pulse:  [53-89] 59  Resp:  [12-26] 13  BP: (112-122)/(71-83) 112/81  MAP:  [84 mmHg-101 mmHg] 86 mmHg  Arterial Line BP: (115-137)/(65-80) 115/67  SpO2:  [96 %-100 %] 96 %  Patient Vitals for the past 24 hrs:   BP Temp Temp src Pulse Resp SpO2   09/23/23 0700 -- -- -- 59 13 96 %   09/23/23 0600 -- -- -- 53 15 98 %   09/23/23 0500 -- -- -- 60 15 98 %   09/23/23 0400 -- 97.8  F (36.6  C) Oral 60 13 97 %   09/23/23 0300 -- -- -- 64 13 98 %   09/23/23 0200 -- -- -- 62 13 99 %   09/23/23 0100 -- -- -- 65 20 99 %   09/23/23 0000 -- 97.9  F (36.6  C) Oral 72 13 97 %   09/22/23 2300 -- -- -- 64 17 98 %   09/22/23 2200 112/81 -- -- 64 12 97 %   09/22/23 2145 120/81 -- -- 64 12 98 %   09/22/23 2130 118/83 -- -- 66 15 97 %   09/22/23 2115 116/79 -- -- 73 16 100 %   09/22/23 2109 -- -- -- 67 14 100 %   09/22/23 2102 -- -- -- 67 15 100 %   09/22/23 2100 112/71 -- -- 72 13 100 %   09/22/23 2048 -- -- -- 78 18 100 %   09/22/23 2045 116/76 -- -- 75 17 100 %   09/22/23 2037 -- -- -- 80 18 100 %   09/22/23 2030 115/73 -- -- 80 14 100 %   09/22/23 2027 -- -- -- 85 18 100 %   09/22/23 2020 -- -- -- 89 21 100 %   09/22/23 2015 122/77 -- -- 89 26 100 %   09/22/23 2006 120/81 97.8  F (36.6 "  C) -- 89 24 100 %     General Appearance: in mild distress.   Skin: normal, warm, dry, no suspicious lesions or rashes  Heart: regular rate and rhythm  Lungs: clear to auscultation  Abdomen: The abdomen is soft, ND, and  mildly tender. The wound is well approximated and without signs of infection. Tube/Drain sites are: CDI.  NINFA: serosanguinous, small   :voiding  Extremities: edema: present bilaterally. trace    Data:   CMP  Recent Labs   Lab 09/23/23  0651 09/23/23  0558 09/23/23  0309 09/23/23  0304 09/22/23 2134 09/22/23 2031 09/22/23 2026 09/22/23  1929 09/22/23  1914   NA  --   --   --  142  --  140  --  140 140   POTASSIUM  --   --   --  3.3*  --  4.4  --  3.9 3.8   CHLORIDE  --   --   --  110*  --  108*  --   --   --    CO2  --   --   --  22  --  19*  --   --   --    * 136*   < > 77   < > 149*   < > 96 93   BUN  --   --   --  12.6  --  13.5  --   --   --    CR  --   --   --  0.85  --  0.95  --   --   --    GFRESTIMATED  --   --   --  >90  --  83  --   --   --    MURTAZA  --   --   --  8.7  --  8.7  --   --   --    ICAW  --   --   --   --   --   --   --  4.7 4.9   AMYLASE  --   --   --  25*  --   --   --   --   --    LIPASE  --   --   --  12*  --   --   --   --   --    ALBUMIN  --   --   --  3.3*  --  3.3*  --   --   --    BILITOTAL  --   --   --  0.4  --  1.2  --   --   --    ALKPHOS  --   --   --  32*  --  35  --   --   --    AST  --   --   --  63*  --  63*  --   --   --    ALT  --   --   --  38  --  41  --   --   --     < > = values in this interval not displayed.     CBC  Recent Labs   Lab 09/23/23  0304 09/22/23  2345 09/22/23 2031   HGB 10.1*  --  11.2*   WBC 23.7*  --  22.6*     --  346   A1C  --  5.5  --      Coags  Recent Labs   Lab 09/23/23  0304 09/22/23 2031 09/21/23  1609   INR  --  1.34* 1.13   PTT 32 110*  --       Urinalysis  Recent Labs   Lab Test 09/21/23  1614   COLOR Yellow   APPEARANCE Slightly Cloudy*   URINEGLC Negative   URINEBILI Negative   URINEKETONE Negative   SG  1.021   UBLD Negative   URINEPH 7.0   PROTEIN 10*   NITRITE Negative   LEUKEST Negative   RBCU 2   WBCU <1

## 2023-09-23 NOTE — PLAN OF CARE
Goal Outcome Evaluation:      Plan of Care Reviewed With: patient    Overall Patient Progress: improvingOverall Patient Progress: improving    Outcome Evaluation: Starting EN support via J-tube + Relizorb. See Nutrition Progress note for further detail.

## 2023-09-23 NOTE — ANESTHESIA CARE TRANSFER NOTE
Patient: Artie Burger    Procedure: Procedure(s):  PANCREATECTOMY, TOTAL, WITH AUTOLOGOUS PANCREATIC ISLET CELL TRANSPLANT, Cholecystectomy, Splenectomy, gastrojejunostomy placement       Diagnosis: Chronic pancreatitis (H) [K86.1]  Diagnosis Additional Information: No value filed.    Anesthesia Type:   General     Note:    Oropharynx: oropharynx clear of all foreign objects and spontaneously breathing  Level of Consciousness: awake  Oxygen Supplementation: face mask  Level of Supplemental Oxygen (L/min / FiO2): 8 LPM  Independent Airway: airway patency satisfactory and stable  Dentition: dentition unchanged  Vital Signs Stable: post-procedure vital signs reviewed and stable  Report to RN Given: handoff report given  Patient transferred to: PACU    Handoff Report: Identifed the Patient, Identified the Reponsible Provider, Reviewed the pertinent medical history, Discussed the surgical course, Reviewed Intra-OP anesthesia mangement and issues during anesthesia, Set expectations for post-procedure period and Allowed opportunity for questions and acknowledgement of understanding      Vitals:  Vitals Value Taken Time   /77 09/22/23 2015   Temp     Pulse 90 09/22/23 2018   Resp 20 09/22/23 2018   SpO2 100 % 09/22/23 2018   Vitals shown include unvalidated device data.    Electronically Signed By: ROSIE Duque CRNA  September 22, 2023  8:19 PM

## 2023-09-23 NOTE — PROGRESS NOTES
CLINICAL NUTRITION SERVICES - ASSESSMENT NOTE    RECOMMENDATIONS FOR MDs/PROVIDERS TO ORDER:  Recommend start EN support as soon as medically appropriate and advance to goal rate.    Check fat soluble vitamins (Vitamin A, D, E) in ~1 month to determine ongoing need for MVM pediatric vitamin solution. Recommend do not check fat soluble vitamins this admit d/t likely unreliable results with acute inflammatory response 2/2 surgery.    Malnutrition  Patient does not meet two of the established criteria necessary for diagnosing malnutrition    Recommendations already ordered by Registered Dietitian (RD):  1.  Once confirm JT placement/approval for use post surgery, begin TF with Vital 1.5 @ 10 ml/hr and advance by 10 ml q 24 hrs (and/or ONLY advance rate upon MD approval after daily evaluation) to goal 50 mL/hr.   GOAL: Vital 1.5 Filippo @ goal of  50ml/hr  (1200ml/day)  will provide: 1800 kcals (31 kcal/kg), 81 g PRO (1.4 g pro/kg), 916 ml free H20, 224 g CHO, and 7 g fiber daily.     2. Ordered the following within TF orderset:  RN: Change 1 RELIZORB cartridge every 24 hours with TF rate at 10-20 ml/hr.  Change 1 RELIZORB cartridge every 12 hours with TF rates >20 ml/hr.  RN: Obtain cartridges from  med room and/or 4E unit nurse manager.  (Relizorb is a immobilized lipase cartridge used to hydrolyze fat within the TF formula for easier absorption s/p total pancreatectomy)    3. Entered the following within a patient care order:  RELIZORB CARTRIDGES  *Change 1 cartridge every 24 hours with TF rate @ 10-20 ml/hr  *Change 1 cartridge every 12 hours with TF rate >20 ml/hr  *Supplies: Obtain cartridges in the 4E unit med room or call [phone] and provide peoplesoft #[phone]     4.  In addition to the MVM pediatric solution (largely for fat-soluble vitamins ordered by the primary team), ordered multivitamin/mineral (15 ml/day via JT) to help ensure micronutrient needs being met with suspected hypermetabolic demands, anticipated  "slow adv of TFs to goal infusion and potential interruptions to TF infusions.    5.  Ordered to check Magnesium, Phosphorus levels over the next 5 days for monitoring with TF start    Future/Additional Recommendations:  Monitor ability to start and advance EN support to goal rate.     Once taking PO, resume oral PERT:   Recommend 2-3 capsules of Creon 24 TID with meals  + 1 capsule with snacks/supplements PRN  --Provides 827-1241 units lipase/kg/meal which is within the therapeutic dosing guidelines for PERT       REASON FOR ASSESSMENT  Artie Burger is a 27 year old female seen by Registered Dietitian for Provider Order - Registered Dietitian to Assess and Order TF per Medical Nutrition protocol    NUTRITION HISTORY  -Per review of EMR, pt most recently seen by inpatient RD on 8/20/23 for consult \"Pt w/ pancreatic insuffiencey - need for increase in enzymes?\"; however pt was asleep for visit and information largely obtained from chart review at that time. Pt had been started on Creon 24,000 x 2-3 capsule with each meals TID (provides 842-1263 units lipase/kg/meal), which is within guidelines of 500-2500 units of lipase/kg/meal and within appropriate guidelines for prevention fibrosing colonopathy of <2,500 units lipase/kg/meal.     -Per outpatient RD note on 6/28/2023: Brief overview of what to expect from nutrition standpoint post TP AIT done at that visit.    Nutrition Assessment  Pt reports there are a few days she doesn't eat during a flare. Flares occur a few times/month.  She avoids raw veggies, red meat, fatty/fried foods.      Symptoms:  - Pain: chronic   - N/V: w/ flare   - Bloating: sometimes   - D/C: back & forth     Vitamins, Supplements, Pertinent Meds: MVI, probiotic   Herbal Medicines/Supplements: none       PERT: Creon 3000 x 2-3 with meals (only provides  ul/kg/meal)- below therapeutic range of 500 ul/kg/meal Minimum  - How enzymes are taken in relation to meal: with food   - Duration of " PERT: 1 year  - Improvements seen since starting: no- reports not taking them consistently as they don't seem they are working   - Oily/floaty stools (steatorrhea): yes, sometimes   - Tried other enzyme brands in the past: no     Diet Recall  Breakfast     Lunch     Dinner Chicken w/ veggies    Snacks Throughout the day- crackers; some junk food HS    Beverages Coffee (creamer), some water, some pop/energy drink (a few times/week)   Alcohol None    Dining out 2x/week       -Per pt's report today, she had been doing well after her flare in August. She takes 4 capsules of Creon 3000 with meals. Stays quite active at her job.     -Per H&P PTA medication list, Rx for 2 capsules of Creon 3000 TID with meals.    CURRENT NUTRITION ORDERS  Diet:  NPO -  Anticipate prolonged NPO status post AIT    LABS  -Vitamin A 0.58 (WNL, 6/28/23)  -Vitamin E 6.8 (WNL, 6/28/23)  -Vitamin D (25 OH Vit total) <63 (WNL, 6/28/23)    Electrolytes  Potassium (mmol/L)   Date Value   09/23/2023 3.3 (L)   09/22/2023 4.4   04/04/2022 3.8   02/16/2022 3.8   12/13/2021 3.8     Potassium POCT (mmol/L)   Date Value   09/22/2023 3.9   09/22/2023 3.8   09/22/2023 3.9     Phosphorus (mg/dL)   Date Value   08/22/2023 4.6 (H)   08/21/2023 3.5   08/20/2023 2.9    Blood Glucose  Glucose (mg/dL)   Date Value   09/23/2023 77   04/04/2022 110 (H)   02/16/2022 105 (H)   12/13/2021 109 (H)   11/01/2021 72   10/06/2021 103 (H)     GLUCOSE BY METER POCT (mg/dL)   Date Value   09/23/2023 93   09/23/2023 127 (H)   09/23/2023 136 (H)   09/23/2023 132 (H)   09/23/2023 122 (H)     Hemoglobin A1C (%)   Date Value   09/22/2023 5.5   06/28/2023 5.3    Inflammatory Markers  WBC Count (10e3/uL)   Date Value   09/23/2023 23.7 (H)   09/22/2023 22.6 (H)   09/21/2023 14.1 (H)     Albumin (g/dL)   Date Value   09/23/2023 3.3 (L)   09/22/2023 3.3 (L)   08/23/2023 4.1   04/04/2022 3.2 (L)   02/16/2022 3.3 (L)   12/13/2021 3.7      Magnesium (mg/dL)   Date Value   08/22/2023 1.7  "  08/21/2023 1.8   08/20/2023 2.4 (H)     Sodium (mmol/L)   Date Value   09/23/2023 142   09/22/2023 140   08/23/2023 140     Sodium POCT (mmol/L)   Date Value   09/22/2023 140   09/22/2023 140   09/22/2023 140    Renal  Urea Nitrogen (mg/dL)   Date Value   09/23/2023 12.6   09/22/2023 13.5   08/23/2023 9.5   04/04/2022 20   02/16/2022 20   12/13/2021 19     Creatinine (mg/dL)   Date Value   09/23/2023 0.85   09/22/2023 0.95   08/23/2023 0.91     Additional  Triglycerides (mg/dL)   Date Value   09/21/2023 73   06/28/2023 72     Ketones Urine (mg/dL)   Date Value   09/21/2023 Negative        MEDICATIONS  MVW complete formulation (pediatric) oral solution    ANTHROPOMETRICS  Height: 5' 2\"    Most Recent Weight: 128 lbs 8.45 oz   IBW: 50 kg   BMI: 23.51 kg/m2; Normal BMI  Weight History: Overall ~3.8% wt loss over past ~3 months.  Wt Readings from Last 20 Encounters:   09/22/23 58.3 kg (128 lb 8.5 oz)   09/21/23 58.5 kg (129 lb)   08/18/23 57.2 kg (126 lb)   07/12/23 61.7 kg (136 lb 0.4 oz) - outlier   06/29/23 60.4 kg (133 lb 1.6 oz)   05/10/23 60.1 kg (132 lb 7.9 oz)   04/20/23 60.6 kg (133 lb 9.6 oz)   03/13/23 60.2 kg (132 lb 11.5 oz)   01/18/23 59 kg (130 lb 1.1 oz)   11/21/22 61.4 kg (135 lb 5.8 oz)   09/14/22 54.4 kg (119 lb 14.9 oz)   07/13/22 56.4 kg (124 lb 5.4 oz)   06/23/22 55.8 kg (123 lb)   04/04/22 55.8 kg (123 lb 0.3 oz)   02/16/22 54.4 kg (119 lb 14.9 oz)   12/13/21 54.7 kg (120 lb 9.5 oz)   11/01/21 55 kg (121 lb 4.1 oz)   10/06/21 53.3 kg (117 lb 8.1 oz)   09/13/21 50.9 kg (112 lb 3.4 oz)   09/03/21 49.9 kg (110 lb)   Dosing Weight: 58 kg (actual, based on admit wt of 58.3 kg on 9/22)    ASSESSED NUTRITION NEEDS  Estimated Energy Needs: 7236-3749+ kcals (25-30+ Kcal/Kg)  Justification: post-op and pending EN absorption post total pancreatectomy  Estimated Protein Needs:  grams protein (1.3-1.8 g pro/Kg)+  Justification: post-op and pending EN absorption post total pancreatectomy  Estimated " Fluid Needs: 1 mL/Kcal or per MD pending fluid status    PHYSICAL FINDINGS  See malnutrition section below.  -s/p total pancreatectomy, islet cell autotrasnplant, splenectomy, cholecystectomy, and GJ tube placement  on 9/22    MALNUTRITION  % Intake: Decreased intake does not meet criteria  % Weight Loss: Weight loss does not meet criteria  Subcutaneous Fat Loss: None observed  Muscle Loss: None observed  Fluid Accumulation/Edema: None noted - pt also denies any BLE edema PTA  Malnutrition Diagnosis: Patient does not meet two of the established criteria necessary for diagnosing malnutrition    NUTRITION DIAGNOSIS  Altered GI function r/t s/p TPAIT with J-tube placement AEB need for EN support to meet nutrition needs in the acute post-op phase.      INTERVENTIONS  Implementation  -Nutrition education: Provided education on RD role in care, start of EN support, PERT cartridges and lifelong need for oral PERT with total pancreatectomy. Discussed increased Creon dose recs with pt for once she is taking PO.   -Ordered EN support as above    Goals  1.  Tolerate adv of TF to goal infusion without sx of refeeding within the next 5 days.  2.  Once TF at goal, total ave EN intakes provide minimum of 25 kcal/kg/day and 1.3 g/kg/day (per 58 kg)      Monitoring/Evaluation  Progress toward goals will be monitored and evaluated per protocol.     Preeti Hopkins RD, LD  Emergency Department/Weekend Pager: [phone]

## 2023-09-23 NOTE — OP NOTE
Transplant Surgery  Operative Note     PREOPERATIVE DIAGNOSES: Recurrent acute on chronic pancreatitis secondary to alcoholic pancreatitis  POSTOPERATIVE DIAGNOSES: Same  PROCEDURE: total pancreatectomy, islet cell autotransplant, splenectomy, cholecystectomy, and gastrojejunostomy tube placement.   SURGEON: Junito Patel MD  ASSISTANT:  Glen Wilkins MD- fellow. There was no qualified resident available to assist. Dr. Wilkins was the primary assistant for the procedure and participated in all aspects of the case including the anastomoses.   Chuy Royal MD- resident. Dr. Royal also participated in the bowel and gastric anastomoses.   ANESTHESIA:  General endotracheal.   SPECIMENS: pancreas to the islet lab, spleen, duodenum and proximal jejunum, gallbladder for permanent  DRAINS: Thomas drain.  EBL: 300 ml  UO: 3110 ml  FLUIDS: 1uPRBC, crystalloid and albumin  COMPLICATIONS: None.  FINDINGS: pancreas firm, dense, and calcified. Extensive scarring  Autotransplant data:   Patient weight: 58.3 kg  Tissue mass: 0.5 ml  Total Islet number: [id number]  Total Islet number/k.  Islet equivalents: [id number]  Islet equivalents/kilogram: 479  Pre-infusion portal pressure: 4 cm/H2O  Peak portal pressure (after bag and rinse): 8cm H2O  Post-rinse (final) portal pressure: 8cmH20 (same as post-rinse)     INDICATIONS OF THE PROCEDURE: recurrent relapsing pancreatitis affecting quality of life     DESCRIPTION OF THE PROCEDURE: After obtaining informed consent, the patient was brought to the operative room and placed in a supine position. General endotracheal intubation and anesthesia was induced. After this, an arterial line and a central line were placed under sterile condition by the anesthesia team. A briefing was performed. SCD's and Padron catheter were placed. The abdomen was prepped and draped in the usual fashion. The patient received preoperative IV  antibiotics. A pause for the cause was performed.    An midline  incision was made sharply and carried down through the subcutaneous tissue. The peritoneum was opened under direct visualization and after this, we performed lysis of adhesions for the next 15-20 minutes. After this, the abdominal retractor was placed in the upper abdomen. We proceeded to open the lesser sac and found the pancreas to be firm and fibrotic. The short gastric vessels and splenic attachments to the colon were divided. We then mobilized the tail, body and neck of the pancreas. During this maneouver we were able to identify the splenic artery, splenic vein, SMV, IMV and portal vein. The extrapancreatic portions of the splenic artery and vein were circumferentially cleared of investing tissue, and the anterior surface of the portal vein was cleared. The duodenum was Kocherized, completely mobilizing the head of the pancreas. At the superior border of the pancreas, the distal common bile duct and gastroduodenal artery were isolated. The proximal duodenum was divided at the level of the pancreas and the jejunum was transected near the ligament of Treitz.  The gallbladder was excised in top down fashion. The appendix was not excised.  The uncinate process of the pancreas was then dissected free from the retroperitoneum. The fibrofatty tissue connecting the uncinate process to the portal vein and SMA was thickened and inflamed, not amenable to stapling. We took this with serial firing from the ligasure, and verified a preserved SMA pulse and hemostasis between firings.  The bile duct was divided as described above.  At this point, the pancreas was essentially only attached through the vessels and the retroperitoneum. The retroperitoneal attachments were divided with electrocautery and ligation.  The splenic artery and the splenic vein were both ligated and divided. The GDA was then ligated and divided and confirming a hepatic artery pulse after clamping of the GDA and the pancreas was transferred into ice cold  saline for further preparation on the back table. We verified that the operative field was hemostatic. We verified that the fibrofatty tissue below the uncinate was hemostatic and that the portal vein and SMA retained normal flow.      The pancreas was prepared by resecting all the non-pancreatic tissue sharply.  The pancreas was packed in iced cold preservation solution and transferred to the awaiting islet team.      We then performed a thorough irrigation and complete hemostasis and began the reconstruction. I measuyred the total length of his small bowel to be ~500cm. The jejunum was divided, and a hand sewn side to side functional end to end jejunojejunostomy was created to allow for a 100cm Liana limb.  The mesenteric defect was closed with 4-0 prolene sutures.  The proximal jejunum was mobilized and passed in a transmesocolic fashion into the right upper quadrant through the prior ligament of treitz defect. We then performed an end-to-side choledochojejunostomy with 6-0 PDS running. The size of the bile duct was approximately 10 mm. We noted healthy bile flow. A biliary stent was not placed. 3-0 silk sutures were used to pexy the bowel to the hilum to take tension off our anastomosis.  The biliary limb was then secured to the transverse mesocolon with a 3-0 silk suture.  The distal jejunum was brought in a retrocolic fashion via a defect in the transverse mesocolon to the duodenum and was anastomosed in an end to side, hand sewn 2-layered fashion. The ligament of treitz defect was closed with running prolene.  After this, we pexied the serosa of the jejunum to the mesentery of the colon. We introduced an 22Fr gastrojejunostomy tube into the left upper abdominal wall.  4-0 prolene sutures were used to place a double pursestring in the anterior wall of the body of the stomach.  The lumen of the stomach was entered with cautery through the center of the pursestring.  The tube was passed across the  duodenojejunostomy anastomosis and fed distally. The balloon was inflated with 10cc of sterile water. The gastric serosa was pexied to the abdominal wall peritoneum with 3 stitches of 3-0 silk. The end of the tube was secured in place using a chromic suture across the wall of the jejunum.    We placed a Thomas drain into the right upper quadrant of the abdomen and positioned it along the cut lymphatic edge beneath the prior uncinate process and along the choledochojejunostomy.   Irrigation and hemostasis of the abdomen was completed, and the abdomen was packed while awaiting arrival of the islets. Upon arrival of the islets the patient was heparinized.      We placed a micropuncture needle and wire into the previously ligated splenic vein, and using Seldinger technique we advanced a 5F catheter. Through this, we infused the islet cells into the portal vein.  All the islets were infused into the portal system.  See findings for blood flow and portal pressure measurements.  The catheter was removed and the puncture site tied oversewn with 6-0 prolene to achieve hemostasis of the splenic vein.  A needle core liver biopsy was not done.  The abdomen was lavaged, then the midline fascia was closed with 0 looped PDS, the subcutaneous tissue was irrigated, hemostasis was obtained and the skin was closed with running 4-0 monocryl subcuticular suture and skin glue. At the end of the case, all the sponge and needle counts were correct. Faculty was present during the entire procedure. The patient tolerated the procedure well and  was transferred in stable and satisfactory condition to the PACU.

## 2023-09-23 NOTE — PROGRESS NOTES
SURGICAL ICU PROGRESS NOTE  09/23/2023        Date of Service (when I saw the patient): 09/23/2023    ASSESSMENT:  Artie Burger is a 27 year old female who was admitted on 9/22/2023 s/p TPAIT with Dr Patel for chronic calcific pancreatitis. 300cc EBL intra-op, received 1u pRBCs intra-op. Past medical notable for MDD with panic attacks, prior hx abuse of alcohol, insomnia, Iron deficiency anemia and chronic abdominal pain on dilaudid.      CHANGES and MAJOR THINGS TODAY:   - Discuss with transplant about starting buspar through J-tube, DVT ppx, getting RIJ out, and transferring to   - Remove A-line and miner catheter  - Heparin gtt to 400  - Clamp trial for NGT  - Start meropenem and vancomycin for bacteremia, positive islet cultures      PLAN:    Neurological:  # Acute post operative pain   # Chronic pain on oral dilaudid ( PTA taking 4x per day)   # MDD with panic attacks   - Monitor neurological status.  - PTA medications:  Celexa 40mg daily, Dilaudid 4mg QID, Buspar 10 mg BID, Gabapentin 600mg TID,  and vistaril 25mg daily   - Holding buspirone (no compatible formula for jejunostomy), continue citalopram  - Pain:   - Bilateral Paravertebral catheters placed 9/22/23   - dilaudid PCA 0.2mg q10min  - tylenol 1g q8, toradol 10mg q6hrs  - Ketamine gtt  - Sedation plan: Precedex gtt     Pulmonary:   # cannabis use, current vaping, prior hx of tobacco use    - Extubated post-op, on RA  - Supplemental oxygen to keep saturation above 92 %.  - Incentive spirometer every 15- 30 minutes.      Cardiovascular:    # Shock-distributive vs hypovolemic, resolved   - Monitor hemodynamic status.   - Off of pressors, maintaining BPs     Gastroenterology/Nutrition:  # GERD  # Chronic calcific pancreatitis now s/p TPIAT   - G-J tube in LUQ   - PTA famotidine held  - drain management per Transplant surgery team   - ppx: pantoprazole  - bowel regimen: senna, docusate, miralax        Fluids/Electrolytes:   # Hypovolaemia  - D5  LR @100cc/hr   - electrolyte replacement protocol Mg, Phos, K      Renal:  # Acute kidney injury    - resolved: Cr 0.85  - continue miner for now. Will continue to monitor intake and output.      Endocrine:  # Concern for hyperglycemia due to stress and critical illness   # S/P TPAIT  - insulin gtt per TPAIT protocol                - do not stop insulin gtt, dextrose infusion to maintain glucose if needed   - Endocrine - DM to manage transition to subcutaneous insulin when appropriate      ID:  # Leukocytosis   # Bacteremia, GNR not yet speciated  # Positive islet cultures  - WCC 23.7  - Vanc (9/23-), Meropenem (9/23-)     Positive cultures:  - 9/23 islets: gram positive cocci in pairs and chains, gram negative rods  - 9/23 blood: gram negative rods     Heme:     # Acute blood loss anemia due to surgical blood loss   # Coagulopathy  # Iron deficiency anemia   - EBL: 300cc   - Transfuse if hgb <7.0 or signs/symptoms of hypoperfusion. Monitor and trend.   - heparin gtt per TPAIT protocol  - Hgb 10.1     Musculoskeletal:  # hx of osteopenia   # deconditioning   - Physical and occupational therapy consult for POD3     General Cares/Prophylaxis:    DVT Prophylaxis: Pneumatic Compression Devices. Chemical prophylaxis when okay with surgical transplant team   GI Prophylaxis: PPI     Lines/ tubes/ drains:  - Radial art line - discontinue 9/23  - Right internal jugular   - Bilateral ES catheters   - Miner catheter - remove 9/23  - 2 PIV  - NGT  - GJ tube  - Drain left abdomen     Disposition:  -  Surgical ICU  - Transfer to step-down if OK with transplant staff    Patient seen, findings and plan discussed with surgical ICU staff, Dr. Ortiz.      Sage Das MD  Surgery staff  DOS: 9/23/2023    I saw and examined this patient, personally reviewed lab and imaging data, and discussed their care with the multidisciplinary team. I agree with the findings and plan of care as documented in the jointly edited note by   Wolof. The patient is not critically ill but requires higher-level nursing care. May be appropriate for transfer to step-down unit. Level 2 visit on 9/23/2023.    Jim Ortiz MD, PhD  Surgical critical care  September 23, 2023, 9:56 AM     ====================================  INTERVAL HISTORY:   Overnight cultures positive for GNB. Antibiotics started this AM. Insulin gtt briefly stopped due to hypoglycemia, will instead continue insulin gtt and give supplemental dextrose in future.    ROS Abdominal pain this morning, some anxiety overnight    OBJECTIVE:   1. VITAL SIGNS:   Temp:  [97.8  F (36.6  C)-97.9  F (36.6  C)] 97.8  F (36.6  C)  Pulse:  [53-89] 53  Resp:  [12-27] 15  BP: (112-124)/(70-83) 112/81  MAP:  [84 mmHg-101 mmHg] 92 mmHg  Arterial Line BP: (116-137)/(65-80) 120/73  SpO2:  [94 %-100 %] 98 %  Resp: 15      2. INTAKE/ OUTPUT:   I/O last 3 completed shifts:  In: 6971.75 [I.V.:6321.75; IV Piggyback:350]  Out: 4350 [Urine:3985; Emesis/NG output:25; Drains:40; Blood:300]    3. PHYSICAL EXAMINATION:  General: Alert, appropriate, NAD  HEENT:NC/AT  Neuro: A&Ox3, NAD  Pulm/Resp: Breathing comfortably on room air  CV: RRR  Abdomen: Scaphoid, wound clean, g/j tube in place  : miner catheter in place, urine yellow and clear  Incisions/Skin: Warm, dry  MSK/Extremities: No peripheral edema, moving all extremities, peripheral pulses intact, extremities well perfused    4. INVESTIGATIONS:   Arterial Blood Gases   Recent Labs   Lab 09/22/23  1929 09/22/23  1914 09/22/23  1856 09/22/23  1838   PH 7.39 7.34* 7.37 7.37   PCO2 33* 40 36 37   PO2 154* 120* 147* 146*   HCO3 20* 22 21 21     Complete Blood Count   Recent Labs   Lab 09/23/23  0304 09/22/23 2031 09/22/23  1929 09/22/23  1914 09/22/23  0930 09/21/23  1609   WBC 23.7* 22.6*  --   --   --  14.1*   HGB 10.1* 11.2* 10.0* 10.0*   < > 11.9    346  --   --   --  431    < > = values in this interval not displayed.     Basic Metabolic Panel  Recent  Labs   Lab 09/23/23  0558 09/23/23  0504 09/23/23  0353 09/23/23  0331 09/23/23  0309 09/23/23  0304 09/22/23 2134 09/22/23 2031 09/22/23 2026 09/22/23 1929 09/22/23  1914   NA  --   --   --   --   --  142  --  140  --  140 140   POTASSIUM  --   --   --   --   --  3.3*  --  4.4  --  3.9 3.8   CHLORIDE  --   --   --   --   --  110*  --  108*  --   --   --    CO2  --   --   --   --   --  22  --  19*  --   --   --    BUN  --   --   --   --   --  12.6  --  13.5  --   --   --    CR  --   --   --   --   --  0.85  --  0.95  --   --   --    * 132* 122* 64*   < > 77   < > 149*   < > 96 93    < > = values in this interval not displayed.     Liver Function Tests  Recent Labs   Lab 09/23/23 0304 09/22/23 2031 09/21/23  1609   AST 63* 63*  --    ALT 38 41  --    ALKPHOS 32* 35  --    BILITOTAL 0.4 1.2  --    ALBUMIN 3.3* 3.3*  --    INR  --  1.34* 1.13     Pancreatic Enzymes  Recent Labs   Lab 09/23/23 0304   LIPASE 12*   AMYLASE 25*     Coagulation Profile  Recent Labs   Lab 09/23/23 0304 09/22/23 2031 09/21/23  1609   INR  --  1.34* 1.13   PTT 32 110*  --          5. RADIOLOGY:   Recent Results (from the past 24 hour(s))   XR Chest Port 1 View    Narrative    Exam: XR CHEST PORT 1 VIEW, 9/22/2023 8:36 PM    Indication: central line placement    Comparison: None    Findings:   Single portable AP view of the chest. Gastric tube with the tip and  the sidehole in the stomach. Right IJ central line with the tip in the  low SVC. Anesthesia catheters. Trachea is midline. No pneumothorax or  pleural effusion. Retrocardiac atelectasis. Normal cardiac silhouette  and mediastinum.      Impression    Impression: Right IJ central line with the tip in the low SVC.  Retrocardiac postsurgical atelectasis.    I have personally reviewed the examination and initial interpretation  and I agree with the findings.    JENNA ALEMAN DO         SYSTEM ID:  [id number]       =========================================

## 2023-09-23 NOTE — PROGRESS NOTES
"Transplant Surgery  Inpatient Daily Progress Note  09/23/2023    Assessment & Plan: POD1 s/p TPIAT. Doing well. Pain well controlled. Bgs well controlled on insulin drip. Islet cells with gram positive cocci and gram negative bacilli.     -Vanc/meropenem for 7 days.   -start tube feeds at 10/hr and do not increase. Leave G port to gravity. Do NOT crush meds and placed down tube.   -remove NG tube and miner catheter.  -increase heparin gtt to 400u/hr.  -keep precedex and ketamine as needed, may titrate down if able.   -keep ICU status today  _________________________________________________________________    Interval History: History is obtained from the patient  Overnight events: none acute. Doing well. Mild pain. Minimal nausea, no vomiting.     ROS:   A 10-point review of systems was negative except as noted above.    Meds:   acetaminophen  975 mg Oral or Feeding Tube Q8H    [Held by provider] busPIRone  10 mg Oral or Feeding Tube BID    citalopram  40 mg Oral or Feeding Tube Daily    sennosides  5-10 mL Oral or Feeding Tube BID    And    docusate   mg Oral or Feeding Tube BID    gabapentin  600 mg Oral or J tube Q8H LELO    ketorolac  10 mg Intravenous Q6H    meropenem  1 g Intravenous Q8H    [START ON 9/24/2023] mvw complete formulation  1.5 mL Oral or J tube Daily    pantoprazole  40 mg Oral or J tube BID    polyethylene glycol  17 g Oral or Feeding Tube Daily    sodium chloride (PF)  3 mL Intracatheter Q8H    vancomycin  1,000 mg Intravenous Q12H       Physical Exam:     Admit Weight: 58.3 kg (128 lb 8.5 oz)    Current vitals:   /81   Pulse 60   Temp 97.8  F (36.6  C) (Oral)   Resp 16   Ht 1.575 m (5' 2\")   Wt 58.3 kg (128 lb 8.5 oz)   LMP 08/16/2023 (Approximate)   SpO2 97%   BMI 23.51 kg/m      Vital sign ranges:    Temp:  [97.8  F (36.6  C)-97.9  F (36.6  C)] 97.8  F (36.6  C)  Pulse:  [53-89] 60  Resp:  [12-26] 16  BP: (112-122)/(71-83) 112/81  MAP:  [81 mmHg-101 mmHg] 81 mmHg  Arterial " Line BP: (112-137)/(65-80) 112/65  SpO2:  [96 %-100 %] 97 %    General Appearance: in no apparent distress.   Skin: Warm, perfused  Abdomen: The abdomen is soft, non distended, appropriately tender, midline incision closed with glue CDI  Extremities: edema: no,   Neurologic: awake, alert, and oriented. Tremor absent..     Data:   CMP  Recent Labs   Lab 09/23/23  1125 09/23/23  1007 09/23/23  0309 09/23/23 0304 09/22/23 2134 09/22/23 2031 09/22/23 2026 09/22/23 1929 09/22/23  1914   NA  --   --   --  142  --  140  --  140 140   POTASSIUM  --   --   --  3.3*  --  4.4  --  3.9 3.8   CHLORIDE  --   --   --  110*  --  108*  --   --   --    CO2  --   --   --  22  --  19*  --   --   --    * 114*   < > 77   < > 149*   < > 96 93   BUN  --   --   --  12.6  --  13.5  --   --   --    CR  --   --   --  0.85  --  0.95  --   --   --    GFRESTIMATED  --   --   --  >90  --  83  --   --   --    MURTAZA  --   --   --  8.7  --  8.7  --   --   --    ICAW  --   --   --   --   --   --   --  4.7 4.9   AMYLASE  --   --   --  25*  --   --   --   --   --    LIPASE  --   --   --  12*  --   --   --   --   --    ALBUMIN  --   --   --  3.3*  --  3.3*  --   --   --    BILITOTAL  --   --   --  0.4  --  1.2  --   --   --    ALKPHOS  --   --   --  32*  --  35  --   --   --    AST  --   --   --  63*  --  63*  --   --   --    ALT  --   --   --  38  --  41  --   --   --     < > = values in this interval not displayed.     CBC  Recent Labs   Lab 09/23/23 0304 09/22/23  2345 09/22/23 2031   HGB 10.1*  --  11.2*   WBC 23.7*  --  22.6*     --  346   A1C  --  5.5  --

## 2023-09-23 NOTE — ANESTHESIA POSTPROCEDURE EVALUATION
Patient: Artie Burger    Procedure: Procedure(s):  PANCREATECTOMY, TOTAL, WITH AUTOLOGOUS PANCREATIC ISLET CELL TRANSPLANT, Cholecystectomy, Splenectomy, gastrojejunostomy placement       Anesthesia Type:  General    Note:  Disposition: ICU; Admission            ICU Sign Out: Anesthesiologist/ICU physician sign out WAS performed   Postop Pain Control: Challenging            Challenges/Interventions: Acute Pain; Exacerbation of chronic pain; Multimodal therapy            Sign Out: ONGOING pain issues   PONV: No   Neuro/Psych: Uneventful            Sign Out: Acceptable/Baseline neuro status   Airway/Respiratory: Uneventful            Sign Out: Acceptable/Baseline resp. status   CV/Hemodynamics: Uneventful            Sign Out: Acceptable CV status; No obvious hypovolemia; No obvious fluid overload   Other NRE: NONE   DID A NON-ROUTINE EVENT OCCUR? No           Last vitals:  Vitals Value Taken Time   /76 09/22/23 2045   Temp 36.6  C (97.8  F) 09/22/23 2006   Pulse 76 09/22/23 2047   Resp 18 09/22/23 2047   SpO2 100 % 09/22/23 2047   Vitals shown include unvalidated device data.    Electronically Signed By: Guille Saini MD  September 22, 2023  8:49 PM

## 2023-09-23 NOTE — PROGRESS NOTES
Pain Service Progress Note  [facility]  Date: 09/23/2023       Patient Name: Artie Burger  MRN: [id number]  Age: 27 year old  Sex: female      Assessment:  Artie is a 28yo femaled with chronic calcific pancreatitis, MDD with panic attacks, prior hx abuse of alcohol, insomnia, iron deficiency anemia.     Today, pain is well controlled and patient was able to sit forward in bed independently.    Procedure: TPIAT    Date of Surgery: 9/22/23    Date of Catheter Placement: 9/22/23    Plan/Recommendations:  1. Regional Anesthesia/Analgesia  -Continuous Catheter Type/Site: bilateral paravertebral (PV) T6-7  Infusate: ropivacaine 0.2%  Programmed Intermittent Bolus (PIB) at 5 mL Q60 min via each catheter, total infusion rate of 10 mL/hr    Plan to maintain catheter, max of 7 days    2. Anticoagulation  -Please contact Inpatient Pain Service before ordering or making any anticoagulation changes       3. Multimodal Analgesia  - Per primary team (currently receiving acetaminophen, gabapentin, ketorolac, ketamine infusion, precedex infusion, hydromorphone PCA)    Pain Service will continue to follow.    Lila Fountain MD  09/23/2023     Overnight Events: No acute events    Tubes/Drains: Yes  RIJ CVC, NG tube    Subjective:  The pain is actually ok   Nausea: No  Vomiting: No  Symptoms of LAST: No    Pain Location:  Abdomen    Pain Intensity:    Satisfied with your level of pain control: Yes    Diet: NPO for Medical/Clinical Reasons Except for: Ice Chips  Adult Formula Drip Feeding: Continuous Vital 1.5; Jejunostomy; Goal Rate: 50 mL/hr (goal) - 9/23: Once confirm JT placement/approval for use post surgery, begin TF with Vital 1.5 @ 10 ml/hr and advance by 10 ml q 24 hrs (and/or ONLY advance rate u...    Relevant Labs:  Recent Labs   Lab Test 09/23/23  0304 09/22/23 2031   INR  --  1.34*    346   PTT 32 110*   BUN 12.6 13.5       Physical Exam:  Vitals: BP 96/66   Pulse 66   Temp 97.6  F  "(36.4  C) (Oral)   Resp 14   Ht 1.575 m (5' 2\")   Wt 58.3 kg (128 lb 8.5 oz)   LMP 08/16/2023 (Approximate)   SpO2 96%   BMI 23.51 kg/m      Physical Exam:   Orientation:  Alert, oriented, and in no acute distress: Yes  Sedation: No    Motor Examination:  5/5 Strength in lower extremities: Yes    Catheter Site:   Catheter entry site is clean/dry/intact: Yes    Tender: No      Relevant Medications:  Current Pain Medications:  Medications related to Pain Management (From now, onward)      Start     Dose/Rate Route Frequency Ordered Stop    09/25/23 0000  acetaminophen (TYLENOL) solution 650 mg         650 mg Oral or Feeding Tube EVERY 4 HOURS PRN 09/22/23 2312 09/23/23 0930  dexmedeTOMIDine (PRECEDEX) 4 mcg/mL in NS infusion         0.1-1.2 mcg/kg/hr × 58.3 kg  1.5-17.5 mL/hr  Intravenous CONTINUOUS 09/23/23 0912 09/23/23 0800  [Held by provider]  busPIRone (BUSPAR) suspension 10 mg        (Held by provider since Sat 9/23/2023 at 0826 by Junito Patel MD.Hold Reason: OtherHold Comments: Product not available)   Note to Pharmacy: PTA Sig:Take 10 mg by mouth 2 times daily      10 mg Oral or Feeding Tube 2 TIMES DAILY 09/22/23 2250 09/23/23 0800  polyethylene glycol (MIRALAX) Packet 17 g         17 g Oral or Feeding Tube DAILY 09/22/23 2249 09/23/23 0600  gabapentin (NEURONTIN) solution 600 mg         600 mg Oral or J tube EVERY 8 HOURS SCHEDULED 09/22/23 2250 09/22/23 2330  sennosides (SENOKOT) syrup 5-10 mL        See Hyperspace for full Linked Orders Report.    5-10 mL Oral or Feeding Tube 2 TIMES DAILY 09/22/23 2305 09/22/23 2330  docusate (COLACE) 50 MG/5ML liquid  mg        See Hyperspace for full Linked Orders Report.     mg Oral or Feeding Tube 2 TIMES DAILY 09/22/23 2305 09/22/23 2300  acetaminophen (TYLENOL) solution 975 mg         975 mg Oral or Feeding Tube EVERY 8 HOURS 09/22/23 2240 09/25/23 4569    09/22/23 6395  ketorolac (TORADOL) " "injection 10 mg         10 mg Intravenous EVERY 6 HOURS 09/22/23 2249 09/23/23 2359    09/22/23 2250  hydrOXYzine (ATARAX) syrup 25 mg        See Hyperspace for full Linked Orders Report.    25 mg Oral or Feeding Tube EVERY 6 HOURS PRN 09/22/23 2250      09/22/23 2250  hydrOXYzine (ATARAX) syrup 50 mg        See Hyperspace for full Linked Orders Report.    50 mg Oral or Feeding Tube EVERY 6 HOURS PRN 09/22/23 2250      09/22/23 2249  lidocaine 1 % 0.1-1 mL         0.1-1 mL Other EVERY 1 HOUR PRN 09/22/23 2250      09/22/23 2249  lidocaine (LMX4) cream          Topical EVERY 1 HOUR PRN 09/22/23 2250 09/22/23 2249  magnesium hydroxide (MILK OF MAGNESIA) suspension 30 mL         30 mL Oral or Feeding Tube DAILY PRN 09/22/23 2249      09/22/23 2249  bisacodyl (DULCOLAX) suppository 10 mg         10 mg Rectal DAILY PRN 09/22/23 2249 09/22/23 2249  methocarbamol (ROBAXIN) tablet 750 mg         750 mg Oral or Feeding Tube EVERY 6 HOURS PRN 09/22/23 2249      09/22/23 2100  ketamine (KETALAR) 2 mg/mL in sodium chloride 0.9 % 50 mL ANALGESIA infusion         10 mg/hr  5 mL/hr  Intravenous CONTINUOUS 09/22/23 2047      09/22/23 2030  HYDROmorphone (DILAUDID) PCA 0.2 mg/mL OPIOID TOLERANT          Intravenous CONTINUOUS 09/22/23 2018 09/22/23 0800  ROPivacaine 0.2% in NS perineural infusion (simple)          Perineural Continuous Nerve Block 09/22/23 0746      09/22/23 0800  ROPivacaine 0.2% in NS perineural infusion (simple)          Perineural Continuous Nerve Block 09/22/23 0746              Primary Service Contacted with Recommendations? No      50 MINUTES SPENT BY ME on the date of service doing chart review, history, exam, documentation & further activities per the note.      Acute Inpatient Pain Service [facility]  Hours of pain coverage 24/7   Page via Amcom- Please Page the Pain Team Via Amcom: \"PAIN MANAGEMENT ACUTE INPATIENT/ [facility]\"             "

## 2023-09-23 NOTE — PLAN OF CARE
[facility]      Patient Name: Artie Burger MRN# [id number]   Age: 27 year old YOB: 1995   Date of Admission:9/22/2023      ICU End of Shift Summary. Please see flowsheets for vital signs and detailed assessment data.    Changes this shift: Patient arrived from PACU at or around 2300, was accompanied by PACU nursing staff, and was transferred with personal belongings, including the patient's mobile device, spectacles, and two bags of personal effects. Two nurse comprehensive skin assessment completed by Jim GELLER RN, and Erica GELLER RN.     Patient receiving continuous insulin infusion with nurse titration, per SOT TPIAT algorithm with Myxredlin infusion. Dextrose 10% infusion delivered briefly overnight for blood glucose below ordered threshold.     Patient receiving dexmedetomidine (0.6-1.0 mcg/kg/hr), hydromorphone PCA, and ketamine (10 mg/hr) infusions for analgesia, as well as paraspinal ropivacaine infusions.     Patient receiving continuous infusion of dextrose 5% in lactated ringer's solution at rate of 100 mL/hr, as well as continuous heparin infusion at rate of 200 Units/hr.     Patient reporting intermittent anxiety overnight, which the patient reported subjective improvement of after having received hydroxyzine PRN.     Potassium replaced, per RN-Managed Electrolyte Replacement Protocol.     Plan: Continue with current plan of care, maintaining glucose within strict parameters via SOT TPIAT algorithm, ensuring ceaseless insulin delivery, per primary team. Additionally, promote multi-modal analgesia to maintain patient's post-operative pain.         Jim Elizondo RN  Critical Care Flex Team

## 2023-09-24 LAB
A-TOCOPHEROL VIT E SERPL-MCNC: 6.6 MG/L
ANION GAP SERPL CALCULATED.3IONS-SCNC: 10 MMOL/L (ref 7–15)
ANNOTATION COMMENT IMP: NORMAL
APTT PPP: 33 SECONDS (ref 22–38)
BASOPHILS # BLD AUTO: 0 10E3/UL (ref 0–0.2)
BASOPHILS NFR BLD AUTO: 0 %
BETA+GAMMA TOCOPHEROL SERPL-MCNC: 1.3 MG/L
BUN SERPL-MCNC: 5 MG/DL (ref 6–20)
CALCIUM SERPL-MCNC: 7.6 MG/DL (ref 8.6–10)
CHLORIDE SERPL-SCNC: 108 MMOL/L (ref 98–107)
CREAT SERPL-MCNC: 0.61 MG/DL (ref 0.51–0.95)
DEPRECATED HCO3 PLAS-SCNC: 23 MMOL/L (ref 22–29)
EGFRCR SERPLBLD CKD-EPI 2021: >90 ML/MIN/1.73M2
EOSINOPHIL # BLD AUTO: 0.2 10E3/UL (ref 0–0.7)
EOSINOPHIL NFR BLD AUTO: 1 %
ERYTHROCYTE [DISTWIDTH] IN BLOOD BY AUTOMATED COUNT: 14.3 % (ref 10–15)
GLUCOSE BLDC GLUCOMTR-MCNC: 101 MG/DL (ref 70–99)
GLUCOSE BLDC GLUCOMTR-MCNC: 107 MG/DL (ref 70–99)
GLUCOSE BLDC GLUCOMTR-MCNC: 109 MG/DL (ref 70–99)
GLUCOSE BLDC GLUCOMTR-MCNC: 114 MG/DL (ref 70–99)
GLUCOSE BLDC GLUCOMTR-MCNC: 129 MG/DL (ref 70–99)
GLUCOSE BLDC GLUCOMTR-MCNC: 134 MG/DL (ref 70–99)
GLUCOSE BLDC GLUCOMTR-MCNC: 147 MG/DL (ref 70–99)
GLUCOSE BLDC GLUCOMTR-MCNC: 160 MG/DL (ref 70–99)
GLUCOSE BLDC GLUCOMTR-MCNC: 162 MG/DL (ref 70–99)
GLUCOSE BLDC GLUCOMTR-MCNC: 168 MG/DL (ref 70–99)
GLUCOSE BLDC GLUCOMTR-MCNC: 172 MG/DL (ref 70–99)
GLUCOSE BLDC GLUCOMTR-MCNC: 173 MG/DL (ref 70–99)
GLUCOSE BLDC GLUCOMTR-MCNC: 175 MG/DL (ref 70–99)
GLUCOSE BLDC GLUCOMTR-MCNC: 194 MG/DL (ref 70–99)
GLUCOSE BLDC GLUCOMTR-MCNC: 210 MG/DL (ref 70–99)
GLUCOSE BLDC GLUCOMTR-MCNC: 54 MG/DL (ref 70–99)
GLUCOSE BLDC GLUCOMTR-MCNC: 54 MG/DL (ref 70–99)
GLUCOSE BLDC GLUCOMTR-MCNC: 58 MG/DL (ref 70–99)
GLUCOSE BLDC GLUCOMTR-MCNC: 63 MG/DL (ref 70–99)
GLUCOSE BLDC GLUCOMTR-MCNC: 63 MG/DL (ref 70–99)
GLUCOSE BLDC GLUCOMTR-MCNC: 66 MG/DL (ref 70–99)
GLUCOSE BLDC GLUCOMTR-MCNC: 71 MG/DL (ref 70–99)
GLUCOSE BLDC GLUCOMTR-MCNC: 73 MG/DL (ref 70–99)
GLUCOSE BLDC GLUCOMTR-MCNC: 80 MG/DL (ref 70–99)
GLUCOSE BLDC GLUCOMTR-MCNC: 80 MG/DL (ref 70–99)
GLUCOSE BLDC GLUCOMTR-MCNC: 85 MG/DL (ref 70–99)
GLUCOSE BLDC GLUCOMTR-MCNC: 90 MG/DL (ref 70–99)
GLUCOSE BLDC GLUCOMTR-MCNC: 93 MG/DL (ref 70–99)
GLUCOSE BLDC GLUCOMTR-MCNC: 99 MG/DL (ref 70–99)
GLUCOSE SERPL-MCNC: 52 MG/DL (ref 70–99)
HCT VFR BLD AUTO: 29 % (ref 35–47)
HGB BLD-MCNC: 10 G/DL (ref 11.7–15.7)
IMM GRANULOCYTES # BLD: 0.1 10E3/UL
IMM GRANULOCYTES NFR BLD: 1 %
LYMPHOCYTES # BLD AUTO: 1.3 10E3/UL (ref 0.8–5.3)
LYMPHOCYTES NFR BLD AUTO: 7 %
MAGNESIUM SERPL-MCNC: 2 MG/DL (ref 1.7–2.3)
MCH RBC QN AUTO: 30.3 PG (ref 26.5–33)
MCHC RBC AUTO-ENTMCNC: 34.5 G/DL (ref 31.5–36.5)
MCV RBC AUTO: 88 FL (ref 78–100)
MONOCYTES # BLD AUTO: 0.5 10E3/UL (ref 0–1.3)
MONOCYTES NFR BLD AUTO: 3 %
NEUTROPHILS # BLD AUTO: 15.6 10E3/UL (ref 1.6–8.3)
NEUTROPHILS NFR BLD AUTO: 88 %
NRBC # BLD AUTO: 0 10E3/UL
NRBC BLD AUTO-RTO: 0 /100
PHOSPHATE SERPL-MCNC: 1.7 MG/DL (ref 2.5–4.5)
PLATELET # BLD AUTO: 322 10E3/UL (ref 150–450)
POTASSIUM SERPL-SCNC: 3 MMOL/L (ref 3.4–5.3)
POTASSIUM SERPL-SCNC: 3.6 MMOL/L (ref 3.4–5.3)
RBC # BLD AUTO: 3.3 10E6/UL (ref 3.8–5.2)
RETINYL PALMITATE SERPL-MCNC: <0.02 MG/L
SODIUM SERPL-SCNC: 141 MMOL/L (ref 136–145)
VIT A SERPL-MCNC: 0.77 MG/L
WBC # BLD AUTO: 17.6 10E3/UL (ref 4–11)

## 2023-09-24 PROCEDURE — 250N000011 HC RX IP 250 OP 636: Performed by: SURGERY

## 2023-09-24 PROCEDURE — 85025 COMPLETE CBC W/AUTO DIFF WBC: CPT | Performed by: SURGERY

## 2023-09-24 PROCEDURE — 250N000011 HC RX IP 250 OP 636: Mod: JZ | Performed by: SURGERY

## 2023-09-24 PROCEDURE — 250N000013 HC RX MED GY IP 250 OP 250 PS 637: Performed by: SURGERY

## 2023-09-24 PROCEDURE — 258N000003 HC RX IP 258 OP 636: Performed by: SURGERY

## 2023-09-24 PROCEDURE — 271N000002 HC RX 271: Performed by: SURGERY

## 2023-09-24 PROCEDURE — 84132 ASSAY OF SERUM POTASSIUM: CPT | Performed by: SURGERY

## 2023-09-24 PROCEDURE — 99231 SBSQ HOSP IP/OBS SF/LOW 25: CPT | Mod: GC | Performed by: SURGERY

## 2023-09-24 PROCEDURE — 84100 ASSAY OF PHOSPHORUS: CPT | Performed by: SURGERY

## 2023-09-24 PROCEDURE — 83735 ASSAY OF MAGNESIUM: CPT | Performed by: SURGERY

## 2023-09-24 PROCEDURE — 85730 THROMBOPLASTIN TIME PARTIAL: CPT | Performed by: SURGERY

## 2023-09-24 PROCEDURE — 250N000009 HC RX 250

## 2023-09-24 PROCEDURE — 80048 BASIC METABOLIC PNL TOTAL CA: CPT | Performed by: SURGERY

## 2023-09-24 PROCEDURE — 999N000248 HC STATISTIC IV INSERT WITH US BY RN

## 2023-09-24 PROCEDURE — 200N000002 HC R&B ICU UMMC

## 2023-09-24 PROCEDURE — 250N000011 HC RX IP 250 OP 636: Mod: JZ

## 2023-09-24 PROCEDURE — 01996 DLY HOSP MGMT EDRL RX ADMIN: CPT | Mod: GC | Performed by: ANESTHESIOLOGY

## 2023-09-24 PROCEDURE — 258N000003 HC RX IP 258 OP 636

## 2023-09-24 PROCEDURE — 250N000011 HC RX IP 250 OP 636: Performed by: NURSE PRACTITIONER

## 2023-09-24 PROCEDURE — 250N000012 HC RX MED GY IP 250 OP 636 PS 637: Performed by: SURGERY

## 2023-09-24 RX ORDER — GUAIFENESIN 600 MG/1
15 TABLET, EXTENDED RELEASE ORAL DAILY
Status: DISCONTINUED | OUTPATIENT
Start: 2023-09-25 | End: 2023-09-26

## 2023-09-24 RX ORDER — CEFEPIME HYDROCHLORIDE 2 G/1
2 INJECTION, POWDER, FOR SOLUTION INTRAVENOUS EVERY 12 HOURS
Status: DISCONTINUED | OUTPATIENT
Start: 2023-09-24 | End: 2023-09-25

## 2023-09-24 RX ORDER — METHOCARBAMOL 100 MG/ML
1000 INJECTION, SOLUTION INTRAMUSCULAR; INTRAVENOUS EVERY 6 HOURS
Status: DISCONTINUED | OUTPATIENT
Start: 2023-09-24 | End: 2023-09-24

## 2023-09-24 RX ORDER — MAGNESIUM SULFATE HEPTAHYDRATE 40 MG/ML
2 INJECTION, SOLUTION INTRAVENOUS ONCE
Status: COMPLETED | OUTPATIENT
Start: 2023-09-24 | End: 2023-09-24

## 2023-09-24 RX ORDER — HYDROMORPHONE HYDROCHLORIDE 1 MG/ML
0.5 INJECTION, SOLUTION INTRAMUSCULAR; INTRAVENOUS; SUBCUTANEOUS ONCE
Status: COMPLETED | OUTPATIENT
Start: 2023-09-24 | End: 2023-09-24

## 2023-09-24 RX ORDER — POTASSIUM CHLORIDE 1.5 G/1.58G
40 POWDER, FOR SOLUTION ORAL ONCE
Status: COMPLETED | OUTPATIENT
Start: 2023-09-24 | End: 2023-09-24

## 2023-09-24 RX ORDER — METHOCARBAMOL 100 MG/ML
1000 INJECTION, SOLUTION INTRAMUSCULAR; INTRAVENOUS EVERY 8 HOURS
Status: COMPLETED | OUTPATIENT
Start: 2023-09-24 | End: 2023-09-27

## 2023-09-24 RX ORDER — POTASSIUM CHLORIDE 1.5 G/1.58G
20 POWDER, FOR SOLUTION ORAL ONCE
Status: COMPLETED | OUTPATIENT
Start: 2023-09-24 | End: 2023-09-24

## 2023-09-24 RX ADMIN — SENNOSIDES 5 ML: 8.8 LIQUID ORAL at 20:26

## 2023-09-24 RX ADMIN — SODIUM CHLORIDE 2 UNITS/HR: 9 INJECTION, SOLUTION INTRAVENOUS at 14:20

## 2023-09-24 RX ADMIN — POTASSIUM CHLORIDE 20 MEQ: 1.5 POWDER, FOR SOLUTION ORAL at 14:32

## 2023-09-24 RX ADMIN — MAGNESIUM SULFATE HEPTAHYDRATE 2 G: 40 INJECTION, SOLUTION INTRAVENOUS at 05:26

## 2023-09-24 RX ADMIN — MEROPENEM 1 G: 1 INJECTION, POWDER, FOR SOLUTION INTRAVENOUS at 08:54

## 2023-09-24 RX ADMIN — HYDROMORPHONE HYDROCHLORIDE 0.5 MG: 1 INJECTION, SOLUTION INTRAMUSCULAR; INTRAVENOUS; SUBCUTANEOUS at 05:52

## 2023-09-24 RX ADMIN — MEROPENEM 1 G: 1 INJECTION, POWDER, FOR SOLUTION INTRAVENOUS at 01:20

## 2023-09-24 RX ADMIN — HYDROXYZINE HYDROCHLORIDE 50 MG: 10 SOLUTION ORAL at 23:26

## 2023-09-24 RX ADMIN — CEFEPIME HYDROCHLORIDE 2 G: 2 INJECTION, POWDER, FOR SOLUTION INTRAVENOUS at 16:33

## 2023-09-24 RX ADMIN — METHOCARBAMOL 1000 MG: 100 INJECTION, SOLUTION INTRAMUSCULAR; INTRAVENOUS at 20:25

## 2023-09-24 RX ADMIN — ACETAMINOPHEN 975 MG: 325 SOLUTION ORAL at 06:00

## 2023-09-24 RX ADMIN — CITALOPRAM 40 MG: 10 SOLUTION ORAL at 08:58

## 2023-09-24 RX ADMIN — METHOCARBAMOL 1000 MG: 100 INJECTION, SOLUTION INTRAMUSCULAR; INTRAVENOUS at 00:13

## 2023-09-24 RX ADMIN — ACETAMINOPHEN 975 MG: 325 SOLUTION ORAL at 14:32

## 2023-09-24 RX ADMIN — VANCOMYCIN HYDROCHLORIDE 1000 MG: 1 INJECTION, SOLUTION INTRAVENOUS at 12:26

## 2023-09-24 RX ADMIN — Medication 40 MG: at 08:57

## 2023-09-24 RX ADMIN — GABAPENTIN 600 MG: 250 SUSPENSION ORAL at 14:32

## 2023-09-24 RX ADMIN — KETAMINE HYDROCHLORIDE 10 MG/HR: 10 INJECTION INTRAMUSCULAR; INTRAVENOUS at 22:53

## 2023-09-24 RX ADMIN — SENNOSIDES 5 ML: 8.8 LIQUID ORAL at 08:57

## 2023-09-24 RX ADMIN — GABAPENTIN 600 MG: 250 SUSPENSION ORAL at 22:59

## 2023-09-24 RX ADMIN — SODIUM CHLORIDE, SODIUM LACTATE, POTASSIUM CHLORIDE, CALCIUM CHLORIDE AND DEXTROSE MONOHYDRATE: 5; 600; 310; 30; 20 INJECTION, SOLUTION INTRAVENOUS at 13:22

## 2023-09-24 RX ADMIN — POTASSIUM CHLORIDE 20 MEQ: 1.5 POWDER, FOR SOLUTION ORAL at 08:56

## 2023-09-24 RX ADMIN — DEXMEDETOMIDINE HYDROCHLORIDE 0.8 MCG/KG/HR: 400 INJECTION INTRAVENOUS at 17:16

## 2023-09-24 RX ADMIN — SODIUM CHLORIDE, SODIUM LACTATE, POTASSIUM CHLORIDE, CALCIUM CHLORIDE AND DEXTROSE MONOHYDRATE: 5; 600; 310; 30; 20 INJECTION, SOLUTION INTRAVENOUS at 23:26

## 2023-09-24 RX ADMIN — DEXMEDETOMIDINE HYDROCHLORIDE 1 MCG/KG/HR: 400 INJECTION INTRAVENOUS at 04:59

## 2023-09-24 RX ADMIN — DOCUSATE SODIUM 100 MG: 50 LIQUID ORAL at 08:56

## 2023-09-24 RX ADMIN — Medication 500 MG: at 14:17

## 2023-09-24 RX ADMIN — DEXMEDETOMIDINE HYDROCHLORIDE 1 MCG/KG/HR: 400 INJECTION INTRAVENOUS at 08:42

## 2023-09-24 RX ADMIN — HYDROXYZINE HYDROCHLORIDE 50 MG: 10 SOLUTION ORAL at 04:43

## 2023-09-24 RX ADMIN — ACETAMINOPHEN 975 MG: 325 SOLUTION ORAL at 22:59

## 2023-09-24 RX ADMIN — POTASSIUM CHLORIDE 40 MEQ: 1.5 POWDER, FOR SOLUTION ORAL at 05:26

## 2023-09-24 RX ADMIN — Medication 500 MG: at 14:11

## 2023-09-24 RX ADMIN — Medication 40 MG: at 20:25

## 2023-09-24 RX ADMIN — Medication: at 14:04

## 2023-09-24 RX ADMIN — Medication: at 20:27

## 2023-09-24 RX ADMIN — HYDROXYZINE HYDROCHLORIDE 50 MG: 10 SOLUTION ORAL at 10:44

## 2023-09-24 RX ADMIN — DOCUSATE SODIUM 50 MG: 50 LIQUID ORAL at 20:26

## 2023-09-24 RX ADMIN — DAPTOMYCIN 400 MG: 500 INJECTION, POWDER, LYOPHILIZED, FOR SOLUTION INTRAVENOUS at 18:04

## 2023-09-24 RX ADMIN — ACETAMINOPHEN 975 MG: 325 SOLUTION ORAL at 00:20

## 2023-09-24 RX ADMIN — METHOCARBAMOL 1000 MG: 100 INJECTION, SOLUTION INTRAMUSCULAR; INTRAVENOUS at 11:41

## 2023-09-24 RX ADMIN — Medication 1.5 ML: at 08:57

## 2023-09-24 RX ADMIN — KETAMINE HYDROCHLORIDE 10 MG/HR: 10 INJECTION INTRAMUSCULAR; INTRAVENOUS at 12:42

## 2023-09-24 RX ADMIN — GABAPENTIN 600 MG: 250 SUSPENSION ORAL at 05:26

## 2023-09-24 RX ADMIN — KETAMINE HYDROCHLORIDE 10 MG/HR: 10 INJECTION INTRAMUSCULAR; INTRAVENOUS at 02:01

## 2023-09-24 ASSESSMENT — ACTIVITIES OF DAILY LIVING (ADL)
ADLS_ACUITY_SCORE: 23

## 2023-09-24 NOTE — PROGRESS NOTES
DIABETES MANAGEMENT CONSULT  Artie Burger  Age: 27 year old  MRN # [id number]   YOB: 1995    Chief Complaint:    27 year old with PMH significant for recurrent acute on chronic pancreatitis 2/2 alcoholic pancreatitis. Now s/p total pancreatectomy, islet cell autotransplant, splenectomy, cholecystectomy, gastrojejunostomy tube placement.  Endocrine consulted for postoperative hyperglycemia      Reason for Consult: Hyperglycemia      Assessment: #Postoperative hyperglycemia  #Tube feeds associated hyperglycemia  #History of chronic pancreatitis  #S/p total pancreatectomy  #Islet cell autotransplant    Patient had the procedure on 22 September 2023.  Preop A1c 5.5%  Patient was started on insulin drip on 22nd morning around 9:30 AM  Patient currently on algorithm two and roughly insulin requirements ranging between 0.5 units/h to 3 units/h.     Patient is on tube feeds continuous vital 1.5 at rate of  10 cc/h Target goal rate of 50 cc/h.  To be titrated 10 cc/h every 24 hours            Plan:    -Continue with regular insulin drip for now   -BG monitoring TID AC, HS   -hypoglycemia protocol      -Diabetes education needs will be assessed closer to discharge   -on discharge, will recommend outpatient follow up with [facility] Endocrinology service    Discussed plan of care with patient, nursing, and primary team  Thank you for this consult; Inpatient Diabetes will continue to follow.         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     Diabetic Complications:   retinopathy - N/A,   Peripheral neuropathy -  "denied  Nephropathy -   Lab Results   Component Value Date/Time    CR 0.61 2023 03:07 AM    CR 0.85 2023 03:04 AM     No results found for: MICROCR  Prior to Admission Diabetes Regimen:      N/A    Lab Results   Component Value Date    A1C 5.5 2023    A1C 5.3 2023     History of DKA:N/A  Current Diet: Orders Placed This Encounter      NPO for Medical/Clinical Reasons Except for: Ice Chips      10 point ROS completed with pertinent positives and negatives noted in the HPI  Past medical, family and social histories are reviewed and updated.    Social History  Social History     Tobacco Use     Smoking status: Former     Types: Cigarettes     Quit date: 2022     Years since quittin.4     Smokeless tobacco: Current     Tobacco comments:     Vapes daily   Substance Use Topics     Alcohol use: Not Currently         Family History   History reviewed. No pertinent family history.    Physical Exam   /74   Pulse 74   Temp 98.3  F (36.8  C) (Oral)   Resp 23   Ht 1.575 m (5' 2\")   Wt 65.4 kg (144 lb 2.9 oz)   LMP 2023 (Approximate)   SpO2 98%   BMI 26.37 kg/m    General: pleasant, in no distress  HEENT: normocephalic, atraumatic. Oral mucous membranes moist.   Lungs: unlabored respiration, no cough  Skin: warm and dry, no obvious lesions  MSK:  moves all extremities  Lymp:  no LE edema   Mental status:  alert, oriented to self, place, time  Psych:  calm and appropriate interaction     Most Recent Laboratory Tests:  Recent Labs   Lab 23  0307   HGB 10.0*             To contact Endocrine Diabetes service:   From 8AM-4PM: page inpatient diabetes provider that is following the patient  For questions or updates from 4PM-8AM: page the diabetes job code for on call fellow: [phone]    Patient labs, chart, and imaging reviewed.   Discussed with oncall attending.     Yonny Redmond MD  Endocrine Fellow  [phone]        "

## 2023-09-24 NOTE — PROGRESS NOTES
Writer came earlier to patient's room but she's in the commode, advise bedside nurse to page back once pt. Is done.

## 2023-09-24 NOTE — PLAN OF CARE
[facility]      Patient Name: Artie Burger MRN# [id number]   Age: 27 year old YOB: 1995   Date of Admission:9/22/2023      ICU End of Shift Summary. Please see flowsheets for vital signs and detailed assessment data.    Changes this shift: Patient reporting increased pain, requiring one-time doses of methocarbamol IV and hydromorphone IV, but reports not having been able to sleep. Precedex increased. Patient with episode of hypoglycemia. Insulin infusion reduced to 0.2 Units/hr, per primary team directive, and dextrose 10% infusion initiated at 1 mL/kg/hr for approximately one hour with resumption of ordered algorithm thereafter. Room noted to to have intermittent cannabinoid aroma. Patient with two bowel movements. Potassium and magnesium replaced, per protocol. Right NINFA leaking at site--bandage changed Q4H.     Plan: Continue with current plan of care, providing sufficient analgesia as able.         Jim Elizondo RN  Critical Care Flex Team

## 2023-09-24 NOTE — PLAN OF CARE
Major Shift Events:  Neurologically intact. Complains of 6-7/10 pain. On continuous + PCA dilaudid gtt, ketamine gtt, and has epidural caths x2, given IV robaxin x1 and anesthesia came to bolus ropivicaine x1. Up SBA to commode, refused to sit in chair or walk today. NSR, no ectopy. BP stable. Afebrile. 2L NC, clear LS, shallow breathing. TF @ 10mL/hr, per SICU, do not advance TF goal until discussed with transplant tomorrow, standard FWF. Voiding in commode. BM x2 today. NINFA leaking at site, seems to have improved. BG unstable, insulin gtt adjusted per algorithm, 1 episode of hypoglycemia today.   Plan: Continue to monitor.  For vital signs and complete assessments, please see documentation flowsheets.

## 2023-09-24 NOTE — PROGRESS NOTES
Pain Service Progress Note  [facility]  Date: 09/24/2023       Patient Name: Artie Burger  MRN: [id number]  Age: 27 year old  Sex: female    Procedure: TPIAT    Date of Surgery: 9/22/23    Date of Catheter Placement: 9/22/23      Assessment:  Artie is a 28yo femaled with chronic calcific pancreatitis, MDD with panic attacks, prior hx abuse of alcohol, insomnia, iron deficiency anemia.     Took a step backward overnight, endorsing worsening abdominal pain this morning. Was able to get OOB yesterday and mobilize to and from commode. Had bowel movement 9/23.       Plan/Recommendations:  1. Regional Anesthesia/Analgesia  -Continuous Catheter Type/Site: bilateral paravertebral (PV) T6-7  Infusate: ropivacaine 0.2%  Programmed Intermittent Bolus (PIB) at 5 mL Q60 min via each catheter, total infusion rate of 10 mL/hr    Plan to maintain catheter, max of 7 days    2. Anticoagulation  -Please contact Inpatient Pain Service before ordering or making any anticoagulation changes       3. Multimodal Analgesia  - Per primary team   Precedex gtt  Ketamine gtt   Hydromorphone PCA  Scheduled Acetaminophen 975 mg Q8H   Scheduled Robaxin 1000 mg Q8H  Scheduled gabapentin 600 mg Q8H    Discussed with anesthesiologist Dr. Lila Fountain.     Pain Service will continue to follow.    Serenity Chowdary MD  Anesthesiology PGY-3  09/24/2023     Overnight Events: No acute events overnight.    Tubes/Drains: Yes  RIJ CVC, NG tube    Subjective:  A little worse today, felt like things changed after bowel movement    Nausea: No  Vomiting: No  Symptoms of LAST: No    Pain Location:  Abdomen    Pain Intensity:    Satisfied with your level of pain control: No    Diet: NPO for Medical/Clinical Reasons Except for: Ice Chips  Adult Formula Drip Feeding: Continuous Vital 1.5; Jejunostomy; Goal Rate: 50 mL/hr (goal) - 9/23: Once confirm JT placement/approval for use post surgery, begin TF with Vital 1.5 @ 10 ml/hr and advance  "by 10 ml q 24 hrs (and/or ONLY advance rate u...    Relevant Labs:  Recent Labs   Lab Test 09/23/23  0304 09/22/23 2031   INR  --  1.34*    346   PTT 32 110*   BUN 12.6 13.5       Physical Exam:  Vitals: BP (!) 88/76   Pulse 74   Temp 36.9  C (98.5  F) (Oral)   Resp 22   Ht 1.575 m (5' 2\")   Wt 65.4 kg (144 lb 2.9 oz)   LMP 08/16/2023 (Approximate)   SpO2 96%   BMI 26.37 kg/m      Physical Exam:   Orientation:  Alert, oriented, and in no acute distress: Yes  Sedation: No    Motor Examination:  5/5 Strength in lower extremities: Yes    Catheter Site:   Catheter entry site is clean/dry/intact: Yes    Tender: No      Relevant Medications:  Current Pain Medications:  Medications related to Pain Management (From now, onward)      Start     Dose/Rate Route Frequency Ordered Stop    09/25/23 0000  acetaminophen (TYLENOL) solution 650 mg         650 mg Oral or Feeding Tube EVERY 4 HOURS PRN 09/22/23 2312 09/23/23 0930  dexmedeTOMIDine (PRECEDEX) 4 mcg/mL in NS infusion         0.1-1.2 mcg/kg/hr × 58.3 kg  1.5-17.5 mL/hr  Intravenous CONTINUOUS 09/23/23 0912      09/23/23 0800  [Held by provider]  busPIRone (BUSPAR) suspension 10 mg        (Held by provider since Sat 9/23/2023 at 0826 by Junito Patel MD.Hold Reason: OtherHold Comments: Product not available)   Note to Pharmacy: PTA Sig:Take 10 mg by mouth 2 times daily      10 mg Oral or Feeding Tube 2 TIMES DAILY 09/22/23 2250 09/23/23 0800  polyethylene glycol (MIRALAX) Packet 17 g         17 g Oral or Feeding Tube DAILY 09/22/23 2249 09/23/23 0600  gabapentin (NEURONTIN) solution 600 mg         600 mg Oral or J tube EVERY 8 HOURS SCHEDULED 09/22/23 2250 09/22/23 2330  sennosides (SENOKOT) syrup 5-10 mL        See Hyperspace for full Linked Orders Report.    5-10 mL Oral or Feeding Tube 2 TIMES DAILY 09/22/23 2305 09/22/23 3273  docusate (COLACE) 50 MG/5ML liquid  mg        See Hyperspace for full Linked " Orders Report.     mg Oral or Feeding Tube 2 TIMES DAILY 09/22/23 2305 09/22/23 2300  acetaminophen (TYLENOL) solution 975 mg         975 mg Oral or Feeding Tube EVERY 8 HOURS 09/22/23 2249 09/25/23 2259 09/22/23 2300  ketorolac (TORADOL) injection 10 mg         10 mg Intravenous EVERY 6 HOURS 09/22/23 2249 09/23/23 2359 09/22/23 2250  hydrOXYzine (ATARAX) syrup 25 mg        See Hyperspace for full Linked Orders Report.    25 mg Oral or Feeding Tube EVERY 6 HOURS PRN 09/22/23 2250      09/22/23 2250  hydrOXYzine (ATARAX) syrup 50 mg        See Hyperspace for full Linked Orders Report.    50 mg Oral or Feeding Tube EVERY 6 HOURS PRN 09/22/23 2250 09/22/23 2249  lidocaine 1 % 0.1-1 mL         0.1-1 mL Other EVERY 1 HOUR PRN 09/22/23 2250 09/22/23 2249  lidocaine (LMX4) cream          Topical EVERY 1 HOUR PRN 09/22/23 2250 09/22/23 2249  magnesium hydroxide (MILK OF MAGNESIA) suspension 30 mL         30 mL Oral or Feeding Tube DAILY PRN 09/22/23 2249 09/22/23 2249  bisacodyl (DULCOLAX) suppository 10 mg         10 mg Rectal DAILY PRN 09/22/23 2249 09/22/23 2249  methocarbamol (ROBAXIN) tablet 750 mg         750 mg Oral or Feeding Tube EVERY 6 HOURS PRN 09/22/23 2249 09/22/23 2100  ketamine (KETALAR) 2 mg/mL in sodium chloride 0.9 % 50 mL ANALGESIA infusion         10 mg/hr  5 mL/hr  Intravenous CONTINUOUS 09/22/23 2047 09/22/23 2030  HYDROmorphone (DILAUDID) PCA 0.2 mg/mL OPIOID TOLERANT          Intravenous CONTINUOUS 09/22/23 2018 09/22/23 0800  ROPivacaine 0.2% in NS perineural infusion (simple)          Perineural Continuous Nerve Block 09/22/23 0746      09/22/23 0800  ROPivacaine 0.2% in NS perineural infusion (simple)          Perineural Continuous Nerve Block 09/22/23 0746              Primary Service Contacted with Recommendations? Yes    Physician Attestation   I saw this patient with the resident and agree with the resident/fellow's findings and plan  "of care as documented in the note.       Ramirez findings: Artie reports more difficulty with pain control this AM, after having a bowel movement, which seems to be improving over time. She denies symptoms of local anesthetic toxicity. Catheter sites are c/d/i, no erythema/swelling/drainage.      Please see A&P for additional details of medical decision making.     Lila Fountain MD  Date of Service (when I saw the patient): 9/24/23    Acute Inpatient Pain Service [facility]  Hours of pain coverage 24/7   Page via Amcom- Please Page the Pain Team Via Amcom: \"PAIN MANAGEMENT ACUTE INPATIENT/ [facility]\"             "

## 2023-09-24 NOTE — PROGRESS NOTES
SURGICAL ICU PROGRESS NOTE  09/24/2023    PRIMARY TEAM: Transplant  PRIMARY PHYSICIAN: Junito Patel MD  REASON FOR CRITICAL CARE ADMISSION: Post operative Cares  ADMITTING PHYSICIAN: Dr. Ortiz    ASSESSMENT: Artie Burger is a 27 year old female who was admitted on 9/22/2023 s/p TPAIT with Dr Patel for chronic calcific pancreatitis. 300cc EBL intra-op, received 1u pRBCs intra-op. Past medical notable for MDD with panic attacks, prior hx abuse of alcohol, insomnia, Iron deficiency anemia and chronic abdominal pain on dilaudid.      Interval Events:   - Bowel movements x 2 overnight  - Increasing pain, anxiety overnight. 50mg atarax given, Dilaudid PCA and Push  - Hypoglycemia to 54 overnight, insulin titrated to 0.2 Recheck 175 this am    PLAN:  Changes Today:  - Increase Gabapentin to 900 mg TID  - Schedule robaxin   - Continue ICU care pending precedex drip    Neurological:  # Acute post operative pain   # Chronic pain on oral dilaudid ( PTA taking 4x per day)   # MDD with panic attacks   - Monitor neurological status.  - PTA medications:  Celexa 40mg daily, Dilaudid 4mg QID, Buspar 10 mg BID, Gabapentin 600mg TID,  and vistaril 25mg daily   - Holding buspirone (no compatible formula for jejunostomy), continue citalopram  - Pain:   - Bilateral Paravertebral catheters placed 9/22/23   - dilaudid PCA 0.2mg q10min  - tylenol 1g q8, toradol 10mg q6hrs  - Ketamine gtt - 10 mg/hr  - Precedex gtt - 1mcg/kg/hr try to wean as possible  - Increase gabapentin to 900mg TID  - Robaxin scheduled per transplant recs  - Sedation plan: Precedex gtt   dexmedeTOMIDine 1 mcg/kg/hr (09/24/23 0700)    dextrose      dextrose Stopped (09/24/23 0400)    dextrose 5% lactated ringers 100 mL/hr at 09/24/23 0700    HEParin 400 Units/hr (09/24/23 0700)    HYDROmorphone      insulin (regular) 4 Units/hr (09/24/23 0600)    ketamine 2 mg/mL ADULT 10 mg/hr (09/24/23 0700)    ROPivacaine 5 mL/hr at 09/23/23 0730     ROPivacaine 5 mL/hr at 09/23/23 0730       Pulmonary:   Vital Signs: Most Recent  Ranges (24 hours)   Resp: 30  SpO2: 96 % Resp  Min: 13  Max: 30  SpO2  Min: 90 %  Max: 100 %   O2 Device:   (RA)    # cannabis use, current vaping, prior hx of tobacco use    - Extubated post-op, on RA  - Supplemental oxygen to keep saturation above 92 %.  - Incentive spirometer every 15- 30 minutes.   - No current interventions required    Cardiovascular:  Vital Signs: Most Recent  Ranges (24 hours)   Pulse: 79  BP: 109/89  Arterial Line MAP (mmHg): 81 mmHg  Arterial Line BP: 112/65 Pulse  Min: 59  Max: 95  BP  Min: 92/60  Max: 113/78  Arterial Line MAP (mmHg)  Min: 81 mmHg  Max: 87 mmHg  Arterial Line BP  Min: 112/65  Max: 119/69   # Shock-distributive vs hypovolemic, resolved   - Continue to monitor hemodynamic status.   - Off of pressors, maintaining Bps  - No need to trend Lactates    Recent Labs   Lab 09/22/23  1929 09/22/23  1914 09/22/23  1856 09/22/23  1838   LACT 2.6* 2.7* 3.1* 2.5*      dexmedeTOMIDine 1 mcg/kg/hr (09/24/23 0700)    dextrose      dextrose Stopped (09/24/23 0400)    dextrose 5% lactated ringers 100 mL/hr at 09/24/23 0700    HEParin 400 Units/hr (09/24/23 0700)    HYDROmorphone      insulin (regular) 4 Units/hr (09/24/23 0600)    ketamine 2 mg/mL ADULT 10 mg/hr (09/24/23 0700)    ROPivacaine 5 mL/hr at 09/23/23 0730    ROPivacaine 5 mL/hr at 09/23/23 0730       Gastroenterology/Nutrition:  Recent Labs   Lab 09/23/23  0304 09/22/23  2031 09/21/23  1609   AST 63* 63*  --    ALT 38 41  --    ALKPHOS 32* 35  --    BILITOTAL 0.4 1.2  --    ALBUMIN 3.3* 3.3*  --    INR  --  1.34* 1.13       # GERD  # Chronic calcific pancreatitis now s/p TPIAT   # NPO  - G-J tube in LUQ   - drain management per Transplant surgery team - out 112ml yesterday  - ppx: pantoprazole  - bowel regimen: senna, docusate, miralax - 2 BM yesterday    Fluids/Electrolytes/Renal:   I/O last 3 completed shifts:  In: 4091.49 [I.V.:3511.49;  NG/GT:470]  Out: 3280 [Urine:2640; Emesis/NG output:478; Drains:162]   Yesterday: 3773 in / 3080 out  This AM: 1708 in / 1560 out  UOP: 1.14 ml/kg/hr (avg over last shift)  Recent Labs   Lab 09/24/23 0307 09/23/23 1957 09/23/23  1010 09/23/23 0304 09/22/23 2031 09/22/23 2031 09/22/23 1929 09/22/23 1914     --   --  142  --  140 140 140   POTASSIUM 3.0*  --  3.5 3.3*  --  4.4 3.9 3.8   CHLORIDE 108*  --   --  110*  --  108*  --   --    MAG 2.0 1.1*  --   --   --   --   --   --    PHOS 1.7*  --   --   --   --   --   --   --    MURTAZA 7.6*  --   --  8.7   < > 8.7  --   --    ICAW  --   --   --   --   --   --  4.7 4.9   CO2 23  --   --  22  --  19*  --   --    BUN 5.0*  --   --  12.6  --  13.5  --   --    CR 0.61  --   --  0.85  --  0.95  --   --     < > = values in this interval not displayed.       # Hypokalemia  - Potassium, 3.0 this am, down from 3.5 yesterday  - Given 40 meq this am. Repletion protocol in place    # Hypomagnesemia, resolved  - Mg 1.1 overnight, repleted and recheck 2.0    # Hypophosphatemia  - Phosphorus 1.7 this am, repletion protocol in place    # Hypovolaemia  - D5 LR @100cc/hr   - electrolyte replacement protocol Mg, Phos, K       dexmedeTOMIDine Last Rate: 1 mcg/kg/hr (09/24/23 0700)    dextrose    dextrose Last Rate: Stopped (09/24/23 0400)    dextrose 5% lactated ringers Last Rate: 100 mL/hr at 09/24/23 0700    HEParin Last Rate: 400 Units/hr (09/24/23 0700)    HYDROmorphone    insulin (regular) Last Rate: 4 Units/hr (09/24/23 0600)    ketamine 2 mg/mL ADULT Last Rate: 10 mg/hr (09/24/23 0700)    ROPivacaine Last Rate: 5 mL/hr at 09/23/23 0730    ROPivacaine Last Rate: 5 mL/hr at 09/23/23 0730    # Acute kidney injury, Resolved    - resolved: Cr 0.61 stable    Endocrine:  Recent Labs   Lab 09/24/23  0546 09/24/23  0513 09/24/23  0433 09/24/23  0408 09/24/23  0348 09/24/23  0331 09/24/23  0318 09/24/23  0307 09/24/23  0158 09/24/23  0057 09/23/23  2348 09/23/23  2252   * 173*  114* 80 66* 58* 54* 52* 107* 147* 107* 134*     Hemoglobin A1C   Date Value Ref Range Status   09/22/2023 5.5 <5.7 % Final     Comment:     Normal <5.7%   Prediabetes 5.7-6.4%    Diabetes 6.5% or higher     Note: Adopted from ADA consensus guidelines.     - Glucose Ranges: /24h. Goal glucose range of 140-180 for wound healing.  - Insulin Plan: insulin gtt    # Concern for hyperglycemia due to stress and critical illness   # S/P TPAIT  - insulin gtt per TPAIT protocol                - do not stop insulin gtt, dextrose infusion to maintain glucose if needed   - Endocrine - DM to manage transition to subcutaneous insulin when appropriate     ID:  Vital Signs: Most Recent  Ranges (24 hours)   Temp: 98.5  F (36.9  C) Temp  Min: 97.6  F (36.4  C)  Max: 98.7  F (37.1  C)     Recent Labs   Lab 09/24/23  0307 09/23/23  0304 09/22/23 2031 09/21/23  1609   WBC 17.6* 23.7* 22.6* 14.1*       # Leukocytosis, improving   # Bacteremia, GNR not yet speciated  # Positive islet cultures  - WBC downtrending from 23.7 to 17.6 (9/24)  - Continue Vanc (9/23-), Meropenem (9/23-)  - afebrile tmax 37.1 overnight    Cultures:  - 9/23 islets: Gram positive cocci in pairs and chains, gram negative rods  - 9/23 blood: gram negative rods  Lab Results   Component Value Date/Time    CULTURE Positive on the 1st day of incubation (A) 09/22/2023 05:36 PM    CULTURE Gram negative bacilli (AA) 09/22/2023 05:36 PM    LABFRAM No organisms seen 09/22/2023 05:36 PM    LABFRAM 1+ WBC seen 09/22/2023 05:36 PM        Heme:  Recent Labs   Lab 09/24/23  0307 09/23/23  1010 09/23/23  0304 09/22/23 2031 09/22/23  0930 09/21/23  1609   HGB 10.0* 10.2* 10.1* 11.2*   < > 11.9     --  345 346  --  431   FIBR  --   --   --  313  --   --     < > = values in this interval not displayed.     # Acute blood loss anemia due to surgical blood loss, stable  # Coagulopathy  # Iron deficiency anemia   - EBL: 300cc   - Transfuse if hgb <7.0 or signs/symptoms of  hypoperfusion. Monitor and trend.   - heparin gtt per TPAIT protocol - 400 units per hour  - Hgb 10.0 (9/24) stable    MSK:  # hx of osteopenia   # deconditioning   - Physical and occupational therapy consult for POD3    General Cares/Prophylaxis:    DVT Prophylaxis: Heparin GTT  GI Prophylaxis: PPI  Restraints: Restraints for medical healing needed: NO  LDA:  - Right internal jugular   - Bilateral ES catheters   - 2 PIV  - GJ tube  - Drain left abdomen    Disposition:  - SICU.   - Barriers to transfer out of ICU: precedex drip    Patient seen and discussed with staff. Dr Angel Cage MD   Orthopaedic Surgery Resident      ====================================  SUBJECTIVE: Overnight patient increasing pain and anxiousness. Required increase in precedex drip and 1x Dilaudid push.     OBJECTIVE:   Temp:  [97.6  F (36.4  C)-98.7  F (37.1  C)] 98.5  F (36.9  C)  Pulse:  [59-95] 79  Resp:  [13-30] 30  BP: ()/(52-89) 109/89  MAP:  [81 mmHg-87 mmHg] 81 mmHg  Arterial Line BP: (112-119)/(65-69) 112/65  SpO2:  [90 %-100 %] 96 %  Resp: 30      Physical Exam:  General: Alert, appropriate, NAD  HEENT:NC/AT  Neuro: A&Ox3, moving all extremities independently  Pulm/Resp: CTAB, comfortable on RA  CV: RRR, distal pulses intact  Abdomen: soft, wound clean, g/j tube in place  Incisions/Skin: Warm, dry  MSK/Extremities: No peripheral edema, extremities well perfused    LABS:   Arterial Blood Gases   Recent Labs   Lab 09/22/23  1929 09/22/23  1914 09/22/23  1856 09/22/23  1838   PH 7.39 7.34* 7.37 7.37   PCO2 33* 40 36 37   PO2 154* 120* 147* 146*   HCO3 20* 22 21 21     Complete Blood Count   Recent Labs   Lab 09/24/23  0307 09/23/23  1010 09/23/23  0304 09/22/23  2031 09/22/23  0930 09/21/23  1609   WBC 17.6*  --  23.7* 22.6*  --  14.1*   HGB 10.0* 10.2* 10.1* 11.2*   < > 11.9     --  345 346  --  431    < > = values in this interval not displayed.     Renal Panel  Recent Labs   Lab 09/24/23  9530  09/23/23  1010 09/23/23  0304 09/22/23 2031 09/22/23  1929     --  142 140 140   POTASSIUM 3.0* 3.5 3.3* 4.4 3.9   CHLORIDE 108*  --  110* 108*  --    CO2 23  --  22 19*  --    BUN 5.0*  --  12.6 13.5  --    CR 0.61  --  0.85 0.95  --      Glucose  Recent Labs   Lab 09/24/23  0546 09/24/23  0513 09/24/23  0433 09/24/23  0408 09/24/23  0348 09/24/23  0331   * 173* 114* 80 66* 58*     Liver Function Tests  Recent Labs   Lab 09/23/23  0304 09/22/23 2031 09/21/23  1609   AST 63* 63*  --    ALT 38 41  --    ALKPHOS 32* 35  --    BILITOTAL 0.4 1.2  --    ALBUMIN 3.3* 3.3*  --    INR  --  1.34* 1.13     Pancreatic Enzymes  Recent Labs   Lab 09/23/23  0304   LIPASE 12*   AMYLASE 25*     Coagulation Profile  Recent Labs   Lab 09/24/23  0307 09/23/23  0304 09/22/23 2031 09/21/23  1609   INR  --   --  1.34* 1.13   PTT 33 32 110*  --      Cultures  Recent Labs   Lab 09/22/23  1736 09/22/23  1301   CULTURE Positive on the 1st day of incubation*  Gram negative bacilli* Positive on the 1st day of incubation*  Gram negative bacilli*  Gram positive cocci in pairs and chains*     Lab Results   Component Value Date/Time    CULTURE Positive on the 1st day of incubation (A) 09/22/2023 05:36 PM    CULTURE Gram negative bacilli (AA) 09/22/2023 05:36 PM    CULTURE Positive on the 1st day of incubation (A) 09/22/2023 01:01 PM    CULTURE Gram negative bacilli (AA) 09/22/2023 01:01 PM    CULTURE Gram positive cocci in pairs and chains (AA) 09/22/2023 01:01 PM    LABFRAM No organisms seen 09/22/2023 05:36 PM    LABFRAM 1+ WBC seen 09/22/2023 05:36 PM    LABFRAM No organisms seen 09/22/2023 01:01 PM    LABFRAM 1+ WBC seen 09/22/2023 01:01 PM         IMAGING:   No results found for this or any previous visit (from the past 24 hour(s)).

## 2023-09-25 ENCOUNTER — APPOINTMENT (OUTPATIENT)
Dept: PHYSICAL THERAPY | Facility: CLINIC | Age: 28
End: 2023-09-25
Attending: SURGERY
Payer: COMMERCIAL

## 2023-09-25 LAB
ANION GAP SERPL CALCULATED.3IONS-SCNC: 7 MMOL/L (ref 7–15)
APTT PPP: 31 SECONDS (ref 22–38)
BASOPHILS # BLD AUTO: 0 10E3/UL (ref 0–0.2)
BASOPHILS NFR BLD AUTO: 0 %
BUN SERPL-MCNC: 4.9 MG/DL (ref 6–20)
CALCIUM SERPL-MCNC: 7.7 MG/DL (ref 8.6–10)
CHLORIDE SERPL-SCNC: 111 MMOL/L (ref 98–107)
CREAT SERPL-MCNC: 0.65 MG/DL (ref 0.51–0.95)
DEPRECATED CALCIDIOL+CALCIFEROL SERPL-MC: <60 UG/L (ref 20–75)
DEPRECATED HCO3 PLAS-SCNC: 22 MMOL/L (ref 22–29)
EGFRCR SERPLBLD CKD-EPI 2021: >90 ML/MIN/1.73M2
EOSINOPHIL # BLD AUTO: 0.4 10E3/UL (ref 0–0.7)
EOSINOPHIL NFR BLD AUTO: 3 %
ERYTHROCYTE [DISTWIDTH] IN BLOOD BY AUTOMATED COUNT: 14.4 % (ref 10–15)
ERYTHROCYTE [DISTWIDTH] IN BLOOD BY AUTOMATED COUNT: 14.4 % (ref 10–15)
GLUCOSE BLDC GLUCOMTR-MCNC: 101 MG/DL (ref 70–99)
GLUCOSE BLDC GLUCOMTR-MCNC: 106 MG/DL (ref 70–99)
GLUCOSE BLDC GLUCOMTR-MCNC: 115 MG/DL (ref 70–99)
GLUCOSE BLDC GLUCOMTR-MCNC: 116 MG/DL (ref 70–99)
GLUCOSE BLDC GLUCOMTR-MCNC: 117 MG/DL (ref 70–99)
GLUCOSE BLDC GLUCOMTR-MCNC: 118 MG/DL (ref 70–99)
GLUCOSE BLDC GLUCOMTR-MCNC: 133 MG/DL (ref 70–99)
GLUCOSE BLDC GLUCOMTR-MCNC: 136 MG/DL (ref 70–99)
GLUCOSE BLDC GLUCOMTR-MCNC: 139 MG/DL (ref 70–99)
GLUCOSE BLDC GLUCOMTR-MCNC: 142 MG/DL (ref 70–99)
GLUCOSE BLDC GLUCOMTR-MCNC: 146 MG/DL (ref 70–99)
GLUCOSE BLDC GLUCOMTR-MCNC: 148 MG/DL (ref 70–99)
GLUCOSE BLDC GLUCOMTR-MCNC: 148 MG/DL (ref 70–99)
GLUCOSE BLDC GLUCOMTR-MCNC: 151 MG/DL (ref 70–99)
GLUCOSE BLDC GLUCOMTR-MCNC: 156 MG/DL (ref 70–99)
GLUCOSE BLDC GLUCOMTR-MCNC: 162 MG/DL (ref 70–99)
GLUCOSE BLDC GLUCOMTR-MCNC: 168 MG/DL (ref 70–99)
GLUCOSE BLDC GLUCOMTR-MCNC: 173 MG/DL (ref 70–99)
GLUCOSE BLDC GLUCOMTR-MCNC: 46 MG/DL (ref 70–99)
GLUCOSE BLDC GLUCOMTR-MCNC: 69 MG/DL (ref 70–99)
GLUCOSE BLDC GLUCOMTR-MCNC: 71 MG/DL (ref 70–99)
GLUCOSE BLDC GLUCOMTR-MCNC: 72 MG/DL (ref 70–99)
GLUCOSE BLDC GLUCOMTR-MCNC: 75 MG/DL (ref 70–99)
GLUCOSE BLDC GLUCOMTR-MCNC: 76 MG/DL (ref 70–99)
GLUCOSE BLDC GLUCOMTR-MCNC: 79 MG/DL (ref 70–99)
GLUCOSE BLDC GLUCOMTR-MCNC: 97 MG/DL (ref 70–99)
GLUCOSE BLDC GLUCOMTR-MCNC: 99 MG/DL (ref 70–99)
GLUCOSE SERPL-MCNC: 142 MG/DL (ref 70–99)
HCT VFR BLD AUTO: 26.4 % (ref 35–47)
HCT VFR BLD AUTO: 26.4 % (ref 35–47)
HGB BLD-MCNC: 8.7 G/DL (ref 11.7–15.7)
HGB BLD-MCNC: 8.7 G/DL (ref 11.7–15.7)
IMM GRANULOCYTES # BLD: 0.1 10E3/UL
IMM GRANULOCYTES NFR BLD: 0 %
LYMPHOCYTES # BLD AUTO: 3.1 10E3/UL (ref 0.8–5.3)
LYMPHOCYTES NFR BLD AUTO: 21 %
MAGNESIUM SERPL-MCNC: 1.6 MG/DL (ref 1.7–2.3)
MCH RBC QN AUTO: 29.8 PG (ref 26.5–33)
MCH RBC QN AUTO: 29.8 PG (ref 26.5–33)
MCHC RBC AUTO-ENTMCNC: 33 G/DL (ref 31.5–36.5)
MCHC RBC AUTO-ENTMCNC: 33 G/DL (ref 31.5–36.5)
MCV RBC AUTO: 90 FL (ref 78–100)
MCV RBC AUTO: 90 FL (ref 78–100)
MONOCYTES # BLD AUTO: 0.8 10E3/UL (ref 0–1.3)
MONOCYTES NFR BLD AUTO: 6 %
NEUTROPHILS # BLD AUTO: 10.3 10E3/UL (ref 1.6–8.3)
NEUTROPHILS NFR BLD AUTO: 70 %
NRBC # BLD AUTO: 0 10E3/UL
NRBC BLD AUTO-RTO: 0 /100
PHOSPHATE SERPL-MCNC: 1.6 MG/DL (ref 2.5–4.5)
PHOSPHATE SERPL-MCNC: 2.5 MG/DL (ref 2.5–4.5)
PLATELET # BLD AUTO: 350 10E3/UL (ref 150–450)
PLATELET # BLD AUTO: 350 10E3/UL (ref 150–450)
POTASSIUM SERPL-SCNC: 3.4 MMOL/L (ref 3.4–5.3)
POTASSIUM SERPL-SCNC: 4 MMOL/L (ref 3.4–5.3)
RBC # BLD AUTO: 2.92 10E6/UL (ref 3.8–5.2)
RBC # BLD AUTO: 2.92 10E6/UL (ref 3.8–5.2)
SODIUM SERPL-SCNC: 140 MMOL/L (ref 136–145)
VITAMIN D2 SERPL-MCNC: <5 UG/L
VITAMIN D3 SERPL-MCNC: 55 UG/L
WBC # BLD AUTO: 14.7 10E3/UL (ref 4–11)
WBC # BLD AUTO: 14.7 10E3/UL (ref 4–11)

## 2023-09-25 PROCEDURE — 85025 COMPLETE CBC W/AUTO DIFF WBC: CPT | Performed by: SURGERY

## 2023-09-25 PROCEDURE — 250N000013 HC RX MED GY IP 250 OP 250 PS 637: Performed by: SURGERY

## 2023-09-25 PROCEDURE — 250N000011 HC RX IP 250 OP 636: Performed by: SURGERY

## 2023-09-25 PROCEDURE — 250N000013 HC RX MED GY IP 250 OP 250 PS 637: Performed by: NURSE PRACTITIONER

## 2023-09-25 PROCEDURE — 01996 DLY HOSP MGMT EDRL RX ADMIN: CPT | Mod: GC | Performed by: ANESTHESIOLOGY

## 2023-09-25 PROCEDURE — 97161 PT EVAL LOW COMPLEX 20 MIN: CPT | Mod: GP

## 2023-09-25 PROCEDURE — 97530 THERAPEUTIC ACTIVITIES: CPT | Mod: GP

## 2023-09-25 PROCEDURE — 250N000009 HC RX 250

## 2023-09-25 PROCEDURE — 258N000003 HC RX IP 258 OP 636: Performed by: NURSE PRACTITIONER

## 2023-09-25 PROCEDURE — 85730 THROMBOPLASTIN TIME PARTIAL: CPT | Performed by: SURGERY

## 2023-09-25 PROCEDURE — 83735 ASSAY OF MAGNESIUM: CPT | Performed by: SURGERY

## 2023-09-25 PROCEDURE — 120N000011 HC R&B TRANSPLANT UMMC

## 2023-09-25 PROCEDURE — 250N000011 HC RX IP 250 OP 636: Mod: JZ | Performed by: SURGERY

## 2023-09-25 PROCEDURE — 84100 ASSAY OF PHOSPHORUS: CPT | Performed by: SURGERY

## 2023-09-25 PROCEDURE — 80048 BASIC METABOLIC PNL TOTAL CA: CPT | Performed by: SURGERY

## 2023-09-25 PROCEDURE — 97116 GAIT TRAINING THERAPY: CPT | Mod: GP

## 2023-09-25 PROCEDURE — 250N000013 HC RX MED GY IP 250 OP 250 PS 637

## 2023-09-25 PROCEDURE — 250N000011 HC RX IP 250 OP 636

## 2023-09-25 PROCEDURE — 258N000003 HC RX IP 258 OP 636

## 2023-09-25 PROCEDURE — 999N000128 HC STATISTIC PERIPHERAL IV START W/O US GUIDANCE

## 2023-09-25 PROCEDURE — 84132 ASSAY OF SERUM POTASSIUM: CPT | Performed by: SURGERY

## 2023-09-25 PROCEDURE — 258N000001 HC RX 258: Performed by: SURGERY

## 2023-09-25 PROCEDURE — 36415 COLL VENOUS BLD VENIPUNCTURE: CPT | Performed by: SURGERY

## 2023-09-25 PROCEDURE — 250N000011 HC RX IP 250 OP 636: Mod: JZ | Performed by: NURSE PRACTITIONER

## 2023-09-25 RX ORDER — AMOXICILLIN 250 MG
2 CAPSULE ORAL 2 TIMES DAILY
Status: ON HOLD | COMMUNITY
End: 2023-10-03

## 2023-09-25 RX ORDER — SODIUM CHLORIDE, SODIUM LACTATE, POTASSIUM CHLORIDE, CALCIUM CHLORIDE 600; 310; 30; 20 MG/100ML; MG/100ML; MG/100ML; MG/100ML
INJECTION, SOLUTION INTRAVENOUS CONTINUOUS
Status: DISCONTINUED | OUTPATIENT
Start: 2023-09-25 | End: 2023-09-26

## 2023-09-25 RX ORDER — LANOLIN ALCOHOL/MO/W.PET/CERES
3 CREAM (GRAM) TOPICAL
Status: DISCONTINUED | OUTPATIENT
Start: 2023-09-25 | End: 2023-09-26

## 2023-09-25 RX ORDER — MAGNESIUM SULFATE HEPTAHYDRATE 40 MG/ML
2 INJECTION, SOLUTION INTRAVENOUS ONCE
Status: COMPLETED | OUTPATIENT
Start: 2023-09-25 | End: 2023-09-25

## 2023-09-25 RX ORDER — POTASSIUM CHLORIDE 20MEQ/15ML
40 LIQUID (ML) ORAL ONCE
Status: COMPLETED | OUTPATIENT
Start: 2023-09-25 | End: 2023-09-25

## 2023-09-25 RX ORDER — THIAMINE HYDROCHLORIDE 100 MG/ML
100 INJECTION, SOLUTION INTRAMUSCULAR; INTRAVENOUS DAILY
Status: DISCONTINUED | OUTPATIENT
Start: 2023-09-25 | End: 2023-10-03 | Stop reason: HOSPADM

## 2023-09-25 RX ORDER — BUSPIRONE HYDROCHLORIDE 10 MG/1
10 TABLET ORAL 2 TIMES DAILY
Status: DISCONTINUED | OUTPATIENT
Start: 2023-09-25 | End: 2023-10-02

## 2023-09-25 RX ORDER — PIPERACILLIN SODIUM, TAZOBACTAM SODIUM 3; .375 G/15ML; G/15ML
3.38 INJECTION, POWDER, LYOPHILIZED, FOR SOLUTION INTRAVENOUS EVERY 6 HOURS
Status: DISCONTINUED | OUTPATIENT
Start: 2023-09-25 | End: 2023-09-26

## 2023-09-25 RX ADMIN — ACETAMINOPHEN 975 MG: 325 SOLUTION ORAL at 15:56

## 2023-09-25 RX ADMIN — GABAPENTIN 600 MG: 250 SUSPENSION ORAL at 14:55

## 2023-09-25 RX ADMIN — MAGNESIUM SULFATE IN WATER 2 G: 40 INJECTION, SOLUTION INTRAVENOUS at 06:00

## 2023-09-25 RX ADMIN — DEXMEDETOMIDINE HYDROCHLORIDE 0.8 MCG/KG/HR: 400 INJECTION INTRAVENOUS at 00:39

## 2023-09-25 RX ADMIN — METHOCARBAMOL 1000 MG: 100 INJECTION, SOLUTION INTRAMUSCULAR; INTRAVENOUS at 11:38

## 2023-09-25 RX ADMIN — GABAPENTIN 600 MG: 250 SUSPENSION ORAL at 06:27

## 2023-09-25 RX ADMIN — HYDROXYZINE HYDROCHLORIDE 50 MG: 10 SOLUTION ORAL at 22:38

## 2023-09-25 RX ADMIN — GABAPENTIN 600 MG: 250 SUSPENSION ORAL at 22:11

## 2023-09-25 RX ADMIN — POTASSIUM & SODIUM PHOSPHATES POWDER PACK 280-160-250 MG 2 PACKET: 280-160-250 PACK at 14:01

## 2023-09-25 RX ADMIN — DEXTROSE MONOHYDRATE 25 ML: 25 INJECTION, SOLUTION INTRAVENOUS at 02:58

## 2023-09-25 RX ADMIN — CEFEPIME HYDROCHLORIDE 2 G: 2 INJECTION, POWDER, FOR SOLUTION INTRAVENOUS at 03:58

## 2023-09-25 RX ADMIN — HEPARIN SODIUM 400 UNITS/HR: 10000 INJECTION, SOLUTION INTRAVENOUS at 18:00

## 2023-09-25 RX ADMIN — POTASSIUM CHLORIDE 40 MEQ: 40 SOLUTION ORAL at 06:00

## 2023-09-25 RX ADMIN — Medication 40 MG: at 20:30

## 2023-09-25 RX ADMIN — DEXTROSE MONOHYDRATE 1000 ML: 100 INJECTION, SOLUTION INTRAVENOUS at 19:00

## 2023-09-25 RX ADMIN — BUSPIRONE HYDROCHLORIDE 10 MG: 10 TABLET ORAL at 20:19

## 2023-09-25 RX ADMIN — DEXMEDETOMIDINE HYDROCHLORIDE 0.5 MCG/KG/HR: 400 INJECTION INTRAVENOUS at 08:28

## 2023-09-25 RX ADMIN — KETAMINE HYDROCHLORIDE 10 MG/HR: 10 INJECTION INTRAMUSCULAR; INTRAVENOUS at 09:10

## 2023-09-25 RX ADMIN — Medication 40 MG: at 08:13

## 2023-09-25 RX ADMIN — POTASSIUM & SODIUM PHOSPHATES POWDER PACK 280-160-250 MG 2 PACKET: 280-160-250 PACK at 10:05

## 2023-09-25 RX ADMIN — SODIUM CHLORIDE, POTASSIUM CHLORIDE, SODIUM LACTATE AND CALCIUM CHLORIDE: 600; 310; 30; 20 INJECTION, SOLUTION INTRAVENOUS at 16:18

## 2023-09-25 RX ADMIN — Medication: at 18:50

## 2023-09-25 RX ADMIN — METHOCARBAMOL 1000 MG: 100 INJECTION, SOLUTION INTRAMUSCULAR; INTRAVENOUS at 03:58

## 2023-09-25 RX ADMIN — Medication 15 ML: at 08:14

## 2023-09-25 RX ADMIN — PIPERACILLIN AND TAZOBACTAM 3.38 G: 3; .375 INJECTION, POWDER, LYOPHILIZED, FOR SOLUTION INTRAVENOUS at 13:45

## 2023-09-25 RX ADMIN — PIPERACILLIN AND TAZOBACTAM 3.38 G: 3; .375 INJECTION, POWDER, LYOPHILIZED, FOR SOLUTION INTRAVENOUS at 17:55

## 2023-09-25 RX ADMIN — HYDROXYZINE HYDROCHLORIDE 50 MG: 10 SOLUTION ORAL at 10:05

## 2023-09-25 RX ADMIN — THIAMINE HYDROCHLORIDE 100 MG: 100 INJECTION, SOLUTION INTRAMUSCULAR; INTRAVENOUS at 11:38

## 2023-09-25 RX ADMIN — POTASSIUM & SODIUM PHOSPHATES POWDER PACK 280-160-250 MG 2 PACKET: 280-160-250 PACK at 05:59

## 2023-09-25 RX ADMIN — METHOCARBAMOL 1000 MG: 100 INJECTION, SOLUTION INTRAMUSCULAR; INTRAVENOUS at 20:30

## 2023-09-25 RX ADMIN — HYDROXYZINE HYDROCHLORIDE 50 MG: 10 SOLUTION ORAL at 15:50

## 2023-09-25 RX ADMIN — PIPERACILLIN AND TAZOBACTAM 3.38 G: 3; .375 INJECTION, POWDER, LYOPHILIZED, FOR SOLUTION INTRAVENOUS at 23:49

## 2023-09-25 RX ADMIN — SODIUM CHLORIDE, SODIUM LACTATE, POTASSIUM CHLORIDE, CALCIUM CHLORIDE AND DEXTROSE MONOHYDRATE: 5; 600; 310; 30; 20 INJECTION, SOLUTION INTRAVENOUS at 09:09

## 2023-09-25 RX ADMIN — CITALOPRAM 40 MG: 10 SOLUTION ORAL at 08:13

## 2023-09-25 RX ADMIN — KETAMINE HYDROCHLORIDE 10 MG/HR: 10 INJECTION INTRAMUSCULAR; INTRAVENOUS at 19:18

## 2023-09-25 RX ADMIN — DEXTROSE MONOHYDRATE 1000 ML: 100 INJECTION, SOLUTION INTRAVENOUS at 13:59

## 2023-09-25 RX ADMIN — ACETAMINOPHEN 975 MG: 325 SOLUTION ORAL at 08:13

## 2023-09-25 RX ADMIN — Medication 3 MG: at 23:49

## 2023-09-25 ASSESSMENT — ACTIVITIES OF DAILY LIVING (ADL)
ADLS_ACUITY_SCORE: 22
ADLS_ACUITY_SCORE: 22
DEPENDENT_IADLS:: INDEPENDENT
ADLS_ACUITY_SCORE: 26
ADLS_ACUITY_SCORE: 26
ADLS_ACUITY_SCORE: 23
ADLS_ACUITY_SCORE: 26
ADLS_ACUITY_SCORE: 22

## 2023-09-25 NOTE — PROGRESS NOTES
Pancreatitis Service - Daily Progress Note  09/25/2023    Assessment & Plan: Artie Burger is a 27 year old with PMH significant for recurrent acute on chronic pancreatitis 2/2 alcoholic pancreatitis. She is now s/p status post total pancreatectomy, islet cell autotransplant, splenectomy, and gastrojejunostomy tube placement on 9/22/23 with Dr. Junito Patel MD.        Cardiorespiratory:   Hypotensive: SBP improved, 100-120, Stable   Hypoxia: Weaned downt to RA, encourage IS/Pulm toilet.  GI/Nutrition:   S/p TPAIT:3 NPO, G/J tube in place. Her g tube is currently draining (675cc/24hr) and her j tube is running Vital 1.5 +relizorb tube feeds at 20 cc/h (goal 50) . Plan to advance by 10 ml/hr q 12 hours to goal rate of 50 ml/hr.   Continue bowel regimen senna/colace  Fluid/Electrolytes:   Hypervolemia:Weight up, will obtain a standing weight. D5LR @100-decrease to 50. Plan to take out dextrose with EN advancement.  Electrolyte replacements per ICU  : Padron removed POD #1, voiding.  Post-pancreatectomy diabetes: Insulin gtt, appreciate Endocrine consult.  Infection: Afebrile   Leukocytosis, peaked at 23, trending down to 15 today  Panc preservation/islets : Enterobacter clocae, E. Faecium. Started on Meropenem, Transitioned to Cefepime and daptomycin.  With sensitivities, will transition to Zosyn   Prophylaxis: PPI, Anticoagulation: Hep 400units/hr  Pain control: Controlled.   Epidural-bolus BID PRN  Neurontin 600mg Q8  Ketamine 10  Precedex 0.8-stopped at 0800  Dilaudid PCA 0.2 Q10, continuous 0.2  Robaxin 1G IV Q8  Atarax 25-50 PRN     Anxiety: PTA buspar-restart today via PO.    Nicotine dependence/vaping: will attempt to manage anxiety. Avoid nicotine patches at this time.   Activity: OOB to chair. Ambulate with assist. PT consulted.   Anticipated LOS/Discharge: SICU-transfer to , 7-10 days    Medical Decision Making: Medium  Subsequent visit 12285 (moderate level decision making)    JOSEFA/Fellow/Resident  "Provider: Therese Argueta NP     Faculty: Junito Patel MD    __________________________________________________________________  Transplant History: Admitted 9/22/2023 for Chronic pancreatitis (H) [K86.1]     9/22/2023 (Islet), Postoperative day: 3     Interval History: History is obtained from the patient  Overnight events: Up to chair. Reporting pain 7/10 but appears comfortable. Reporting anxiety. Hx of vaping. Not yet restarted anti-anxiety medications.     ROS:   A 10-point review of systems was negative except as noted above.    Curent Meds:   acetaminophen  975 mg Oral or Feeding Tube Q8H    ceFEPIme  2 g Intravenous Q12H    citalopram  40 mg Oral or Feeding Tube Daily    DAPTOmycin (CUBICIN) 400 mg in sodium chloride 0.9 % 100 mL intermittent infusion  6 mg/kg Intravenous Daily at 4 pm    sennosides  5-10 mL Oral or Feeding Tube BID    And    docusate   mg Oral or Feeding Tube BID    gabapentin  600 mg Oral or J tube Q8H LELO    methocarbamol  1,000 mg Intravenous Q8H    multivitamins w/minerals  15 mL Oral or Feeding Tube Daily    pantoprazole  40 mg Oral or J tube BID    polyethylene glycol  17 g Oral or Feeding Tube Daily    potassium & sodium phosphates  2 packet Oral or Feeding Tube Q4H    sodium chloride (PF)  3 mL Intracatheter Q8H       Physical Exam:     Admit Weight: 58.3 kg (128 lb 8.5 oz)    Current Vitals:   BP (!) 107/97 (BP Location: Left arm, Cuff Size: Adult Regular)   Pulse 72   Temp 97.8  F (36.6  C) (Axillary)   Resp 23   Ht 1.575 m (5' 2\")   Wt 69.3 kg (152 lb 12.5 oz)   LMP 08/16/2023 (Approximate)   SpO2 98%   BMI 27.94 kg/m           Vital sign ranges:    Temp:  [97.8  F (36.6  C)-98.8  F (37.1  C)] 97.8  F (36.6  C)  Pulse:  [60-88] 72  Resp:  [12-34] 23  BP: ()/() 107/97  SpO2:  [90 %-100 %] 98 %  Patient Vitals for the past 24 hrs:   BP Temp Temp src Pulse Resp SpO2 Weight   09/25/23 0800 (!) 107/97 97.8  F (36.6  C) Axillary 72 23 98 % -- "   09/25/23 0700 (!) 121/92 -- -- 73 28 97 % --   09/25/23 0645 110/85 -- -- 75 21 91 % --   09/25/23 0630 (!) 145/122 -- -- 74 27 97 % --   09/25/23 0615 (!) 123/91 -- -- 72 23 98 % --   09/25/23 0600 97/61 -- -- 68 20 93 % --   09/25/23 0500 (!) 129/90 -- -- 70 29 97 % --   09/25/23 0445 123/89 -- -- 64 18 97 % --   09/25/23 0430 (!) 116/91 -- -- 68 26 94 % --   09/25/23 0415 (!) 127/95 -- -- 66 27 96 % --   09/25/23 0400 125/86 98.8  F (37.1  C) Oral 63 21 99 % 69.3 kg (152 lb 12.5 oz)   09/25/23 0345 (!) 119/97 -- -- 69 25 98 % --   09/25/23 0330 (!) 137/101 -- -- 78 24 97 % --   09/25/23 0315 (!) 131/91 -- -- 65 25 96 % --   09/25/23 0300 122/88 -- -- 68 24 91 % --   09/25/23 0245 (!) 110/94 -- -- 69 22 93 % --   09/25/23 0230 116/82 -- -- 70 (!) 32 90 % --   09/25/23 0215 109/75 -- -- 65 25 94 % --   09/25/23 0200 108/58 -- -- 65 18 92 % --   09/25/23 0145 105/76 -- -- 60 17 93 % --   09/25/23 0130 107/79 -- -- 70 26 92 % --   09/25/23 0115 119/88 -- -- 69 26 93 % --   09/25/23 0100 -- -- -- 74 23 94 % --   09/25/23 0045 -- -- -- 78 28 94 % --   09/25/23 0030 -- -- -- 75 28 92 % --   09/25/23 0015 -- -- -- 69 23 93 % --   09/25/23 0000 -- 98.7  F (37.1  C) Oral 76 27 97 % --   09/24/23 2345 -- -- -- 76 28 92 % --   09/24/23 2330 -- -- -- 76 19 96 % --   09/24/23 2315 -- -- -- 73 26 97 % --   09/24/23 2300 -- -- -- 66 28 95 % --   09/24/23 2253 -- -- -- 68 28 96 % --   09/24/23 2245 -- -- -- 70 21 96 % --   09/24/23 2230 -- -- -- 76 22 94 % --   09/24/23 2215 -- -- -- 71 23 96 % --   09/24/23 2200 -- -- -- 82 (!) 33 96 % --   09/24/23 2145 -- -- -- 75 21 100 % --   09/24/23 2130 -- -- -- 73 21 97 % --   09/24/23 2115 -- -- -- 88 (!) 32 100 % --   09/24/23 2100 -- -- -- 87 (!) 32 96 % --   09/24/23 2045 124/86 -- -- 70 (!) 31 98 % --   09/24/23 2030 105/75 -- -- 67 13 99 % --   09/24/23 2015 106/85 -- -- 65 26 98 % --   09/24/23 2000 116/80 98.4  F (36.9  C) Oral 71 12 98 % --   09/24/23 1945 122/84 -- -- 64 26  100 % --   09/24/23 1930 108/77 -- -- 71 20 94 % --   09/24/23 1915 (!) 105/90 -- -- 70 (!) 31 93 % --   09/24/23 1900 111/85 -- -- 71 20 96 % --   09/24/23 1800 -- -- -- 84 26 96 % --   09/24/23 1700 119/86 -- -- 79 18 97 % --   09/24/23 1600 106/76 98.5  F (36.9  C) Axillary 72 21 96 % --   09/24/23 1500 (!) 125/108 -- -- 81 (!) 34 95 % --   09/24/23 1400 109/74 -- -- 74 23 98 % --   09/24/23 1300 99/73 -- -- 82 26 99 % --   09/24/23 1200 104/73 98.3  F (36.8  C) Oral 75 21 92 % --   09/24/23 1100 94/62 -- -- 77 30 94 % --   09/24/23 1000 91/74 -- -- 74 20 95 % --   09/24/23 0900 98/69 -- -- 70 19 97 % --     General Appearance: in NAD, sitting comfortably in chair.   Skin: normal, warm, dry, no suspicious lesions or rashes  Heart: regular rate and rhythm  Lungs:   NLB on RA  Abdomen: The abdomen is soft, ND, and  mildly tender. The wound is well approximated and without signs of infection. Tube/Drain sites are: CDI.  NINFA: serosanguinous, small G/Jtube. Jtube infusing EN, Gtube with small amount of pale yellow output.  :voiding  Extremities: edema: present bilaterally. trace    Data:   CMP  Recent Labs   Lab 09/25/23  0808 09/25/23  0702 09/25/23  0456 09/25/23  0428 09/24/23  1319 09/24/23  1238 09/24/23  0318 09/24/23  0307 09/23/23  0309 09/23/23  0304 09/22/23  2134 09/22/23  2031 09/22/23  2026 09/22/23  1929 09/22/23  1914   NA  --   --   --  140  --   --   --  141  --  142  --  140   < > 140 140   POTASSIUM  --   --   --  3.4  --  3.6  --  3.0*   < > 3.3*  --  4.4   < > 3.9 3.8   CHLORIDE  --   --   --  111*  --   --   --  108*  --  110*  --  108*   < >  --   --    CO2  --   --   --  22  --   --   --  23  --  22  --  19*   < >  --   --    * 116*   < > 142*   < >  --    < > 52*   < > 77   < > 149*   < > 96 93   BUN  --   --   --  4.9*  --   --   --  5.0*  --  12.6  --  13.5   < >  --   --    CR  --   --   --  0.65  --   --   --  0.61  --  0.85  --  0.95   < >  --   --    GFRESTIMATED  --   --   --   >90  --   --   --  >90  --  >90  --  83   < >  --   --    MURTAZA  --   --   --  7.7*  --   --   --  7.6*  --  8.7  --  8.7   < >  --   --    ICAW  --   --   --   --   --   --   --   --   --   --   --   --   --  4.7 4.9   MAG  --   --   --  1.6*  --   --   --  2.0   < >  --   --   --   --   --   --    PHOS  --   --   --  1.6*  --   --   --  1.7*  --   --   --   --   --   --   --    AMYLASE  --   --   --   --   --   --   --   --   --  25*  --   --   --   --   --    LIPASE  --   --   --   --   --   --   --   --   --  12*  --   --   --   --   --    ALBUMIN  --   --   --   --   --   --   --   --   --  3.3*  --  3.3*  --   --   --    BILITOTAL  --   --   --   --   --   --   --   --   --  0.4  --  1.2  --   --   --    ALKPHOS  --   --   --   --   --   --   --   --   --  32*  --  35  --   --   --    AST  --   --   --   --   --   --   --   --   --  63*  --  63*  --   --   --    ALT  --   --   --   --   --   --   --   --   --  38  --  41  --   --   --     < > = values in this interval not displayed.     CBC  Recent Labs   Lab 09/25/23  0014 09/24/23 0307 09/23/23 0304 09/22/23  0655   HGB 8.7*  8.7* 10.0*   < >  --    WBC 14.7*  14.7* 17.6*   < >  --      350 322   < >  --    A1C  --   --   --  5.5    < > = values in this interval not displayed.     Coags  Recent Labs   Lab 09/25/23  0428 09/24/23  0307 09/23/23  0304 09/22/23  2031 09/21/23  1609   INR  --   --   --  1.34* 1.13   PTT 31 33   < > 110*  --     < > = values in this interval not displayed.      Urinalysis  Recent Labs   Lab Test 09/21/23  1614   COLOR Yellow   APPEARANCE Slightly Cloudy*   URINEGLC Negative   URINEBILI Negative   URINEKETONE Negative   SG 1.021   UBLD Negative   URINEPH 7.0   PROTEIN 10*   NITRITE Negative   LEUKEST Negative   RBCU 2   WBCU <1

## 2023-09-25 NOTE — PROGRESS NOTES
N: AOX4, call light appropriate. Neuro intact. Moves all extremities with strength. Denies numbness/tingling. Pain management: scheduled tylenol, gabapentin, & robaxin, dilaudid gtt and PCA, ketamine gtt, Ropivacaine X2. Anxiety management: PRN atarax given, discussed restarting Buspar (home med). Off Precedex gtt. Pt also noted to be vaping this AM - SICU and Transplant notified. Discussed with teams not sleeping at night.   C: NSR, no ectopy noted. MAP > 65 without intervention. Pulses palpable. Afebrile.   R:RA. Lungs clear. Pulmonary hygiene encouraged.   GI: NPO, ice chips. J tube with TF @ 20 + Relizorb, advance q24 hrs, standard FWF. G clamped since 1000, no N/V/ bloating. Loose stools.   : Voiding per urinal. K recheck WDL. Phos being replaced, recheck scheduled.   SKIN: Incision WNL. NINFA with minimal output.   ACCESS: [facility], PIV X2  GTTS: D5 LR @ 50, Heparin @ 400, ketamine, dilaudid, insulin (alog 2)  ACTIVITY: SBA to commode/chair, ambulated with PT in hallway   SOCIAL: no visitors present, personal phone a bedside    PLAN: Transferred to  with belongings including: personal bags X2, purse, headphones, cellphone, chargers. Security refused to take vape pen as it contains liquid - 7A charge and accepting RN aware that it is being sent with chart to bedside.

## 2023-09-25 NOTE — CONSULTS
Care Management Initial Consult    General Information  Assessment completed with:  ,         Primary Care Provider verified and updated as needed:   Yes [facility]  Readmission within the last 30 days:   No        Advance Care Planning:    No        Communication Assessment  Patient's communication style: spoken language (English or Bilingual)    Hearing Difficulty or Deaf: (P) no   Wear Glasses or Blind: yes    Cognitive  Cognitive/Neuro/Behavioral: WDL  Level of Consciousness: alert  Arousal Level: opens eyes spontaneously  Orientation: oriented x 4  Mood/Behavior: calm, cooperative  Best Language: 0 - No aphasia  Speech: clear, spontaneous, logical    Living Environment:   People in home:     Lives alone  Current living Arrangements:    Apartment    Able to return to prior arrangements:  Patient will be staying locally for 6-8 weeks post hospitalization with her sister Marisa Carlson       Family/Social Support:  Care provided by: parent(s), other (see comments)  Provides care for:  No one                Description of Support System:    Involved, supportive       Current Resources:   Patient receiving home care services:  No     Community Resources:    Equipment currently used at home: none  Supplies currently used at home:  None    Employment/Financial:  Employment Status:     Unemployed     Financial Concerns:  Pt concerned as she is recently unemployed.  She has not applied for unemployment yet or contacted her county for additional resources.            Does the patient's insurance plan have a 3 day qualifying hospital stay waiver?  No    Lifestyle & Psychosocial Needs:  Social Determinants of Health     Food Insecurity: Not on file   Depression: At risk (4/4/2023)    PHQ-2     PHQ-2 Score: 3   Housing Stability: Not on file   Tobacco Use: High Risk (9/25/2023)    Patient History     Smoking Tobacco Use: Former     Smokeless Tobacco Use: Current     Passive Exposure: Not on file    Financial Resource Strain: Not on file   Alcohol Use: Not on file   Transportation Needs: Not on file   Physical Activity: Not on file   Interpersonal Safety: Not on file   Stress: Not on file   Social Connections: Not on file       Functional Status:  Prior to admission patient needed assistance: Independent             Mental Health Status: Not addressed as patient noted she needed to utilize the restroom and was uncomfortable.  Per chart pt has anxiety and depression.  SW pre transplant assessment notes pt has been seeing a therapist.          Chemical Dependency Status: Past history of ETOH abuse                Values/Beliefs:  Spiritual, Cultural Beliefs, Islam Practices, Values that affect care:                 Additional Information:  Patient s/p TPAIT.  Transferred to  for on going medical management.  [facility] for discharge planning.    Met with pt.  Introduced RNCC role.  Briefly discussed home infusion, PLC.  Pt confirmed she plans to stay locally with her sister Marisa:    [address]   Amanda JEROME     Pt notes being uncomfortable and needing to utilize the restroom.  Claudia RN at bedside.  Agreed to follow up with patient.      PLC requested - pt would like her sister Sandra to participate in class.    Spoke with RACHEL Levy.  Pt has 100% coverage for enteral feeds.  [facility] requesting benefit check to confirm Relizorb is covered.          Home infusion:  [facility]  Phone # [phone]  Fax # [phone]   Enteral feeds    Local Address  [address]   Amanda Carmichael, RN BSN, PHN, ACM-RN  7A RN Care Coordinator  Phone: [phone]  Pager [phone]    To contact the weekend RNCC  [place] (0800 [phone] 1630) Saturday and Sunday    Units: 5A,5B,5C [phone]    Units: 6A, [phone]    Units 6B, 6C, 6D [phone]    Units: 7A, 7B, 7C, 7D, [phone]    [facility] ([phone]) Saturday and Sunday    Units: 5 Ortho, 8A, 10 ICU, & Pediatric  Units-Pager 4: [phone]    9/25/2023 3:21 PM

## 2023-09-25 NOTE — PROGRESS NOTES
SURGICAL ICU PROGRESS NOTE  09/25/2023    PRIMARY TEAM: Transplant  PRIMARY PHYSICIAN: Junito Patel MD  REASON FOR CRITICAL CARE ADMISSION: Post operative Cares  ADMITTING PHYSICIAN: Dr. Ortiz    ASSESSMENT: Artie Burger is a 27 year old female who was admitted on 9/22/2023 s/p TPAIT with Dr Patel for chronic calcific pancreatitis. 300cc EBL intra-op, received 1u pRBCs intra-op. Past medical notable for MDD with panic attacks, prior hx abuse of alcohol, insomnia, Iron deficiency anemia and chronic abdominal pain on dilaudid.      Interval Events:   - Did not sleep well overnight due to anxiety and pain    PLAN:  Changes Today:  - Precedex gtt off this AM  - Will discuss restarting Buspar with Transplant    Neurological:  # Acute post operative pain   # Chronic pain on oral dilaudid ( PTA taking 4x per day)   # MDD with panic attacks   - Monitor neurological status.  - PTA medications:  Celexa 40mg daily, Dilaudid 4mg QID, Buspar 10 mg BID, Gabapentin 600mg TID,  and vistaril 25mg daily   - Currently holding buspirone (no compatible formula for jejunostomy), continue citalopram  - Pain:   - Bilateral Paravertebral catheters placed 9/22/23   - dilaudid PCA 0.2mg q10min  - tylenol 1g q8, toradol 10mg q6hrs  - Ketamine gtt - 10 mg/hr  - Precedex gtt - 1mcg/kg/hr try to wean as possible  - Increase gabapentin to 900mg TID  - Robaxin scheduled per transplant recs  - Sedation plan: Precedex gtt, off 9/25 AM   dextrose 65.4 mL/hr at 09/24/23 1800    dextrose 65.4 mL/hr at 09/24/23 1952    dextrose 5% lactated ringers 100 mL/hr at 09/25/23 0909    HEParin 400 Units/hr (09/25/23 0800)    HYDROmorphone      insulin (regular) 1 Units/hr (09/25/23 0908)    ketamine 2 mg/mL ADULT 10 mg/hr (09/25/23 0910)    ROPivacaine 6 mL/hr at 09/24/23 1534    ROPivacaine 6 mL/hr at 09/24/23 1533       Pulmonary:   Vital Signs: Most Recent  Ranges (24 hours)   Resp: 22  SpO2: 92 % Resp  Min: 12  Max: 34  SpO2   Min: 90 %  Max: 100 %   O2 Device:   (RA)    # cannabis use, current vaping, prior hx of tobacco use    - Extubated post-op, on RA  - Supplemental oxygen to keep saturation above 92 %.  - Incentive spirometer every 15- 30 minutes.   - No current interventions required    Cardiovascular:  Vital Signs: Most Recent  Ranges (24 hours)   Pulse: 81  BP: 107/79       Pulse  Min: 60  Max: 88  BP  Min: 94/62  Max: 145/122  No data recorded  No data recorded   # Shock-distributive vs hypovolemic, resolved   - Continue to monitor hemodynamic status.   - Off of pressors, maintaining BPs  - No need to trend Lactates    Recent Labs   Lab 09/22/23  1929 09/22/23  1914 09/22/23  1856 09/22/23  1838   LACT 2.6* 2.7* 3.1* 2.5*      dextrose 65.4 mL/hr at 09/24/23 1800    dextrose 65.4 mL/hr at 09/24/23 1952    dextrose 5% lactated ringers 100 mL/hr at 09/25/23 0909    HEParin 400 Units/hr (09/25/23 0800)    HYDROmorphone      insulin (regular) 1 Units/hr (09/25/23 0908)    ketamine 2 mg/mL ADULT 10 mg/hr (09/25/23 0910)    ROPivacaine 6 mL/hr at 09/24/23 1534    ROPivacaine 6 mL/hr at 09/24/23 1533       Gastroenterology/Nutrition:  Recent Labs   Lab 09/23/23  0304 09/22/23  2031 09/21/23  1609   AST 63* 63*  --    ALT 38 41  --    ALKPHOS 32* 35  --    BILITOTAL 0.4 1.2  --    ALBUMIN 3.3* 3.3*  --    INR  --  1.34* 1.13       # GERD  # Chronic calcific pancreatitis now s/p TPIAT   # NPO  - G-J tube in LUQ   - drain management per Transplant surgery team - out 58 ml yesterday  - ppx: pantoprazole  - bowel regimen: senna, docusate, miralax - 1 BM yesterday    Fluids/Electrolytes/Renal:   I/O last 3 completed shifts:  In: 4201.49 [I.V.:3709.49; NG/GT:362]  Out: 874.5 [Emesis/NG output:850; Drains:24.5]   Yesterday: 3773 in / 3080 out  This AM: 1708 in / 1560 out  UOP: 1.14 ml/kg/hr (avg over last shift)  Recent Labs   Lab 09/25/23  0428 09/24/23  1238 09/24/23  0307 09/23/23  1957 09/23/23  1010 09/23/23  0304 09/22/23 2031  09/22/23 2031 09/22/23 1929 09/22/23 1914     --  141  --   --  142  --  140 140 140   POTASSIUM 3.4 3.6 3.0*  --  3.5 3.3*   < > 4.4 3.9 3.8   CHLORIDE 111*  --  108*  --   --  110*  --  108*  --   --    MAG 1.6*  --  2.0   < >  --   --   --   --   --   --    PHOS 1.6*  --  1.7*  --   --   --   --   --   --   --    MURTAZA 7.7*  --  7.6*  --   --  8.7   < > 8.7  --   --    ICAW  --   --   --   --   --   --   --   --  4.7 4.9   CO2 22 --  23  --   --  22  --  19*  --   --    BUN 4.9*  --  5.0*  --   --  12.6  --  13.5  --   --    CR 0.65  --  0.61  --   --  0.85  --  0.95  --   --     < > = values in this interval not displayed.       # Hypokalemia  - Potassium, 3.4 this am, up from 3.0 yesterday  - Repletion protocol in place    # Hypomagnesemia, resolved  - Mg 1.6 overnight, repletion protocol in place    # Hypophosphatemia  - Phosphorus 1.6 this am, repletion protocol in place    # Hypovolaemia  - D5 LR @100cc/hr   - electrolyte replacement protocol Mg, Phos, K       dextrose Last Rate: 65.4 mL/hr at 09/24/23 1800    dextrose Last Rate: 65.4 mL/hr at 09/24/23 1952    dextrose 5% lactated ringers Last Rate: 100 mL/hr at 09/25/23 0909    HEParin Last Rate: 400 Units/hr (09/25/23 0800)    HYDROmorphone    insulin (regular) Last Rate: 1 Units/hr (09/25/23 0908)    ketamine 2 mg/mL ADULT Last Rate: 10 mg/hr (09/25/23 0910)    ROPivacaine Last Rate: 6 mL/hr at 09/24/23 1534    ROPivacaine Last Rate: 6 mL/hr at 09/24/23 1533    # Acute kidney injury, Resolved    - resolved: Cr 0.65 stable    Endocrine:  Recent Labs   Lab 09/25/23  0908 09/25/23  0808 09/25/23  0702 09/25/23  0554 09/25/23  0456 09/25/23  0428 09/25/23  0349 09/25/23  0322 09/25/23  0249 09/25/23  0228 09/25/23  0206 09/25/23  0115   * 115* 116* 97 118* 142* 148* 151* 69* 71 75 139*     Hemoglobin A1C   Date Value Ref Range Status   09/22/2023 5.5 <5.7 % Final     Comment:     Normal <5.7%   Prediabetes 5.7-6.4%    Diabetes 6.5% or higher      Note: Adopted from ADA consensus guidelines.     - Glucose Ranges: /24h. Goal glucose range of 140-180 for wound healing.  - Insulin Plan: insulin gtt    # Concern for hyperglycemia due to stress and critical illness   # S/P TPAIT  - insulin gtt per TPAIT protocol                - do not stop insulin gtt, dextrose infusion to maintain glucose if needed   - Endocrine - DM to manage transition to subcutaneous insulin when appropriate     ID:  Vital Signs: Most Recent  Ranges (24 hours)   Temp: 97.8  F (36.6  C) Temp  Min: 97.8  F (36.6  C)  Max: 98.8  F (37.1  C)     Recent Labs   Lab 09/25/23  0014 09/24/23  0307 09/23/23  0304 09/22/23 2031   WBC 14.7*  14.7* 17.6* 23.7* 22.6*       # Leukocytosis, improving   # Bacteremia, GNR not yet speciated  # Positive islet cultures  - WBC downtrending from 23.7 to 17.6 to 14.7 (9/25)  - On cefepime and daptomycin. Enterobacter cloacae and E faecalis both susceptible to Zosyn, will discuss with Transplant  - afebrile overnight    Cultures:  - 9/23 islets: Gram positive cocci in pairs and chains, gram negative rods  - 9/23 blood: gram negative rods  - 9/24: blood: Enterobacter cloacae, Enterococcus faecium  Lab Results   Component Value Date/Time    CULTURE Positive on the 1st day of incubation (A) 09/22/2023 05:36 PM    CULTURE Enterobacter cloacae complex (AA) 09/22/2023 05:36 PM    LABFRAM No organisms seen 09/22/2023 05:36 PM    LABFRAM 1+ WBC seen 09/22/2023 05:36 PM        Heme:  Recent Labs   Lab 09/25/23  0014 09/24/23  0307 09/23/23  1010 09/23/23  0304 09/22/23 2031   HGB 8.7*  8.7* 10.0* 10.2* 10.1* 11.2*     350 322  --  345 346   FIBR  --   --   --   --  313     # Acute blood loss anemia due to surgical blood loss, stable  # Coagulopathy  # Iron deficiency anemia   - EBL: 300cc   - Transfuse if hgb <7.0 or signs/symptoms of hypoperfusion. Monitor and trend.   - heparin gtt per TPAIT protocol - 400 units per hour  - Hgb 8.7 (9/25)  stable    MSK:  # hx of osteopenia   # deconditioning   - Physical and occupational therapy consult for POD3    General Cares/Prophylaxis:    DVT Prophylaxis: Heparin GTT  GI Prophylaxis: PPI  Restraints: Restraints for medical healing needed: NO  LDA:  - Right internal jugular   - Bilateral ES catheters   - 2 PIV  - GJ tube  - Drain left abdomen    Disposition:  - SICU.   - Barriers to transfer out of ICU: none, Tx to 7A    Patient seen and discussed with staff. Dr Laron Das MD       ====================================  SUBJECTIVE: Overnight patient increasing pain and anxiousness. Precedex gtt at 0.8, off later in the morning. Ambulating to bathroom but limited by pain. 1x BM.    OBJECTIVE:   Temp:  [97.8  F (36.6  C)-98.8  F (37.1  C)] 97.8  F (36.6  C)  Pulse:  [60-88] 81  Resp:  [12-34] 22  BP: ()/() 107/79  SpO2:  [90 %-100 %] 92 %  Resp: 22      Physical Exam:  General: Alert, appropriate, NAD  HEENT:NC/AT  Neuro: A&Ox3, moving all extremities independently  Pulm/Resp: CTAB, comfortable on RA  CV: RRR, distal pulses intact  Abdomen: soft, wound clean, g/j tube in place  Incisions/Skin: Warm, dry  MSK/Extremities: No peripheral edema, extremities well perfused    LABS:   Arterial Blood Gases   Recent Labs   Lab 09/22/23  1929 09/22/23  1914 09/22/23  1856 09/22/23  1838   PH 7.39 7.34* 7.37 7.37   PCO2 33* 40 36 37   PO2 154* 120* 147* 146*   HCO3 20* 22 21 21     Complete Blood Count   Recent Labs   Lab 09/25/23  0014 09/24/23  0307 09/23/23  1010 09/23/23  0304 09/22/23 2031   WBC 14.7*  14.7* 17.6*  --  23.7* 22.6*   HGB 8.7*  8.7* 10.0* 10.2* 10.1* 11.2*     350 322  --  345 346     Renal Panel  Recent Labs   Lab 09/25/23  0428 09/24/23  1238 09/24/23  0307 09/23/23  1010 09/23/23  0304 09/22/23 2031     --  141  --  142 140   POTASSIUM 3.4 3.6 3.0* 3.5 3.3* 4.4   CHLORIDE 111*  --  108*  --  110* 108*   CO2 22  --  23  --  22 19*   BUN 4.9*  --  5.0*  --   12.6 13.5   CR 0.65  --  0.61  --  0.85 0.95     Glucose  Recent Labs   Lab 09/25/23  0908 09/25/23  0808 09/25/23  0702 09/25/23  0554 09/25/23  0456 09/25/23  0428   * 115* 116* 97 118* 142*     Liver Function Tests  Recent Labs   Lab 09/23/23  0304 09/22/23 2031 09/21/23  1609   AST 63* 63*  --    ALT 38 41  --    ALKPHOS 32* 35  --    BILITOTAL 0.4 1.2  --    ALBUMIN 3.3* 3.3*  --    INR  --  1.34* 1.13     Pancreatic Enzymes  Recent Labs   Lab 09/23/23  0304   LIPASE 12*   AMYLASE 25*     Coagulation Profile  Recent Labs   Lab 09/25/23  0428 09/24/23  0307 09/23/23  0304 09/22/23 2031 09/21/23  1609   INR  --   --   --  1.34* 1.13   PTT 31 33 32 110*  --      Cultures  Recent Labs   Lab 09/22/23  1736 09/22/23  1301   CULTURE Positive on the 1st day of incubation*  Enterobacter cloacae complex* Positive on the 1st day of incubation*  Enterobacter cloacae complex*  Enterococcus faecium*     Lab Results   Component Value Date/Time    CULTURE Positive on the 1st day of incubation (A) 09/22/2023 05:36 PM    CULTURE Enterobacter cloacae complex (AA) 09/22/2023 05:36 PM    CULTURE Positive on the 1st day of incubation (A) 09/22/2023 01:01 PM    CULTURE Enterobacter cloacae complex (AA) 09/22/2023 01:01 PM    CULTURE Enterococcus faecium (AA) 09/22/2023 01:01 PM    LABFRAM No organisms seen 09/22/2023 05:36 PM    LABFRAM 1+ WBC seen 09/22/2023 05:36 PM    LABFRAM No organisms seen 09/22/2023 01:01 PM    LABFRAM 1+ WBC seen 09/22/2023 01:01 PM         IMAGING:   No results found for this or any previous visit (from the past 24 hour(s)).

## 2023-09-25 NOTE — PLAN OF CARE
"BP (!) 126/90 (BP Location: Right arm)   Pulse 88   Temp 98.8  F (37.1  C) (Oral)   Resp 20   Ht 1.575 m (5' 2\")   Wt 69.3 kg (152 lb 12.5 oz)   LMP 08/16/2023 (Approximate)   SpO2 92%   BMI 27.94 kg/m      Shift: 5253-9106. Transferred to unit from  at 1300  Isolation Status: None  VS: VSS on RA, afebrile, Con't pulse ox  Neuro: Aox4  Behaviors: Calm, pleasant, anxious  BG: Q1H, Alg 1  Labs: K, Mg, Phos replaced today  Respiratory: WDL  Cardiac: WDL  Pain/Nausea: Denies nausea. C/o pain  PRN: None this shift  Diet: NPO + ice chips.   IV Access: L and R PIV. R IJ  Infusion(s): D5LR @50ml/hr, Hep gtt at 400u/hr, Ketamine @10mg/hr, Dilaudid PCA  Lines/Drains: G/J tube. G tube clamped. J tube to TF at 20ml/hr. To increase rate by 10ml/hr q12h. Jpx1, minimal output  GI/: Voiding without difficulty. BM x1 this shift  Skin: Midline incision BLZAE.  Mobility: SBA to commode  Events/Education: n/a  Plan: Continue with plan of care    "

## 2023-09-25 NOTE — PHARMACY-ADMISSION MEDICATION HISTORY
Pharmacist Admission Medication History    Admission medication history is complete. The information provided in this note is only as accurate as the sources available at the time of the update.    Medication reconciliation/reorder completed by provider prior to medication history? Yes    Information Source(s): Patient via in-person    Pertinent Information: Of note, patient does fill buspirone 90 tablets for 30 day supply however, she did confirm she takes two tablets daily and the rest she has just incase she wanted to increase dose. She also stated that the creon she didn't not take on a regular schedule although she was directed to. Her last Depo-provera injection was on 9/15/2023.     Changes made to PTA medication list:  Added:   Docusate-Senna two tablets BID  Deleted: None  Changed:   Hydroxyzine 25 mg daily to 25-50 mg at bedtime prn      Allergies reviewed with patient and updates made in EHR: no    Medication History Completed By: LUIS CARLOS MASON [facility] 9/25/2023 5:25 PM    Prior to Admission medications    Medication Sig Last Dose Taking? Auth Provider Long Term End Date   acetaminophen (TYLENOL) 500 MG tablet Take 500-1,000 mg by mouth every 6 hours as needed for mild pain 9/21/2023 at 1930 Yes Reported, Patient     busPIRone (BUSPAR) 10 MG tablet Take 10 mg by mouth 2 times daily 9/21/2023 at 0900 Yes Reported, Patient Yes    cetirizine (ZYRTEC) 10 MG tablet Take 10 mg by mouth daily as needed Past Month Yes Reported, Patient     citalopram (CELEXA) 40 MG tablet Take 1 tablet by mouth daily 9/21/2023 at 0900 Yes Reported, Patient Yes    famotidine (PEPCID) 20 MG tablet Take 20 mg by mouth daily as needed Past Month Yes Reported, Patient     gabapentin (NEURONTIN) 300 MG capsule Take 2 capsules (600 mg) by mouth 3 times daily 9/21/2023 at 1800 Yes [place] Yes    medroxyPROGESTERone (DEPO-PROVERA) 150 MG/ML IM injection Inject 150 mg into the muscle every 3 months Last injection was around  September 15th 9/15/2023 Yes Reported, Patient Yes    Multiple Vitamin (TAB-A-FABI) TABS Take 1 tablet by mouth daily More than a month Yes Reported, Patient     senna-docusate (SENOKOT-S/PERICOLACE) 8.6-50 MG tablet Take 2 tablets by mouth 2 times daily  Yes Unknown, Entered By History     HYDROmorphone (DILAUDID) 4 MG tablet Take 4 mg by mouth every 8 hours as needed for pain   Reported, Patient     hydrOXYzine (ATARAX) 25 MG tablet Take 25-50 mg by mouth nightly as needed for other (Insomnia) 9/20/2023 at 2200  Reported, Patient     ondansetron (ZOFRAN-ODT) 4 MG ODT tab Place 4-8 mg under the tongue every 6 hours as needed 9/19/2023  Reported, Patient     pancrelipase, lip-prot-amyl, 3000 UNITS (CREON) CPEP Take 2 capsules by mouth 3 times daily (with meals) 9/19/2023  Reported, Patient

## 2023-09-25 NOTE — PLAN OF CARE
Major Shift Events: A/Ox 4 SBA to commode, pt experiencing visual hallucinations. Pt restless and unable to sleep. Atarax given x1. PERRLA. On 0.2 of continuous dilauded, and PCA dilauded. 10 ketamine, 0.8 of dex. 2 L NC. Lung sounds clear. HR 70s to 80s, no ectopy noted. NPO except ice chips. G tube to gravity, J tube w/ tube feeds at 10ml/hr. NINFA w/ minimual output, dressing w/ minimal drainage. 25ml of D50 given x1. D10 restarted twice, see MAR for details.     Drips: 100ml/hr 5% dextrose in LR, 0.2 dilauded (and PCA dilauded), 0.8 dex, 6 and 6 of ropivacaine epidurals, 400 heparin, 10 ketamine, and currently on 0.5 insulin, algorithm1.     Plan: Continue auto islet algorithm and plan for potential transfer if appropriate. Continue goals of care.     For vital signs and complete assessments, please see documentation flowsheets.

## 2023-09-25 NOTE — PROGRESS NOTES
CLINICAL NUTRITION SERVICES - BRIEF NOTE   (See RD note on 9/23 for full assessment)     Reason for RD note: Anticipated shortage of Vital 1.5 formula.    New Findings/Chart Review:  Nutrition support: TF @ 10 mL/hr. Advance to 20 mL/hr per surgery team.     Labs: K+ 3.4 (L), Mg++ 1.6 (L), Phos 1.6 (L) - on replacement.    Interventions:  Ordered 100 mg thiamine daily x 5 days to reduce refeeding risk d/t low lytes    Rounds: Surgery team will advance TF to 20 mL/hr.     Future/Additional Recommendations:  If supply of Vital 1.5 runs out, recommend switch to:  Vital AF 1.2 @ 60 ml/hr provides 1440 ml volume, 1728 kcals (30 kcal/kg), 108 g pro (1.9 g/kg), 78 g Fat, 159 g CHO, 7 g fiber, 1168 ml free water. + Relizorb cartridges    Nutrition will continue to follow per protocol.    Preeti Hopkins RD, LD  Pager: [phone]; Ascom: *[phone]

## 2023-09-25 NOTE — PROGRESS NOTES
09/25/23 1050   Appointment Info   Signing Clinician's Name / Credentials (PT) CONCEPCION Black   Student Supervision Direct Patient Contact Provided   Rehab Comments (PT) Abdominal Precautions   Living Environment   People in Home alone   Current Living Arrangements apartment   Home Accessibility stairs within home   Number of Stairs, Within Home, Primary greater than 10 stairs   Stair Railings, Within Home, Primary railing on left side (ascending)   Transportation Anticipated car, drives self   Living Environment Comments Pt lives in 2nd floor of apt, uses elevator mainly but also 14 steps to 2nd.   Self-Care   Usual Activity Tolerance good   Current Activity Tolerance fair   Regular Exercise No   Equipment Currently Used at Home none   Fall history within last six months no   Activity/Exercise/Self-Care Comment Pt works in retail, activities include watching TV, spending time with friends   General Information   Onset of Illness/Injury or Date of Surgery 09/22/23   Referring Physician Junito Patel MD   Patient/Family Therapy Goals Statement (PT) to return home, do activities without pain   Pertinent History of Current Problem (include personal factors and/or comorbidities that impact the POC) Pt is a 27 year old female who was admitted on 9/22/2023 s/p TPAIT with Dr Patel for chronic calcific pancreatitis. 300cc EBL intra-op, received 1u pRBCs intra-op. Past medical notable for MDD with panic attacks, prior hx abuse of alcohol, insomnia, Iron deficiency anemia and chronic abdominal pain on dilaudid.   Existing Precautions/Restrictions abdominal;fall   Heart Disease Risk Factors Overweight;Lack of physical activity;Medical history;Smoking;Stress   Cognition   Affect/Mental Status (Cognition) WFL   Orientation Status (Cognition) oriented x 4   Follows Commands (Cognition) follows multi-step commands;over 90% accuracy   Cognitive Status Comments pt reporting anxiety   Pain Assessment    Patient Currently in Pain Yes, see Vital Sign flowsheet   Integumentary/Edema   Integumentary/Edema Comments Midline incision abdominal area   Posture    Posture Forward head position;Protracted shoulders   Range of Motion (ROM)   ROM Comment BUE/BLE appears grossly WFL   Strength (Manual Muscle Testing)   Strength Comments BUE/BLE appears grossly WFL   Bed Mobility   Comment, (Bed Mobility) impaired per clinical reasoning   Transfers   Comment, (Transfers) sit > stand CGA   Gait/Stairs (Locomotion)   Comment, (Gait/Stairs) Pt amb 5' FWW CGA   Balance   Balance Comments sitting balance WFL, standing and dynamic balance impaired   Clinical Impression   Criteria for Skilled Therapeutic Intervention Yes, treatment indicated   PT Diagnosis (PT) impaired activity tolerence   Influenced by the following impairments pain control, strength, balance, precautions, BP, anxiety, sleep   Functional limitations due to impairments bed mobility, transfers, stairs, gait   Clinical Presentation (PT Evaluation Complexity) Stable/Uncomplicated   Clinical Presentation Rationale clinical reasoning   Clinical Decision Making (Complexity) low complexity   Planned Therapy Interventions (PT) balance training;bed mobility training;gait training;home program guidelines;risk factor education;progressive activity/exercise;transfer training;strengthening;stair training;postural re-education;patient/family education;neuromuscular re-education;home exercise program   Risk & Benefits of therapy have been explained evaluation/treatment results reviewed;risks/benefits reviewed;care plan/treatment goals reviewed;current/potential barriers reviewed;participants voiced agreement with care plan;participants included;patient   PT Total Evaluation Time   PT Eval, Low Complexity Minutes (58920) 7   Physical Therapy Goals   PT Frequency 6x/week   PT Predicted Duration/Target Date for Goal Attainment 10/27/23   PT Goals Bed Mobility;Transfers;Gait;Stairs    PT: Bed Mobility Independent;Supine to/from sit;Within precautions   PT: Transfers Independent;Sit to/from stand;Within precautions   PT: Gait Independent;Greater than 200 feet;Within precautions   PT: Stairs Independent;Within precautions;Greater than 10 stairs;Rail on left   PT Discharge Planning   PT Plan progress gait with FWW, education of precautions, bed mobility   PT Discharge Recommendation (DC Rec) home with assist;home with outpatient physical therapy   PT Rationale for DC Rec Pt demonstrates strength, balance and gait below baseline. Anticipate d/c to home w/ assist following improvements to activity tolerance, increased gait, pain management. Benefit from OP PT for LE strengthening   PT Brief overview of current status Ax1   Total Session Time   Timed Code Treatment Minutes 27   Total Session Time (sum of timed and untimed services) 34

## 2023-09-25 NOTE — PROGRESS NOTES
CLINICAL NUTRITION SERVICES - BRIEF NOTE     Nutrition Prescription    Recommendations already ordered by Registered Dietitian (RD):  Changed advancement schedule to advancing by 10 ml/hr q 12 hours to goal rate of 50 ml/hr.       EVALUATION OF THE PROGRESS TOWARD GOALS   Diet: NPO  Nutrition Support: Vital 1.5 @ 20 ml/hr + relizorb      INTERVENTIONS  Team okay with advancing q 12 hours.     Monitoring/Evaluation  Progress toward goals will be monitored and evaluated per protocol.     Cathryn Woodruff, MS, RD, LD, CCTD, CNSC  7A and 7B beds [phone], pager [phone]  Weekend pager [phone]

## 2023-09-25 NOTE — PROGRESS NOTES
"Pain Service Progress Note  [facility]  Date: 09/25/2023       Patient Name: Artie Burger  MRN: [id number]  Age: 27 year old  Sex: female      Procedure: TPIAT     Date of Surgery: 9/22/23     Date of Catheter Placement: 9/22/23        Assessment:  Artie is a 28yo femaled with chronic calcific pancreatitis, MDD with panic attacks, prior hx abuse of alcohol, insomnia, iron deficiency anemia.      Pain initially difficult to control, but appears to be trending in the right direction now.         Plan/Recommendations:  1. Regional Anesthesia/Analgesia  -Continuous Catheter Type/Site: bilateral paravertebral (PV) T6-7  Infusate: ropivacaine 0.2%  Programmed Intermittent Bolus (PIB) at 6 mL Q60 min via each catheter, total infusion rate of 12 mL/hr     Plan to maintain catheter, max of 7 days     2. Anticoagulation  -Please contact Inpatient Pain Service before ordering or making any anticoagulation changes        3. Multimodal Analgesia  - Per primary team   Precedex gtt  Ketamine gtt   Hydromorphone PCA - continuous rate started by primary team, would advise caution and close monitoring of sedation and apnea side effects, particularly after multiple hours of use.   Scheduled Acetaminophen 975 mg Q8H   Scheduled Robaxin 1000 mg Q8H  Scheduled gabapentin 600 mg Q8H    Pain Service will continue to follow.    Discussed with attending anesthesiologist    Lalitha Middleton MD  09/25/2023     Overnight Events: No acute events overnight.     Tubes/Drains: No    Subjective:  Doing better today than I have been.    Nausea: No  Vomiting: No  Pruritus: No  Symptoms of LAST: No    Pain Location:  \"Inner and outer abdomen\"    Diet: NPO for Medical/Clinical Reasons Except for: Ice Chips  Adult Formula Drip Feeding: Continuous Vital 1.5; Jejunostomy; Goal Rate: 50 mL/hr (goal) - 9/23: Once confirm JT placement/approval for use post surgery, begin TF with Vital 1.5 @ 10 ml/hr and advance by 10 ml q 24 hrs " "(and/or ONLY advance rate u...    Relevant Labs:  Recent Labs   Lab Test 09/25/23  0428 09/25/23  0014 09/23/23  0304 09/22/23 2031   INR  --   --   --  1.34*   PLT  --  350  350   < > 346   PTT 31  --    < > 110*   BUN 4.9*  --    < > 13.5    < > = values in this interval not displayed.       Physical Exam:  Vitals: /89   Pulse 74   Temp 97.8  F (36.6  C) (Axillary)   Resp 20   Ht 1.575 m (5' 2\")   Wt 69.3 kg (152 lb 12.5 oz)   LMP 08/16/2023 (Approximate)   SpO2 92%   BMI 27.94 kg/m      Physical Exam:   Orientation:  Alert, oriented, and in no acute distress: Yes  Sedation: Yes - reports mildly feeling \"out of it\"    Motor Examination:  5/5 Strength in lower extremities: Yes    Catheter Site:   Catheter entry site is clean/dry/intact: Yes    Tender: No      Relevant Medications:  Current Pain Medications:  Medications related to Pain Management (From now, onward)      Start     Dose/Rate Route Frequency Ordered Stop    09/25/23 0000  acetaminophen (TYLENOL) solution 650 mg         650 mg Oral or Feeding Tube EVERY 4 HOURS PRN 09/22/23 2312      09/24/23 1500  ROPivacaine 0.2% in NS perineural infusion (simple)          Perineural Continuous Nerve Block 09/24/23 1458      09/24/23 1500  ROPivacaine 0.2% in NS perineural infusion (simple)          Perineural Continuous Nerve Block 09/24/23 1458      09/24/23 1330  HYDROmorphone (DILAUDID) PCA 0.2 mg/mL OPIOID TOLERANT          Intravenous CONTINUOUS 09/24/23 1325      09/24/23 1200  methocarbamol (ROBAXIN) injection 1,000 mg         1,000 mg Intravenous EVERY 8 HOURS 09/24/23 1050 09/27/23 1159    09/23/23 0800  polyethylene glycol (MIRALAX) Packet 17 g         17 g Oral or Feeding Tube DAILY 09/22/23 2249      09/23/23 0600  gabapentin (NEURONTIN) solution 600 mg         600 mg Oral or J tube EVERY 8 HOURS SCHEDULED 09/22/23 2250      09/22/23 2330  sennosides (SENOKOT) syrup 5-10 mL        See Hyperspace for full Linked Orders Report.    5-10 mL " Oral or Feeding Tube 2 TIMES DAILY 09/22/23 2305 09/22/23 2330  docusate (COLACE) 50 MG/5ML liquid  mg        See Hyperspace for full Linked Orders Report.     mg Oral or Feeding Tube 2 TIMES DAILY 09/22/23 2305 09/22/23 2300  acetaminophen (TYLENOL) solution 975 mg         975 mg Oral or Feeding Tube EVERY 8 HOURS 09/22/23 2249 09/25/23 2259 09/22/23 2250  hydrOXYzine (ATARAX) syrup 25 mg        See Hyperspace for full Linked Orders Report.    25 mg Oral or Feeding Tube EVERY 6 HOURS PRN 09/22/23 2250 09/22/23 2250  hydrOXYzine (ATARAX) syrup 50 mg        See Hyperspace for full Linked Orders Report.    50 mg Oral or Feeding Tube EVERY 6 HOURS PRN 09/22/23 2250 09/22/23 2249  lidocaine 1 % 0.1-1 mL         0.1-1 mL Other EVERY 1 HOUR PRN 09/22/23 2250 09/22/23 2249  lidocaine (LMX4) cream          Topical EVERY 1 HOUR PRN 09/22/23 2250 09/22/23 2249  magnesium hydroxide (MILK OF MAGNESIA) suspension 30 mL         30 mL Oral or Feeding Tube DAILY PRN 09/22/23 2249 09/22/23 2249  bisacodyl (DULCOLAX) suppository 10 mg         10 mg Rectal DAILY PRN 09/22/23 2249 09/22/23 2249  [Held by provider]  methocarbamol (ROBAXIN) tablet 750 mg        (Held by provider since Sat 9/23/2023 at 1611 by Junito Patel MD.Hold Reason: OtherHold Comments: Cannot crush tablets with TPAIT)    750 mg Oral or Feeding Tube EVERY 6 HOURS PRN 09/22/23 2249 09/22/23 2100  ketamine (KETALAR) 2 mg/mL in sodium chloride 0.9 % 50 mL ANALGESIA infusion         10 mg/hr  5 mL/hr  Intravenous CONTINUOUS 09/22/23 2047              Primary Service Contacted with Recommendations? No      Physician Attestation   I saw this patient with the resident and agree with the resident/fellow's findings and plan of care as documented in the note.       Ramirez findings: Artie feels pain is improved today. She is looking forward to getting out of bed to a recliner. She denies  "signs/symptoms of local anesthetic toxicity. Sites are clean/dry/intact, no erythema/swelling/tenderness. Will continue current perineural catheter infusions.      Please see A&P for additional details of medical decision making.       Llia Fountain MD  Date of Service (when I saw the patient): 9/25/23        Acute Inpatient Pain Service [facility]  Hours of pain coverage 24/7   Page via [employer]- Please Page the Pain Team Via [facility]: \"PAIN MANAGEMENT ACUTE INPATIENT/ [facility]\"  "

## 2023-09-25 NOTE — PROGRESS NOTES
Admitted/transferred from:   Time of arrival on unit 1300  2 RN full  skin assessment completed by POONAM Egan and Oscar RN    Skin assessment finding: skin intact, no problems Midline incision open to air,   Interventions/actions: other        Will continue to monitor.

## 2023-09-25 NOTE — INTERIM SUMMARY
Regional Anesthesia and Pain Service    Artie Burger was evaluated this evening. Bilateral catheters sites were clean, dry, and intact. She was experiencing mild pain, but was thinking that the pain was contributing to her anxiety and wanted to try a bolus. Bedside BP was 120 systolic. A total bolus of 10 mL 0.2% ropivicaine was administered via the pump to the bilateral paravertebral catheters (5 ml per catheter) at 18:00. There were no immediate complications. The patient and their nurse were informed of necessary vigilance over vital signs for 30 minutes following the bolus. There was also discussion regarding how these catheters can only be bolused every 12 hours to avoid local anesthetic system toxicity. Please reach out to the RAPS team for any questions or concerns.    Maximino Daley MD  Anesthesiology Resident, CA-3  September 25, 2023

## 2023-09-26 ENCOUNTER — APPOINTMENT (OUTPATIENT)
Dept: PHYSICAL THERAPY | Facility: CLINIC | Age: 28
End: 2023-09-26
Attending: SURGERY
Payer: COMMERCIAL

## 2023-09-26 ENCOUNTER — APPOINTMENT (OUTPATIENT)
Dept: ULTRASOUND IMAGING | Facility: CLINIC | Age: 28
End: 2023-09-26
Attending: PHYSICIAN ASSISTANT
Payer: COMMERCIAL

## 2023-09-26 LAB
ANION GAP SERPL CALCULATED.3IONS-SCNC: 12 MMOL/L (ref 7–15)
APTT PPP: 32 SECONDS (ref 22–38)
BACTERIA SPEC CULT: ABNORMAL
BASOPHILS # BLD AUTO: 0 10E3/UL (ref 0–0.2)
BASOPHILS NFR BLD AUTO: 0 %
BUN SERPL-MCNC: 3.3 MG/DL (ref 6–20)
CALCIUM SERPL-MCNC: 8.1 MG/DL (ref 8.6–10)
CHLORIDE SERPL-SCNC: 106 MMOL/L (ref 98–107)
CREAT SERPL-MCNC: 0.62 MG/DL (ref 0.51–0.95)
DEPRECATED HCO3 PLAS-SCNC: 22 MMOL/L (ref 22–29)
EGFRCR SERPLBLD CKD-EPI 2021: >90 ML/MIN/1.73M2
EOSINOPHIL # BLD AUTO: 0.3 10E3/UL (ref 0–0.7)
EOSINOPHIL NFR BLD AUTO: 2 %
ERYTHROCYTE [DISTWIDTH] IN BLOOD BY AUTOMATED COUNT: 14.5 % (ref 10–15)
GLUCOSE BLDC GLUCOMTR-MCNC: 103 MG/DL (ref 70–99)
GLUCOSE BLDC GLUCOMTR-MCNC: 106 MG/DL (ref 70–99)
GLUCOSE BLDC GLUCOMTR-MCNC: 107 MG/DL (ref 70–99)
GLUCOSE BLDC GLUCOMTR-MCNC: 122 MG/DL (ref 70–99)
GLUCOSE BLDC GLUCOMTR-MCNC: 124 MG/DL (ref 70–99)
GLUCOSE BLDC GLUCOMTR-MCNC: 124 MG/DL (ref 70–99)
GLUCOSE BLDC GLUCOMTR-MCNC: 130 MG/DL (ref 70–99)
GLUCOSE BLDC GLUCOMTR-MCNC: 134 MG/DL (ref 70–99)
GLUCOSE BLDC GLUCOMTR-MCNC: 143 MG/DL (ref 70–99)
GLUCOSE BLDC GLUCOMTR-MCNC: 144 MG/DL (ref 70–99)
GLUCOSE BLDC GLUCOMTR-MCNC: 158 MG/DL (ref 70–99)
GLUCOSE BLDC GLUCOMTR-MCNC: 164 MG/DL (ref 70–99)
GLUCOSE BLDC GLUCOMTR-MCNC: 167 MG/DL (ref 70–99)
GLUCOSE BLDC GLUCOMTR-MCNC: 176 MG/DL (ref 70–99)
GLUCOSE BLDC GLUCOMTR-MCNC: 177 MG/DL (ref 70–99)
GLUCOSE BLDC GLUCOMTR-MCNC: 195 MG/DL (ref 70–99)
GLUCOSE BLDC GLUCOMTR-MCNC: 54 MG/DL (ref 70–99)
GLUCOSE BLDC GLUCOMTR-MCNC: 62 MG/DL (ref 70–99)
GLUCOSE BLDC GLUCOMTR-MCNC: 68 MG/DL (ref 70–99)
GLUCOSE BLDC GLUCOMTR-MCNC: 74 MG/DL (ref 70–99)
GLUCOSE BLDC GLUCOMTR-MCNC: 85 MG/DL (ref 70–99)
GLUCOSE BLDC GLUCOMTR-MCNC: 94 MG/DL (ref 70–99)
GLUCOSE BLDC GLUCOMTR-MCNC: 96 MG/DL (ref 70–99)
GLUCOSE BLDC GLUCOMTR-MCNC: 96 MG/DL (ref 70–99)
GLUCOSE BLDC GLUCOMTR-MCNC: 98 MG/DL (ref 70–99)
GLUCOSE SERPL-MCNC: 143 MG/DL (ref 70–99)
HCT VFR BLD AUTO: 27.7 % (ref 35–47)
HGB BLD-MCNC: 9.5 G/DL (ref 11.7–15.7)
IMM GRANULOCYTES # BLD: 0.1 10E3/UL
IMM GRANULOCYTES NFR BLD: 0 %
LYMPHOCYTES # BLD AUTO: 2.4 10E3/UL (ref 0.8–5.3)
LYMPHOCYTES NFR BLD AUTO: 16 %
MAGNESIUM SERPL-MCNC: 1.5 MG/DL (ref 1.7–2.3)
MCH RBC QN AUTO: 30.3 PG (ref 26.5–33)
MCHC RBC AUTO-ENTMCNC: 34.3 G/DL (ref 31.5–36.5)
MCV RBC AUTO: 88 FL (ref 78–100)
MONOCYTES # BLD AUTO: 1.1 10E3/UL (ref 0–1.3)
MONOCYTES NFR BLD AUTO: 7 %
NEUTROPHILS # BLD AUTO: 10.6 10E3/UL (ref 1.6–8.3)
NEUTROPHILS NFR BLD AUTO: 75 %
NRBC # BLD AUTO: 0 10E3/UL
NRBC BLD AUTO-RTO: 0 /100
PHOSPHATE SERPL-MCNC: 3.5 MG/DL (ref 2.5–4.5)
PLATELET # BLD AUTO: 409 10E3/UL (ref 150–450)
POTASSIUM SERPL-SCNC: 3.7 MMOL/L (ref 3.4–5.3)
RBC # BLD AUTO: 3.14 10E6/UL (ref 3.8–5.2)
SODIUM SERPL-SCNC: 140 MMOL/L (ref 136–145)
WBC # BLD AUTO: 14.4 10E3/UL (ref 4–11)

## 2023-09-26 PROCEDURE — 250N000011 HC RX IP 250 OP 636: Performed by: PHYSICIAN ASSISTANT

## 2023-09-26 PROCEDURE — 250N000011 HC RX IP 250 OP 636: Mod: JZ | Performed by: NURSE PRACTITIONER

## 2023-09-26 PROCEDURE — 258N000003 HC RX IP 258 OP 636: Performed by: SURGERY

## 2023-09-26 PROCEDURE — 84100 ASSAY OF PHOSPHORUS: CPT | Performed by: SURGERY

## 2023-09-26 PROCEDURE — 250N000013 HC RX MED GY IP 250 OP 250 PS 637: Performed by: PHYSICIAN ASSISTANT

## 2023-09-26 PROCEDURE — 250N000009 HC RX 250

## 2023-09-26 PROCEDURE — 85025 COMPLETE CBC W/AUTO DIFF WBC: CPT | Performed by: SURGERY

## 2023-09-26 PROCEDURE — 97110 THERAPEUTIC EXERCISES: CPT | Mod: GP

## 2023-09-26 PROCEDURE — 01996 DLY HOSP MGMT EDRL RX ADMIN: CPT | Mod: GC | Performed by: ANESTHESIOLOGY

## 2023-09-26 PROCEDURE — 258N000001 HC RX 258: Performed by: SURGERY

## 2023-09-26 PROCEDURE — 93975 VASCULAR STUDY: CPT

## 2023-09-26 PROCEDURE — 250N000013 HC RX MED GY IP 250 OP 250 PS 637: Performed by: SURGERY

## 2023-09-26 PROCEDURE — 250N000012 HC RX MED GY IP 250 OP 636 PS 637: Performed by: SURGERY

## 2023-09-26 PROCEDURE — 271N000002 HC RX 271: Performed by: STUDENT IN AN ORGANIZED HEALTH CARE EDUCATION/TRAINING PROGRAM

## 2023-09-26 PROCEDURE — 93975 VASCULAR STUDY: CPT | Mod: 26 | Performed by: RADIOLOGY

## 2023-09-26 PROCEDURE — 36415 COLL VENOUS BLD VENIPUNCTURE: CPT | Performed by: SURGERY

## 2023-09-26 PROCEDURE — 258N000003 HC RX IP 258 OP 636

## 2023-09-26 PROCEDURE — 97530 THERAPEUTIC ACTIVITIES: CPT | Mod: GP

## 2023-09-26 PROCEDURE — 250N000011 HC RX IP 250 OP 636: Mod: JZ | Performed by: SURGERY

## 2023-09-26 PROCEDURE — 120N000011 HC R&B TRANSPLANT UMMC

## 2023-09-26 PROCEDURE — 85730 THROMBOPLASTIN TIME PARTIAL: CPT | Performed by: SURGERY

## 2023-09-26 PROCEDURE — 250N000011 HC RX IP 250 OP 636

## 2023-09-26 PROCEDURE — 250N000013 HC RX MED GY IP 250 OP 250 PS 637: Performed by: NURSE PRACTITIONER

## 2023-09-26 PROCEDURE — 90832 PSYTX W PT 30 MINUTES: CPT | Performed by: STUDENT IN AN ORGANIZED HEALTH CARE EDUCATION/TRAINING PROGRAM

## 2023-09-26 PROCEDURE — 83735 ASSAY OF MAGNESIUM: CPT | Performed by: SURGERY

## 2023-09-26 PROCEDURE — 250N000009 HC RX 250: Performed by: SURGERY

## 2023-09-26 PROCEDURE — 80048 BASIC METABOLIC PNL TOTAL CA: CPT | Performed by: SURGERY

## 2023-09-26 PROCEDURE — 250N000011 HC RX IP 250 OP 636: Mod: JZ | Performed by: STUDENT IN AN ORGANIZED HEALTH CARE EDUCATION/TRAINING PROGRAM

## 2023-09-26 RX ORDER — HYDROXYZINE HCL 10 MG/5 ML
25 SOLUTION, ORAL ORAL EVERY 6 HOURS PRN
Status: DISCONTINUED | OUTPATIENT
Start: 2023-09-26 | End: 2023-09-26

## 2023-09-26 RX ORDER — SIMETHICONE 80 MG
80 TABLET,CHEWABLE ORAL EVERY 6 HOURS PRN
Status: DISCONTINUED | OUTPATIENT
Start: 2023-09-26 | End: 2023-10-03 | Stop reason: HOSPADM

## 2023-09-26 RX ORDER — HYDROXYZINE HCL 10 MG/5 ML
50 SOLUTION, ORAL ORAL EVERY 6 HOURS
Status: DISCONTINUED | OUTPATIENT
Start: 2023-09-26 | End: 2023-09-28

## 2023-09-26 RX ORDER — CEFEPIME HYDROCHLORIDE 2 G/1
2 INJECTION, POWDER, FOR SOLUTION INTRAVENOUS EVERY 12 HOURS
Status: DISCONTINUED | OUTPATIENT
Start: 2023-09-26 | End: 2023-09-29

## 2023-09-26 RX ORDER — MAGNESIUM SULFATE HEPTAHYDRATE 40 MG/ML
4 INJECTION, SOLUTION INTRAVENOUS ONCE
Status: COMPLETED | OUTPATIENT
Start: 2023-09-26 | End: 2023-09-26

## 2023-09-26 RX ORDER — HYDROXYZINE HYDROCHLORIDE 25 MG/1
25 TABLET, FILM COATED ORAL EVERY 6 HOURS PRN
Status: DISCONTINUED | OUTPATIENT
Start: 2023-09-26 | End: 2023-09-27

## 2023-09-26 RX ORDER — GUAIFENESIN 600 MG/1
15 TABLET, EXTENDED RELEASE ORAL DAILY
Status: DISCONTINUED | OUTPATIENT
Start: 2023-09-27 | End: 2023-10-03 | Stop reason: HOSPADM

## 2023-09-26 RX ORDER — HYDROXYZINE HCL 10 MG/5 ML
50 SOLUTION, ORAL ORAL EVERY 6 HOURS PRN
Status: DISCONTINUED | OUTPATIENT
Start: 2023-09-26 | End: 2023-09-26

## 2023-09-26 RX ORDER — AMPICILLIN 2 G/1
2 INJECTION, POWDER, FOR SOLUTION INTRAVENOUS EVERY 6 HOURS
Status: DISCONTINUED | OUTPATIENT
Start: 2023-09-26 | End: 2023-09-29

## 2023-09-26 RX ORDER — KETOROLAC TROMETHAMINE 15 MG/ML
15 INJECTION, SOLUTION INTRAMUSCULAR; INTRAVENOUS EVERY 6 HOURS PRN
Status: DISCONTINUED | OUTPATIENT
Start: 2023-09-26 | End: 2023-09-26

## 2023-09-26 RX ORDER — FUROSEMIDE 10 MG/ML
10 INJECTION INTRAMUSCULAR; INTRAVENOUS ONCE
Status: COMPLETED | OUTPATIENT
Start: 2023-09-26 | End: 2023-09-26

## 2023-09-26 RX ORDER — POLYETHYLENE GLYCOL 3350 17 G/17G
17 POWDER, FOR SOLUTION ORAL DAILY
Status: DISCONTINUED | OUTPATIENT
Start: 2023-09-27 | End: 2023-10-03 | Stop reason: HOSPADM

## 2023-09-26 RX ORDER — KETOROLAC TROMETHAMINE 15 MG/ML
15 INJECTION, SOLUTION INTRAMUSCULAR; INTRAVENOUS EVERY 6 HOURS
Status: DISCONTINUED | OUTPATIENT
Start: 2023-09-26 | End: 2023-09-27

## 2023-09-26 RX ORDER — CITALOPRAM HYDROBROMIDE 20 MG/10ML
40 SOLUTION, ORAL ORAL DAILY
Status: DISCONTINUED | OUTPATIENT
Start: 2023-09-26 | End: 2023-10-03 | Stop reason: HOSPADM

## 2023-09-26 RX ORDER — HYDROXYZINE HCL 10 MG/5 ML
75 SOLUTION, ORAL ORAL EVERY 6 HOURS PRN
Status: DISCONTINUED | OUTPATIENT
Start: 2023-09-26 | End: 2023-09-26

## 2023-09-26 RX ORDER — HYDROXYZINE HCL 10 MG/5 ML
75 SOLUTION, ORAL ORAL EVERY 6 HOURS
Status: DISCONTINUED | OUTPATIENT
Start: 2023-09-26 | End: 2023-09-26

## 2023-09-26 RX ORDER — HYDROMORPHONE HYDROCHLORIDE 5 MG/5ML
4 SOLUTION ORAL EVERY 4 HOURS
Status: DISCONTINUED | OUTPATIENT
Start: 2023-09-26 | End: 2023-09-27

## 2023-09-26 RX ORDER — GABAPENTIN 250 MG/5ML
600 SOLUTION ORAL EVERY 8 HOURS SCHEDULED
Status: DISCONTINUED | OUTPATIENT
Start: 2023-09-26 | End: 2023-10-03 | Stop reason: HOSPADM

## 2023-09-26 RX ADMIN — KETOROLAC TROMETHAMINE 15 MG: 15 INJECTION INTRAMUSCULAR; INTRAVENOUS at 19:17

## 2023-09-26 RX ADMIN — MAGNESIUM SULFATE HEPTAHYDRATE 4 G: 40 INJECTION, SOLUTION INTRAVENOUS at 09:22

## 2023-09-26 RX ADMIN — DOCUSATE SODIUM 100 MG: 50 LIQUID ORAL at 20:56

## 2023-09-26 RX ADMIN — Medication 15 ML: at 07:48

## 2023-09-26 RX ADMIN — HYDROXYZINE HYDROCHLORIDE 50 MG: 10 SYRUP ORAL at 16:12

## 2023-09-26 RX ADMIN — SODIUM CHLORIDE 5 UNITS/HR: 9 INJECTION, SOLUTION INTRAVENOUS at 10:11

## 2023-09-26 RX ADMIN — BUSPIRONE HYDROCHLORIDE 10 MG: 10 TABLET ORAL at 20:56

## 2023-09-26 RX ADMIN — HYDROMORPHONE HYDROCHLORIDE 4 MG: 1 SOLUTION ORAL at 20:56

## 2023-09-26 RX ADMIN — HEPARIN SODIUM 400 UNITS/HR: 10000 INJECTION, SOLUTION INTRAVENOUS at 11:02

## 2023-09-26 RX ADMIN — PIPERACILLIN AND TAZOBACTAM 3.38 G: 3; .375 INJECTION, POWDER, LYOPHILIZED, FOR SOLUTION INTRAVENOUS at 06:22

## 2023-09-26 RX ADMIN — Medication: at 12:28

## 2023-09-26 RX ADMIN — METHOCARBAMOL 1000 MG: 100 INJECTION, SOLUTION INTRAMUSCULAR; INTRAVENOUS at 12:37

## 2023-09-26 RX ADMIN — GABAPENTIN 600 MG: 250 SUSPENSION ORAL at 21:56

## 2023-09-26 RX ADMIN — METHOCARBAMOL 1000 MG: 100 INJECTION, SOLUTION INTRAMUSCULAR; INTRAVENOUS at 20:56

## 2023-09-26 RX ADMIN — THIAMINE HYDROCHLORIDE 100 MG: 100 INJECTION, SOLUTION INTRAMUSCULAR; INTRAVENOUS at 08:09

## 2023-09-26 RX ADMIN — POTASSIUM PHOSPHATE, MONOBASIC POTASSIUM PHOSPHATE, DIBASIC 9 MMOL: 224; 236 INJECTION, SOLUTION, CONCENTRATE INTRAVENOUS at 01:33

## 2023-09-26 RX ADMIN — Medication: at 15:11

## 2023-09-26 RX ADMIN — Medication 500 MG: at 06:47

## 2023-09-26 RX ADMIN — HYDROXYZINE HYDROCHLORIDE 50 MG: 10 SYRUP ORAL at 21:56

## 2023-09-26 RX ADMIN — CEFEPIME HYDROCHLORIDE 2 G: 2 INJECTION, POWDER, FOR SOLUTION INTRAVENOUS at 22:07

## 2023-09-26 RX ADMIN — Medication 40 MG: at 07:48

## 2023-09-26 RX ADMIN — KETAMINE HYDROCHLORIDE 10 MG/HR: 10 INJECTION INTRAMUSCULAR; INTRAVENOUS at 15:09

## 2023-09-26 RX ADMIN — AMPICILLIN 2 G: 2 INJECTION, POWDER, FOR SOLUTION INTRAVENOUS at 12:40

## 2023-09-26 RX ADMIN — GABAPENTIN 600 MG: 250 SUSPENSION ORAL at 13:55

## 2023-09-26 RX ADMIN — KETAMINE HYDROCHLORIDE 10 MG/HR: 10 INJECTION INTRAMUSCULAR; INTRAVENOUS at 05:29

## 2023-09-26 RX ADMIN — ACETAMINOPHEN 650 MG: 160 LIQUID ORAL at 13:55

## 2023-09-26 RX ADMIN — DEXTROSE MONOHYDRATE 1000 ML: 100 INJECTION, SOLUTION INTRAVENOUS at 12:13

## 2023-09-26 RX ADMIN — CITALOPRAM 40 MG: 10 SOLUTION ORAL at 09:14

## 2023-09-26 RX ADMIN — BUSPIRONE HYDROCHLORIDE 10 MG: 10 TABLET ORAL at 09:14

## 2023-09-26 RX ADMIN — Medication 40 MG: at 21:56

## 2023-09-26 RX ADMIN — FUROSEMIDE 10 MG: 10 INJECTION, SOLUTION INTRAMUSCULAR; INTRAVENOUS at 09:18

## 2023-09-26 RX ADMIN — Medication 500 MG: at 08:06

## 2023-09-26 RX ADMIN — KETOROLAC TROMETHAMINE 15 MG: 15 INJECTION INTRAMUSCULAR; INTRAVENOUS at 13:55

## 2023-09-26 RX ADMIN — HYDROXYZINE HYDROCHLORIDE 50 MG: 10 SOLUTION ORAL at 07:47

## 2023-09-26 RX ADMIN — GABAPENTIN 600 MG: 250 SUSPENSION ORAL at 07:48

## 2023-09-26 RX ADMIN — METHOCARBAMOL 1000 MG: 100 INJECTION, SOLUTION INTRAMUSCULAR; INTRAVENOUS at 03:03

## 2023-09-26 RX ADMIN — SENNOSIDES 5 ML: 8.8 LIQUID ORAL at 20:56

## 2023-09-26 RX ADMIN — CEFEPIME HYDROCHLORIDE 2 G: 2 INJECTION, POWDER, FOR SOLUTION INTRAVENOUS at 12:10

## 2023-09-26 RX ADMIN — HYDROMORPHONE HYDROCHLORIDE 4 MG: 1 SOLUTION ORAL at 16:12

## 2023-09-26 RX ADMIN — AMPICILLIN 2 G: 2 INJECTION, POWDER, FOR SOLUTION INTRAVENOUS at 17:55

## 2023-09-26 ASSESSMENT — ACTIVITIES OF DAILY LIVING (ADL)
ADLS_ACUITY_SCORE: 22

## 2023-09-26 NOTE — CONSULTS
"  Health Psychology                                                                                                                          Lynn Villar, Ph.D., L.P [phone]  Rehana Ly, Ph.D., L.P. [phone]  Erica Bright, Ph.D, L..P. [phone]  Jacklyn Lawson, Ph.D., L.P. [phone]  Gerald Scott, Ph.D., A.B.P.P., L.P. [phone]         Jenifer Burger, Ph.D., L.P. [phone]       Grecia Daniel, Ph.D., A.B.P.P., LP [phone]           [facility], 3rd Floor  [address] [place] [address]      Inpatient Health Psychology Consultation    Date of Service:  09/26/23    BACKGROUND:  Per EMR: \"Artie Burger is a 27 year old with PMH significant for recurrent acute on chronic pancreatitis 2/2 alcoholic pancreatitis. She is now s/p status post total pancreatectomy, islet cell autotransplant, splenectomy, and gastrojejunostomy tube placement on 9/22/23 with Dr. Junito Patel MD.\"    Health psychology consulted by Bree Plaza PA-C, Pancreatitis Service to support Artie in managing symptoms of anxiety. This writer met with Artie for her presurgical psychological evaluation.     SUBJECTIVE:  Met with Artie today to review experiences with surgery. She endorsed high levels of anxiety in past days consisting of worry, muscle tension, difficulty feeling comfortable. She also said that the feeling of missing time due to coming out of surgery resembled times in her life where she was drinking and blacked out; this familiar feeling was very troubling to her. Regarding pain she said she wasn't fully prepared for the type of pain she would be feeling, or even how intense the incision would be. She feels that pain is getting better controlled now, anxiety as well.     Supported Artie in finding ways to be more comfortable and strategies to manage anxiety. She said she already felt better after speaking with Bree Plaza PA-C, earlier " today. She is pleased that medications are scheduled more and that the nursing staff has been helpful. We discussed ways to help herself feel calm and comfortable. Discussed options such as letting staff know she would like to move rooms if possible (as her medical team had offered that earlier today), having a few more pillows in bed to help her feel comfortable, resting when able (she has been unable to sleep well due to discomfort and hourly blood sugar checks).     Reviewed coping skills Artie has used in the past. Reviewed events preceding admission for surgery and updates with her employment. Artie expressed understanding of information provided and gratitude for the visit.       OBJECTIVE:  Artie was sitting up in her recliner chair during the visit. She reported anxious mood. Affect mood- and thought-congruent, tearful at times. Thought processes logical and linear. Insight and judgment good. Speech WNL. Denied suicidal ideation.        ASSESSMENT:  Artie has a history of depression and anxiety managed with medications and psychotherapy. Since surgery she has noted increased anxiety, which she anticipated, primarily related to experiences which reminded her of times of problematic substance use (not remembering time after surgery), and pain. Prior to surgery she was most worried about being away from family and feeling lonely.     She benefits from having advanced notice for procedures, time for questions and explanations. She expressed gratitude for having medications scheduled as opposed to prn; it can be difficult for her to remember/ask due to anxiety.       DIAGNOSIS:  Chronic pancreatitis  Major depressive disorder, recurrent, moderate, in remission  Generalized anxiety disorder    PLAN:  Will follow-up throughout this week for support.    Erica Bright, PhD,   Clinical Health Psychologist  Phone: [phone]  Pager: [phone]      Time in: 4:00  Time out: 4:30

## 2023-09-26 NOTE — PLAN OF CARE
"Goal Outcome Evaluation:  BP (!) 128/99 (BP Location: Left arm)   Pulse 81   Temp 99.2  F (37.3  C) (Oral)   Resp 16   Ht 1.575 m (5' 2\")   Wt 69.3 kg (152 lb 12.5 oz)   LMP 08/16/2023 (Approximate)   SpO2 96%   BMI 27.94 kg/m      Shift: 3393-2081  VS: Vitals stable, 1 L nasal cannula, afebrile  Neuro: Alert and oriented x4   BG: Insulin drip algo 2  Labs: Awaiting AM labs, phos replaced overnight   Pain/Nausea: Denies nausea, pain managed by scheduled robaxin, dilaudid PCA, ketamine, ropivacaine nerve block x2   Diet: NPO except meds/ice chips   IV Access: R triple lumen internal jugular, R PIV, L PIV all infusing   Infusion(s): LR at 50, Heparin at 400  Lines/Drains: GJ tube, J tube with TF at 30, next rate change at 10am, G to gravity   GI/: Voiding adequately, using bedside commode, BM x1 overnight   Skin: Midline incision BLAZE, NINFA drain x1   Mobility: SBA   Plan: Continue with POC and notify team with any changes   "

## 2023-09-26 NOTE — PROGRESS NOTES
Notified by RN that Ms. Burger was having increased anxiety.     On exam she started crying asking for something for anxiety. She originally was normocardic, became tachycardic to 104 during exam while crying. She reports minimal pain, but was unable to sleep for three days.     Per RN she recently had buspar restarted and first dose is due at 8pm.    Discussed with patient and RN, we will try her buspar (okay to give 1 hour early) and PRNs before escalating therapy for anxiety.     Stephen Flaherty MD  General Surgery Resident  Pager [phone]

## 2023-09-26 NOTE — PROGRESS NOTES
Pancreatitis Service - Daily Progress Note  09/26/2023    Assessment & Plan: Artie Burger is a 27 year old with PMH significant for recurrent acute on chronic pancreatitis 2/2 alcoholic pancreatitis. She is now s/p status post total pancreatectomy, islet cell autotransplant, splenectomy, and gastrojejunostomy tube placement on 9/22/23 with Dr. Junito Patel MD.        Cardiorespiratory:   Hypotensive: Resolved. -140s, Stable   Hypoxia: Weaned downt to RA, encourage IS/Pulm toilet.    GI/Nutrition:   S/p TPAIT:4 NPO, G/J tube in place. Her g tube is currently draining (675cc/24hr) and her j tube is running Vital 1.5 +relizorb tube feeds at 30 cc/h (goal 50) . Plan to advance by 10 ml/hr q 12 hours to goal rate of 50 ml/hr.   Continue bowel regimen senna/colace/PEG.    Fluid/Electrolytes:   Hypervolemia: Wt up ~10kg. LR @ 50. Will decrease/stop MIV when EN at goal.   Hypomagnesemia: Mg sulfate 4g IV x 1    : No issues.    Post-pancreatectomy diabetes: Insulin gtt, appreciate Endocrine consult.    Infection: Afebrile   Leukocytosis, peaked at 23, trending down to 14 today  Panc preservation/islets : Pancreas preservation soln grew Enterobacter clocae (sensitive to ampicillin) and E. Faecium (sensitive to ampicillin) - treatment including Meropenem, Cefepime, Zosyn, and ampicillin due switching antibiotics with increasing leukocytosis and sensitivities (9/22-9/29). Islet Final product culture grew Enterobacter clocae (resistant to Zosyn) and E. Faecium (sensitive to ampicillin) will add Cefipime today for coverage of Enterobacter clocae (9/22-9/30).    Prophylaxis: PPI, Anticoagulation: Hep 400units/hr. RUQ US to evaluate flow in PV.    Pain control: Controlled.   Epidural-infusion + bolus BID PRN  Neurontin 600 mg per J tube Q8  Ketamine 10 mg/hr continuous infusion   Add Toradol 15 mg every 6 hours x 5 days  Start dilaudid 4 mg every 4 hours and stop PCA continuous rate. Continue dilaudid PCA 0.2 q10  bolus PRN.  Robaxin 1G IV Q8  Increase Atarax 50-75 mg per J tube PRN anxiety/adjuvant pain  Precedex stopped 9/25    Anxiety: PTA buspar 10 mg daily, give PO and citalopram 40 mg daily, give per J tube. Atarax per J tube PRN anxiety. Adult psychology consult placed. Patient has seen [facility] outpatient and requested this therapist if she is available.    Nicotine dependence/vaping: Avoid nicotine patches at this time.     Activity: OOB to chair. Ambulate with assist. PT consulted.    Anticipated LOS/Discharge: 7A, 7-10 days    JOSEFA/Fellow/Resident Provider: Bree Plaza, PAC 4335     Faculty: Junito Patel MD    __________________________________________________________________  Transplant History: Admitted 9/22/2023 for Chronic pancreatitis (H) [K86.1]     9/22/2023 (Islet), Postoperative day: 4     Interval History: History is obtained from the patient  Overnight events: Anxiety per patient worse due to poor pain control and poor sleep.  Reporting pain 7/10. No nausea. G tube to gravity this AM. Minimal flatus, +loose BMs.     ROS:   A 10-point review of systems was negative except as noted above.    Curent Meds:   busPIRone  10 mg Oral BID    citalopram  40 mg Per J Tube Daily    sennosides  5-10 mL Per J Tube BID    And    docusate   mg Per J Tube BID    furosemide  10 mg Intravenous Once    gabapentin  600 mg Per J Tube Q8H LELO    magnesium sulfate  4 g Intravenous Once    methocarbamol  1,000 mg Intravenous Q8H    [START ON 9/27/2023] multivitamins w/minerals  15 mL Per J Tube Daily    pantoprazole  40 mg Per J Tube BID    piperacillin-tazobactam  3.375 g Intravenous Q6H    [START ON 9/27/2023] polyethylene glycol  17 g Per J Tube Daily    sodium chloride (PF)  3 mL Intracatheter Q8H    thiamine  100 mg Intravenous Daily       Physical Exam:     Admit Weight: 58.3 kg (128 lb 8.5 oz)    Current Vitals:   BP (!) 133/98 (BP Location: Left arm)   Pulse 91   Temp 98.8  F (37.1  C) (Oral)   Resp  "16   Ht 1.575 m (5' 2\")   Wt 69.3 kg (152 lb 12.5 oz)   LMP 08/16/2023 (Approximate)   SpO2 96%   BMI 27.94 kg/m           Vital sign ranges:    Temp:  [98.2  F (36.8  C)-99.6  F (37.6  C)] 98.8  F (37.1  C)  Pulse:  [74-91] 91  Resp:  [16-26] 16  BP: (106-144)/(74-99) 133/98  SpO2:  [92 %-99 %] 96 %  Patient Vitals for the past 24 hrs:   BP Temp Temp src Pulse Resp SpO2   09/26/23 0615 (!) 133/98 98.8  F (37.1  C) Oral 91 16 96 %   09/26/23 0202 (!) 128/99 99.2  F (37.3  C) Oral 81 16 96 %   09/25/23 2153 (!) 144/96 99.6  F (37.6  C) Oral 91 20 99 %   09/25/23 1748 126/87 99  F (37.2  C) Oral 84 16 94 %   09/25/23 1300 (!) 126/90 98.8  F (37.1  C) Oral 88 20 92 %   09/25/23 1200 -- 98.2  F (36.8  C) Axillary 77 26 97 %   09/25/23 1100 120/74 -- -- 76 21 99 %   09/25/23 1000 106/89 -- -- 74 20 92 %       General Appearance: mild distress due to fatigue, pain. General edema  Skin: normal, warm, dry  Heart: well perfused  Lungs:   NLB on RA  Abdomen: The abdomen is soft, ND, and  mildly tender. The wound is well approximated and without signs of infection. Tube/Drain sites are: CDI.  NINFA: serosanguinous, small G tube. J tube infusing EN, G tube with small amount of pale yellow output.  :voiding  Extremities: edema: BLE 1+    Data:   CMP  Recent Labs   Lab 09/26/23  0759 09/26/23  0651 09/26/23  0644 09/25/23  2157 09/25/23  2150 09/25/23  1104 09/25/23  1012 09/25/23  0456 09/25/23  0428 09/23/23  0309 09/23/23  0304 09/22/23 2134 09/22/23 2031 09/22/23 2026 09/22/23 1929 09/22/23  1914   NA  --   --  140  --   --   --   --   --  140   < > 142  --  140   < > 140 140   POTASSIUM  --   --  3.7  --   --   --  4.0  --  3.4   < > 3.3*  --  4.4   < > 3.9 3.8   CHLORIDE  --   --  106  --   --   --   --   --  111*   < > 110*  --  108*   < >  --   --    CO2  --   --  22  --   --   --   --   --  22   < > 22  --  19*   < >  --   --    GLC 68* 122* 143*   < >  --    < >  --    < > 142*   < > 77   < > 149*   < > 96 93 "   BUN  --   --  3.3*  --   --   --   --   --  4.9*   < > 12.6  --  13.5   < >  --   --    CR  --   --  0.62  --   --   --   --   --  0.65   < > 0.85  --  0.95   < >  --   --    GFRESTIMATED  --   --  >90  --   --   --   --   --  >90   < > >90  --  83   < >  --   --    MURTAZA  --   --  8.1*  --   --   --   --   --  7.7*   < > 8.7  --  8.7   < >  --   --    ICAW  --   --   --   --   --   --   --   --   --   --   --   --   --   --  4.7 4.9   MAG  --   --  1.5*  --   --   --   --   --  1.6*   < >  --   --   --   --   --   --    PHOS  --   --  3.5  --  2.5  --   --   --  1.6*   < >  --   --   --   --   --   --    AMYLASE  --   --   --   --   --   --   --   --   --   --  25*  --   --   --   --   --    LIPASE  --   --   --   --   --   --   --   --   --   --  12*  --   --   --   --   --    ALBUMIN  --   --   --   --   --   --   --   --   --   --  3.3*  --  3.3*  --   --   --    BILITOTAL  --   --   --   --   --   --   --   --   --   --  0.4  --  1.2  --   --   --    ALKPHOS  --   --   --   --   --   --   --   --   --   --  32*  --  35  --   --   --    AST  --   --   --   --   --   --   --   --   --   --  63*  --  63*  --   --   --    ALT  --   --   --   --   --   --   --   --   --   --  38  --  41  --   --   --     < > = values in this interval not displayed.       CBC  Recent Labs   Lab 09/26/23  0644 09/25/23  0014 09/23/23  0304 09/22/23  2345   HGB 9.5* 8.7*  8.7*   < >  --    WBC 14.4* 14.7*  14.7*   < >  --     350  350   < >  --    A1C  --   --   --  5.5    < > = values in this interval not displayed.       Coags  Recent Labs   Lab 09/26/23  0644 09/25/23  0428 09/23/23  0304 09/22/23  2031 09/21/23  1609   INR  --   --   --  1.34* 1.13   PTT 32 31   < > 110*  --     < > = values in this interval not displayed.        Urinalysis  Recent Labs   Lab Test 09/21/23  1614   COLOR Yellow   APPEARANCE Slightly Cloudy*   URINEGLC Negative   URINEBILI Negative   URINEKETONE Negative   SG 1.021   UBLD Negative   URINEPH  7.0   PROTEIN 10*   NITRITE Negative   LEUKEST Negative   RBCU 2   WBCU <1

## 2023-09-26 NOTE — PROGRESS NOTES
Pain Service Progress Note  [facility]  Date: 09/26/2023       Patient Name: Artie Burger  MRN: [id number]  Age: 27 year old  Sex: female      Assessment:  Artie Burger is a 27 year old with PMH significant for recurrent acute on chronic pancreatitis 2/2 alcoholic pancreatitis. She is now s/p status post total pancreatectomy, islet cell autotransplant, splenectomy, and gastrojejunostomy tube placement on 9/22/23.     Procedure: total pancreatectomy, islet cell autotransplant, splenectomy, and gastrojejunostomy tube placement    Date of Surgery: 9/22/23    Date of Catheter Placement: 9/22/23    Plan/Recommendations:  1. Regional Anesthesia/Analgesia  -Continuous Catheter Type/Site: bilateral paravertebral (PV)   Infusate:   Programmed Intermittent Bolus (PIB) at 6 mL Q60 min via each catheter, total infusion rate of 12 mL/hr    Plan to maintain catheter, max of 7 days    Gave clinician bolus of 5 mL in each catheter 9/26 at 0900. BP stable and unchanged before and after bolus. No signs or symptoms of LAST.     2. Anticoagulation  -Please contact Inpatient Pain Service before ordering or making any anticoagulation changes       3. Multimodal Analgesia  - Per primary team    Pain Service will continue to follow.    Discussed with attending anesthesiologist    Lalitha Middleton MD  09/26/2023     Overnight Events: No acute events overnight.     Tubes/Drains: No    Subjective:  Still having a lot of pain.   Nausea: No  Vomiting: No  Pruritus: No  Symptoms of LAST: No    Pain Location:  Abdominal     Diet: NPO for Medical/Clinical Reasons Except for: Ice Chips  Adult Formula Drip Feeding: Continuous Vital 1.5; Jejunostomy; Goal Rate: 50 mL/hr; mL/hr; 9/25: Advance by 10 ml/hr q 12 hours to goal rate of 50 ml/hr.  See separate Relizorb order for instructions.; Do not advance tube feeding rate unless K+ is...    Relevant Labs:  Recent Labs   Lab Test 09/26/23  0644 09/23/23  0304 09/22/23 2031  "  INR  --   --  1.34*      < > 346   PTT 32   < > 110*   BUN 3.3*   < > 13.5    < > = values in this interval not displayed.       Physical Exam:  Vitals: /86 (BP Location: Left arm)   Pulse 87   Temp 98.9  F (37.2  C) (Oral)   Resp 16   Ht 1.575 m (5' 2\")   Wt 69.3 kg (152 lb 12.5 oz)   LMP 08/16/2023 (Approximate)   SpO2 94%   BMI 27.94 kg/m      Physical Exam:   Orientation:  Alert, oriented, and in no acute distress: Yes  Sedation: No    Catheter Site:   Catheter entry site is clean/dry/intact: Yes    Tender: no       Relevant Medications:  Current Pain Medications:  Medications related to Pain Management (From now, onward)      Start     Dose/Rate Route Frequency Ordered Stop    09/27/23 0800  polyethylene glycol (MIRALAX) Packet 17 g         17 g Per J Tube DAILY 09/26/23 0842      09/26/23 2000  sennosides (SENOKOT) syrup 5-10 mL        See Hyperspace for full Linked Orders Report.    5-10 mL Per J Tube 2 TIMES DAILY 09/26/23 0842      09/26/23 2000  docusate (COLACE) 50 MG/5ML liquid  mg        See Hyperspace for full Linked Orders Report.     mg Per J Tube 2 TIMES DAILY 09/26/23 0842      09/26/23 1400  gabapentin (NEURONTIN) solution 600 mg         600 mg Per J Tube EVERY 8 HOURS SCHEDULED 09/26/23 0842      09/26/23 0839  hydrOXYzine (ATARAX) syrup 50 mg        See Hyperspace for full Linked Orders Report.    50 mg Per J Tube EVERY 6 HOURS PRN 09/26/23 0842      09/26/23 0839  hydrOXYzine (ATARAX) syrup 25 mg        See Hyperspace for full Linked Orders Report.    25 mg Per J Tube EVERY 6 HOURS PRN 09/26/23 0842      09/26/23 0839  magnesium hydroxide (MILK OF MAGNESIA) suspension 30 mL         30 mL Per J Tube DAILY PRN 09/26/23 0842      09/26/23 0833  ketorolac (TORADOL) injection 15 mg         15 mg Intravenous EVERY 6 HOURS PRN 09/26/23 0834 10/01/23 0832    09/26/23 0826  acetaminophen *SUGAR FREE* (TYLENOL) solution 650 mg         650 mg Per J Tube EVERY 4 HOURS PRN " "09/26/23 0829      09/25/23 2000  busPIRone (BUSPAR) tablet 10 mg         10 mg Oral 2 TIMES DAILY 09/25/23 1412      09/24/23 1500  ROPivacaine 0.2% in NS perineural infusion (simple)          Perineural Continuous Nerve Block 09/24/23 1458      09/24/23 1500  ROPivacaine 0.2% in NS perineural infusion (simple)          Perineural Continuous Nerve Block 09/24/23 1458      09/24/23 1330  HYDROmorphone (DILAUDID) PCA 0.2 mg/mL OPIOID TOLERANT          Intravenous CONTINUOUS 09/24/23 1325      09/24/23 1200  methocarbamol (ROBAXIN) injection 1,000 mg         1,000 mg Intravenous EVERY 8 HOURS 09/24/23 1050 09/27/23 1159    09/22/23 2249  lidocaine 1 % 0.1-1 mL         0.1-1 mL Other EVERY 1 HOUR PRN 09/22/23 2250      09/22/23 2249  lidocaine (LMX4) cream          Topical EVERY 1 HOUR PRN 09/22/23 2250      09/22/23 2249  bisacodyl (DULCOLAX) suppository 10 mg         10 mg Rectal DAILY PRN 09/22/23 2249      09/22/23 2249  [Held by provider]  methocarbamol (ROBAXIN) tablet 750 mg        (Held by provider since Sat 9/23/2023 at 1611 by Junito Patel MD.Hold Reason: OtherHold Comments: Cannot crush tablets with TPAIT)    750 mg Oral or Feeding Tube EVERY 6 HOURS PRN 09/22/23 2249      09/22/23 2100  ketamine (KETALAR) 2 mg/mL in sodium chloride 0.9 % 50 mL ANALGESIA infusion         10 mg/hr  5 mL/hr  Intravenous CONTINUOUS 09/22/23 2047              Primary Service Contacted with Recommendations? Yes            Acute Inpatient Pain Service [facility]  Hours of pain coverage 24/7   Page via Amcom- Please Page the Pain Team Via Amcom: \"PAIN MANAGEMENT ACUTE INPATIENT/ [facility]\"  "

## 2023-09-26 NOTE — PLAN OF CARE
"/86 (BP Location: Left arm)   Pulse 87   Temp 98.9  F (37.2  C) (Oral)   Resp 16   Ht 1.575 m (5' 2\")   Wt 69.3 kg (152 lb 12.5 oz)   LMP 08/16/2023 (Approximate)   SpO2 94%   BMI 27.94 kg/m      0799-3139. VSS on RA, afebrile. Patient endorsing 10/10 pain. PRN tylenol, atarax administered. All other pain meds scheduled. Denies nausea. Insulin gtt with very labile sugars, currently on algorithm 1. NPO. Double lumen IJ & PIV x2. Heparin gtt at 400  units/hr. Dilaudid PCA pump, ropivacaine epidurals x2, ketamine gtt. Patient has had loose stools x2 today. Voiding good amounts, lasix given x1 this AM. PEG-J tube with TF advanced to 40 mL/hr this hour; relizorb change due at 2300. G tube clamped for 4 hrs today for a clamping trial; patient tolerated it well. Patient refusing to get up and ambulate this shift due to pain. Patient is transferring independently to [facility]. Midline incision BLAZE. NINFA drain with serosang drainage. Continue with plan of care and update team with any changes.  "

## 2023-09-26 NOTE — PROGRESS NOTES
Care Management Follow Up    Length of Stay (days): 4    Expected Discharge Date: 09/28/2023     Concerns to be Addressed: discharge planning     Patient plan of care discussed at interdisciplinary rounds: Yes    Anticipated Discharge Disposition: Home, Home Care, Home Infusion (Staying locally with her sister)     Anticipated Discharge Services: None  Anticipated Discharge DME: None    Patient/family educated on Medicare website which has current facility and service quality ratings: no  Education Provided on the Discharge Plan: Yes  Patient/Family in Agreement with the Plan: yes    Referrals Placed by CM/SW: External Care Coordination, Home Infusion  Private pay costs discussed: Not applicable    Additional Information:  Notified by RACHEL Levy Liaison that pt insurance does not cover relizorb cartridges.  Messaged Drew  Transplant RNCC with above information.      Angeles Carmichael RN BSN, PHN, ACM-RN  7A RN Care Coordinator  Phone: [phone]  Pager [phone]    To contact the weekend RNCC  [place] (0800 - 1630) Saturday and Sunday    Units: 5A,5B,5C [phone]    Units: 6A, [phone]    Units 6B, 6C, 6D [phone]    Units: 7A, 7B, 7C, 7D, [phone]    [facility] [place] [phone]) Saturday and Sunday    Units: 5 Ortho, 8A, 10 ICU, & Pediatric Units-Pager 4: [phone]    9/26/2023 9:28 AM

## 2023-09-26 NOTE — PLAN OF CARE
"BP (!) 144/96 (BP Location: Left arm)   Pulse 91   Temp 99.6  F (37.6  C) (Oral)   Resp 20   Ht 1.575 m (5' 2\")   Wt 69.3 kg (152 lb 12.5 oz)   LMP 08/16/2023 (Approximate)   SpO2 99%   BMI 27.94 kg/m      Shift: [phone]  VS: stable on RA, afebrile  Neuro: AOx4  BG: insulin drip; Q1hr, alg 2;    Labs: Rn managed phos, mg, and k+.   Respiratory: WNL  Pain/Nausea/PRN: pain managed with scheduled continuous dilaudid and ketamine. Atarax x2 for anxiety   Diet: NPO  LDA: left and right PIV; R internal jugular   GI/: voiding spontaneously; loose BM   Skin: midline incision open to air   Mobility: SBA walks to commode at bed site   Plan: continue POC    Handoff given to following RN. Goal Outcome Evaluation:                        "

## 2023-09-26 NOTE — DISCHARGE SUMMARY
[facility]    Discharge Summary  Transplant Surgery    Date of Admission:  9/22/2023  Date of Discharge:  10/3/2023  Discharging Provider: Bree Plaza PA-C  Date of Service (when I saw the patient): 10/03/23    Attestation: I saw and examined this patient with Bree Plaza PA-C, and the transplant surgery team. I independently reviewed all pertinent laboratory and imaging information and made independent management decisions including immunosuppression adjustment. I agree with the findings and plan as documented in this note.  Junito Patel MD    Discharge Diagnoses   Principal Problem:    Pancreatitis, chronic (H)  Active Problems:    Insomnia    Acquired total absence of pancreas    Post-pancreatectomy diabetes (H)    Acute post-operative pain      Procedure/Surgery Information   Procedure: Procedure(s):  PANCREATECTOMY, TOTAL, WITH AUTOLOGOUS PANCREATIC ISLET CELL TRANSPLANT  Cholecystectomy  Splenectomy  Gastrojejunostomy   Surgeon(s): Surgeon(s) and Role:     * Junito Patel MD - Primary     * Chuy Royal MD - Resident - Assisting     * Glen Wilkins MD - Fellow - Assisting       Non-operative procedures None performed     History of Present Illness   Artie Burger is a 27 year old with PMH significant for recurrent acute on chronic pancreatitis 2/2 alcoholic pancreatitis. She is now s/p status post total pancreatectomy, islet cell autotransplant, splenectomy, and gastrojejunostomy tube placement on 9/22/23 with Dr. Junito Patel MD.     Hospital Course   Artie Burger was admitted on 9/22/2023.  The following problems were addressed during her hospitalization:    s/p TPAIT 9/22/23`: No complaints of nausea with clamping trials of G tube. J tube is running tube feeds at goal of 50 mL/hr. CLD (sugar free) diet started 10/2. Continue pantoprazole 40 mg BID and MVI daily.    Malnutrition considered and ruled out: Continue  continuous tube feeding. Outpatient dietitian to follow patient.    Post-pancreatectomy diabetes: Patient received 479 islet equivalents/kilogram. Endocrine following, transitioned to subcutaneous insulin on 9/29 (POD 7). Patient completed diabetic education.   Discharged on:   -Glargine (Lantus): inject 14 units at 8 AM and 10 units at 8 PM  -Aspart (Novolog): inject 1 unit per every 10 grams of carbohydrate ingested   -Aspart (Novolog): custom intensity sliding scale (see below) every 4 hours   121-155 = 1.   156-190 = 2.   191-225 = 3.   226-260 = 4.   261-295 = 5.   296-330 = 6.   Greater than 330 = 7 units      Leukocytosis: Afebrile. WBC elevated s/p splenectomy. WBC 12.7 at the time of discharge.    Positive pancreas preservation/islets cultures:   -Pancreas preservation soln grew Enterobacter clocae (sensitive to ampicillin) and E. Faecium (sensitive to ampicillin) - treatment including Meropenem, Cefepime, Zosyn, and ampicillin due switching antibiotics with increasing leukocytosis and sensitivities (9/22-9/29).   -Islet Final product culture grew Enterobacter clocae (resistant to Zosyn) and E. Faecium (sensitive to ampicillin) will add Cefipime today for coverage of Enterobacter clocae (9/22-9/30).      Acute surgical pain: Pain team consulted post surgery. Initially managed with Ropivacaine paravertebral infusion (catheter removed 9/29), dilaudid PCA, tylenol, robaxin, and gabapentin.  Discharged on:  -Neurontin 600 mg per J tube Q8  -Robaxin 1G PO every 8 hours   -Acetaminophen 650 mg per J tube every 6 hours.  -Methadone 7.5 mg soln per J tube every 8 hours  -Dilaudid 4 mg soln per J tube every 3 hours PRN pain refractory to scheduled pain medications.    Hypervolemia: Resolved    Hypomagnesemia: Resolved. Replacements given PRN    Hypokalemia: Resolved. Replacements given PRN    Hypophosphatemia: Resolved. Replacements given PRN      Discharge Disposition   Discharged to home (patient will stay locally  "with her sister)  Condition at discharge: Stable    Final pathology results:   9/22/23  Final Diagnosis   A. SPLEEN, SPLENECTOMY:  - Congested splenic parenchyma with no significant histopathologic abnormality  - Fibroadipose tissue with two benign lymph nodes     B. SMALL BOWEL, RESECTION:  - Small bowel mucosa with no significant histopathology abnormality   - Fibroadipose tissue with two benign lymph nodes     C. GALLBLADDER, CHOLECYSTECTOMY:  - Mild chronic cholecystitis  - One benign lymph node      Primary Care Physician   Lila Gallegos    /82 (BP Location: Left arm)   Pulse 59   Temp 98.4  F (36.9  C) (Oral)   Resp 16   Ht 1.575 m (5' 2\")   Wt 57.3 kg (126 lb 6.4 oz)   LMP 08/16/2023 (Approximate)   SpO2 100%   BMI 23.12 kg/m    General Appearance: NAD  Skin: normal, warm, dry  Heart: well perfused  Lungs:   NLB on RA  Abdomen: The abdomen is soft, ND, and  mildly tender. The wound is well approximated and without signs of infection. Tube/Drain sites are: CDI. J tube infusing EN.  : no miner  Extremities: edema: none      Consultations This Hospital Stay   ENDOCRINOLOGY IP CONSULT  PHYSICAL THERAPY ADULT IP CONSULT  CNS DIABETES IP CONSULT  NUTRITION SERVICES ADULT IP CONSULT  PHARMACY IP CONSULT  PHARMACY TO DOSE VANCO  PHARMACY IP CONSULT  NURSING TO CONSULT FOR VASCULAR ACCESS CARE IP CONSULT  NURSING TO CONSULT FOR VASCULAR ACCESS CARE IP CONSULT  OCCUPATIONAL THERAPY ADULT IP CONSULT  CARE MANAGEMENT / SOCIAL WORK IP CONSULT  PATIENT LEARNING CENTER IP CONSULT  PHARMACY LIAISON FOR MEDICATION COVERAGE CONSULT  NURSING TO CONSULT FOR VASCULAR ACCESS CARE IP CONSULT  PSYCHOLOGY ADULT IP CONSULT  NURSING TO CONSULT FOR VASCULAR ACCESS CARE IP CONSULT  NURSING TO CONSULT FOR VASCULAR ACCESS CARE IP CONSULT  PHARMACY TO DOSE WARFARIN  PHARMACY IP CONSULT  PSYCHIATRY IP CONSULT  DIABETES EDUCATION IP CONSULT  PHARMACY LIAISON FOR MEDICATION COVERAGE CONSULT  PHARMACY IP CONSULT  NURSING TO " CONSULT FOR VASCULAR ACCESS CARE IP CONSULT  PATIENT LEARNING CENTER IP CONSULT  NUTRITION SERVICES ADULT IP CONSULT    Time Spent on this Encounter   I have spent greater than 30 minutes on this discharge.    Discharge Orders   Discharge Medications   Current Discharge Medication List        START taking these medications    Details   acetaminophen *SUGAR FREE* (TYLENOL) 32 mg/mL solution 20.313 mLs (650 mg) by Per J Tube route every 4 hours  Qty: 853.146 mL, Refills: 3    Associated Diagnoses: Acquired total absence of pancreas      Alcohol Swabs PADS Use to swab the area of the injection or matthew as directed Per insurance coverage  Qty: 100 each, Refills: 0    Associated Diagnoses: Acquired total absence of pancreas      apixaban ANTICOAGULANT (ELIQUIS) 5 MG tablet Take 2 tablets (10 mg) by mouth 2 times daily for 2 days, THEN 1 tablet (5 mg) 2 times daily for 60 days.  Qty: 128 tablet, Refills: 0    Associated Diagnoses: Acquired total absence of pancreas      blood glucose (NO BRAND SPECIFIED) lancets standard To use to test glucose level in the blood Use to test blood sugar  6  times daily as directed. To accompany glucose monitor brands per insurance coverage. Accu Chek Soft Clix Lancets - patient has Accu Chek meter  Qty: 100 each, Refills: 3    Associated Diagnoses: Acquired total absence of pancreas      blood glucose (NO BRAND SPECIFIED) test strip To use to test glucose level in the blood Use to test blood sugar  6 times daily as directed. To accompany glucose monitor brands per insurance coverage. Accu Chek test strips, patient has an Accu Chek meter  Qty: 100 strip, Refills: 3    Associated Diagnoses: Acquired total absence of pancreas      blood glucose monitoring (NO BRAND SPECIFIED) meter device kit Use as directed Per insurance coverage. DM education recommending Accu Chek Guide glucose meter  Qty: 1 kit, Refills: 0    Associated Diagnoses: Acquired total absence of pancreas      citalopram  (CELEXA) 10 MG/5ML solution 20 mLs (40 mg) by Per J Tube route daily  Qty: 140 mL, Refills: 3    Associated Diagnoses: Acquired total absence of pancreas      docusate (COLACE) 50 MG/5ML liquid 5-10 mLs ( mg) by Per J Tube route 2 times daily  Qty: 600 mL, Refills: 3    Associated Diagnoses: Acquired total absence of pancreas      gabapentin (NEURONTIN) 250 MG/5ML solution 12 mLs (600 mg) by Per J Tube route every 8 hours  Qty: 252 mL, Refills: 3    Associated Diagnoses: Acquired total absence of pancreas      !! Glucagon (GVOKE HYPOPEN) 1 MG/0.2ML pen Inject the contents of 1 device under the skin into lower abdomen, outer thigh, or outer upper arm as needed for hypoglycemia. If no response after 15 minutes, additional 1 mg dose from a new device may be injected while waiting for emergency assistance.  Qty: 0.4 mL, Refills: 3    Associated Diagnoses: Acquired total absence of pancreas      !! Glucagon (GVOKE HYPOPEN) 1 MG/0.2ML pen Inject the contents of 1 device under the skin into lower abdomen, outer thigh, or outer upper arm as needed for hypoglycemia. If no response after 15 minutes, additional 1 mg dose from a new device may be injected while waiting for emergency assistance.  Qty: 0.4 mL, Refills: 3    Associated Diagnoses: Acquired total absence of pancreas      HYDROmorphone, STANDARD CONC, (DILAUDID) 1 MG/ML oral solution Take 4 mLs (4 mg) by mouth every 4 hours as needed for severe pain  Qty: 112 mL, Refills: 0    Associated Diagnoses: Acquired total absence of pancreas      hydrOXYzine (ATARAX) 10 MG/5ML syrup 12.5 mLs (25 mg) by Per J Tube route every 6 hours as needed for anxiety  Qty: 175 mL, Refills: 3    Associated Diagnoses: Acquired total absence of pancreas      !! insulin aspart (NOVOLOG PEN) 100 UNIT/ML pen DOSE:  1 units per 10 grams of carbohydrate.  Only chart total amount of units given.  Do not give if pre-prandial glucose is less than 60 mg/dL. If given at mealtime, administer  within 30 minutes of start of meal.  Qty: 15 mL, Refills: 3    Comments: Novolog flexpens  Associated Diagnoses: Acquired total absence of pancreas      !! insulin aspart (NOVOLOG PEN) 100 UNIT/ML pen DOSE:  1 units per 10 grams of carbohydrate. Only chart total amount of units given.  Do not give if pre-prandial glucose is less than 60 mg/dL. If given at mealtime, administer within 30 minutes of start of meal.  Qty: 15 mL, Refills: 3    Comments: Novolog flexpens  Associated Diagnoses: Acquired total absence of pancreas      !! insulin aspart (NOVOLOG PEN) 100 UNIT/ML pen Inject 1-7 Units Subcutaneous every 4 hours Correction Scale - custom DOSING   Do Not give Correction Insulin if BG less than 120 Check blood glucose Q4H and administer based on blood glucose ISF 35  1 per 35 >120 121-155 = 1. 156-190 = 2. 191-225 = 3. 226-260 = 4. 261-295 = 5. 296-330 = 6. >330 = 7 units If given at mealtime, administer within 30 minutes of start of meal.  Qty: 15 mL, Refills: 3    Associated Diagnoses: Acquired total absence of pancreas      insulin glargine (LANTUS PEN) 100 UNIT/ML pen Inject 14 Units Subcutaneous every morning AND 10 Units every evening.  Qty: 15 mL, Refills: 3    Comments: If Lantus is not covered by insurance, may substitute Basaglar or Semglee or other insulin glargine product per insurance preference at same dose and frequency.    Associated Diagnoses: Acquired total absence of pancreas      insulin pen needle (32G X 4 MM) 32G X 4 MM miscellaneous Use as directed by provider Per insurance coverage  Qty: 100 each, Refills: 3    Associated Diagnoses: Acquired total absence of pancreas      melatonin 1 MG/ML LIQD liquid 10 mLs (10 mg) by Per J Tube route At Bedtime  Qty: 70 mL, Refills: 3    Associated Diagnoses: Acquired total absence of pancreas      methadone (DOLOPHINE) 5 MG/5ML solution Take 7.5 mLs (7.5 mg) by mouth every 8 hours  Qty: 157.5 mL, Refills: 0    Associated Diagnoses: Acquired total absence  of pancreas      methocarbamol 1000 MG TABS Take 1,000 mg by mouth 3 times daily  Qty: 21 tablet, Refills: 3    Associated Diagnoses: Acquired total absence of pancreas      Multiple Vitamins-Minerals (MULTIVITAMINS W/MINERALS) liquid 15 mLs by Per J Tube route daily  Qty: 105 mL, Refills: 3    Associated Diagnoses: Acquired total absence of pancreas      pantoprazole (PROTONIX) 2 mg/mL SUSP suspension 20 mLs (40 mg) by Per J Tube route 2 times daily  Qty: 280 mL, Refills: 3    Associated Diagnoses: Acquired total absence of pancreas      polyethylene glycol (MIRALAX) 17 GM/Dose powder Take 17 g by mouth daily  Qty: 510 g, Refills: 3    Associated Diagnoses: Acquired total absence of pancreas      !! QUEtiapine (SEROQUEL) 25 MG tablet Take 0.5-1 tablets (12.5-25 mg) by mouth every 6 hours as needed (anxiety)  Qty: 30 tablet, Refills: 3    Associated Diagnoses: Acquired total absence of pancreas      !! QUEtiapine (SEROQUEL) 25 MG tablet Take 1 tablet (25 mg) by mouth At Bedtime  Qty: 30 tablet, Refills: 3    Associated Diagnoses: Acquired total absence of pancreas      sennosides (SENOKOT) 8.8 MG/5ML syrup 5-10 mLs by Per J Tube route 2 times daily  Qty: 600 mL, Refills: 3    Associated Diagnoses: Acquired total absence of pancreas      Sharps Container MISC Use as directed to dispose of needles, lancets and other sharps  Qty: 1 each, Refills: 0    Associated Diagnoses: Acquired total absence of pancreas       !! - Potential duplicate medications found. Please discuss with provider.        CONTINUE these medications which have CHANGED    Details   busPIRone (BUSPAR) 15 MG tablet Take 1 tablet (15 mg) by mouth 2 times daily  Qty: 60 tablet, Refills: 3    Associated Diagnoses: Acquired total absence of pancreas      ondansetron (ZOFRAN ODT) 4 MG ODT tab Take 1 tablet (4 mg) by mouth every 6 hours as needed for nausea or vomiting  Qty: 12 tablet, Refills: 3    Associated Diagnoses: Acquired total absence of pancreas            CONTINUE these medications which have NOT CHANGED    Details   gabapentin (NEURONTIN) 300 MG capsule Take 2 capsules (600 mg) by mouth 3 times daily      medroxyPROGESTERone (DEPO-PROVERA) 150 MG/ML IM injection Inject 150 mg into the muscle every 3 months Last injection was around September 15th           STOP taking these medications       acetaminophen (TYLENOL) 500 MG tablet Comments:   Reason for Stopping:         cetirizine (ZYRTEC) 10 MG tablet Comments:   Reason for Stopping:         citalopram (CELEXA) 40 MG tablet Comments:   Reason for Stopping:         famotidine (PEPCID) 20 MG tablet Comments:   Reason for Stopping:         HYDROmorphone (DILAUDID) 4 MG tablet Comments:   Reason for Stopping:         hydrOXYzine (ATARAX) 25 MG tablet Comments:   Reason for Stopping:         Multiple Vitamin (TAB-A-FABI) TABS Comments:   Reason for Stopping:         pancrelipase, lip-prot-amyl, 3000 UNITS (CREON) CPEP Comments:   Reason for Stopping:         senna-docusate (SENOKOT-S/PERICOLACE) 8.6-50 MG tablet Comments:   Reason for Stopping:                  Home Infusion Referral      Reason for your hospital stay    Patient underwent  total pancreatectomy, islet cell autotransplant, splenectomy, cholecystectomy, and gastrojejunostomy tube placement on 9/22/23.     Activity    Your activity upon discharge:   -Don't lift anything heavier than 10 pounds for 8 weeks (or longer if your surgeon has discussed this with you).  -Walk regularly.    -OK to drive only after no longer taking narcotics AND you feel comfortable working the breaks/clutch suddenly if needed.  -Wear abdominal binder for comfort if you desire.     When to contact your care team    Your transplant coordinator if you have any of the following:   Swelling, oozing, worsening pain, unusual redness around the incision  Fever of 101 F or higher   Increasing abdominal pain   Nausea or vomiting   Severe diarrhea, bloating, or constipation     Any  concerns or questions, please call your RN coordinator:    Phone: [phone].  If they are not available, the on call coordinator/MD will help you with your concern.  If you are unable to reach a coordinator and have an urgent medical questions, please call the hospital at [phone] and ask to have the Pancreas Transplant Surgery fellow on-call paged.     Wound care and dressings    Instructions to care for your wound at home: Gently wash with soap and water, but do not scrub. Do not soak in a bath until the glue has naturally fallen off and any openings are closed (at least 2 weeks).     Tubes and drains    You are going home with the following tubes or drains:   1. G-tube capped. May vent G-tube as needed if experiencing intractable nausea, increased abdominal pain, or abdominal distension. Flush G-tube with 30 ml water daily to keep tube patent.  2. J-tube to have continuous tube feeding. Flush J-tube with 15-30 ml water pre and post medication administration.     Follow Up ([facility])    Patient to follow up within 2 weeks of discharge with [facility].     Appointments on [place] and/or [facility] (with [facility] or [facility] provider or service). Call [phone] if you haven't heard regarding these appointments within 7 days of discharge.     Monitor and record    -blood glucose monitoring three times daily before meals and at bedtime     Adult [facility] Follow-up and recommended labs and tests    [facility] FOLLOW UP:     1. [facility]: Will be seen in the [facility] ([facility], ph. [phone], option 3) on the day after discharge, 10/4 at 0900. Please make sure she brings all her medications to your ATC appointment  2. Follow up with Dr. Junito Patel in Transplant Clinic weekly, starting 10/9/23.   3. Follow up with [facility] Endocr[facility]ology provider visit in 1-2 weeks after discharge (appointment request sent to clinic coordinator on 10/3).   4. Follow  up with Valentine Mejia, [facility] Endocrinology, on 10/31/23 at 9:15. For diabetic education.     Remember to always bring an updated medication list to all appointments.      Call your RN Coordinator with questions about follow up appointment scheduling.   Your transplant coordinator is Drew Chiang. Phone: [phone], Fax: [phone]     Diet    Follow this diet upon discharge:   1. Clear Liquid Diet (SUGAR FREE)  2. Adult Formula Drip Feeding: Continuous Vital 1.5; Jejunostomy; Goal Rate: 50 mL/hr; mL/hr    RELIZORB CARTRIDGES  *Change 1 cartridge every 12 hours         Data   Most Recent 3 CBC's:  Recent Labs   Lab Test 10/02/23  0955 09/30/23  0942 09/29/23  0026   WBC 12.7* 12.9* 10.5   HGB 10.2* 9.8* 8.4*   MCV 93 90 89   * 771* 545*      Most Recent 3 BMP's:  Recent Labs   Lab Test 10/03/23  0802 10/03/23  0605 10/03/23  0216 10/02/23  1144 10/02/23  0955 09/30/23  1215 09/30/23  0942 09/29/23  0103 09/29/23  0026   NA  --   --   --   --  141  --  137  --  137   POTASSIUM  --   --   --   --  3.7  --  4.2  --  3.4   CHLORIDE  --   --   --   --  103  --  100  --  116*   CO2  --   --   --   --  26  --  26  --  23   BUN  --   --   --   --  10.5  --  9.0  --  7.1   CR  --   --   --   --  0.53  --  0.58  --  0.50*   ANIONGAP  --   --   --   --  12  --  11  --  <1*   MURTAZA  --   --   --   --  9.0  --  9.0  --  7.4*   * 109* 77   < > 70   < > 244*   < > 134*    < > = values in this interval not displayed.     Most Recent 2 LFT's:  Recent Labs   Lab Test 09/27/23  0517 09/23/23  0304   AST 16 63*   ALT 18 38   ALKPHOS 75 32*   BILITOTAL 0.3 0.4     Most Recent INR's and Anticoagulation Dosing History:  Anticoagulation Dose History  More data exists         Latest Ref Rng & Units 9/27/2023 9/28/2023 9/29/2023 9/30/2023 10/1/2023 10/2/2023 10/3/2023   Recent Dosing and Labs   warfarin ANTICOAGULANT (COUMADIN) tablet 2.5 mg - 2.5 mg, $Given - - - - - -   INR 0.85 - 1.15 1.03  1.06  1.02  1.08  1.14  1.15   1.09      Most Recent 3 Troponin's:No lab results found.  Most Recent Cholesterol Panel:  Recent Labs   Lab Test 09/21/23  1609   CHOL 144   LDL 75   HDL 54   TRIG 73     Most Recent 6 Bacteria Isolates From Any Culture (See EPIC Reports for Culture Details):No lab results found.  Most Recent TSH, T4 and A1c Labs:  Recent Labs   Lab Test 09/22/23  2345 08/18/23  1726   TSH  --  1.68   A1C 5.5  --        Lab Results   Component Value Date    LIPASE 6 (L) 09/27/2023    LIPASE 12 (L) 09/23/2023    LIPASE 11 (L) 08/18/2023    LIPASE 29 06/28/2023    LIPASE 29 05/10/2023     Lab Results   Component Value Date    AMYLASE 13 (L) 09/27/2023    AMYLASE 25 (L) 09/23/2023    AMYLASE 62 06/28/2023    AMYLASE 54 05/10/2023    AMYLASE 59 03/13/2023

## 2023-09-26 NOTE — CONSULTS
Discharge Pharmacy Test Claim    Patient has pharmacy benefits through [facility] prepaid medical assistance plan. Requested test claims and expected monthly copays listed below.    Test Claim Copay   accu-chek guide 0.00   baqsimi PA required   glucagon kit 1.00   gvoke 3.00   lantus solostar pens 3.00   novolog flexpens 3.00     Brenda Erazo  [facility] Liaison  Ph: [phone] Pager: [phone]   Securely message with the Vocera Web Console (learn more here)

## 2023-09-27 ENCOUNTER — DOCUMENTATION ONLY (OUTPATIENT)
Dept: TRANSPLANT | Facility: CLINIC | Age: 28
End: 2023-09-27
Payer: COMMERCIAL

## 2023-09-27 ENCOUNTER — APPOINTMENT (OUTPATIENT)
Dept: PHYSICAL THERAPY | Facility: CLINIC | Age: 28
End: 2023-09-27
Attending: SURGERY
Payer: COMMERCIAL

## 2023-09-27 LAB
ALBUMIN SERPL BCG-MCNC: 2.9 G/DL (ref 3.5–5.2)
ALP SERPL-CCNC: 75 U/L (ref 35–104)
ALT SERPL W P-5'-P-CCNC: 18 U/L (ref 0–50)
AMYLASE SERPL-CCNC: 13 U/L (ref 28–100)
ANION GAP SERPL CALCULATED.3IONS-SCNC: 11 MMOL/L (ref 7–15)
APTT PPP: 28 SECONDS (ref 22–38)
AST SERPL W P-5'-P-CCNC: 16 U/L (ref 0–45)
BASOPHILS # BLD AUTO: 0 10E3/UL (ref 0–0.2)
BASOPHILS NFR BLD AUTO: 0 %
BILIRUB DIRECT SERPL-MCNC: <0.2 MG/DL (ref 0–0.3)
BILIRUB SERPL-MCNC: 0.3 MG/DL
BUN SERPL-MCNC: 5.2 MG/DL (ref 6–20)
CALCIUM SERPL-MCNC: 8.2 MG/DL (ref 8.6–10)
CHLORIDE SERPL-SCNC: 108 MMOL/L (ref 98–107)
CREAT SERPL-MCNC: 0.56 MG/DL (ref 0.51–0.95)
DEPRECATED HCO3 PLAS-SCNC: 25 MMOL/L (ref 22–29)
EGFRCR SERPLBLD CKD-EPI 2021: >90 ML/MIN/1.73M2
EOSINOPHIL # BLD AUTO: 0.5 10E3/UL (ref 0–0.7)
EOSINOPHIL NFR BLD AUTO: 5 %
ERYTHROCYTE [DISTWIDTH] IN BLOOD BY AUTOMATED COUNT: 14.5 % (ref 10–15)
GLUCOSE BLDC GLUCOMTR-MCNC: 101 MG/DL (ref 70–99)
GLUCOSE BLDC GLUCOMTR-MCNC: 103 MG/DL (ref 70–99)
GLUCOSE BLDC GLUCOMTR-MCNC: 115 MG/DL (ref 70–99)
GLUCOSE BLDC GLUCOMTR-MCNC: 118 MG/DL (ref 70–99)
GLUCOSE BLDC GLUCOMTR-MCNC: 122 MG/DL (ref 70–99)
GLUCOSE BLDC GLUCOMTR-MCNC: 138 MG/DL (ref 70–99)
GLUCOSE BLDC GLUCOMTR-MCNC: 144 MG/DL (ref 70–99)
GLUCOSE BLDC GLUCOMTR-MCNC: 155 MG/DL (ref 70–99)
GLUCOSE BLDC GLUCOMTR-MCNC: 155 MG/DL (ref 70–99)
GLUCOSE BLDC GLUCOMTR-MCNC: 161 MG/DL (ref 70–99)
GLUCOSE BLDC GLUCOMTR-MCNC: 165 MG/DL (ref 70–99)
GLUCOSE BLDC GLUCOMTR-MCNC: 168 MG/DL (ref 70–99)
GLUCOSE BLDC GLUCOMTR-MCNC: 171 MG/DL (ref 70–99)
GLUCOSE BLDC GLUCOMTR-MCNC: 171 MG/DL (ref 70–99)
GLUCOSE BLDC GLUCOMTR-MCNC: 184 MG/DL (ref 70–99)
GLUCOSE BLDC GLUCOMTR-MCNC: 195 MG/DL (ref 70–99)
GLUCOSE BLDC GLUCOMTR-MCNC: 198 MG/DL (ref 70–99)
GLUCOSE BLDC GLUCOMTR-MCNC: 212 MG/DL (ref 70–99)
GLUCOSE BLDC GLUCOMTR-MCNC: 270 MG/DL (ref 70–99)
GLUCOSE BLDC GLUCOMTR-MCNC: 63 MG/DL (ref 70–99)
GLUCOSE BLDC GLUCOMTR-MCNC: 66 MG/DL (ref 70–99)
GLUCOSE BLDC GLUCOMTR-MCNC: 90 MG/DL (ref 70–99)
GLUCOSE BLDC GLUCOMTR-MCNC: 92 MG/DL (ref 70–99)
GLUCOSE BLDC GLUCOMTR-MCNC: 94 MG/DL (ref 70–99)
GLUCOSE BLDC GLUCOMTR-MCNC: 96 MG/DL (ref 70–99)
GLUCOSE BLDC GLUCOMTR-MCNC: 97 MG/DL (ref 70–99)
GLUCOSE SERPL-MCNC: 78 MG/DL (ref 70–99)
HCT VFR BLD AUTO: 28.1 % (ref 35–47)
HGB BLD-MCNC: 9.5 G/DL (ref 11.7–15.7)
HOLD SPECIMEN: NORMAL
HOLD SPECIMEN: NORMAL
IMM GRANULOCYTES # BLD: 0.1 10E3/UL
IMM GRANULOCYTES NFR BLD: 1 %
INR PPP: 1.03 (ref 0.85–1.15)
LIPASE SERPL-CCNC: 6 U/L (ref 13–60)
LYMPHOCYTES # BLD AUTO: 3.9 10E3/UL (ref 0.8–5.3)
LYMPHOCYTES NFR BLD AUTO: 35 %
MAGNESIUM SERPL-MCNC: 1.8 MG/DL (ref 1.7–2.3)
MCH RBC QN AUTO: 29.4 PG (ref 26.5–33)
MCHC RBC AUTO-ENTMCNC: 33.8 G/DL (ref 31.5–36.5)
MCV RBC AUTO: 87 FL (ref 78–100)
MONOCYTES # BLD AUTO: 1.4 10E3/UL (ref 0–1.3)
MONOCYTES NFR BLD AUTO: 13 %
NEUTROPHILS # BLD AUTO: 5.3 10E3/UL (ref 1.6–8.3)
NEUTROPHILS NFR BLD AUTO: 46 %
NRBC # BLD AUTO: 0 10E3/UL
NRBC BLD AUTO-RTO: 0 /100
PHOSPHATE SERPL-MCNC: 2.3 MG/DL (ref 2.5–4.5)
PLATELET # BLD AUTO: 478 10E3/UL (ref 150–450)
POTASSIUM SERPL-SCNC: 3.2 MMOL/L (ref 3.4–5.3)
PROT SERPL-MCNC: 5.2 G/DL (ref 6.4–8.3)
RBC # BLD AUTO: 3.23 10E6/UL (ref 3.8–5.2)
SODIUM SERPL-SCNC: 144 MMOL/L (ref 135–145)
WBC # BLD AUTO: 11.2 10E3/UL (ref 4–11)

## 2023-09-27 PROCEDURE — 250N000009 HC RX 250: Performed by: PHYSICIAN ASSISTANT

## 2023-09-27 PROCEDURE — 84100 ASSAY OF PHOSPHORUS: CPT | Performed by: SURGERY

## 2023-09-27 PROCEDURE — 120N000011 HC R&B TRANSPLANT UMMC

## 2023-09-27 PROCEDURE — 250N000009 HC RX 250

## 2023-09-27 PROCEDURE — 93010 ELECTROCARDIOGRAM REPORT: CPT | Performed by: INTERNAL MEDICINE

## 2023-09-27 PROCEDURE — 90834 PSYTX W PT 45 MINUTES: CPT | Performed by: STUDENT IN AN ORGANIZED HEALTH CARE EDUCATION/TRAINING PROGRAM

## 2023-09-27 PROCEDURE — 83735 ASSAY OF MAGNESIUM: CPT | Performed by: SURGERY

## 2023-09-27 PROCEDURE — 250N000011 HC RX IP 250 OP 636: Mod: JZ | Performed by: NURSE PRACTITIONER

## 2023-09-27 PROCEDURE — 83690 ASSAY OF LIPASE: CPT | Performed by: SURGERY

## 2023-09-27 PROCEDURE — 85730 THROMBOPLASTIN TIME PARTIAL: CPT | Performed by: SURGERY

## 2023-09-27 PROCEDURE — 258N000003 HC RX IP 258 OP 636: Performed by: PHYSICIAN ASSISTANT

## 2023-09-27 PROCEDURE — 97116 GAIT TRAINING THERAPY: CPT | Mod: GP | Performed by: PHYSICAL THERAPIST

## 2023-09-27 PROCEDURE — 258N000003 HC RX IP 258 OP 636

## 2023-09-27 PROCEDURE — 999N000248 HC STATISTIC IV INSERT WITH US BY RN

## 2023-09-27 PROCEDURE — 250N000011 HC RX IP 250 OP 636: Mod: JZ | Performed by: PHYSICIAN ASSISTANT

## 2023-09-27 PROCEDURE — 85025 COMPLETE CBC W/AUTO DIFF WBC: CPT | Performed by: SURGERY

## 2023-09-27 PROCEDURE — 80053 COMPREHEN METABOLIC PANEL: CPT | Performed by: SURGERY

## 2023-09-27 PROCEDURE — 250N000013 HC RX MED GY IP 250 OP 250 PS 637: Performed by: SURGERY

## 2023-09-27 PROCEDURE — 93005 ELECTROCARDIOGRAM TRACING: CPT

## 2023-09-27 PROCEDURE — 01996 DLY HOSP MGMT EDRL RX ADMIN: CPT | Mod: GC | Performed by: ANESTHESIOLOGY

## 2023-09-27 PROCEDURE — 82248 BILIRUBIN DIRECT: CPT | Performed by: SURGERY

## 2023-09-27 PROCEDURE — 85610 PROTHROMBIN TIME: CPT | Performed by: PHYSICIAN ASSISTANT

## 2023-09-27 PROCEDURE — 250N000013 HC RX MED GY IP 250 OP 250 PS 637

## 2023-09-27 PROCEDURE — 97530 THERAPEUTIC ACTIVITIES: CPT | Mod: GP | Performed by: PHYSICAL THERAPIST

## 2023-09-27 PROCEDURE — 250N000013 HC RX MED GY IP 250 OP 250 PS 637: Performed by: NURSE PRACTITIONER

## 2023-09-27 PROCEDURE — 250N000011 HC RX IP 250 OP 636

## 2023-09-27 PROCEDURE — 82150 ASSAY OF AMYLASE: CPT | Performed by: SURGERY

## 2023-09-27 PROCEDURE — 250N000013 HC RX MED GY IP 250 OP 250 PS 637: Performed by: PHYSICIAN ASSISTANT

## 2023-09-27 PROCEDURE — 36592 COLLECT BLOOD FROM PICC: CPT | Performed by: SURGERY

## 2023-09-27 PROCEDURE — 999N000111 HC STATISTIC OT IP EVAL DEFER

## 2023-09-27 RX ORDER — WARFARIN SODIUM 2.5 MG/1
2.5 TABLET ORAL
Status: COMPLETED | OUTPATIENT
Start: 2023-09-27 | End: 2023-09-27

## 2023-09-27 RX ORDER — HYDROMORPHONE HYDROCHLORIDE 5 MG/5ML
8 SOLUTION ORAL
Status: DISCONTINUED | OUTPATIENT
Start: 2023-09-27 | End: 2023-09-27

## 2023-09-27 RX ORDER — HYDROMORPHONE HYDROCHLORIDE 5 MG/5ML
4 SOLUTION ORAL
Status: DISCONTINUED | OUTPATIENT
Start: 2023-09-27 | End: 2023-09-27

## 2023-09-27 RX ORDER — METHADONE HYDROCHLORIDE 5 MG/5ML
4 SOLUTION ORAL EVERY 8 HOURS
Status: DISCONTINUED | OUTPATIENT
Start: 2023-09-27 | End: 2023-09-29

## 2023-09-27 RX ORDER — AMOXICILLIN 250 MG
2 CAPSULE ORAL 2 TIMES DAILY
Status: DISCONTINUED | OUTPATIENT
Start: 2023-09-27 | End: 2023-09-27

## 2023-09-27 RX ORDER — HYDROXYZINE HCL 10 MG/5 ML
25 SOLUTION, ORAL ORAL ONCE
Status: COMPLETED | OUTPATIENT
Start: 2023-09-27 | End: 2023-09-27

## 2023-09-27 RX ORDER — HYDROXYZINE HCL 10 MG/5 ML
25 SOLUTION, ORAL ORAL EVERY 6 HOURS PRN
Status: DISCONTINUED | OUTPATIENT
Start: 2023-09-27 | End: 2023-09-28

## 2023-09-27 RX ORDER — DEXTROSE MONOHYDRATE 100 MG/ML
INJECTION, SOLUTION INTRAVENOUS
Status: DISCONTINUED
Start: 2023-09-27 | End: 2023-09-27 | Stop reason: HOSPADM

## 2023-09-27 RX ORDER — POTASSIUM CHLORIDE 20MEQ/15ML
60 LIQUID (ML) ORAL ONCE
Status: COMPLETED | OUTPATIENT
Start: 2023-09-27 | End: 2023-09-27

## 2023-09-27 RX ORDER — AMOXICILLIN 250 MG
1 CAPSULE ORAL 2 TIMES DAILY
Status: DISCONTINUED | OUTPATIENT
Start: 2023-09-27 | End: 2023-09-27

## 2023-09-27 RX ORDER — ENOXAPARIN SODIUM 100 MG/ML
1 INJECTION SUBCUTANEOUS EVERY 12 HOURS
Status: DISCONTINUED | OUTPATIENT
Start: 2023-09-27 | End: 2023-09-28

## 2023-09-27 RX ORDER — POLYETHYLENE GLYCOL 3350 17 G/17G
17 POWDER, FOR SOLUTION ORAL DAILY
Status: DISCONTINUED | OUTPATIENT
Start: 2023-09-27 | End: 2023-09-27

## 2023-09-27 RX ADMIN — POTASSIUM CHLORIDE 60 MEQ: 1.5 SOLUTION ORAL at 09:30

## 2023-09-27 RX ADMIN — Medication 40 MG: at 08:16

## 2023-09-27 RX ADMIN — ACETAMINOPHEN 650 MG: 160 LIQUID ORAL at 06:18

## 2023-09-27 RX ADMIN — METHOCARBAMOL 1000 MG: 100 INJECTION, SOLUTION INTRAMUSCULAR; INTRAVENOUS at 04:19

## 2023-09-27 RX ADMIN — AMPICILLIN 2 G: 2 INJECTION, POWDER, FOR SOLUTION INTRAVENOUS at 00:23

## 2023-09-27 RX ADMIN — KETOROLAC TROMETHAMINE 15 MG: 15 INJECTION INTRAMUSCULAR; INTRAVENOUS at 01:15

## 2023-09-27 RX ADMIN — SIMETHICONE 80 MG: 80 TABLET, CHEWABLE ORAL at 09:25

## 2023-09-27 RX ADMIN — KETAMINE HYDROCHLORIDE 10 MG/HR: 10 INJECTION INTRAMUSCULAR; INTRAVENOUS at 00:31

## 2023-09-27 RX ADMIN — Medication 6 MG: at 21:07

## 2023-09-27 RX ADMIN — BUSPIRONE HYDROCHLORIDE 10 MG: 10 TABLET ORAL at 21:07

## 2023-09-27 RX ADMIN — ENOXAPARIN SODIUM 60 MG: 60 INJECTION SUBCUTANEOUS at 18:32

## 2023-09-27 RX ADMIN — METHADONE HYDROCHLORIDE 4 MG: 5 SOLUTION ORAL at 22:25

## 2023-09-27 RX ADMIN — ACETAMINOPHEN 650 MG: 160 LIQUID ORAL at 18:28

## 2023-09-27 RX ADMIN — HYDROXYZINE HYDROCHLORIDE 50 MG: 10 SYRUP ORAL at 08:16

## 2023-09-27 RX ADMIN — HYDROXYZINE HYDROCHLORIDE 50 MG: 10 SYRUP ORAL at 02:31

## 2023-09-27 RX ADMIN — AMPICILLIN 2 G: 2 INJECTION, POWDER, FOR SOLUTION INTRAVENOUS at 18:28

## 2023-09-27 RX ADMIN — CEFEPIME HYDROCHLORIDE 2 G: 2 INJECTION, POWDER, FOR SOLUTION INTRAVENOUS at 10:17

## 2023-09-27 RX ADMIN — BUSPIRONE HYDROCHLORIDE 10 MG: 10 TABLET ORAL at 09:25

## 2023-09-27 RX ADMIN — GABAPENTIN 600 MG: 250 SUSPENSION ORAL at 05:22

## 2023-09-27 RX ADMIN — GABAPENTIN 600 MG: 250 SUSPENSION ORAL at 21:07

## 2023-09-27 RX ADMIN — AMPICILLIN 2 G: 2 INJECTION, POWDER, FOR SOLUTION INTRAVENOUS at 05:22

## 2023-09-27 RX ADMIN — SODIUM PHOSPHATE, MONOBASIC, MONOHYDRATE AND SODIUM PHOSPHATE, DIBASIC, ANHYDROUS 9 MMOL: 142; 276 INJECTION, SOLUTION INTRAVENOUS at 11:32

## 2023-09-27 RX ADMIN — SIMETHICONE 80 MG: 80 TABLET, CHEWABLE ORAL at 21:26

## 2023-09-27 RX ADMIN — CITALOPRAM 40 MG: 10 SOLUTION ORAL at 08:16

## 2023-09-27 RX ADMIN — HYDROMORPHONE HYDROCHLORIDE 4 MG: 1 SOLUTION ORAL at 08:47

## 2023-09-27 RX ADMIN — THIAMINE HYDROCHLORIDE 100 MG: 100 INJECTION, SOLUTION INTRAMUSCULAR; INTRAVENOUS at 08:18

## 2023-09-27 RX ADMIN — Medication 40 MG: at 21:06

## 2023-09-27 RX ADMIN — Medication: at 16:08

## 2023-09-27 RX ADMIN — HYDROXYZINE HYDROCHLORIDE 25 MG: 10 SOLUTION ORAL at 18:31

## 2023-09-27 RX ADMIN — HYDROXYZINE HYDROCHLORIDE 50 MG: 10 SYRUP ORAL at 21:06

## 2023-09-27 RX ADMIN — Medication: at 18:53

## 2023-09-27 RX ADMIN — HYDROXYZINE HYDROCHLORIDE 50 MG: 10 SYRUP ORAL at 15:15

## 2023-09-27 RX ADMIN — HYDROMORPHONE HYDROCHLORIDE 4 MG: 1 SOLUTION ORAL at 04:21

## 2023-09-27 RX ADMIN — ACETAMINOPHEN 650 MG: 160 LIQUID ORAL at 10:39

## 2023-09-27 RX ADMIN — GABAPENTIN 600 MG: 250 SUSPENSION ORAL at 14:13

## 2023-09-27 RX ADMIN — KETAMINE HYDROCHLORIDE 5 MG/HR: 10 INJECTION INTRAMUSCULAR; INTRAVENOUS at 11:40

## 2023-09-27 RX ADMIN — CEFEPIME HYDROCHLORIDE 2 G: 2 INJECTION, POWDER, FOR SOLUTION INTRAVENOUS at 21:07

## 2023-09-27 RX ADMIN — METHADONE HYDROCHLORIDE 4 MG: 5 SOLUTION ORAL at 14:13

## 2023-09-27 RX ADMIN — HYDROXYZINE HYDROCHLORIDE 25 MG: 10 SOLUTION ORAL at 12:38

## 2023-09-27 RX ADMIN — KETOROLAC TROMETHAMINE 15 MG: 15 INJECTION INTRAMUSCULAR; INTRAVENOUS at 08:17

## 2023-09-27 RX ADMIN — SENNOSIDES 10 ML: 8.8 LIQUID ORAL at 21:07

## 2023-09-27 RX ADMIN — WARFARIN SODIUM 2.5 MG: 2.5 TABLET ORAL at 21:07

## 2023-09-27 RX ADMIN — Medication 15 ML: at 08:16

## 2023-09-27 RX ADMIN — AMPICILLIN 2 G: 2 INJECTION, POWDER, FOR SOLUTION INTRAVENOUS at 13:34

## 2023-09-27 RX ADMIN — KETOROLAC TROMETHAMINE 15 MG: 15 INJECTION INTRAMUSCULAR; INTRAVENOUS at 13:40

## 2023-09-27 RX ADMIN — POLYETHYLENE GLYCOL 3350 17 G: 17 POWDER, FOR SOLUTION ORAL at 09:52

## 2023-09-27 RX ADMIN — HYDROMORPHONE HYDROCHLORIDE 4 MG: 1 SOLUTION ORAL at 00:21

## 2023-09-27 ASSESSMENT — ACTIVITIES OF DAILY LIVING (ADL)
ADLS_ACUITY_SCORE: 22

## 2023-09-27 NOTE — PROGRESS NOTES
Pancreatitis Service - Daily Progress Note  09/27/2023    Assessment & Plan: Artie Burger is a 27 year old with PMH significant for recurrent acute on chronic pancreatitis 2/2 alcoholic pancreatitis. She is now s/p status post total pancreatectomy, islet cell autotransplant, splenectomy, and gastrojejunostomy tube placement on 9/22/23 with Dr. Junito Patel MD.        Cardiorespiratory:   Hypotensive: Resolved. Stable   Hypoxia: Resolved. Encourage IS/Pulm toilet.    GI/Nutrition:   S/p TPAIT:5 NPO, G/J tube in place. Her g tube is currently draining (675cc/24hr) and her j tube is running Vital 1.5 +relizorb tube feeds at 50 cc/h (goal 50).   Continue bowel regimen senna/colace/PEG.    Fluid/Electrolytes:   Hypervolemia: Wt up ~10kg. LR @ 50. Will decrease/stop MIV when EN at goal.   Hypomagnesemia: Mg sulfate 4g IV x 1    : No issues.    Post-pancreatectomy diabetes: Insulin gtt, appreciate Endocrine consult.    Infection: Afebrile   Leukocytosis, peaked at 23, trending down to 11.2 today  Panc preservation/islets : Pancreas preservation soln grew Enterobacter clocae (sensitive to ampicillin) and E. Faecium (sensitive to ampicillin) - treatment including Meropenem, Cefepime, Zosyn, and ampicillin due switching antibiotics with increasing leukocytosis and sensitivities (9/22-9/29). Islet Final product culture grew Enterobacter clocae (resistant to Zosyn) and E. Faecium (sensitive to ampicillin) switched to Cefipime 9/25 for coverage of Enterobacter clocae (9/22-9/30).    Prophylaxis: PPI, Anticoagulation: Hep 400units/hr. RUQ US: dampened bidirectional flow left PV. Will discuss with staff.     Pain control: Fair. Pain regimen adjusted (added Toradol) yesterday due to limited activity. Minimal improvement.   Epidural-infusion + bolus BID PRN  Neurontin 600 mg per J tube Q8  Ketamine continuous infusion, decrease to 5 mg/hr    Toradol 15 mg every 6 hours x 5 days (9/26-9/30).   Stop oral dilaudid and switch  to methadone 4 mg soln every 8 hours. Continue dilaudid PCA 0.2, decrease to q20 bolus PRN.  Robaxin 1G IV Q8  Atarax 50 mg every 6 hours per J tube PRN anxiety/adjuvant pain  Precedex stopped 9/25    Anxiety: PTA buspar 10 mg daily, give PO and citalopram 40 mg daily, give per J tube. Atarax per J tube scheduled, for anxiety. Adult psychology consult. Patient seen by Erica Bright 9/25 and 9/26.    Nicotine dependence/vaping: Avoid nicotine patches at this time.     Activity: OOB to chair. Ambulate with assist. PT consulted.    Anticipated LOS/Discharge: 7A, 7-10 days. Possible discharge this weekend.    JOSEFA/Fellow/Resident Provider: FARZANA Quiles [phone]     Faculty: Junito Patel MD    __________________________________________________________________  Transplant History: Admitted 9/22/2023 for Chronic pancreatitis (H) [K86.1]     9/22/2023 (Islet), Postoperative day: 5     Interval History: History is obtained from the patient  Overnight events: No nausea. Tolerating G tube clamped this morning, late morning needed G tube vented due to abdominal distension. Loose stools. Limited activity due to pain. Doing overall much better today.    ROS:   A 10-point review of systems was negative except as noted above.    Curent Meds:   ampicillin  2 g Intravenous Q6H    busPIRone  10 mg Oral BID    ceFEPIme  2 g Intravenous Q12H    citalopram  40 mg Per J Tube Daily    dextrose 10%        sennosides  5-10 mL Per J Tube BID    And    docusate   mg Per J Tube BID    gabapentin  600 mg Per J Tube Q8H LELO    HYDROmorphone  4 mg Oral Q4H    hydrOXYzine  50 mg Per J Tube Q6H    ketorolac  15 mg Intravenous Q6H    multivitamins w/minerals  15 mL Per J Tube Daily    pantoprazole  40 mg Per J Tube BID    polyethylene glycol  17 g Per J Tube Daily    potassium chloride  60 mEq Oral Once    sodium chloride (PF)  3 mL Intracatheter Q8H    sodium phosphate  9 mmol Intravenous Once    thiamine  100 mg Intravenous Daily  "      Physical Exam:     Admit Weight: 58.3 kg (128 lb 8.5 oz)    Current Vitals:   /85 (BP Location: Left arm, Cuff Size: Adult Regular)   Pulse 88   Temp 98.9  F (37.2  C) (Oral)   Resp 18   Ht 1.575 m (5' 2\")   Wt 69.3 kg (152 lb 12.5 oz)   LMP 08/16/2023 (Approximate)   SpO2 96%   BMI 27.94 kg/m           Vital sign ranges:    Temp:  [98.1  F (36.7  C)-99.4  F (37.4  C)] 98.9  F (37.2  C)  Pulse:  [75-88] 88  Resp:  [16-18] 18  BP: (111-128)/(75-98) 124/85  SpO2:  [94 %-99 %] 96 %  Patient Vitals for the past 24 hrs:   BP Temp Temp src Pulse Resp SpO2   09/27/23 0622 124/85 98.9  F (37.2  C) Oral 88 18 96 %   09/27/23 0153 123/75 99.4  F (37.4  C) Oral 88 16 96 %   09/26/23 2153 115/85 99  F (37.2  C) Oral 75 16 95 %   09/26/23 1821 111/76 98.1  F (36.7  C) Oral 84 17 99 %   09/26/23 1046 120/86 98.9  F (37.2  C) Oral 87 16 94 %   09/26/23 0928 (!) 128/98 -- -- -- -- --   09/26/23 0900 118/79 -- -- -- -- --       General Appearance: NAD  Skin: normal, warm, dry  Heart: well perfused  Lungs:   NLB on RA  Abdomen: The abdomen is soft, ND, and  mildly tender. The wound is well approximated and without signs of infection. Tube/Drain sites are: CDI.  NINFA: serosanguinous, 10 ml output.  J tube infusing EN, G tube with small amount of pale yellow output.  :voiding  Extremities: edema: BLE 1+    Data:   CMP  Recent Labs   Lab 09/27/23  0759 09/27/23  0659 09/27/23  0619 09/27/23  0517 09/26/23  0651 09/26/23  0644 09/23/23  0309 09/23/23  0304 09/22/23 2026 09/22/23 1929 09/22/23 1914   NA  --   --   --  144  --  140   < > 142   < > 140 140   POTASSIUM  --   --   --  3.2*  --  3.7   < > 3.3*   < > 3.9 3.8   CHLORIDE  --   --   --  108*  --  106   < > 110*   < >  --   --    CO2  --   --   --  25  --  22   < > 22   < >  --   --    * 96   < > 78   < > 143*   < > 77   < > 96 93   BUN  --   --   --  5.2*  --  3.3*   < > 12.6   < >  --   --    CR  --   --   --  0.56  --  0.62   < > 0.85   < >  --   -- "    GFRESTIMATED  --   --   --  >90  --  >90   < > >90   < >  --   --    MURTAZA  --   --   --  8.2*  --  8.1*   < > 8.7   < >  --   --    ICAW  --   --   --   --   --   --   --   --   --  4.7 4.9   MAG  --   --   --  1.8  --  1.5*   < >  --   --   --   --    PHOS  --   --   --  2.3*  --  3.5   < >  --   --   --   --    AMYLASE  --   --   --  13*  --   --   --  25*  --   --   --    LIPASE  --   --   --  6*  --   --   --  12*  --   --   --    ALBUMIN  --   --   --  2.9*  --   --   --  3.3*   < >  --   --    BILITOTAL  --   --   --  0.3  --   --   --  0.4   < >  --   --    ALKPHOS  --   --   --  75  --   --   --  32*   < >  --   --    AST  --   --   --  16  --   --   --  63*   < >  --   --    ALT  --   --   --  18  --   --   --  38   < >  --   --     < > = values in this interval not displayed.       CBC  Recent Labs   Lab 09/27/23 0517 09/26/23  0644 09/23/23  0304 09/22/23  2345   HGB 9.5* 9.5*   < >  --    WBC 11.2* 14.4*   < >  --    * 409   < >  --    A1C  --   --   --  5.5    < > = values in this interval not displayed.       Coags  Recent Labs   Lab 09/27/23  0517 09/26/23  0644 09/23/23  0304 09/22/23 2031 09/21/23  1609   INR  --   --   --  1.34* 1.13   PTT 28 32   < > 110*  --     < > = values in this interval not displayed.        Urinalysis  Recent Labs   Lab Test 09/21/23  1614   COLOR Yellow   APPEARANCE Slightly Cloudy*   URINEGLC Negative   URINEBILI Negative   URINEKETONE Negative   SG 1.021   UBLD Negative   URINEPH 7.0   PROTEIN 10*   NITRITE Negative   LEUKEST Negative   RBCU 2   WBCU <1

## 2023-09-27 NOTE — PROGRESS NOTES
Home Infusion    Artie is expected to discharge within a few days and will be going home on continuous enteral feeds with with Relizorb cassette s/p TPAIT.   She has not been to the [facility] yet but has an appt on Fri for some teaching on enteral feeds.    Temo is from [facility] but will be staying locally at her sister's home ([address] [place] [address] [place]) after discharge.  Benefits have been checked and pt will have coverage through her [facility] PMAP policy however it does not cover the relizorb cartridges.    Met with Artie at bedside to inform her about I services and plan for discharge.   Explained about home enteral pump and backpack for mobility while on continuous feeds.   Informed her that formula and supplies will be delivered to the hospital on day of discharge and I or another I nurse (depending on the day) will assist with hook up of home enteral feed pump and provide some additional teaching and information.  Explained about plan for appt in clinic day after discharge and first home nurse visit the day following clinic appt.  Talked about supplies and supply deliveries as well as 24/7 availability of I staff while on enteral therapy.       Artie verbalized understanding of all information given.   She is willing and able to learn and manage home enteral therapy along with the help of her sister who will be her primary CG.      Will continue to follow and update pt with more details once discharge is confirmed.     Mildred CARRION  Nurse Liaison  [facility] I [email or web address]  [address] [place] [address]  [email or web address]  [phone] M-F I I main: [phone]

## 2023-09-27 NOTE — PROGRESS NOTES
BRIEF SOCIAL WORK NOTE      SW attempted to meet with Artie at the bedside for psychosocial support. At baseline, she does suffer from intermittent panic and generalized anxiety. She has noticed an increase in anxiety symptoms since her hospitalization. Currently, health psychology is following. SW will continue to provide additional support, as needed. Artie will benefit from a consistent community provider. This visit was cut short due to a different provider visit, SW to return. ASHER will continue to follow for psychosocial support, resources and advocate on behalf of the patient.     LATOSHA Guan, [facility]  Outpatient Kidney/Pancreas/[facility] Transplant Program   [address] [place] [id number]  [email or web address]  Office: [phone] I Fax: [phone]

## 2023-09-27 NOTE — PROGRESS NOTES
"/85   Pulse 75   Temp 99  F (37.2  C) (Oral)   Resp 16   Ht 1.575 m (5' 2\")   Wt 69.3 kg (152 lb 12.5 oz)   LMP 2023 (Approximate)   SpO2 95%   BMI 27.94 kg/m      Shift: 3615-8700  Isolation Status: N/A  VS: WDL on room air, afebrile  Neuro: Aox4  Behaviors: Calm, cooperative, pleasant. Able to make needs known. Anxiety managed with scheduled Atarax.  B-164, insulin gtt algorithm 1   Labs: no new labs  Respiratory: WDL  Cardiac: WDL  Pain/Nausea: Continuous incisional pain, no nausea. Ropivacaine epidural, ketamine gtt, dilaudid PCA, scheduled oral dilaudid/robaxin/toradol  PRN: N/A  Diet: NPO+ ice chips. TF via PEG-J increased to 50 ml/hr at 2300. Relizorb changed at 2300.  IV Access: R double lumen internal jugular. R PIV, L PIV  Infusion(s): ketamine gtt, heparin gtt straight rated 400 U/hr  Lines/Drains: R NINFA minimal serosang output, PEG-J tube 75 ml output before clamped, unclamped HS    GI/: 1 BM on shift  Skin: Midline incision BLAZE, mepilex on sacrum no signs of breakdown     Mobility: Assist of 1 with walker to commode  Events/Education: Med card not yet started   Plan: Continue with POC and update team with changes. Change relizorb at 1100.  "

## 2023-09-27 NOTE — CONSULTS
"  Health Psychology                                                                                                                          Lynn Villar, Ph.D., L.P [phone]  Rehana Ly, Ph.D., L.P. [phone]  Erica Bright, Ph.D, L..P. [phone]  Jacklyn Lawson, Ph.D., L.P. [phone]  Gerald Scott, Ph.D., A.B.P.P., L.P. [phone]         Jenifer Burger, Ph.D., L.P. [phone]       Grecia Daniel, Ph.D., A.B.P.P., LP [phone]           [facility], 3rd Floor  [address] [place] [address]      Inpatient Health Psychology Consultation    Date of Service:  9/27/23    BACKGROUND:  Per EMR: \"Artie Burger is a 27 year old with PMH significant for recurrent acute on chronic pancreatitis 2/2 alcoholic pancreatitis. She is now s/p status post total pancreatectomy, islet cell autotransplant, splenectomy, and gastrojejunostomy tube placement on 9/22/23 with Dr. Junito Patel MD.\"    Health psychology consulted by Bree Plaza PA-C, Pancreatitis Service to support Artie in managing symptoms of anxiety. This writer met with Artie for her presurgical psychological evaluation.     SUBJECTIVE:  Supportive follow-up visit with [facility] today. She reported feeling much more comfortable in her current room; anxiety decreased. She slept slightly better night prior. She asked again about if she would be getting Seroquel. She said she has taken that before and found it helpful for sleep and that she had spoken with one of her nurses about it. Bree Plaza PA-C, came into the room to update Angelique about blood clot prophylaxis plan, she's aware of the Seroquel question - will talk to surgeons or consult psychiatry.    During the visit Angelique shared what she wishes she would have known prior to surgery. She also highlighted aspects of the hospital environment contribute to anxiety. She also needed a new IV placed and her pumps were sounding which " provided more examples of factors that increase anxiety. Despite these things she was tolerating it well. This writer reinforced her comments about aspects of her recovery that are improving and ways she feels she is coping well. Validated her emotional experiences and identified additional ways to practice calm and feeling safe.    Angelique was engaged in the visit and expressed understanding of information provided.      OBJECTIVE:  Artie was sitting up in her recliner chair during the visit. She reported anxious mood. Affect mood- and thought-congruent. Thought processes logical and linear. Insight and judgment good. Speech WNL. Denied suicidal ideation.        ASSESSMENT:  Artie has a history of depression and anxiety managed with medications and psychotherapy. Since surgery she has noted increased anxiety, which she anticipated, primarily related to experiences which reminded her of times of problematic substance use (not remembering time after surgery), and pain. Prior to surgery she was most worried about being away from family and feeling lonely.     She benefits from having advanced notice for procedures, time for questions and explanations. She expressed gratitude for having medications scheduled as opposed to prn; it can be difficult for her to remember/ask due to anxiety.       DIAGNOSIS:  Chronic pancreatitis  Major depressive disorder, recurrent, moderate, in remission  Generalized anxiety disorder    PLAN:  Will follow-up throughout this week for support.    Erica Bright, PhD,   Clinical Health Psychologist  Phone: [phone]  Pager: [phone]      Time in: 3:45  Time out: 4:25

## 2023-09-27 NOTE — PLAN OF CARE
Occupational Therapy: Orders received. Chart reviewed and discussed with care team.? Occupational Therapy not indicated at this time as pt with no acute OT needs. Primarily limited by pain, OT following for basic mobility needs.? Defer discharge recommendations to PT.? Will complete orders. Please re-consult if pt experiences a change in status or if further needs are identified.

## 2023-09-27 NOTE — PLAN OF CARE
"Goal Outcome Evaluation:      Plan of Care Reviewed With: patient  /85 (BP Location: Left arm, Cuff Size: Adult Regular)   Pulse 88   Temp 98.9  F (37.2  C) (Oral)   Resp 18   Ht 1.575 m (5' 2\")   Wt 69.3 kg (152 lb 12.5 oz)   LMP 2023 (Approximate)   SpO2 96%   BMI 27.94 kg/m      Shift: 5323-6886  Isolation Status: standard isolation  VS: stable on RA, afebrile  Neuro: Aox4  Behaviors: calm & cooperative  B-176, insulin gtt algorithm 1-2. D10 was started twice with BG less than 80.  Labs: potassium and phosphorous low will need replacement  Respiratory: WDL  Cardiac: WDL  Pain/Nausea: pain at 7-8/10, scheduled pain meds given   PRN: tylenol given x1 and diluadid PCA pump.  Diet: TF at 50mL NPO except ice chips  IV Access: 2 R PIV's 2 L PIV's  Infusion(s): insulin gtt titrated to BG level, ketamine at 5mL/hr, two ropivocaine nerve blocks, heparin at 400 units/hr.  Lines/Drains: R NINFA drain with scant output and G tube to gravity with 400 output (pt taking a lot of ice chips, clear)  GI/: voided x2 mixed urine and stool.  Skin: midline incision with some erythema  Mobility: SBA to commode  Events/Education: central line pulled out some, medications stopped, provider notified, order to remove central line, oncoming nurse aware.  Plan: continue to manage blood sugars, encourage activity, PLC class scheduled at 1pm with sister.               "

## 2023-09-27 NOTE — PROGRESS NOTES
Pain Service Progress Note  [facility]  Date: 09/27/2023       Patient Name: Artie Burger  MRN: [id number]  Age: 27 year old  Sex: female      Assessment:  Artie Burger is a 27 year old with PMH significant for recurrent acute on chronic pancreatitis 2/2 alcoholic pancreatitis. She is now s/p status post total pancreatectomy, islet cell autotransplant, splenectomy, and gastrojejunostomy tube placement on 9/22/23.      Procedure: total pancreatectomy, islet cell autotransplant, splenectomy, and gastrojejunostomy tube placement     Date of Surgery: 9/22/23     Date of Catheter Placement: 9/22/23     Plan/Recommendations:  1. Regional Anesthesia/Analgesia  -Continuous Catheter Type/Site: bilateral paravertebral (PV)   Infusate:   Programmed Intermittent Bolus (PIB) at 6 mL Q60 min via each catheter, total infusion rate of 12 mL/hr     Plan to maintain catheter, max of 7 days      2. Anticoagulation  -Please contact Inpatient Pain Service before ordering or making any anticoagulation changes        3. Multimodal Analgesia  - Per primary team     Pain Service will continue to follow.    Discussed with attending anesthesiologist    Lalitha Middleton MD  09/27/2023     Overnight Events: No acute events overnight.     Tubes/Drains: Yes    Subjective:  much better than yesterdat   Nausea: No  Vomiting: No  Pruritus: No  Symptoms of LAST: No    Pain Location:  Abdomen    Diet: NPO for Medical/Clinical Reasons Except for: Ice Chips  Adult Formula Drip Feeding: Continuous Vital 1.5; Jejunostomy; Goal Rate: 50 mL/hr; mL/hr; 9/25: Advance by 10 ml/hr q 12 hours to goal rate of 50 ml/hr.  See separate Relizorb order for instructions.; Do not advance tube feeding rate unless K+ is...    Relevant Labs:  Recent Labs   Lab Test 09/27/23  0517 09/23/23  0304 09/22/23 2031   INR  --   --  1.34*   *   < > 346   PTT 28   < > 110*   BUN 5.2*   < > 13.5    < > = values in this interval not displayed.  "      Physical Exam:  Vitals: /87 (BP Location: Left arm)   Pulse 79   Temp 99  F (37.2  C) (Oral)   Resp 18   Ht 1.575 m (5' 2\")   Wt 69.3 kg (152 lb 12.5 oz)   LMP 08/16/2023 (Approximate)   SpO2 96%   BMI 27.94 kg/m      Physical Exam:   Orientation:  Alert, oriented, and in no acute distress: Yes  Sedation: No    Catheter Site:   Catheter entry site is clean/dry/intact: Yes    Tender: No      Relevant Medications:  Current Pain Medications:  Medications related to Pain Management (From now, onward)      Start     Dose/Rate Route Frequency Ordered Stop    09/27/23 0800  polyethylene glycol (MIRALAX) Packet 17 g         17 g Per J Tube DAILY 09/26/23 0842      09/26/23 2000  sennosides (SENOKOT) syrup 5-10 mL        See Hyperspace for full Linked Orders Report.    5-10 mL Per J Tube 2 TIMES DAILY 09/26/23 0842      09/26/23 2000  docusate (COLACE) 50 MG/5ML liquid  mg        See Hyperspace for full Linked Orders Report.     mg Per J Tube 2 TIMES DAILY 09/26/23 0842      09/26/23 1600  HYDROmorphone (STANDARD CONC) (DILAUDID) liquid 4 mg         4 mg Oral EVERY 4 HOURS 09/26/23 1426      09/26/23 1500  hydrOXYzine (ATARAX) syrup 50 mg        See Hyperspace for full Linked Orders Report.    50 mg Per J Tube EVERY 6 HOURS 09/26/23 1455      09/26/23 1456  hydrOXYzine (ATARAX) tablet 25 mg         25 mg Oral EVERY 6 HOURS PRN 09/26/23 1457      09/26/23 1454  simethicone (MYLICON) chewable tablet 80 mg         80 mg Oral EVERY 6 HOURS PRN 09/26/23 1454      09/26/23 1430  HYDROmorphone (DILAUDID) PCA 0.2 mg/mL OPIOID TOLERANT          Intravenous CONTINUOUS 09/26/23 1427      09/26/23 1400  gabapentin (NEURONTIN) solution 600 mg         600 mg Per J Tube EVERY 8 HOURS SCHEDULED 09/26/23 0842      09/26/23 1330  ketorolac (TORADOL) injection 15 mg         15 mg Intravenous EVERY 6 HOURS 09/26/23 1317 10/01/23 1329    09/26/23 0839  magnesium hydroxide (MILK OF MAGNESIA) suspension 30 mL      " "   30 mL Per J Tube DAILY PRN 09/26/23 0842      09/26/23 0826  acetaminophen *SUGAR FREE* (TYLENOL) solution 650 mg         650 mg Per J Tube EVERY 4 HOURS PRN 09/26/23 0829      09/25/23 2000  busPIRone (BUSPAR) tablet 10 mg         10 mg Oral 2 TIMES DAILY 09/25/23 1412      09/24/23 1500  ROPivacaine 0.2% in NS perineural infusion (simple)          Perineural Continuous Nerve Block 09/24/23 1458      09/24/23 1500  ROPivacaine 0.2% in NS perineural infusion (simple)          Perineural Continuous Nerve Block 09/24/23 1458      09/22/23 2249  lidocaine 1 % 0.1-1 mL         0.1-1 mL Other EVERY 1 HOUR PRN 09/22/23 2250      09/22/23 2249  lidocaine (LMX4) cream          Topical EVERY 1 HOUR PRN 09/22/23 2250      09/22/23 2249  bisacodyl (DULCOLAX) suppository 10 mg         10 mg Rectal DAILY PRN 09/22/23 2249 09/22/23 2249  [Held by provider]  methocarbamol (ROBAXIN) tablet 750 mg        (Held by provider since Sat 9/23/2023 at 1611 by Junito Patel MD.Hold Reason: OtherHold Comments: Cannot crush tablets with TPAIT)    750 mg Oral or Feeding Tube EVERY 6 HOURS PRN 09/22/23 2249 09/22/23 2100  ketamine (KETALAR) 2 mg/mL in sodium chloride 0.9 % 50 mL ANALGESIA infusion         5 mg/hr  2.5 mL/hr  Intravenous CONTINUOUS 09/22/23 2047              Primary Service Contacted with Recommendations? Yes            Acute Inpatient Pain Service [facility]  Hours of pain coverage 24/7   Page via Amcom- Please Page the Pain Team Via Amcom: \"PAIN MANAGEMENT ACUTE INPATIENT/ [facility]\"  "

## 2023-09-28 LAB
ANION GAP SERPL CALCULATED.3IONS-SCNC: 10 MMOL/L (ref 7–15)
ATRIAL RATE - MUSE: 75 BPM
BACTERIA SPEC CULT: ABNORMAL
BASOPHILS # BLD AUTO: 0 10E3/UL (ref 0–0.2)
BASOPHILS NFR BLD AUTO: 0 %
BUN SERPL-MCNC: 7.1 MG/DL (ref 6–20)
CALCIUM SERPL-MCNC: 8.4 MG/DL (ref 8.6–10)
CHLORIDE SERPL-SCNC: 105 MMOL/L (ref 98–107)
CREAT SERPL-MCNC: 0.63 MG/DL (ref 0.51–0.95)
DIASTOLIC BLOOD PRESSURE - MUSE: NORMAL MMHG
EGFRCR SERPLBLD CKD-EPI 2021: >90 ML/MIN/1.73M2
EOSINOPHIL # BLD AUTO: 0.6 10E3/UL (ref 0–0.7)
EOSINOPHIL NFR BLD AUTO: 5 %
ERYTHROCYTE [DISTWIDTH] IN BLOOD BY AUTOMATED COUNT: 14.4 % (ref 10–15)
ERYTHROCYTE [DISTWIDTH] IN BLOOD BY AUTOMATED COUNT: 14.5 % (ref 10–15)
ERYTHROCYTE [DISTWIDTH] IN BLOOD BY AUTOMATED COUNT: 14.6 % (ref 10–15)
GLUCOSE BLDC GLUCOMTR-MCNC: 102 MG/DL (ref 70–99)
GLUCOSE BLDC GLUCOMTR-MCNC: 103 MG/DL (ref 70–99)
GLUCOSE BLDC GLUCOMTR-MCNC: 112 MG/DL (ref 70–99)
GLUCOSE BLDC GLUCOMTR-MCNC: 125 MG/DL (ref 70–99)
GLUCOSE BLDC GLUCOMTR-MCNC: 125 MG/DL (ref 70–99)
GLUCOSE BLDC GLUCOMTR-MCNC: 128 MG/DL (ref 70–99)
GLUCOSE BLDC GLUCOMTR-MCNC: 134 MG/DL (ref 70–99)
GLUCOSE BLDC GLUCOMTR-MCNC: 146 MG/DL (ref 70–99)
GLUCOSE BLDC GLUCOMTR-MCNC: 147 MG/DL (ref 70–99)
GLUCOSE BLDC GLUCOMTR-MCNC: 148 MG/DL (ref 70–99)
GLUCOSE BLDC GLUCOMTR-MCNC: 149 MG/DL (ref 70–99)
GLUCOSE BLDC GLUCOMTR-MCNC: 149 MG/DL (ref 70–99)
GLUCOSE BLDC GLUCOMTR-MCNC: 150 MG/DL (ref 70–99)
GLUCOSE BLDC GLUCOMTR-MCNC: 165 MG/DL (ref 70–99)
GLUCOSE BLDC GLUCOMTR-MCNC: 165 MG/DL (ref 70–99)
GLUCOSE BLDC GLUCOMTR-MCNC: 173 MG/DL (ref 70–99)
GLUCOSE BLDC GLUCOMTR-MCNC: 176 MG/DL (ref 70–99)
GLUCOSE BLDC GLUCOMTR-MCNC: 194 MG/DL (ref 70–99)
GLUCOSE BLDC GLUCOMTR-MCNC: 50 MG/DL (ref 70–99)
GLUCOSE BLDC GLUCOMTR-MCNC: 51 MG/DL (ref 70–99)
GLUCOSE BLDC GLUCOMTR-MCNC: 59 MG/DL (ref 70–99)
GLUCOSE BLDC GLUCOMTR-MCNC: 73 MG/DL (ref 70–99)
GLUCOSE BLDC GLUCOMTR-MCNC: 78 MG/DL (ref 70–99)
GLUCOSE BLDC GLUCOMTR-MCNC: 79 MG/DL (ref 70–99)
GLUCOSE BLDC GLUCOMTR-MCNC: 83 MG/DL (ref 70–99)
GLUCOSE BLDC GLUCOMTR-MCNC: 88 MG/DL (ref 70–99)
GLUCOSE BLDC GLUCOMTR-MCNC: 95 MG/DL (ref 70–99)
GLUCOSE SERPL-MCNC: 139 MG/DL (ref 70–99)
HCO3 SERPL-SCNC: 26 MMOL/L (ref 22–29)
HCT VFR BLD AUTO: 26.3 % (ref 35–47)
HCT VFR BLD AUTO: 27.5 % (ref 35–47)
HCT VFR BLD AUTO: 27.9 % (ref 35–47)
HGB BLD-MCNC: 8.9 G/DL (ref 11.7–15.7)
HGB BLD-MCNC: 9.2 G/DL (ref 11.7–15.7)
HGB BLD-MCNC: 9.4 G/DL (ref 11.7–15.7)
HOLD SPECIMEN: NORMAL
IMM GRANULOCYTES # BLD: 0.1 10E3/UL
IMM GRANULOCYTES NFR BLD: 1 %
INR PPP: 1.06 (ref 0.85–1.15)
INTERPRETATION ECG - MUSE: NORMAL
LYMPHOCYTES # BLD AUTO: 4.1 10E3/UL (ref 0.8–5.3)
LYMPHOCYTES NFR BLD AUTO: 35 %
MAGNESIUM SERPL-MCNC: 1.6 MG/DL (ref 1.7–2.3)
MAGNESIUM SERPL-MCNC: 2.4 MG/DL (ref 1.7–2.3)
MCH RBC QN AUTO: 29.6 PG (ref 26.5–33)
MCH RBC QN AUTO: 29.8 PG (ref 26.5–33)
MCH RBC QN AUTO: 29.9 PG (ref 26.5–33)
MCHC RBC AUTO-ENTMCNC: 33.5 G/DL (ref 31.5–36.5)
MCHC RBC AUTO-ENTMCNC: 33.7 G/DL (ref 31.5–36.5)
MCHC RBC AUTO-ENTMCNC: 33.8 G/DL (ref 31.5–36.5)
MCV RBC AUTO: 87 FL (ref 78–100)
MCV RBC AUTO: 89 FL (ref 78–100)
MCV RBC AUTO: 89 FL (ref 78–100)
MONOCYTES # BLD AUTO: 1.9 10E3/UL (ref 0–1.3)
MONOCYTES NFR BLD AUTO: 16 %
NEUTROPHILS # BLD AUTO: 5.2 10E3/UL (ref 1.6–8.3)
NEUTROPHILS NFR BLD AUTO: 43 %
NRBC # BLD AUTO: 0 10E3/UL
NRBC BLD AUTO-RTO: 0 /100
P AXIS - MUSE: 62 DEGREES
PATH REPORT.COMMENTS IMP SPEC: NORMAL
PATH REPORT.COMMENTS IMP SPEC: NORMAL
PATH REPORT.FINAL DX SPEC: NORMAL
PATH REPORT.GROSS SPEC: NORMAL
PATH REPORT.MICROSCOPIC SPEC OTHER STN: NORMAL
PATH REPORT.RELEVANT HX SPEC: NORMAL
PHOSPHATE SERPL-MCNC: 3.2 MG/DL (ref 2.5–4.5)
PHOTO IMAGE: NORMAL
PLATELET # BLD AUTO: 501 10E3/UL (ref 150–450)
PLATELET # BLD AUTO: 515 10E3/UL (ref 150–450)
PLATELET # BLD AUTO: 558 10E3/UL (ref 150–450)
POTASSIUM SERPL-SCNC: 4.1 MMOL/L (ref 3.4–5.3)
PR INTERVAL - MUSE: 160 MS
QRS DURATION - MUSE: 76 MS
QT - MUSE: 418 MS
QTC - MUSE: 466 MS
R AXIS - MUSE: 71 DEGREES
RBC # BLD AUTO: 3.01 10E6/UL (ref 3.8–5.2)
RBC # BLD AUTO: 3.08 10E6/UL (ref 3.8–5.2)
RBC # BLD AUTO: 3.15 10E6/UL (ref 3.8–5.2)
SODIUM SERPL-SCNC: 141 MMOL/L (ref 135–145)
SYSTOLIC BLOOD PRESSURE - MUSE: NORMAL MMHG
T AXIS - MUSE: 64 DEGREES
UFH PPP CHRO-ACNC: 0.23 IU/ML
VENTRICULAR RATE- MUSE: 75 BPM
WBC # BLD AUTO: 11.5 10E3/UL (ref 4–11)
WBC # BLD AUTO: 11.8 10E3/UL (ref 4–11)
WBC # BLD AUTO: 11.9 10E3/UL (ref 4–11)

## 2023-09-28 PROCEDURE — 258N000001 HC RX 258: Performed by: SURGERY

## 2023-09-28 PROCEDURE — 83735 ASSAY OF MAGNESIUM: CPT | Performed by: PHYSICIAN ASSISTANT

## 2023-09-28 PROCEDURE — 250N000009 HC RX 250

## 2023-09-28 PROCEDURE — 250N000012 HC RX MED GY IP 250 OP 636 PS 637

## 2023-09-28 PROCEDURE — 85027 COMPLETE CBC AUTOMATED: CPT | Performed by: SURGERY

## 2023-09-28 PROCEDURE — 250N000012 HC RX MED GY IP 250 OP 636 PS 637: Performed by: SURGERY

## 2023-09-28 PROCEDURE — 120N000011 HC R&B TRANSPLANT UMMC

## 2023-09-28 PROCEDURE — 88304 TISSUE EXAM BY PATHOLOGIST: CPT | Mod: 26 | Performed by: PATHOLOGY

## 2023-09-28 PROCEDURE — 01996 DLY HOSP MGMT EDRL RX ADMIN: CPT | Mod: GC | Performed by: ANESTHESIOLOGY

## 2023-09-28 PROCEDURE — 83735 ASSAY OF MAGNESIUM: CPT | Performed by: SURGERY

## 2023-09-28 PROCEDURE — 271N000002 HC RX 271: Performed by: STUDENT IN AN ORGANIZED HEALTH CARE EDUCATION/TRAINING PROGRAM

## 2023-09-28 PROCEDURE — 250N000011 HC RX IP 250 OP 636: Mod: JZ | Performed by: STUDENT IN AN ORGANIZED HEALTH CARE EDUCATION/TRAINING PROGRAM

## 2023-09-28 PROCEDURE — 84100 ASSAY OF PHOSPHORUS: CPT | Performed by: SURGERY

## 2023-09-28 PROCEDURE — 250N000011 HC RX IP 250 OP 636

## 2023-09-28 PROCEDURE — 250N000011 HC RX IP 250 OP 636: Mod: JZ | Performed by: PHYSICIAN ASSISTANT

## 2023-09-28 PROCEDURE — 85027 COMPLETE CBC AUTOMATED: CPT | Performed by: PHYSICIAN ASSISTANT

## 2023-09-28 PROCEDURE — 99233 SBSQ HOSP IP/OBS HIGH 50: CPT

## 2023-09-28 PROCEDURE — 99222 1ST HOSP IP/OBS MODERATE 55: CPT | Performed by: PSYCHIATRY & NEUROLOGY

## 2023-09-28 PROCEDURE — 36415 COLL VENOUS BLD VENIPUNCTURE: CPT | Performed by: SURGERY

## 2023-09-28 PROCEDURE — 258N000003 HC RX IP 258 OP 636: Performed by: SURGERY

## 2023-09-28 PROCEDURE — 250N000013 HC RX MED GY IP 250 OP 250 PS 637: Performed by: PHYSICIAN ASSISTANT

## 2023-09-28 PROCEDURE — 258N000003 HC RX IP 258 OP 636

## 2023-09-28 PROCEDURE — 85025 COMPLETE CBC W/AUTO DIFF WBC: CPT | Performed by: SURGERY

## 2023-09-28 PROCEDURE — 85610 PROTHROMBIN TIME: CPT | Performed by: PHYSICIAN ASSISTANT

## 2023-09-28 PROCEDURE — 85520 HEPARIN ASSAY: CPT | Performed by: PHYSICIAN ASSISTANT

## 2023-09-28 PROCEDURE — 88307 TISSUE EXAM BY PATHOLOGIST: CPT | Mod: 26 | Performed by: PATHOLOGY

## 2023-09-28 PROCEDURE — 36415 COLL VENOUS BLD VENIPUNCTURE: CPT | Performed by: PHYSICIAN ASSISTANT

## 2023-09-28 PROCEDURE — 88305 TISSUE EXAM BY PATHOLOGIST: CPT | Mod: 26 | Performed by: PATHOLOGY

## 2023-09-28 PROCEDURE — 250N000013 HC RX MED GY IP 250 OP 250 PS 637: Performed by: NURSE PRACTITIONER

## 2023-09-28 PROCEDURE — 250N000013 HC RX MED GY IP 250 OP 250 PS 637

## 2023-09-28 PROCEDURE — 80048 BASIC METABOLIC PNL TOTAL CA: CPT | Performed by: SURGERY

## 2023-09-28 RX ORDER — HYDROXYZINE HCL 10 MG/5 ML
50 SOLUTION, ORAL ORAL EVERY 6 HOURS PRN
Status: DISCONTINUED | OUTPATIENT
Start: 2023-09-28 | End: 2023-09-29

## 2023-09-28 RX ORDER — HEPARIN SODIUM 10000 [USP'U]/100ML
0-5000 INJECTION, SOLUTION INTRAVENOUS CONTINUOUS
Status: DISCONTINUED | OUTPATIENT
Start: 2023-09-28 | End: 2023-09-28

## 2023-09-28 RX ORDER — BLOOD PRESSURE TEST KIT
KIT MISCELLANEOUS
Qty: 100 EACH | Refills: 0 | Status: SHIPPED | OUTPATIENT
Start: 2023-09-28 | End: 2024-06-11

## 2023-09-28 RX ORDER — KETOROLAC TROMETHAMINE 15 MG/ML
15 INJECTION, SOLUTION INTRAMUSCULAR; INTRAVENOUS ONCE
Status: COMPLETED | OUTPATIENT
Start: 2023-09-28 | End: 2023-09-28

## 2023-09-28 RX ORDER — MAGNESIUM SULFATE HEPTAHYDRATE 40 MG/ML
4 INJECTION, SOLUTION INTRAVENOUS ONCE
Status: COMPLETED | OUTPATIENT
Start: 2023-09-28 | End: 2023-09-28

## 2023-09-28 RX ORDER — METHOCARBAMOL 500 MG/1
1000 TABLET, FILM COATED ORAL 4 TIMES DAILY
Status: DISCONTINUED | OUTPATIENT
Start: 2023-09-28 | End: 2023-09-28

## 2023-09-28 RX ORDER — HYDROMORPHONE HYDROCHLORIDE 5 MG/5ML
4 SOLUTION ORAL EVERY 4 HOURS PRN
Status: DISCONTINUED | OUTPATIENT
Start: 2023-09-28 | End: 2023-10-03 | Stop reason: HOSPADM

## 2023-09-28 RX ORDER — METHOCARBAMOL 100 MG/ML
1000 INJECTION, SOLUTION INTRAMUSCULAR; INTRAVENOUS ONCE
Status: COMPLETED | OUTPATIENT
Start: 2023-09-28 | End: 2023-09-28

## 2023-09-28 RX ORDER — QUETIAPINE FUMARATE 25 MG/1
25-50 TABLET, FILM COATED ORAL AT BEDTIME
Status: DISCONTINUED | OUTPATIENT
Start: 2023-09-28 | End: 2023-10-03 | Stop reason: HOSPADM

## 2023-09-28 RX ORDER — METHOCARBAMOL 500 MG/1
1000 TABLET, FILM COATED ORAL 3 TIMES DAILY
Status: DISCONTINUED | OUTPATIENT
Start: 2023-09-28 | End: 2023-10-03 | Stop reason: HOSPADM

## 2023-09-28 RX ORDER — ENOXAPARIN SODIUM 100 MG/ML
1 INJECTION SUBCUTANEOUS EVERY 12 HOURS
Status: DISCONTINUED | OUTPATIENT
Start: 2023-09-28 | End: 2023-09-28

## 2023-09-28 RX ORDER — CONTAINER,EMPTY
EACH MISCELLANEOUS
Qty: 1 EACH | Refills: 0 | Status: SHIPPED | OUTPATIENT
Start: 2023-09-28 | End: 2024-03-07

## 2023-09-28 RX ORDER — HEPARIN SODIUM 10000 [USP'U]/100ML
0-5000 INJECTION, SOLUTION INTRAVENOUS CONTINUOUS
Status: DISCONTINUED | OUTPATIENT
Start: 2023-09-28 | End: 2023-09-29

## 2023-09-28 RX ADMIN — Medication: at 17:32

## 2023-09-28 RX ADMIN — HEPARIN SODIUM 1250 UNITS/HR: 10000 INJECTION, SOLUTION INTRAVENOUS at 18:09

## 2023-09-28 RX ADMIN — DOCUSATE SODIUM 100 MG: 50 LIQUID ORAL at 20:41

## 2023-09-28 RX ADMIN — HYDROXYZINE HYDROCHLORIDE 50 MG: 10 SYRUP ORAL at 02:00

## 2023-09-28 RX ADMIN — HYDROMORPHONE HYDROCHLORIDE 4 MG: 1 SOLUTION ORAL at 23:23

## 2023-09-28 RX ADMIN — METHADONE HYDROCHLORIDE 4 MG: 5 SOLUTION ORAL at 22:15

## 2023-09-28 RX ADMIN — CITALOPRAM 40 MG: 10 SOLUTION ORAL at 08:17

## 2023-09-28 RX ADMIN — AMPICILLIN 2 G: 2 INJECTION, POWDER, FOR SOLUTION INTRAVENOUS at 00:10

## 2023-09-28 RX ADMIN — Medication 10 MG: at 22:16

## 2023-09-28 RX ADMIN — SIMETHICONE 80 MG: 80 TABLET, CHEWABLE ORAL at 17:58

## 2023-09-28 RX ADMIN — SENNOSIDES 5 ML: 8.8 LIQUID ORAL at 20:41

## 2023-09-28 RX ADMIN — THIAMINE HYDROCHLORIDE 100 MG: 100 INJECTION, SOLUTION INTRAMUSCULAR; INTRAVENOUS at 11:15

## 2023-09-28 RX ADMIN — HYDROMORPHONE HYDROCHLORIDE 4 MG: 1 SOLUTION ORAL at 13:19

## 2023-09-28 RX ADMIN — Medication 40 MG: at 08:17

## 2023-09-28 RX ADMIN — ACETAMINOPHEN 650 MG: 160 LIQUID ORAL at 05:42

## 2023-09-28 RX ADMIN — AMPICILLIN 2 G: 2 INJECTION, POWDER, FOR SOLUTION INTRAVENOUS at 18:29

## 2023-09-28 RX ADMIN — SODIUM CHLORIDE 1 UNITS/HR: 9 INJECTION, SOLUTION INTRAVENOUS at 02:00

## 2023-09-28 RX ADMIN — AMPICILLIN 2 G: 2 INJECTION, POWDER, FOR SOLUTION INTRAVENOUS at 12:09

## 2023-09-28 RX ADMIN — METHOCARBAMOL TABLETS 1000 MG: 500 TABLET, COATED ORAL at 20:41

## 2023-09-28 RX ADMIN — SIMETHICONE 80 MG: 80 TABLET, CHEWABLE ORAL at 11:15

## 2023-09-28 RX ADMIN — METHOCARBAMOL 1000 MG: 100 INJECTION, SOLUTION INTRAMUSCULAR; INTRAVENOUS at 05:05

## 2023-09-28 RX ADMIN — HYDROXYZINE HYDROCHLORIDE 25 MG: 10 SOLUTION ORAL at 03:02

## 2023-09-28 RX ADMIN — ACETAMINOPHEN 650 MG: 160 LIQUID ORAL at 16:50

## 2023-09-28 RX ADMIN — AMPICILLIN 2 G: 2 INJECTION, POWDER, FOR SOLUTION INTRAVENOUS at 05:57

## 2023-09-28 RX ADMIN — GABAPENTIN 600 MG: 250 SUSPENSION ORAL at 22:15

## 2023-09-28 RX ADMIN — METHADONE HYDROCHLORIDE 4 MG: 5 SOLUTION ORAL at 14:10

## 2023-09-28 RX ADMIN — Medication 500 MG: at 00:20

## 2023-09-28 RX ADMIN — ACETAMINOPHEN 650 MG: 160 LIQUID ORAL at 00:45

## 2023-09-28 RX ADMIN — Medication 15 ML: at 08:17

## 2023-09-28 RX ADMIN — SIMETHICONE 80 MG: 80 TABLET, CHEWABLE ORAL at 03:02

## 2023-09-28 RX ADMIN — KETOROLAC TROMETHAMINE 15 MG: 15 INJECTION INTRAMUSCULAR; INTRAVENOUS at 04:01

## 2023-09-28 RX ADMIN — ACETAMINOPHEN 650 MG: 160 LIQUID ORAL at 10:57

## 2023-09-28 RX ADMIN — DEXTROSE MONOHYDRATE 1000 ML: 100 INJECTION, SOLUTION INTRAVENOUS at 08:04

## 2023-09-28 RX ADMIN — ENOXAPARIN SODIUM 60 MG: 60 INJECTION SUBCUTANEOUS at 05:05

## 2023-09-28 RX ADMIN — CEFEPIME HYDROCHLORIDE 2 G: 2 INJECTION, POWDER, FOR SOLUTION INTRAVENOUS at 22:26

## 2023-09-28 RX ADMIN — KETAMINE HYDROCHLORIDE 5 MG/HR: 10 INJECTION INTRAMUSCULAR; INTRAVENOUS at 05:57

## 2023-09-28 RX ADMIN — QUETIAPINE FUMARATE 25 MG: 25 TABLET ORAL at 22:14

## 2023-09-28 RX ADMIN — HYDROXYZINE HYDROCHLORIDE 50 MG: 10 SOLUTION ORAL at 15:42

## 2023-09-28 RX ADMIN — BUSPIRONE HYDROCHLORIDE 10 MG: 10 TABLET ORAL at 20:41

## 2023-09-28 RX ADMIN — GABAPENTIN 600 MG: 250 SUSPENSION ORAL at 14:10

## 2023-09-28 RX ADMIN — HYDROMORPHONE HYDROCHLORIDE 4 MG: 1 SOLUTION ORAL at 17:21

## 2023-09-28 RX ADMIN — METHOCARBAMOL TABLETS 1000 MG: 500 TABLET, COATED ORAL at 14:10

## 2023-09-28 RX ADMIN — INSULIN GLARGINE 5 UNITS: 100 INJECTION, SOLUTION SUBCUTANEOUS at 18:43

## 2023-09-28 RX ADMIN — BUSPIRONE HYDROCHLORIDE 10 MG: 10 TABLET ORAL at 08:57

## 2023-09-28 RX ADMIN — GABAPENTIN 600 MG: 250 SUSPENSION ORAL at 05:57

## 2023-09-28 RX ADMIN — METHADONE HYDROCHLORIDE 4 MG: 5 SOLUTION ORAL at 05:57

## 2023-09-28 RX ADMIN — Medication 12.5 MG: at 18:47

## 2023-09-28 RX ADMIN — HYDROXYZINE HYDROCHLORIDE 50 MG: 10 SYRUP ORAL at 08:33

## 2023-09-28 RX ADMIN — Medication 40 MG: at 20:40

## 2023-09-28 RX ADMIN — METHOCARBAMOL 1000 MG: 500 TABLET ORAL at 08:57

## 2023-09-28 RX ADMIN — CEFEPIME HYDROCHLORIDE 2 G: 2 INJECTION, POWDER, FOR SOLUTION INTRAVENOUS at 11:18

## 2023-09-28 RX ADMIN — MAGNESIUM SULFATE IN WATER 4 G: 40 INJECTION, SOLUTION INTRAVENOUS at 09:06

## 2023-09-28 RX ADMIN — POLYETHYLENE GLYCOL 3350 17 G: 17 POWDER, FOR SOLUTION ORAL at 08:19

## 2023-09-28 ASSESSMENT — ACTIVITIES OF DAILY LIVING (ADL)
ADLS_ACUITY_SCORE: 22

## 2023-09-28 NOTE — PROVIDER NOTIFICATION
Notified oncall transplant MD Finch that pt BG 51--D10 restarted and infusing per MAR orders. Bedside RNs also giving apple juice via jtube to bring up BGs gently--paged to clarify if D50 should also be given. Per MD, okay with current plan of D10 infusion and apple juice via Jtube--per MD, if BGs do not come up, then ok to given D50 via IV. Bedside RNs made aware, will continue to monitor.

## 2023-09-28 NOTE — PLAN OF CARE
"/89 (BP Location: Left arm)   Pulse 85   Temp 98.2  F (36.8  C) (Oral)   Resp 16   Ht 1.575 m (5' 2\")   Wt 69.3 kg (152 lb 12.5 oz)   LMP 08/16/2023 (Approximate)   SpO2 97%   BMI 27.94 kg/m      Assumed cares [id number]  Neuro: A/Ox4, sometimes drowsy between cares  Pain/Nausea: abdominal pain increased over the course of the shift until venting G tube to gravity - see MAR for PRN's given; denies nausea  Cardiac: rate and rhythm regular  Resp: lung sounds clear, equal bilaterally  GI/: voiding mixed stool and BM  Diet/Appetite: NPO + ice chips;  TF @50mL/hr  BG: insulin gtt Alg 2, BG   Skin: midline incison - dermabonded  Access: PIV x4  Drains: PEJ - TF; PEG - clamped/vented  Activity: SBA, up to chair this afternoon  Plan: heparin gtt  starting this evening so it can be held at 0400 so paravertebral blocks can be removed tomorrow  Will continue with plan of care and notify team of any changes.?    ALEK ARMAS RN on 9/28/2023 at 3:52 PM        "

## 2023-09-28 NOTE — PHARMACY-ANTICOAGULATION SERVICE
Clinical Pharmacy - Warfarin Dosing Consult     Pharmacy has been consulted to manage this patient s warfarin therapy.  Indication: Other - specify in comments (PV thrombosis prophylaxis s/p TPAIT)  Therapy Goal: INR 2-3  Warfarin Prior to Admission: No  Significant drug interactions: Ampicillin, cefepime, citalopram, enoxaparin    INR   Date Value Ref Range Status   09/27/2023 1.03 0.85 - 1.15 Final   09/22/2023 1.34 (H) 0.85 - 1.15 Final       Recommend warfarin 2.5 mg today.  Pharmacy will monitor Artie Burger daily and order warfarin doses to achieve specified goal.      Please contact pharmacy as soon as possible if the warfarin needs to be held for a procedure or if the warfarin goals change.       Therese Brewer, PharmD

## 2023-09-28 NOTE — PROGRESS NOTES
IP Diabetes Management  Daily Note         HPI: 27 year old with PMH significant for recurrent acute on chronic pancreatitis 2/2 alcoholic pancreatitis. Now s/p total pancreatectomy, islet cell autotransplant, splenectomy, cholecystectomy, gastrojejunostomy tube placement.  Endocrine consulted for postoperative hyperglycemia       Assessment:   #Postoperative hyperglycemia  #Tube feeds associated hyperglycemia  #History of chronic pancreatitis  #S/p total pancreatectomy  #Islet cell autotransplant    Plan:    -Start Lantus 5 units- to mitigate needs a (algorithm 1 currently not providing enough coverage but algorithm 2 to aggressive causing hypoglycemia)  -Continue SOT TPIAT insulin drip - do not escalate above algorithm 1 after receiving lantus dose- contact endocrinology if running consisently above goal     -BG monitoring per insulin drip protocol   -hypoglycemia protocol   -carb counting protocol    Discharge Planning:    -Tentative diabetic regimen likely basal and bolus- I anticipate may need half units   -diabetes education needs: new insulin teaching  -Outpatient follow up:  [facility]  -Test claim: not done today    Plan discussed with patient, bedside RN, and primary team.      Interval History and Assessment: interval glucose trend reviewed:   Patient has variable needs from 0-4 units per hour. Algorithm 1 appears to not give enough coverage but algorithm 2 is too aggressive causing hypoglycemia. Will give small dose of lantus to even needs out for potential transition tomorrow.          Currently, Patient denies nausea, vomiting. Patient reports diarrhea and abdominal pain.    Labs:9/28  Bicarb:26  Creatinine: 0.63  eGFR: >90  Anion Gap: 10    Current nutritional intake and type: Orders Placed This Encounter      NPO for Medical/Clinical Reasons Except for: Ice Chips  TF     Planned Procedures/surgeries: none  Steroid planning: none  D5W-containing solutions/medications: TF    PTA Diabetes Regimen:  none           Diabetes History:   Type of Diabetes: S/p total pancreatectomy  Lab Results   Component Value Date    A1C 5.5 09/22/2023    A1C 5.3 06/28/2023              Review of Systems:     The Review of Systems is negative other than noted in the Interval History.           Medications:     Current Facility-Administered Medications   Medication    acetaminophen *SUGAR FREE* (TYLENOL) solution 650 mg    ampicillin (OMNIPEN) 2 g vial to attach to  mL bag    bisacodyl (DULCOLAX) suppository 10 mg    busPIRone (BUSPAR) tablet 10 mg    ceFEPIme (MAXIPIME) 2 g vial to attach to  mL bag for ADULTS or 50 mL bag for PEDS    citalopram (celeXA) solution 40 mg    dextrose 10% infusion    dextrose 10% infusion    glucose gel 15-30 g    Or    dextrose 50 % injection 25-50 mL    Or    glucagon injection 1 mg    sennosides (SENOKOT) syrup 5-10 mL    And    docusate (COLACE) 50 MG/5ML liquid  mg    gabapentin (NEURONTIN) solution 600 mg    heparin infusion 25,000 units in D5W 250 mL ANTICOAGULANT    HYDROmorphone (DILAUDID) PCA 0.2 mg/mL OPIOID TOLERANT    HYDROmorphone (STANDARD CONC) (DILAUDID) liquid 4 mg    hydrOXYzine (ATARAX) syrup 50 mg    insulin 1 unit/1mL in saline (NovoLIN-Regular) infusion- SOT TPIAT algorithm syringe    insulin glargine (LANTUS PEN) injection 5 Units    lidocaine (LMX4) cream    lidocaine 1 % 0.1-1 mL    magnesium hydroxide (MILK OF MAGNESIA) suspension 30 mL    melatonin liquid 10 mg    methadone (DOLOPHINE) solution 4 mg    methocarbamol (ROBAXIN) tablet 1,000 mg    multivitamins w/minerals liquid 15 mL    naloxone (NARCAN) injection 0.2 mg    Or    naloxone (NARCAN) injection 0.4 mg    Or    naloxone (NARCAN) injection 0.2 mg    Or    naloxone (NARCAN) injection 0.4 mg    ondansetron (ZOFRAN ODT) ODT tab 4 mg    Or    ondansetron (ZOFRAN) injection 4 mg    pantoprazole (PROTONIX) 2 mg/mL suspension 40 mg    polyethylene glycol (MIRALAX) Packet 17 g    prochlorperazine  "(COMPAZINE) injection 10 mg    Or    prochlorperazine (COMPAZINE) tablet 10 mg    Or    prochlorperazine (COMPAZINE) suppository 25 mg    QUEtiapine (SEROquel) half-tab 12.5-25 mg    QUEtiapine (SEROquel) tablet 25-50 mg    ROPivacaine 0.2% in NS perineural infusion (simple)    ROPivacaine 0.2% in NS perineural infusion (simple)    simethicone (MYLICON) chewable tablet 80 mg    sodium chloride (PF) 0.9% PF flush 3 mL    sodium chloride (PF) 0.9% PF flush 3 mL    thiamine (B-1) injection 100 mg    [Held by provider] Warfarin Dose Required Daily - Pharmacist Managed            Physical Exam:    /89 (BP Location: Left arm)   Pulse 85   Temp 98.2  F (36.8  C) (Oral)   Resp 16   Ht 1.575 m (5' 2\")   Wt 69.3 kg (152 lb 12.5 oz)   LMP 08/16/2023 (Approximate)   SpO2 97%   BMI 27.94 kg/m    General: pleasant, in no distress. Lying in  bed   HEENT: normocephalic, atraumatic. Oral mucous membranes moist.   Lungs: unlabored respiration, no cough  ABD: rounded, nondistended appearing  Skin: warm and dry, no obvious lesions  MSK:  moves all extremities  Lymp:  no LE edema   Mental status:  alert, oriented to self, place, time  Psych:  bright affect, calm and appropriate interaction             Data:     Recent Labs   Lab 09/28/23  1614 09/28/23  1507 09/28/23  1401 09/28/23  1311 09/28/23  1207 09/28/23  1110   * 59* 173* 134* 83 146*     Lab Results   Component Value Date    WBC 11.9 (H) 09/28/2023    WBC 11.8 (H) 09/28/2023    WBC 11.5 (H) 09/28/2023    HGB 9.4 (L) 09/28/2023    HGB 9.2 (L) 09/28/2023    HGB 8.9 (L) 09/28/2023    HCT 27.9 (L) 09/28/2023    HCT 27.5 (L) 09/28/2023    HCT 26.3 (L) 09/28/2023    MCV 89 09/28/2023    MCV 89 09/28/2023    MCV 87 09/28/2023     (H) 09/28/2023     (H) 09/28/2023     (H) 09/28/2023     Lab Results   Component Value Date     09/28/2023     09/27/2023     09/26/2023    POTASSIUM 4.1 09/28/2023    POTASSIUM 3.2 (L) 09/27/2023    " POTASSIUM 3.7 09/26/2023    CHLORIDE 105 09/28/2023    CHLORIDE 108 (H) 09/27/2023    CHLORIDE 106 09/26/2023    CO2 26 09/28/2023    CO2 25 09/27/2023    CO2 22 09/26/2023     (H) 09/28/2023    GLC 59 (L) 09/28/2023     (H) 09/28/2023     Lab Results   Component Value Date    BUN 7.1 09/28/2023    BUN 5.2 (L) 09/27/2023    BUN 3.3 (L) 09/26/2023     Lab Results   Component Value Date    TSH 1.68 08/18/2023     Lab Results   Component Value Date    AST 16 09/27/2023    AST 63 (H) 09/23/2023    AST 63 (H) 09/22/2023    ALT 18 09/27/2023    ALT 38 09/23/2023    ALT 41 09/22/2023    ALKPHOS 75 09/27/2023    ALKPHOS 32 (L) 09/23/2023    ALKPHOS 35 09/22/2023       I spent a total of 60 minutes face to face or coordinating care of Artie Tao.             Over 50% of my time on the unit was spent counseling the patient and/or coordinating care regarding acute hyperglycemia management.  See note for details.      Contacting the Inpatient Diabetes Team   From 7AM-5PM: page inpatient diabetes provider that is following the patient, or utilize the job code paging system. From 5PM-7AM: page the job code for endocrine fellow on call.     Please use the following job code to reach the Inpatient Diabetes team. Note that you must use an in house phone and that job codes cannot receive text pages.   Dial [phone] (or star-star-star [phone] on the new [employer] telephones), then [phone] to reach the endocrine-diabetes provider on call.    ROSIE Obrien, CNP  Inpatient Diabetes Service   Pager: [phone]

## 2023-09-28 NOTE — PROGRESS NOTES
Pancreatitis Service - Daily Progress Note  09/28/2023    Assessment & Plan: Artie Burger is a 27 year old with PMH significant for recurrent acute on chronic pancreatitis 2/2 alcoholic pancreatitis. She is now s/p status post total pancreatectomy, islet cell autotransplant, splenectomy, and gastrojejunostomy tube placement on 9/22/23 with Dr. Junito Patel MD.        Cardiorespiratory:   Hypotensive: Resolved. Stable   Hypoxia: Resolved. Encourage IS/Pulm toilet.    GI/Nutrition:   S/p TPAIT:6 NPO, G/J tube in place. Started G tube clamp trial on 9/25. Vented intermittently due to abdominal distention. Output 975 ml/24 hr.  J tube is running Vital 1.5 +relizorb tube feeds at 50 cc/h (goal 50).   Malnutrition secondary to poor intake: Continue continuous TF to meet 100% nutritional need.   Continue bowel regimen senna/colace/PEG, hold for loose stools as needed.      Fluid/Electrolytes:   Hypervolemia: Wt up ~10kg. Lasix 10 mg IV x 1 on 9/26. Check standing weight today.   Hypomagnesemia: Mg sulfate 4g IV x 1    : No issues.    Post-pancreatectomy diabetes: Insulin gtt, appreciate Endocrine consult. Plan to switch to subcutaneous insulin once patient stable on insulin gtt. Possible transition today. Diabetes education to be completed.     Infection: Afebrile   Leukocytosis, peaked at 23, trending down. WBC ~ 11.   Panc preservation/islets : Pancreas preservation soln grew Enterobacter clocae (sensitive to ampicillin) and E. Faecium (sensitive to ampicillin) - treatment including Meropenem, Cefepime, Zosyn, and ampicillin due switching antibiotics with increasing leukocytosis and sensitivities (9/22-9/29). Islet Final product culture grew Enterobacter clocae (resistant to Zosyn) and E. Faecium (sensitive to ampicillin) switched to Cefipime 9/25 for coverage of Enterobacter clocae (9/22-9/30).    Prophylaxis: PPI, Anticoagulation: Hep 400units/hr. RUQ US: dampened bidirectional flow left PV. Heparin gtt  stopped. Started lovenox 1mg/kg yesterday.   -Lovenox discontinued (last dose 0500 today). Will start heparin gtt high intensity from 2043-0743. Will restart anticoagulation after paravertebral is removed at 1000 tomorrow AM. Check INR in AM.    Pain control: Fair. Pain regimen adjusted (added Toradol) yesterday due to limited activity. Minimal improvement.   Epidural-infusion + bolus BID PRN. Plan for catheter to be removed at 1000 tomorrow. Anticoagulation will be held prior to removal, see above.  Neurontin 600 mg per J tube Q8  Methadone 4 mg soln per J tube every 8 hours (started 9/27).   Continue dilaudid PCA 0.2 q10 bolus PRN pain refractory to scheduled medications.  Dilaudid 4 mg soln per J tube PRN severe pain  Robaxin 1G PO every 8 hours   Precedex stopped 9/25  Ketamine discontinued 9/28  Toradol discontinued 9/27     Anxiety: PTA buspar 10 mg daily, give PO and citalopram 40 mg daily, give per J tube.  Adult psychology consult. Patient seen by Erica Bright 9/25 and 9/26. Psychiatry consulted for medication management for anxiety and insomnia. Start Seroquel 12.5-25 mg PO every 6 hours for anxiety. Seroquel 25-50 mg PO at bedtime. Change hydroxyzine to 50 mg per J tube every 6 hours PRN anxiety refractory to Seroquel.     Nicotine dependence/vaping: Avoid nicotine patches at this time.     Activity: OOB to chair. Ambulate with assist. PT consulted.  Anticipated LOS/Discharge: 7A, 7-10 days. Possible discharge this weekend.    JOSEFA/Fellow/Resident Provider: FARZANA Quiles [phone]     Faculty: Junito Patel MD    __________________________________________________________________  Transplant History: Admitted 9/22/2023 for Chronic pancreatitis (H) [K86.1]     9/22/2023 (Islet), Postoperative day: 6     Interval History: History is obtained from the patient  Overnight events: Vented g tube intermittently due to abdominal distention/discomfort. No nausea or emesis. +BMs.   Uncontrolled pain overnight -  "on call surgeon ordered Robaxin x 1 and Toradol x 1.    ROS:   A 10-point review of systems was negative except as noted above.    Curent Meds:   ampicillin  2 g Intravenous Q6H    busPIRone  10 mg Oral BID    ceFEPIme  2 g Intravenous Q12H    citalopram  40 mg Per J Tube Daily    sennosides  5-10 mL Per J Tube BID    And    docusate   mg Per J Tube BID    enoxaparin ANTICOAGULANT  1 mg/kg (Order-Specific) Subcutaneous Q12H    gabapentin  600 mg Per J Tube Q8H LELO    hydrOXYzine  50 mg Per J Tube Q6H    melatonin  6 mg Per J Tube At Bedtime    methadone  4 mg Oral Q8H    multivitamins w/minerals  15 mL Per J Tube Daily    pantoprazole  40 mg Per J Tube BID    polyethylene glycol  17 g Per J Tube Daily    sodium chloride (PF)  3 mL Intracatheter Q8H    thiamine  100 mg Intravenous Daily    Warfarin Therapy Reminder  1 each Oral See Admin Instructions       Physical Exam:     Admit Weight: 58.3 kg (128 lb 8.5 oz)    Current Vitals:   /80 (BP Location: Left arm)   Pulse 78   Temp 98.2  F (36.8  C) (Oral)   Resp 16   Ht 1.575 m (5' 2\")   Wt 69.3 kg (152 lb 12.5 oz)   LMP 08/16/2023 (Approximate)   SpO2 96%   BMI 27.94 kg/m           Vital sign ranges:    Temp:  [98.1  F (36.7  C)-99.6  F (37.6  C)] 98.2  F (36.8  C)  Pulse:  [74-86] 78  Resp:  [16-19] 16  BP: (114-126)/(80-91) 114/80  SpO2:  [95 %-98 %] 96 %  Patient Vitals for the past 24 hrs:   BP Temp Temp src Pulse Resp SpO2   09/28/23 0610 114/80 98.2  F (36.8  C) Oral 78 16 96 %   09/28/23 0204 (!) 125/91 98.1  F (36.7  C) Oral 86 16 97 %   09/27/23 2215 123/85 99  F (37.2  C) Oral 77 19 95 %   09/27/23 1758 126/84 99.6  F (37.6  C) Oral 84 18 98 %   09/27/23 1221 123/87 98.7  F (37.1  C) Oral 74 19 95 %   09/27/23 1013 123/87 99  F (37.2  C) Oral 79 18 96 %       General Appearance: NAD  Skin: normal, warm, dry  Heart: well perfused  Lungs:   NLB on RA  Abdomen: The abdomen is soft, ND, and  mildly tender. The wound is well approximated and " without signs of infection. Tube/Drain sites are: CDI.  NINFA: serosanguinous, scant output. J tube infusing EN, G tube with small amount of pale yellow output.  :voiding  Extremities: edema: trace    Data:   CMP  Recent Labs   Lab 09/28/23  0655 09/28/23  0608 09/28/23  0532 09/27/23  0619 09/27/23  0517 09/26/23  0651 09/26/23  0644 09/23/23  0309 09/23/23  0304 09/22/23 2026 09/22/23 1929 09/22/23  1914   NA  --   --   --   --  144  --  140   < > 142   < > 140 140   POTASSIUM  --   --   --   --  3.2*  --  3.7   < > 3.3*   < > 3.9 3.8   CHLORIDE  --   --   --   --  108*  --  106   < > 110*   < >  --   --    CO2  --   --   --   --  25  --  22   < > 22   < >  --   --    * 176*  --    < > 78   < > 143*   < > 77   < > 96 93   BUN  --   --   --   --  5.2*  --  3.3*   < > 12.6   < >  --   --    CR  --   --   --   --  0.56  --  0.62   < > 0.85   < >  --   --    GFRESTIMATED  --   --   --   --  >90  --  >90   < > >90   < >  --   --    MURTAZA  --   --   --   --  8.2*  --  8.1*   < > 8.7   < >  --   --    ICAW  --   --   --   --   --   --   --   --   --   --  4.7 4.9   MAG  --   --  1.6*  --  1.8  --  1.5*   < >  --   --   --   --    PHOS  --   --  3.2  --  2.3*  --  3.5   < >  --   --   --   --    AMYLASE  --   --   --   --  13*  --   --   --  25*  --   --   --    LIPASE  --   --   --   --  6*  --   --   --  12*  --   --   --    ALBUMIN  --   --   --   --  2.9*  --   --   --  3.3*   < >  --   --    BILITOTAL  --   --   --   --  0.3  --   --   --  0.4   < >  --   --    ALKPHOS  --   --   --   --  75  --   --   --  32*   < >  --   --    AST  --   --   --   --  16  --   --   --  63*   < >  --   --    ALT  --   --   --   --  18  --   --   --  38   < >  --   --     < > = values in this interval not displayed.       CBC  Recent Labs   Lab 09/28/23  0141 09/27/23  0517 09/23/23  0304 09/22/23  2345   HGB 8.9* 9.5*   < >  --    WBC 11.5* 11.2*   < >  --    * 478*   < >  --    A1C  --   --   --  5.5    < > = values in  this interval not displayed.       Coags  Recent Labs   Lab 09/28/23  0532 09/27/23  1635 09/27/23  0517 09/26/23  0644   INR 1.06 1.03  --   --    PTT  --   --  28 32        Urinalysis  Recent Labs   Lab Test 09/21/23  1614   COLOR Yellow   APPEARANCE Slightly Cloudy*   URINEGLC Negative   URINEBILI Negative   URINEKETONE Negative   SG 1.021   UBLD Negative   URINEPH 7.0   PROTEIN 10*   NITRITE Negative   LEUKEST Negative   RBCU 2   WBCU <1

## 2023-09-28 NOTE — PLAN OF CARE
"Goal Outcome Evaluation:      Plan of Care Reviewed With: patient    Overall Patient Progress: improving  BP (!) 125/91 (BP Location: Left arm, Cuff Size: Adult Regular)   Pulse 86   Temp 98.1  F (36.7  C) (Oral)   Resp 16   Ht 1.575 m (5' 2\")   Wt 69.3 kg (152 lb 12.5 oz)   LMP 2023 (Approximate)   SpO2 97%   BMI 27.94 kg/m      Shift: [id number]  Isolation Status: standard isolation  VS: hypertensive at 125/91 on RA, afebrile  Neuro: Aox4  Behaviors: Calm & cooperative  B-212, insulin gtt on algorithm 1-2, D10 started x1 and stopped within 30mins  Labs: hemoglobin 8.9, pending AM lab draw  Respiratory: WDL  Cardiac: WDL  Pain/Nausea: pain at 7-9/10, provider notified, ordered one time dosage of toradol and robaxin. Denies nausea.  PRN: Tylenol x2, atarax x1, simethicone x2  Diet: tube feeds at 50, NPO with the exception of ice chips  IV Access: 2 R PIV, 2 L PIV  Infusion(s): ketamine, ropivocaine x2, insulin gtt, and dilaudid PCA  Lines/Drains: G/J tube to gravity with 200mL output, dressing changed. R NINFA drain with scant output.  GI/: 2 loose stools mixed with urine  Skin: midline abdominal incision with liquid bandage open to air  Mobility: SBA to commode  Events/Education: internal jugular line pulled, pt tolerated well  Plan: Needs warfarin education. Continue with plan of care, will notify MD with any changes.            "

## 2023-09-28 NOTE — PROGRESS NOTES
Pain Service Progress Note  [facility]  Date: 09/28/2023       Patient Name: Artie Burger  MRN: [id number]  Age: 27 year old  Sex: female      Assessment:  Artie Burger is a 27 year old with PMH significant for recurrent acute on chronic pancreatitis 2/2 alcoholic pancreatitis. She is now s/p status post total pancreatectomy, islet cell autotransplant, splenectomy, and gastrojejunostomy tube placement on 9/22/23.      Procedure: total pancreatectomy, islet cell autotransplant, splenectomy, and gastrojejunostomy tube placement     Date of Surgery: 9/22/23     Date of Catheter Placement: 9/22/23     Plan/Recommendations:  1. Regional Anesthesia/Analgesia  -Continuous Catheter Type/Site: bilateral paravertebral (PV)   Infusate:   Programmed Intermittent Bolus (PIB) at 6 mL Q60 min via each catheter, total infusion rate of 12 mL/hr     Plan to maintain catheter, max of 7 days, see plan below for removal.       2. Anticoagulation  -Please contact Inpatient Pain Service before ordering or making any anticoagulation changes    Patient received a dose of warfarin 9/27 @ 2107, and therapeutic Lovenox at 1832 on 9/27 and 0500 on 9/28. Discussed with primary team, and they will hold coumadin & enoxaparin. They will start heparin infusion, instead. Catheter removal to be done at least 24 hours after Lovenox is held.     Will plan to remove the catheter 9/29:  - Please do not restart lovenox or warfarin prior to that time.   - Please hold heparin gtt at 0400 on 9/29 in preparation for removal.   - Please obtain INR level around with AM labs on 9/29.        3. Multimodal Analgesia  - Per primary team    Pain Service will continue to follow.    Discussed with attending anesthesiologist    Lalitha Middleton MD  09/28/2023     Overnight Events: No acute events overnight.     Tubes/Drains: No    Subjective:  rough night. They took me off of the IV pain meds yesterday.    Nausea: No  Vomiting: No  Pruritus:  "No  Symptoms of LAST: No    Pain Location:  Abdominal    Diet: NPO for Medical/Clinical Reasons Except for: Ice Chips  Adult Formula Drip Feeding: Continuous Vital 1.5; Jejunostomy; Goal Rate: 50 mL/hr; mL/hr; 9/25: Advance by 10 ml/hr q 12 hours to goal rate of 50 ml/hr.  See separate Relizorb order for instructions.; Do not advance tube feeding rate unless K+ is...    Relevant Labs:  Recent Labs   Lab Test 09/28/23  1009 09/28/23  0917 09/28/23  0532 09/27/23  1635 09/27/23  0517   INR  --   --  1.06   < >  --    * 515*  --    < > 478*   PTT  --   --   --   --  28   BUN  --  7.1  --   --  5.2*    < > = values in this interval not displayed.       Physical Exam:  Vitals: /89 (BP Location: Left arm)   Pulse 85   Temp 98.2  F (36.8  C) (Oral)   Resp 16   Ht 1.575 m (5' 2\")   Wt 69.3 kg (152 lb 12.5 oz)   LMP 08/16/2023 (Approximate)   SpO2 97%   BMI 27.94 kg/m      Physical Exam:   Orientation:  Alert, oriented, and in no acute distress: Yes  Sedation: No    Motor exam: Moving all extremities purposefully    Catheter Site:   Catheter entry site is clean/dry/intact: Yes    Tender: No      Relevant Medications:  Current Pain Medications:  Medications related to Pain Management (From now, onward)      Start     Dose/Rate Route Frequency Ordered Stop    09/28/23 1400  methocarbamol (ROBAXIN) tablet 1,000 mg         1,000 mg Oral 3 TIMES DAILY 09/28/23 0909      09/28/23 1100  hydrOXYzine (ATARAX) syrup 50 mg        See [facility] [place] [facility] for full Linked Orders Report.    50 mg Per J Tube EVERY 6 HOURS PRN 09/28/23 1050      09/27/23 1630  HYDROmorphone (DILAUDID) PCA 0.2 mg/mL OPIOID TOLERANT          Intravenous CONTINUOUS 09/27/23 1611      09/27/23 1400  methadone (DOLOPHINE) solution 4 mg         4 mg Oral EVERY 8 HOURS 09/27/23 1231      09/27/23 0800  polyethylene glycol (MIRALAX) Packet 17 g         17 g Per J Tube DAILY 09/26/23 0842      09/26/23 2000  sennosides (SENOKOT) syrup 5-10 mL        See " Hyperspace for full Linked Orders Report.    5-10 mL Per J Tube 2 TIMES DAILY 09/26/23 0842      09/26/23 2000  docusate (COLACE) 50 MG/5ML liquid  mg        See Hyperspace for full Linked Orders Report.     mg Per J Tube 2 TIMES DAILY 09/26/23 0842      09/26/23 1454  simethicone (MYLICON) chewable tablet 80 mg         80 mg Oral EVERY 6 HOURS PRN 09/26/23 1454      09/26/23 1400  gabapentin (NEURONTIN) solution 600 mg         600 mg Per J Tube EVERY 8 HOURS SCHEDULED 09/26/23 0842      09/26/23 0839  magnesium hydroxide (MILK OF MAGNESIA) suspension 30 mL         30 mL Per J Tube DAILY PRN 09/26/23 0842      09/26/23 0826  acetaminophen *SUGAR FREE* (TYLENOL) solution 650 mg         650 mg Per J Tube EVERY 4 HOURS PRN 09/26/23 0829      09/25/23 2000  busPIRone (BUSPAR) tablet 10 mg         10 mg Oral 2 TIMES DAILY 09/25/23 1412      09/24/23 1500  ROPivacaine 0.2% in NS perineural infusion (simple)          Perineural Continuous Nerve Block 09/24/23 1458      09/24/23 1500  ROPivacaine 0.2% in NS perineural infusion (simple)          Perineural Continuous Nerve Block 09/24/23 1458      09/22/23 2249  lidocaine 1 % 0.1-1 mL         0.1-1 mL Other EVERY 1 HOUR PRN 09/22/23 2250      09/22/23 2249  lidocaine (LMX4) cream          Topical EVERY 1 HOUR PRN 09/22/23 2250      09/22/23 2249  bisacodyl (DULCOLAX) suppository 10 mg         10 mg Rectal DAILY PRN 09/22/23 2249              Primary Service Contacted with Recommendations? Yes    Physician Attestation   I saw this patient with the resident and agree with the resident/fellow's findings and plan of care as documented in the note.       Ramirez findings: Artie is sitting in bed, no acute distress, at the time of our visit. She is able to sit forward independently for exam of catheter sites. Sites are c/d/i, no erythema/swelling/tenderness.     Please see A&P for additional details of medical decision making.  Tests REVIEWED in the past 24 hours:  -  "CBC, INR       Lila Fountain MD  Date of Service (when I saw the patient): 9/28/23        Acute Inpatient Pain Service [facility]  Hours of pain coverage 24/7   Page via Am[employer]- Please Page the Pain Team Via [facility]: \"PAIN MANAGEMENT ACUTE INPATIENT/ [facility]\"  "

## 2023-09-28 NOTE — PLAN OF CARE
"/84 (BP Location: Left arm)   Pulse 84   Temp 99.6  F (37.6  C) (Oral)   Resp 18   Ht 1.575 m (5' 2\")   Wt 69.3 kg (152 lb 12.5 oz)   LMP 08/16/2023 (Approximate)   SpO2 98%   BMI 27.94 kg/m      Shift: [id number]  Isolation Status: none  VS: stable on room air, afebrile  Neuro: Aox4  Behaviors: calm, cooperative, friendly  BG: Q1 hour. Insulin gtt, alg.1  Labs: K, Phos  Respiratory: WDL  Cardiac: WDL  Pain/Nausea: pain 7-8, denies nausea  PRN: tylenol, atarax, simethicone given  Diet: NPO + ice chips. TF@50 (goal)  IV Access: Right PIVx2, Left PIVx2  Infusion(s): insulin, dilaudid PCA, ketamine, NS TKO and carriers, Ropivacaine   Lines/Drains: right NINFA to bulb suction, g/J tube with g to gravity (due to gas pressure discomfort). G-tube to ideally be clamped at all times.   GI/: diarrhea, voiding spontaneously without difficulty  Skin: midline incision with liquid bandage and BLAZE  Mobility: SBA  Plan: start warfarin tomorrow and transition off insulin gtt       "

## 2023-09-28 NOTE — CONSULTS
"          Initial Psychiatric Consult   Consult date: September 28, 2023         Reason for Consult, requesting source:    Anxiety and sleep difficulties in context of chronic pain   Requesting source: Junito Patel    Labs and imaging reviewed. 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 She is now s/p status post total pancreatectomy, islet cell autotransplant, splenectomy, and gastrojejunostomy tube placement on 9/22/23 with Dr. Junito Patel MD.\"    Since her father was a drug addict she had a very difficult childhood experience quite a bit of trauma, and she may have some nightmares related to this, no flashbacks.  Related to her difficult childhood she had the onset of depression by about age 10 but was not compliant with medications backhand.  She has been doing quite well taking antidepressants and BuSpar on a regular basis in the last 6 years.  She also has quite an anxiety that she finds at bedtime hydroxyzine takes care of this.  She is having more difficulty hospital with anxiety related to pain and not being able to sleep at night due to disruptions by nursing.  He continues on Celexa and BuSpar and has been prescribed hydroxyzine 50 mg every 6 hours for anxiety.           Past Psychiatric History:   No psychiatric hospitalizations. In 2020 and 2021 she was seeing Jenifer Bo NP for psych meds, now prescribed  by her PCP Lila Gallegos CNP. She had been on Prozac and then Cymbalta; due to loss of efficacy she switched to Lexapro and then Celexa due to sexual side effects. Has been on Buspar long term; is very helpful, as is PRN hydroxyzine 50 mg HS. Has tried Seroquel; doesn't recall effect of this.         Substance Use and History: "   She was drinking up to a liter per day of vodka, quit in 8/20/20 after her second bout of pancreatitis. Former cigarette smoker.   Is on medical cannabis for pain.         Past Medical History:   PAST MEDICAL HISTORY:   Past Medical History:   Diagnosis Date    Alcohol-induced chronic pancreatitis (H)     Anxiety     Depression     Gastroesophageal reflux disease     Pancreatic disease     PONV (postoperative nausea and vomiting)     Type 1 diabetes mellitus (H) 9/19/2023       PAST SURGICAL HISTORY:   Past Surgical History:   Procedure Laterality Date    CHOLECYSTECTOMY N/A 9/22/2023    Procedure: Cholecystectomy;  Surgeon: Junito Patel MD;  Location: [facility]    ENDOSCOPIC RETROGRADE CHOLANGIOPANCREATOGRAM COMPLEX N/A 9/13/2021    Procedure: ENDOSCOPIC RETROGRADE CHOLANGIOPANCREATOGRAPHY with pancreatic sphincterotomy, dilation, stone removal, stent placement;  Surgeon: Guru Sabino Campuzano MD;  Location: [facility]    ENDOSCOPIC RETROGRADE CHOLANGIOPANCREATOGRAM WITH DIRECT VISUALIZATION SYSTEM AND GENERATOR N/A 11/1/2021    Procedure: ENDOSCOPIC RETROGRADE CHOLANGIOPANCREATOGRAPHY, dilation and debridement sludge removal, pancreatic duct stent placement;  Surgeon: Guru Sabino Campuzano MD;  Location: [facility]    ENDOSCOPIC RETROGRADE CHOLANGIOPANCREATOGRAM WITH SPYGLASS N/A 10/6/2021    Procedure: ENDOSCOPIC RETROGRADE CHOLANGIOPANCREATOGRAPHY, WITH DIRECT DUCT VISUALIZATION, USING PANCREATICOBILIARY FIBEROPTIC PROBE, BILE DUCT STENT EXCHANGE, LITHOTRIPSY OF PANCREATIC STONE, SPHINCTEROTOMY, BALLOON DILATION OF PANCREATIC DUCT AND STONE EXTRACTION;  Surgeon: Guru Sabino Campuzano MD;  Location: [facility]    ENDOSCOPIC RETROGRADE CHOLANGIOPANCREATOGRAM WITH SPYGLASS N/A 12/13/2021    Procedure: ENDOSCOPIC RETROGRADE CHOLANGIOPANCREATOGRAPHY, with pancreatic stent exchange, ballon dilation, stone removal, with spyglass direct visualization;  Surgeon:  Guru Sabino Campuzano MD;  Location: [facility]    ENDOSCOPIC RETROGRADE CHOLANGIOPANCREATOGRAPHY, EXCHANGE TUBE/STENT N/A 2/16/2022    Procedure: ENDOSCOPIC RETROGRADE CHOLANGIOPANCREATOGRAPH with pancreatic dilation, debris removal and stent exchange.;  Surgeon: Guru Sabino Campuzano MD;  Location: [facility]    ENDOSCOPIC RETROGRADE CHOLANGIOPANCREATOGRAPHY, EXCHANGE TUBE/STENT N/A 4/4/2022    Procedure: ENDOSCOPIC RETROGRADE CHOLANGIOPANCREATOGRAPHY, WITH REPLACEMENT OF pancreatic STENT, stone removal;  Surgeon: Guru Sabino Campuzano MD;  [facility]    ENDOSCOPIC RETROGRADE CHOLANGIOPANCREATOGRAPHY, EXCHANGE TUBE/STENT N/A 7/13/2022    Procedure: ENDOSCOPIC RETROGRADE CHOLANGIOPANCREATOGRAPHY, WITH pancreatic duct dilation and stent exchange, biliary stent placement, debris removal;  Surgeon: Guru Sabino Campuzano MD;  [facility]    ENDOSCOPIC RETROGRADE CHOLANGIOPANCREATOGRAPHY, EXCHANGE TUBE/STENT N/A 9/14/2022    Procedure: ENDOSCOPIC RETROGRADE CHOLANGIOPANCREATOGRAPHY, WITH pancreatic stone removal, biliary stent removal, pancreatic stent exchange;  Surgeon: Guru Sabino Campuzano MD;  [facility]    ENDOSCOPIC RETROGRADE CHOLANGIOPANCREATOGRAPHY, EXCHANGE TUBE/STENT N/A 11/21/2022    Procedure: ENDOSCOPIC RETROGRADE CHOLANGIOPANCREATOGRAPHY WITH PANCREATIC DUCT STENT EXCHANGE, DILATION, AND DEBRIS REMOVAL;  Surgeon: Guru Sabino Campuzano MD;  [facility]    ENDOSCOPIC RETROGRADE CHOLANGIOPANCREATOGRAPHY, EXCHANGE TUBE/STENT N/A 1/18/2023    Procedure: ENDOSCOPIC RETROGRADE CHOLANGIOPANCREATOGRAPHY stent exchange, dilation, sludge and stone removal;  Surgeon: Guru Sabino Campuzano MD;  Location: [facility]    ENDOSCOPIC RETROGRADE CHOLANGIOPANCREATOGRAPHY, EXCHANGE TUBE/STENT N/A 3/13/2023    Procedure: ENDOSCOPIC RETROGRADE CHOLANGIOPANCREATOGRAPHY, WITH replacement of pancreatic stents, bile duct  stent placed,sphinterotomy of bile duct ampulla, balloon sweep of bile duct for sludge,;  Surgeon: Guru Sabino Campuzano MD;  [facility]: [facility]    ENDOSCOPIC RETROGRADE CHOLANGIOPANCREATOGRAPHY, EXCHANGE TUBE/STENT N/A 5/10/2023    Procedure: ENDOSCOPIC RETROGRADE CHOLANGIOPANCREATOGRAPHY with pancreatic stents exchange and stone extraction;  Surgeon: Guru Sabino Campuzano MD;  Location[facility]    ENDOSCOPIC RETROGRADE CHOLANGIOPANCREATOGRAPHY, EXCHANGE TUBE/STENT N/A 2023    Procedure: ENDOSCOPIC RETROGRADE CHOLANGIOPANCREATOGRAPHY, WITH REPLACEMENT OF STENT X3 AND BALLOON SWEEP OF BILE DUCT FOR STONES;  Surgeon: Guru Sabino Campuzano MD;  Location[facility]    GASTROJEJUNOSTOMY N/A 2023    Procedure: Gastrojejunostomy;  Surgeon: Junito Patel MD;  Location: [facility]    PANCREATECTOMY, TRANSPLANT AUTO ISLET CELL, COMBINED N/A 2023    Procedure: PANCREATECTOMY, TOTAL, WITH AUTOLOGOUS PANCREATIC ISLET CELL TRANSPLANT;  Surgeon: Junito Patel MD;  Location: [facility]    SPLENECTOMY N/A 2023    Procedure: Splenectomy;  Surgeon: Junito Patel MD;  Location: [facility]             Family History:   FAMILY HISTORY: a lot of depression and substance use.         Social History:   As indicated above she had a difficult childhood, parents  when she was young, and her father  when she was 14. 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   Living alone, in a relationship about 10 months which is going well.          Physical ROS:   The 10 point Review of Systems is negative other than noted in the HPI or here.           Medications:      ampicillin  2 g Intravenous Q6H    busPIRone  10 mg Oral BID    ceFEPIme  2 g Intravenous Q12H    citalopram  40 mg Per J Tube Daily    sennosides  5-10 mL Per J Tube BID    And    docusate   mg Per J Tube BID    gabapentin  600 mg Per J Tube Q8H LELO    magnesium sulfate  4 g  Intravenous Once    melatonin  10 mg Per J Tube At Bedtime    methadone  4 mg Oral Q8H    methocarbamol  1,000 mg Oral TID    multivitamins w/minerals  15 mL Per J Tube Daily    pantoprazole  40 mg Per J Tube BID    polyethylene glycol  17 g Per J Tube Daily    QUEtiapine  25-50 mg Oral At Bedtime    sodium chloride (PF)  3 mL Intracatheter Q8H    thiamine  100 mg Intravenous Daily    [Held by provider] Warfarin Therapy Reminder  1 each Oral See Admin Instructions     Medications Prior to Admission   Medication Sig Dispense Refill Last Dose    acetaminophen (TYLENOL) 500 MG tablet Take 500-1,000 mg by mouth every 6 hours as needed for mild pain   9/21/2023 at 1930    busPIRone (BUSPAR) 10 MG tablet Take 10 mg by mouth 2 times daily   9/21/2023 at 0900    cetirizine (ZYRTEC) 10 MG tablet Take 10 mg by mouth daily as needed   Past Month    citalopram (CELEXA) 40 MG tablet Take 1 tablet by mouth daily   9/21/2023 at 0900    famotidine (PEPCID) 20 MG tablet Take 20 mg by mouth daily as needed   Past Month    gabapentin (NEURONTIN) 300 MG capsule Take 2 capsules (600 mg) by mouth 3 times daily   9/21/2023 at 1800    medroxyPROGESTERone (DEPO-PROVERA) 150 MG/ML IM injection Inject 150 mg into the muscle every 3 months Last injection was around September 15th   9/15/2023    Multiple Vitamin (TAB-A-FABI) TABS Take 1 tablet by mouth daily   More than a month    senna-docusate (SENOKOT-S/PERICOLACE) 8.6-50 MG tablet Take 2 tablets by mouth 2 times daily       HYDROmorphone (DILAUDID) 4 MG tablet Take 4 mg by mouth every 8 hours as needed for pain       hydrOXYzine (ATARAX) 25 MG tablet Take 25-50 mg by mouth nightly as needed for other (Insomnia)   9/20/2023 at 2200    ondansetron (ZOFRAN-ODT) 4 MG ODT tab Place 4-8 mg under the tongue every 6 hours as needed   9/19/2023    pancrelipase, lip-prot-amyl, 3000 UNITS (CREON) CPEP Take 2 capsules by mouth 3 times daily (with meals)   9/19/2023     See  below for use of Dilaudid           Allergies:   No Known Allergies       Labs:     Recent Results (from the past 48 hour(s))   Glucose by meter    Collection Time: 09/26/23 11:09 AM   Result Value Ref Range    GLUCOSE BY METER POCT 106 (H) 70 - 99 mg/dL   Glucose by meter    Collection Time: 09/26/23 12:02 PM   Result Value Ref Range    GLUCOSE BY METER POCT 62 (L) 70 - 99 mg/dL   Glucose by meter    Collection Time: 09/26/23 12:58 PM   Result Value Ref Range    GLUCOSE BY METER POCT 124 (H) 70 - 99 mg/dL   Glucose by meter    Collection Time: 09/26/23  1:56 PM   Result Value Ref Range    GLUCOSE BY METER POCT 177 (H) 70 - 99 mg/dL   Glucose by meter    Collection Time: 09/26/23  3:20 PM   Result Value Ref Range    GLUCOSE BY METER POCT 164 (H) 70 - 99 mg/dL   Glucose by meter    Collection Time: 09/26/23  4:03 PM   Result Value Ref Range    GLUCOSE BY METER POCT 103 (H) 70 - 99 mg/dL   Glucose by meter    Collection Time: 09/26/23  5:03 PM   Result Value Ref Range    GLUCOSE BY METER POCT 107 (H) 70 - 99 mg/dL   Glucose by meter    Collection Time: 09/26/23  6:03 PM   Result Value Ref Range    GLUCOSE BY METER POCT 96 70 - 99 mg/dL   Glucose by meter    Collection Time: 09/26/23  7:10 PM   Result Value Ref Range    GLUCOSE BY METER POCT 130 (H) 70 - 99 mg/dL   Glucose by meter    Collection Time: 09/26/23  8:05 PM   Result Value Ref Range    GLUCOSE BY METER POCT 98 70 - 99 mg/dL   Glucose by meter    Collection Time: 09/26/23  9:03 PM   Result Value Ref Range    GLUCOSE BY METER POCT 74 70 - 99 mg/dL   Glucose by meter    Collection Time: 09/26/23  9:29 PM   Result Value Ref Range    GLUCOSE BY METER POCT 96 70 - 99 mg/dL   Glucose by meter    Collection Time: 09/26/23 10:03 PM   Result Value Ref Range    GLUCOSE BY METER POCT 94 70 - 99 mg/dL   Glucose by meter    Collection Time: 09/26/23 11:02 PM   Result Value Ref Range    GLUCOSE BY METER POCT 176 (H) 70 - 99 mg/dL   Glucose by meter    Collection Time: 09/26/23 11:57 PM   Result  Value Ref Range    GLUCOSE BY METER POCT 155 (H) 70 - 99 mg/dL   Glucose by meter    Collection Time: 09/27/23  1:12 AM   Result Value Ref Range    GLUCOSE BY METER POCT 90 70 - 99 mg/dL   Glucose by meter    Collection Time: 09/27/23  2:00 AM   Result Value Ref Range    GLUCOSE BY METER POCT 66 (L) 70 - 99 mg/dL   Glucose by meter    Collection Time: 09/27/23  2:22 AM   Result Value Ref Range    GLUCOSE BY METER POCT 115 (H) 70 - 99 mg/dL   Glucose by meter    Collection Time: 09/27/23  3:11 AM   Result Value Ref Range    GLUCOSE BY METER POCT 168 (H) 70 - 99 mg/dL   Glucose by meter    Collection Time: 09/27/23  4:08 AM   Result Value Ref Range    GLUCOSE BY METER POCT 171 (H) 70 - 99 mg/dL   Glucose by meter    Collection Time: 09/27/23  5:04 AM   Result Value Ref Range    GLUCOSE BY METER POCT 118 (H) 70 - 99 mg/dL   Basic metabolic panel    Collection Time: 09/27/23  5:17 AM   Result Value Ref Range    Sodium 144 135 - 145 mmol/L    Potassium 3.2 (L) 3.4 - 5.3 mmol/L    Chloride 108 (H) 98 - 107 mmol/L    Carbon Dioxide (CO2) 25 22 - 29 mmol/L    Anion Gap 11 7 - 15 mmol/L    Urea Nitrogen 5.2 (L) 6.0 - 20.0 mg/dL    Creatinine 0.56 0.51 - 0.95 mg/dL    GFR Estimate >90 >60 mL/min/1.73m2    Calcium 8.2 (L) 8.6 - 10.0 mg/dL    Glucose 78 70 - 99 mg/dL   Amylase    Collection Time: 09/27/23  5:17 AM   Result Value Ref Range    Amylase 13 (L) 28 - 100 U/L   Lipase    Collection Time: 09/27/23  5:17 AM   Result Value Ref Range    Lipase 6 (L) 13 - 60 U/L   Hepatic panel    Collection Time: 09/27/23  5:17 AM   Result Value Ref Range    Protein Total 5.2 (L) 6.4 - 8.3 g/dL    Albumin 2.9 (L) 3.5 - 5.2 g/dL    Bilirubin Total 0.3 <=1.2 mg/dL    Alkaline Phosphatase 75 35 - 104 U/L    AST 16 0 - 45 U/L    ALT 18 0 - 50 U/L    Bilirubin Direct <0.20 0.00 - 0.30 mg/dL   Partial thromboplastin time    Collection Time: 09/27/23  5:17 AM   Result Value Ref Range    aPTT 28 22 - 38 Seconds   Magnesium    Collection Time:  09/27/23  5:17 AM   Result Value Ref Range    Magnesium 1.8 1.7 - 2.3 mg/dL   Phosphorus    Collection Time: 09/27/23  5:17 AM   Result Value Ref Range    Phosphorus 2.3 (L) 2.5 - 4.5 mg/dL   CBC with platelets and differential    Collection Time: 09/27/23  5:17 AM   Result Value Ref Range    WBC Count 11.2 (H) 4.0 - 11.0 10e3/uL    RBC Count 3.23 (L) 3.80 - 5.20 10e6/uL    Hemoglobin 9.5 (L) 11.7 - 15.7 g/dL    Hematocrit 28.1 (L) 35.0 - 47.0 %    MCV 87 78 - 100 fL    MCH 29.4 26.5 - 33.0 pg    MCHC 33.8 31.5 - 36.5 g/dL    RDW 14.5 10.0 - 15.0 %    Platelet Count 478 (H) 150 - 450 10e3/uL    % Neutrophils 46 %    % Lymphocytes 35 %    % Monocytes 13 %    % Eosinophils 5 %    % Basophils 0 %    % Immature Granulocytes 1 %    NRBCs per 100 WBC 0 <1 /100    Absolute Neutrophils 5.3 1.6 - 8.3 10e3/uL    Absolute Lymphocytes 3.9 0.8 - 5.3 10e3/uL    Absolute Monocytes 1.4 (H) 0.0 - 1.3 10e3/uL    Absolute Eosinophils 0.5 0.0 - 0.7 10e3/uL    Absolute Basophils 0.0 0.0 - 0.2 10e3/uL    Absolute Immature Granulocytes 0.1 <=0.4 10e3/uL    Absolute NRBCs 0.0 10e3/uL   Glucose by meter    Collection Time: 09/27/23  6:19 AM   Result Value Ref Range    GLUCOSE BY METER POCT 63 (L) 70 - 99 mg/dL   Glucose by meter    Collection Time: 09/27/23  6:37 AM   Result Value Ref Range    GLUCOSE BY METER POCT 92 70 - 99 mg/dL   Glucose by meter    Collection Time: 09/27/23  6:59 AM   Result Value Ref Range    GLUCOSE BY METER POCT 96 70 - 99 mg/dL   Glucose by meter    Collection Time: 09/27/23  7:59 AM   Result Value Ref Range    GLUCOSE BY METER POCT 155 (H) 70 - 99 mg/dL   Glucose by meter    Collection Time: 09/27/23  9:36 AM   Result Value Ref Range    GLUCOSE BY METER POCT 270 (H) 70 - 99 mg/dL   Glucose by meter    Collection Time: 09/27/23 10:16 AM   Result Value Ref Range    GLUCOSE BY METER POCT 198 (H) 70 - 99 mg/dL   Glucose by meter    Collection Time: 09/27/23 11:13 AM   Result Value Ref Range    GLUCOSE BY METER POCT 97  70 - 99 mg/dL   Glucose by meter    Collection Time: 09/27/23 12:30 PM   Result Value Ref Range    GLUCOSE BY METER POCT 138 (H) 70 - 99 mg/dL   Glucose by meter    Collection Time: 09/27/23  1:27 PM   Result Value Ref Range    GLUCOSE BY METER POCT 195 (H) 70 - 99 mg/dL   Glucose by meter    Collection Time: 09/27/23  2:31 PM   Result Value Ref Range    GLUCOSE BY METER POCT 171 (H) 70 - 99 mg/dL   Glucose by meter    Collection Time: 09/27/23  3:23 PM   Result Value Ref Range    GLUCOSE BY METER POCT 101 (H) 70 - 99 mg/dL   Glucose by meter    Collection Time: 09/27/23  4:34 PM   Result Value Ref Range    GLUCOSE BY METER POCT 161 (H) 70 - 99 mg/dL   INR    Collection Time: 09/27/23  4:35 PM   Result Value Ref Range    INR 1.03 0.85 - 1.15   Extra Green Top (Lithium Heparin) Tube    Collection Time: 09/27/23  4:35 PM   Result Value Ref Range    Hold Specimen JIC    Extra Purple Top Tube    Collection Time: 09/27/23  4:35 PM   Result Value Ref Range    Hold Specimen JIC    EKG 12-lead, complete    Collection Time: 09/27/23  5:10 PM   Result Value Ref Range    Systolic Blood Pressure  mmHg    Diastolic Blood Pressure  mmHg    Ventricular Rate 75 BPM    Atrial Rate 75 BPM    KY Interval 160 ms    QRS Duration 76 ms     ms    QTc 466 ms    P Axis 62 degrees    R AXIS 71 degrees    T Axis 64 degrees    Interpretation ECG       Sinus rhythm  T wave abnormality, consider anterior ischemia  Abnormal ECG  No previous ECGs available     Glucose by meter    Collection Time: 09/27/23  5:21 PM   Result Value Ref Range    GLUCOSE BY METER POCT 184 (H) 70 - 99 mg/dL   Glucose by meter    Collection Time: 09/27/23  6:00 PM   Result Value Ref Range    GLUCOSE BY METER POCT 165 (H) 70 - 99 mg/dL   Glucose by meter    Collection Time: 09/27/23  7:04 PM   Result Value Ref Range    GLUCOSE BY METER POCT 122 (H) 70 - 99 mg/dL   Glucose by meter    Collection Time: 09/27/23  7:55 PM   Result Value Ref Range    GLUCOSE BY METER  POCT 94 70 - 99 mg/dL   Glucose by meter    Collection Time: 09/27/23  9:08 PM   Result Value Ref Range    GLUCOSE BY METER POCT 144 (H) 70 - 99 mg/dL   Glucose by meter    Collection Time: 09/27/23 10:11 PM   Result Value Ref Range    GLUCOSE BY METER POCT 212 (H) 70 - 99 mg/dL   Glucose by meter    Collection Time: 09/27/23 11:09 PM   Result Value Ref Range    GLUCOSE BY METER POCT 103 (H) 70 - 99 mg/dL   Glucose by meter    Collection Time: 09/28/23 12:11 AM   Result Value Ref Range    GLUCOSE BY METER POCT 102 (H) 70 - 99 mg/dL   Glucose by meter    Collection Time: 09/28/23 12:53 AM   Result Value Ref Range    GLUCOSE BY METER POCT 128 (H) 70 - 99 mg/dL   CBC with platelets    Collection Time: 09/28/23  1:41 AM   Result Value Ref Range    WBC Count 11.5 (H) 4.0 - 11.0 10e3/uL    RBC Count 3.01 (L) 3.80 - 5.20 10e6/uL    Hemoglobin 8.9 (L) 11.7 - 15.7 g/dL    Hematocrit 26.3 (L) 35.0 - 47.0 %    MCV 87 78 - 100 fL    MCH 29.6 26.5 - 33.0 pg    MCHC 33.8 31.5 - 36.5 g/dL    RDW 14.5 10.0 - 15.0 %    Platelet Count 501 (H) 150 - 450 10e3/uL   Glucose by meter    Collection Time: 09/28/23  2:02 AM   Result Value Ref Range    GLUCOSE BY METER POCT 148 (H) 70 - 99 mg/dL   Glucose by meter    Collection Time: 09/28/23  3:01 AM   Result Value Ref Range    GLUCOSE BY METER POCT 165 (H) 70 - 99 mg/dL   Glucose by meter    Collection Time: 09/28/23  4:02 AM   Result Value Ref Range    GLUCOSE BY METER POCT 95 70 - 99 mg/dL   Glucose by meter    Collection Time: 09/28/23  5:01 AM   Result Value Ref Range    GLUCOSE BY METER POCT 50 (LL) 70 - 99 mg/dL   Glucose by meter    Collection Time: 09/28/23  5:16 AM   Result Value Ref Range    GLUCOSE BY METER POCT 78 70 - 99 mg/dL   Glucose by meter    Collection Time: 09/28/23  5:25 AM   Result Value Ref Range    GLUCOSE BY METER POCT 88 70 - 99 mg/dL   Magnesium    Collection Time: 09/28/23  5:32 AM   Result Value Ref Range    Magnesium 1.6 (L) 1.7 - 2.3 mg/dL   Phosphorus     Collection Time: 09/28/23  5:32 AM   Result Value Ref Range    Phosphorus 3.2 2.5 - 4.5 mg/dL   INR    Collection Time: 09/28/23  5:32 AM   Result Value Ref Range    INR 1.06 0.85 - 1.15   Extra Purple Top Tube    Collection Time: 09/28/23  5:32 AM   Result Value Ref Range    Hold Specimen JIC    Glucose by meter    Collection Time: 09/28/23  6:08 AM   Result Value Ref Range    GLUCOSE BY METER POCT 176 (H) 70 - 99 mg/dL   Glucose by meter    Collection Time: 09/28/23  6:55 AM   Result Value Ref Range    GLUCOSE BY METER POCT 194 (H) 70 - 99 mg/dL   Glucose by meter    Collection Time: 09/28/23  8:02 AM   Result Value Ref Range    GLUCOSE BY METER POCT 73 70 - 99 mg/dL   Glucose by meter    Collection Time: 09/28/23  9:03 AM   Result Value Ref Range    GLUCOSE BY METER POCT 125 (H) 70 - 99 mg/dL   Basic metabolic panel    Collection Time: 09/28/23  9:17 AM   Result Value Ref Range    Sodium 141 135 - 145 mmol/L    Potassium 4.1 3.4 - 5.3 mmol/L    Chloride 105 98 - 107 mmol/L    Carbon Dioxide (CO2) 26 22 - 29 mmol/L    Anion Gap 10 7 - 15 mmol/L    Urea Nitrogen 7.1 6.0 - 20.0 mg/dL    Creatinine 0.63 0.51 - 0.95 mg/dL    GFR Estimate >90 >60 mL/min/1.73m2    Calcium 8.4 (L) 8.6 - 10.0 mg/dL    Glucose 139 (H) 70 - 99 mg/dL   CBC with platelets and differential    Collection Time: 09/28/23  9:17 AM   Result Value Ref Range    WBC Count 11.8 (H) 4.0 - 11.0 10e3/uL    RBC Count 3.08 (L) 3.80 - 5.20 10e6/uL    Hemoglobin 9.2 (L) 11.7 - 15.7 g/dL    Hematocrit 27.5 (L) 35.0 - 47.0 %    MCV 89 78 - 100 fL    MCH 29.9 26.5 - 33.0 pg    MCHC 33.5 31.5 - 36.5 g/dL    RDW 14.6 10.0 - 15.0 %    Platelet Count 515 (H) 150 - 450 10e3/uL    % Neutrophils 43 %    % Lymphocytes 35 %    % Monocytes 16 %    % Eosinophils 5 %    % Basophils 0 %    % Immature Granulocytes 1 %    NRBCs per 100 WBC 0 <1 /100    Absolute Neutrophils 5.2 1.6 - 8.3 10e3/uL    Absolute Lymphocytes 4.1 0.8 - 5.3 10e3/uL    Absolute Monocytes 1.9 (H) 0.0 -  "1.3 10e3/uL    Absolute Eosinophils 0.6 0.0 - 0.7 10e3/uL    Absolute Basophils 0.0 0.0 - 0.2 10e3/uL    Absolute Immature Granulocytes 0.1 <=0.4 10e3/uL    Absolute NRBCs 0.0 10e3/uL   Glucose by meter    Collection Time: 09/28/23 10:06 AM   Result Value Ref Range    GLUCOSE BY METER POCT 150 (H) 70 - 99 mg/dL   CBC with platelets    Collection Time: 09/28/23 10:09 AM   Result Value Ref Range    WBC Count 11.9 (H) 4.0 - 11.0 10e3/uL    RBC Count 3.15 (L) 3.80 - 5.20 10e6/uL    Hemoglobin 9.4 (L) 11.7 - 15.7 g/dL    Hematocrit 27.9 (L) 35.0 - 47.0 %    MCV 89 78 - 100 fL    MCH 29.8 26.5 - 33.0 pg    MCHC 33.7 31.5 - 36.5 g/dL    RDW 14.4 10.0 - 15.0 %    Platelet Count 558 (H) 150 - 450 10e3/uL          Physical and Psychiatric Examination:     /89 (BP Location: Left arm)   Pulse 85   Temp 98.2  F (36.8  C) (Oral)   Resp 16   Ht 1.575 m (5' 2\")   Wt 69.3 kg (152 lb 12.5 oz)   LMP 08/16/2023 (Approximate)   SpO2 97%   BMI 27.94 kg/m    Weight is 152 lbs 12.46 oz  Body mass index is 27.94 kg/m .    Physical Exam:  I have reviewed the physical exam as documented by by the medical team and agree with findings and assessment and have no additional findings to add at this time.         MSE:   Appearance: awake, alert and adequately groomed  Attitude:  cooperative  Eye Contact:  good  Mood:  \"OK\"  Affect:  slightly restricted  Speech:  clear, coherent  Psychomotor Behavior:  no evidence of tardive dyskinesia, dystonia, or tics  Muscle strength and tone: intact   Thought Process:  logical, linear, and goal oriented  Associations:  no loose associations  Thought Content:  no evidence of suicidal ideation or homicidal ideation and no evidence of psychotic thought  Insight:  fair  Judgement:  fair  Oriented to:  time, person, and place  Attention Span and Concentration:  intact  Recent and Remote Memory:  intact      QTc: 466 ms 9/27         DSM-5 Diagnosis:   296.35 (F33.41)  Major Depressive Disorder, " "Recurrent Episode, In partial remission With anxious distress  300.02 (F41.1) Generalized Anxiety Disorder  Alcohol use disorder in remission   Chronic opioid and cannabis use (prescribed)           Assessment:   She does quite well with Celexa and Buspar, but I informed her that Buspar at 15 mg BID may be more effective (although she sometimes takes the 10 mg TID).   With hydroxyzine 50 mg q 6 hours I have concerns about the anticholinergic burden; use of Seroquel may allow for lower dose of hydroxyzine.   I seen that she is being followed by health psychology; she finds this helpful since she is used to seeing someone for psychotherapy as an outpatient.           Summary of Recommendations:   Continue Celexa 40 mg per day, consider increasing Buspar to 15 mg BID  As alternative to hydroxyzine, try Seroquel 12.5-25 mg q 6 hours PRN anxiety, 25-50 mg HS (can be used with hydroxyzine since she uses  this at home).  Try to limit hydroxyzine to 25 mg per dose   Continue with psychotherapy     Page me or re-consult psychiatry as needed (psychiatry is signing off).     Nima Cisneros M.D.   Consult liaison psychiatry   [facility]   Contact information available via [facility] Paging/Directory.  If I am not available, then [facility] intake ([phone]) should know who   Is on call          From [place] :         \"This dictation was performed with voice recognition software and may contain errors,  omissions and inadvertent word substitution.\"           "

## 2023-09-28 NOTE — PROVIDER NOTIFICATION
"Surgical on call paged @ [phone]  \"7A 215 JOSSIE Burger  TPAIT  pt pain not controlled, lowest 7/10 throughout shift. all PRNS utilized. was switched to methadone yesterday, Toradol was stopped. are we able to restart or give x1 Toradol?  [place] As[place], [phone]\"    0330: provider ordered 15mg Toradol and 1000mg IV Robaxin as one time doses  "

## 2023-09-28 NOTE — PROGRESS NOTES
Patient's Name: Artie Burger    Procedure Date: 09/27/23  Procedure Time 2330    Procedure Performed: Central line removal     The Central Venous Catheter (CVC) was removed per provider order.     The central venous catheter was located: Right Internal Jugular vein, with a total of 2 lumens.     The patient was placed in Trendelenburg position with Insertion site lower than heart.     Valsalva response achieved by: Patient instructed to take a deep breath and bear down while the CVC was removed with a constant steady motion.     Firm pressure was applied at the access site for 2 minutes until bleeding stopped.     Post removal site assessment; Clean and dry without bleeding. Occlusive dressing applied: Yes    CVC tip was inspected and intact: Yes    Tip was sent to lab for culture per provider order: No    Patient tolerated the procedure: well    2nd RN present during removal (if applicable) Demetra Haines RN   9/27/2023   11:43 PM

## 2023-09-29 ENCOUNTER — APPOINTMENT (OUTPATIENT)
Dept: EDUCATION SERVICES | Facility: CLINIC | Age: 28
End: 2023-09-29
Attending: SURGERY
Payer: COMMERCIAL

## 2023-09-29 ENCOUNTER — APPOINTMENT (OUTPATIENT)
Dept: PHYSICAL THERAPY | Facility: CLINIC | Age: 28
End: 2023-09-29
Attending: SURGERY
Payer: COMMERCIAL

## 2023-09-29 LAB
ANION GAP SERPL CALCULATED.3IONS-SCNC: <1 MMOL/L (ref 7–15)
BASOPHILS # BLD AUTO: 0.1 10E3/UL (ref 0–0.2)
BASOPHILS NFR BLD AUTO: 1 %
BUN SERPL-MCNC: 7.1 MG/DL (ref 6–20)
CALCIUM SERPL-MCNC: 7.4 MG/DL (ref 8.6–10)
CHLORIDE SERPL-SCNC: 116 MMOL/L (ref 98–107)
CREAT SERPL-MCNC: 0.5 MG/DL (ref 0.51–0.95)
DEPRECATED HCO3 PLAS-SCNC: 23 MMOL/L (ref 22–29)
EGFRCR SERPLBLD CKD-EPI 2021: >90 ML/MIN/1.73M2
EOSINOPHIL # BLD AUTO: 0.7 10E3/UL (ref 0–0.7)
EOSINOPHIL NFR BLD AUTO: 6 %
ERYTHROCYTE [DISTWIDTH] IN BLOOD BY AUTOMATED COUNT: 14.6 % (ref 10–15)
GLUCOSE BLDC GLUCOMTR-MCNC: 101 MG/DL (ref 70–99)
GLUCOSE BLDC GLUCOMTR-MCNC: 127 MG/DL (ref 70–99)
GLUCOSE BLDC GLUCOMTR-MCNC: 135 MG/DL (ref 70–99)
GLUCOSE BLDC GLUCOMTR-MCNC: 147 MG/DL (ref 70–99)
GLUCOSE BLDC GLUCOMTR-MCNC: 153 MG/DL (ref 70–99)
GLUCOSE BLDC GLUCOMTR-MCNC: 156 MG/DL (ref 70–99)
GLUCOSE BLDC GLUCOMTR-MCNC: 161 MG/DL (ref 70–99)
GLUCOSE BLDC GLUCOMTR-MCNC: 167 MG/DL (ref 70–99)
GLUCOSE BLDC GLUCOMTR-MCNC: 190 MG/DL (ref 70–99)
GLUCOSE BLDC GLUCOMTR-MCNC: 202 MG/DL (ref 70–99)
GLUCOSE BLDC GLUCOMTR-MCNC: 221 MG/DL (ref 70–99)
GLUCOSE BLDC GLUCOMTR-MCNC: 310 MG/DL (ref 70–99)
GLUCOSE BLDC GLUCOMTR-MCNC: 339 MG/DL (ref 70–99)
GLUCOSE BLDC GLUCOMTR-MCNC: 72 MG/DL (ref 70–99)
GLUCOSE BLDC GLUCOMTR-MCNC: 83 MG/DL (ref 70–99)
GLUCOSE BLDC GLUCOMTR-MCNC: 93 MG/DL (ref 70–99)
GLUCOSE BLDC GLUCOMTR-MCNC: 96 MG/DL (ref 70–99)
GLUCOSE BLDC GLUCOMTR-MCNC: 97 MG/DL (ref 70–99)
GLUCOSE SERPL-MCNC: 134 MG/DL (ref 70–99)
HCT VFR BLD AUTO: 24.8 % (ref 35–47)
HGB BLD-MCNC: 8.4 G/DL (ref 11.7–15.7)
HOLD SPECIMEN: NORMAL
IMM GRANULOCYTES # BLD: 0.1 10E3/UL
IMM GRANULOCYTES NFR BLD: 1 %
INR PPP: 1.02 (ref 0.85–1.15)
LYMPHOCYTES # BLD AUTO: 3.6 10E3/UL (ref 0.8–5.3)
LYMPHOCYTES NFR BLD AUTO: 34 %
MCH RBC QN AUTO: 30.1 PG (ref 26.5–33)
MCHC RBC AUTO-ENTMCNC: 33.9 G/DL (ref 31.5–36.5)
MCV RBC AUTO: 89 FL (ref 78–100)
MONOCYTES # BLD AUTO: 1.6 10E3/UL (ref 0–1.3)
MONOCYTES NFR BLD AUTO: 15 %
NEUTROPHILS # BLD AUTO: 4.6 10E3/UL (ref 1.6–8.3)
NEUTROPHILS NFR BLD AUTO: 43 %
NRBC # BLD AUTO: 0 10E3/UL
NRBC BLD AUTO-RTO: 0 /100
PLATELET # BLD AUTO: 545 10E3/UL (ref 150–450)
POTASSIUM SERPL-SCNC: 3.4 MMOL/L (ref 3.4–5.3)
RBC # BLD AUTO: 2.79 10E6/UL (ref 3.8–5.2)
SODIUM SERPL-SCNC: 137 MMOL/L (ref 135–145)
UFH PPP CHRO-ACNC: 0.49 IU/ML
UFH PPP CHRO-ACNC: <0.1 IU/ML
WBC # BLD AUTO: 10.5 10E3/UL (ref 4–11)

## 2023-09-29 PROCEDURE — 97116 GAIT TRAINING THERAPY: CPT | Mod: GP

## 2023-09-29 PROCEDURE — 250N000013 HC RX MED GY IP 250 OP 250 PS 637: Performed by: NURSE PRACTITIONER

## 2023-09-29 PROCEDURE — 36415 COLL VENOUS BLD VENIPUNCTURE: CPT | Performed by: SURGERY

## 2023-09-29 PROCEDURE — 99231 SBSQ HOSP IP/OBS SF/LOW 25: CPT | Mod: GC | Performed by: ANESTHESIOLOGY

## 2023-09-29 PROCEDURE — 85025 COMPLETE CBC W/AUTO DIFF WBC: CPT | Performed by: SURGERY

## 2023-09-29 PROCEDURE — 250N000011 HC RX IP 250 OP 636

## 2023-09-29 PROCEDURE — 85520 HEPARIN ASSAY: CPT | Performed by: SURGERY

## 2023-09-29 PROCEDURE — 250N000011 HC RX IP 250 OP 636: Performed by: PHYSICIAN ASSISTANT

## 2023-09-29 PROCEDURE — 250N000012 HC RX MED GY IP 250 OP 636 PS 637

## 2023-09-29 PROCEDURE — 120N000011 HC R&B TRANSPLANT UMMC

## 2023-09-29 PROCEDURE — 85610 PROTHROMBIN TIME: CPT | Performed by: PHYSICIAN ASSISTANT

## 2023-09-29 PROCEDURE — 80048 BASIC METABOLIC PNL TOTAL CA: CPT | Performed by: SURGERY

## 2023-09-29 PROCEDURE — 99233 SBSQ HOSP IP/OBS HIGH 50: CPT

## 2023-09-29 PROCEDURE — 99207 PR NO BILLABLE SERVICE THIS VISIT: CPT | Performed by: PSYCHIATRY & NEUROLOGY

## 2023-09-29 PROCEDURE — 250N000013 HC RX MED GY IP 250 OP 250 PS 637: Performed by: PHYSICIAN ASSISTANT

## 2023-09-29 PROCEDURE — 97530 THERAPEUTIC ACTIVITIES: CPT | Mod: GP

## 2023-09-29 RX ORDER — HYDROXYZINE HCL 10 MG/5 ML
25 SOLUTION, ORAL ORAL EVERY 6 HOURS PRN
Status: DISCONTINUED | OUTPATIENT
Start: 2023-09-29 | End: 2023-10-03 | Stop reason: HOSPADM

## 2023-09-29 RX ORDER — METHADONE HYDROCHLORIDE 5 MG/5ML
7.5 SOLUTION ORAL EVERY 8 HOURS
Status: DISCONTINUED | OUTPATIENT
Start: 2023-09-29 | End: 2023-10-03 | Stop reason: HOSPADM

## 2023-09-29 RX ORDER — CEFEPIME HYDROCHLORIDE 2 G/1
2 INJECTION, POWDER, FOR SOLUTION INTRAVENOUS EVERY 12 HOURS
Status: COMPLETED | OUTPATIENT
Start: 2023-09-29 | End: 2023-09-30

## 2023-09-29 RX ORDER — METHADONE HYDROCHLORIDE 5 MG/5ML
3.5 SOLUTION ORAL ONCE
Status: COMPLETED | OUTPATIENT
Start: 2023-09-29 | End: 2023-09-29

## 2023-09-29 RX ADMIN — CEFEPIME HYDROCHLORIDE 2 G: 2 INJECTION, POWDER, FOR SOLUTION INTRAVENOUS at 21:19

## 2023-09-29 RX ADMIN — METHADONE HYDROCHLORIDE 7.5 MG: 5 SOLUTION ORAL at 22:32

## 2023-09-29 RX ADMIN — METHADONE HYDROCHLORIDE 4 MG: 5 SOLUTION ORAL at 06:12

## 2023-09-29 RX ADMIN — Medication: at 11:21

## 2023-09-29 RX ADMIN — AMPICILLIN 2 G: 2 INJECTION, POWDER, FOR SOLUTION INTRAVENOUS at 06:10

## 2023-09-29 RX ADMIN — SENNOSIDES 5 ML: 8.8 LIQUID ORAL at 20:54

## 2023-09-29 RX ADMIN — ACETAMINOPHEN 650 MG: 160 LIQUID ORAL at 11:11

## 2023-09-29 RX ADMIN — THIAMINE HYDROCHLORIDE 100 MG: 100 INJECTION, SOLUTION INTRAMUSCULAR; INTRAVENOUS at 08:23

## 2023-09-29 RX ADMIN — DOCUSATE SODIUM 100 MG: 50 LIQUID ORAL at 20:54

## 2023-09-29 RX ADMIN — ACETAMINOPHEN 650 MG: 160 LIQUID ORAL at 16:35

## 2023-09-29 RX ADMIN — ACETAMINOPHEN 650 MG: 160 LIQUID ORAL at 14:13

## 2023-09-29 RX ADMIN — Medication 10 MG: at 20:55

## 2023-09-29 RX ADMIN — SENNOSIDES 5 ML: 8.8 LIQUID ORAL at 08:14

## 2023-09-29 RX ADMIN — QUETIAPINE FUMARATE 25 MG: 25 TABLET ORAL at 22:32

## 2023-09-29 RX ADMIN — INSULIN ASPART 5 UNITS: 100 INJECTION, SOLUTION INTRAVENOUS; SUBCUTANEOUS at 14:00

## 2023-09-29 RX ADMIN — ACETAMINOPHEN 650 MG: 160 LIQUID ORAL at 20:55

## 2023-09-29 RX ADMIN — HYDROXYZINE HYDROCHLORIDE 25 MG: 10 SYRUP ORAL at 23:18

## 2023-09-29 RX ADMIN — SIMETHICONE 80 MG: 80 TABLET, CHEWABLE ORAL at 16:35

## 2023-09-29 RX ADMIN — CEFEPIME HYDROCHLORIDE 2 G: 2 INJECTION, POWDER, FOR SOLUTION INTRAVENOUS at 09:43

## 2023-09-29 RX ADMIN — METHOCARBAMOL TABLETS 1000 MG: 500 TABLET, COATED ORAL at 14:12

## 2023-09-29 RX ADMIN — METHADONE HYDROCHLORIDE 7.5 MG: 5 SOLUTION ORAL at 14:01

## 2023-09-29 RX ADMIN — METHOCARBAMOL TABLETS 1000 MG: 500 TABLET, COATED ORAL at 20:54

## 2023-09-29 RX ADMIN — Medication 40 MG: at 22:31

## 2023-09-29 RX ADMIN — METHADONE HYDROCHLORIDE 3.5 MG: 5 SOLUTION ORAL at 08:52

## 2023-09-29 RX ADMIN — AMPICILLIN 2 G: 2 INJECTION, POWDER, FOR SOLUTION INTRAVENOUS at 00:07

## 2023-09-29 RX ADMIN — BUSPIRONE HYDROCHLORIDE 10 MG: 10 TABLET ORAL at 20:54

## 2023-09-29 RX ADMIN — BUSPIRONE HYDROCHLORIDE 10 MG: 10 TABLET ORAL at 08:14

## 2023-09-29 RX ADMIN — GABAPENTIN 600 MG: 250 SUSPENSION ORAL at 22:33

## 2023-09-29 RX ADMIN — HYDROXYZINE HYDROCHLORIDE 50 MG: 10 SOLUTION ORAL at 12:33

## 2023-09-29 RX ADMIN — GABAPENTIN 600 MG: 250 SUSPENSION ORAL at 06:12

## 2023-09-29 RX ADMIN — HYDROMORPHONE HYDROCHLORIDE 4 MG: 1 SOLUTION ORAL at 20:55

## 2023-09-29 RX ADMIN — Medication 40 MG: at 08:14

## 2023-09-29 RX ADMIN — POLYETHYLENE GLYCOL 3350 17 G: 17 POWDER, FOR SOLUTION ORAL at 08:14

## 2023-09-29 RX ADMIN — CITALOPRAM 40 MG: 10 SOLUTION ORAL at 08:14

## 2023-09-29 RX ADMIN — APIXABAN 10 MG: 5 TABLET, FILM COATED ORAL at 12:32

## 2023-09-29 RX ADMIN — GABAPENTIN 600 MG: 250 SUSPENSION ORAL at 14:13

## 2023-09-29 RX ADMIN — METHOCARBAMOL TABLETS 1000 MG: 500 TABLET, COATED ORAL at 08:14

## 2023-09-29 RX ADMIN — Medication 15 ML: at 08:14

## 2023-09-29 RX ADMIN — INSULIN ASPART 2 UNITS: 100 INJECTION, SOLUTION INTRAVENOUS; SUBCUTANEOUS at 18:31

## 2023-09-29 RX ADMIN — HYDROMORPHONE HYDROCHLORIDE 4 MG: 1 SOLUTION ORAL at 04:08

## 2023-09-29 RX ADMIN — HYDROMORPHONE HYDROCHLORIDE 4 MG: 1 SOLUTION ORAL at 10:15

## 2023-09-29 RX ADMIN — APIXABAN 10 MG: 5 TABLET, FILM COATED ORAL at 23:14

## 2023-09-29 RX ADMIN — INSULIN ASPART 2 UNITS: 100 INJECTION, SOLUTION INTRAVENOUS; SUBCUTANEOUS at 23:22

## 2023-09-29 ASSESSMENT — ACTIVITIES OF DAILY LIVING (ADL)
ADLS_ACUITY_SCORE: 22
ADLS_ACUITY_SCORE: 20
ADLS_ACUITY_SCORE: 20
ADLS_ACUITY_SCORE: 22

## 2023-09-29 NOTE — PLAN OF CARE
"VS: /75 (BP Location: Left arm)   Pulse 68   Temp 98.3  F (36.8  C) (Oral)   Resp 16   Ht 1.575 m (5' 2\")   Wt 69.3 kg (152 lb 12.5 oz)   LMP 08/16/2023 (Approximate)   SpO2 97%   BMI 27.94 kg/m      Cares: 2300 - 0730     Neuro: Aox4   VS: VSS   Cardiac: WDL   Respiratory: WDL on RA   GI/: voiding urine/stool mix (bedside commode)   Skin: midline abdominal incision dermabond BLAZE   Diet: NPO except ice chips   Labs: pending AM labs   BG: Q1H (72 - 167) *KEEP INSULIN GTT on ALG 1*  LDA:  PIV x4 (dilaudid PCA, ropivacaine epidural x2, insulin gtt w/ TKO, TKO w/ ABX), Right NINFA, GJ tube (G tube clamped, J tube has cont. TF @ 50mL/hr)  Mobility: SBA/UAL  Pain/Nausea: abdominal pain, denies nausea   PRN medications: dilaudid x2, D10 for 15 min. For BG of 72)  Plan of Care: plan on epidural removal @ 10am, continue with current POC and update MD with any changes   "

## 2023-09-29 NOTE — CONSULTS
Artie and her sister Sandra were seen bedside for GJ tube and feeding pump education. They were both engaged in teaching. They were taught all site cares, dressing change, anchoring tube, water and medication administration. They will  a pill  prior to discharge .    I then demonstrated use of the infinity pump for continuous feeds. Angelique did well with all skills and stated understanding of all information.    Please have her do all her cares as able prior to discharge home.    Literature given: Handwashing and Skin Care, Caring for Your Tube at Home.  Using the Enteralite Infinity Pump for Tube Feeding,

## 2023-09-29 NOTE — CONSULTS
She reports that the Seroquel at 12.5 mg helped anxiety and she slept well last night with 25 mg Seroquel (and 50 mg hydroxyzine).   She was ok with using 25 mg Atarax rather than 50 mg per dose (less anticholinergic should help bowel function).  I told her to ask for higher doses of Seroquel as needed       Nima Cisneros M.D.   Consult liaison psychiatry [facility]   Contact information available via [facility] Paging/Directory

## 2023-09-29 NOTE — CONSULTS
Discharge Pharmacy Test Claim    Dexcom G7 and freestyle isamra 3 are covered with $0 copay through patient's [employer] [place] PMAP.    Test Claim Copay   dexcom G7 0.00   freestyle isamar 3 0.00     Brenda Erazo  [facility] Pharmacy Liaison  Ph: [phone] Pager: [phone]   Securely message with the Vocera Web Console (learn more here)

## 2023-09-29 NOTE — PROGRESS NOTES
Pain Service Progress Note  [facility]  Date: 09/29/2023       Patient Name: Artie Burger  MRN: [id number]  Age: 27 year old  Sex: female      Assessment:  Artie Burger is a 27 year old with PMH significant for recurrent acute on chronic pancreatitis 2/2 alcoholic pancreatitis. She is now s/p status post total pancreatectomy, islet cell autotransplant, splenectomy, and gastrojejunostomy tube placement on 9/22/23.      Procedure: total pancreatectomy, islet cell autotransplant, splenectomy, and gastrojejunostomy tube placement     Date of Surgery: 9/22/23     Date of Catheter Placement: 9/22/23, removed 9/29.      Plan/Recommendations:  1. Regional Anesthesia/Analgesia  -Continuous Catheter Type/Site: bilateral paravertebral (PV)   Infusate:   Programmed Intermittent Bolus (PIB) at 6 mL Q60 min via each catheter, total infusion rate of 12 mL/hr     Plan to maintain catheter, max of 7 days.    Removed above catheter 9/29 at 0900, patient tolerated removal with no acute complications, no signs of bleeding, area was clean/dry/in tact, dark blue tip in tact after removal of both catheters. Dresses with clean bandage afterwards. Anticoagulation was appropriately held as outlined the day prior below.       2. Anticoagulation  -Please contact Inpatient Pain Service before ordering or making any anticoagulation changes     Patient received a dose of warfarin 9/27 @ 2107, and therapeutic Lovenox at 1832 on 9/27 and 0500 on 9/28. Discussed with primary team, and they will hold coumadin & enoxaparin. They will start heparin infusion, instead. Catheter removal to be done at least 24 hours after Lovenox is held.      Will plan to remove the catheter 9/29:  - Please do not restart lovenox or warfarin prior to that time.   - Please hold heparin gtt at 0400 on 9/29 in preparation for removal.   - Please obtain INR level around with AM labs on 9/29.         3. Multimodal Analgesia  - Per primary  "team    Pain Service will sign off.    Discussed with attending anesthesiologist    Lalitha Middleton MD  09/29/2023     Overnight Events: No acute events overnight.     Tubes/Drains: No    Subjective:  I am still having pain.   Nausea: No  Vomiting: No  Pruritus: No  Symptoms of LAST: No    Pain Location:  Abdominal, incisional.     Diet: NPO for Medical/Clinical Reasons Except for: Ice Chips  Adult Formula Drip Feeding: Continuous Vital 1.5; Jejunostomy; Goal Rate: 50 mL/hr; mL/hr; 9/25: Advance by 10 ml/hr q 12 hours to goal rate of 50 ml/hr.  See separate Relizorb order for instructions.; Do not advance tube feeding rate unless K+ is...    Relevant Labs:  Recent Labs   Lab Test 09/29/23  0527 09/29/23  0026 09/27/23  1635 09/27/23  0517   INR 1.02  --    < >  --    PLT  --  545*   < > 478*   PTT  --   --   --  28   BUN  --  7.1   < > 5.2*    < > = values in this interval not displayed.       Physical Exam:  Vitals: /75 (BP Location: Left arm)   Pulse 68   Temp 98.3  F (36.8  C) (Oral)   Resp 16   Ht 1.575 m (5' 2\")   Wt 69.3 kg (152 lb 12.5 oz)   LMP 08/16/2023 (Approximate)   SpO2 97%   BMI 27.94 kg/m      Physical Exam:   Orientation:  Alert, oriented, and in no acute distress: Yes  Sedation: No    Catheter Site:   Catheter entry site is clean/dry/intact: Yes, after removal - dresses with clean bandage.     Tender: No      Relevant Medications:  Current Pain Medications:  Medications related to Pain Management (From now, onward)      Start     Dose/Rate Route Frequency Ordered Stop    09/29/23 1400  methadone (DOLOPHINE) solution 7.5 mg         7.5 mg Oral EVERY 8 HOURS 09/29/23 0836      09/29/23 1030  HYDROmorphone (DILAUDID) PCA 0.2 mg/mL OPIOID TOLERANT          Intravenous CONTINUOUS 09/29/23 0959      09/29/23 0900  acetaminophen *SUGAR FREE* (TYLENOL) solution 650 mg         650 mg Per J Tube EVERY 4 HOURS 09/29/23 0850      09/28/23 1400  methocarbamol (ROBAXIN) tablet 1,000 mg         1,000 " "mg Oral 3 TIMES DAILY 09/28/23 0909      09/28/23 1311  HYDROmorphone (STANDARD CONC) (DILAUDID) liquid 4 mg         4 mg Oral EVERY 4 HOURS PRN 09/28/23 1311      09/28/23 1100  hydrOXYzine (ATARAX) syrup 50 mg        See Hyperspace for full Linked Orders Report.    50 mg Per J Tube EVERY 6 HOURS PRN 09/28/23 1050      09/27/23 0800  polyethylene glycol (MIRALAX) Packet 17 g         17 g Per J Tube DAILY 09/26/23 0842      09/26/23 2000  sennosides (SENOKOT) syrup 5-10 mL        See Hyperspace for full Linked Orders Report.    5-10 mL Per J Tube 2 TIMES DAILY 09/26/23 0842      09/26/23 2000  docusate (COLACE) 50 MG/5ML liquid  mg        See Hyperspace for full Linked Orders Report.     mg Per J Tube 2 TIMES DAILY 09/26/23 0842      09/26/23 1454  simethicone (MYLICON) chewable tablet 80 mg         80 mg Oral EVERY 6 HOURS PRN 09/26/23 1454      09/26/23 1400  gabapentin (NEURONTIN) solution 600 mg         600 mg Per J Tube EVERY 8 HOURS SCHEDULED 09/26/23 0842      09/26/23 0839  magnesium hydroxide (MILK OF MAGNESIA) suspension 30 mL         30 mL Per J Tube DAILY PRN 09/26/23 0842      09/25/23 2000  busPIRone (BUSPAR) tablet 10 mg         10 mg Oral 2 TIMES DAILY 09/25/23 1412      09/22/23 2249  lidocaine 1 % 0.1-1 mL         0.1-1 mL Other EVERY 1 HOUR PRN 09/22/23 2250      09/22/23 2249  lidocaine (LMX4) cream          Topical EVERY 1 HOUR PRN 09/22/23 2250      09/22/23 2249  bisacodyl (DULCOLAX) suppository 10 mg         10 mg Rectal DAILY PRN 09/22/23 2249              Primary Service Contacted with Recommendations? Yes            Acute Inpatient Pain Service [facility]  Hours of pain coverage 24/7   Page via Amcom- Please Page the Pain Team Via Amcom: \"PAIN MANAGEMENT ACUTE INPATIENT/ [facility]\"  "

## 2023-09-29 NOTE — PROGRESS NOTES
Pancreatitis Service - Daily Progress Note  09/29/2023    Assessment & Plan: Artie Burger is a 27 year old with PMH significant for recurrent acute on chronic pancreatitis 2/2 alcoholic pancreatitis. She is now s/p status post total pancreatectomy, islet cell autotransplant, splenectomy, and gastrojejunostomy tube placement on 9/22/23 with Dr. Junito Patel MD.        Cardiorespiratory:   Hypotensive: Resolved. Stable   Hypoxia: Resolved. Encourage IS/Pulm toilet.    GI/Nutrition:   S/p TPAIT:7 NPO, G/J tube in place. Started G tube clamp trial on 9/25. Vented intermittently due to abdominal distention. Output 775 ml/24 hr.  J tube is running Vital 1.5 @ 50 ml/hr +relizorb tube, feeds at goal.  Remove NINFA drain.  PV abnormal flow, concern for possible small thrombosis: US liver on POD4 showed dampened bidirectional flow in the left PV. Possible patient may have small clot not identified on US. Will switch from anticoagulation ppx to treatment x 3 months. Start apixaban 10 mg PO BID x 7 days then switch to 5 mg PO BID x 3 months.   Malnutrition secondary to poor intake: Continue continuous TF to meet 100% nutritional need.   Continue bowel regimen senna/colace/PEG, hold for loose stools as needed.      Fluid/Electrolytes:   Hypervolemia: Wt up ~10kg. Lasix 10 mg IV x 1 on 9/26. Check standing weight today.   Hypomagnesemia: Mg sulfate 4g IV x 1    : No issues.    Post-pancreatectomy diabetes: Insulin gtt, appreciate Endocrine consult. Transitioning to subcutaneous insulin. Plan for DM education today.    Infection: Afebrile   Leukocytosis, peaked at 23, trending down. WBC ~ 11.   Panc preservation/islets : Pancreas preservation soln grew Enterobacter clocae (sensitive to ampicillin) and E. Faecium (sensitive to ampicillin) - treatment including Meropenem, Cefepime, Zosyn, and ampicillin due switching antibiotics with increasing leukocytosis and sensitivities (9/22-9/29). Islet Final product culture grew  Enterobacter clocae (resistant to Zosyn) and E. Faecium (sensitive to ampicillin) switched to Cefipime 9/25 for coverage of Enterobacter clocae (9/22-9/30).    Prophylaxis: PPI    Pain control: Fair. Pain regimen adjusted (added Toradol) yesterday due to limited activity. Minimal improvement.   Neurontin 600 mg per J tube Q8  Robaxin 1G PO every 8 hours   Acetaminophen 650 mg per J tube every 6 hours.  Methadone 4 mg soln per J tube every 8 hours (started 9/27). Increase to 7.5 mg soln per J tube every 8 hours (9/29).  Continue dilaudid PCA 0.2 q10 bolus PRN pain refractory to scheduled medications.  Dilaudid 4 mg soln per J tube every 3 hours PRN severe pain refractory     Precedex stopped 9/25  Ketamine discontinued 9/28  Toradol discontinued 9/27   Ropivacaine infusion discontinued 9/29. Paravertebral catheters removed.     Anxiety: PTA buspar 10 mg daily, give PO and citalopram 40 mg daily, give per J tube.  Adult psychology consult. Patient seen by Erica Bright 9/25 and 9/26. Psychiatry consulted for medication management for anxiety and insomnia (patient seen 9/28 and 9/29). Continue Seroquel 12.5-25 mg PO every 6 hours for anxiety and Seroquel 25-50 mg PO at bedtime. Hydroxyzine to 25 mg per J tube every 6 hours PRN anxiety refractory to Seroquel. Hydroxyzine changed to PRN and dose reduced as medication may cause altered bowel function.  Nicotine dependence/vaping: Avoid nicotine patches at this time.     Activity: OOB to chair. Ambulate with assist. PT consulted.  Anticipated LOS/Discharge: 7A, 7-10 days. Possible discharge this weekend.    JOSEFA/Fellow/Resident Provider: FARZANA Quiles [phone]     Faculty: Junito Patel MD    __________________________________________________________________  Transplant History: Admitted 9/22/2023 for Chronic pancreatitis (H) [K86.1]     9/22/2023 (Islet), Postoperative day: 7     Interval History: History is obtained from the patient  Overnight events: c/o gas pain.  "Improved with passing flatus or venting G tube. +Loose BMs. Tolerating G tube clamped for PO medications. Ambulated in the flower yesterday.    ROS:   A 10-point review of systems was negative except as noted above.    Curent Meds:   ampicillin  2 g Intravenous Q6H    busPIRone  10 mg Oral BID    ceFEPIme  2 g Intravenous Q12H    citalopram  40 mg Per J Tube Daily    sennosides  5-10 mL Per J Tube BID    And    docusate   mg Per J Tube BID    gabapentin  600 mg Per J Tube Q8H LELO    insulin aspart  1-5 Units Subcutaneous Q4H    insulin glargine  5 Units Subcutaneous Q24H    insulin glargine  7 Units Subcutaneous Q24H    melatonin  10 mg Per J Tube At Bedtime    methadone  3.5 mg Oral Once    methadone  7.5 mg Oral Q8H    methocarbamol  1,000 mg Oral TID    multivitamins w/minerals  15 mL Per J Tube Daily    pantoprazole  40 mg Per J Tube BID    polyethylene glycol  17 g Per J Tube Daily    QUEtiapine  25-50 mg Oral At Bedtime    sodium chloride (PF)  3 mL Intracatheter Q8H    thiamine  100 mg Intravenous Daily    [Held by provider] Warfarin Therapy Reminder  1 each Oral See Admin Instructions       Physical Exam:     Admit Weight: 58.3 kg (128 lb 8.5 oz)    Current Vitals:   /75 (BP Location: Left arm)   Pulse 68   Temp 98.3  F (36.8  C) (Oral)   Resp 16   Ht 1.575 m (5' 2\")   Wt 69.3 kg (152 lb 12.5 oz)   LMP 08/16/2023 (Approximate)   SpO2 97%   BMI 27.94 kg/m           Vital sign ranges:    Temp:  [98.2  F (36.8  C)-98.7  F (37.1  C)] 98.3  F (36.8  C)  Pulse:  [67-85] 68  Resp:  [16] 16  BP: (114-126)/(75-89) 118/75  SpO2:  [97 %-98 %] 97 %  Patient Vitals for the past 24 hrs:   BP Temp Temp src Pulse Resp SpO2   09/29/23 0557 118/75 98.3  F (36.8  C) Oral 68 16 97 %   09/29/23 0158 114/76 98.5  F (36.9  C) Oral 67 16 98 %   09/28/23 2157 122/85 98.7  F (37.1  C) Oral 76 16 97 %   09/28/23 1009 126/89 98.2  F (36.8  C) Oral 85 16 97 %       General Appearance: NAD  Skin: normal, warm, " dry  Heart: well perfused  Lungs:   NLB on RA  Abdomen: The abdomen is soft, ND, and  mildly tender. The wound is well approximated and without signs of infection. Tube/Drain sites are: CDI.  NINFA removed. G tube clamped.  J tube infusing EN.  : no miner  Extremities: edema: trace    Data:   CMP  Recent Labs   Lab 09/29/23  0809 09/29/23  0653 09/29/23  0103 09/29/23  0026 09/28/23  1401 09/28/23  1337 09/28/23  1006 09/28/23  0917 09/28/23  0608 09/28/23  0532 09/27/23  0619 09/27/23  0517 09/23/23  0309 09/23/23  0304 09/22/23 2026 09/22/23  1929 09/22/23  1914   NA  --   --   --  137  --   --   --  141  --   --   --  144   < > 142   < > 140 140   POTASSIUM  --   --   --  3.4  --   --   --  4.1  --   --   --  3.2*   < > 3.3*   < > 3.9 3.8   CHLORIDE  --   --   --  116*  --   --   --  105  --   --   --  108*   < > 110*   < >  --   --    CO2  --   --   --  23  --   --   --  26  --   --   --  25   < > 22   < >  --   --    * 161*   < > 134*   < >  --    < > 139*   < >  --    < > 78   < > 77   < > 96 93   BUN  --   --   --  7.1  --   --   --  7.1  --   --   --  5.2*   < > 12.6   < >  --   --    CR  --   --   --  0.50*  --   --   --  0.63  --   --   --  0.56   < > 0.85   < >  --   --    GFRESTIMATED  --   --   --  >90  --   --   --  >90  --   --   --  >90   < > >90   < >  --   --    MURTAZA  --   --   --  7.4*  --   --   --  8.4*  --   --   --  8.2*   < > 8.7   < >  --   --    ICAW  --   --   --   --   --   --   --   --   --   --   --   --   --   --   --  4.7 4.9   MAG  --   --   --   --   --  2.4*  --   --   --  1.6*  --  1.8   < >  --   --   --   --    PHOS  --   --   --   --   --   --   --   --   --  3.2  --  2.3*   < >  --   --   --   --    AMYLASE  --   --   --   --   --   --   --   --   --   --   --  13*  --  25*  --   --   --    LIPASE  --   --   --   --   --   --   --   --   --   --   --  6*  --  12*  --   --   --    ALBUMIN  --   --   --   --   --   --   --   --   --   --   --  2.9*  --  3.3*   < >  --   --     BILITOTAL  --   --   --   --   --   --   --   --   --   --   --  0.3  --  0.4   < >  --   --    ALKPHOS  --   --   --   --   --   --   --   --   --   --   --  75  --  32*   < >  --   --    AST  --   --   --   --   --   --   --   --   --   --   --  16  --  63*   < >  --   --    ALT  --   --   --   --   --   --   --   --   --   --   --  18  --  38   < >  --   --     < > = values in this interval not displayed.       CBC  Recent Labs   Lab 09/29/23  0026 09/28/23  1009 09/23/23  0304 09/22/23  2345   HGB 8.4* 9.4*   < >  --    WBC 10.5 11.9*   < >  --    * 558*   < >  --    A1C  --   --   --  5.5    < > = values in this interval not displayed.       Coags  Recent Labs   Lab 09/29/23  0527 09/28/23  0532 09/27/23  1635 09/27/23  0517 09/26/23  0644   INR 1.02 1.06   < >  --   --    PTT  --   --   --  28 32    < > = values in this interval not displayed.        Urinalysis  Recent Labs   Lab Test 09/21/23  1614   COLOR Yellow   APPEARANCE Slightly Cloudy*   URINEGLC Negative   URINEBILI Negative   URINEKETONE Negative   SG 1.021   UBLD Negative   URINEPH 7.0   PROTEIN 10*   NITRITE Negative   LEUKEST Negative   RBCU 2   WBCU <1

## 2023-09-29 NOTE — CONSULTS
Diabetes CNS and Educator consults received for Artie Burger, age 27, with PMH significant for recurrent acute on chronic pancreatitis 2/2 alcoholic pancreatitis. Now s/p total pancreatectomy, islet cell autotransplant, splenectomy, cholecystectomy, gastrojejunostomy tube placement on 9/22/23.   Endocrine consulted for postoperative hyperglycemia management.    Patient transitioned today from Intravenous Insulin Infusion to basal/bolus therapy with Lantus and Novolog.  Remains NPO.  On continuous enteral feed.    Current Treatment Plan for Diabetes Care 9/29/23; Artie Burger     Glucose Monitoring-Goal  mg/dL     Check blood glucose every 4 hours while on tube feeding.  When you are actually off feeds and eating,  then will monitor before each meal and at bedtime.     Insulin     Long-Acting Insulin     Lantus (glargine) 7 units one time daily at 8:00 AM  Lantus (glargine) 5 units one time daily at 8:00 PM     Rapid-Acting Insulin     Novolog Correction scale every 4 hours based on blood sugar value  For  - 169 give 1 unit.   For  - 219 give 2 units.   For  - 269 give 3 units.   For  - 319 give 4 units.   For BG greater than or equal to 320 give 5 units.       Hypoglycemia Treatment     If BG 51-69 mg/dL, treat with 15 grams of CHO and recheck in 15 minutes.  May repeat until glucose stable at 80 mg/dL.  4 ounces of apple juice via J tube is 15 grams.    If BG < 50, treat with 30 grams of CHO and recheck in 15 minutes.  May repeat until glucose stable at 80 mg/dL.  8 ounces of apple juice via J tube is 30 grams.    For severe hypoglycemia, unable to arouse patient or unable to swallow, may administer Gvoke Hypopen (Glucagon) per instructions.     See Hypoglycemia Treatment Guide.      Follow-Up     Education provided today at bedside included:  Target glucose level of  mg/dL.  Discussed purpose of insulin and tight control to provide best opportunity for islets to  engraft.  Glucose monitoring with Accu Chek Guide Me Glucose Meter, strips and Soft Clix lancing device.  Demonstrated set-up, loading and unloading lancing device, fingerstick technique, testing, use of memory and safe sharps disposal.  Angelique was able to perform a self-test with a result of 269 mg/dL. This was communicated to RN and to IDS.  Discussed frequency schedule of monitoring every 4 hours while on continuous feeds.  Eventually will go to before meals and bedtime.  Action, peak, dosage, duration of glargine and aspart insulins. Concept of basal/bolus therapy, physiologic insulin, use of correction scale.  Angelique was able to accurately determine rapid acting dosages with a few different scenarios.    Injection technique with insulin pens and B-D 4 mm vanita pen needles. Demonstrated with training pen and injection pad pen needle placement, priming of needle, dialing dose, and injection technique.  Angelique was able to perform skill with training pen and injection pad without difficulty. Reviewed storage and length of use of pens, site selection and rotation, and safe sharps disposal.  Highlighted signs/symptoms/causes/treatment of hyperglycemia and when to notify MD.  Reviewed signs/symptoms/causes/treatment of hypoglycemia, Rule of 15, Hypo plan, and use of Gvoke Hypopen (glucagon).  Angelique stated that she has had a low and the nurse did administer apple juice via J tube.  Stressed to Angelique that she will treat/check/repeat for glucose to stablize at 80 mg/dL once discharged.  Reviewed use of gel, glucose tablets, and Gvoke Hypopen for severe lows.  Angelique practiced with the demo Gvoke Hypopen.  Resources provided included Understanding Diabetes Basics, B-D Insulin Injections with Pens and Pen Needles, and the treatment plan.  Stressed doses may change yet prior to discharge.     Answered all questions.    Will need for discharge:  Accu Chek Guide glucose meter-provided meter/starter kit to patient  Accu Chek Guide test  strips  Accu Chek Soft Clix Lancets  Sharps Container  Alcohol Wipes  B-D 4 mm vanita pen needles  Lantus Solostar insulin pens  Novolog flexpens  Gvoke Hypopen-glucagon     Informed RN, CC, transplant PA, and CNP for IDS of education being completed.    ROSIE Rodas  Diabetes CNS/CDCES  [phone]   .

## 2023-09-29 NOTE — PROGRESS NOTES
IP Diabetes Management  Daily Note         HPI: 27 year old with PMH significant for recurrent acute on chronic pancreatitis 2/2 alcoholic pancreatitis. Now s/p total pancreatectomy, islet cell autotransplant, splenectomy, cholecystectomy, gastrojejunostomy tube placement.  Endocrine consulted for postoperative hyperglycemia       Assessment:   #Postoperative hyperglycemia  #Tube feeds associated hyperglycemia  #History of chronic pancreatitis  #S/p total pancreatectomy  #Islet cell autotransplant    Plan:    -Give 7 units of lantus and stop iV insulin 2 hours after   -Continue 5 units lantus in evening-- update: increase to 9 units   -Restart IV insulin if next 2 evening BG checks are >180 mg/dL please restart IV insulin and hold sliding scale novolog    -D10 to be infused at 70 ml/hr if tube feeding stopped or interrupted to avoid severe hypoglycemia   -BG monitoring per insulin drip protocol   -hypoglycemia protocol   -carb counting protocol   -test claim placed for CGM coverage    Discharge Planning:   -Lantus 7 units at 0800 and 5 units 2000  -Novolog medium sliding scale every 4 hours   -Gvoke for severe hypoglycemia  -accu-chek blood glucose testing supplies    Plan discussed with patient, bedside RN, and primary team.      Interval History and Assessment: interval glucose trend reviewed:   Based on trends when dextrose fluids stopped-- patient could likely tolerate 7 more units of lantus this AM. Will transition off IV insulin\      Update:  Hyperglycemia- unclear of cause- seems outside of what previous needs were on drip. Potentially methadone increased Bg and pain likely playing a role. Will increase lantus and add PRN insulin drip for overnight if persistently hyperglycemic    -discussed plan with RN        Currently, Patient reports improvement in abdominal pain. Denies nausea and vomiting, does note some loose stool.     She is agreeable to above plan.       Labs:9/29  Bicarb:23  Creatinine:  0.50  eGFR: >90  Anion Gap: <1    Current nutritional intake and type: Orders Placed This Encounter      NPO for Medical/Clinical Reasons Except for: Ice Chips  TF- vital 1.5 running at 50 ml/hr over 24 hours- 224 CHO in 24 hours    Planned Procedures/surgeries: none  Steroid planning: none  D5W-containing solutions/medications: TF    PTA Diabetes Regimen: none           Diabetes History:   Type of Diabetes: S/p total pancreatectomy  Lab Results   Component Value Date    A1C 5.5 09/22/2023    A1C 5.3 06/28/2023              Review of Systems:     The Review of Systems is negative other than noted in the Interval History.           Medications:     Current Facility-Administered Medications   Medication    acetaminophen *SUGAR FREE* (TYLENOL) solution 650 mg    apixaban ANTICOAGULANT (ELIQUIS) tablet 10 mg    Followed by    [START ON 10/6/2023] apixaban ANTICOAGULANT (ELIQUIS) tablet 5 mg    bisacodyl (DULCOLAX) suppository 10 mg    busPIRone (BUSPAR) tablet 10 mg    ceFEPIme (MAXIPIME) 2 g vial to attach to  mL bag for ADULTS or 50 mL bag for PEDS    citalopram (celeXA) solution 40 mg    dextrose 10% infusion    dextrose 10% infusion    glucose gel 15-30 g    Or    dextrose 50 % injection 25-50 mL    Or    glucagon injection 1 mg    sennosides (SENOKOT) syrup 5-10 mL    And    docusate (COLACE) 50 MG/5ML liquid  mg    gabapentin (NEURONTIN) solution 600 mg    HYDROmorphone (DILAUDID) PCA 0.2 mg/mL OPIOID TOLERANT    HYDROmorphone (STANDARD CONC) (DILAUDID) liquid 4 mg    hydrOXYzine (ATARAX) syrup 50 mg    insulin 1 unit/1mL in saline (NovoLIN-Regular) infusion- SOT TPIAT algorithm syringe    insulin aspart (NovoLOG) injection (RAPID ACTING)    insulin glargine (LANTUS PEN) injection 5 Units    insulin glargine (LANTUS PEN) injection 7 Units    lidocaine (LMX4) cream    lidocaine 1 % 0.1-1 mL    magnesium hydroxide (MILK OF MAGNESIA) suspension 30 mL    Med Instruction - Transition from IV Insulin  "Infusion to Sub-Q Insulin    melatonin liquid 10 mg    methadone (DOLOPHINE) solution 7.5 mg    methocarbamol (ROBAXIN) tablet 1,000 mg    multivitamins w/minerals liquid 15 mL    naloxone (NARCAN) injection 0.2 mg    Or    naloxone (NARCAN) injection 0.4 mg    Or    naloxone (NARCAN) injection 0.2 mg    Or    naloxone (NARCAN) injection 0.4 mg    ondansetron (ZOFRAN ODT) ODT tab 4 mg    Or    ondansetron (ZOFRAN) injection 4 mg    pantoprazole (PROTONIX) 2 mg/mL suspension 40 mg    polyethylene glycol (MIRALAX) Packet 17 g    prochlorperazine (COMPAZINE) injection 10 mg    Or    prochlorperazine (COMPAZINE) tablet 10 mg    Or    prochlorperazine (COMPAZINE) suppository 25 mg    QUEtiapine (SEROquel) half-tab 12.5-25 mg    QUEtiapine (SEROquel) tablet 25-50 mg    simethicone (MYLICON) chewable tablet 80 mg    sodium chloride (PF) 0.9% PF flush 3 mL    sodium chloride (PF) 0.9% PF flush 3 mL    thiamine (B-1) injection 100 mg            Physical Exam:    /75 (BP Location: Left arm)   Pulse 68   Temp 98.3  F (36.8  C) (Oral)   Resp 16   Ht 1.575 m (5' 2\")   Wt 69.3 kg (152 lb 12.5 oz)   LMP 08/16/2023 (Approximate)   SpO2 97%   BMI 27.94 kg/m    General: pleasant, in no distress. Lying in  bed   HEENT: normocephalic, atraumatic. Oral mucous membranes moist.   Lungs: unlabored respiration, no cough  ABD: rounded, nondistended appearing  Skin: warm and dry, no obvious lesions  MSK:  moves all extremities  Lymp:  no LE edema   Mental status:  alert, oriented to self, place, time  Psych:  bright affect, calm and appropriate interaction             Data:     Recent Labs   Lab 09/29/23  1054 09/29/23  0922 09/29/23  0809 09/29/23  0653 09/29/23  0621 09/29/23  0554   * 96 147* 161* 127* 72     Lab Results   Component Value Date    WBC 10.5 09/29/2023    WBC 11.9 (H) 09/28/2023    WBC 11.8 (H) 09/28/2023    HGB 8.4 (L) 09/29/2023    HGB 9.4 (L) 09/28/2023    HGB 9.2 (L) 09/28/2023    HCT 24.8 (L) " 09/29/2023    HCT 27.9 (L) 09/28/2023    HCT 27.5 (L) 09/28/2023    MCV 89 09/29/2023    MCV 89 09/28/2023    MCV 89 09/28/2023     (H) 09/29/2023     (H) 09/28/2023     (H) 09/28/2023     Lab Results   Component Value Date     09/29/2023     09/28/2023     09/27/2023    POTASSIUM 3.4 09/29/2023    POTASSIUM 4.1 09/28/2023    POTASSIUM 3.2 (L) 09/27/2023    CHLORIDE 116 (H) 09/29/2023    CHLORIDE 105 09/28/2023    CHLORIDE 108 (H) 09/27/2023    CO2 23 09/29/2023    CO2 26 09/28/2023    CO2 25 09/27/2023     (H) 09/29/2023    GLC 96 09/29/2023     (H) 09/29/2023     Lab Results   Component Value Date    BUN 7.1 09/29/2023    BUN 7.1 09/28/2023    BUN 5.2 (L) 09/27/2023     Lab Results   Component Value Date    TSH 1.68 08/18/2023     Lab Results   Component Value Date    AST 16 09/27/2023    AST 63 (H) 09/23/2023    AST 63 (H) 09/22/2023    ALT 18 09/27/2023    ALT 38 09/23/2023    ALT 41 09/22/2023    ALKPHOS 75 09/27/2023    ALKPHOS 32 (L) 09/23/2023    ALKPHOS 35 09/22/2023       I spent a total of 60 minutes face to face or coordinating care of Artie Burger.             Over 50% of my time on the unit was spent counseling the patient and/or coordinating care regarding acute hyperglycemia management.  See note for details.      Contacting the Inpatient Diabetes Team   From 7AM-5PM: page inpatient diabetes provider that is following the patient, or utilize the job code paging system. From 5PM-7AM: page the job code for endocrine fellow on call.     Please use the following job code to reach the Inpatient Diabetes team. Note that you must use an in house phone and that job codes cannot receive text pages.   Dial [phone] (or star-star-star [phone] on the new [employer] telephones), then [phone] to reach the endocrine-diabetes provider on call.    ROSIE Obrien, CNP  Inpatient Diabetes Service   Pager: [phone]

## 2023-09-29 NOTE — PLAN OF CARE
"Goal Outcome Evaluation:       /85 (BP Location: Left arm)   Pulse 76   Temp 98.7  F (37.1  C) (Oral)   Resp 16   Ht 1.575 m (5' 2\")   Wt 69.3 kg (152 lb 12.5 oz)   LMP 2023 (Approximate)   SpO2 97%   BMI 27.94 kg/m      Shift: 8197-2006  Isolation: Standard   VS: stable on room air, afebrile  Neuro: AO x4  Behaviors: calm, cooperative, able to make needs known  B-149, insulin gtt alg 1   Labs: RN managed anti hep Xa, currently running 1250 U/hr rerun 0029.  Pain/ nausea: 7/10 abdominal incisional pain managed with scheduled robaxin, methadone. PRN dilaudid x1 given and simethicone for abd fullness x1. No nausea, G clamped since 1800 tolerating well.  Cardiac: WDL  Respiratory: WDL  PRN: Dilaudid x1, simethicone x1  Diet: NPO, TF @50 ml/hr,  GI/: 1 BM mixed with urine  IV access: R PIV x2, L PIV x2  Infusions: Insulin gtt, heparin gtt (1250 unit(s)/hr), dilaudid PCA, ropivacaine epidurals x2  Lines/Drains: R NINFA minimal output, PEG/ J TF running 50 ml/hr. G clamped tolerating well  Skin: Midline incision BLAZE  Mobility: SBA to bedside commode and chair   Events/ education: lantus to begin transition off insulin gtt.  Med card started.  Plan: Heparin gtt to stop @0400, epidurals to remove at 1000, transition off insulin gtt    Note:  Order to remain on insulin algorithm 1 post Lantus administration. If blood sugars too high, page endocrine team.      "

## 2023-09-29 NOTE — PROGRESS NOTES
Care Management Follow Up    Length of Stay (days): 7    Expected Discharge Date: 10/01/2023     Concerns to be Addressed: discharge planning     Patient plan of care discussed at interdisciplinary rounds: No    Anticipated Discharge Disposition: Home, Home Care, Home Infusion (Staying locally with her sister)     Anticipated Discharge Services: None  Anticipated Discharge DME: None    Patient/family educated on Medicare website which has current facility and service quality ratings: no  Education Provided on the Discharge Plan: Yes  Patient/Family in Agreement with the Plan: yes    Referrals Placed by CM/SW: External Care Coordination, Home Infusion  Private pay costs discussed: Not applicable    Additional Information:    Per care team rounds anticipate discharge Sunday.  LEA Devries Diabetic Educator will be meeting with pt today.      Met with pt and her sister.  Reviewed anticipated plan for discharge, ATC appointments, home infusion/enteral services. Pt confirmed PLC went well.  No concerns noted with managing enteral feeds at home.  As previously noted pt will be staying locally with her sister post hospitalization.  Pt confirmed OP coordinator dropped off a box of relizorb cartridges for discharge.     Updated RACHEL Levy Liaison.  Pt placed on weekend RNCC list.     Home infusion:  [facility]  Phone # [phone]  Fax # [phone]   Enteral feeds     Local Address  [address] [place] [address] [place]   Amanda Carmichael, RN BSN, PHN, ACM-RN  7A RN Care Coordinator  Phone: [phone]  Pager [phone]    To contact the weekend RNCC  [facility] (0800 - 1630) Saturday and Sunday    Units: 5A,5B,5C [phone]    Units: 6A, [phone]    Units 6B, 6C, 6D [phone]    Units: 7A, 7B, 7C, 7D, [phone]    [facility] ([phone]) Saturday and Sunday    Units: 5 Ortho, 8A, 10 ICU, & Pediatric Units-Pager 4: [phone]    9/29/2023 12:04 PM

## 2023-09-29 NOTE — PLAN OF CARE
"/81 (BP Location: Right arm)   Pulse 98   Temp 99.1  F (37.3  C) (Oral)   Resp 16   Ht 1.575 m (5' 2\")   Wt 69.3 kg (152 lb 12.5 oz)   LMP 08/16/2023 (Approximate)   SpO2 98%   BMI 27.94 kg/m     Neuro: A/Ox3  VS: VSS on RA  Pain: c/o abdominal/incisional pain controlled by PCA hydromorphone, PRN hydromorphone 4mg POx1, scheduled methadone, scheduled tylenol and robaxin  GB: , pt was on insulin gtt and discontinued around 11am. Last  and Endocrine was notified (Next shift RN is aware and will Notify Endocrine with 4pm BG)  GI: NPO/ice chips. G/J PEG tube inplace, GT clamped and was on gravity for 2 hours with 280ml gastric drainage, Jtube formula running 50ml/h. Denies Nausea. Diarrhea x2  : Voiding without difficulty  PIV x4, 1 PIV infusing PCA and TKO rest of PIV SL  Skin/Drain: Abdominal incision intact, NINFA removed site leaking and dressing  changed.   Activity - Independent in room to bedside commode  Education - G/J tube care and pt practice giving herself medication and flushes. Pain/bowel management  Plan of Care - Continue with POC    "

## 2023-09-29 NOTE — PROGRESS NOTES
CLINICAL NUTRITION SERVICES - REASSESSMENT NOTE   Nutrition Prescription    RECOMMENDATIONS FOR MDs/PROVIDERS TO ORDER:  Monitor fluid status.     Malnutrition Status:    Patient does not meet two of the established criteria necessary for diagnosing malnutrition but is at risk for malnutrition    Recommendations already ordered by Registered Dietitian (RD):  Adjusted FWF to more closely meet hydration needs (discussed with primary team and OK'd writer to update):   Increase free water to 110 mL q3h to provide 1769 mL/day total with TF (~1 mL/kcal).    Continue EN support as ordered:  Vital 1.5 Filippo @ goal of  50ml/hr  (1200ml/day)  will provide: 1800 kcals (31 kcal/kg), 81 g PRO (1.4 g pro/kg), 916 ml free H20, 224 g CHO, and 7 g fiber daily.   +Relizorb Cartridges     Future/Additional Recommendations:  If supply of Vital 1.5 runs out, recommend switch to:  Vital AF 1.2 @ 60 ml/hr provides 1440 ml volume, 1728 kcals (30 kcal/kg), 108 g pro (1.9 g/kg), 78 g Fat, 159 g CHO, 7 g fiber, 1168 ml free water. + Relizorb cartridges     EVALUATION OF THE PROGRESS TOWARD GOALS   Diet: NPO    Enteral Access: J-tube    FWF: 75 ml q 3 hours (600 ml), with TF FW total received ~1516 ml (26 ml/kg)     Nutrition Support: EN  9/23-9/25: Vital 1.5 @ 10 mL/hr     9/25-Current: Vital 1.5 Filippo @ goal of  50ml/hr  (1200ml/day)  will provide: 1800 kcals (31 kcal/kg), 81 g PRO (1.4 g pro/kg), 916 ml free H20, 224 g CHO, and 7 g fiber daily.   +Relizorb Cartridges     Enteral Intake: Reached goal TF rate on 9/26 @ 2300. Tolerating TF at goal rate. TF teaching today. Attempted to visit pt x2 but sleeping upon initial visit, then walking halls with PT during second attempt.  -->3-day average enteral nutrition infusions: 1120 mL TF = 1680 kcals (29 kcal/kg, or >100% minimum assessed kcal needs) and 76 g protein (1.3 g protein/kg, or 100% minimum assessed protein needs).     NEW FINDINGS   -Wt trends: Wt significantly up this admit - suspect  combination of variability in bed scales and fluid wt gain.  Date/Time Weight Weight Method   09/25/23 0400 69.3 kg (152 lb 12.5 oz) Bed scale   09/24/23 0000 65.4 kg (144 lb 2.9 oz) Bed scale   09/22/23 0524 58.3 kg (128 lb 8.5 oz) --     -Labs: Reviewed    -GI: G tube with  mL output daily per I/Os. 0-4 BMs daily over past week (since admit) per I/Os    -Skin: Not followed by WOCN    -Meds & Vitamin/Mineral Supplementation: Reviewed, notable for:   Liquid sugar-free tylenol  Lantus 5 units q24 hrs (PM)  Lantus 7 units q24 hrs (AM)  Custom sliding scale insulin q4h  Liquid MVI/mineral  Thiamine injection 100 mg daily    -Endocrine: Endocrinology following for s/p TPAIT for BG management. BG  over past 24 hrs    MALNUTRITION  % Intake: Decreased intake does not meet criteria - on TF  % Weight Loss: Weight loss does not meet criteria  Subcutaneous Fat Loss: None observed - per writer's previous visit to pt in ICU over the weekend  Muscle Loss: None observed - per writer's previous visit to pt in ICU over the weekend  Fluid Accumulation/Edema: None noted  Malnutrition Diagnosis: Patient does not meet two of the established criteria necessary for diagnosing malnutrition but is at risk for malnutrition    Previous Goals   1.  Tolerate adv of TF to goal infusion without sx of refeeding within the next 5 days.  Evaluation: Met TF advancement to goal rate within 5 days, but some low lytes suggestive of refeeding    2.  Once TF at goal, total ave EN intakes provide minimum of 25 kcal/kg/day and 1.3 g/kg/day (per 58 kg)    Evaluation: Met    Previous Nutrition Diagnosis  Altered GI function r/t s/p TPAIT with J-tube placement AEB need for EN support to meet nutrition needs in the acute post-op phase.    Evaluation: No change    CURRENT NUTRITION DIAGNOSIS  Altered GI function r/t s/p TPAIT with J-tube placement AEB need for EN support to meet nutrition needs in the acute post-op phase.       INTERVENTIONS  Implementation  -Enteral Nutrition - Continue as ordered  -Rounds    Goals  Total ave EN intakes provide minimum of 25 kcal/kg/day and 1.3 g/kg/day (per 58 kg)      Monitoring/Evaluation  Progress toward goals will be monitored and evaluated per protocol.     Preeti Hopkins RD, LD  Pager: [phone]

## 2023-09-30 ENCOUNTER — APPOINTMENT (OUTPATIENT)
Dept: PHYSICAL THERAPY | Facility: CLINIC | Age: 28
End: 2023-09-30
Attending: SURGERY
Payer: COMMERCIAL

## 2023-09-30 LAB
ANION GAP SERPL CALCULATED.3IONS-SCNC: 11 MMOL/L (ref 7–15)
BUN SERPL-MCNC: 9 MG/DL (ref 6–20)
CALCIUM SERPL-MCNC: 9 MG/DL (ref 8.6–10)
CHLORIDE SERPL-SCNC: 100 MMOL/L (ref 98–107)
CREAT SERPL-MCNC: 0.58 MG/DL (ref 0.51–0.95)
DEPRECATED HCO3 PLAS-SCNC: 26 MMOL/L (ref 22–29)
EGFRCR SERPLBLD CKD-EPI 2021: >90 ML/MIN/1.73M2
ERYTHROCYTE [DISTWIDTH] IN BLOOD BY AUTOMATED COUNT: 14.8 % (ref 10–15)
GLUCOSE BLDC GLUCOMTR-MCNC: 138 MG/DL (ref 70–99)
GLUCOSE BLDC GLUCOMTR-MCNC: 167 MG/DL (ref 70–99)
GLUCOSE BLDC GLUCOMTR-MCNC: 190 MG/DL (ref 70–99)
GLUCOSE BLDC GLUCOMTR-MCNC: 223 MG/DL (ref 70–99)
GLUCOSE BLDC GLUCOMTR-MCNC: 260 MG/DL (ref 70–99)
GLUCOSE BLDC GLUCOMTR-MCNC: 281 MG/DL (ref 70–99)
GLUCOSE SERPL-MCNC: 244 MG/DL (ref 70–99)
HCT VFR BLD AUTO: 29.1 % (ref 35–47)
HGB BLD-MCNC: 9.8 G/DL (ref 11.7–15.7)
INR PPP: 1.08 (ref 0.85–1.15)
MAGNESIUM SERPL-MCNC: 1.7 MG/DL (ref 1.7–2.3)
MCH RBC QN AUTO: 30.2 PG (ref 26.5–33)
MCHC RBC AUTO-ENTMCNC: 33.7 G/DL (ref 31.5–36.5)
MCV RBC AUTO: 90 FL (ref 78–100)
PHOSPHATE SERPL-MCNC: 3.4 MG/DL (ref 2.5–4.5)
PLATELET # BLD AUTO: 771 10E3/UL (ref 150–450)
POTASSIUM SERPL-SCNC: 4.2 MMOL/L (ref 3.4–5.3)
RBC # BLD AUTO: 3.24 10E6/UL (ref 3.8–5.2)
SODIUM SERPL-SCNC: 137 MMOL/L (ref 135–145)
WBC # BLD AUTO: 12.9 10E3/UL (ref 4–11)

## 2023-09-30 PROCEDURE — 99232 SBSQ HOSP IP/OBS MODERATE 35: CPT | Performed by: INTERNAL MEDICINE

## 2023-09-30 PROCEDURE — 93010 ELECTROCARDIOGRAM REPORT: CPT | Performed by: INTERNAL MEDICINE

## 2023-09-30 PROCEDURE — 84100 ASSAY OF PHOSPHORUS: CPT | Performed by: SURGERY

## 2023-09-30 PROCEDURE — 80048 BASIC METABOLIC PNL TOTAL CA: CPT | Performed by: SURGERY

## 2023-09-30 PROCEDURE — 97116 GAIT TRAINING THERAPY: CPT | Mod: GP

## 2023-09-30 PROCEDURE — 250N000011 HC RX IP 250 OP 636: Performed by: PHYSICIAN ASSISTANT

## 2023-09-30 PROCEDURE — 85610 PROTHROMBIN TIME: CPT | Performed by: PHYSICIAN ASSISTANT

## 2023-09-30 PROCEDURE — 85027 COMPLETE CBC AUTOMATED: CPT | Performed by: SURGERY

## 2023-09-30 PROCEDURE — 120N000011 HC R&B TRANSPLANT UMMC

## 2023-09-30 PROCEDURE — 250N000013 HC RX MED GY IP 250 OP 250 PS 637: Performed by: NURSE PRACTITIONER

## 2023-09-30 PROCEDURE — 36415 COLL VENOUS BLD VENIPUNCTURE: CPT | Performed by: PHYSICIAN ASSISTANT

## 2023-09-30 PROCEDURE — 36415 COLL VENOUS BLD VENIPUNCTURE: CPT | Performed by: SURGERY

## 2023-09-30 PROCEDURE — 93005 ELECTROCARDIOGRAM TRACING: CPT

## 2023-09-30 PROCEDURE — 250N000011 HC RX IP 250 OP 636

## 2023-09-30 PROCEDURE — 83735 ASSAY OF MAGNESIUM: CPT | Performed by: SURGERY

## 2023-09-30 PROCEDURE — 250N000011 HC RX IP 250 OP 636: Performed by: SURGERY

## 2023-09-30 PROCEDURE — 250N000013 HC RX MED GY IP 250 OP 250 PS 637: Performed by: PHYSICIAN ASSISTANT

## 2023-09-30 RX ORDER — HYDROMORPHONE HCL IN WATER/PF 6 MG/30 ML
0.4 PATIENT CONTROLLED ANALGESIA SYRINGE INTRAVENOUS
Status: DISCONTINUED | OUTPATIENT
Start: 2023-09-30 | End: 2023-10-01

## 2023-09-30 RX ADMIN — ACETAMINOPHEN 650 MG: 160 LIQUID ORAL at 01:53

## 2023-09-30 RX ADMIN — SIMETHICONE 80 MG: 80 TABLET, CHEWABLE ORAL at 10:08

## 2023-09-30 RX ADMIN — SENNOSIDES 5 ML: 8.8 LIQUID ORAL at 08:23

## 2023-09-30 RX ADMIN — APIXABAN 10 MG: 5 TABLET, FILM COATED ORAL at 20:07

## 2023-09-30 RX ADMIN — INSULIN ASPART 3 UNITS: 100 INJECTION, SOLUTION INTRAVENOUS; SUBCUTANEOUS at 20:05

## 2023-09-30 RX ADMIN — BUSPIRONE HYDROCHLORIDE 10 MG: 10 TABLET ORAL at 08:24

## 2023-09-30 RX ADMIN — METHADONE HYDROCHLORIDE 7.5 MG: 5 SOLUTION ORAL at 05:42

## 2023-09-30 RX ADMIN — POLYETHYLENE GLYCOL 3350 17 G: 17 POWDER, FOR SOLUTION ORAL at 08:19

## 2023-09-30 RX ADMIN — METHOCARBAMOL TABLETS 1000 MG: 500 TABLET, COATED ORAL at 08:23

## 2023-09-30 RX ADMIN — THIAMINE HYDROCHLORIDE 100 MG: 100 INJECTION, SOLUTION INTRAMUSCULAR; INTRAVENOUS at 08:23

## 2023-09-30 RX ADMIN — ACETAMINOPHEN 650 MG: 160 LIQUID ORAL at 21:46

## 2023-09-30 RX ADMIN — DOCUSATE SODIUM 100 MG: 50 LIQUID ORAL at 08:23

## 2023-09-30 RX ADMIN — METHADONE HYDROCHLORIDE 7.5 MG: 5 SOLUTION ORAL at 14:07

## 2023-09-30 RX ADMIN — ONDANSETRON 4 MG: 4 TABLET, ORALLY DISINTEGRATING ORAL at 21:07

## 2023-09-30 RX ADMIN — Medication 40 MG: at 20:10

## 2023-09-30 RX ADMIN — GABAPENTIN 600 MG: 250 SUSPENSION ORAL at 14:07

## 2023-09-30 RX ADMIN — HYDROMORPHONE HYDROCHLORIDE 4 MG: 1 SOLUTION ORAL at 21:07

## 2023-09-30 RX ADMIN — Medication 10 MG: at 20:10

## 2023-09-30 RX ADMIN — ACETAMINOPHEN 650 MG: 160 LIQUID ORAL at 17:42

## 2023-09-30 RX ADMIN — QUETIAPINE FUMARATE 25 MG: 25 TABLET ORAL at 21:46

## 2023-09-30 RX ADMIN — INSULIN ASPART 3 UNITS: 100 INJECTION, SOLUTION INTRAVENOUS; SUBCUTANEOUS at 12:16

## 2023-09-30 RX ADMIN — APIXABAN 10 MG: 5 TABLET, FILM COATED ORAL at 08:23

## 2023-09-30 RX ADMIN — INSULIN ASPART 1 UNITS: 100 INJECTION, SOLUTION INTRAVENOUS; SUBCUTANEOUS at 04:00

## 2023-09-30 RX ADMIN — HYDROMORPHONE HYDROCHLORIDE 4 MG: 1 SOLUTION ORAL at 03:52

## 2023-09-30 RX ADMIN — METHOCARBAMOL TABLETS 1000 MG: 500 TABLET, COATED ORAL at 20:07

## 2023-09-30 RX ADMIN — CEFEPIME HYDROCHLORIDE 2 G: 2 INJECTION, POWDER, FOR SOLUTION INTRAVENOUS at 08:19

## 2023-09-30 RX ADMIN — INSULIN ASPART 4 UNITS: 100 INJECTION, SOLUTION INTRAVENOUS; SUBCUTANEOUS at 16:22

## 2023-09-30 RX ADMIN — HYDROMORPHONE HYDROCHLORIDE 4 MG: 1 SOLUTION ORAL at 15:43

## 2023-09-30 RX ADMIN — CITALOPRAM 40 MG: 10 SOLUTION ORAL at 08:20

## 2023-09-30 RX ADMIN — ACETAMINOPHEN 650 MG: 160 LIQUID ORAL at 05:43

## 2023-09-30 RX ADMIN — CEFEPIME HYDROCHLORIDE 2 G: 2 INJECTION, POWDER, FOR SOLUTION INTRAVENOUS at 21:07

## 2023-09-30 RX ADMIN — HYDROMORPHONE HYDROCHLORIDE 4 MG: 1 SOLUTION ORAL at 08:44

## 2023-09-30 RX ADMIN — METHADONE HYDROCHLORIDE 7.5 MG: 5 SOLUTION ORAL at 21:45

## 2023-09-30 RX ADMIN — Medication 15 ML: at 08:22

## 2023-09-30 RX ADMIN — SENNOSIDES 5 ML: 8.8 LIQUID ORAL at 20:10

## 2023-09-30 RX ADMIN — Medication 40 MG: at 08:20

## 2023-09-30 RX ADMIN — METHOCARBAMOL TABLETS 1000 MG: 500 TABLET, COATED ORAL at 14:07

## 2023-09-30 RX ADMIN — GABAPENTIN 600 MG: 250 SUSPENSION ORAL at 21:45

## 2023-09-30 RX ADMIN — ACETAMINOPHEN 650 MG: 160 LIQUID ORAL at 15:43

## 2023-09-30 RX ADMIN — DOCUSATE SODIUM 100 MG: 50 LIQUID ORAL at 20:10

## 2023-09-30 RX ADMIN — GABAPENTIN 600 MG: 250 SUSPENSION ORAL at 05:43

## 2023-09-30 RX ADMIN — BUSPIRONE HYDROCHLORIDE 10 MG: 10 TABLET ORAL at 20:07

## 2023-09-30 RX ADMIN — INSULIN ASPART 2 UNITS: 100 INJECTION, SOLUTION INTRAVENOUS; SUBCUTANEOUS at 08:20

## 2023-09-30 RX ADMIN — ACETAMINOPHEN 650 MG: 160 LIQUID ORAL at 11:30

## 2023-09-30 ASSESSMENT — ACTIVITIES OF DAILY LIVING (ADL)
ADLS_ACUITY_SCORE: 22
ADLS_ACUITY_SCORE: 20
ADLS_ACUITY_SCORE: 22
ADLS_ACUITY_SCORE: 20
ADLS_ACUITY_SCORE: 22
ADLS_ACUITY_SCORE: 22

## 2023-09-30 NOTE — PROGRESS NOTES
Pancreatitis Service - Daily Progress Note  09/30/2023    Assessment & Plan: Artie Burger is a 27 year old with PMH significant for recurrent acute on chronic pancreatitis 2/2 alcoholic pancreatitis. She is now s/p status post total pancreatectomy, islet cell autotransplant, splenectomy, and gastrojejunostomy tube placement on 9/22/23 with Dr. Junito Patel MD.    Cardiorespiratory:   Hypotensive: Resolved. Stable   Hypoxia: Resolved. Encourage IS/Pulm toilet.    GI/Nutrition:   S/p TPAIT:8 NPO, G/J tube in place. Started G tube clamp trial on 9/25. Vented intermittently due to abdominal distention. Output 815 ml/24 hr. Was vented this AM though patient asymptomatic, so will try clamping again. J tube is running Vital 1.5 @ 50 ml/hr +relizorb tube, feeds at goal.  Removed NINFA drain.  PV abnormal flow, concern for possible small thrombosis: US liver on POD4 showed dampened bidirectional flow in the left PV. Possible patient may have small clot not identified on US. Will switch from anticoagulation ppx to treatment x 3 months. Start apixaban 10 mg PO BID x 7 days then switch to 5 mg PO BID x 3 months.   Malnutrition secondary to poor intake: Continue continuous TF to meet 100% nutritional need.   Continue bowel regimen senna/colace/PEG, hold for loose stools as needed.      Fluid/Electrolytes:   Hypervolemia: Wt up ~10kg. Lasix 10 mg IV x 1 on 9/26. 9/29 standing weight 59.1kg.   Hypomagnesemia: Mg sulfate 4g IV x 1    : No issues.    Post-pancreatectomy diabetes: Insulin gtt, appreciate Endocrine consult. Transitioned to subcutaneous insulin, continuing to titrate. Plan for DM education today.    Infection: Afebrile   Leukocytosis, peaked at 23, trending down. WBC ~ 13.   Panc preservation/islets : Pancreas preservation soln grew Enterobacter clocae (sensitive to ampicillin) and E. Faecium (sensitive to ampicillin) - treatment including Meropenem, Cefepime, Zosyn, and ampicillin due switching antibiotics  with increasing leukocytosis and sensitivities (9/22-9/29). Islet Final product culture grew Enterobacter clocae (resistant to Zosyn) and E. Faecium (sensitive to ampicillin) switched to Cefipime 9/25 for coverage of Enterobacter clocae (9/22-9/30).    Prophylaxis: PPI    Pain control: Fair. Pain regimen adjusted (added Toradol) yesterday due to limited activity. Minimal improvement.   Neurontin 600 mg per J tube Q8  Robaxin 1G PO every 8 hours   Acetaminophen 650 mg per J tube every 6 hours.  Methadone 4 mg soln per J tube every 8 hours (started 9/27). Increased to 7.5 mg soln per J tube every 8 hours on 9/29. Pain better controlled  Discontinued dilaudid PCA, PRN IV available for issues  Dilaudid 4 mg soln per J tube every 3 hours PRN severe pain refractory     Precedex stopped 9/25  Ketamine discontinued 9/28  Toradol discontinued 9/27   Ropivacaine infusion discontinued 9/29. Paravertebral catheters removed.     Anxiety: PTA buspar 10 mg daily, give PO and citalopram 40 mg daily, give per J tube.  Adult psychology consult. Patient seen by Erica Bright 9/25 and 9/26. Psychiatry consulted for medication management for anxiety and insomnia (patient seen 9/28 and 9/29). Continue Seroquel 12.5-25 mg PO every 6 hours for anxiety and Seroquel 25-50 mg PO at bedtime. Hydroxyzine to 25 mg per J tube every 6 hours PRN anxiety refractory to Seroquel. Hydroxyzine changed to PRN and dose reduced as medication may cause altered bowel function.  Nicotine dependence/vaping: Avoid nicotine patches at this time.     Activity: OOB to chair. Ambulate with assist. PT consulted.  Anticipated LOS/Discharge: 7A, 7-10 days. Possible discharge the next 24-48 hours.    JOSEFA/Fellow/Resident Provider: Mia Moreno PA-C     Faculty: Junito Patel MD    __________________________________________________________________  Transplant History: Admitted 9/22/2023 for Chronic pancreatitis (H) [K86.1]     9/22/2023 (Islet), Postoperative  "day: 8     Interval History: History is obtained from the patient  Overnight events: Feeling well overall. Pain control improved Ambulated in the flower    ROS:   A 10-point review of systems was negative except as noted above.    Curent Meds:   acetaminophen *SUGAR FREE*  650 mg Per J Tube Q4H    apixaban ANTICOAGULANT  10 mg Oral BID    Followed by    [START ON 10/6/2023] apixaban ANTICOAGULANT  5 mg Oral BID    busPIRone  10 mg Oral BID    ceFEPIme  2 g Intravenous Q12H    citalopram  40 mg Per J Tube Daily    sennosides  5-10 mL Per J Tube BID    And    docusate   mg Per J Tube BID    gabapentin  600 mg Per J Tube Q8H LELO    insulin aspart  1-5 Units Subcutaneous Q4H    insulin glargine  7 Units Subcutaneous Q24H    insulin glargine  9 Units Subcutaneous Q24H    melatonin  10 mg Per J Tube At Bedtime    methadone  7.5 mg Oral Q8H    methocarbamol  1,000 mg Oral TID    multivitamins w/minerals  15 mL Per J Tube Daily    pantoprazole  40 mg Per J Tube BID    polyethylene glycol  17 g Per J Tube Daily    QUEtiapine  25-50 mg Oral At Bedtime    sodium chloride (PF)  3 mL Intracatheter Q8H    thiamine  100 mg Intravenous Daily       Physical Exam:     Admit Weight: 58.3 kg (128 lb 8.5 oz)    Current Vitals:   /67 (BP Location: Left arm)   Pulse 66   Temp 98.3  F (36.8  C) (Oral)   Resp 18   Ht 1.575 m (5' 2\")   Wt 59.1 kg (130 lb 3.2 oz)   LMP 08/16/2023 (Approximate)   SpO2 98%   BMI 23.81 kg/m           Vital sign ranges:    Temp:  [98.1  F (36.7  C)-99.5  F (37.5  C)] 98.3  F (36.8  C)  Pulse:  [62-98] 66  Resp:  [16-18] 18  BP: (109-144)/(67-83) 109/67  SpO2:  [95 %-98 %] 98 %  Patient Vitals for the past 24 hrs:   BP Temp Temp src Pulse Resp SpO2 Weight   09/30/23 1005 109/67 98.3  F (36.8  C) Oral 66 18 98 % --   09/30/23 0552 137/83 98.3  F (36.8  C) Oral 65 18 95 % --   09/30/23 0207 (!) 144/74 99.5  F (37.5  C) Oral 62 18 97 % --   09/29/23 2140 115/70 98.6  F (37  C) Oral 68 18 97 % -- "   09/29/23 1806 118/75 99  F (37.2  C) Oral 71 18 98 % --   09/29/23 1800 118/75 -- -- -- -- -- --   09/29/23 1600 -- -- -- -- -- -- 59.1 kg (130 lb 3.2 oz)   09/29/23 1425 -- 99.1  F (37.3  C) Oral -- -- -- --   09/29/23 1347 127/81 98.1  F (36.7  C) Oral 98 16 98 % --     Patient seen and was sleeping, examined by Dr. De Leon  General Appearance: NAD  Skin: normal, warm, dry  Heart: well perfused  Lungs:   NLB on RA  Abdomen: Per Dr. De Leon: The abdomen is soft, ND, and  mildly tender. The wound is well approximated and without signs of infection. Tube/Drain sites are: CDI. J tube infusing EN.  : no miner  Extremities: edema: trace    Data:   CMP  Recent Labs   Lab 09/30/23  0942 09/30/23  0801 09/29/23  0103 09/29/23  0026 09/28/23  1401 09/28/23  1337 09/28/23  0608 09/28/23  0532 09/27/23  0619 09/27/23  0517     --   --  137  --   --    < >  --   --  144   POTASSIUM 4.2  --   --  3.4  --   --    < >  --   --  3.2*   CHLORIDE 100  --   --  116*  --   --    < >  --   --  108*   CO2 26  --   --  23  --   --    < >  --   --  25   * 190*   < > 134*   < >  --    < >  --    < > 78   BUN 9.0  --   --  7.1  --   --    < >  --   --  5.2*   CR 0.58  --   --  0.50*  --   --    < >  --   --  0.56   GFRESTIMATED >90  --   --  >90  --   --    < >  --   --  >90   MURTAZA 9.0  --   --  7.4*  --   --    < >  --   --  8.2*   MAG 1.7  --   --   --   --  2.4*  --  1.6*  --  1.8   PHOS 3.4  --   --   --   --   --   --  3.2  --  2.3*   AMYLASE  --   --   --   --   --   --   --   --   --  13*   LIPASE  --   --   --   --   --   --   --   --   --  6*   ALBUMIN  --   --   --   --   --   --   --   --   --  2.9*   BILITOTAL  --   --   --   --   --   --   --   --   --  0.3   ALKPHOS  --   --   --   --   --   --   --   --   --  75   AST  --   --   --   --   --   --   --   --   --  16   ALT  --   --   --   --   --   --   --   --   --  18    < > = values in this interval not displayed.       CBC  Recent Labs   Lab 09/30/23  7232  09/29/23  0026   HGB 9.8* 8.4*   WBC 12.9* 10.5   * 545*       Coags  Recent Labs   Lab 09/30/23  0554 09/29/23  0527 09/27/23  1635 09/27/23  0517 09/26/23  0644   INR 1.08 1.02   < >  --   --    PTT  --   --   --  28 32    < > = values in this interval not displayed.        Urinalysis  Recent Labs   Lab Test 09/21/23  1614   COLOR Yellow   APPEARANCE Slightly Cloudy*   URINEGLC Negative   URINEBILI Negative   URINEKETONE Negative   SG 1.021   UBLD Negative   URINEPH 7.0   PROTEIN 10*   NITRITE Negative   LEUKEST Negative   RBCU 2   WBCU <1

## 2023-09-30 NOTE — PLAN OF CARE
"Goal Outcome Evaluation:      Plan of Care Reviewed With: patient    Overall Patient Progress: no change    /86 (BP Location: Left arm)   Pulse 65   Temp 98.3  F (36.8  C) (Oral)   Resp 16   Ht 1.575 m (5' 2\")   Wt 59.1 kg (130 lb 3.2 oz)   LMP 08/16/2023 (Approximate)   SpO2 98%   BMI 23.81 kg/m       Patient oriented x 4. VS stable. Patient on room air. Patient complains of pain. Declines pharmacological interventions. Patient denies nausea. Urine Output - incontinent of bladder. Bowel Function - incontinent of bowels. Nutrition - Regular diet. Calorie counts. Poor appetite. PIV x 2 - saline locked. Activity - SBA. Plan of Care - Will continue with plan of care and notify care team of any changes.        "

## 2023-09-30 NOTE — PHARMACY-CONSULT NOTE
Pharmacy consulted to review oral liquid medications for sugar-free options.    Patient is currently receiving 12 medications as oral liquid. Acetaminophen is ordered specifically as the sugar-free formulation because we have a separate product available that is labeled as sugar-free. Other products were reviewed and some of the other oral liquids ordered (such as docusate and melatonin) specify that they are sugar-free on the bottle. However, many of our bottles do not specify whether they are sugar-free, and for most of the medications, we only have one brand of oral liquid available. Besides the acetaminophen liquid, no other oral liquids the patient is taking had a separate option for sugar-free.    Recommend to continue sugar-free acetaminophen liquid. No additional changes at this time.    Therese Brewer, PharmD

## 2023-09-30 NOTE — PROVIDER NOTIFICATION
FYI, patient had two consecutive BG above 180. BG was 190 at 0800. Noon BG was 260. Lisa Mckenzie Endocrine MD. Awaiting call back to determine if insulin gtt should be restarted. Transplant provider, Mia TAMAYO, also notified.    Endocrine states not to initiate insulin gtt at this time. Endocrine will monitor BG trends, as patient had similar BG yesterday after stopping gtt. Endocrine will be available to address any other questions.

## 2023-09-30 NOTE — PLAN OF CARE
"BP (!) 144/74 (BP Location: Left arm)   Pulse 62   Temp 99.5  F (37.5  C) (Oral)   Resp 18   Ht 1.575 m (5' 2\")   Wt 59.1 kg (130 lb 3.2 oz)   LMP 08/16/2023 (Approximate)   SpO2 97%   BMI 23.81 kg/m      Shift: [id number]  VS: Mildly hypertensive 140s/70s, afebrile  Neuro: Aox4  BG: q4h;   Respiratory: RA  Pain/Nausea: Tylenol and Dilaudid for pain.   PRN: Atarax x1  Diet: NPO except ice chips   IV Access: PIV x3   Infusion(s): PCA Dilaudid    Lines/Drains: J tube feed continues @ 50mL/hr. G tube output 75mL.   GI/: Voiding. LBM 9/30  Skin: midline incision with liquid bandage and BLAZE   Mobility: UAL in room  Plan: Continue with POC and notify team of any changes.    "

## 2023-09-30 NOTE — PROGRESS NOTES
IP Diabetes Management  Daily Note         HPI: 27 year old with PMH significant for recurrent acute on chronic pancreatitis 2/2 alcoholic pancreatitis. Now s/p total pancreatectomy, islet cell autotransplant, splenectomy, cholecystectomy, gastrojejunostomy tube placement.  Endocrine consulted for postoperative hyperglycemia       Assessment:   #Postoperative hyperglycemia  #Tube feeds associated hyperglycemia  #History of chronic pancreatitis  #S/p total pancreatectomy  #Islet cell autotransplant    Pattern of high readings during the day     Plan:    -On lantus 7 units at 0800 and 9 units at 2000, was transitioned off IV insulin yesterday    -Consider Restarting  IV insulin if  BG  remain persistently above goal     -D10 to be infused at 70 ml/hr if tube feeding stopped or interrupted to avoid severe hypoglycemia   -BG monitoring q 4 hrs               - Novolog correction 1:50 > 120 mg d/L   -hypoglycemia protocol   -carb counting protocol    Discharge Planning:   -Lantus 7 units at 0800 and 5 units 2000- TBD  -Novolog medium sliding scale every 4 hours   -Gvoke for severe hypoglycemia  -accu-chek blood glucose testing supplies    Plan discussed with patient and bedside RN    Interval History and Assessment: interval glucose trend reviewed:           Current nutritional intake and type: Orders Placed This Encounter      NPO for Medical/Clinical Reasons Except for: Ice Chips  TF- vital 1.5 running at 50 ml/hr over 24 hours- 224 CHO in 24 hours    Planned Procedures/surgeries: none  Steroid planning: none  D5W-containing solutions/medications: TF    PTA Diabetes Regimen: none          Diabetes History:   Type of Diabetes: S/p total pancreatectomy  Lab Results   Component Value Date    A1C 5.5 09/22/2023    A1C 5.3 06/28/2023              Review of Systems:     The Review of Systems is negative other than noted in the Interval History.           Medications:     Current Facility-Administered Medications   Medication     acetaminophen *SUGAR FREE* (TYLENOL) solution 650 mg    apixaban ANTICOAGULANT (ELIQUIS) tablet 10 mg    Followed by    [START ON 10/6/2023] apixaban ANTICOAGULANT (ELIQUIS) tablet 5 mg    bisacodyl (DULCOLAX) suppository 10 mg    busPIRone (BUSPAR) tablet 10 mg    ceFEPIme (MAXIPIME) 2 g vial to attach to  mL bag for ADULTS or 50 mL bag for PEDS    citalopram (celeXA) solution 40 mg    dextrose 10% infusion    dextrose 10% infusion    glucose gel 15-30 g    Or    dextrose 50 % injection 25-50 mL    Or    glucagon injection 1 mg    sennosides (SENOKOT) syrup 5-10 mL    And    docusate (COLACE) 50 MG/5ML liquid  mg    gabapentin (NEURONTIN) solution 600 mg    HYDROmorphone (DILAUDID) injection 0.4 mg    HYDROmorphone (STANDARD CONC) (DILAUDID) liquid 4 mg    hydrOXYzine (ATARAX) syrup 25 mg    insulin 1 unit/1mL in saline (NovoLIN-Regular) infusion- SOT TPIAT algorithm syringe    insulin aspart (NovoLOG) injection (RAPID ACTING)    insulin glargine (LANTUS PEN) injection 7 Units    insulin glargine (LANTUS PEN) injection 9 Units    lidocaine (LMX4) cream    lidocaine 1 % 0.1-1 mL    magnesium hydroxide (MILK OF MAGNESIA) suspension 30 mL    Med Instruction - Transition from IV Insulin Infusion to Sub-Q Insulin    melatonin liquid 10 mg    methadone (DOLOPHINE) solution 7.5 mg    methocarbamol (ROBAXIN) tablet 1,000 mg    multivitamins w/minerals liquid 15 mL    naloxone (NARCAN) injection 0.2 mg    Or    naloxone (NARCAN) injection 0.4 mg    Or    naloxone (NARCAN) injection 0.2 mg    Or    naloxone (NARCAN) injection 0.4 mg    ondansetron (ZOFRAN ODT) ODT tab 4 mg    Or    ondansetron (ZOFRAN) injection 4 mg    pantoprazole (PROTONIX) 2 mg/mL suspension 40 mg    polyethylene glycol (MIRALAX) Packet 17 g    prochlorperazine (COMPAZINE) injection 10 mg    Or    prochlorperazine (COMPAZINE) tablet 10 mg    Or    prochlorperazine (COMPAZINE) suppository 25 mg    QUEtiapine (SEROquel) half-tab 12.5-25 mg  "   QUEtiapine (SEROquel) tablet 25-50 mg    simethicone (MYLICON) chewable tablet 80 mg    sodium chloride (PF) 0.9% PF flush 3 mL    sodium chloride (PF) 0.9% PF flush 3 mL    thiamine (B-1) injection 100 mg            Physical Exam:    /67 (BP Location: Left arm)   Pulse 66   Temp 98.3  F (36.8  C) (Oral)   Resp 18   Ht 1.575 m (5' 2\")   Wt 59.1 kg (130 lb 3.2 oz)   LMP 08/16/2023 (Approximate)   SpO2 98%   BMI 23.81 kg/m    General: pleasant, in no distress. Lying in  bed   Lungs: unlabored respiration, no cough  MSK:  moves all extremities  Mental status:  alert, oriented to self, place, time         Data:     Recent Labs   Lab 09/30/23  1215 09/30/23  0942 09/30/23  0801 09/30/23  0359 09/30/23  0205 09/29/23  2322   * 244* 190* 167* 138* 190*       Lab Results   Component Value Date    WBC 12.9 (H) 09/30/2023    WBC 10.5 09/29/2023    WBC 11.9 (H) 09/28/2023    HGB 9.8 (L) 09/30/2023    HGB 8.4 (L) 09/29/2023    HGB 9.4 (L) 09/28/2023    HCT 29.1 (L) 09/30/2023    HCT 24.8 (L) 09/29/2023    HCT 27.9 (L) 09/28/2023    MCV 90 09/30/2023    MCV 89 09/29/2023    MCV 89 09/28/2023     (H) 09/30/2023     (H) 09/29/2023     (H) 09/28/2023     Lab Results   Component Value Date     09/30/2023     09/29/2023     09/28/2023    POTASSIUM 4.2 09/30/2023    POTASSIUM 3.4 09/29/2023    POTASSIUM 4.1 09/28/2023    CHLORIDE 100 09/30/2023    CHLORIDE 116 (H) 09/29/2023    CHLORIDE 105 09/28/2023    CO2 26 09/30/2023    CO2 23 09/29/2023    CO2 26 09/28/2023     (H) 09/30/2023     (H) 09/30/2023     (H) 09/30/2023     Lab Results   Component Value Date    BUN 9.0 09/30/2023    BUN 7.1 09/29/2023    BUN 7.1 09/28/2023     Lab Results   Component Value Date    TSH 1.68 08/18/2023     Lab Results   Component Value Date    AST 16 09/27/2023    AST 63 (H) 09/23/2023    AST 63 (H) 09/22/2023    ALT 18 09/27/2023    ALT 38 09/23/2023    ALT 41 09/22/2023 "    ALKPHOS 75 09/27/2023    ALKPHOS 32 (L) 09/23/2023    ALKPHOS 35 09/22/2023       I spent a total of 35 minutes face to face or coordinating care of Artie Burger.   Over 50% of my time on the unit was spent counseling the patient and/or coordinating care regarding acute hyperglycemia management.  See note for details.      Contacting the Inpatient Diabetes Team   From 7AM-5PM: page inpatient diabetes provider that is following the patient, or utilize the job code paging system. From 5PM-7AM: page the job code for endocrine fellow on call.     Please use the following job code to reach the Inpatient Diabetes team. Note that you must use an in house phone and that job codes cannot receive text pages.   Dial [phone] (or star-star-star [phone] on the new [employer] telephones), then [phone] to reach the endocrine-diabetes provider on call.    ANGELICA Carlin

## 2023-09-30 NOTE — PROGRESS NOTES
"/75 (BP Location: Left arm)   Pulse 71   Temp 99  F (37.2  C) (Oral)   Resp 18   Ht 1.575 m (5' 2\")   Wt 59.1 kg (130 lb 3.2 oz)   LMP 08/16/2023 (Approximate)   SpO2 98%   BMI 23.81 kg/m      Alert and oriented x4.   Abdominal/incisional pain managed with PCA hydromorphone, PRN methadone, schedule tylenol and robaxin. Given simethicone one time.  NINFA drain removed this AM, dressing changed done   Tube feeding, J tube, at goal rate of 50 ml/hr. Tube and relizorb changed at 1500  G tube open to gravity with 160 ml of clear output   , and 202   Lantus dosage increase to 9 units - given   Per patient report, having loose stools   Will continue with POC   "

## 2023-10-01 LAB
GLUCOSE BLDC GLUCOMTR-MCNC: 113 MG/DL (ref 70–99)
GLUCOSE BLDC GLUCOMTR-MCNC: 138 MG/DL (ref 70–99)
GLUCOSE BLDC GLUCOMTR-MCNC: 156 MG/DL (ref 70–99)
GLUCOSE BLDC GLUCOMTR-MCNC: 191 MG/DL (ref 70–99)
GLUCOSE BLDC GLUCOMTR-MCNC: 207 MG/DL (ref 70–99)
GLUCOSE BLDC GLUCOMTR-MCNC: 258 MG/DL (ref 70–99)
GLUCOSE BLDC GLUCOMTR-MCNC: 258 MG/DL (ref 70–99)
INR PPP: 1.14 (ref 0.85–1.15)

## 2023-10-01 PROCEDURE — 85610 PROTHROMBIN TIME: CPT | Performed by: PHYSICIAN ASSISTANT

## 2023-10-01 PROCEDURE — 120N000011 HC R&B TRANSPLANT UMMC

## 2023-10-01 PROCEDURE — 250N000013 HC RX MED GY IP 250 OP 250 PS 637: Performed by: NURSE PRACTITIONER

## 2023-10-01 PROCEDURE — 250N000011 HC RX IP 250 OP 636

## 2023-10-01 PROCEDURE — 250N000011 HC RX IP 250 OP 636: Performed by: SURGERY

## 2023-10-01 PROCEDURE — 36415 COLL VENOUS BLD VENIPUNCTURE: CPT | Performed by: PHYSICIAN ASSISTANT

## 2023-10-01 PROCEDURE — 99232 SBSQ HOSP IP/OBS MODERATE 35: CPT | Performed by: INTERNAL MEDICINE

## 2023-10-01 PROCEDURE — 250N000013 HC RX MED GY IP 250 OP 250 PS 637: Performed by: PHYSICIAN ASSISTANT

## 2023-10-01 RX ADMIN — INSULIN ASPART 3 UNITS: 100 INJECTION, SOLUTION INTRAVENOUS; SUBCUTANEOUS at 04:12

## 2023-10-01 RX ADMIN — Medication 10 MG: at 19:07

## 2023-10-01 RX ADMIN — APIXABAN 10 MG: 5 TABLET, FILM COATED ORAL at 20:44

## 2023-10-01 RX ADMIN — METHOCARBAMOL TABLETS 1000 MG: 500 TABLET, COATED ORAL at 08:18

## 2023-10-01 RX ADMIN — GABAPENTIN 600 MG: 250 SUSPENSION ORAL at 21:55

## 2023-10-01 RX ADMIN — METHOCARBAMOL TABLETS 1000 MG: 500 TABLET, COATED ORAL at 14:24

## 2023-10-01 RX ADMIN — DOCUSATE SODIUM 100 MG: 50 LIQUID ORAL at 20:44

## 2023-10-01 RX ADMIN — Medication 40 MG: at 08:19

## 2023-10-01 RX ADMIN — ONDANSETRON 4 MG: 4 TABLET, ORALLY DISINTEGRATING ORAL at 07:04

## 2023-10-01 RX ADMIN — METHADONE HYDROCHLORIDE 7.5 MG: 5 SOLUTION ORAL at 06:35

## 2023-10-01 RX ADMIN — APIXABAN 10 MG: 5 TABLET, FILM COATED ORAL at 08:18

## 2023-10-01 RX ADMIN — QUETIAPINE FUMARATE 25 MG: 25 TABLET ORAL at 21:55

## 2023-10-01 RX ADMIN — HYDROMORPHONE HYDROCHLORIDE 4 MG: 1 SOLUTION ORAL at 20:43

## 2023-10-01 RX ADMIN — METHADONE HYDROCHLORIDE 7.5 MG: 5 SOLUTION ORAL at 14:24

## 2023-10-01 RX ADMIN — ACETAMINOPHEN 650 MG: 160 LIQUID ORAL at 21:55

## 2023-10-01 RX ADMIN — GABAPENTIN 600 MG: 250 SUSPENSION ORAL at 06:35

## 2023-10-01 RX ADMIN — BUSPIRONE HYDROCHLORIDE 10 MG: 10 TABLET ORAL at 20:45

## 2023-10-01 RX ADMIN — ACETAMINOPHEN 650 MG: 160 LIQUID ORAL at 19:07

## 2023-10-01 RX ADMIN — ACETAMINOPHEN 650 MG: 160 LIQUID ORAL at 02:04

## 2023-10-01 RX ADMIN — ACETAMINOPHEN 650 MG: 160 LIQUID ORAL at 14:24

## 2023-10-01 RX ADMIN — GABAPENTIN 600 MG: 250 SUSPENSION ORAL at 14:24

## 2023-10-01 RX ADMIN — METHADONE HYDROCHLORIDE 7.5 MG: 5 SOLUTION ORAL at 21:54

## 2023-10-01 RX ADMIN — THIAMINE HYDROCHLORIDE 100 MG: 100 INJECTION, SOLUTION INTRAMUSCULAR; INTRAVENOUS at 08:19

## 2023-10-01 RX ADMIN — HYDROMORPHONE HYDROCHLORIDE 4 MG: 1 SOLUTION ORAL at 08:41

## 2023-10-01 RX ADMIN — INSULIN ASPART 2 UNITS: 100 INJECTION, SOLUTION INTRAVENOUS; SUBCUTANEOUS at 00:14

## 2023-10-01 RX ADMIN — METHOCARBAMOL TABLETS 1000 MG: 500 TABLET, COATED ORAL at 20:44

## 2023-10-01 RX ADMIN — ACETAMINOPHEN 650 MG: 160 LIQUID ORAL at 06:35

## 2023-10-01 RX ADMIN — SENNOSIDES 10 ML: 8.8 LIQUID ORAL at 20:44

## 2023-10-01 RX ADMIN — Medication 15 ML: at 08:19

## 2023-10-01 RX ADMIN — BUSPIRONE HYDROCHLORIDE 10 MG: 10 TABLET ORAL at 08:19

## 2023-10-01 RX ADMIN — CITALOPRAM 40 MG: 10 SOLUTION ORAL at 08:19

## 2023-10-01 RX ADMIN — Medication 40 MG: at 20:43

## 2023-10-01 ASSESSMENT — ACTIVITIES OF DAILY LIVING (ADL)
ADLS_ACUITY_SCORE: 22

## 2023-10-01 NOTE — PROGRESS NOTES
Pancreatitis Service - Daily Progress Note  10/01/2023    Assessment & Plan: Artie Burger is a 27 year old with PMH significant for recurrent acute on chronic pancreatitis 2/2 alcoholic pancreatitis. She is now s/p status post total pancreatectomy, islet cell autotransplant, splenectomy, and gastrojejunostomy tube placement on 9/22/23 with Dr. Junito Patel MD.    Cardiorespiratory:   Hypotensive: Resolved. Stable   Hypoxia: Resolved. Encourage IS/Pulm toilet.    GI/Nutrition:   S/p TPAIT:9 NPO, G/J tube in place. Started G tube clamp trial on 9/25. Vented intermittently due to abdominal distention. Output 815 ml/24 hr. Was vented this AM though patient asymptomatic, so will try clamping again. J tube is running Vital 1.5 @ 50 ml/hr +relizorb tube, feeds at goal.  Removed NNIFA drain.  PV abnormal flow, concern for possible small thrombosis: US liver on POD4 showed dampened bidirectional flow in the left PV. Possible patient may have small clot not identified on US. Will switch from anticoagulation ppx to treatment x 3 months. Start apixaban 10 mg PO BID x 7 days then switch to 5 mg PO BID x 3 months.   Malnutrition secondary to poor intake: Continue continuous TF to meet 100% nutritional need.   Continue bowel regimen senna/colace/PEG, hold for loose stools as needed.      Fluid/Electrolytes:   Hypervolemia: Wt up ~10kg. Lasix 10 mg IV x 1 on 9/26. 9/29 standing weight 59.1kg.   Hypomagnesemia: Mg sulfate 4g IV x 1    : No issues.    Post-pancreatectomy diabetes: Insulin gtt, appreciate Endocrine consult. Transitioned to subcutaneous insulin, continuing to titrate. Completed DM education    Infection: Afebrile   Leukocytosis, peaked at 23, trending down. WBC ~ 13.   Panc preservation/islets : Pancreas preservation soln grew Enterobacter clocae (sensitive to ampicillin) and E. Faecium (sensitive to ampicillin) - treatment including Meropenem, Cefepime, Zosyn, and ampicillin due switching antibiotics with  increasing leukocytosis and sensitivities (9/22-9/29). Islet Final product culture grew Enterobacter clocae (resistant to Zosyn) and E. Faecium (sensitive to ampicillin) switched to Cefipime 9/25 for coverage of Enterobacter clocae (9/22-9/30).    Prophylaxis: PPI    Pain control: Fair. Pain regimen adjusted (added Toradol) yesterday due to limited activity. Minimal improvement.   Neurontin 600 mg per J tube Q8  Robaxin 1G PO every 8 hours   Acetaminophen 650 mg per J tube every 6 hours.  Methadone 4 mg soln per J tube every 8 hours (started 9/27). Increased to 7.5 mg soln per J tube every 8 hours on 9/29. Pain better controlled  Dilaudid 4 mg soln per J tube every 3 hours PRN severe pain refractory     Precedex stopped 9/25  Ketamine discontinued 9/28  Toradol discontinued 9/27   Ropivacaine infusion discontinued 9/29. Paravertebral catheters removed.     Anxiety: PTA buspar 10 mg daily, give PO and citalopram 40 mg daily, give per J tube.  Adult psychology consult. Patient seen by Erica Bright 9/25 and 9/26. Psychiatry consulted for medication management for anxiety and insomnia (patient seen 9/28 and 9/29). Continue Seroquel 12.5-25 mg PO every 6 hours for anxiety and Seroquel 25-50 mg PO at bedtime. Hydroxyzine to 25 mg per J tube every 6 hours PRN anxiety refractory to Seroquel. Hydroxyzine changed to PRN and dose reduced as medication may cause altered bowel function.  Nicotine dependence/vaping: Avoid nicotine patches at this time.     Activity: OOB to chair. Ambulate with assist. PT consulted.  Anticipated LOS/Discharge: 7A, 7-10 days. Possible discharge pending discussion with endocrinology    JOSEFA/Fellow/Resident Provider: Álvaro De Leon    Faculty: Junito Patel MD    __________________________________________________________________  Transplant History: Admitted 9/22/2023 for Chronic pancreatitis (H) [K86.1]     9/22/2023 (Islet), Postoperative day: 9     Interval History: History is obtained from  "the patient  Overnight events: Feeling well overall. Pain controlled, no IV pain meds    ROS:   A 10-point review of systems was negative except as noted above.    Curent Meds:   acetaminophen *SUGAR FREE*  650 mg Per J Tube Q4H    apixaban ANTICOAGULANT  10 mg Oral BID    Followed by    [START ON 10/6/2023] apixaban ANTICOAGULANT  5 mg Oral BID    busPIRone  10 mg Oral BID    citalopram  40 mg Per J Tube Daily    sennosides  5-10 mL Per J Tube BID    And    docusate   mg Per J Tube BID    gabapentin  600 mg Per J Tube Q8H LELO    insulin aspart  1-8 Units Subcutaneous Q4H    insulin glargine  10 Units Subcutaneous Q24H    insulin glargine  14 Units Subcutaneous Q24H    melatonin  10 mg Per J Tube At Bedtime    methadone  7.5 mg Oral Q8H    methocarbamol  1,000 mg Oral TID    multivitamins w/minerals  15 mL Per J Tube Daily    pantoprazole  40 mg Per J Tube BID    polyethylene glycol  17 g Per J Tube Daily    QUEtiapine  25-50 mg Oral At Bedtime    sodium chloride (PF)  3 mL Intracatheter Q8H    thiamine  100 mg Intravenous Daily       Physical Exam:     Admit Weight: 58.3 kg (128 lb 8.5 oz)    Current Vitals:   /84 (BP Location: Right arm)   Pulse 58   Temp 98.2  F (36.8  C) (Oral)   Resp 16   Ht 1.575 m (5' 2\")   Wt 59.1 kg (130 lb 3.2 oz)   LMP 08/16/2023 (Approximate)   SpO2 98%   BMI 23.81 kg/m           Vital sign ranges:    Temp:  [97.9  F (36.6  C)-99.5  F (37.5  C)] 98.2  F (36.8  C)  Pulse:  [58-72] 58  Resp:  [16-18] 16  BP: (109-137)/(67-86) 134/84  SpO2:  [98 %-99 %] 98 %  Patient Vitals for the past 24 hrs:   BP Temp Temp src Pulse Resp SpO2   10/01/23 0614 134/84 98.2  F (36.8  C) Oral 58 16 98 %   10/01/23 0140 115/75 98.3  F (36.8  C) Oral 68 16 98 %   09/30/23 2115 136/83 97.9  F (36.6  C) Oral 71 16 98 %   09/30/23 1732 118/81 99.5  F (37.5  C) Oral 72 16 99 %   09/30/23 1335 137/86 98.3  F (36.8  C) Oral 65 16 98 %   09/30/23 1005 109/67 98.3  F (36.8  C) Oral 66 18 98 % "     General Appearance: NAD  Skin: normal, warm, dry  Heart: well perfused  Lungs:   NLB on RA  Abdomen: The abdomen is soft, ND, and  mildly tender. The wound is well approximated and without signs of infection. Tube/Drain sites are: CDI. J tube infusing EN.  : no miner  Extremities: edema: trace    Data:   CMP  Recent Labs   Lab 10/01/23  0817 10/01/23  0403 09/30/23  1215 09/30/23  0942 09/29/23  0103 09/29/23  0026 09/28/23  1401 09/28/23  1337 09/28/23  0608 09/28/23  0532 09/27/23  0619 09/27/23  0517   NA  --   --   --  137  --  137  --   --    < >  --   --  144   POTASSIUM  --   --   --  4.2  --  3.4  --   --    < >  --   --  3.2*   CHLORIDE  --   --   --  100  --  116*  --   --    < >  --   --  108*   CO2  --   --   --  26  --  23  --   --    < >  --   --  25   * 258*   < > 244*   < > 134*   < >  --    < >  --    < > 78   BUN  --   --   --  9.0  --  7.1  --   --    < >  --   --  5.2*   CR  --   --   --  0.58  --  0.50*  --   --    < >  --   --  0.56   GFRESTIMATED  --   --   --  >90  --  >90  --   --    < >  --   --  >90   MURTAZA  --   --   --  9.0  --  7.4*  --   --    < >  --   --  8.2*   MAG  --   --   --  1.7  --   --   --  2.4*  --  1.6*  --  1.8   PHOS  --   --   --  3.4  --   --   --   --   --  3.2  --  2.3*   AMYLASE  --   --   --   --   --   --   --   --   --   --   --  13*   LIPASE  --   --   --   --   --   --   --   --   --   --   --  6*   ALBUMIN  --   --   --   --   --   --   --   --   --   --   --  2.9*   BILITOTAL  --   --   --   --   --   --   --   --   --   --   --  0.3   ALKPHOS  --   --   --   --   --   --   --   --   --   --   --  75   AST  --   --   --   --   --   --   --   --   --   --   --  16   ALT  --   --   --   --   --   --   --   --   --   --   --  18    < > = values in this interval not displayed.     CBC  Recent Labs   Lab 09/30/23  0942 09/29/23  0026   HGB 9.8* 8.4*   WBC 12.9* 10.5   * 545*     Coags  Recent Labs   Lab 10/01/23  0707 09/30/23  0554  09/27/23  1635 09/27/23  0517 09/26/23  0644   INR 1.14 1.08   < >  --   --    PTT  --   --   --  28 32    < > = values in this interval not displayed.      Urinalysis  Recent Labs   Lab Test 09/21/23  1614   COLOR Yellow   APPEARANCE Slightly Cloudy*   URINEGLC Negative   URINEBILI Negative   URINEKETONE Negative   SG 1.021   UBLD Negative   URINEPH 7.0   PROTEIN 10*   NITRITE Negative   LEUKEST Negative   RBCU 2   WBCU <1

## 2023-10-01 NOTE — PLAN OF CARE
"/78 (BP Location: Right arm)   Pulse 75   Temp 98.5  F (36.9  C) (Oral)   Resp 18   Ht 1.575 m (5' 2\")   Wt 56.9 kg (125 lb 8 oz)   LMP 08/16/2023 (Approximate)   SpO2 98%   BMI 22.95 kg/m       Patient alert and oriented. VS stable. Patient on room air. Patient complains of pain. Scheduled Tylenol given (See MAR). Scheduled methadone given (See MAR). Scheduled Robaxin given (See MAR). Scheduled gabapentin given (See MAR). PRN dilaudid given (See MAR). Patient denies nausea. BG - Q4. Urine Output - voiding adequately. Bowel Function - Multiple liquid stools during shift. Nutrition - NPO. Continuous tube feeds at goal rate of 50 ml/hr via J-tube. G-tube open to gravity or clamped with PO medications. Abdominal incision - approximated. CDI. PIV - saline locked. Activity - UAL. Plan of Care - Will continue with plan of care and notify care team of any changes.    "

## 2023-10-01 NOTE — PLAN OF CARE
"/75 (BP Location: Right arm)   Pulse 68   Temp 98.3  F (36.8  C) (Oral)   Resp 16   Ht 1.575 m (5' 2\")   Wt 59.1 kg (130 lb 3.2 oz)   LMP 08/16/2023 (Approximate)   SpO2 98%   BMI 23.81 kg/m      Shift: 3769-6903  VS: VSS, afebrile  Neuro: Aox4  BG: Q4H  Respiratory: RA   Pain/Nausea: Tylenol given for pain.   Diet: NPO   IV Access: PIV tko   Lines/Drains: GJ tube in place. Feed running @ 50mL/hr.   GI/: Voiding and passing stool   Skin: Midline incision   Mobility: UAL in room   Plan: Continue with POC and notify team of any changes.      "

## 2023-10-01 NOTE — PLAN OF CARE
"/83 (BP Location: Left arm)   Pulse 71   Temp 97.9  F (36.6  C) (Oral)   Resp 16   Ht 1.575 m (5' 2\")   Wt 59.1 kg (130 lb 3.2 oz)   LMP 08/16/2023 (Approximate)   SpO2 98%   BMI 23.81 kg/m      Shift: 2076-9255  VS: stable on RA , afebrile  Neuro: Aox4  BG: q4hrs 281, 223; lantus increasef from 7 to 10 uints   Respiratory: WNL   Pain/Nausea/PRN: pain controled with Tylenol and Dilaudid   Diet: NPO   LDA: 1 L piv   GI/: LBM 9/30, voids adequately   Skin: midline incision   Mobility: independent  Plan: continue POC     Handoff given to following RN.                          "

## 2023-10-01 NOTE — PROGRESS NOTES
IP Diabetes Management  Daily Note         HPI: 27 year old with PMH significant for recurrent acute on chronic pancreatitis 2/2 alcoholic pancreatitis. Now s/p total pancreatectomy, islet cell autotransplant, splenectomy, cholecystectomy, gastrojejunostomy tube placement.  Endocrine consulted for postoperative hyperglycemia     Assessment:   #Postoperative hyperglycemia  #Tube feeds associated hyperglycemia  #History of chronic pancreatitis  #S/p total pancreatectomy  #Islet cell autotransplant    Patient was transitioned off insulin drip on 9/29/2023.  Glucose readings have been persistently above 200 over the last 24 hours.  Requiring higher insulin compared to while she was on the insulin drip.  Potential discharge is being planned for tomorrow so will avoid insulin drip today and titrate up Lantus and correction dose in preparation for discharge.    Plan:    -Increase Lantus insulin to 14 units in the morning and 10 units in the evening (may adjust the evening Lantus dose based on glucose readings during the day today).   -D10 to be infused at 70 ml/hr if tube feeding stopped or interrupted to avoid severe hypoglycemia   -BG monitoring q 4 hrs               -Change NovoLog to custom correction 1:30 > 120 mg d/L   -hypoglycemia protocol   -carb counting protocol    Discharge Planning:   -Lantus 14 units at 0800 and 10 units 2000- TBD  -Novolog custom sliding scale 1: 30 every 4 hours   -Gvoke for severe hypoglycemia  -accu-chek blood glucose testing supplies  -Patient has received diabetes education.  -Patient was counseled about hypoglycemia management using glucose containing liquids like apple juice through the tube feed and also about use of emergency glucagon.  Counseled about risk of hypoglycemia if tube feeds were interrupted with Lantus on board  -Follow-up with [facility] endocrinology  -Consider CGM as outpatient    Plan discussed with patient and bedside RN    Interval History and Assessment: interval  glucose trend reviewed:   feels pain is well managed on current medication  Reports that primary team was planning to discharge today but holding off due to glucose reading not in range      Current nutritional intake and type: Orders Placed This Encounter      NPO for Medical/Clinical Reasons Except for: Ice Chips  TF- vital 1.5 running at 50 ml/hr over 24 hours- 224 CHO in 24 hours    Planned Procedures/surgeries: none  Steroid planning: none  D5W-containing solutions/medications: TF    PTA Diabetes Regimen: none          Diabetes History:   Type of Diabetes: S/p total pancreatectomy  Lab Results   Component Value Date    A1C 5.5 09/22/2023    A1C 5.3 06/28/2023              Review of Systems:     The Review of Systems is negative other than noted in the Interval History.           Medications:     Current Facility-Administered Medications   Medication    acetaminophen *SUGAR FREE* (TYLENOL) solution 650 mg    apixaban ANTICOAGULANT (ELIQUIS) tablet 10 mg    Followed by    [START ON 10/6/2023] apixaban ANTICOAGULANT (ELIQUIS) tablet 5 mg    bisacodyl (DULCOLAX) suppository 10 mg    busPIRone (BUSPAR) tablet 10 mg    citalopram (celeXA) solution 40 mg    dextrose 10% infusion    dextrose 10% infusion    glucose gel 15-30 g    Or    dextrose 50 % injection 25-50 mL    Or    glucagon injection 1 mg    sennosides (SENOKOT) syrup 5-10 mL    And    docusate (COLACE) 50 MG/5ML liquid  mg    gabapentin (NEURONTIN) solution 600 mg    HYDROmorphone (STANDARD CONC) (DILAUDID) liquid 4 mg    hydrOXYzine (ATARAX) syrup 25 mg    insulin 1 unit/1mL in saline (NovoLIN-Regular) infusion- SOT TPIAT algorithm syringe    insulin aspart (NovoLOG) injection (RAPID ACTING)    insulin glargine (LANTUS PEN) injection 10 Units    insulin glargine (LANTUS PEN) injection 14 Units    lidocaine (LMX4) cream    lidocaine 1 % 0.1-1 mL    magnesium hydroxide (MILK OF MAGNESIA) suspension 30 mL    Med Instruction - Transition from IV Insulin  "Infusion to Sub-Q Insulin    melatonin liquid 10 mg    methadone (DOLOPHINE) solution 7.5 mg    methocarbamol (ROBAXIN) tablet 1,000 mg    multivitamins w/minerals liquid 15 mL    naloxone (NARCAN) injection 0.2 mg    Or    naloxone (NARCAN) injection 0.4 mg    Or    naloxone (NARCAN) injection 0.2 mg    Or    naloxone (NARCAN) injection 0.4 mg    ondansetron (ZOFRAN ODT) ODT tab 4 mg    Or    ondansetron (ZOFRAN) injection 4 mg    pantoprazole (PROTONIX) 2 mg/mL suspension 40 mg    polyethylene glycol (MIRALAX) Packet 17 g    prochlorperazine (COMPAZINE) injection 10 mg    Or    prochlorperazine (COMPAZINE) tablet 10 mg    Or    prochlorperazine (COMPAZINE) suppository 25 mg    QUEtiapine (SEROquel) half-tab 12.5-25 mg    QUEtiapine (SEROquel) tablet 25-50 mg    simethicone (MYLICON) chewable tablet 80 mg    sodium chloride (PF) 0.9% PF flush 3 mL    sodium chloride (PF) 0.9% PF flush 3 mL    thiamine (B-1) injection 100 mg            Physical Exam:    /81 (BP Location: Right arm)   Pulse 77   Temp 97.7  F (36.5  C) (Oral)   Resp 18   Ht 1.575 m (5' 2\")   Wt 56.9 kg (125 lb 8 oz)   LMP 08/16/2023 (Approximate)   SpO2 98%   BMI 22.95 kg/m    General: pleasant, in no distress. Lying in  bed   Lungs: unlabored respiration, no cough  MSK:  moves all extremities  Mental status:  alert, oriented to self, place, time         Data:     Recent Labs   Lab 10/01/23  0817 10/01/23  0403 10/01/23  0006 09/30/23  2004 09/30/23  1613 09/30/23  1215   * 258* 207* 223* 281* 260*       Lab Results   Component Value Date    WBC 12.9 (H) 09/30/2023    WBC 10.5 09/29/2023    WBC 11.9 (H) 09/28/2023    HGB 9.8 (L) 09/30/2023    HGB 8.4 (L) 09/29/2023    HGB 9.4 (L) 09/28/2023    HCT 29.1 (L) 09/30/2023    HCT 24.8 (L) 09/29/2023    HCT 27.9 (L) 09/28/2023    MCV 90 09/30/2023    MCV 89 09/29/2023    MCV 89 09/28/2023     (H) 09/30/2023     (H) 09/29/2023     (H) 09/28/2023     Lab Results "   Component Value Date     09/30/2023     09/29/2023     09/28/2023    POTASSIUM 4.2 09/30/2023    POTASSIUM 3.4 09/29/2023    POTASSIUM 4.1 09/28/2023    CHLORIDE 100 09/30/2023    CHLORIDE 116 (H) 09/29/2023    CHLORIDE 105 09/28/2023    CO2 26 09/30/2023    CO2 23 09/29/2023    CO2 26 09/28/2023     (H) 10/01/2023     (H) 10/01/2023     (H) 10/01/2023     Lab Results   Component Value Date    BUN 9.0 09/30/2023    BUN 7.1 09/29/2023    BUN 7.1 09/28/2023     Lab Results   Component Value Date    TSH 1.68 08/18/2023     Lab Results   Component Value Date    AST 16 09/27/2023    AST 63 (H) 09/23/2023    AST 63 (H) 09/22/2023    ALT 18 09/27/2023    ALT 38 09/23/2023    ALT 41 09/22/2023    ALKPHOS 75 09/27/2023    ALKPHOS 32 (L) 09/23/2023    ALKPHOS 35 09/22/2023       I spent a total of 35 minutes face to face or coordinating care of Artie Burger.   Over 50% of my time on the unit was spent counseling the patient and/or coordinating care regarding acute hyperglycemia management.  See note for details.      Contacting the Inpatient Diabetes Team   From 7AM-5PM: page inpatient diabetes provider that is following the patient, or utilize the job code paging system. From 5PM-7AM: page the job code for endocrine fellow on call.     Please use the following job code to reach the Inpatient Diabetes team. Note that you must use an in house phone and that job codes cannot receive text pages.   Dial [phone] (or star-star-star [phone] on the new [employer] telephones), then [phone] to reach the endocrine-diabetes provider on call.    ANGELICA Carlin

## 2023-10-02 ENCOUNTER — APPOINTMENT (OUTPATIENT)
Dept: PHYSICAL THERAPY | Facility: CLINIC | Age: 28
End: 2023-10-02
Attending: SURGERY
Payer: COMMERCIAL

## 2023-10-02 LAB
ANION GAP SERPL CALCULATED.3IONS-SCNC: 12 MMOL/L (ref 7–15)
ATRIAL RATE - MUSE: 67 BPM
BASOPHILS # BLD AUTO: 0.1 10E3/UL (ref 0–0.2)
BASOPHILS NFR BLD AUTO: 1 %
BUN SERPL-MCNC: 10.5 MG/DL (ref 6–20)
CALCIUM SERPL-MCNC: 9 MG/DL (ref 8.6–10)
CHLORIDE SERPL-SCNC: 103 MMOL/L (ref 98–107)
CREAT SERPL-MCNC: 0.53 MG/DL (ref 0.51–0.95)
DEPRECATED HCO3 PLAS-SCNC: 26 MMOL/L (ref 22–29)
DIASTOLIC BLOOD PRESSURE - MUSE: NORMAL MMHG
EGFRCR SERPLBLD CKD-EPI 2021: >90 ML/MIN/1.73M2
EOSINOPHIL # BLD AUTO: 0.4 10E3/UL (ref 0–0.7)
EOSINOPHIL NFR BLD AUTO: 3 %
ERYTHROCYTE [DISTWIDTH] IN BLOOD BY AUTOMATED COUNT: 15 % (ref 10–15)
GLUCOSE BLDC GLUCOMTR-MCNC: 102 MG/DL (ref 70–99)
GLUCOSE BLDC GLUCOMTR-MCNC: 115 MG/DL (ref 70–99)
GLUCOSE BLDC GLUCOMTR-MCNC: 132 MG/DL (ref 70–99)
GLUCOSE BLDC GLUCOMTR-MCNC: 136 MG/DL (ref 70–99)
GLUCOSE BLDC GLUCOMTR-MCNC: 146 MG/DL (ref 70–99)
GLUCOSE BLDC GLUCOMTR-MCNC: 149 MG/DL (ref 70–99)
GLUCOSE BLDC GLUCOMTR-MCNC: 165 MG/DL (ref 70–99)
GLUCOSE BLDC GLUCOMTR-MCNC: 179 MG/DL (ref 70–99)
GLUCOSE BLDC GLUCOMTR-MCNC: 236 MG/DL (ref 70–99)
GLUCOSE BLDC GLUCOMTR-MCNC: 83 MG/DL (ref 70–99)
GLUCOSE SERPL-MCNC: 70 MG/DL (ref 70–99)
HCT VFR BLD AUTO: 31.4 % (ref 35–47)
HGB BLD-MCNC: 10.2 G/DL (ref 11.7–15.7)
IMM GRANULOCYTES # BLD: 0.3 10E3/UL
IMM GRANULOCYTES NFR BLD: 2 %
INR PPP: 1.15 (ref 0.85–1.15)
INTERPRETATION ECG - MUSE: NORMAL
LYMPHOCYTES # BLD AUTO: 2.6 10E3/UL (ref 0.8–5.3)
LYMPHOCYTES NFR BLD AUTO: 21 %
MAGNESIUM SERPL-MCNC: 2.1 MG/DL (ref 1.7–2.3)
MCH RBC QN AUTO: 30.2 PG (ref 26.5–33)
MCHC RBC AUTO-ENTMCNC: 32.5 G/DL (ref 31.5–36.5)
MCV RBC AUTO: 93 FL (ref 78–100)
MONOCYTES # BLD AUTO: 1.5 10E3/UL (ref 0–1.3)
MONOCYTES NFR BLD AUTO: 12 %
NEUTROPHILS # BLD AUTO: 7.8 10E3/UL (ref 1.6–8.3)
NEUTROPHILS NFR BLD AUTO: 61 %
NRBC # BLD AUTO: 0 10E3/UL
NRBC BLD AUTO-RTO: 0 /100
P AXIS - MUSE: 60 DEGREES
PHOSPHATE SERPL-MCNC: 3.6 MG/DL (ref 2.5–4.5)
PLATELET # BLD AUTO: 963 10E3/UL (ref 150–450)
POTASSIUM SERPL-SCNC: 3.7 MMOL/L (ref 3.4–5.3)
PR INTERVAL - MUSE: 146 MS
QRS DURATION - MUSE: 72 MS
QT - MUSE: 436 MS
QTC - MUSE: 460 MS
R AXIS - MUSE: 49 DEGREES
RBC # BLD AUTO: 3.38 10E6/UL (ref 3.8–5.2)
SODIUM SERPL-SCNC: 141 MMOL/L (ref 135–145)
SYSTOLIC BLOOD PRESSURE - MUSE: NORMAL MMHG
T AXIS - MUSE: 45 DEGREES
VENTRICULAR RATE- MUSE: 67 BPM
WBC # BLD AUTO: 12.7 10E3/UL (ref 4–11)

## 2023-10-02 PROCEDURE — 250N000013 HC RX MED GY IP 250 OP 250 PS 637: Performed by: NURSE PRACTITIONER

## 2023-10-02 PROCEDURE — 97116 GAIT TRAINING THERAPY: CPT | Mod: GP

## 2023-10-02 PROCEDURE — 80048 BASIC METABOLIC PNL TOTAL CA: CPT | Performed by: NURSE PRACTITIONER

## 2023-10-02 PROCEDURE — 120N000011 HC R&B TRANSPLANT UMMC

## 2023-10-02 PROCEDURE — 36415 COLL VENOUS BLD VENIPUNCTURE: CPT | Performed by: NURSE PRACTITIONER

## 2023-10-02 PROCEDURE — 84100 ASSAY OF PHOSPHORUS: CPT | Performed by: NURSE PRACTITIONER

## 2023-10-02 PROCEDURE — 250N000013 HC RX MED GY IP 250 OP 250 PS 637: Performed by: PHYSICIAN ASSISTANT

## 2023-10-02 PROCEDURE — 83735 ASSAY OF MAGNESIUM: CPT | Performed by: NURSE PRACTITIONER

## 2023-10-02 PROCEDURE — 99232 SBSQ HOSP IP/OBS MODERATE 35: CPT | Performed by: PHYSICIAN ASSISTANT

## 2023-10-02 PROCEDURE — 85025 COMPLETE CBC W/AUTO DIFF WBC: CPT | Performed by: NURSE PRACTITIONER

## 2023-10-02 PROCEDURE — 85610 PROTHROMBIN TIME: CPT | Performed by: PHYSICIAN ASSISTANT

## 2023-10-02 PROCEDURE — 97530 THERAPEUTIC ACTIVITIES: CPT | Mod: GP

## 2023-10-02 PROCEDURE — 250N000011 HC RX IP 250 OP 636

## 2023-10-02 PROCEDURE — 36415 COLL VENOUS BLD VENIPUNCTURE: CPT | Performed by: PHYSICIAN ASSISTANT

## 2023-10-02 RX ORDER — BUSPIRONE HYDROCHLORIDE 15 MG/1
15 TABLET ORAL 2 TIMES DAILY
Status: DISCONTINUED | OUTPATIENT
Start: 2023-10-02 | End: 2023-10-03 | Stop reason: HOSPADM

## 2023-10-02 RX ADMIN — GABAPENTIN 600 MG: 250 SUSPENSION ORAL at 06:26

## 2023-10-02 RX ADMIN — ACETAMINOPHEN 650 MG: 160 LIQUID ORAL at 09:50

## 2023-10-02 RX ADMIN — SENNOSIDES 5 ML: 8.8 LIQUID ORAL at 20:09

## 2023-10-02 RX ADMIN — THIAMINE HYDROCHLORIDE 100 MG: 100 INJECTION, SOLUTION INTRAMUSCULAR; INTRAVENOUS at 08:07

## 2023-10-02 RX ADMIN — METHOCARBAMOL TABLETS 1000 MG: 500 TABLET, COATED ORAL at 20:09

## 2023-10-02 RX ADMIN — HYDROMORPHONE HYDROCHLORIDE 4 MG: 1 SOLUTION ORAL at 09:50

## 2023-10-02 RX ADMIN — ACETAMINOPHEN 650 MG: 160 LIQUID ORAL at 15:50

## 2023-10-02 RX ADMIN — HYDROMORPHONE HYDROCHLORIDE 4 MG: 1 SOLUTION ORAL at 06:26

## 2023-10-02 RX ADMIN — APIXABAN 10 MG: 5 TABLET, FILM COATED ORAL at 20:08

## 2023-10-02 RX ADMIN — METHOCARBAMOL TABLETS 1000 MG: 500 TABLET, COATED ORAL at 13:39

## 2023-10-02 RX ADMIN — BUSPIRONE HYDROCHLORIDE 15 MG: 15 TABLET ORAL at 20:09

## 2023-10-02 RX ADMIN — POLYETHYLENE GLYCOL 3350 17 G: 17 POWDER, FOR SOLUTION ORAL at 08:04

## 2023-10-02 RX ADMIN — BUSPIRONE HYDROCHLORIDE 10 MG: 10 TABLET ORAL at 08:07

## 2023-10-02 RX ADMIN — CITALOPRAM 40 MG: 10 SOLUTION ORAL at 08:04

## 2023-10-02 RX ADMIN — GABAPENTIN 600 MG: 250 SUSPENSION ORAL at 22:16

## 2023-10-02 RX ADMIN — Medication 40 MG: at 08:04

## 2023-10-02 RX ADMIN — METHADONE HYDROCHLORIDE 7.5 MG: 5 SOLUTION ORAL at 06:25

## 2023-10-02 RX ADMIN — Medication 15 ML: at 08:04

## 2023-10-02 RX ADMIN — ACETAMINOPHEN 650 MG: 160 LIQUID ORAL at 02:40

## 2023-10-02 RX ADMIN — DOCUSATE SODIUM 100 MG: 50 LIQUID ORAL at 08:25

## 2023-10-02 RX ADMIN — SENNOSIDES 5 ML: 8.8 LIQUID ORAL at 08:25

## 2023-10-02 RX ADMIN — ACETAMINOPHEN 650 MG: 160 LIQUID ORAL at 18:52

## 2023-10-02 RX ADMIN — ACETAMINOPHEN 650 MG: 160 LIQUID ORAL at 22:13

## 2023-10-02 RX ADMIN — ACETAMINOPHEN 650 MG: 160 LIQUID ORAL at 06:26

## 2023-10-02 RX ADMIN — APIXABAN 10 MG: 5 TABLET, FILM COATED ORAL at 08:04

## 2023-10-02 RX ADMIN — HYDROMORPHONE HYDROCHLORIDE 4 MG: 1 SOLUTION ORAL at 17:49

## 2023-10-02 RX ADMIN — DOCUSATE SODIUM 100 MG: 50 LIQUID ORAL at 20:09

## 2023-10-02 RX ADMIN — Medication 10 MG: at 22:16

## 2023-10-02 RX ADMIN — HYDROMORPHONE HYDROCHLORIDE 4 MG: 1 SOLUTION ORAL at 02:41

## 2023-10-02 RX ADMIN — METHADONE HYDROCHLORIDE 7.5 MG: 5 SOLUTION ORAL at 22:14

## 2023-10-02 RX ADMIN — METHADONE HYDROCHLORIDE 7.5 MG: 5 SOLUTION ORAL at 13:39

## 2023-10-02 RX ADMIN — METHOCARBAMOL TABLETS 1000 MG: 500 TABLET, COATED ORAL at 08:04

## 2023-10-02 RX ADMIN — GABAPENTIN 600 MG: 250 SUSPENSION ORAL at 13:39

## 2023-10-02 RX ADMIN — QUETIAPINE FUMARATE 25 MG: 25 TABLET ORAL at 22:14

## 2023-10-02 RX ADMIN — Medication 40 MG: at 20:09

## 2023-10-02 ASSESSMENT — ACTIVITIES OF DAILY LIVING (ADL)
ADLS_ACUITY_SCORE: 22

## 2023-10-02 NOTE — PLAN OF CARE
"/82 (BP Location: Right arm)   Pulse 62   Temp 98.2  F (36.8  C) (Oral)   Resp 17   Ht 1.575 m (5' 2\")   Wt 57.4 kg (126 lb 8 oz)   LMP 08/16/2023 (Approximate)   SpO2 97%   BMI 23.14 kg/m      Shift: 8413-3402  Isolation Status: N/a  VS: stable on RA, afebrile  Neuro: Aox4  Behaviors: calm and cooperative   BG: Q4hr  Labs: Am labs pending   Respiratory: WDL  Cardiac: WDL  Pain/Nausea: reports 7/10 pain . Denies nausea   PRN: scheduled Tylenol and PRN Dilaudid    Diet: NPO. Continuous tube feeds at goal rate of 50 ml/hr via J-tube   IV Access: PIV SL  Infusion(s): n/a  Lines/Drains: J-tube. G-tube   GI/:  voiding adequately , No BM during this shift   Skin: Abdominal incision - approximated.    Mobility: independent in the room   Events/Education: n/a   Plan: Will continue to follow POC       "

## 2023-10-02 NOTE — PROGRESS NOTES
Pancreatitis Service - Daily Progress Note  10/02/2023    Assessment & Plan: Artie Burger is a 27 year old with PMH significant for recurrent acute on chronic pancreatitis 2/2 alcoholic pancreatitis. She is now s/p status post total pancreatectomy, islet cell autotransplant, splenectomy, and gastrojejunostomy tube placement on 9/22/23 with Dr. Junito Patel MD.    Cardiorespiratory:   Hypotensive: Resolved. Stable   Hypoxia: Resolved. RA. Encourage IS/Pulm toilet.    GI/Nutrition:   S/p TPAIT: POD # 10 NPO. G/J tube in place. Started G tube clamp trial on 9/25. Vented intermittently due to abdominal distention. Output 815 ml/24 hr. Retrialed starting 10/1 am. Tolerating well. Will advance to sugar free clears today. J tube is running Vital 1.5 @ 50 ml/hr +relizorb tube, feeds at goal.    PV abnormal flow, concern for possible small thrombosis: US liver on POD4 showed dampened bidirectional flow in the left PV. Patient may have small clot not identified on US. Will switch from anticoagulation ppx to treatment x 3 months. Start apixaban 10 mg PO BID x 7 days then switch to 5 mg PO BID x 3 months.   Malnutrition secondary to poor intake: Continue continuous TF to meet 100% nutritional need.   Continue bowel regimen senna/colace/PEG, hold for loose stools as needed.      Fluid/Electrolytes:   Hypervolemia: resolved.  Required Lasix 10 mg IV x 1 on 9/26.   Hypomagnesemia: Mg sulfate 4g IV x 1    : No issues.    Post-pancreatectomy diabetes:   Endocrine following. Transitioned to subcutaneous insulin, BID Lantus 14 units in am 10 units in pm. Sliding scale insulin per Endo. Completed DM education    Infection: Afebrile   Leukocytosis, peaked at 23, trending down. WBC ~ 13.   Panc preservation/islets : Pancreas preservation soln grew Enterobacter clocae (sensitive to ampicillin) and E. Faecium (sensitive to ampicillin) - treatment including Meropenem, Cefepime, Zosyn, and ampicillin due switching antibiotics with  increasing leukocytosis and sensitivities (9/22-9/29). Islet Final product culture grew Enterobacter clocae (resistant to Zosyn) and E. Faecium (sensitive to ampicillin) switched to Cefipime 9/25 for coverage of Enterobacter clocae (9/22-9/30).    Prophylaxis: PPI    Pain control: Fair. Pain regimen adjusted (added Toradol) yesterday due to limited activity. Minimal improvement.   Neurontin 600 mg per J tube Q8  Robaxin 1G PO every 8 hours   Acetaminophen 650 mg per J tube every 6 hours.  Methadone 4 mg soln per J tube every 8 hours (started 9/27). Increased to 7.5 mg soln per J tube every 8 hours on 9/29. Pain better controlled  Dilaudid 4 mg soln per J tube every 3 hours PRN severe pain refractory     Precedex stopped 9/25  Ketamine discontinued 9/28  Toradol discontinued 9/27   Ropivacaine infusion discontinued 9/29. Paravertebral catheters removed.     Anxiety: PTA buspar 10 mg daily, give PO and citalopram 40 mg daily, give per J tube.  Adult psychology consult. Patient seen by Erica Bright 9/25 and 9/26. Psychiatry consulted for medication management for anxiety and insomnia (patient seen 9/28 and 9/29). Continue Seroquel 12.5-25 mg PO every 6 hours for anxiety and Seroquel 25-50 mg PO at bedtime. Hydroxyzine to 25 mg per J tube every 6 hours PRN anxiety refractory to Seroquel. Hydroxyzine changed to PRN and dose reduced as medication may cause altered bowel function. Increase buspar to 15mg BID.  Nicotine dependence/vaping: Avoid nicotine patches at this time.     Activity: OOB to chair. Ambulate with assist. PT consulted.  Anticipated LOS/Discharge: 7A, 7-10 days. Possible discharge tomorrow pending  endocrine control.     JOSEFA/Fellow/Resident Provider: Therese Argueta NP      Faculty: Junito Patel MD    __________________________________________________________________  Transplant History: Admitted 9/22/2023 for Chronic pancreatitis (H) [K86.1]     9/22/2023 (Islet), Postoperative day: 10  "    Interval History: History is obtained from the patient  Overnight events: Feeling well overall. Pain controlled, no IV pain meds. Tolerating clamping trial without nausea/vomiting.    ROS:   A 10-point review of systems was negative except as noted above.    Curent Meds:   acetaminophen *SUGAR FREE*  650 mg Per J Tube Q4H    apixaban ANTICOAGULANT  10 mg Oral BID    Followed by    [START ON 10/6/2023] apixaban ANTICOAGULANT  5 mg Oral BID    busPIRone  10 mg Oral BID    citalopram  40 mg Per J Tube Daily    sennosides  5-10 mL Per J Tube BID    And    docusate   mg Per J Tube BID    gabapentin  600 mg Per J Tube Q8H LELO    insulin aspart  1-8 Units Subcutaneous Q4H    insulin glargine  10 Units Subcutaneous Q24H    insulin glargine  14 Units Subcutaneous Q24H    melatonin  10 mg Per J Tube At Bedtime    methadone  7.5 mg Oral Q8H    methocarbamol  1,000 mg Oral TID    multivitamins w/minerals  15 mL Per J Tube Daily    pantoprazole  40 mg Per J Tube BID    polyethylene glycol  17 g Per J Tube Daily    QUEtiapine  25-50 mg Oral At Bedtime    sodium chloride (PF)  3 mL Intracatheter Q8H    thiamine  100 mg Intravenous Daily       Physical Exam:     Admit Weight: 58.3 kg (128 lb 8.5 oz)    Current Vitals:   /84 (BP Location: Right arm)   Pulse 60   Temp 98.5  F (36.9  C) (Oral)   Resp 16   Ht 1.575 m (5' 2\")   Wt 57.4 kg (126 lb 8 oz)   LMP 08/16/2023 (Approximate)   SpO2 98%   BMI 23.14 kg/m           Vital sign ranges:    Temp:  [98  F (36.7  C)-98.5  F (36.9  C)] 98.5  F (36.9  C)  Pulse:  [60-75] 60  Resp:  [16-18] 16  BP: (119-129)/(68-84) 129/84  SpO2:  [97 %-100 %] 98 %  Patient Vitals for the past 24 hrs:   BP Temp Temp src Pulse Resp SpO2 Weight   10/02/23 0542 129/84 98.5  F (36.9  C) Oral 60 16 98 % --   10/02/23 0234 119/82 98.2  F (36.8  C) Oral 62 17 97 % --   10/01/23 6364 -- -- -- -- -- -- 57.4 kg (126 lb 8 oz)   10/01/23 2255 129/68 98  F (36.7  C) Oral 60 18 100 % --   10/01/23 " 1820 122/74 98.2  F (36.8  C) Oral 74 18 100 % --   10/01/23 1347 122/78 98.5  F (36.9  C) Oral 75 18 98 % --     General Appearance: NAD  Skin: normal, warm, dry  Heart: well perfused  Lungs:   NLB on RA  Abdomen: The abdomen is soft, ND, and  mildly tender. The wound is well approximated and without signs of infection. Tube/Drain sites are: CDI. J tube infusing EN.  : no miner  Extremities: edema: none    Data:   CMP  Recent Labs   Lab 10/02/23  0800 10/02/23  0417 09/30/23  1215 09/30/23  0942 09/29/23  0103 09/29/23  0026 09/28/23  1401 09/28/23  1337 09/28/23  0608 09/28/23  0532 09/27/23  0619 09/27/23  0517   NA  --   --   --  137  --  137  --   --    < >  --   --  144   POTASSIUM  --   --   --  4.2  --  3.4  --   --    < >  --   --  3.2*   CHLORIDE  --   --   --  100  --  116*  --   --    < >  --   --  108*   CO2  --   --   --  26  --  23  --   --    < >  --   --  25   * 136*   < > 244*   < > 134*   < >  --    < >  --    < > 78   BUN  --   --   --  9.0  --  7.1  --   --    < >  --   --  5.2*   CR  --   --   --  0.58  --  0.50*  --   --    < >  --   --  0.56   GFRESTIMATED  --   --   --  >90  --  >90  --   --    < >  --   --  >90   MURTAZA  --   --   --  9.0  --  7.4*  --   --    < >  --   --  8.2*   MAG  --   --   --  1.7  --   --   --  2.4*  --  1.6*  --  1.8   PHOS  --   --   --  3.4  --   --   --   --   --  3.2  --  2.3*   AMYLASE  --   --   --   --   --   --   --   --   --   --   --  13*   LIPASE  --   --   --   --   --   --   --   --   --   --   --  6*   ALBUMIN  --   --   --   --   --   --   --   --   --   --   --  2.9*   BILITOTAL  --   --   --   --   --   --   --   --   --   --   --  0.3   ALKPHOS  --   --   --   --   --   --   --   --   --   --   --  75   AST  --   --   --   --   --   --   --   --   --   --   --  16   ALT  --   --   --   --   --   --   --   --   --   --   --  18    < > = values in this interval not displayed.     CBC  Recent Labs   Lab 09/30/23  0942 09/29/23  0026   HGB 9.8*  8.4*   WBC 12.9* 10.5   * 545*     Coags  Recent Labs   Lab 10/02/23  0538 10/01/23  0707 09/27/23  1635 09/27/23  0517 09/26/23  0644   INR 1.15 1.14   < >  --   --    PTT  --   --   --  28 32    < > = values in this interval not displayed.      Urinalysis  Recent Labs   Lab Test 09/21/23  1614   COLOR Yellow   APPEARANCE Slightly Cloudy*   URINEGLC Negative   URINEBILI Negative   URINEKETONE Negative   SG 1.021   UBLD Negative   URINEPH 7.0   PROTEIN 10*   NITRITE Negative   LEUKEST Negative   RBCU 2   WBCU <1

## 2023-10-02 NOTE — PLAN OF CARE
Goal Outcome Evaluation:    Shift: [id number]   VS: vss on RA   Pain: abdominal incision 7/10 - managed with PRN dilaudid 2x, and schedule Tylenol. Gabapentin, Robaxin, and methadone  Neuro: WNL   Cardiac: WNL  Respiratory: on RA    Diet/Appetite:  Tolerating clear liquid - had coffee and Tube feeding at goal rate of 50 ml/hr. Tube feeding and Rilozorb changed at 1000  /GI: voiding spont. Hat in toilet and using commode at the bedside - 5 loose stools this shift.  LDA's: PIV SL   Skin: Abdominal incision BLAZE, G/J Tube site C/D/I   Activity: independent in room   Procedures: Physical therapy this morning.   Pertinent Labs/: Mag 2.1, Phos 3.6, and K+ 3.7  , 146, 83     Plan: Possible discharge tomorrow        Plan of Care Reviewed With: patient

## 2023-10-02 NOTE — PROGRESS NOTES
IP Diabetes Management  Daily Note         HPI: 27 year old with PMH significant for recurrent acute on chronic pancreatitis 2/2 alcoholic pancreatitis. Now s/p total pancreatectomy, islet cell autotransplant, splenectomy, cholecystectomy, gastrojejunostomy tube placement.  Endocrine consulted for postoperative hyperglycemia     Assessment:   #Postoperative hyperglycemia  #Tube feeds associated hyperglycemia  #History of chronic pancreatitis  #S/p total pancreatectomy  #Islet cell autotransplant    Patient was transitioned off insulin drip on 9/29/2023.  Glucose readings have been 113,138, 191(?- no diet yet) but clamping and unclamping G Tube sometimes will do this.  Also both this am and yesterday am OD=809 and 256.  Meds in solution including greta, celexus and methadone  Allowed her to have sugar free clears starting this am, she had not had anything when saw her this am yet but coffee with splenda.Not cause of elevated BG this am      Plan:    Continue Lantus insulin to 14 units in the morning and 10 units in the evening (may adjust the evening Lantus dose based on glucose readings during the day today).   -D10 to be infused at 70 ml/hr if tube feeding stopped or interrupted to avoid severe hypoglycemia   -BG monitoring q 4 hrs               -Change NovoLog to custom correction 1:30 > 120 mg d/L: Addendum: changed  to 1:35 this afternoon   -Add Novolog Carb coverage 1:10 with meals and snacks   -hypoglycemia protocol   -carb counting protocol    Discharge Planning:   -Lantus 14 units at 0800 and 10 units 2000- TBD  -Novolog custom sliding scale 1: 35 every 4 hours     -Gvoke for severe hypoglycemia  -accu-chek blood glucose testing supplies  -Patient has received diabetes education.  -Patient was counseled about hypoglycemia management using glucose containing liquids like apple juice through the tube feed and also about use of emergency glucagon.  Counseled about risk of hypoglycemia if tube feeds were  interrupted with Lantus on board  -Follow-up with [facility]ocrinology  -Consider CGM as outpatient    Plan discussed with patient and bedside RN    Interval History and Assessment: interval glucose trend reviewed:   Primary team advanced her to sugar free clear liquid diet today so no discharge.  Denies clare,bibiana,poppy.    BG 70 after correcting a >200--is this am elevated BG from meds in solution or clamping unclamping g tube?  BG=83 after correcting a 146 so decrease correction scale  Current nutritional intake and type: Orders Placed This Encounter      Clear Liquid Diet (SUGAR FREE)  TF- vital 1.5 running at 50 ml/hr over 24 hours- 224 CHO in 24 hours    Planned Procedures/surgeries: none  Steroid planning: none  D5W-containing solutions/medications: TF    PTA Diabetes Regimen: none          Diabetes History:   Type of Diabetes: S/p total pancreatectomy  Lab Results   Component Value Date    A1C 5.5 09/22/2023    A1C 5.3 06/28/2023              Review of Systems:     The Review of Systems is negative other than noted in the Interval History.           Medications:     Current Facility-Administered Medications   Medication    acetaminophen *SUGAR FREE* (TYLENOL) solution 650 mg    apixaban ANTICOAGULANT (ELIQUIS) tablet 10 mg    Followed by    [START ON 10/6/2023] apixaban ANTICOAGULANT (ELIQUIS) tablet 5 mg    bisacodyl (DULCOLAX) suppository 10 mg    busPIRone (BUSPAR) tablet 10 mg    citalopram (celeXA) solution 40 mg    dextrose 10% infusion    dextrose 10% infusion    glucose gel 15-30 g    Or    dextrose 50 % injection 25-50 mL    Or    glucagon injection 1 mg    sennosides (SENOKOT) syrup 5-10 mL    And    docusate (COLACE) 50 MG/5ML liquid  mg    gabapentin (NEURONTIN) solution 600 mg    HYDROmorphone (STANDARD CONC) (DILAUDID) liquid 4 mg    hydrOXYzine (ATARAX) syrup 25 mg    insulin 1 unit/1mL in saline (NovoLIN-Regular) infusion- SOT TPIAT algorithm syringe    insulin aspart (NovoLOG) injection (RAPID  "ACTING)    insulin glargine (LANTUS PEN) injection 10 Units    insulin glargine (LANTUS PEN) injection 14 Units    insulin glargine (LANTUS PEN) injection 3 Units    lidocaine (LMX4) cream    lidocaine 1 % 0.1-1 mL    magnesium hydroxide (MILK OF MAGNESIA) suspension 30 mL    Med Instruction - Transition from IV Insulin Infusion to Sub-Q Insulin    melatonin liquid 10 mg    methadone (DOLOPHINE) solution 7.5 mg    methocarbamol (ROBAXIN) tablet 1,000 mg    multivitamins w/minerals liquid 15 mL    naloxone (NARCAN) injection 0.2 mg    Or    naloxone (NARCAN) injection 0.4 mg    Or    naloxone (NARCAN) injection 0.2 mg    Or    naloxone (NARCAN) injection 0.4 mg    ondansetron (ZOFRAN ODT) ODT tab 4 mg    Or    ondansetron (ZOFRAN) injection 4 mg    pantoprazole (PROTONIX) 2 mg/mL suspension 40 mg    polyethylene glycol (MIRALAX) Packet 17 g    prochlorperazine (COMPAZINE) injection 10 mg    Or    prochlorperazine (COMPAZINE) tablet 10 mg    Or    prochlorperazine (COMPAZINE) suppository 25 mg    QUEtiapine (SEROquel) half-tab 12.5-25 mg    QUEtiapine (SEROquel) tablet 25-50 mg    simethicone (MYLICON) chewable tablet 80 mg    sodium chloride (PF) 0.9% PF flush 3 mL    sodium chloride (PF) 0.9% PF flush 3 mL    thiamine (B-1) injection 100 mg            Physical Exam:    /84 (BP Location: Right arm)   Pulse 60   Temp 98.5  F (36.9  C) (Oral)   Resp 16   Ht 1.575 m (5' 2\")   Wt 57.4 kg (126 lb 8 oz)   LMP 08/16/2023 (Approximate)   SpO2 98%   BMI 23.14 kg/m    General: pleasant, in no distress. Sitting up in  bed   Lungs: unlabored respiration, no cough, remains on room air  Abdomen: non distended, soft  MSK:  moves all extremities  Mental status:  alert, oriented to self, place, time         Data:     Recent Labs   Lab 10/02/23  0800 10/02/23  0417 10/01/23  2348 10/01/23  2015 10/01/23  1632 10/01/23  1149   * 136* 113* 156* 191* 138*       Lab Results   Component Value Date    WBC 12.9 (H) " 09/30/2023    WBC 10.5 09/29/2023    WBC 11.9 (H) 09/28/2023    HGB 9.8 (L) 09/30/2023    HGB 8.4 (L) 09/29/2023    HGB 9.4 (L) 09/28/2023    HCT 29.1 (L) 09/30/2023    HCT 24.8 (L) 09/29/2023    HCT 27.9 (L) 09/28/2023    MCV 90 09/30/2023    MCV 89 09/29/2023    MCV 89 09/28/2023     (H) 09/30/2023     (H) 09/29/2023     (H) 09/28/2023     Lab Results   Component Value Date     09/30/2023     09/29/2023     09/28/2023    POTASSIUM 4.2 09/30/2023    POTASSIUM 3.4 09/29/2023    POTASSIUM 4.1 09/28/2023    CHLORIDE 100 09/30/2023    CHLORIDE 116 (H) 09/29/2023    CHLORIDE 105 09/28/2023    CO2 26 09/30/2023    CO2 23 09/29/2023    CO2 26 09/28/2023     (H) 10/02/2023     (H) 10/02/2023     (H) 10/01/2023     Lab Results   Component Value Date    BUN 9.0 09/30/2023    BUN 7.1 09/29/2023    BUN 7.1 09/28/2023     Lab Results   Component Value Date    TSH 1.68 08/18/2023     Lab Results   Component Value Date    AST 16 09/27/2023    AST 63 (H) 09/23/2023    AST 63 (H) 09/22/2023    ALT 18 09/27/2023    ALT 38 09/23/2023    ALT 41 09/22/2023    ALKPHOS 75 09/27/2023    ALKPHOS 32 (L) 09/23/2023    ALKPHOS 35 09/22/2023       I spent a total of 35 minutes face to face or coordinating care of Artie Burger.   Over 50% of my time on the unit was spent counseling the patient and/or coordinating care regarding acute hyperglycemia management.  See note for details.      Contacting the Inpatient Diabetes Team   From 7AM-5PM: page inpatient diabetes provider that is following the patient, or utilize the job code paging system. From 5PM-7AM: page the job code for endocrine fellow on call.     Please use the following job code to reach the Inpatient Diabetes team. Note that you must use an in house phone and that job codes cannot receive text pages.   Dial [phone] (or star-star-star [phone] on the new [employer] telephones), then [phone] to reach the endocrine-diabetes provider on  call.  Abby Arechiga PA-C  Inpatient Diabetes Service  Pager   [phone]

## 2023-10-02 NOTE — PROGRESS NOTES
Care Management Follow Up    Length of Stay (days): 10    Expected Discharge Date: 10/01/2023     Concerns to be Addressed: discharge planning     Patient plan of care discussed at interdisciplinary rounds: Yes    Anticipated Discharge Disposition: Home, Home Care, Home Infusion (Staying locally with her sister)     Anticipated Discharge Services: None  Anticipated Discharge DME: None    Patient/family educated on Medicare website which has current facility and service quality ratings: no  Education Provided on the Discharge Plan: Yes  Patient/Family in Agreement with the Plan: yes    Referrals Placed by CM/SW: External Care Coordination, Home Infusion  Private pay costs discussed: Not applicable    Additional Information:  Per discussion with provider team anticipate possible discharge today however discussing diet advancement to sugar free clear liquids, blood glucose management.  Reviewed that if team anticipates discharge [facility] would need final discharge orders no later than 10/11 a.m. as patient will need to be hooked up inpatient to her enteral feeds prior to discharge.    0825: No plan for discharge today. RACHEL Levy Liaison updated.     Home infusion:  [facility]  Phone # [phone]  Fax # [phone]   Enteral feeds     Local Address  [address] [place] [address]      Angeles Carmichael RN BSN, PHN, ACM-RN  7A RN Care Coordinator  Phone: [phone]  Pager [phone]    To contact the weekend RNCC  [place] (0800 - 1630) Saturday and Sunday    Units: 5A,5B,5C [phone]    Units: 6A, [phone]    Units 6B, 6C, 6D [phone]    Units: 7A, 7B, 7C, 7D, [phone]    [facility] ([phone]) Saturday and Sunday    Units: 5 Ortho, 8A, 10 ICU, & Pediatric Units-Pager 4: [phone]    10/2/2023 8:24 AM

## 2023-10-02 NOTE — PROGRESS NOTES
CLINICAL NUTRITION SERVICES - BRIEF NOTE     Nutrition Prescription    Recommendations already ordered by Registered Dietitian (RD):  Modified food ordering system to prevent patient from ordering sugar containing clear liquids.        EVALUATION OF THE PROGRESS TOWARD GOALS   Diet: Clear liquid - sugar free  Nutrition Support: Vital 1.5 @ 50 ml/hr + relizorb via J-tube    INTERVENTIONS  Discussed diet order with team. Spoke with patient (with RN in room) re: need to avoid sugar containing beverages.  Highlighted menu items that would be appropriate on sugar free CL diet.     Monitoring/Evaluation  Progress toward goals will be monitored and evaluated per protocol.     Cathryn Woodruff, MS, RD, LD, CCTD, CNSC  7A and 7B beds [phone], pager [phone]  Weekend pager [phone]

## 2023-10-03 ENCOUNTER — DOCUMENTATION ONLY (OUTPATIENT)
Dept: MEDSURG UNIT | Facility: CLINIC | Age: 28
End: 2023-10-03
Payer: COMMERCIAL

## 2023-10-03 ENCOUNTER — TELEPHONE (OUTPATIENT)
Dept: TRANSPLANT | Facility: CLINIC | Age: 28
End: 2023-10-03
Payer: COMMERCIAL

## 2023-10-03 VITALS
SYSTOLIC BLOOD PRESSURE: 122 MMHG | OXYGEN SATURATION: 100 % | TEMPERATURE: 98.4 F | DIASTOLIC BLOOD PRESSURE: 82 MMHG | HEIGHT: 62 IN | RESPIRATION RATE: 16 BRPM | HEART RATE: 59 BPM | BODY MASS INDEX: 23.26 KG/M2 | WEIGHT: 126.4 LBS

## 2023-10-03 PROBLEM — E13.9 POST-PANCREATECTOMY DIABETES (H): Status: ACTIVE | Noted: 2023-10-03

## 2023-10-03 PROBLEM — G89.18 ACUTE POST-OPERATIVE PAIN: Status: ACTIVE | Noted: 2023-10-03

## 2023-10-03 PROBLEM — E89.1 POST-PANCREATECTOMY DIABETES (H): Status: ACTIVE | Noted: 2023-10-03

## 2023-10-03 PROBLEM — Z90.410 ACQUIRED TOTAL ABSENCE OF PANCREAS: Status: ACTIVE | Noted: 2023-10-03

## 2023-10-03 PROBLEM — Z90.410 POST-PANCREATECTOMY DIABETES (H): Status: ACTIVE | Noted: 2023-10-03

## 2023-10-03 LAB
GLUCOSE BLDC GLUCOMTR-MCNC: 109 MG/DL (ref 70–99)
GLUCOSE BLDC GLUCOMTR-MCNC: 154 MG/DL (ref 70–99)
GLUCOSE BLDC GLUCOMTR-MCNC: 155 MG/DL (ref 70–99)
GLUCOSE BLDC GLUCOMTR-MCNC: 161 MG/DL (ref 70–99)
GLUCOSE BLDC GLUCOMTR-MCNC: 77 MG/DL (ref 70–99)
HOLD SPECIMEN: NORMAL
INR PPP: 1.09 (ref 0.85–1.15)

## 2023-10-03 PROCEDURE — 99024 POSTOP FOLLOW-UP VISIT: CPT | Performed by: SURGERY

## 2023-10-03 PROCEDURE — 250N000013 HC RX MED GY IP 250 OP 250 PS 637: Performed by: PHYSICIAN ASSISTANT

## 2023-10-03 PROCEDURE — 99233 SBSQ HOSP IP/OBS HIGH 50: CPT

## 2023-10-03 PROCEDURE — 250N000013 HC RX MED GY IP 250 OP 250 PS 637: Performed by: NURSE PRACTITIONER

## 2023-10-03 PROCEDURE — 36415 COLL VENOUS BLD VENIPUNCTURE: CPT | Performed by: PHYSICIAN ASSISTANT

## 2023-10-03 PROCEDURE — 250N000011 HC RX IP 250 OP 636

## 2023-10-03 PROCEDURE — 85610 PROTHROMBIN TIME: CPT | Performed by: PHYSICIAN ASSISTANT

## 2023-10-03 RX ORDER — ONDANSETRON 4 MG/1
4 TABLET, ORALLY DISINTEGRATING ORAL EVERY 6 HOURS PRN
Qty: 12 TABLET | Refills: 3 | Status: SHIPPED | OUTPATIENT
Start: 2023-10-03

## 2023-10-03 RX ORDER — METHOCARBAMOL 1000 MG/1
1000 TABLET, FILM COATED ORAL 3 TIMES DAILY
Qty: 21 TABLET | Refills: 3 | Status: SHIPPED | OUTPATIENT
Start: 2023-10-03 | End: 2023-10-16 | Stop reason: DRUGHIGH

## 2023-10-03 RX ORDER — METHADONE HYDROCHLORIDE 5 MG/5ML
7.5 SOLUTION ORAL EVERY 8 HOURS
Qty: 157.5 ML | Refills: 0 | Status: SHIPPED | OUTPATIENT
Start: 2023-10-03 | End: 2023-10-10

## 2023-10-03 RX ORDER — POLYETHYLENE GLYCOL 3350 17 G/17G
17 POWDER, FOR SOLUTION ORAL DAILY
Qty: 510 G | Refills: 3 | Status: SHIPPED | OUTPATIENT
Start: 2023-10-03

## 2023-10-03 RX ORDER — QUETIAPINE FUMARATE 25 MG/1
12.5-25 TABLET, FILM COATED ORAL EVERY 6 HOURS PRN
Qty: 30 TABLET | Refills: 3 | Status: SHIPPED | OUTPATIENT
Start: 2023-10-03 | End: 2023-11-22

## 2023-10-03 RX ORDER — GUAIFENESIN 600 MG/1
15 TABLET, EXTENDED RELEASE ORAL DAILY
Qty: 105 ML | Refills: 3 | Status: SHIPPED | OUTPATIENT
Start: 2023-10-04 | End: 2023-10-23 | Stop reason: DRUGHIGH

## 2023-10-03 RX ORDER — QUETIAPINE FUMARATE 25 MG/1
25 TABLET, FILM COATED ORAL AT BEDTIME
Qty: 30 TABLET | Refills: 3 | Status: SHIPPED | OUTPATIENT
Start: 2023-10-03 | End: 2023-11-22

## 2023-10-03 RX ORDER — BUSPIRONE HYDROCHLORIDE 15 MG/1
15 TABLET ORAL 2 TIMES DAILY
Qty: 60 TABLET | Refills: 3 | Status: SHIPPED | OUTPATIENT
Start: 2023-10-03 | End: 2023-11-03

## 2023-10-03 RX ORDER — HYDROXYZINE HCL 10 MG/5 ML
25 SOLUTION, ORAL ORAL EVERY 6 HOURS PRN
Qty: 175 ML | Refills: 3 | Status: SHIPPED | OUTPATIENT
Start: 2023-10-03 | End: 2023-10-16 | Stop reason: DRUGHIGH

## 2023-10-03 RX ORDER — GABAPENTIN 250 MG/5ML
600 SOLUTION ORAL EVERY 8 HOURS
Qty: 252 ML | Refills: 3 | Status: SHIPPED | OUTPATIENT
Start: 2023-10-03 | End: 2023-10-16 | Stop reason: DRUGHIGH

## 2023-10-03 RX ORDER — HYDROMORPHONE HYDROCHLORIDE 1 MG/ML
4 SOLUTION ORAL EVERY 4 HOURS PRN
Qty: 112 ML | Refills: 0 | Status: SHIPPED | OUTPATIENT
Start: 2023-10-03 | End: 2023-10-16 | Stop reason: DRUGHIGH

## 2023-10-03 RX ORDER — CITALOPRAM HYDROBROMIDE 20 MG/10ML
40 SOLUTION, ORAL ORAL DAILY
Qty: 140 ML | Refills: 3 | Status: SHIPPED | OUTPATIENT
Start: 2023-10-03 | End: 2023-10-10

## 2023-10-03 RX ADMIN — METHADONE HYDROCHLORIDE 7.5 MG: 5 SOLUTION ORAL at 14:22

## 2023-10-03 RX ADMIN — BUSPIRONE HYDROCHLORIDE 15 MG: 15 TABLET ORAL at 08:58

## 2023-10-03 RX ADMIN — APIXABAN 10 MG: 5 TABLET, FILM COATED ORAL at 08:58

## 2023-10-03 RX ADMIN — GABAPENTIN 600 MG: 250 SUSPENSION ORAL at 14:21

## 2023-10-03 RX ADMIN — METHADONE HYDROCHLORIDE 7.5 MG: 5 SOLUTION ORAL at 06:24

## 2023-10-03 RX ADMIN — Medication 15 ML: at 08:58

## 2023-10-03 RX ADMIN — METHOCARBAMOL TABLETS 1000 MG: 500 TABLET, COATED ORAL at 14:21

## 2023-10-03 RX ADMIN — SENNOSIDES 5 ML: 8.8 LIQUID ORAL at 08:58

## 2023-10-03 RX ADMIN — ACETAMINOPHEN 650 MG: 160 LIQUID ORAL at 06:25

## 2023-10-03 RX ADMIN — DOCUSATE SODIUM 100 MG: 50 LIQUID ORAL at 08:58

## 2023-10-03 RX ADMIN — GABAPENTIN 600 MG: 250 SUSPENSION ORAL at 06:25

## 2023-10-03 RX ADMIN — POLYETHYLENE GLYCOL 3350 17 G: 17 POWDER, FOR SOLUTION ORAL at 08:58

## 2023-10-03 RX ADMIN — Medication 40 MG: at 08:59

## 2023-10-03 RX ADMIN — CITALOPRAM 40 MG: 10 SOLUTION ORAL at 08:59

## 2023-10-03 RX ADMIN — THIAMINE HYDROCHLORIDE 100 MG: 100 INJECTION, SOLUTION INTRAMUSCULAR; INTRAVENOUS at 08:59

## 2023-10-03 RX ADMIN — ACETAMINOPHEN 650 MG: 160 LIQUID ORAL at 14:22

## 2023-10-03 RX ADMIN — ACETAMINOPHEN 650 MG: 160 LIQUID ORAL at 09:54

## 2023-10-03 RX ADMIN — METHOCARBAMOL TABLETS 1000 MG: 500 TABLET, COATED ORAL at 08:59

## 2023-10-03 ASSESSMENT — ACTIVITIES OF DAILY LIVING (ADL)
ADLS_ACUITY_SCORE: 22

## 2023-10-03 NOTE — TELEPHONE ENCOUNTER
Patient Call: General  Route to LPN    Reason for call: Edward following up to see if patient will be discharged from the hospital today. Also wanting Discharge summery, and last nutrition notes.     Call back needed? Yes    Return Call Needed  Same as documented in contacts section  When to return call?: Same day: Route High Priority

## 2023-10-03 NOTE — PROGRESS NOTES
Reason for Assessment:  Nutrition education regarding CHO counting/food label reading.    Nutrition Diagnosis:  Food- and nutrition-related knowledge deficit r/t no previous knowledge of CHO in foods, food label reading AEB patient verbal report.   Interventions:  Provided brief instruction on CHO in foods, food label reading  Provided handouts: Diet Guidelines after Total Pancreatectomy, How to Read Nutrition Labels,  CHO counting handout ([place]), Carbohydrates  Note: Current diet is only sugar free clear liquids. Pt will meet with diabetes education after discharge after diet advanced.   Goals:   Patient was able to identify correct amount of CHO in food based on food label.  Patient asked appropriate questions and verbalized understanding following education.   Follow-up:    Patient to ask any further nutrition-related questions before discharge.  In addition, pt may request outpatient RD appointment.    Cathryn Woodruff, MS, RD, LD, CCTD, CNSC  7A and 7B beds [phone], pager [phone]  Weekend pager [phone]     She went to the walk in on 8/10 and was treated for uti. She just received her culture results and just wants to make sure she is on the right antibiotic

## 2023-10-03 NOTE — PLAN OF CARE
"/82 (BP Location: Left arm)   Pulse 59   Temp 98.4  F (36.9  C) (Oral)   Resp 16   Ht 1.575 m (5' 2\")   Wt 57.4 kg (126 lb 8 oz)   LMP 08/16/2023 (Approximate)   SpO2 100%   BMI 23.14 kg/m      SHIFT: 2391-9898  ISOLATION: NA  VITALS: AVSS on RA.  BG: Q4H  Recent Labs   Lab 10/03/23  0605 10/03/23  0216 10/02/23  2225 10/02/23  2021 10/02/23  1851 10/02/23  1747   * 77 102* 115* 149* 83   NEURO: A&O x4.  DIET: CLD w/ TF @ 50 mL/hr.  PAIN: No reported pain or nausea  RESPIRATORY: WDL  CARDIAC: WDL  : Voiding, not saving.  GI: LBM: 10/02  TUBES: PEG/J, PIV  MIVF/GTT/ABX: NA  ASSIST: UAL in the room, SBA outside.  SAFETY: NA  SKIN: Abdominal incision is dermabonded, clean, and BLAZE.  LABS:   Recent Labs   Lab 10/02/23  0955 09/30/23  0942 09/29/23  0026 09/28/23  1337 09/28/23  0917 09/28/23  0532   POTASSIUM 3.7 4.2 3.4  --    < >  --    PHOS 3.6 3.4  --   --   --  3.2   MAG 2.1 1.7  --  2.4*  --  1.6*   HGB 10.2* 9.8* 8.4*  --    < >  --    CR 0.53 0.58 0.50*  --    < >  --     < > = values in this interval not displayed.   PLAN: Per Therese Argueta NP:  - Possible discharge pending endocrine control.     "

## 2023-10-03 NOTE — PROGRESS NOTES
Prior Authorization Approval    Medication: QUETIAPINE FUMARATE 25 MG PO TABS  PA Initiated: 10/3/2023  PA Type: Quantity Limit    Insurance: [facility] - Phone [phone] Fax [phone]  Formerly [facility] / Reference #: [id number] / [id number]   Authorization Effective Dates: 7/5/2023 - 10/3/2023    Expected CoPay: $ 0.00  CoPay Card Eligible: No    Filling Pharmacy: [facility] [address]      Brenda Erazo  [facility] Pharm[facility] Liaison  Ph: [phone] Pager: [phone]   Securely message with the Vocera Web Console (learn more here)

## 2023-10-03 NOTE — PROGRESS NOTES
IP Diabetes Management  Daily Note         HPI: 27 year old with PMH significant for recurrent acute on chronic pancreatitis 2/2 alcoholic pancreatitis. Now s/p total pancreatectomy, islet cell autotransplant, splenectomy, cholecystectomy, gastrojejunostomy tube placement.  Endocrine consulted for postoperative hyperglycemia      Inpatient Diabetes Service Signing off 10/3/2023- since patient projected to discharge today CALL BACK IF NOT DISCHARGING  Please call back with any questions, labile blood glucose readings, changes to nutrition plan, or need up updated glycemic control recommendations (please allow for 24-48 hours for this).     Meg Bledsoe, APRN, CNP  Inpatient Diabetes Management Service  Job Code: [id number]      Assessment:   #Postoperative hyperglycemia  #Tube feeds associated hyperglycemia  #History of chronic pancreatitis  #S/p total pancreatectomy  #Islet cell autotransplant    Plan:     -Continue Lantus insulin to 14 units in the morning and 10 units in the evening               -D10 to be infused at 70 ml/hr if tube feeding stopped or interrupted to avoid severe hypoglycemia              -BG monitoring q 4 hrs               -Change NovoLog to custom correction 1:35 > 120 mg d/L              -Add Novolog Carb coverage 1:10 with meals and snacks              -hypoglycemia protocol              -carb counting protocol   -Patient should receiving CHO counting education if able to leave and have carb intake    Recommendations for Discharge:   Medications and supplies are to be ordered by primary service on discharge.   *please use the DIAB non-branded discharge supply order set ([id number])*                 -blood glucose monitoring every 4 hours              -Insulin dosed based on current tube feeding rate and formula- if changes to either of these occur then insulin changes will be needed              -Gvoke for severe hyperglycemia  -Glargine (Lantus): inject 14 units at 8 AM and 10 units at 8  "PM  -Aspart (Novolog): inject 1 unit per every 10 grams of carbohydrate ingested   -Aspart (Novolog): custom intensity sliding scale (see below) every 4 hours   Do Not give sliding scale Insulin if BG less than 120   BG=blood glucose  Check blood glucose Q4H and administer based on blood glucose   121-155 = 1.   156-190 = 2.   191-225 = 3.   226-260 = 4.   261-295 = 5.   296-330 = 6.   Greater than 330 = 7 units      The following scales may be copied and pasted into discharge medication orders, without outnumbering character limit. A more detailed and user friendly scale was placed in discharge navigator for patient to review.                 -follow up with [facility] in 1-2 weeks after discharge (appointment request sent to clinic coordinator on 10/3)              -upon discharge, patient will need the following supplies: Lantus Solostar pens, Novolog Flexpens, \"BD\" (32G x 4mm) insulin pen needles, glucometer, lancets, and test strips (if brand of meter not known, can be ordered \"no brand specified\" and note to pharmacy: \"can substitute per insurance coverage\"), sharps container, alcohol swabs, gvoke       Plan discussed with patient, bedside RN, and primary team.      Interval History and Assessment: interval glucose trend reviewed:   BG come into range last night with BG ranging from  mg/dL. Elevation this AM at 161- potential due to medications in liquid solutions containing glucose. Patient endorses understanding of plan.      Currently, denies nausea, vomiting or pain. 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           Medications:     Current Facility-Administered Medications   Medication    acetaminophen *SUGAR FREE* (TYLENOL) solution 650 mg    apixaban ANTICOAGULANT (ELIQUIS) tablet 10 mg    Followed by    [START ON 10/6/2023] apixaban ANTICOAGULANT (ELIQUIS) tablet 5 mg    bisacodyl (DULCOLAX) suppository 10 mg    busPIRone (BUSPAR) tablet 15 mg    citalopram (celeXA) solution 40 mg    dextrose 10% infusion    dextrose 10% infusion    glucose gel 15-30 g    Or    dextrose 50 % injection 25-50 mL    Or    glucagon injection 1 mg    sennosides (SENOKOT) syrup 5-10 mL    And    docusate (COLACE) 50 MG/5ML liquid  mg    gabapentin (NEURONTIN) solution 600 mg    HYDROmorphone (STANDARD CONC) (DILAUDID) liquid 4 mg    hydrOXYzine (ATARAX) syrup 25 mg    insulin 1 unit/1mL in saline (NovoLIN-Regular) infusion- SOT TPIAT algorithm syringe    insulin aspart (NovoLOG) injection (RAPID ACTING)    insulin aspart (NovoLOG) injection (RAPID ACTING)    insulin aspart (NovoLOG) injection (RAPID ACTING)    insulin glargine (LANTUS PEN) injection 10 Units    insulin glargine (LANTUS PEN) injection 14 Units    lidocaine (LMX4) cream    lidocaine 1 % 0.1-1 mL    magnesium hydroxide (MILK OF MAGNESIA) suspension 30 mL    Med Instruction - Transition from IV Insulin Infusion to Sub-Q Insulin    melatonin liquid 10 mg    methadone (DOLOPHINE) solution 7.5 mg    methocarbamol (ROBAXIN) tablet 1,000 mg    multivitamins w/minerals liquid 15 mL    naloxone (NARCAN) injection 0.2 mg    Or    naloxone (NARCAN) injection 0.4 mg    Or    naloxone (NARCAN) injection 0.2 mg    Or    naloxone (NARCAN) injection 0.4 mg    ondansetron  "(ZOFRAN ODT) ODT tab 4 mg    Or    ondansetron (ZOFRAN) injection 4 mg    pantoprazole (PROTONIX) 2 mg/mL suspension 40 mg    polyethylene glycol (MIRALAX) Packet 17 g    prochlorperazine (COMPAZINE) injection 10 mg    Or    prochlorperazine (COMPAZINE) tablet 10 mg    Or    prochlorperazine (COMPAZINE) suppository 25 mg    QUEtiapine (SEROquel) half-tab 12.5-25 mg    QUEtiapine (SEROquel) tablet 25-50 mg    simethicone (MYLICON) chewable tablet 80 mg    sodium chloride (PF) 0.9% PF flush 3 mL    sodium chloride (PF) 0.9% PF flush 3 mL    thiamine (B-1) injection 100 mg            Physical Exam:    /82 (BP Location: Left arm)   Pulse 59   Temp 98.4  F (36.9  C) (Oral)   Resp 16   Ht 1.575 m (5' 2\")   Wt 57.4 kg (126 lb 8 oz)   LMP 08/16/2023 (Approximate)   SpO2 100%   BMI 23.14 kg/m    General: pleasant, in no distress. Sitting in bed   HEENT: normocephalic, atraumatic. Oral mucous membranes moist.   Lungs: unlabored respiration, no cough  ABD: rounded, nondistended appearing  Skin: warm and dry, no obvious lesions  MSK:  moves all extremities  Lymp:  no LE edema   Mental status:  alert, oriented to self, place, time  Psych:  bright affect, calm and appropriate interaction             Data:     Recent Labs   Lab 10/03/23  0605 10/03/23  0216 10/02/23  2225 10/02/23  2021 10/02/23  1851 10/02/23  1747   * 77 102* 115* 149* 83     Lab Results   Component Value Date    WBC 12.7 (H) 10/02/2023    WBC 12.9 (H) 09/30/2023    WBC 10.5 09/29/2023    HGB 10.2 (L) 10/02/2023    HGB 9.8 (L) 09/30/2023    HGB 8.4 (L) 09/29/2023    HCT 31.4 (L) 10/02/2023    HCT 29.1 (L) 09/30/2023    HCT 24.8 (L) 09/29/2023    MCV 93 10/02/2023    MCV 90 09/30/2023    MCV 89 09/29/2023     (H) 10/02/2023     (H) 09/30/2023     (H) 09/29/2023     Lab Results   Component Value Date     10/02/2023     09/30/2023     09/29/2023    POTASSIUM 3.7 10/02/2023    POTASSIUM 4.2 09/30/2023    " POTASSIUM 3.4 09/29/2023    CHLORIDE 103 10/02/2023    CHLORIDE 100 09/30/2023    CHLORIDE 116 (H) 09/29/2023    CO2 26 10/02/2023    CO2 26 09/30/2023    CO2 23 09/29/2023     (H) 10/03/2023    GLC 77 10/03/2023     (H) 10/02/2023     Lab Results   Component Value Date    BUN 10.5 10/02/2023    BUN 9.0 09/30/2023    BUN 7.1 09/29/2023     Lab Results   Component Value Date    TSH 1.68 08/18/2023     Lab Results   Component Value Date    AST 16 09/27/2023    AST 63 (H) 09/23/2023    AST 63 (H) 09/22/2023    ALT 18 09/27/2023    ALT 38 09/23/2023    ALT 41 09/22/2023    ALKPHOS 75 09/27/2023    ALKPHOS 32 (L) 09/23/2023    ALKPHOS 35 09/22/2023       I spent a total of 60 minutes face to face or coordinating care of Artie Burger.             Over 50% of my time on the unit was spent counseling the patient and/or coordinating care regarding acute hyperglycemia management.  See note for details.      Contacting the Inpatient Diabetes Team   From 7AM-5PM: page inpatient diabetes provider that is following the patient, or utilize the job code paging system. From 5PM-7AM: page the job code for endocrine fellow on call.     Please use the following job code to reach the Inpatient Diabetes team. Note that you must use an in house phone and that job codes cannot receive text pages.   Dial [phone] (or star-star-star [phone] on the new [employer] telephones), then [phone] to reach the endocrine-diabetes provider on call.    ROSIE Obrien, CNP  Inpatient Diabetes Service   Pager: [phone]

## 2023-10-03 NOTE — DISCHARGE INSTRUCTIONS
Tube feeding regimen:  Vital 1.5 @ 50 ml/hr   Free water flushes: 30 ml q 4 hours (to keep tube patent)  *Use/change relizorb cartridge (for enzyme replacement) every 12 hours    Vitamin/Mineral Recommendations:  Multivitamin with minerals (Certavite) through feeding tube daily  When okay to switch to oral medications -> okay to take oral forms of these medications.     Recommended checking vitamin A, D, E, K and B12 every 1-2 years following surgery due to risk for deficiencies           Diabetes Recommendations for Discharge:                 -blood glucose monitoring every 4 hours               -Insulin dosed based on current tube feeding rate and formula- if changes to either of these occur then insulin changes will be needed              -Gvoke for severe low blood glucose  -Glargine (Lantus): inject 14 units at 8 AM and 10 units at 8 PM  -Aspart (Novolog): inject 1 unit per every 10 grams of carbohydrate eaten  -Aspart (Novolog): custom intensity sliding scale (see below) every 4 hours   Do Not give sliding scale Insulin if BG less than 120   BG=blood glucose  Check blood glucose Q4H and administer based on blood glucose   121-155 = 1.   156-190 = 2.   191-225 = 3.   226-260 = 4.   261-295 = 5.   296-330 = 6.   Greater than 330 = 7 units       Endocrinology Outpatient follow up: An appointment request was sent to the [facility] coordinator to schedule your outpatient diabetes appointment 1-2 weeks from discharge. Please call the clinic at [phone] if you do not have an appointment scheduled on discharge, or if you have non-urgent questions regarding your blood sugars or insulin.   Please reach out sooner if BG running consistently greater than 120 mg/dL or any episodes less than 70 mg/dL    If you have urgent questions or concerns regarding your blood sugars or insulin, you may contact [phone] (the main hospital ). Ask to speak with the endocrinologist on call.    Thank you  for letting the Diabetes Management Team be involved in your care!

## 2023-10-03 NOTE — PROGRESS NOTES
"Recommendations for Discharge:   Medications and supplies are to be ordered by primary service on discharge.   *please use the DIAB non-branded discharge supply order set ([id number])*     -blood glucose monitoring every 4 hours   -Insulin dosed based on current tube feeding rate and formula- if changes to either of these occur then insulin changes will be needed   -Gvoke for severe hyperglycemia  -Glargine (Lantus): inject 14 units at 8 AM and 10 units at 8 PM  -Aspart (Novolog): inject 1 unit per every 10 grams of carbohydrate ingested   -Aspart (Novolog): custom intensity sliding scale (see below) every 4 hours   Do Not give sliding scale Insulin if BG less than 120   BG=blood glucose  Check blood glucose Q4H and administer based on blood glucose   121-155 = 1.   156-190 = 2.   191-225 = 3.   226-260 = 4.   261-295 = 5.   296-330 = 6.   Greater than 330 = 7 units     The following scales may be copied and pasted into discharge medication orders, without outnumbering character limit. A more detailed and user friendly scale was placed in discharge navigator for patient to review.     -follow up with [facility] in 1-2 weeks after discharge (appointment request sent to clinic coordinator on 10/3)   -upon discharge, patient will need the following supplies: Lantus Solostar pens, Novolog Flexpens, \"BD\" (32G x 4mm) insulin pen needles, glucometer, lancets, and test strips (if brand of meter not known, can be ordered \"no brand specified\" and note to pharmacy: \"can substitute per insurance coverage\"), sharps container, alcohol swabs, ROSIE Triana CNP on 10/3/2023 at 9:31 AM         "

## 2023-10-03 NOTE — PROGRESS NOTES
Prior Authorization Approval    Medication: METHADONE HCL 5 MG/5ML PO EVAN TAMAYO Initiated: 10/3/2023  PA Type: Clinical    Insurance: [facility] - Phone [phone] Fax [phone]  [facility] Key / Reference #: [id number] / [id number]   Authorization Effective Dates: 7/5/2023 - 10/3/2024    Expected CoPay: $ 0.00  CoPay Card Eligible: No    Filling Pharmacy: [facility] [address]      Brenda Erazo  [facility] Liaison  Ph: [phone] Pager: [phone]   Securely message with the Vocera Web Console (learn more here)

## 2023-10-03 NOTE — PROGRESS NOTES
Home Infusion  Artie is discharging today and going home on continuous enteral feeds with Relizorb cartridge s/p TPAIT.   She and her sister have been to the [facility] for initial teaching.   Met with patient and sister at bedside once supplies arrived.   Had patient program the pump, set up feeding and prime tubing, add Relizorb cartridge, load pump and bag into the backpack and initiate the feeding.  Instructed in hang time for formula, to change Relizorb cassette q 12 hrs, to use a new feeding bag q 24 hrs, and to flush tube 6 times per day for patency.  Provided information about supplies and supply delivery, storage of formula, and 24/7 availability of [facility] staff.  Plan for patient to have follow-up appt in clinic and will be seen by [facility] RN Thursday for initial nurse visit.    Angeliquearia and Sandra verbalized understanding of all information given.  Questions answered.  Patient's tube feeding is running and she is ready for discharge from [facility] perspective.     POONAM Bearden  [facility] Nurse Liaison   [email or web address]  Cell: [phone] M-F  [facility] Main: [phone]

## 2023-10-03 NOTE — PLAN OF CARE
DISCHARGE:  D: Patient with orders to discharge to sister's house (locally).   I: Discharge instructions, medications, & follow ups reviewed with patient and sister. Copy of discharge summary given to patient.  PIV removed. All belongings packed & sent with patient. Medications filled & picked up at discharge pharmacy.   A: Patient in stable condition. AVSS. Patient had no further questions regarding discharge instructions and medications. Patient transferred out by wheelchair & left with transport services. Patient's sister to drive them home.   P: Plan for patient to return for lab and follow up appointments tomorrow at [facility].

## 2023-10-03 NOTE — PROGRESS NOTES
Care Management Discharge Note    Discharge Date: 10/03/2023       Discharge Disposition: Home, Home Care, Home Infusion (Staying locally with her sister)    Discharge Services: [facility]    Discharge DME: None    Discharge Transportation: family or friend will provide    Private pay costs discussed: Not applicable    Does the patient's insurance plan have a 3 day qualifying hospital stay waiver?  No    PAS Confirmation Code:  NA  Patient/family educated on Medicare website which has current facility and service quality ratings: no    Education Provided on the Discharge Plan: Yes  Persons Notified of Discharge Plans: Patient  Patient/Family in Agreement with the Plan: yes    Handoff Referral Completed: Yes    Additional Information:    RNCC notified [facility] that patient will be discharging today and needs enteral hook up.   [facility]  stated no coverage for Reliazorb.  CHW to arrange ATC appt.    Drew Chiang OP CC: MARLIN reached out to CC.    Home infusion:  [facility]  Phone # [phone]  Fax # [phone]   Enteral feeds     Local Address  [address] [place] [address]    Future Appointments   Date Time Provider Department Center   10/5/2023  7:45 AM UU PT WAITLIST [facility]   10/5/2023  3:00 PM Junito Patel MD [facility]   10/9/2023  3:00 PM Junito Patel MD [facility]   10/31/2023  9:15 AM Valentine Mejia RN [facility]   3/19/2024  8:00 AM Caro Cormier, PA[facility]ilC [facility]     1645: Addendum:  RNCHAPARRITA received in-basket from [facility] regarding appts.  Raquel Martinez RN   SIPC/ATC Infusion   Phone [phone]     -MARLIN called and spoke with Raquel who will call patient to schedule [facility] appt.     Please schedule this patient for a SIPC/ATC appointment tomorrow. At that appointment, please check vitals including orthostatics, and we will determine if she needs fluids. I don't think she necessarily needs a lab draw at that time.     Thank you-   Junito    RNCC called patient at home. Patient has Home infusion 10/5 at 2:45 PM and cannot make the SIPC/ATC appt.   -RNCC provided patient with number to [facility] to call first thing in the morning.     Patient was transported home by spouse.     Iris Murillo RNCC Float  Covering for 7A  Phone [phone]  Pager [phone]  Nurse Coordinator     Social Work and Care Management Department

## 2023-10-04 ENCOUNTER — INFUSION THERAPY VISIT (OUTPATIENT)
Dept: INFUSION THERAPY | Facility: CLINIC | Age: 28
End: 2023-10-04
Attending: SURGERY
Payer: COMMERCIAL

## 2023-10-04 VITALS — OXYGEN SATURATION: 96 % | TEMPERATURE: 98.6 F

## 2023-10-04 DIAGNOSIS — K86.0 ALCOHOL-INDUCED CHRONIC PANCREATITIS (H): ICD-10-CM

## 2023-10-04 DIAGNOSIS — K86.1 CHRONIC PANCREATITIS (H): ICD-10-CM

## 2023-10-04 DIAGNOSIS — Z90.410 ACQUIRED TOTAL ABSENCE OF PANCREAS: ICD-10-CM

## 2023-10-04 DIAGNOSIS — Z90.410 ACQUIRED TOTAL ABSENCE OF PANCREAS: Primary | ICD-10-CM

## 2023-10-04 DIAGNOSIS — G89.18 ACUTE POST-OPERATIVE PAIN: Primary | ICD-10-CM

## 2023-10-04 DIAGNOSIS — K85.90 ACUTE ON CHRONIC PANCREATITIS (H): ICD-10-CM

## 2023-10-04 DIAGNOSIS — K86.1 ACUTE ON CHRONIC PANCREATITIS (H): ICD-10-CM

## 2023-10-04 PROCEDURE — 96361 HYDRATE IV INFUSION ADD-ON: CPT

## 2023-10-04 PROCEDURE — 258N000003 HC RX IP 258 OP 636: Performed by: SURGERY

## 2023-10-04 PROCEDURE — 96360 HYDRATION IV INFUSION INIT: CPT

## 2023-10-04 RX ADMIN — SODIUM CHLORIDE, POTASSIUM CHLORIDE, SODIUM LACTATE AND CALCIUM CHLORIDE 2000 ML: 600; 310; 30; 20 INJECTION, SOLUTION INTRAVENOUS at 09:48

## 2023-10-04 NOTE — PROGRESS NOTES
Diabetes Consult Note  October 6, 2023        Artie Burger  is a 27 year old female who has a past medical history of Alcohol-induced chronic pancreatitis (H), Anxiety, Depression, Gastroesophageal reflux disease, Pancreatic disease, PONV (postoperative nausea and vomiting), and Type 1 diabetes mellitus (H). She is here for follow-up of diabetes.      Discharged on 1u: 10g carb, 1u: 35 >140 q4h  Glargine 14 units daily      Today:  Last night felt weird while sitting on floor folding laundry and checked and saw BG 42.  She put 30 ml in feeding tube and drank 210       She is using feeding tube 24 hours /day using peptomen which has 47 g carb/250 ml  and uses 600 ml every 12 hours  - 225 g carb /day.    She is able to eat apples, drinking coffee. She has some bread, tomato soup, string cheese.  Just trying random things and so far going well.  Now that she can eat again appetite returning.  She was initially giving     Current Diabetes Medications:  As above.      We reviewed glucometer/CGMS data together.  It revealed:    See below BG 42 - 284        Artie's PMH, PSH and allergies were reviewed today and pertinent information is summarized above.    Artie's pertinent social and family history are also reviewed today and pertinent information is summarized above.      Current Outpatient Medications   Medication    acetaminophen *SUGAR FREE* (TYLENOL) 32 mg/mL solution    Alcohol Swabs PADS    apixaban ANTICOAGULANT (ELIQUIS) 5 MG tablet    aspirin 81 MG EC tablet    blood glucose (NO BRAND SPECIFIED) lancets standard    blood glucose (NO BRAND SPECIFIED) test strip    blood glucose monitoring (NO BRAND SPECIFIED) meter device kit    busPIRone (BUSPAR) 15 MG tablet    citalopram (CELEXA) 10 MG/5ML solution    docusate (COLACE) 50 MG/5ML liquid    gabapentin (NEURONTIN) 250 MG/5ML solution    gabapentin (NEURONTIN) 300 MG capsule    Glucagon (GVOKE HYPOPEN) 1 MG/0.2ML pen    Glucagon (GVOKE HYPOPEN) 1  "MG/0.2ML pen    HYDROmorphone (DILAUDID) 2 MG tablet    HYDROmorphone (DILAUDID) 4 MG tablet    HYDROmorphone, STANDARD CONC, (DILAUDID) 1 MG/ML oral solution    hydrOXYzine (ATARAX) 10 MG/5ML syrup    insulin aspart (NOVOLOG PEN) 100 UNIT/ML pen    insulin aspart (NOVOLOG PEN) 100 UNIT/ML pen    insulin aspart (NOVOLOG PEN) 100 UNIT/ML pen    insulin glargine (LANTUS PEN) 100 UNIT/ML pen    insulin pen needle (32G X 4 MM) 32G X 4 MM miscellaneous    medroxyPROGESTERone (DEPO-PROVERA) 150 MG/ML IM injection    melatonin 1 MG/ML LIQD liquid    methadone (DOLOPHINE) 5 MG/5ML solution    methocarbamol 1000 MG TABS    Multiple Vitamins-Minerals (MULTIVITAMINS W/MINERALS) liquid    ondansetron (ZOFRAN ODT) 4 MG ODT tab    pantoprazole (PROTONIX) 2 mg/mL SUSP suspension    polyethylene glycol (MIRALAX) 17 GM/Dose powder    QUEtiapine (SEROQUEL) 25 MG tablet    QUEtiapine (SEROQUEL) 25 MG tablet    sennosides (SENOKOT) 8.8 MG/5ML syrup    Sharps Container MISC    lipase-protease-amylase (CREON) 54480-12128-715082 units CPEP per EC capsule     No current facility-administered medications for this visit.         ROS:   Reports good physical activity tolerance.  Denies any pedal lesions or vision changes or concerns. Denies any other acute concerns except as noted above.      Exam:    LMP 08/16/2023 (Approximate)   General: Pleasant, well apperaing in NAD vaping throughout visit.  Psych:  Mood is \"good,\" affect is appropriate.  Thought form and content are fluid and coherent.    HEENT: Eyes and sclera are clear. Extraocular movements are grossly intact without proptosis.  Nares are patent, mucous membranes moist.  Neck: No masses or JVD are noted.    Resp: Easy and unlabored breathing.   Neuro: Alert and oriented, communicating clearly.     Data:      Recent Labs   Lab Test 10/05/23  1435 10/02/23  0955 09/28/23  0917 09/27/23  0517 09/23/23  0304 09/22/23  2345 09/22/23  2031 09/21/23  1614 09/21/23  1609 08/19/23  0742 " 08/18/23  1726 07/12/23  0656 06/28/23  0724   A1C  --   --   --   --   --  5.5  --   --   --   --   --   --  5.3   TSH  --   --   --   --   --   --   --   --   --   --  1.68  --   --    LDL  --   --   --   --   --   --   --   --  75  --   --   --  65   HDL  --   --   --   --   --   --   --   --  54  --   --   --  52   TRIG  --   --   --   --   --   --   --   --  73  --   --   --  72   CR 0.59 0.53   < > 0.56   < >  --    < >  --   --    < > 0.74   < > 0.77   MICROL  --   --   --   --   --   --   --  <12.0  --   --   --   --   --    AST 19  --   --  16   < >  --    < >  --   --    < > 22   < > 16   ALT 11  --   --  18   < >  --    < >  --   --    < > 21   < > 10    < > = values in this interval not displayed.       Microalbuminuria:  Lab Results   Component Value Date    UMALCR  09/21/2023      Comment:      Unable to calculate, urine albumin and/or urine creatinine is outside detectable limits.  Microalbuminuria is defined as an albumin:creatinine ratio of 17 to 299 for males and 25 to 299 for females. A ratio of albumin:creatinine of 300 or higher is indicative of overt proteinuria.  Due to biologic variability, positive results should be confirmed by a second, first-morning random or 24-hour timed urine specimen. If there is discrepancy, a third specimen is recommended. When 2 out of 3 results are in the microalbuminuria range, this is evidence for incipient nephropathy and warrants increased efforts at glucose control, blood pressure control, and institution of therapy with an angiotensin-converting-enzyme (ACE) inhibitor (if the patient can tolerate it).         Most recent GFRs:    Lab Results   Component Value Date    GFRESTIMATED >90 10/05/2023    GFRESTIMATED >90 10/02/2023    GFRESTIMATED >90 09/30/2023       Lab Results   Component Value Date    CPEPT 4.4 06/28/2023    GADAB <5.0 06/28/2023    ISCAB <1:4 06/28/2023     FIB-4 Calculation: 0.15 at 10/5/2023  2:35 PM  Calculated from:  SGOT/AST: 19 U/L at  10/5/2023  2:35 PM  SGPT/ALT: 11 U/L at 10/5/2023  2:35 PM  Platelets: 1,093 10e3/uL at 10/5/2023  2:35 PM  Age: 27 years  AST   Date Value Ref Range Status   10/05/2023 19 0 - 45 U/L Final     Comment:     Reference intervals for this test were updated on 6/12/2023 to more accurately reflect our healthy population. There may be differences in the flagging of prior results with similar values performed with this method. Interpretation of those prior results can be made in the context of the updated reference intervals.     Lab Results   Component Value Date    ALT 11 10/05/2023         Most recent eye exam date: : Not Found       Assessment/Plan:    Artie Burger is a 27 year old 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     Post-pancreatectomy diabetes: Due to low islet cell count expect will remain insulin dependent.  Appetite is progressing and at this time remains on continuous tube feeding with 225 g carb/day. She is doing a good job monitoring her blood sugar will benefit from CGMS and likely pump therapy.  Recently had low blood sugar following correction and limited symptoms so will get CGMS to her now though may to transition to Dexcom for pump at a later date.    Also will give less aggressive correction ISF 40 at this time.  May need to reduce Glargine dose when tube feeding is cycled.    Advised:  You are doing a great job managing your blood sugar and keeping it mostly 80 - 120. Please continue to diligently record you blood sugars and also please record your insulin doses.     As you eat more, please try to give Novolog before meals.  For now, please give as SOON as you are done eating when you aren't sure how much you will eat.      I have ordered [employer] CGMS for you to use.  Please use event record to log your long (Lantus) and short (Novolog doses) as well as carbs especially if things aren't working well and you continue to see  "number s>180 or <70.  Please set high alarm 160 and low alarm 70.      Please use the following sliding scale for now:  ISF 40  1 per 40 >/= 140  -179 give 1 units.  -219 give 2 units.  -259 give 3 units.  -299 give 4 units.  -239 give 5 units. Recheck in 4 hours, if still >300, call clinic.  -379 give 6 units. Recheck in 4 hours, if still >300, call clinic.  If feel ill to ER  -419 give 7 units. Recheck in 4 hours, if still >300, call clinic. If feel ill to ER  BG >/= 420 give 8 units.  Recheck in 4 hours, if still >300, call clinic. If feel ill to ER      45 minutes spent on the date of the encounter doing chart review, history and exam, documentation, education and counseling, as well as communication and coordination of care, and further activities as noted above.      It is my privilege to be involved in the care of the above patient.     Caro Cormier PA-C, [facility]  [facility]  Diabetes, Endocrinology, and Metabolism  [phone] Appointments/Nurse  [phone] pager  [phone] nurse line    This note was completed in part using Dragon voice recognition, and may contain word and grammatical errors.      Joined the call at 10/6/2023, 1:22:39 pm.  Left the call at 10/6/2023, 1:42:28 pm.  You were on the call for 19 minutes 49 seconds .      Outcome for 10/04/23 7:46 AM: Glucose readings are under \"labs\" from patients recent hospitalization.   Willow Cao LPN   Outcome for 10/04/23 7:47 AM: [employer] message sent; post hospital BG readings   Willow Cao LPN   Outcome for 10/05/23 8:54 AM: Per patient, will send BG readings via [employer]  Willow Cao LPN   Outcome for 10/06/23 12:23 PM: Glucose readings sent via [employer]  Cathryn Olivier MA          "

## 2023-10-04 NOTE — PROGRESS NOTES
"Artie Burger came to [facility] today for a lab and assess following a auto islet transplant on 9/22/23.      Discharge date: 10/3/23  Transplant coordinator: Drew  Phone number patient can be reached at: [phone] (M)       Physical Assessment:  See physical assessment located under \"Document Flowsheets\".  Incision site: dermabond; CDI  Lines: G/J tube. G tube clamped. TF in J tube  Hydration: Pt on clear liquid diet. BP hypotensive this morning  Nutrition: Clear liquids and TF. No nausea   Last BM: yesterday; loose  Pain: controlled with scheduled narcotics  Labs drawn by [facility] staff No. Per Dr. Patel    Plan of care for today: Pt and vitals assessed. Care coordinator Drew arrived and reviewed medications with pt. Added Aspirin today. Pt's blood sugars have been stable and pain manageable since discharge.     Medication changes: Aspirin 81mg daily    Medications administered:    Administrations This Visit       lactated ringers BOLUS 2,000 mL       Admin Date  10/04/2023 Action  $New Bag Dose  2,000 mL Rate  999 mL/hr Route  Intravenous Administered By  Brenda Brito RN                     Patient education:    The following teaching topics were addressed: Medications (purposes, doses and times of administration) and Plan of care   Patient verbalized understanding and all questions answered.      Face to face time: 30 minutes  Discharge Plan    Pt will follow up with surgeon tomorrow in transplant clinic  Discharge instructions reviewed with patient: YES  Patient/Representative verbalized understanding, all questions answered: YES    Discharged from unit at 1210 with whom: self to home.    Brenda Brito RN, RN    "

## 2023-10-04 NOTE — CONFIDENTIAL NOTE
RECORDS RECEIVED FROM: internal /ce   DATE RECEIVED: 10.6.23    NOTES (FOR ALL VISITS) STATUS DETAILS   OFFICE NOTES from referring provider internal  Junito Patel MD   OFFICE NOTES from other specialist internal  6.29.23 Carmela Chisholm  - 5.19.23 Carlota   ED NOTES internal  9.22.23 Amanda  8/18/23 Francisca   3.13.23 Trikudanat  6.9.22 Melanie  9.13.21    OPERATIVE REPORT  (thyroid, pituitary, adrenal, parathyroid) internal  9.22.23 Amanda/Damien   5.10.23 Triku/Meston  1/18/23, 11.21.22, 9.14.22, 7/13/22 Trikudanat   MEDICATION LIST internal     IMAGING        XR (Chest) internal  9.22.23,    CT (HEAD/NECK/CHEST/ABDOMEN) internal  8/18/23, , 4/18/23,     Phan- 8.9.22, 6.9.22     LABS     DIABETES: HBGA1C, CREATININE, FASTING LIPIDS, MICROALBUMIN URINE, POTASSIUM, TSH, T4    THYROID: TSH, T4, CBC, THYRODLONULIN, TOTAL T3, FREE T4, CALCITONIN, CEA internal  Glucose meter- 10.3.23   Cbc- 10.2.23  BMP- 10.2.23  POTASSIUM- 9.25.23  Lipid- 9.21.23  Cmp- 8.23.23  TSH-8/18/23   T4- 8/18/23   HBGA1C- 6.28.23

## 2023-10-05 ENCOUNTER — TELEPHONE (OUTPATIENT)
Dept: TRANSPLANT | Facility: CLINIC | Age: 28
End: 2023-10-05

## 2023-10-05 ENCOUNTER — LAB (OUTPATIENT)
Dept: LAB | Facility: CLINIC | Age: 28
End: 2023-10-05
Payer: COMMERCIAL

## 2023-10-05 ENCOUNTER — OFFICE VISIT (OUTPATIENT)
Dept: TRANSPLANT | Facility: CLINIC | Age: 28
End: 2023-10-05
Attending: SURGERY
Payer: COMMERCIAL

## 2023-10-05 ENCOUNTER — TELEPHONE (OUTPATIENT)
Dept: ENDOCRINOLOGY | Facility: CLINIC | Age: 28
End: 2023-10-05

## 2023-10-05 VITALS
BODY MASS INDEX: 24.53 KG/M2 | HEART RATE: 74 BPM | DIASTOLIC BLOOD PRESSURE: 71 MMHG | WEIGHT: 134.1 LBS | SYSTOLIC BLOOD PRESSURE: 110 MMHG | OXYGEN SATURATION: 97 %

## 2023-10-05 DIAGNOSIS — K85.90 ACUTE ON CHRONIC PANCREATITIS (H): ICD-10-CM

## 2023-10-05 DIAGNOSIS — K86.1 ACUTE ON CHRONIC PANCREATITIS (H): ICD-10-CM

## 2023-10-05 DIAGNOSIS — R79.89 ELEVATED PLATELET COUNT: Primary | ICD-10-CM

## 2023-10-05 DIAGNOSIS — G89.18 ACUTE POST-OPERATIVE PAIN: ICD-10-CM

## 2023-10-05 DIAGNOSIS — Z90.410 ACQUIRED TOTAL ABSENCE OF PANCREAS: Primary | ICD-10-CM

## 2023-10-05 DIAGNOSIS — K86.0 ALCOHOL-INDUCED CHRONIC PANCREATITIS (H): ICD-10-CM

## 2023-10-05 DIAGNOSIS — Z90.410 ACQUIRED TOTAL ABSENCE OF PANCREAS: ICD-10-CM

## 2023-10-05 DIAGNOSIS — Z48.298 AFTERCARE FOLLOWING ORGAN TRANSPLANT: ICD-10-CM

## 2023-10-05 DIAGNOSIS — K86.1 CHRONIC PANCREATITIS (H): ICD-10-CM

## 2023-10-05 DIAGNOSIS — G89.18 ACUTE POST-OPERATIVE PAIN: Primary | ICD-10-CM

## 2023-10-05 LAB
ALBUMIN SERPL BCG-MCNC: 3.7 G/DL (ref 3.5–5.2)
ALP SERPL-CCNC: 84 U/L (ref 35–104)
ALT SERPL W P-5'-P-CCNC: 11 U/L (ref 0–50)
ANION GAP SERPL CALCULATED.3IONS-SCNC: 9 MMOL/L (ref 7–15)
AST SERPL W P-5'-P-CCNC: 19 U/L (ref 0–45)
BASO+EOS+MONOS # BLD AUTO: ABNORMAL 10*3/UL
BASO+EOS+MONOS NFR BLD AUTO: ABNORMAL %
BASOPHILS # BLD AUTO: 0.1 10E3/UL (ref 0–0.2)
BASOPHILS NFR BLD AUTO: 1 %
BILIRUB SERPL-MCNC: <0.2 MG/DL
BUN SERPL-MCNC: 11.4 MG/DL (ref 6–20)
CALCIUM SERPL-MCNC: 9.7 MG/DL (ref 8.6–10)
CHLORIDE SERPL-SCNC: 99 MMOL/L (ref 98–107)
CREAT SERPL-MCNC: 0.59 MG/DL (ref 0.51–0.95)
DEPRECATED HCO3 PLAS-SCNC: 29 MMOL/L (ref 22–29)
EGFRCR SERPLBLD CKD-EPI 2021: >90 ML/MIN/1.73M2
EOSINOPHIL # BLD AUTO: 0.3 10E3/UL (ref 0–0.7)
EOSINOPHIL NFR BLD AUTO: 2 %
ERYTHROCYTE [DISTWIDTH] IN BLOOD BY AUTOMATED COUNT: 14.6 % (ref 10–15)
GLUCOSE SERPL-MCNC: 77 MG/DL (ref 70–99)
HCT VFR BLD AUTO: 28 % (ref 35–47)
HGB BLD-MCNC: 9.2 G/DL (ref 11.7–15.7)
IMM GRANULOCYTES # BLD: 0.1 10E3/UL
IMM GRANULOCYTES NFR BLD: 1 %
LYMPHOCYTES # BLD AUTO: 3 10E3/UL (ref 0.8–5.3)
LYMPHOCYTES NFR BLD AUTO: 20 %
MCH RBC QN AUTO: 30.3 PG (ref 26.5–33)
MCHC RBC AUTO-ENTMCNC: 32.9 G/DL (ref 31.5–36.5)
MCV RBC AUTO: 92 FL (ref 78–100)
MONOCYTES # BLD AUTO: 1.4 10E3/UL (ref 0–1.3)
MONOCYTES NFR BLD AUTO: 10 %
NEUTROPHILS # BLD AUTO: 9.9 10E3/UL (ref 1.6–8.3)
NEUTROPHILS NFR BLD AUTO: 66 %
NRBC # BLD AUTO: 0 10E3/UL
NRBC BLD AUTO-RTO: 0 /100
PLATELET # BLD AUTO: 1093 10E3/UL (ref 150–450)
POTASSIUM SERPL-SCNC: 4.4 MMOL/L (ref 3.4–5.3)
PROT SERPL-MCNC: 6.4 G/DL (ref 6.4–8.3)
RBC # BLD AUTO: 3.04 10E6/UL (ref 3.8–5.2)
SODIUM SERPL-SCNC: 137 MMOL/L (ref 135–145)
WBC # BLD AUTO: 14.8 10E3/UL (ref 4–11)

## 2023-10-05 PROCEDURE — 85025 COMPLETE CBC W/AUTO DIFF WBC: CPT | Performed by: PATHOLOGY

## 2023-10-05 PROCEDURE — G0463 HOSPITAL OUTPT CLINIC VISIT: HCPCS | Performed by: SURGERY

## 2023-10-05 PROCEDURE — 36415 COLL VENOUS BLD VENIPUNCTURE: CPT | Performed by: PATHOLOGY

## 2023-10-05 PROCEDURE — 80053 COMPREHEN METABOLIC PANEL: CPT | Performed by: PATHOLOGY

## 2023-10-05 PROCEDURE — 99024 POSTOP FOLLOW-UP VISIT: CPT | Performed by: SURGERY

## 2023-10-05 RX ORDER — ASPIRIN 81 MG/1
81 TABLET ORAL DAILY
Qty: 90 TABLET | Refills: 3 | Status: SHIPPED | OUTPATIENT
Start: 2023-10-05 | End: 2023-11-03

## 2023-10-05 RX ORDER — HYDROMORPHONE HYDROCHLORIDE 2 MG/1
4 TABLET ORAL EVERY 4 HOURS PRN
Qty: 42 TABLET | Refills: 0 | Status: SHIPPED | OUTPATIENT
Start: 2023-10-05 | End: 2023-10-12

## 2023-10-05 ASSESSMENT — PAIN SCALES - GENERAL: PAINLEVEL: NO PAIN (0)

## 2023-10-05 NOTE — PLAN OF CARE
Physical Therapy Discharge Summary    Reason for therapy discharge:    Discharged to home with outpatient therapy.    Progress towards therapy goal(s). See goals on Care Plan in [facility] electronic health record for goal details.  Goals partially met.  Barriers to achieving goals:   discharge from facility.    Therapy recommendation(s):    Continued therapy is recommended.  Rationale/Recommendations:  home safety, strength, endurance.

## 2023-10-05 NOTE — TELEPHONE ENCOUNTER
DATE:  10/5/2023     TIME OF RECEIPT FROM LAB:  2:52    ORDERING PROVIDER: Ramakrishna    LAB TEST:  Platelets    LAB VALUE:  1093    RESULTS GIVEN WITH READ-BACK TO (PROVIDER):  Joanna Nolasco    TIME LAB VALUE REPORTED TO PROVIDER:   3:03

## 2023-10-05 NOTE — LETTER
10/5/2023         RE: Artie Burger  [address]  [place] [address]        Dear Colleague,    Thank you for referring your patient, Artie Burger, to the [facility]. Please see a copy of my visit note below.        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 S/p TPIAT 23. Islet yield poor due to dense fibrosis of pancreas and difficulty with digestion. Postoperative course otherwise uneventful.    -continue PO narcotic wean  -increasing diet, weaning tube feeding  -staying locally with sister to help make appointments. Doing well overall but hoping to return home soon. Advised that this would be best when she is off of tube feeding.          Faculty: Junito Patel MD  __________________________________________________________________  Transplant History: pancreatitis consult  2023 (Islet), Postoperative day:      Interval History: History is obtained from the patient  S/p TPIAT 23. Tolerated surgery well and had fairly uneventful postoperative course. Pain control initially with potent IV analgesic, transitioned to PO methadone and dilaudid. Slowly weaning off.     Social History     Tobacco Use     Smoking status: Former     Types: Cigarettes     Quit date: 2022     Years since quittin.5     Smokeless tobacco: Current     Tobacco comments:     Vapes daily   Vaping Use     Vaping Use: Every day     Substances: Nicotine   Substance Use Topics     Alcohol use: Not Currently     Drug use: Yes     Types: Marijuana     Comment: smokes daily     ROS:   A 10-point review of systems was negative except as noted above.    Curent Meds:      Physical Exam:     Admit      Current Vitals:   /71   Pulse 74   Wt 60.8 kg (134 lb 1.6 oz)   LMP 2023 (Approximate)   SpO2 97%   BMI 24.53 kg/m           Vital sign ranges:       No data  found.  General Appearance: in no apparent distress.   Skin: normal appearance, warm and dry  Heart: normal S1/S2  Lungs: without wheezes, nonlabored respirations  Abdomen: The abdomen is soft, flat, appropriate rolando-incisional tenderness. No erythema/drainage/evident hernia. Drain site healing well. GJ tube site clean  Extremities: edema: appears absent    Data:   CMP@LABRCNTIPR(na:2,potassium:2,chloride:2,co2:2,g,bun:2,cr:2,gfrestimated:2,gfrestblack:2,mark:2,icapoc:2,icaw:2,ma,phos:2,amylase:2,lipase:2,uamy24:3,albumin:2,bilitotal:2,biliconj:2,bilidelta:2,alkphos:2,ast:2,alt:2,fbili:1)@  CBC@LABRCNTIPR(hgb:2,wbc:2,a,plt:2,a1c:1)@  Coags@LABRCNTIPR(inr:2,ptt:2,Xa:2)@   Urinalysis  Recent Labs   Lab Test 23  1614   COLOR Yellow   APPEARANCE Slightly Cloudy*   URINEGLC Negative   URINEBILI Negative   URINEKETONE Negative   SG 1.021   UBLD Negative   URINEPH 7.0   PROTEIN 10*   NITRITE Negative   LEUKEST Negative   RBCU 2   WBCU <1        Prior CT personally reviewed. Note multifocal inflammatory calcification of the pancreatic head with evident dilated upstream pancreatic duct, though this measures ~4mm to me. Limited by lack of IV contrast.     Updated CT with IV contrast also personally reviewed. Note conventional hepatic arterial anatomy. Small calcifications in gland at body/tail, more significant inflammatory calcification in pancreatic head. Stents in duct without evident stones or debris.       Again, thank you for allowing me to participate in the care of your patient.        Sincerely,        Junito Patel MD

## 2023-10-05 NOTE — TELEPHONE ENCOUNTER
Spoke with patient in regards to obtaining blood glucose readings post discharge. Patient will send readings from last two days via [employer].    Willow Cao LPN 10/05/23 8:54 AM

## 2023-10-06 ENCOUNTER — PRE VISIT (OUTPATIENT)
Dept: ENDOCRINOLOGY | Facility: CLINIC | Age: 28
End: 2023-10-06

## 2023-10-06 ENCOUNTER — VIRTUAL VISIT (OUTPATIENT)
Dept: ENDOCRINOLOGY | Facility: CLINIC | Age: 28
End: 2023-10-06
Attending: SURGERY
Payer: COMMERCIAL

## 2023-10-06 DIAGNOSIS — G89.18 ACUTE POST-OPERATIVE PAIN: Primary | ICD-10-CM

## 2023-10-06 DIAGNOSIS — Z90.410 POST-PANCREATECTOMY DIABETES (H): Primary | ICD-10-CM

## 2023-10-06 DIAGNOSIS — Z86.39 HX OF INSULIN DEPENDENT DIABETES MELLITUS: ICD-10-CM

## 2023-10-06 DIAGNOSIS — E13.9 POST-PANCREATECTOMY DIABETES (H): Primary | ICD-10-CM

## 2023-10-06 DIAGNOSIS — K86.81 EXOCRINE PANCREATIC INSUFFICIENCY: Primary | ICD-10-CM

## 2023-10-06 DIAGNOSIS — Z90.410 ACQUIRED TOTAL ABSENCE OF PANCREAS: ICD-10-CM

## 2023-10-06 DIAGNOSIS — E89.1 POST-PANCREATECTOMY DIABETES (H): Primary | ICD-10-CM

## 2023-10-06 PROCEDURE — 99215 OFFICE O/P EST HI 40 MIN: CPT | Mod: VID | Performed by: PHYSICIAN ASSISTANT

## 2023-10-06 RX ORDER — HYDROMORPHONE HYDROCHLORIDE 4 MG/1
4 TABLET ORAL EVERY 6 HOURS PRN
Qty: 28 TABLET | Refills: 0 | Status: SHIPPED | OUTPATIENT
Start: 2023-10-06 | End: 2023-10-14

## 2023-10-06 RX ORDER — BLOOD-GLUCOSE SENSOR
1 EACH MISCELLANEOUS
Qty: 6 EACH | Refills: 3 | Status: SHIPPED | OUTPATIENT
Start: 2023-10-06 | End: 2023-11-03

## 2023-10-06 NOTE — PATIENT INSTRUCTIONS
Dear Artie,    It is bridgette to meet you!    You are doing a great job managing your blood sugar and keeping it mostly 80 - 120. Please continue to diligently record you blood sugars and also please record your insulin doses.     As you eat more, please try to give Novolog before meals.  For now, please give as SOON as you are done eating when you aren't sure how much you will eat.      I have ordered [employer] CGMS for you to use.  Please use event record to log your long (Lantus) and short (Novolog doses) as well as carbs especially if things aren't working well and you continue to see number s>180 or <70.  Please set high alarm 160 and low alarm 70.      Please use the following sliding scale for now:  ISF 40  1 per 40 >/= 140  -179 give 1 units.  -219 give 2 units.  -259 give 3 units.  -299 give 4 units.  -239 give 5 units. Recheck in 4 hours, if still >300, call clinic.  -379 give 6 units. Recheck in 4 hours, if still >300, call clinic.  If feel ill to ER  -419 give 7 units. Recheck in 4 hours, if still >300, call clinic. If feel ill to ER  BG >/= 420 give 8 units.  Recheck in 4 hours, if still >300, call clinic. If feel ill to ER    My best wishes,    Caro Cormier PA-C, MPAS  [facility]  Diabetes, Endocrinology, and Metabolism  [phone] Appointments/Nurse  [phone] Fax

## 2023-10-06 NOTE — LETTER
10/6/2023       RE: Artie Burger  [address]  [facility] [address]     Dear Colleague,    Thank you for referring your patient, Artie Burger, to the [facility] ENDOCRINOLOGY CLINIC [place] at [facility]. Please see a copy of my visit note below.               Diabetes Consult Note  October 6, 2023        Artie Burger  is a 27 year old female who has a past medical history of Alcohol-induced chronic pancreatitis (H), Anxiety, Depression, Gastroesophageal reflux disease, Pancreatic disease, PONV (postoperative nausea and vomiting), and Type 1 diabetes mellitus (H). She is here for follow-up of diabetes.      Discharged on 1u: 10g carb, 1u: 35 >140 q4h  Glargine 14 units daily      Today:  Last night felt weird while sitting on floor folding laundry and checked and saw BG 42.  She put 30 ml in feeding tube and drank 210       She is using feeding tube 24 hours /day using peptomen which has 47 g carb/250 ml  and uses 600 ml every 12 hours  - 225 g carb /day.    She is able to eat apples, drinking coffee. She has some bread, tomato soup, string cheese.  Just trying random things and so far going well.  Now that she can eat again appetite returning.  She was initially giving     Current Diabetes Medications:  As above.      We reviewed glucometer/CGMS data together.  It revealed:    See below BG 42 - 284        Artie's PMH, PSH and allergies were reviewed today and pertinent information is summarized above.    Artie's pertinent social and family history are also reviewed today and pertinent information is summarized above.      Current Outpatient Medications   Medication    acetaminophen *SUGAR FREE* (TYLENOL) 32 mg/mL solution    Alcohol Swabs PADS    apixaban ANTICOAGULANT (ELIQUIS) 5 MG tablet    aspirin 81 MG EC tablet    blood glucose (NO BRAND SPECIFIED) lancets standard    blood glucose (NO BRAND SPECIFIED) test strip    blood glucose  "monitoring (NO BRAND SPECIFIED) meter device kit    busPIRone (BUSPAR) 15 MG tablet    citalopram (CELEXA) 10 MG/5ML solution    docusate (COLACE) 50 MG/5ML liquid    gabapentin (NEURONTIN) 250 MG/5ML solution    gabapentin (NEURONTIN) 300 MG capsule    Glucagon (GVOKE HYPOPEN) 1 MG/0.2ML pen    Glucagon (GVOKE HYPOPEN) 1 MG/0.2ML pen    HYDROmorphone (DILAUDID) 2 MG tablet    HYDROmorphone (DILAUDID) 4 MG tablet    HYDROmorphone, STANDARD CONC, (DILAUDID) 1 MG/ML oral solution    hydrOXYzine (ATARAX) 10 MG/5ML syrup    insulin aspart (NOVOLOG PEN) 100 UNIT/ML pen    insulin aspart (NOVOLOG PEN) 100 UNIT/ML pen    insulin aspart (NOVOLOG PEN) 100 UNIT/ML pen    insulin glargine (LANTUS PEN) 100 UNIT/ML pen    insulin pen needle (32G X 4 MM) 32G X 4 MM miscellaneous    medroxyPROGESTERone (DEPO-PROVERA) 150 MG/ML IM injection    melatonin 1 MG/ML LIQD liquid    methadone (DOLOPHINE) 5 MG/5ML solution    methocarbamol 1000 MG TABS    Multiple Vitamins-Minerals (MULTIVITAMINS W/MINERALS) liquid    ondansetron (ZOFRAN ODT) 4 MG ODT tab    pantoprazole (PROTONIX) 2 mg/mL SUSP suspension    polyethylene glycol (MIRALAX) 17 GM/Dose powder    QUEtiapine (SEROQUEL) 25 MG tablet    QUEtiapine (SEROQUEL) 25 MG tablet    sennosides (SENOKOT) 8.8 MG/5ML syrup    Sharps Container MISC    lipase-protease-amylase (CREON) [id number] units CPEP per EC capsule     No current facility-administered medications for this visit.         ROS:   Reports good physical activity tolerance.  Denies any pedal lesions or vision changes or concerns. Denies any other acute concerns except as noted above.      Exam:    [facility] 08/16/2023 (Approximate)   General: Pleasant, well apperaing in NAD vaping throughout visit.  Psych:  Mood is \"good,\" affect is appropriate.  Thought form and content are fluid and coherent.    HEENT: Eyes and sclera are clear. Extraocular movements are grossly intact without proptosis.  Nares are patent, mucous membranes " moist.  Neck: No masses or JVD are noted.    Resp: Easy and unlabored breathing.   Neuro: Alert and oriented, communicating clearly.     Data:      Recent Labs   Lab Test 10/05/23  1435 10/02/23  0955 09/28/23  0917 09/27/23  0517 09/23/23  0304 09/22/23  2345 09/22/23  2031 09/21/23  1614 09/21/23  1609 08/19/23  0742 08/18/23  1726 07/12/23  0656 06/28/23  0724   A1C  --   --   --   --   --  5.5  --   --   --   --   --   --  5.3   TSH  --   --   --   --   --   --   --   --   --   --  1.68  --   --    LDL  --   --   --   --   --   --   --   --  75  --   --   --  65   HDL  --   --   --   --   --   --   --   --  54  --   --   --  52   TRIG  --   --   --   --   --   --   --   --  73  --   --   --  72   CR 0.59 0.53   < > 0.56   < >  --    < >  --   --    < > 0.74   < > 0.77   MICROL  --   --   --   --   --   --   --  <12.0  --   --   --   --   --    AST 19  --   --  16   < >  --    < >  --   --    < > 22   < > 16   ALT 11  --   --  18   < >  --    < >  --   --    < > 21   < > 10    < > = values in this interval not displayed.       Microalbuminuria:  Lab Results   Component Value Date    [facility]  09/21/2023      Comment:      Unable to calculate, urine albumin and/or urine creatinine is outside detectable limits.  Microalbuminuria is defined as an albumin:creatinine ratio of 17 to 299 for males and 25 to 299 for females. A ratio of albumin:creatinine of 300 or higher is indicative of overt proteinuria.  Due to biologic variability, positive results should be confirmed by a second, first-morning random or 24-hour timed urine specimen. If there is discrepancy, a third specimen is recommended. When 2 out of 3 results are in the microalbuminuria range, this is evidence for incipient nephropathy and warrants increased efforts at glucose control, blood pressure control, and institution of therapy with an angiotensin-converting-enzyme (ACE) inhibitor (if the patient can tolerate it).         Most recent GFRs:    Lab Results    Component Value Date    GFRESTIMATED >90 10/05/2023    GFRESTIMATED >90 10/02/2023    GFRESTIMATED >90 09/30/2023       Lab Results   Component Value Date    CPEPT 4.4 06/28/2023    GADAB <5.0 06/28/2023    ISCAB <1:4 06/28/2023     FIB-4 Calculation: 0.15 at 10/5/2023  2:35 PM  Calculated from:  SGOT/AST: 19 U/L at 10/5/2023  2:35 PM  SGPT/ALT: 11 U/L at 10/5/2023  2:35 PM  Platelets: 1,093 10e3/uL at 10/5/2023  2:35 PM  Age: 27 years  AST   Date Value Ref Range Status   10/05/2023 19 0 - 45 U/L Final     Comment:     Reference intervals for this test were updated on 6/12/2023 to more accurately reflect our healthy population. There may be differences in the flagging of prior results with similar values performed with this method. Interpretation of those prior results can be made in the context of the updated reference intervals.     Lab Results   Component Value Date    ALT 11 10/05/2023         Most recent eye exam date: : Not Found       Assessment/Plan:    Artie Burger is a 27 year old 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     Post-pancreatectomy diabetes: Due to low islet cell count expect will remain insulin dependent.  Appetite is progressing and at this time remains on continuous tube feeding with 225 g carb/day. She is doing a good job monitoring her blood sugar will benefit from CGMS and likely pump therapy.  Recently had low blood sugar following correction and limited symptoms so will get CGMS to her now though may to transition to Dexcom for pump at a later date.    Also will give less aggressive correction ISF 40 at this time.  May need to reduce Glargine dose when tube feeding is cycled.    Advised:  You are doing a great job managing your blood sugar and keeping it mostly 80 - 120. Please continue to diligently record you blood sugars and also please record your insulin doses.     As you eat more, please try to give Novolog before  "meals.  For now, please give as SOON as you are done eating when you aren't sure how much you will eat.      I have ordered Naa [facility] CGMS for you to use.  Please use event record to log your long (Lantus) and short (Novolog doses) as well as carbs especially if things aren't working well and you continue to see number s>180 or <70.  Please set high alarm 160 and low alarm 70.      Please use the following sliding scale for now:  ISF 40  1 per 40 >/= 140  -179 give 1 units.  -219 give 2 units.  -259 give 3 units.  -299 give 4 units.  -239 give 5 units. Recheck in 4 hours, if still >300, call clinic.  -379 give 6 units. Recheck in 4 hours, if still >300, call clinic.  If feel ill to ER  -419 give 7 units. Recheck in 4 hours, if still >300, call clinic. If feel ill to ER  BG >/= 420 give 8 units.  Recheck in 4 hours, if still >300, call clinic. If feel ill to ER      45 minutes spent on the date of the encounter doing chart review, history and exam, documentation, education and counseling, as well as communication and coordination of care, and further activities as noted above.      It is my privilege to be involved in the care of the above patient.     Caro Cormier PA-C, MPAS  [facility]  Diabetes, Endocrinology, and Metabolism  [phone] Appointments/Nurse  [phone] pager  [phone] nurse line    This note was completed in part using Dragon voice recognition, and may contain word and grammatical errors.      Joined the call at 10/6/2023, 1:22:39 pm.  Left the call at 10/6/2023, 1:42:28 pm.  You were on the call for 19 minutes 49 seconds .      Outcome for 10/04/23 7:46 AM: Glucose readings are under \"labs\" from patients recent hospitalization.   Willow Cao LPN   Outcome for 10/04/23 7:47 AM: TopChalkshart message sent; post hospital BG readings   Willow Cao LPN   Outcome for 10/05/23 8:54 AM: Per patient, will send BG readings via " Americo Cao LPN   Outcome for 10/06/23 12:23 PM: Glucose readings sent via Americo Olivier MA

## 2023-10-06 NOTE — NURSING NOTE
Is the patient currently in the state of [place]? YES    Visit mode:VIDEO    If the visit is dropped, the patient can be reconnected by: VIDEO VISIT: Text to cell phone: [phone]    Will anyone else be joining the visit? NO    How would you like to obtain your AVS? MyChart    Are changes needed to the allergy or medication list? NO    Reason for visit:   Chief Complaint   Patient presents with    Consult     Chronic Pancreatitis       Cathryn Olivier MA

## 2023-10-09 ENCOUNTER — VIRTUAL VISIT (OUTPATIENT)
Dept: TRANSPLANT | Facility: CLINIC | Age: 28
End: 2023-10-09
Attending: SURGERY
Payer: COMMERCIAL

## 2023-10-09 DIAGNOSIS — Z48.298 AFTERCARE FOLLOWING ORGAN TRANSPLANT: Primary | ICD-10-CM

## 2023-10-09 PROCEDURE — 99207 PR NO BILLABLE SERVICE THIS VISIT: CPT | Mod: VID | Performed by: SURGERY

## 2023-10-09 NOTE — LETTER
10/9/2023         RE: Artie Burger  [address]  [facility] [address]        Dear Colleague,    Thank you for referring your patient, Artie Burger, to the [facility] TRANSPLANT CLINIC. Please see a copy of my visit note below.    Attempted to connect for video visit but patient was not available. Please do not bill.      Again, thank you for allowing me to participate in the care of your patient.        Sincerely,        Junito Patel MD

## 2023-10-10 ENCOUNTER — TELEPHONE (OUTPATIENT)
Dept: TRANSPLANT | Facility: CLINIC | Age: 28
End: 2023-10-10
Payer: COMMERCIAL

## 2023-10-10 DIAGNOSIS — E89.1 POST-PANCREATECTOMY DIABETES (H): Primary | ICD-10-CM

## 2023-10-10 DIAGNOSIS — K86.1 CHRONIC PANCREATITIS (H): ICD-10-CM

## 2023-10-10 DIAGNOSIS — Z90.410 POST-PANCREATECTOMY DIABETES (H): Primary | ICD-10-CM

## 2023-10-10 DIAGNOSIS — F10.11 ALCOHOL ABUSE, IN REMISSION: ICD-10-CM

## 2023-10-10 DIAGNOSIS — K86.0 ALCOHOL-INDUCED CHRONIC PANCREATITIS (H): ICD-10-CM

## 2023-10-10 DIAGNOSIS — G89.18 ACUTE POST-OPERATIVE PAIN: Primary | ICD-10-CM

## 2023-10-10 DIAGNOSIS — Z90.410 ACQUIRED TOTAL ABSENCE OF PANCREAS: ICD-10-CM

## 2023-10-10 DIAGNOSIS — E13.9 POST-PANCREATECTOMY DIABETES (H): Primary | ICD-10-CM

## 2023-10-10 RX ORDER — METHADONE HYDROCHLORIDE 5 MG/1
5 TABLET ORAL EVERY 8 HOURS
Qty: 42 TABLET | Refills: 0 | Status: SHIPPED | OUTPATIENT
Start: 2023-10-10 | End: 2023-10-24

## 2023-10-10 RX ORDER — CITALOPRAM HYDROBROMIDE 40 MG/1
40 TABLET ORAL DAILY
Qty: 30 TABLET | Refills: 0 | Status: SHIPPED | OUTPATIENT
Start: 2023-10-10 | End: 2023-11-24

## 2023-10-10 RX ORDER — PANTOPRAZOLE SODIUM 40 MG/1
40 TABLET, DELAYED RELEASE ORAL 2 TIMES DAILY
Qty: 180 TABLET | Refills: 3 | Status: SHIPPED | OUTPATIENT
Start: 2023-10-10 | End: 2024-01-10

## 2023-10-10 NOTE — TELEPHONE ENCOUNTER
Patient is doing very well since her surgery.  She is eating just about anything and feeling good with little stomach pains.  She still has her surgical pain but she says its nothing like her chronic pancreatitis pain she used to have.  She's having daily BM's, drinking fluids good, and is being following by our diabetes team.  Naa was ordered for her to help with monitoring her sugars.  Artie is feeling a lot of fatigue right now which is not uncommon for her surgery, and her having low blood sugars (60-90).

## 2023-10-12 ENCOUNTER — VIRTUAL VISIT (OUTPATIENT)
Dept: TRANSPLANT | Facility: CLINIC | Age: 28
End: 2023-10-12
Attending: SURGERY
Payer: COMMERCIAL

## 2023-10-12 DIAGNOSIS — Z90.410 ACQUIRED TOTAL ABSENCE OF PANCREAS: Primary | ICD-10-CM

## 2023-10-12 DIAGNOSIS — K86.0 ALCOHOL-INDUCED CHRONIC PANCREATITIS (H): ICD-10-CM

## 2023-10-12 PROCEDURE — 99024 POSTOP FOLLOW-UP VISIT: CPT | Mod: VID | Performed by: SURGERY

## 2023-10-12 NOTE — LETTER
10/12/2023         RE: Artie Burger  [address]  [facility] [address]        Dear Colleague,    Thank you for referring your patient, Artie Burger, to the [facility] TRANSPLANT CLINIC. Please see a copy of my visit note below.        Pancreatitis Service - Progress Note    Video visit  Start time: 2:15  End time: 2:40  Patient location: home  Provider location; [facility]  Assessment & Plan: 28 yo F with a history of alcohol induced pancreatitis, now with ~2 years of sobriety. S/p TPIAT 23. Islet yield poor due to dense fibrosis of pancreas and difficulty with digestion. Postoperative course otherwise uneventful.    -continue PO narcotic wean. Progressing welll    TF stopped, plan to remove GJ next week and allow her to return home    -staying locally with sister to help make appointments. Doing well overall but hoping to return home soon. Advised that this would be best when she is off of tube feeding.    RTC: as needed          Faculty: Junito Patel MD  __________________________________________________________________  Transplant History: pancreatitis consult  2023 (Islet), Postoperative day:      Interval History: History is obtained from the patient  S/p TPIAT 23. Tolerated surgery well and had fairly uneventful postoperative course. Pain control initially with potent IV analgesic, transitioned to PO methadone and dilaudid. Slowly weaning off.     Social History     Tobacco Use    Smoking status: Former     Types: Cigarettes     Quit date: 2022     Years since quittin.6    Smokeless tobacco: Current    Tobacco comments:     Vapes daily   Vaping Use    Vaping Use: Every day    Substances: Nicotine   Substance Use Topics    Alcohol use: Not Currently    Drug use: Yes     Types: Marijuana     Comment: smokes daily     ROS:   A 10-point review of systems was negative except as noted above.    Curent Meds:      Physical Exam:     Admit      Current  Vitals:   [facility] 2023 (Approximate)          Vital sign ranges:       No data found.  General Appearance: in no apparent distress.   Skin: normal appearance, warm and dry  Heart: normal S1/S2  Lungs: without wheezes, nonlabored respirations  Abdomen: The abdomen is soft, flat, appropriate rolando-incisional tenderness. No erythema/drainage/evident hernia. Drain site healing well. GJ tube site clean  Extremities: edema: appears absent    Data:   CMP@LABRCNTIPR(na:2,potassium:2,chloride:2,co2:2,g,bun:2,cr:2,gfrestimated:2,gfrestblack:2,mark:2,icapoc:2,icaw:2,ma,phos:2,amylase:2,lipase:2,uamy24:3,albumin:2,bilitotal:2,biliconj:2,bilidelta:2,alkphos:2,ast:2,alt:2,fbili:1)@  CBC@LABRCNTIPR(hgb:2,wbc:2,a,plt:2,a1c:1)@  Coags@LABRCNTIPR(inr:2,ptt:2,Xa:2)@   Urinalysis  Recent Labs   Lab Test 23  1614   COLOR Yellow   APPEARANCE Slightly Cloudy*   URINEGLC Negative   URINEBILI Negative   URINEKETONE Negative   SG 1.021   UBLD Negative   URINEPH 7.0   PROTEIN 10*   NITRITE Negative   LEUKEST Negative   RBCU 2   WBCU <1          Junito Patel MD

## 2023-10-14 DIAGNOSIS — G89.18 ACUTE POST-OPERATIVE PAIN: ICD-10-CM

## 2023-10-14 DIAGNOSIS — E13.9 POST-PANCREATECTOMY DIABETES (H): Primary | ICD-10-CM

## 2023-10-14 DIAGNOSIS — E10.9 TYPE 1 DIABETES MELLITUS (H): ICD-10-CM

## 2023-10-14 DIAGNOSIS — Z90.410 POST-PANCREATECTOMY DIABETES (H): Primary | ICD-10-CM

## 2023-10-14 DIAGNOSIS — Z90.410 ACQUIRED TOTAL ABSENCE OF PANCREAS: ICD-10-CM

## 2023-10-14 DIAGNOSIS — E89.1 POST-PANCREATECTOMY DIABETES (H): Primary | ICD-10-CM

## 2023-10-14 RX ORDER — BLOOD PRESSURE TEST KIT
1 KIT MISCELLANEOUS ONCE
Qty: 120 EACH | Refills: 0 | Status: SHIPPED | OUTPATIENT
Start: 2023-10-14 | End: 2023-10-14

## 2023-10-14 RX ORDER — HYDROMORPHONE HYDROCHLORIDE 4 MG/1
4 TABLET ORAL EVERY 8 HOURS PRN
Qty: 42 TABLET | Refills: 0 | Status: SHIPPED | OUTPATIENT
Start: 2023-10-14 | End: 2023-10-27

## 2023-10-16 ENCOUNTER — MYC MEDICAL ADVICE (OUTPATIENT)
Dept: ENDOCRINOLOGY | Facility: CLINIC | Age: 28
End: 2023-10-16
Payer: COMMERCIAL

## 2023-10-16 ENCOUNTER — CARE COORDINATION (OUTPATIENT)
Dept: EDUCATION SERVICES | Facility: CLINIC | Age: 28
End: 2023-10-16
Payer: COMMERCIAL

## 2023-10-16 DIAGNOSIS — G89.18 ACUTE POST-OPERATIVE PAIN: Primary | ICD-10-CM

## 2023-10-16 DIAGNOSIS — Z90.410 ACQUIRED TOTAL ABSENCE OF PANCREAS: ICD-10-CM

## 2023-10-17 ENCOUNTER — VIRTUAL VISIT (OUTPATIENT)
Dept: EDUCATION SERVICES | Facility: CLINIC | Age: 28
End: 2023-10-17
Payer: COMMERCIAL

## 2023-10-17 ENCOUNTER — TELEPHONE (OUTPATIENT)
Dept: EDUCATION SERVICES | Facility: CLINIC | Age: 28
End: 2023-10-17

## 2023-10-17 DIAGNOSIS — K86.1 CHRONIC PANCREATITIS (H): ICD-10-CM

## 2023-10-17 DIAGNOSIS — K86.0 ALCOHOL-INDUCED CHRONIC PANCREATITIS (H): ICD-10-CM

## 2023-10-17 PROCEDURE — 99207 PR NO BILLABLE SERVICE THIS VISIT: CPT | Mod: VID | Performed by: DIETITIAN, REGISTERED

## 2023-10-17 RX ORDER — METHOCARBAMOL 1000 MG/1
1 TABLET, FILM COATED ORAL 3 TIMES DAILY PRN
Qty: 90 TABLET | Refills: 0 | Status: SHIPPED | OUTPATIENT
Start: 2023-10-17 | End: 2023-11-16

## 2023-10-17 RX ORDER — GABAPENTIN 600 MG/1
600 TABLET ORAL 3 TIMES DAILY
Qty: 90 TABLET | Refills: 3 | Status: SHIPPED | OUTPATIENT
Start: 2023-10-17 | End: 2023-11-14

## 2023-10-17 NOTE — PATIENT INSTRUCTIONS
Mansoor Alva,    It was nice speaking with you. Call [facility] at [phone] to have them ship the Naa 3 sensors. I will send you a carb counting guide and info on the sensor via mail.    PLAN:  Start wearing Naa 3 sensor. Set High Alarm to 160 and Low Alarm to 70.  Enter Practice ID under Settings -> Connected Apps -> FullContact connect -> Connect to a practice -> Practice ID: [id number] to share data with clinic  Record both your Lantus and Novolog doses on Seakeeper layo or Naa 3 layo (Logbook -> add note)  Record carbohydrate intake in Seakeeper layo or Naa 3 layo (Logbook -> add note)  Try to give Novolog 10-15 minutes before meals as you eat more  Continue current correction scale:  ISF 40  1 per 40 >/= 140  -179 give 1 units.  -219 give 2 units.  -259 give 3 units.  -299 give 4 units.  -239 give 5 units. Recheck in 4 hours, if still >300, call clinic.  -379 give 6 units. Recheck in 4 hours, if still >300, call clinic.  If feel ill to ER  -419 give 7 units. Recheck in 4 hours, if still >300, call clinic. If feel ill to ER  BG >/= 420 give 8 units.  Recheck in 4 hours, if still >300, call clinic. If feel ill to ER    Reach out if you have any questions/concerns.    Maria Teresa ARREDONDO  Diabetes Education  [facility] [address] [address]  Phone: [phone]  Email: [email or web address]     detailed exam

## 2023-10-17 NOTE — NURSING NOTE
Is the patient currently in the state of [place]? YES    Visit mode:VIDEO    If the visit is dropped, the patient can be reconnected by: VIDEO VISIT: Text to cell phone: [phone]    Will anyone else be joining the visit? NO    How would you like to obtain your AVS? MyChart    Are changes needed to the allergy or medication list? NO    Reason for visit:   Chief Complaint   Patient presents with    Consult     Diabetes Education - carb counting       Cathryn Olivier MA

## 2023-10-17 NOTE — PROGRESS NOTES
Video-Visit Details    Type of service:  Video Visit  Patient consented to video visit  Joined the call at 10/17/2023, 1:57:41 pm.  Left the call at 10/17/2023, 2:13:37 pm.  You were on the call for 15 minutes 55 seconds   Originating Location (pt. Location): Home  Distant Location (provider location):  [facility] DIABETES EDUCATION [place]   Platform used for Video Visit: Usman Merinoaria Burger presents today for education related to diabetes secondary to total pancreatectomy performed on 9/22/2023  Artie Burger is accompanied by self    ASSESSMENT:  Patient glucose self monitoring as follows: Every four hours  Name of glucose meter:           Time 12:00am 04:00am 08:00am 12:00pm 04:00pm 08:00pm   BG Range  [phone]     Current Insulin Regimen:  Lantus:  13 units in the morning, 10 units at night  Novolog correction scale of 1/40/140   Novolog 1 unit for every 10 grams    Exercise: just walking up and down the stairs multiple times a day    Nutrition:  eating some solid food, risotto and broccoli, apples, grapes,    PATIENT CONCERNS:   no concerns per patient. She says carb counting is going well, she is using Step-In for carb counting    EDUCATION and INSTRUCTION PROVIDED AT THIS VISIT:     Post-Pancreatectomy Diabetes patient seen for follow-up after surgery. Receiving 225 grams of carbs/day via tube feeding. Patient is eating small amounts of food, some risotto and broccoli, apples, grapes. She is not always finishing all of the food so she is dosing Novolog after she eats the meal and using Novolog for correction.  Discussed carb counting. She received a brief overview during her admission at [facility]. She is currently using Step-In layo to get nutrition info. I suggested CalorieKing as another option for layo. She says she feels confident about her carb counting, is using a 1 unit to 10 grams of CHO carb ratio. I will send her guide to carb counting via mail.  Artie has not  received Naa 3 sensors from [facility] yet. I called [facility], sensors are covered with $0 co-pay, Artie was instructed to call pharmacy and ask them to send it out.  I offered another appointment to start FreeStyle Naa 3 but Artie feels confident that she can start it on her own. She previously wore a Dexcom CGM for a research study. Discussed downloading Naa 3 layo on her smartphone and that the sensor is approved for wear on the back of her upper arms.  She says she plans to continue following up with the endocrinology team at [facility].     Patient-stated goal written and given to Angeliuqearia Burger.  Verbalized and demonstrated understanding of instructions.     PLAN:  Start wearing Naa 3 sensor. Set High Alarm to 160 and Low Alarm to 70.  Enter Practice ID under Settings -> Connected Apps -> Aktana connect -> Connect to a practice -> Practice ID: [id number] to share data with clinic  Record both your Lantus and Novolog doses on [employer] layo or Naa 3 layo (Logbook -> add note)  Record carbohydrate intake in [employer] layo or Naa 3 layo (Logbook -> add note)  Try to give Novolog 10-15 minutes before meals as you eat more  Continue current correction scale:  Please use the following sliding scale for now:  ISF 40  1 per 40 >/= 140  -179 give 1 units.  -219 give 2 units.  -259 give 3 units.  -299 give 4 units.  -239 give 5 units. Recheck in 4 hours, if still >300, call clinic.  -379 give 6 units. Recheck in 4 hours, if still >300, call clinic.  If feel ill to ER  -419 give 7 units. Recheck in 4 hours, if still >300, call clinic. If feel ill to ER  BG >/= 420 give 8 units.  Recheck in 4 hours, if still >300, call clinic. If feel ill to ER    See patient instructions  AVS printed and given to patient    FOLLOW-UP:    10/31/2023 with Valentine Mejia RN [facility]    Time spent with patient at today's visit was 15 minutes.      Any diabetes  medication dose changes were made via the CDE Protocol and Collaborative Practice Agreement with [facility].  A copy of this encounter was provided to patient's referring provider.

## 2023-10-18 ENCOUNTER — DOCUMENTATION ONLY (OUTPATIENT)
Dept: TRANSPLANT | Facility: CLINIC | Age: 28
End: 2023-10-18
Payer: COMMERCIAL

## 2023-10-19 ENCOUNTER — TELEPHONE (OUTPATIENT)
Dept: TRANSPLANT | Facility: CLINIC | Age: 28
End: 2023-10-19
Payer: COMMERCIAL

## 2023-10-19 NOTE — TELEPHONE ENCOUNTER
Artie is doing very well, and still advancing her diet slowly.  Some days are better with food, and some not but she is getting adequate nutritional intake.  Angelique will be cutting her tube feeds in half tonight (25mL/hr) and will adjust her insulin as recommended by Dr Casey, and will turn off her tube feeds completely tomorrow morning 10/20. She is feeling better emotional wise and just really wants to go home.  Will discus with Dr Patel

## 2023-10-20 DIAGNOSIS — Z90.410 ACQUIRED TOTAL ABSENCE OF PANCREAS: ICD-10-CM

## 2023-10-23 DIAGNOSIS — E13.9 POST-PANCREATECTOMY DIABETES (H): Primary | ICD-10-CM

## 2023-10-23 DIAGNOSIS — Z90.410 POST-PANCREATECTOMY DIABETES (H): Primary | ICD-10-CM

## 2023-10-23 DIAGNOSIS — E89.1 POST-PANCREATECTOMY DIABETES (H): Primary | ICD-10-CM

## 2023-10-23 DIAGNOSIS — K86.81 EXOCRINE PANCREATIC INSUFFICIENCY: ICD-10-CM

## 2023-10-23 RX ORDER — PEDIATRIC MULTIVIT 61/D3/VIT K 1500-800
1 CAPSULE ORAL DAILY
Qty: 30 TABLET | Refills: 11 | Status: SHIPPED | OUTPATIENT
Start: 2023-10-23 | End: 2023-11-24

## 2023-10-24 DIAGNOSIS — G89.18 ACUTE POST-OPERATIVE PAIN: ICD-10-CM

## 2023-10-24 DIAGNOSIS — K86.1 ACUTE ON CHRONIC PANCREATITIS (H): Primary | ICD-10-CM

## 2023-10-24 DIAGNOSIS — E34.9: ICD-10-CM

## 2023-10-24 DIAGNOSIS — E89.1 POST-PANCREATECTOMY DIABETES (H): ICD-10-CM

## 2023-10-24 DIAGNOSIS — E13.9 POST-PANCREATECTOMY DIABETES (H): ICD-10-CM

## 2023-10-24 DIAGNOSIS — Z90.410 POST-PANCREATECTOMY DIABETES (H): ICD-10-CM

## 2023-10-24 DIAGNOSIS — K85.90 ACUTE ON CHRONIC PANCREATITIS (H): Primary | ICD-10-CM

## 2023-10-24 RX ORDER — METHADONE HYDROCHLORIDE 5 MG/1
5 TABLET ORAL EVERY 8 HOURS
Qty: 42 TABLET | Refills: 0 | Status: SHIPPED | OUTPATIENT
Start: 2023-10-24 | End: 2023-11-03

## 2023-10-24 RX ORDER — PANTOPRAZOLE SODIUM 40 MG/1
40 TABLET, DELAYED RELEASE ORAL DAILY
Qty: 90 TABLET | Refills: 3 | Status: SHIPPED | OUTPATIENT
Start: 2023-10-24 | End: 2024-10-18

## 2023-10-26 ENCOUNTER — OFFICE VISIT (OUTPATIENT)
Dept: TRANSPLANT | Facility: CLINIC | Age: 28
End: 2023-10-26
Attending: SURGERY
Payer: COMMERCIAL

## 2023-10-26 DIAGNOSIS — Z90.410 POST-PANCREATECTOMY DIABETES (H): Primary | ICD-10-CM

## 2023-10-26 DIAGNOSIS — G89.18 ACUTE POST-OPERATIVE PAIN: ICD-10-CM

## 2023-10-26 DIAGNOSIS — E89.1 POST-PANCREATECTOMY DIABETES (H): Primary | ICD-10-CM

## 2023-10-26 DIAGNOSIS — E13.9 POST-PANCREATECTOMY DIABETES (H): Primary | ICD-10-CM

## 2023-10-26 PROCEDURE — G0463 HOSPITAL OUTPT CLINIC VISIT: HCPCS | Performed by: NURSE PRACTITIONER

## 2023-10-26 PROCEDURE — 99213 OFFICE O/P EST LOW 20 MIN: CPT | Mod: 24 | Performed by: NURSE PRACTITIONER

## 2023-10-26 NOTE — TELEPHONE ENCOUNTER
Pt requesting: test strips, lancets and pen needles  Rx available- other provider/clinic has refilled.    blood glucose (NO BRAND SPECIFIED) lancets standard   100 each 3 10/20/2023  No  Sig: Use to test blood sugars up to 8 times daily as directed.    blood glucose (NO BRAND SPECIFIED) test strip 200 strip   3 10/20/2023  No  Sig: Test blood sugars up to 8 times a day as directed by your provider  insulin pen needle (32G X 4 MM) 32G X 4 MM miscellaneous   100 each 3 10/20/2023  No  Sig: Use as directed by provider Per insurance coverage  Prescribing Provider's NPI: [id number]  Tiera Casey  [id number] [address] [place] AT [facility]  [address]  [phone]

## 2023-10-26 NOTE — PROGRESS NOTES
[facility] Progress Note    I had the pleasure of seeing Ms. Artie Burger today. She is 34 days status post total pancreatectomy with islet autotransplant.    Interval events since last visit with me:   ER visits = 0, reasons: NA  Inpatient admissions = 0, reasons NA  The patient reports their current symptoms to be:   GI function:   Nausea:   [x]  none    []  rare    []  often    []  daily  Comment:   Vomiting: [x]  none    []  rare    []  often    []  daily   Comment:   Last BM: Today.  Stools are soft, frequency : 1-2.   Appetite: improving  Tube feeds: NA  Oral food intake: 4-6 per day  Pancreatic exocrine insufficiency:   Takes (Creon 24), 2 with meals, 2 with snacks.  Greasy stools: []  none   [x]  rarely    []  several times/wk   []  daily      Comment:   Diabetes management:   Long acting insulin: Lantus--14 units/day  Short acting insulin: Novolog--20 units/day  Blood sugars typically range from 120 to 140.   Lab Results   Component Value Date    A1C 5.5 2023    A1C 5.3 2023      Pain management:   Pain rating (0=no pain, 10=unbearable): 2  Pain  medications  methadone 5 mg every 8 hours and gabapentin 600mg three times a day     Daily 'Uptime': most of the day  Walking: distance several miles during the  week  Prescription Medications as of 10/26/2023         Rx Number Disp Refills Start End Last Dispensed Date Next Fill Date Owning Pharmacy    acetaminophen *SUGAR FREE* (TYLENOL) 32 mg/mL solution  853.146 mL 3 10/3/2023    [facility] [address]    Si.313 mLs (650 mg) by Per J Tube route every 4 hours    Class: E-Prescribe    Route: Per J Tube    Alcohol Swabs PADS  100 each 0 2023    [address]    Sig: Use to swab the area of the injection or matthew as directed Per insurance coverage    Class: E-Prescribe    apixaban ANTICOAGULANT (ELIQUIS) 5 MG tablet  128  tablet 0 10/6/2023 2023   [address]    Sig: Take 2 tablets (10 mg) by mouth 2 times daily for 2 days, THEN 1 tablet (5 mg) 2 times daily for 60 days.    Class: E-Prescribe    Route: Oral    aspirin 81 MG EC tablet  90 tablet 3 10/5/2023    [facility] [address] [facility] [address]    Sig: Take 1 tablet (81 mg) by mouth daily    Class: E-Prescribe    Route: Oral    blood glucose (NO BRAND SPECIFIED) lancets standard  100 each 3 10/20/2023    [id number] [address] [facility] [address]    Sig: Use to test blood sugars up to 8 times daily as directed.    Class: E-Prescribe    blood glucose (NO BRAND SPECIFIED) test strip  200 strip 3 10/20/2023    [id number] [address] AT [facility] [address]    Sig: Test blood sugars up to 8 times a day as directed by your provider    Class: E-Prescribe    blood glucose monitoring (NO BRAND SPECIFIED) meter device kit  1 kit 0 2023    [address]    Sig: Use as directed Per insurance coverage. DM education recommending Accu Chek Guide glucose meter    Class: E-Prescribe    busPIRone (BUSPAR) 15 MG tablet  60 tablet 3 10/3/2023    [address]    Sig: Take 1 tablet (15 mg) by mouth 2 times daily    Class: E-Prescribe    Route: Oral    citalopram (CELEXA) 40 MG tablet  30 tablet 0 10/10/2023 2023   [facility]/[address]    Sig: Take 1 tablet (40 mg) by mouth daily for 30 days    Class: E-Prescribe    Route: Oral    Continuous Blood Gluc Sensor (FREESTYLE RAJANI 3 SENSOR) MISC  6 each 3 10/6/2023    [facility]/[place] [address]    Si Units every 14 days    Class: E-Prescribe     Route: Does not apply    docusate (COLACE) 50 MG/5ML liquid  600 mL 3 10/3/2023    [facility] [address]    Si-10 mLs ( mg) by Per J Tube route 2 times daily    Class: E-Prescribe    Route: Per J Tube    gabapentin (NEURONTIN) 600 MG tablet  90 tablet 3 10/17/2023    [facility][id number] - [place], [place] - [address] AT [facility] [address]    Sig: Take 1 tablet (600 mg) by mouth 3 times daily    Class: E-Prescribe    Route: Oral    Glucagon (GVOKE HYPOPEN) 1 MG/0.2ML pen  0.4 mL 3 10/3/2023    [address]    Sig: Inject the contents of 1 device under the skin into lower abdomen, outer thigh, or outer upper arm as needed for hypoglycemia. If no response after 15 minutes, additional 1 mg dose from a new device may be injected while waiting for emergency assistance.    Class: E-Prescribe    HYDROmorphone (DILAUDID) 4 MG tablet  42 tablet 0 10/14/2023 10/28/2023   [facility] #[id number] - [place], [place] - [address] AT [facility] [address]    Sig: Take 1 tablet (4 mg) by mouth every 8 hours as needed for severe pain    Class: E-Prescribe    Earliest Fill Date: 10/14/2023    Route: Oral    insulin aspart (NOVOLOG PEN) 100 UNIT/ML pen  15 mL 3 10/6/2023    [facility] - [place], [place] - [address] [place] [address]    Sig: Inject 1-8 Units Subcutaneous every 4 hours -179 give 1 units. -219 give 2 units. -259 give 3 units. -299 give 4 units. -239 give 5 units. -379 give 6 units. -419 give 7 units. BG >/= 420 give 8 units.    Class: E-Prescribe    Route: Subcutaneous    insulin aspart (NOVOLOG PEN) 100 UNIT/ML pen  15 mL 3 10/3/2023    [facility] [address]    Sig: DOSE:  1 units per 10 grams of carbohydrate.  Only chart total amount of units given.  Do not give if  pre-prandial glucose is less than 60 mg/dL. If given at mealtime, administer within 30 minutes of start of meal.    Class: E-Prescribe    Notes to Pharmacy: Novolog flexpens    insulin aspart (NOVOLOG PEN) 100 UNIT/ML pen  15 mL 3 10/3/2023    [place], [place] - [address] [place] [address]    Sig: DOSE:  1 units per 10 grams of carbohydrate. Only chart total amount of units given.  Do not give if pre-prandial glucose is less than 60 mg/dL. If given at mealtime, administer within 30 minutes of start of meal.    Class: E-Prescribe    Notes to Pharmacy: Novolog flexpens    insulin glargine (LANTUS PEN) 100 UNIT/ML pen  15 mL 3 10/3/2023    [address]    Sig: Inject 14 Units Subcutaneous every morning AND 10 Units every evening.    Class: E-Prescribe    Notes to Pharmacy: If Lantus is not covered by insurance, may substitute Basaglar or Semglee or other insulin glargine product per insurance preference at same dose and frequency.      Route: Subcutaneous    insulin pen needle (32G X 4 MM) 32G X 4 MM miscellaneous  100 each 3 10/20/2023    [facility] #[id number] [facility] [address] AT [facility] [address]    Sig: Use as directed by provider Per insurance coverage    Class: E-Prescribe    lipase-protease-amylase (CREON) [id number] units CPEP per EC capsule  300 capsule 11 10/6/2023    [facility] [address] [place] [address] [place] - [address] AT [facility] [address]    Sig: Take 2 capsules with meals and snacks; approximate daily maximum 10 capsules    Class: E-Prescribe    medroxyPROGESTERone (DEPO-PROVERA) 150 MG/ML IM injection            Sig: Inject 150 mg into the muscle every 3 months Last injection was around September 15th    Class: Historical    Route: Intramuscular    methadone (DOLOPHINE) 5 MG tablet  42 tablet 0 10/24/2023    [facility] #[id number] [facility] [place] - [address] AT [facility] [address]    Sig: Take 1 tablet (5 mg) by mouth every 8 hours    Class: E-Prescribe    Earliest Fill Date: 10/24/2023    Route: Oral    Methocarbamol 1000 MG TABS  90 tablet 0 10/17/2023    [facility] [place] [address] [address] AT [facility] [address]    Sig: Take 1 tablet by mouth 3 times daily as needed (for abdominal pain)    Class: E-Prescribe    Route: Oral    Renewals       Renewal requests to authorizing provider (Junito Patel MD) <b>prohibited</b>            mvw complete formulation (CHEWABLES) tablet  30 tablet 11 10/23/2023    [facility] [place] [address] [address] AT [facility] [address]    Sig: Take 1 tablet by mouth daily    Class: E-Prescribe    Notes to Pharmacy: NDCs: [facility] [id number], [place] [id number], [facility] [id number]    Route: Oral    ondansetron (ZOFRAN ODT) 4 MG ODT tab  12 tablet 3 10/3/2023    [place], [place] - [address] [place] [address]    Sig: Take 1 tablet (4 mg) by mouth every 6 hours as needed for nausea or vomiting    Class: E-Prescribe    Route: Oral    pantoprazole (PROTONIX) 40 MG EC tablet  90 tablet 3 10/24/2023 10/18/2024   [id number] - [facility] [address] AT [facility] [address]    Sig: Take 1 tablet (40 mg) by mouth daily for 360 days    Class: E-Prescribe    Route: Oral    pantoprazole (PROTONIX) 40 MG EC tablet  180 tablet 3 10/10/2023 10/4/2024   [facility] - [place], [place] - [address] [place] [address]    Sig: Take 1 tablet (40 mg) by mouth 2 times daily for 360 days    Class: E-Prescribe    Route: Oral    polyethylene glycol (MIRALAX) 17 GM/Dose powder  510 g 3 10/3/2023    [facility] [address] [facility]    Sig: Take 17 g by mouth daily    Class: E-Prescribe    Route: Oral    QUEtiapine (SEROQUEL) 25 MG tablet  30 tablet 3 10/3/2023    [facility] - [address]    Sig: Take 0.5-1 tablets (12.5-25 mg) by mouth every 6 hours as needed (anxiety)    Class: E-Prescribe    Route: Oral    QUEtiapine (SEROQUEL) 25 MG tablet  30 tablet 3 10/3/2023    [address]    Sig: Take 1 tablet (25 mg) by mouth At Bedtime    Class: E-Prescribe    Route: Oral    sennosides (SENOKOT) 8.8 MG/5ML syrup  600 mL 3 10/3/2023    [address]    Si-10 mLs by Per J Tube route 2 times daily    Class: E-Prescribe    Route: Per J Tube    Sharps Container MISC  1 each 0 2023    [address]    Sig: Use as directed to dispose of needles, lancets and other sharps    Class: E-Prescribe          Physical exam:   General Appearance: in no apparent distress.       Skin: Normal, no rashes or jaundice  Heart: Regular rhythm.  Lungs: no audible wheezes or increased work of breathing.  Abdomen: The abdomen is flat, and the wound is Healing well, without hernia.  G/J Tube exit site is not clean. The abdomen is non-tender, generalized.    Edema: absent.        Latest Ref Rng & Units 10/3/2023     9:19 AM 10/3/2023    11:27 AM 10/3/2023     2:55 PM 10/5/2023     2:35 PM 10/5/2023     3:28 PM   Chronic Pancreatitis   Weight  57.335 kg    60.827 kg   Glucose 70 - 99 mg/dL    77     GLUCOSE BY METER POCT 70 - 99 mg/dL  154  155      Hemoglobin 11.7 - 15.7 g/dL    9.2     Platelet Count 150 - 450 10e3/uL    1,093     Albumin 3.5 - 5.2 g/dL    3.7         Assessment and Plan: She is doing well s/p TP-IAT.  Interval progress has been good.  Postoperative gastric ileus: having regular BM  Pancreatic exocrine insufficiency: should remember to take creon   Malnutrition: eating well  Diabetes mellitus: appreciate endo input  Abdominal pain management:  continue same to wean dilaudid    GJ tube  removed. Tolerated well  Followup: I will see her again in clinic in as directed       Total time: 30 min, Counselling Time: 20 min.

## 2023-10-26 NOTE — LETTER
10/26/2023         RE: Artie Burger  [address]  [place] [address]        Dear Colleague,    Thank you for referring your patient, Artie Burger, to the [facility]. Please see a copy of my visit note below.    Chronic Pancreatitis Group Progress Note    I had the pleasure of seeing Ms. Artie Burger today. She is 34 days status post total pancreatectomy with islet autotransplant.    Interval events since last visit with me:   ER visits = 0, reasons: NA  Inpatient admissions = 0, reasons NA  The patient reports their current symptoms to be:   GI function:   Nausea:   [x]  none    []  rare    []  often    []  daily  Comment:   Vomiting: [x]  none    []  rare    []  often    []  daily   Comment:   Last BM: Today.  Stools are soft, frequency : 1-2.   Appetite: improving  Tube feeds: NA  Oral food intake: 4-6 per day  Pancreatic exocrine insufficiency:   Takes (Creon 24), 2 with meals, 2 with snacks.  Greasy stools: []  none   [x]  rarely    []  several times/wk   []  daily      Comment:   Diabetes management:   Long acting insulin: Lantus--14 units/day  Short acting insulin: Novolog--20 units/day  Blood sugars typically range from 120 to 140.   Lab Results   Component Value Date    A1C 5.5 2023    A1C 5.3 2023      Pain management:   Pain rating (0=no pain, 10=unbearable): 2  Pain  medications  methadone 5 mg every 8 hours and gabapentin 600mg three times a day     Daily 'Uptime': most of the day  Walking: distance several miles during the  week  Prescription Medications as of 10/26/2023         Rx Number Disp Refills Start End Last Dispensed Date Next Fill Date Owning Pharmacy    acetaminophen *SUGAR FREE* (TYLENOL) 32 mg/mL solution  853.146 mL 3 10/3/2023    [facility] - [place], [place] - [address] [facility]    Si.313 mLs (650 mg) by Per J Tube route every 4 hours    Class: E-Prescribe    Route: Per J Tube    Alcohol Swabs PADS  100  each 0 9/28/2023    [address]    Sig: Use to swab the area of the injection or matthew as directed Per insurance coverage    Class: E-Prescribe    apixaban ANTICOAGULANT (ELIQUIS) 5 MG tablet  128 tablet 0 10/6/2023 12/7/2023   [address]    Sig: Take 2 tablets (10 mg) by mouth 2 times daily for 2 days, THEN 1 tablet (5 mg) 2 times daily for 60 days.    Class: E-Prescribe    Route: Oral    aspirin 81 MG EC tablet  90 tablet 3 10/5/2023    [place], [place] - [address] [facility] [address]    Sig: Take 1 tablet (81 mg) by mouth daily    Class: E-Prescribe    Route: Oral    blood glucose (NO BRAND SPECIFIED) lancets standard  100 each 3 10/20/2023    [id number] [address] [facility] [address]    Sig: Use to test blood sugars up to 8 times daily as directed.    Class: E-Prescribe    blood glucose (NO BRAND SPECIFIED) test strip  200 strip 3 10/20/2023    [id number] [facility] [address] [facility]  [address]    Sig: Test blood sugars up to 8 times a day as directed by your provider    Class: E-Prescribe    blood glucose monitoring (NO BRAND SPECIFIED) meter device kit  1 kit 0 9/29/2023    [address]    Sig: Use as directed Per insurance coverage. DM education recommending Accu Chek Guide glucose meter    Class: E-Prescribe    busPIRone (BUSPAR) 15 MG tablet  60 tablet 3 10/3/2023    [address]    Sig: Take 1 tablet (15 mg) by mouth 2 times daily    Class: E-Prescribe    Route: Oral    citalopram (CELEXA) 40 MG tablet  30 tablet 0 10/10/2023 11/9/2023   [facility] - [place], [place] - [address] [facility] [address]    Sig: Take 1 tablet (40 mg) by mouth daily for  30 days    Class: E-Prescribe    Route: Oral    Continuous Blood Gluc Sensor (FREESTYLE RAJANI 3 SENSOR) MISC  6 each 3 10/6/2023    [facility] [address] [facility]    Si Units every 14 days    Class: E-Prescribe    Route: Does not apply    docusate (COLACE) 50 MG/5ML liquid  600 mL 3 10/3/2023    [address]    Si-10 mLs ( mg) by Per J Tube route 2 times daily    Class: E-Prescribe    Route: Per J Tube    gabapentin (NEURONTIN) 600 MG tablet  90 tablet 3 10/17/2023    [facility] #[id number] [facility] [address] AT [facility] [address]    Sig: Take 1 tablet (600 mg) by mouth 3 times daily    Class: E-Prescribe    Route: Oral    Glucagon (GVOKE HYPOPEN) 1 MG/0.2ML pen  0.4 mL 3 10/3/2023    [address]    Sig: Inject the contents of 1 device under the skin into lower abdomen, outer thigh, or outer upper arm as needed for hypoglycemia. If no response after 15 minutes, additional 1 mg dose from a new device may be injected while waiting for emergency assistance.    Class: E-Prescribe    HYDROmorphone (DILAUDID) 4 MG tablet  42 tablet 0 10/14/2023 10/28/2023   [id number] [employer] [address] AT [facility] [address]    Sig: Take 1 tablet (4 mg) by mouth every 8 hours as needed for severe pain    Class: E-Prescribe    Earliest Fill Date: 10/14/2023    Route: Oral    insulin aspart (NOVOLOG PEN) 100 UNIT/ML pen  15 mL 3 10/6/2023    [facility] [address]     Sig: Inject 1-8 Units Subcutaneous every 4 hours -179 give 1 units. -219 give 2 units. -259 give 3 units. -299 give 4 units. -239 give 5 units. -379 give 6 units. -419 give 7 units. BG >/= 420 give 8 units.    Class: E-Prescribe    Route: Subcutaneous     insulin aspart (NOVOLOG PEN) 100 UNIT/ML pen  15 mL 3 10/3/2023    [address]    Sig: DOSE:  1 units per 10 grams of carbohydrate.  Only chart total amount of units given.  Do not give if pre-prandial glucose is less than 60 mg/dL. If given at mealtime, administer within 30 minutes of start of meal.    Class: E-Prescribe    Notes to Pharmacy: Novolog flexpens    insulin aspart (NOVOLOG PEN) 100 UNIT/ML pen  15 mL 3 10/3/2023    [address]    Sig: DOSE:  1 units per 10 grams of carbohydrate. Only chart total amount of units given.  Do not give if pre-prandial glucose is less than 60 mg/dL. If given at mealtime, administer within 30 minutes of start of meal.    Class: E-Prescribe    Notes to Pharmacy: Novolog flexpens    insulin glargine (LANTUS PEN) 100 UNIT/ML pen  15 mL 3 10/3/2023    [address]    Sig: Inject 14 Units Subcutaneous every morning AND 10 Units every evening.    Class: E-Prescribe    Notes to Pharmacy: If Lantus is not covered by insurance, may substitute Basaglar or Semglee or other insulin glargine product per insurance preference at same dose and frequency.      Route: Subcutaneous    insulin pen needle (32G X 4 MM) 32G X 4 MM miscellaneous  100 each 3 10/20/2023    [id number] [address] AT [facility] [address]    Sig: Use as directed by provider Per insurance coverage    Class: E-Prescribe    lipase-protease-amylase (CREON) [id number] units CPEP per EC capsule  300 capsule 11 10/6/2023    [facility] #[id number] [facility] [address] [facility] [address]    Sig: Take 2 capsules with meals and snacks; approximate daily maximum 10 capsules    Class: E-Prescribe    medroxyPROGESTERone (DEPO-PROVERA) 150 MG/ML IM injection            Sig: Inject 150  mg into the muscle every 3 months Last injection was around September 15th    Class: Historical    Route: Intramuscular    methadone (DOLOPHINE) 5 MG tablet  42 tablet 0 10/24/2023    [facility] #[address] [place] [address] [address] [facility] [address]    Sig: Take 1 tablet (5 mg) by mouth every 8 hours    Class: E-Prescribe    Earliest Fill Date: 10/24/2023    Route: Oral    Methocarbamol 1000 MG TABS  90 tablet 0 10/17/2023    [facility] #[address] [place] [address] AT [facility] [address]    Sig: Take 1 tablet by mouth 3 times daily as needed (for abdominal pain)    Class: E-Prescribe    Route: Oral    Renewals       Renewal requests to authorizing provider (Junito Patel MD) <b>prohibited</b>            mvw complete formulation (CHEWABLES) tablet  30 tablet 11 10/23/2023    [facility] #[address] [place] [address] [address] AT [facility] [address]    Sig: Take 1 tablet by mouth daily    Class: E-Prescribe    Notes to Pharmacy: NDCs: [facility] [id number], [place] [id number], [place] [id number]    Route: Oral    ondansetron (ZOFRAN ODT) 4 MG ODT tab  12 tablet 3 10/3/2023    [facility] [place], [place] - [address] [facility] [address]    Sig: Take 1 tablet (4 mg) by mouth every 6 hours as needed for nausea or vomiting    Class: E-Prescribe    Route: Oral    pantoprazole (PROTONIX) 40 MG EC tablet  90 tablet 3 10/24/2023 10/18/2024   [id number] [address] AT [facility] [address]    Sig: Take 1 tablet (40 mg) by mouth daily for 360 days    Class: E-Prescribe    Route: Oral    pantoprazole (PROTONIX) 40 MG EC tablet  180 tablet 3 10/10/2023 10/4/2024   [facility] [address] [place] [address]    Sig: Take 1 tablet (40 mg) by mouth 2 times daily for 360 days    Class: E-Prescribe    Route: Oral    polyethylene glycol (MIRALAX) 17 GM/Dose powder  510  g 3 10/3/2023    [address]    Sig: Take 17 g by mouth daily    Class: E-Prescribe    Route: Oral    QUEtiapine (SEROQUEL) 25 MG tablet  30 tablet 3 10/3/2023    [address]    Sig: Take 0.5-1 tablets (12.5-25 mg) by mouth every 6 hours as needed (anxiety)    Class: E-Prescribe    Route: Oral    QUEtiapine (SEROQUEL) 25 MG tablet  30 tablet 3 10/3/2023    [address]    Sig: Take 1 tablet (25 mg) by mouth At Bedtime    Class: E-Prescribe    Route: Oral    sennosides (SENOKOT) 8.8 MG/5ML syrup  600 mL 3 10/3/2023    [address]    Si-10 mLs by Per J Tube route 2 times daily    Class: E-Prescribe    Route: Per J Tube    Sharps Container MISC  1 each 0 2023    [address]    Sig: Use as directed to dispose of needles, lancets and other sharps    Class: E-Prescribe          Physical exam:   General Appearance: in no apparent distress.       Skin: Normal, no rashes or jaundice  Heart: Regular rhythm.  Lungs: no audible wheezes or increased work of breathing.  Abdomen: The abdomen is flat, and the wound is Healing well, without hernia.  G/J Tube exit site is not clean. The abdomen is non-tender, generalized.    Edema: absent.        Latest Ref Rng & Units 10/3/2023     9:19 AM 10/3/2023    11:27 AM 10/3/2023     2:55 PM 10/5/2023     2:35 PM 10/5/2023     3:28 PM   Chronic Pancreatitis   Weight  57.335 kg    60.827 kg   Glucose 70 - 99 mg/dL    77     GLUCOSE BY METER POCT 70 - 99 mg/dL  154  155      Hemoglobin 11.7 - 15.7 g/dL    9.2     Platelet Count 150 - 450 10e3/uL    1,093     Albumin 3.5 - 5.2 g/dL    3.7         Assessment and Plan: She is doing well s/p TP-IAT.  Interval progress has been good.  Postoperative  gastric ileus: having regular BM  Pancreatic exocrine insufficiency: should remember to take creon   Malnutrition: eating well  Diabetes mellitus: appreciate endo input  Abdominal pain management:  continue same to wean dilaudid    GJ tube removed. Tolerated well  Followup: I will see her again in clinic in as directed       Total time: 30 min, Counselling Time: 20 min.

## 2023-10-27 DIAGNOSIS — G89.18 ACUTE POST-OPERATIVE PAIN: ICD-10-CM

## 2023-10-27 RX ORDER — HYDROMORPHONE HYDROCHLORIDE 4 MG/1
4 TABLET ORAL EVERY 12 HOURS PRN
Qty: 28 TABLET | Refills: 0 | Status: SHIPPED | OUTPATIENT
Start: 2023-10-27 | End: 2023-11-08

## 2023-10-31 ENCOUNTER — TELEPHONE (OUTPATIENT)
Dept: ENDOCRINOLOGY | Facility: CLINIC | Age: 28
End: 2023-10-31

## 2023-10-31 NOTE — TELEPHONE ENCOUNTER
Patient call:     Appointment type: RETURN DIABETES  Provider: MARILIA NUNEZ   Return date: SCHEDULE IN 8-12 WEEKS (DECEMBER -JANUARY)  Speciality phone number: [phone]  Additional appointment(s) needed: SEE BELOW   Additional notes:    LVM AND SENT MYC X1   Ana Maria Napier on 10/31/2023 at 2:34 PM      BOB IS OK PER MARILIA NUNEZ     Please note that the above appointment(s) will require manual scheduling as they are marked as BOB and will not appear using auto search. Do not schedule the patient if another patient has already been scheduled in the requested appointment slot.

## 2023-11-03 ENCOUNTER — MYC REFILL (OUTPATIENT)
Dept: ENDOCRINOLOGY | Facility: CLINIC | Age: 28
End: 2023-11-03
Payer: COMMERCIAL

## 2023-11-03 DIAGNOSIS — E13.9 POST-PANCREATECTOMY DIABETES (H): ICD-10-CM

## 2023-11-03 DIAGNOSIS — Z90.410 POST-PANCREATECTOMY DIABETES (H): ICD-10-CM

## 2023-11-03 DIAGNOSIS — Z86.39 HX OF INSULIN DEPENDENT DIABETES MELLITUS: ICD-10-CM

## 2023-11-03 DIAGNOSIS — E89.1 POST-PANCREATECTOMY DIABETES (H): ICD-10-CM

## 2023-11-03 DIAGNOSIS — Z90.410 ACQUIRED TOTAL ABSENCE OF PANCREAS: ICD-10-CM

## 2023-11-03 RX ORDER — BLOOD-GLUCOSE SENSOR
1 EACH MISCELLANEOUS
Qty: 6 EACH | Refills: 3 | Status: SHIPPED | OUTPATIENT
Start: 2023-11-03 | End: 2023-11-22

## 2023-11-03 RX ORDER — BUSPIRONE HYDROCHLORIDE 15 MG/1
15 TABLET ORAL 2 TIMES DAILY
Qty: 120 TABLET | Refills: 0 | Status: SHIPPED | OUTPATIENT
Start: 2023-11-03 | End: 2024-01-08

## 2023-11-07 ENCOUNTER — TELEPHONE (OUTPATIENT)
Dept: TRANSPLANT | Facility: CLINIC | Age: 28
End: 2023-11-07
Payer: COMMERCIAL

## 2023-11-07 NOTE — TELEPHONE ENCOUNTER
"Talked with Kan and overall she is doing great.  She has been having more abdominal cramping late in the evening to overnight with a lot of diarrhea.  She stated her stools are \"oily\" and is only taking about 1-2 creon pills while eating.  I advised her to take 3-4 creon pills when eating meals, and then 1-2 for snacks. She also explained that she would take them all right before eating.  I told her that she should take 1-2 pills right when she starts to eat, then the other 1-2 pills in the middle of eating to see if this helps.  She will report back to me how that is going.  She is still on Diluadid and methadone and we are weaning her diluadid slowly to avoid withdrawal.    "

## 2023-11-08 ENCOUNTER — MYC REFILL (OUTPATIENT)
Dept: TRANSPLANT | Facility: CLINIC | Age: 28
End: 2023-11-08
Payer: COMMERCIAL

## 2023-11-08 DIAGNOSIS — G89.18 ACUTE POST-OPERATIVE PAIN: ICD-10-CM

## 2023-11-09 RX ORDER — HYDROMORPHONE HYDROCHLORIDE 4 MG/1
4 TABLET ORAL EVERY 12 HOURS PRN
Qty: 28 TABLET | Refills: 0 | Status: SHIPPED | OUTPATIENT
Start: 2023-11-09 | End: 2023-11-21

## 2023-11-09 RX ORDER — HYDROMORPHONE HYDROCHLORIDE 4 MG/1
4 TABLET ORAL EVERY 12 HOURS PRN
Qty: 28 TABLET | Refills: 0 | Status: SHIPPED | OUTPATIENT
Start: 2023-11-09 | End: 2023-12-14

## 2023-11-13 DIAGNOSIS — Z90.410 ACQUIRED TOTAL ABSENCE OF PANCREAS: ICD-10-CM

## 2023-11-14 ENCOUNTER — MYC REFILL (OUTPATIENT)
Dept: TRANSPLANT | Facility: CLINIC | Age: 28
End: 2023-11-14
Payer: COMMERCIAL

## 2023-11-14 DIAGNOSIS — G89.18 ACUTE POST-OPERATIVE PAIN: ICD-10-CM

## 2023-11-14 RX ORDER — GABAPENTIN 600 MG/1
600 TABLET ORAL 2 TIMES DAILY
Qty: 28 TABLET | Refills: 0 | Status: SHIPPED | OUTPATIENT
Start: 2023-11-14 | End: 2023-12-06

## 2023-11-16 DIAGNOSIS — G89.18 ACUTE POST-OPERATIVE PAIN: ICD-10-CM

## 2023-11-16 RX ORDER — METHOCARBAMOL 1000 MG/1
1 TABLET, FILM COATED ORAL 3 TIMES DAILY PRN
Qty: 90 TABLET | Refills: 0 | Status: SHIPPED | OUTPATIENT
Start: 2023-11-16 | End: 2023-12-14

## 2023-11-21 ENCOUNTER — MYC REFILL (OUTPATIENT)
Dept: TRANSPLANT | Facility: CLINIC | Age: 28
End: 2023-11-21
Payer: COMMERCIAL

## 2023-11-21 DIAGNOSIS — K86.1 ACUTE ON CHRONIC PANCREATITIS (H): Primary | ICD-10-CM

## 2023-11-21 DIAGNOSIS — G89.18 ACUTE POST-OPERATIVE PAIN: ICD-10-CM

## 2023-11-21 DIAGNOSIS — E10.9 TYPE 1 DIABETES MELLITUS (H): ICD-10-CM

## 2023-11-21 DIAGNOSIS — E89.1 POST-PANCREATECTOMY DIABETES (H): ICD-10-CM

## 2023-11-21 DIAGNOSIS — K85.90 ACUTE ON CHRONIC PANCREATITIS (H): Primary | ICD-10-CM

## 2023-11-21 DIAGNOSIS — E13.9 POST-PANCREATECTOMY DIABETES (H): ICD-10-CM

## 2023-11-21 DIAGNOSIS — Z90.410 POST-PANCREATECTOMY DIABETES (H): ICD-10-CM

## 2023-11-22 DIAGNOSIS — E34.9: Primary | ICD-10-CM

## 2023-11-22 DIAGNOSIS — Z90.410 POST-PANCREATECTOMY DIABETES (H): ICD-10-CM

## 2023-11-22 DIAGNOSIS — E10.9 TYPE 1 DIABETES MELLITUS (H): ICD-10-CM

## 2023-11-22 DIAGNOSIS — E13.9 POST-PANCREATECTOMY DIABETES (H): ICD-10-CM

## 2023-11-22 DIAGNOSIS — Z86.39 HX OF INSULIN DEPENDENT DIABETES MELLITUS: ICD-10-CM

## 2023-11-22 DIAGNOSIS — E89.1 POST-PANCREATECTOMY DIABETES (H): ICD-10-CM

## 2023-11-22 DIAGNOSIS — Z90.410 ACQUIRED TOTAL ABSENCE OF PANCREAS: ICD-10-CM

## 2023-11-22 RX ORDER — METHADONE HYDROCHLORIDE 5 MG/1
5 TABLET ORAL EVERY 8 HOURS
Qty: 90 TABLET | Refills: 0 | Status: SHIPPED | OUTPATIENT
Start: 2023-11-22 | End: 2023-12-14

## 2023-11-22 RX ORDER — HYDROMORPHONE HYDROCHLORIDE 4 MG/1
4 TABLET ORAL EVERY 12 HOURS PRN
Qty: 60 TABLET | Refills: 0 | Status: SHIPPED | OUTPATIENT
Start: 2023-11-22 | End: 2023-12-14

## 2023-11-22 RX ORDER — BLOOD-GLUCOSE SENSOR
1 EACH MISCELLANEOUS
Qty: 7 EACH | Refills: 4 | Status: SHIPPED | OUTPATIENT
Start: 2023-11-22 | End: 2024-06-11

## 2023-11-22 RX ORDER — QUETIAPINE FUMARATE 25 MG/1
12.5-25 TABLET, FILM COATED ORAL EVERY 6 HOURS PRN
Qty: 30 TABLET | Refills: 3 | Status: SHIPPED | OUTPATIENT
Start: 2023-11-22 | End: 2024-04-23

## 2023-11-22 RX ORDER — QUETIAPINE FUMARATE 25 MG/1
25 TABLET, FILM COATED ORAL AT BEDTIME
Qty: 30 TABLET | Refills: 0 | Status: SHIPPED | OUTPATIENT
Start: 2023-11-22 | End: 2023-12-27

## 2023-11-23 ASSESSMENT — PAIN SCALES - PAIN ENJOYMENT GENERAL ACTIVITY SCALE (PEG)
AVG_PAIN_PASTWEEK: 4
PEG_TOTALSCORE: 4.33
INTERFERED_ENJOYMENT_LIFE: 2
INTERFERED_GENERAL_ACTIVITY: 7

## 2023-11-24 ENCOUNTER — MYC REFILL (OUTPATIENT)
Dept: TRANSPLANT | Facility: CLINIC | Age: 28
End: 2023-11-24
Payer: COMMERCIAL

## 2023-11-24 DIAGNOSIS — E13.9 POST-PANCREATECTOMY DIABETES (H): ICD-10-CM

## 2023-11-24 DIAGNOSIS — F10.11 ALCOHOL ABUSE, IN REMISSION: ICD-10-CM

## 2023-11-24 DIAGNOSIS — K86.81 EXOCRINE PANCREATIC INSUFFICIENCY: ICD-10-CM

## 2023-11-24 DIAGNOSIS — Z90.410 POST-PANCREATECTOMY DIABETES (H): ICD-10-CM

## 2023-11-24 DIAGNOSIS — Z90.410 ACQUIRED TOTAL ABSENCE OF PANCREAS: ICD-10-CM

## 2023-11-24 DIAGNOSIS — G89.18 ACUTE POST-OPERATIVE PAIN: ICD-10-CM

## 2023-11-24 DIAGNOSIS — E89.1 POST-PANCREATECTOMY DIABETES (H): ICD-10-CM

## 2023-11-24 RX ORDER — PEDIATRIC MULTIVIT 61/D3/VIT K 1500-800
1 CAPSULE ORAL DAILY
Qty: 30 TABLET | Refills: 11 | Status: SHIPPED | OUTPATIENT
Start: 2023-11-24 | End: 2024-01-10 | Stop reason: ALTCHOICE

## 2023-11-24 RX ORDER — CITALOPRAM HYDROBROMIDE 40 MG/1
40 TABLET ORAL DAILY
Qty: 30 TABLET | Refills: 0 | Status: SHIPPED | OUTPATIENT
Start: 2023-11-24 | End: 2024-01-10 | Stop reason: ALTCHOICE

## 2023-11-24 NOTE — TELEPHONE ENCOUNTER
Patient had enough Eliquis from original prescription which gets her off the medication when out.

## 2023-11-30 ENCOUNTER — OFFICE VISIT (OUTPATIENT)
Dept: PALLIATIVE MEDICINE | Facility: CLINIC | Age: 28
End: 2023-11-30
Payer: COMMERCIAL

## 2023-11-30 VITALS — DIASTOLIC BLOOD PRESSURE: 63 MMHG | SYSTOLIC BLOOD PRESSURE: 99 MMHG | HEART RATE: 74 BPM

## 2023-11-30 DIAGNOSIS — R10.84 ABDOMINAL PAIN, GENERALIZED: Primary | ICD-10-CM

## 2023-11-30 PROCEDURE — 99213 OFFICE O/P EST LOW 20 MIN: CPT | Mod: 24

## 2023-11-30 ASSESSMENT — PAIN SCALES - GENERAL: PAINLEVEL: MILD PAIN (3)

## 2023-11-30 NOTE — PATIENT INSTRUCTIONS
CC: Follow-up on chronic health problems    HISTORY OF THE PRESENT ILLNESS: Patient is a 57 y.o. male. This pleasant patient is here today for evaluation and management of the following health problems.     Since last appointment, patient reports had a flareup of diverticulitis in 5/2021 and was seen at [facility].  Prescribed antibiotics.  Resolved.  No new symptoms.    Health Maintenance: Discussed rationale for Covid vaccine.      HTN (hypertension)  This is a chronic health problem that is well controlled with current medications and lifestyle measures.  Taking lisinopril 20 mg daily and amlodipine 5 mg daily.  Has been doing karate 3 times a week for exercise.  The patient denies chest pain, shortness of breath, headache, vision changes, epistaxis, or dyspnea on exertion.        Type 2 diabetes mellitus  Chronic health problem, uncertain control.  Managed by lifestyle measures.  Patient does admit to being more sedentary and weight gain in the last few months.  Does not check his blood sugar at home.  He is due for labs.  Had retinal scan back in January, reportedly normal.  Will request records.  Denies polydipsia, polyuria, vision changes.      Hyperlipemia  Chronic health problem, uncertain of control.  Taking atorvastatin 40 mg daily.  Exercising by doing karate 3 times a week.  Has gained weight, but has been working on weight loss.  Has lost 4 pounds in the last week.      Allergies: Patient has no known allergies.    Current Outpatient Medications Ordered in Epic   Medication Sig Dispense Refill   • lisinopril (PRINIVIL) 20 MG Tab Take 1 Tab by mouth every day. 90 Tab 1   • amLODIPine (NORVASC) 5 MG Tab Take 1 Tab by mouth every day. 90 Tab 1   • atorvastatin (LIPITOR) 40 MG Tab Take 1 Tab by mouth every bedtime. 90 Tab 1   • aspirin EC (ECOTRIN) 325 MG Tablet Delayed Response TAKE 1 TABLET BY MOUTH EVERY DAY 90 Tab 1     No current Epic-ordered facility-administered medications on file.       Past  Physical therapy - continue activity as tolerated at home.      Pain Psychologist to address relaxation, behavioral change, coping style, and other factors important to improvement.  NO - Let me know if you are interested in exploring pain psychology to support chronic pain experience.      Diagnostic Studies:  None      Medication Management:   Medical cannabis - [place] today     Potential procedures:   None      Other Orders/Referrals:   None      Follow up with ROSIE Park CNP in around 11 months if she would like to continue medical cannabis.     ----------------------------------------------------------------  Clinic Number:  [phone]   Call with any questions about your care and for scheduling assistance.   Calls are returned Monday through Friday between 8 AM and 4:30 PM. We usually get back to you within 2 business days depending on the issue/request.    If we are prescribing your medications:  For opioid medication refills, call the clinic or send a [employer] message 7 days in advance.  Please include:  Name of requested medication  Name of the pharmacy.  For non-opioid medications, call your pharmacy directly to request a refill. Please allow 3-4 days to be processed.   Per [place] State Law:  All controlled substance prescriptions must be filled within 30 days of being written.    For those controlled substances allowing refills, pickup must occur within 30 days of last fill.      We believe regular attendance is key to your success in our program!    Any time you are unable to keep your appointment we ask that you call us at least 24 hours in advance to cancel.This will allow us to offer the appointment time to another patient.   Multiple missed appointments may lead to dismissal from the clinic.    "Medical History:   Diagnosis Date   • Hyperlipemia 4/29/2015   • Hypertension    • KEVIN (obstructive sleep apnea) 1/8/2015   • Pain    • Pneumonia     RESOLVED   • Snoring    • Type 2 diabetes mellitus (HCC) 1/8/2015   • Vertigo of central origin of both ears 1/9/2015       Past Surgical History:   Procedure Laterality Date   • CATARACT EXTRACTION WITH IOL Bilateral 2018   • SHOULDER ARTHROSCOPY  5/20/2010    Performed by XIMENA KANG at [facility]   • DEBRIDEMENT  5/20/2010    Performed by XIMENA KANG at [facility]   • ROTATOR CUFF REPAIR  5/20/2010    Performed by [facility]IMENA KANG [facility]       Social History     Tobacco Use   • Smoking status: Passive Smoke Exposure - Never Smoker   • Smokeless tobacco: Never Used   Vaping Use   • Vaping Use: Never used   Substance Use Topics   • Alcohol use: Yes     Alcohol/week: 0.6 oz     Types: 1 Glasses of wine per week     Comment: seldom   • Drug use: No       Family History   Problem Relation Age of Onset   • Heart Disease Mother    • Cancer Father    • Heart Disease Maternal Aunt    • Heart Disease Maternal Uncle    • Stroke Brother    • Sleep Apnea Neg Hx        ROS:   As in HPI, otherwise negative for chest pain, dyspnea, abdominal pain, dysuria, blood in stool, fever           Exam: /74 (BP Location: Right arm, Patient Position: Sitting, BP Cuff Size: Adult)   Pulse 70   Temp 37.1 °C (98.7 °F) (Temporal)   Resp 16   Ht 1.626 m (5' 4\")   Wt 101 kg (222 lb 14.4 oz)   SpO2 96%  Body mass index is 38.26 kg/m².    General: Alert, pleasant, well nourished, well developed male in NAD  HEENT: Normocephalic. Eyes conjunctiva clear lids without ptosis, pupils equal and reactive to light, ears normal shape and contour, canals are clear bilaterally, tympanic membranes are pearly gray with good light reflex, nasal mucosa without erythema and drainage, oropharynx is without erythema, edema or " exudates.   Neck: Supple without bruit. Thyroid is not enlarged.  Pulmonary: Clear to ausculation.  Normal effort. No rales, ronchi, or wheezing.  Cardiovascular: Normal rate and rhythm without murmur. Carotid and radial pulses are intact and equal bilaterally.  No lower extremity edema.  Abdomen: Soft, nontender, nondistended. Normal bowel sounds. Liver and spleen are not palpable  Neurologic: Grossly nonfocal  Lymph: No cervical or supraclavicular lymph nodes are palpable  Skin: Warm and dry.    Musculoskeletal: Normal gait.   Psych: Normal mood and affect. Alert and oriented. Judgment and insight is normal.    Please note that this dictation was created using voice recognition software. I have made every reasonable attempt to correct obvious errors, but I expect that there are errors of grammar and possibly content that I did not discover before finalizing the note.      Assessment/Plan  1. Essential hypertension  Controlled.  Continue with lisinopril and amlodipine, no changes.  Refill sent to pharmacy today.  Continue with routine aerobic exercise, continue with working on weight loss.  - lisinopril (PRINIVIL) 20 MG Tab; Take 1 Tablet by mouth every day.  Dispense: 90 Tablet; Refill: 1  - amLODIPine (NORVASC) 5 MG Tab; Take 1 Tablet by mouth every day.  Dispense: 90 Tablet; Refill: 1    2. Type 2 diabetes mellitus without complication, without long-term current use of insulin (HCC)  Uncertain of control.  We will get updated labs.  Patient will [facility] labs [facility].  Advised on lifestyle measures.  - Comp Metabolic Panel; Future  - ESTIMATED GFR; Future  - MICROALBUMIN CREAT RATIO URINE; Future  - Lipid Profile; Future  - HEMOGLOBIN A1C; Future  - TSH WITH REFLEX TO FT4; Future    3. Hyperlipidemia, unspecified hyperlipidemia type  Uncertain of control.  We will get updated labs.  Continue with atorvastatin at current dose.  Advised on lifestyle measures.  - atorvastatin (LIPITOR) 40 MG Tab; Take  1 Tablet by mouth at bedtime.  Dispense: 90 Tablet; Refill: 1    Patient will return to clinic in 6 months or sooner if needed and pending lab results.

## 2023-11-30 NOTE — PROGRESS NOTES
[facility] Pain Management     Date of visit: 11/30/2023      Assessment:   Artie Burger is a 28 year old female with a past medical history significant for ETOH use, pancreatitis, GERD who presents with complaints of abdominal pain.      Abdominal pain - Her pain is likely secondary to chronic pancreatitis, though possible some other underlying GI process present. Of note, symptoms are intermittent and seem to be relieved with bowel movements, which may be suggestive of IBS. She has been using ibuprofen and acetaminophen PRN and medical cannabis, which she finds helpful for symptoms and appetite. She has not appreciated much improvement in pain with more conservative measures and is interested in procedure. Her pain is primarily located over epigastric region, more severe after means. Pain seems more visceral at this point as opposed to abdominal wall, recommend celiac plexus block.     Assigned to [place] nursing team.     Visit Diagnoses:  1. Abdominal pain, generalized        Plan:  Diagnosis reviewed, treatment option addressed, and risk/benifits discussed.  Self-care instructions given.  I am recommending a multidisciplinary treatment plan to help this patient better manage their pain.      Physical therapy - continue activity as tolerated at home.      Pain Psychologist to address relaxation, behavioral change, coping style, and other factors important to improvement.  NO - Let me know if you are interested in exploring pain psychology to support chronic pain experience.      Diagnostic Studies:  None      Medication Management:   Medical cannabis - [place]     Potential procedures:   None      Other Orders/Referrals:   None      Follow up with ROSIE Park CNP in around 11 months if she would like to continue medical cannabis.       Review of Electronic Chart: Today I have also reviewed available medical information in the patient's medical record at   [facility] and Care Everywhere (if available), including relevant provider notes, laboratory work, and imaging.     Jayde Del Toro DNP, APRN, MATIAS-ABNER  [facility] Pain Management     -------------------------------------------------    Subjective:    Chief complaint:   Chief Complaint   Patient presents with    Pain       Interval history:  Artie Burger is a 28 year old female last seen on 4/4/23.  They are a patient of mine seen in follow up.     Recommendations/plan at the last visit included:  Physical therapy - continue activity as tolerated at home.      Pain Psychologist to address relaxation, behavioral change, coping style, and other factors important to improvement.  NO - Let me know if you are interested in exploring pain psychology to support chronic pain experience.      Diagnostic Studies:  None      Medication Management:   Discussed buprenorphine today as possible medication option for pain control. She would like to think about it and do some research, which is reasonable. Advised she will need to schedule in person appointment with me, if she decides to pursue this treatment option. See information attached to after visit summary for more information about this drug.   Continue working [place] to optimize pain benefit from cannabis products.      Potential procedures:   None      Other Orders/Referrals:   None      Follow up with ROSIE Park CNP as needed.      Since her last visit, Artie Burger reports:  -Her pain overall has improved since last visit.   -She had TPIAT, states this went well and she is feeling a lot better.   -She continues to use medical cannabis for intermittent abdominal pain. Also helpful for nausea.   -She is currently using flower and vape products.   -She is here today for medical cannabis renewal.       Pain Information:   Pain rating: averages 4/10 on a 0-10 scale.      Interval history from last visit on 4/4/23:  -her pain  "is about the same as it was at last visit.   -PHQ9 was positive for SI, though she states that she is not having active thoughts of self harm.   -She states the way the PHQ9 question stated was confusing.   -She works with mental health provider, counseling and medications.   -She was certified at prior visit [place], still working with products to find good combination.   -She is struggling with pain, has ERCPs periodically to tx stricture, does not feel like most recent procedure on 3/13 was as helpful as it was previously.   -She saw a new surgeon a couple of weeks ago, she is considered a different procedure that is supposed to helped with pain.   -She would possibly be interested in buprenorphine, will consider this option for the future.   -She is most interested in possibly surgery to improve pain.   Pain Information:              Pain rating: averages 9/10 on a 0-10 scale.        Interval history from last visit on 2/14/23:  -Her pain has increased since last visit, reported 4/10 at last visit and 9/10 today.  -She was certified on medical cannabis at last visit, has found this helpful with nausea and appetite but not so much for pain.   -Medical cannabis products: flower and cartridges, also using pills and these are most helpful overall.   -She has plans to go to dispensary sometime in the next week and will explore other products available.   -She is interested in pursuing injection.   -Celiac plexus block was discussed at prior visit.   -Pain is mostly in epigastric area, \"where rib cage meets,\" describes pain as deeper, increase in pain when lying down.   -She inquired about gabapentin max dosage of 900 mg TID, which was suggested by one of her other providers.         HPI from initial visit with me on 11/3/22:  Artie Burger is a 27 year old female presents with a chief complaint of abdominal pain, epigastric.      The pain has been present for over one year .    The pain is Moderate " Pain (4) in severity.    Severity/Intensity: 2/10 at best, 9/10 at worst, 4/10 on average  The pain is described as aching, stabbing, .   The pain is alleviated by meds and BM, lying on left side.    It is exacerbated by standing, lifting, fatty foods.    Modalities that have been utilized in the past which were helpful include OTC pain meds, cannabis, GI procedures.    Things that were not helpful, but tried ,include tramadol.    The patient has never tried pain procedures (celiac, splanchnic, TAP block).     Artie Burger has not been seen at a pain clinic in the past.       -She has abdominal pain, mostly epigastric.   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   -She went to treatment, used to go to .   -Denies person or family h/o of schizophrenia, no major cardiac or hepatic conditions.         Current Pain Treatments:                 Medical cannabis    Gabapentin   Methadone    Hydromorphone         Current MME: 0     Review of [place] Prescription Monitoring Program (): No concern for abuse or misuse of controlled medications based on this report. Reviewed - appears appropriate.      Past pain treatments:  S/P TPIAT    Medications:  Current Outpatient Medications   Medication Sig Dispense Refill    acetaminophen *SUGAR FREE* (TYLENOL) 32 mg/mL solution 20.313 mLs (650 mg) by Per J Tube route every 4 hours 853.146 mL 3    Alcohol Swabs PADS Use to swab the area of the injection or matthew as directed Per insurance coverage 100 each 0    apixaban ANTICOAGULANT (ELIQUIS) 5 MG tablet Take 2 tablets (10 mg) by mouth 2 times daily for 2 days, THEN 1 tablet (5 mg) 2 times daily for 60 days. 128 tablet 0    aspirin 81 MG EC tablet Take 1 tablet (81 mg) by mouth daily  90 tablet 3    blood glucose (NO BRAND SPECIFIED) lancets standard Use to test blood sugars up to 8 times daily as directed. 100 each 3    blood glucose (NO BRAND SPECIFIED) test strip Test blood sugars up to 8 times a day as directed by your provider 200 strip 3    blood glucose monitoring (NO BRAND SPECIFIED) meter device kit Use as directed Per insurance coverage. DM education recommending Accu Chek Guide glucose meter 1 kit 0    busPIRone (BUSPAR) 15 MG tablet Take 1 tablet (15 mg) by mouth 2 times daily for 60 days 120 tablet 0    citalopram (CELEXA) 40 MG tablet Take 1 tablet (40 mg) by mouth daily 30 tablet 0    Continuous Blood Gluc Sensor (FREESTYLE RAJANI 3 SENSOR) MISC 1 Units every 14 days 7 each 4    docusate (COLACE) 50 MG/5ML liquid 5-10 mLs ( mg) by Per J Tube route 2 times daily 600 mL 3    gabapentin (NEURONTIN) 600 MG tablet Take 1 tablet (600 mg) by mouth 2 times daily for 14 days 28 tablet 0    Glucagon (GVOKE HYPOPEN) 1 MG/0.2ML pen Inject the contents of 1 device under the skin into lower abdomen, outer thigh, or outer upper arm as needed for hypoglycemia. If no response after 15 minutes, additional 1 mg dose from a new device may be injected while waiting for emergency assistance. 0.4 mL 3    glucose (BD GLUCOSE) 4 g chewable tablet Take 1 tablet by mouth every hour as needed for low blood sugar 30 tablet 0    HYDROmorphone (DILAUDID) 4 MG tablet Take 1 tablet (4 mg) by mouth every 12 hours as needed for severe pain 60 tablet 0    HYDROmorphone (DILAUDID) 4 MG tablet Take 1 tablet (4 mg) by mouth every 12 hours as needed for severe pain 28 tablet 0    insulin aspart (NOVOLOG PEN) 100 UNIT/ML pen Inject 1-8 Units Subcutaneous every 4 hours -179 give 1 units. -219 give 2 units. -259 give 3 units. -299 give 4 units. -239 give 5 units. -379 give 6 units. -419 give 7 units. BG >/= 420 give 8 units. 15 mL 3    insulin aspart (NOVOLOG PEN) 100 UNIT/ML  pen DOSE:  1 units per 10 grams of carbohydrate.  Only chart total amount of units given.  Do not give if pre-prandial glucose is less than 60 mg/dL. If given at mealtime, administer within 30 minutes of start of meal. 15 mL 3    insulin aspart (NOVOLOG PEN) 100 UNIT/ML pen DOSE:  1 units per 10 grams of carbohydrate. Only chart total amount of units given.  Do not give if pre-prandial glucose is less than 60 mg/dL. If given at mealtime, administer within 30 minutes of start of meal. 15 mL 3    insulin glargine (LANTUS PEN) 100 UNIT/ML pen Inject 14 Units Subcutaneous every morning AND 10 Units every evening. 15 mL 3    insulin pen needle (32G X 4 MM) 32G X 4 MM miscellaneous Use as directed by provider Per insurance coverage 100 each 3    lipase-protease-amylase (CREON) 11679-46160-469034 units CPEP per EC capsule Take 2 capsules with meals and snacks; approximate daily maximum 10 capsules 300 capsule 11    medroxyPROGESTERone (DEPO-PROVERA) 150 MG/ML IM injection Inject 150 mg into the muscle every 3 months Last injection was around September 15th      methadone (DOLOPHINE) 5 MG tablet Take 1 tablet (5 mg) by mouth every 8 hours for 30 days 90 tablet 0    Methocarbamol 1000 MG TABS Take 1 tablet by mouth 3 times daily as needed (for abdominal pain) 90 tablet 0    mvw complete formulation (CHEWABLES) tablet Take 1 tablet by mouth daily 30 tablet 11    Nutritional Supplements (BOOST HIGH PROTEIN) LIQD After above baseline labs are drawn, give: 6 mL/kg to maximum of 360 mL; the beverage is to be consumed within 5 minutes.      ondansetron (ZOFRAN ODT) 4 MG ODT tab Take 1 tablet (4 mg) by mouth every 6 hours as needed for nausea or vomiting 12 tablet 3    pantoprazole (PROTONIX) 40 MG EC tablet Take 1 tablet (40 mg) by mouth daily for 360 days 90 tablet 3    pantoprazole (PROTONIX) 40 MG EC tablet Take 1 tablet (40 mg) by mouth 2 times daily for 360 days 180 tablet 3    polyethylene glycol (MIRALAX) 17 GM/Dose powder  Take 17 g by mouth daily 510 g 3    QUEtiapine (SEROQUEL) 25 MG tablet Take 0.5-1 tablets (12.5-25 mg) by mouth every 6 hours as needed (anxiety) (Patient not taking: Reported on 11/30/2023) 30 tablet 3    QUEtiapine (SEROQUEL) 25 MG tablet Take 1 tablet (25 mg) by mouth at bedtime 30 tablet 0    sennosides (SENOKOT) 8.8 MG/5ML syrup 5-10 mLs by Per J Tube route 2 times daily 600 mL 3    Sharps Container MISC Use as directed to dispose of needles, lancets and other sharps 1 each 0       Medical History: any changes in medical history since they were last seen? Yes - S/P TPIAT      Objective:    Physical Exam:  Blood pressure 99/63, pulse 74, last menstrual period 08/16/2023, not currently breastfeeding.  Constitutional: Well developed, well nourished, appears stated age.  Gait: Normal  HEENT: Head atraumatic, normocephalic. Eyes without conjunctival injection or jaundice. Oropharynx clear. Neck supple. No obvious neck masses.  Skin: No rash, lesions, or petechiae of exposed skin.   Psychiatric/mental status: Alert, without lethargy or stupor. Speech fluent. Appropriate affect. Mood normal. Able to follow commands without difficulty.     Diagnostic Tests/Imaging/Labs:  [facility] 11/24/23 - hepatic and renal function    BILLING TIME DOCUMENTATION:   The total TIME spent on this patient on the date of the encounter/appointment was 16 minutes.      TOTAL TIME includes:   Time spent preparing to see the patient (reviewing records and tests)   Time spent face to face (or over the phone) with the patient   Time spent ordering tests, medications, procedures and referrals   Time spent Referring and communicating with other healthcare professionals   Time spent documenting clinical information in Epic

## 2023-12-02 NOTE — PROGRESS NOTES
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 S/p TPIAT 23. Islet yield poor due to dense fibrosis of pancreas and difficulty with digestion. Postoperative course otherwise uneventful.    -continue PO narcotic wean  -increasing diet, weaning tube feeding  -staying locally with sister to help make appointments. Doing well overall but hoping to return home soon. Advised that this would be best when she is off of tube feeding.          Faculty: Junito Patel MD  __________________________________________________________________  Transplant History: pancreatitis consult  2023 ([place]), Postoperative day:      Interval History: History is obtained from the patient  S/p TPIAT 23. Tolerated surgery well and had fairly uneventful postoperative course. Pain control initially with potent IV analgesic, transitioned to PO methadone and dilaudid. Slowly weaning off.     Social History     Tobacco Use    Smoking status: Former     Types: Cigarettes     Quit date: 2022     Years since quittin.5    Smokeless tobacco: Current    Tobacco comments:     Vapes daily   Vaping Use    Vaping Use: Every day    Substances: Nicotine   Substance Use Topics    Alcohol use: Not Currently    Drug use: Yes     Types: Marijuana     Comment: smokes daily     ROS:   A 10-point review of systems was negative except as noted above.    Curent Meds:      Physical Exam:     Admit      Current Vitals:   /71   Pulse 74   Wt 60.8 kg (134 lb 1.6 oz)   LMP 2023 (Approximate)   SpO2 97%   BMI 24.53 kg/m           Vital sign ranges:       No data found.  General Appearance: in no apparent distress.   Skin: normal appearance, warm and dry  Heart: normal S1/S2  Lungs: without wheezes, nonlabored respirations  Abdomen: The abdomen is soft, flat, appropriate rolando-incisional tenderness. No erythema/drainage/evident  hernia. Drain site healing well. GJ tube site clean  Extremities: edema: appears absent    Data:   CMP@LABRCNTIPR(na:2,potassium:2,chloride:2,co2:2,g,bun:2,cr:2,gfrestimated:2,gfrestblack:2,mark:2,icapoc:2,icaw:2,ma,phos:2,amylase:2,lipase:2,uamy24:3,albumin:2,bilitotal:2,biliconj:2,bilidelta:2,alkphos:2,ast:2,alt:2,fbili:1)@  CBC@LABRCNTIPR(hgb:2,wbc:2,a,plt:2,a1c:1)@  Coags@LABRCNTIPR(inr:2,ptt:2,Xa:2)@   Urinalysis  Recent Labs   Lab Test 23  1614   COLOR Yellow   APPEARANCE Slightly Cloudy*   URINEGLC Negative   URINEBILI Negative   URINEKETONE Negative   SG 1.021   UBLD Negative   URINEPH 7.0   PROTEIN 10*   NITRITE Negative   LEUKEST Negative   RBCU 2   WBCU <1        Prior CT personally reviewed. Note multifocal inflammatory calcification of the pancreatic head with evident dilated upstream pancreatic duct, though this measures ~4mm to me. Limited by lack of IV contrast.     Updated CT with IV contrast also personally reviewed. Note conventional hepatic arterial anatomy. Small calcifications in gland at body/tail, more significant inflammatory calcification in pancreatic head. Stents in duct without evident stones or debris.

## 2023-12-03 NOTE — PROGRESS NOTES
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 In person visit to discuss surgical management options for recurrent pancreatitis.     Surgical approach: per Dr. 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 He has successfully dilated and stented with relief in the past, but she has failed stent free trial. She is referred for consideration of drainage (Jaye operation). On my review of noncon CT, her duct is somewhat dilated but not impressively so (~4mm), though this is limited by lack of contrast. Will need to repeat CT and reassess. Also note large pancreatic head mass with multiple calcifications. She had an updated IV contrast CT that demonstrates a large inflammatory calcified head mass and moderately dilated upstream duct (2-4mm, 5-6mm with stent). The upstream gland is not significantly calcified. Unclear if her optimal surgery would be Jaye vs Whipple or TPIAT. I did not have this imaging available at the time of visit but did discuss that the duct would need to be large enough to facilitate a Jaye, and also what a TPIAT might entail. She is interested in surgery but less interested in TPIAT. I will discuss in [facility] to see if she is felt to be suitable for drainage, or if TPIAT or whipple might be more suitable surgeries up front. I have told her that, if the surgical approach is recommended to be TPIAT or Whipple, we should rediscuss and ensure she is comfortable going forward.     I did discuss her case in [facility]- she was not felt to be an appropriate Jaye/drainage candidate. Consensus opinion was that she would likely achieve subpar analgesic results from that operation and instead would be better served with pancreaticoduodenectomy or total pancreatectomy. I  discussed this with her at length, and today she is ready to proceed with full TPIAT evaluation. She has her mother present with her for support.    Analgesia: previously not on narcotics, but currently using dilaudid 2-4 6 times daily with other non-narcotic adjuncts      Pancreatic function: not diabetic, no overt indication of exocrine insufficiency. Has tried creon in the past with no real benefit.     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 She had been losing weight earlier but has since stabilized.     Alcohol/Tobacco cessation: negative PeTH in chart, reports multiple years of abstinence which is consistent with this. She also reports vaping currently but no longer smoking cigarettes. Will need to verify cessation of alcohol and tobacco prior to any surgery. Tobacco cessation especially important in duodenal resecting surgeries (TPIAT, Whipple). Vaping is ok, but not ideal. NSAID cessation also essential, which she understands.     Medical Decision Making: High  Consult 52729 high complexity, 110 minutes      Faculty: Junito Patel MD  __________________________________________________________________  Transplant History: pancreatitis consult  9/22/2023 ([facility]), Postoperative day:      Interval History: History is obtained from the patient  Artie Burger is a very pleasant 26 yo F with a history of acute on chronic alcoholic pancreatitis. She has an extensive history of alcohol and tobacco use going back about ten years, with her first episodes of pancreatitis following shortly thereafter. She was hospitalized at [facility] with recurrent episodes of pancreatitis. She initially abstained from drinking but did have relapses, corresponding with further episodes of pancreatitis.     Dr. Campuzano has known her for a long period over which he has engaged in numerous successful endoscopic interventions  for her. For full details of this, please see his excellent notes. In short, he has been able to clear her duct of stones and did note that she had a very significant stricture in the pancreatic neck with moderate upstream dilation, as well as calcific changes and side branch calcification in the pancreatic head. She has ultimately failed trials of stent free intervals and is referred for surgery.    She says she has now developed daily pain, where previously it was only intermittent. It is in her central abdomen with radiation to the right and into her back, not really to her left. It doesn't have accompanying nausea, but she does have pain with eating and that previously made her food avoidant. She is able to maintain her weight relatively well now, though eating is still difficult. 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 When she is able to work, she spends the next day resting and recovering due to her fatigue.     Since our last visit, she had a stent exchange with Dr. Campuzano, but she says that this has not helped her pain at all. She is otherwise in her usual state of health.     On today's visit, she remains with pain and malaise symptoms as characterized before. She is also quite anxious as she is undergoing full TPIAT evaluation. She and her mother (present) are asking very thoughtful questions about the plan for surgery and expected recovery.     Social History     Tobacco Use    Smoking status: Former     Types: Cigarettes     Quit date: 2022     Years since quittin.5    Smokeless tobacco: Current    Tobacco comments:     Vapes daily   Vaping Use    Vaping Use: Every day    Substances: Nicotine   Substance Use Topics    Alcohol use: Not Currently    Drug use: Yes     Types: Marijuana     Comment: smokes daily     ROS:   A 10-point review of systems was negative except as noted above.    Curent  Meds:      Physical Exam:     Admit      Current Vitals:   LMP 05/10/2023 (Exact Date)          Vital sign ranges:       No data found.  General Appearance: in no apparent distress.   Skin: normal appearance, warm and dry  Heart: deferred  Lungs: without wheezes, nonlabored respirations  Abdomen: The abdomen is flat, she is thin. No evident incision or hernia. Narrow costal angle. She has a supraumbilical piercing that she would prefer to maintain if at all possible.   Extremities: edema: appears absent    Data:   CMP@LABRCNTIPR(na:2,potassium:2,chloride:2,co2:2,g,bun:2,cr:2,gfrestimated:2,gfrestblack:2,mark:2,icapoc:2,icaw:2,ma,phos:2,amylase:2,lipase:2,uamy24:3,albumin:2,bilitotal:2,biliconj:2,bilidelta:2,alkphos:2,ast:2,alt:2,fbili:1)@  CBC@LABRCNTIPR(hgb:2,wbc:2,a,plt:2,a1c:1)@  Coags@LABRCNTIPR(inr:2,ptt:2,Xa:2)@   Urinalysis  Recent Labs   Lab Test 23  1614   COLOR Yellow   APPEARANCE Slightly Cloudy*   URINEGLC Negative   URINEBILI Negative   URINEKETONE Negative   SG 1.021   UBLD Negative   URINEPH 7.0   PROTEIN 10*   NITRITE Negative   LEUKEST Negative   RBCU 2   WBCU <1        Prior CT personally reviewed. Note multifocal inflammatory calcification of the pancreatic head with evident dilated upstream pancreatic duct, though this measures ~4mm to me. Limited by lack of IV contrast.     Updated CT with IV contrast also personally reviewed. Note conventional hepatic arterial anatomy. Small calcifications in gland at body/tail, more significant inflammatory calcification in pancreatic head. Stents in duct without evident stones or debris.

## 2023-12-06 ENCOUNTER — MYC REFILL (OUTPATIENT)
Dept: TRANSPLANT | Facility: CLINIC | Age: 28
End: 2023-12-06
Payer: COMMERCIAL

## 2023-12-06 DIAGNOSIS — Z90.410 POST-PANCREATECTOMY DIABETES (H): ICD-10-CM

## 2023-12-06 DIAGNOSIS — K86.81 EXOCRINE PANCREATIC INSUFFICIENCY: ICD-10-CM

## 2023-12-06 DIAGNOSIS — E13.9 POST-PANCREATECTOMY DIABETES (H): ICD-10-CM

## 2023-12-06 DIAGNOSIS — E89.1 POST-PANCREATECTOMY DIABETES (H): ICD-10-CM

## 2023-12-06 DIAGNOSIS — Z90.410 ACQUIRED TOTAL ABSENCE OF PANCREAS: ICD-10-CM

## 2023-12-06 DIAGNOSIS — Z86.39 HX OF INSULIN DEPENDENT DIABETES MELLITUS: ICD-10-CM

## 2023-12-06 DIAGNOSIS — G89.18 ACUTE POST-OPERATIVE PAIN: ICD-10-CM

## 2023-12-06 RX ORDER — BLOOD-GLUCOSE SENSOR
1 EACH MISCELLANEOUS
Qty: 7 EACH | Refills: 4 | Status: CANCELLED | OUTPATIENT
Start: 2023-12-06

## 2023-12-06 RX ORDER — PEDIATRIC MULTIVIT 61/D3/VIT K 1500-800
1 CAPSULE ORAL DAILY
Qty: 30 TABLET | Refills: 11 | Status: CANCELLED | OUTPATIENT
Start: 2023-12-06

## 2023-12-06 RX ORDER — BUSPIRONE HYDROCHLORIDE 15 MG/1
15 TABLET ORAL 2 TIMES DAILY
Qty: 120 TABLET | Refills: 0 | Status: CANCELLED | OUTPATIENT
Start: 2023-12-06

## 2023-12-06 RX ORDER — QUETIAPINE FUMARATE 25 MG/1
12.5-25 TABLET, FILM COATED ORAL EVERY 6 HOURS PRN
Qty: 30 TABLET | Refills: 3 | Status: CANCELLED | OUTPATIENT
Start: 2023-12-06

## 2023-12-06 RX ORDER — GABAPENTIN 600 MG/1
600 TABLET ORAL 2 TIMES DAILY
Qty: 180 TABLET | Refills: 1 | Status: SHIPPED | OUTPATIENT
Start: 2023-12-06 | End: 2024-04-10

## 2023-12-07 NOTE — PROGRESS NOTES
Pancreatitis Service - Progress Note    Video visit  Start time: 2:15  End time: 2:40  Patient location: home  Provider location; [facility]  Assessment & Plan: 28 yo F with a history of alcohol induced pancreatitis, now with ~2 years of sobriety. S/p TPIAT 23. Islet yield poor due to dense fibrosis of pancreas and difficulty with digestion. Postoperative course otherwise uneventful.    -continue PO narcotic wean. Progressing welll    TF stopped, plan to remove GJ next week and allow her to return home    -staying locally with sister to help make appointments. Doing well overall but hoping to return home soon. Advised that this would be best when she is off of tube feeding.    RTC: as needed          Faculty: Junito Patel MD  __________________________________________________________________  Transplant History: pancreatitis consult  2023 ([facility]), Postoperative day:      Interval History: History is obtained from the patient  S/p TPIAT 23. Tolerated surgery well and had fairly uneventful postoperative course. Pain control initially with potent IV analgesic, transitioned to PO methadone and dilaudid. Slowly weaning off.     Social History     Tobacco Use    Smoking status: Former     Types: Cigarettes     Quit date: 2022     Years since quittin.6    Smokeless tobacco: Current    Tobacco comments:     Vapes daily   Vaping Use    Vaping Use: Every day    Substances: Nicotine   Substance Use Topics    Alcohol use: Not Currently    Drug use: Yes     Types: Marijuana     Comment: smokes daily     ROS:   A 10-point review of systems was negative except as noted above.    Curent Meds:      Physical Exam:     Admit      Current Vitals:   LMP 2023 (Approximate)          Vital sign ranges:       No data found.  General Appearance: in no apparent distress.   Skin: normal appearance, warm and dry  Heart: normal S1/S2  Lungs: without wheezes, nonlabored respirations  Abdomen: The  abdomen is soft, flat, appropriate rolando-incisional tenderness. No erythema/drainage/evident hernia. Drain site healing well. GJ tube site clean  Extremities: edema: appears absent    Data:   CMP@LABRCNTIPR(na:2,potassium:2,chloride:2,co2:2,g,bun:2,cr:2,gfrestimated:2,gfrestblack:2,mark:2,icapoc:2,icaw:2,ma,phos:2,amylase:2,lipase:2,uamy24:3,albumin:2,bilitotal:2,biliconj:2,bilidelta:2,alkphos:2,ast:2,alt:2,fbili:1)@  CBC@LABRCNTIPR(hgb:2,wbc:2,a,plt:2,a1c:1)@  Coags@LABRCNTIPR(inr:2,ptt:2,Xa:2)@   Urinalysis  Recent Labs   Lab Test 23  1614   COLOR Yellow   APPEARANCE Slightly Cloudy*   URINEGLC Negative   URINEBILI Negative   URINEKETONE Negative   SG 1.021   UBLD Negative   URINEPH 7.0   PROTEIN 10*   NITRITE Negative   LEUKEST Negative   RBCU 2   WBCU <1

## 2023-12-14 ENCOUNTER — LAB (OUTPATIENT)
Dept: LAB | Facility: CLINIC | Age: 28
End: 2023-12-14
Payer: COMMERCIAL

## 2023-12-14 ENCOUNTER — ALLIED HEALTH/NURSE VISIT (OUTPATIENT)
Dept: TRANSPLANT | Facility: CLINIC | Age: 28
End: 2023-12-14
Attending: PEDIATRICS
Payer: COMMERCIAL

## 2023-12-14 VITALS — BODY MASS INDEX: 22.14 KG/M2 | WEIGHT: 121.03 LBS

## 2023-12-14 DIAGNOSIS — K85.90 ACUTE ON CHRONIC PANCREATITIS (H): ICD-10-CM

## 2023-12-14 DIAGNOSIS — K86.1 ACUTE ON CHRONIC PANCREATITIS (H): ICD-10-CM

## 2023-12-14 DIAGNOSIS — E89.1 POST-PANCREATECTOMY DIABETES (H): ICD-10-CM

## 2023-12-14 DIAGNOSIS — Z90.410 POST-PANCREATECTOMY DIABETES (H): ICD-10-CM

## 2023-12-14 DIAGNOSIS — E10.9 TYPE 1 DIABETES MELLITUS (H): ICD-10-CM

## 2023-12-14 DIAGNOSIS — K85.90 ACUTE ON CHRONIC PANCREATITIS (H): Primary | ICD-10-CM

## 2023-12-14 DIAGNOSIS — K86.1 ACUTE ON CHRONIC PANCREATITIS (H): Primary | ICD-10-CM

## 2023-12-14 DIAGNOSIS — G89.18 ACUTE POST-OPERATIVE PAIN: ICD-10-CM

## 2023-12-14 DIAGNOSIS — E13.9 POST-PANCREATECTOMY DIABETES (H): ICD-10-CM

## 2023-12-14 LAB
ALBUMIN SERPL BCG-MCNC: 4.3 G/DL (ref 3.5–5.2)
ALP SERPL-CCNC: 85 U/L (ref 40–150)
ALT SERPL W P-5'-P-CCNC: 35 U/L (ref 0–50)
ANION GAP SERPL CALCULATED.3IONS-SCNC: 13 MMOL/L (ref 7–15)
AST SERPL W P-5'-P-CCNC: 37 U/L (ref 0–45)
BASOPHILS # BLD AUTO: 0.1 10E3/UL (ref 0–0.2)
BASOPHILS NFR BLD AUTO: 1 %
BILIRUB SERPL-MCNC: 0.3 MG/DL
BUN SERPL-MCNC: 14.8 MG/DL (ref 6–20)
C PEPTIDE SERPL-MCNC: <0.1 NG/ML (ref 0.9–6.9)
CALCIUM SERPL-MCNC: 9.5 MG/DL (ref 8.6–10)
CHLORIDE SERPL-SCNC: 105 MMOL/L (ref 98–107)
CHOLEST SERPL-MCNC: 127 MG/DL
CREAT SERPL-MCNC: 0.72 MG/DL (ref 0.51–0.95)
DEPRECATED HCO3 PLAS-SCNC: 22 MMOL/L (ref 22–29)
EGFRCR SERPLBLD CKD-EPI 2021: >90 ML/MIN/1.73M2
EOSINOPHIL # BLD AUTO: 0.2 10E3/UL (ref 0–0.7)
EOSINOPHIL NFR BLD AUTO: 2 %
ERYTHROCYTE [DISTWIDTH] IN BLOOD BY AUTOMATED COUNT: 14.5 % (ref 10–15)
FASTING STATUS PATIENT QL REPORTED: NO
FASTING STATUS PATIENT QL REPORTED: YES
FASTING STATUS PATIENT QL REPORTED: YES
FERRITIN SERPL-MCNC: 22 NG/ML (ref 6–175)
GLUCOSE BLDC GLUCOMTR-MCNC: 95 MG/DL (ref 70–99)
GLUCOSE SERPL-MCNC: 104 MG/DL (ref 70–99)
GLUCOSE SERPL-MCNC: 84 MG/DL (ref 70–99)
GLUCOSE SERPL-MCNC: 88 MG/DL (ref 70–99)
HBA1C MFR BLD: 6.6 %
HCT VFR BLD AUTO: 34.6 % (ref 35–47)
HDLC SERPL-MCNC: 62 MG/DL
HGB BLD-MCNC: 12 G/DL (ref 11.7–15.7)
IMM GRANULOCYTES # BLD: 0 10E3/UL
IMM GRANULOCYTES NFR BLD: 0 %
IRON BINDING CAPACITY (ROCHE): 304 UG/DL (ref 240–430)
IRON SATN MFR SERPL: 17 % (ref 15–46)
IRON SERPL-MCNC: 51 UG/DL (ref 37–145)
LDLC SERPL CALC-MCNC: 47 MG/DL
LYMPHOCYTES # BLD AUTO: 3 10E3/UL (ref 0.8–5.3)
LYMPHOCYTES NFR BLD AUTO: 34 %
MCH RBC QN AUTO: 29.4 PG (ref 26.5–33)
MCHC RBC AUTO-ENTMCNC: 34.7 G/DL (ref 31.5–36.5)
MCV RBC AUTO: 85 FL (ref 78–100)
MONOCYTES # BLD AUTO: 0.9 10E3/UL (ref 0–1.3)
MONOCYTES NFR BLD AUTO: 10 %
NEUTROPHILS # BLD AUTO: 4.5 10E3/UL (ref 1.6–8.3)
NEUTROPHILS NFR BLD AUTO: 53 %
NONHDLC SERPL-MCNC: 65 MG/DL
NRBC # BLD AUTO: 0 10E3/UL
NRBC BLD AUTO-RTO: 0 /100
PLATELET # BLD AUTO: 623 10E3/UL (ref 150–450)
POTASSIUM SERPL-SCNC: 3.8 MMOL/L (ref 3.4–5.3)
PREALB SERPL-MCNC: 17.5 MG/DL (ref 20–40)
PROT SERPL-MCNC: 7.1 G/DL (ref 6.4–8.3)
RBC # BLD AUTO: 4.08 10E6/UL (ref 3.8–5.2)
SODIUM SERPL-SCNC: 140 MMOL/L (ref 135–145)
TRIGL SERPL-MCNC: 90 MG/DL
VIT B12 SERPL-MCNC: 644 PG/ML (ref 232–1245)
WBC # BLD AUTO: 8.6 10E3/UL (ref 4–11)

## 2023-12-14 PROCEDURE — 36415 COLL VENOUS BLD VENIPUNCTURE: CPT | Performed by: PATHOLOGY

## 2023-12-14 PROCEDURE — 82607 VITAMIN B-12: CPT | Performed by: PEDIATRICS

## 2023-12-14 PROCEDURE — 82306 VITAMIN D 25 HYDROXY: CPT | Performed by: PEDIATRICS

## 2023-12-14 PROCEDURE — 82728 ASSAY OF FERRITIN: CPT | Performed by: PATHOLOGY

## 2023-12-14 PROCEDURE — 99000 SPECIMEN HANDLING OFFICE-LAB: CPT | Performed by: PATHOLOGY

## 2023-12-14 PROCEDURE — 80061 LIPID PANEL: CPT | Performed by: PATHOLOGY

## 2023-12-14 PROCEDURE — 83540 ASSAY OF IRON: CPT | Performed by: PATHOLOGY

## 2023-12-14 PROCEDURE — 82962 GLUCOSE BLOOD TEST: CPT | Performed by: PATHOLOGY

## 2023-12-14 PROCEDURE — 82725 ASSAY OF BLOOD FATTY ACIDS: CPT | Mod: 90 | Performed by: PATHOLOGY

## 2023-12-14 PROCEDURE — 84134 ASSAY OF PREALBUMIN: CPT | Performed by: PEDIATRICS

## 2023-12-14 PROCEDURE — 84446 ASSAY OF VITAMIN E: CPT | Mod: 90 | Performed by: PATHOLOGY

## 2023-12-14 PROCEDURE — 83550 IRON BINDING TEST: CPT | Performed by: PATHOLOGY

## 2023-12-14 PROCEDURE — 83036 HEMOGLOBIN GLYCOSYLATED A1C: CPT | Performed by: PEDIATRICS

## 2023-12-14 PROCEDURE — 80053 COMPREHEN METABOLIC PANEL: CPT | Performed by: PATHOLOGY

## 2023-12-14 PROCEDURE — 84681 ASSAY OF C-PEPTIDE: CPT | Performed by: PEDIATRICS

## 2023-12-14 PROCEDURE — 85025 COMPLETE CBC W/AUTO DIFF WBC: CPT | Performed by: PATHOLOGY

## 2023-12-14 PROCEDURE — 84590 ASSAY OF VITAMIN A: CPT | Mod: 90 | Performed by: PATHOLOGY

## 2023-12-14 PROCEDURE — 84630 ASSAY OF ZINC: CPT | Mod: 90 | Performed by: PATHOLOGY

## 2023-12-14 RX ORDER — METHADONE HYDROCHLORIDE 5 MG/1
5 TABLET ORAL EVERY 8 HOURS
Qty: 42 TABLET | Refills: 0 | Status: SHIPPED | OUTPATIENT
Start: 2023-12-14 | End: 2023-12-27

## 2023-12-14 RX ORDER — METHOCARBAMOL 1000 MG/1
1 TABLET, FILM COATED ORAL 3 TIMES DAILY PRN
Qty: 42 TABLET | Refills: 0 | Status: SHIPPED | OUTPATIENT
Start: 2023-12-14 | End: 2023-12-27

## 2023-12-14 RX ORDER — HYDROMORPHONE HYDROCHLORIDE 4 MG/1
4 TABLET ORAL EVERY 12 HOURS PRN
Qty: 28 TABLET | Refills: 0 | Status: SHIPPED | OUTPATIENT
Start: 2023-12-14 | End: 2023-12-27

## 2023-12-14 NOTE — PROGRESS NOTES
Chief Complaint   Patient presents with    Allied [facility] Visit     Boost Test     not currently breastfeeding.    Administered Boost: 10:37am    FBS: 95    Called lab with boost time :     11:40am    12:40am    Patient is aware and given time to return to lab.    Olive Doyle CMA

## 2023-12-15 ENCOUNTER — DOCUMENTATION ONLY (OUTPATIENT)
Dept: TRANSPLANT | Facility: CLINIC | Age: 28
End: 2023-12-15
Payer: COMMERCIAL

## 2023-12-15 LAB
C PEPTIDE SERPL-MCNC: <0.1 NG/ML (ref 0.9–6.9)
C PEPTIDE SERPL-MCNC: <0.1 NG/ML (ref 0.9–6.9)

## 2023-12-15 NOTE — PROGRESS NOTES
Mixed meal (boost) test is inaccurate as wrong type of boost high protein drink was used.  Disregard C-Peptide levels

## 2023-12-16 LAB — ZINC SERPL-MCNC: 113.9 UG/DL

## 2023-12-17 LAB
A-TOCOPHEROL VIT E SERPL-MCNC: 10 MG/L
ANNOTATION COMMENT IMP: NORMAL
BETA+GAMMA TOCOPHEROL SERPL-MCNC: 0.4 MG/L
RETINYL PALMITATE SERPL-MCNC: <0.02 MG/L
VIT A SERPL-MCNC: 0.53 MG/L

## 2023-12-18 LAB
DEPRECATED CALCIDIOL+CALCIFEROL SERPL-MC: <64 UG/L (ref 20–75)
VITAMIN D2 SERPL-MCNC: <5 UG/L
VITAMIN D3 SERPL-MCNC: 59 UG/L

## 2023-12-19 LAB
A-LINOLENATE SERPL-SCNC: 42 NMOL/ML (ref 50–130)
AA SERPL-SCNC: 829 NMOL/ML (ref 520–1490)
ARACHIDATE SERPL-SCNC: 28 NMOL/ML (ref 50–90)
CLINICAL BIOCHEMIST REVIEW: ABNORMAL
DHA SERPL-SCNC: 98 NMOL/ML (ref 30–250)
DOCOSAPENTAENATE W6 SERPL-SCNC: 35 NMOL/ML (ref 10–70)
DOCOSATETRAENOATE SERPL-SCNC: 39 NMOL/ML (ref 10–80)
DOCOSENOATE SERPL-SCNC: 6 NMOL/ML (ref 4–13)
DPA SERPL-SCNC: 41 NMOL/ML (ref 20–210)
EPA SERPL-SCNC: 22 NMOL/ML (ref 14–100)
FA SERPL-SCNC: 9.6 MMOL/L (ref 7.3–16.8)
G-LINOLENATE SERPL-SCNC: 33 NMOL/ML (ref 16–150)
HEXADECENOATE SERPL-SCNC: 32 NMOL/ML (ref 25–105)
HOMO-G LINOLENATE SERPL-SCNC: 157 NMOL/ML (ref 50–250)
LAURATE SERPL-SCNC: 12 NMOL/ML (ref 6–90)
LINOLEATE SERPL-SCNC: 2503 NMOL/ML (ref 2270–3850)
MEAD ACID SERPL-SCNC: 24 NMOL/ML (ref 7–30)
MONOUNSAT FA SERPL-SCNC: 2.7 MMOL/L (ref 1.3–5.8)
MYRISTATE SERPL-SCNC: 90 NMOL/ML (ref 30–450)
NERVONATE SERPL-SCNC: 104 NMOL/ML (ref 60–100)
OCTADECANOATE SERPL-SCNC: 779 NMOL/ML (ref 590–1170)
OLEATE SERPL-SCNC: 2065 NMOL/ML (ref 650–3500)
PALMITATE SERPL-SCNC: 2114 NMOL/ML (ref 1480–3730)
PALMITOLEATE SERPL-SCNC: 268 NMOL/ML (ref 110–1130)
POLYUNSAT FA SERPL-SCNC: 3.8 MMOL/L (ref 3.2–5.8)
SAT FA SERPL-SCNC: 3.1 MMOL/L (ref 2.5–5.5)
TRIENOATE/AA SERPL-SRTO: 0.03 {RATIO} (ref 0.01–0.04)
VACCENATE SERPL-SCNC: 186 NMOL/ML (ref 280–740)
W3 FA SERPL-SCNC: 0.2 MMOL/L (ref 0.2–0.5)
W6 FA SERPL-SCNC: 3.6 MMOL/L (ref 3–5.4)

## 2023-12-20 DIAGNOSIS — Z86.39 HX OF INSULIN DEPENDENT DIABETES MELLITUS: ICD-10-CM

## 2023-12-20 DIAGNOSIS — G89.18 ACUTE POST-OPERATIVE PAIN: Primary | ICD-10-CM

## 2023-12-20 DIAGNOSIS — E89.1 POST-PANCREATECTOMY DIABETES (H): ICD-10-CM

## 2023-12-20 DIAGNOSIS — Z90.410 POST-PANCREATECTOMY DIABETES (H): ICD-10-CM

## 2023-12-20 DIAGNOSIS — E13.9 POST-PANCREATECTOMY DIABETES (H): ICD-10-CM

## 2023-12-20 RX ORDER — BLOOD PRESSURE TEST KIT
KIT MISCELLANEOUS
Qty: 100 EACH | Refills: 3 | Status: SHIPPED | OUTPATIENT
Start: 2023-12-20

## 2023-12-21 ENCOUNTER — VIRTUAL VISIT (OUTPATIENT)
Dept: TRANSPLANT | Facility: CLINIC | Age: 28
End: 2023-12-21
Attending: SURGERY
Payer: COMMERCIAL

## 2023-12-21 DIAGNOSIS — E89.1 POST-PANCREATECTOMY DIABETES (H): ICD-10-CM

## 2023-12-21 DIAGNOSIS — K85.90 ACUTE ON CHRONIC PANCREATITIS (H): Primary | ICD-10-CM

## 2023-12-21 DIAGNOSIS — Z90.410 POST-PANCREATECTOMY DIABETES (H): ICD-10-CM

## 2023-12-21 DIAGNOSIS — K86.1 ACUTE ON CHRONIC PANCREATITIS (H): Primary | ICD-10-CM

## 2023-12-21 DIAGNOSIS — E13.9 POST-PANCREATECTOMY DIABETES (H): ICD-10-CM

## 2023-12-21 PROCEDURE — 99024 POSTOP FOLLOW-UP VISIT: CPT | Mod: VID | Performed by: SURGERY

## 2023-12-21 NOTE — LETTER
2023         RE: Artie Burger  [address]  [place] [place] [address]        Dear Colleague,    Thank you for referring your patient, Artie Burger, to the [facility]. Please see a copy of my visit note below.        Pancreatitis Service - Progress Note    Video visit  Start time: 2:05  End time: 2:30  Patient location: home  Provider location; [facility]  Assessment & Plan: 26 yo F with a history of alcohol induced pancreatitis, now with ~2 years of sobriety. S/p TPIAT 23. Islet yield poor due to dense fibrosis of pancreas and difficulty with digestion. Postoperative course otherwise uneventful.    -continues with narcotic wean. Recovering well. Does have some minimal exocrine insufficiency, and we increased Creon dose. Otherwise no complaints. Planning on a cruise this winter, which we support.     RTC: as needed          Faculty: Junito Patel MD  __________________________________________________________________  Transplant History: pancreatitis consult  2023 ([place]), Postoperative day:      Interval History: History is obtained from the patient  S/p TPIAT 23. Tolerated surgery well and had fairly uneventful postoperative course. Pain control initially with potent IV analgesic, transitioned to PO methadone and dilaudid. Slowly weaning off. Doing very well overall. Has resumed normal diet and is slowly increasing activity. Interested in returning to work but wants to be gradual given physical nature.     Social History     Tobacco Use     Smoking status: Former     Current packs/day: 0.00     Types: Cigarettes     Quit date: 2022     Years since quittin.0     Smokeless tobacco: Never     Tobacco comments:     Vapes daily   Vaping Use     Vaping status: Every Day     Substances: Nicotine   Substance Use Topics     Alcohol use: Not Currently     Drug use: Yes     Types: Marijuana     Comment: smokes daily     ROS:   A 10-point review  of systems was negative except as noted above.    Curent Meds:      Physical Exam:     Admit      Current Vitals:   There were no vitals taken for this visit.         Vital sign ranges:       No data found.  General Appearance: in no apparent distress.   Skin: normal appearance, warm and dry  Heart: normal S1/S2  Lungs: without wheezes, nonlabored respirations  Abdomen: The abdomen is soft, flat, appropriate rolando-incisional tenderness. No erythema/drainage/evident hernia. Incisions and former drain/tube sites all healed without evident hernia  Extremities: edema: appears absent    Data:   CMP@LABRCNTIPR(na:2,potassium:2,chloride:2,co2:2,g,bun:2,cr:2,gfrestimated:2,gfrestblack:2,mark:2,icapoc:2,icaw:2,ma,phos:2,amylase:2,lipase:2,uamy24:3,albumin:2,bilitotal:2,biliconj:2,bilidelta:2,alkphos:2,ast:2,alt:2,fbili:1)@  CBC@LABRCNTIPR(hgb:2,wbc:2,a,plt:2,a1c:1)@  Coags@LABRCNTIPR(inr:2,ptt:2,Xa:2)@   Urinalysis  Recent Labs   Lab Test 23  1614   COLOR Yellow   APPEARANCE Slightly Cloudy*   URINEGLC Negative   URINEBILI Negative   URINEKETONE Negative   SG 1.021   UBLD Negative   URINEPH 7.0   PROTEIN 10*   NITRITE Negative   LEUKEST Negative   RBCU 2   WBCU <1          Again, thank you for allowing me to participate in the care of your patient.        Sincerely,        Junito Patel MD

## 2023-12-27 ENCOUNTER — MYC REFILL (OUTPATIENT)
Dept: TRANSPLANT | Facility: CLINIC | Age: 28
End: 2023-12-27
Payer: COMMERCIAL

## 2023-12-27 DIAGNOSIS — Z90.410 ACQUIRED TOTAL ABSENCE OF PANCREAS: ICD-10-CM

## 2023-12-28 RX ORDER — QUETIAPINE FUMARATE 25 MG/1
25 TABLET, FILM COATED ORAL AT BEDTIME
Qty: 30 TABLET | Refills: 0 | Status: SHIPPED | OUTPATIENT
Start: 2023-12-28 | End: 2024-03-17

## 2023-12-29 DIAGNOSIS — Z90.410 POST-PANCREATECTOMY DIABETES (H): Primary | ICD-10-CM

## 2023-12-29 DIAGNOSIS — E89.1 POST-PANCREATECTOMY DIABETES (H): Primary | ICD-10-CM

## 2023-12-29 DIAGNOSIS — E10.9 TYPE 1 DIABETES MELLITUS (H): ICD-10-CM

## 2023-12-29 DIAGNOSIS — E34.9: ICD-10-CM

## 2023-12-29 DIAGNOSIS — K86.81 EXOCRINE PANCREATIC INSUFFICIENCY: ICD-10-CM

## 2023-12-29 DIAGNOSIS — E13.9 POST-PANCREATECTOMY DIABETES (H): Primary | ICD-10-CM

## 2023-12-29 RX ORDER — CONTAINER,EMPTY
EACH MISCELLANEOUS
Qty: 1 EACH | Refills: 0 | Status: SHIPPED | OUTPATIENT
Start: 2023-12-29 | End: 2024-03-07

## 2024-01-02 NOTE — PROGRESS NOTES
Diabetes Consult Note        Artie Burger  is a 27 year old female who has a past medical history of Alcohol-induced chronic pancreatitis (H), Anxiety, Depression, Gastroesophageal reflux disease, Pancreatic disease, PONV (postoperative nausea and vomiting), and Type 1 diabetes mellitus (H). She is here for follow-up of diabetes.          1/2/2024     9:40 PM   including   1. Since your last visit to our clinic (or if this your first visit, since you last saw your primary care provider), have you experienced any of the following symptoms that may be related to low blood sugars? Weakness    Sweating that wasn't due to exertion    Shaking or trembling    Extreme hunger    Lightheadedness   2. Since your last visit to our clinic (or if this your first visit, since you last saw your primary care provider), have you experienced any of the following symptoms that may be related to prolonged high blood sugars? More thirst than usual   3. Have you had any concerns that you would like to discuss today? Chest pain   4. Do you have any female family members who have had heart attacks or strokes before age 60 or male relatives who have had heart attacks or strokes before age 50? No   5. Do you have any family members who have had complications from diabetes such as kidney disease, heart disease or strokes, retinopathy (a vision problem), or amputations? No   6. Who do you live with?  Myself   Little interest or pleasure in doing things? More than half the days   Feeling down, depressed, or hopeless? Several days   10.  Are you considering a pregnancy or interested in discussing pregnancy prevention today? No            Today:  Artie reports that things are going well and she is feeling good with better energy sleep and more appetite most days, though this is still not consistent.  She has been stressed struggling with depression anxiety but thinks things are improving and she is looking forward to a cruise this  weekend.   she is having more low blood sugars and using a lot less insulin. She is wondering if some of her islet cells might be working.   For example recently gave 2 units: 50 grams carb and was low a few hours later. For 70 g give 3 units and that worked better though her blood sugar might have been higher .      Current Diabetes Medications:  Lantus 8 units qam  NovoLog :has been giving 1 unit of correction if her blood sugar is above 200.  Overall she is giving usually 15 but up to 20 units of short acting insulin a day.    We reviewed glucometer/CGMS data together.  It revealed:    Average blood sugars 135 with 75% time in range and 6% low.  She at times is high overnight but at times is low overnight or in the early morning.  She occasionally has some extended postprandial hyperglycemic excursions but most are limited.  See details below    Artie's PMH, PSH and allergies were reviewed today and pertinent information is summarized above.    Artie's pertinent social and family history are also reviewed today and pertinent information is summarized above.      Current Outpatient Medications   Medication    acetaminophen *SUGAR FREE* (TYLENOL) 32 mg/mL solution    Alcohol Swabs PADS    Alcohol Swabs PADS    aspirin 81 MG EC tablet    blood glucose (NO BRAND SPECIFIED) lancets standard    blood glucose (NO BRAND SPECIFIED) test strip    blood glucose monitoring (NO BRAND SPECIFIED) meter device kit    busPIRone (BUSPAR) 15 MG tablet    citalopram (CELEXA) 40 MG tablet    Continuous Blood Gluc Sensor (FREESTYLE RAJANI 3 SENSOR) MISC    docusate (COLACE) 50 MG/5ML liquid    gabapentin (NEURONTIN) 600 MG tablet    Glucagon (GVOKE HYPOPEN) 1 MG/0.2ML pen    glucose (BD GLUCOSE) 4 g chewable tablet    HYDROmorphone (DILAUDID) 4 MG tablet    insulin aspart (NOVOLOG PEN) 100 UNIT/ML pen    insulin aspart (NOVOLOG PEN) 100 UNIT/ML pen    insulin aspart (NOVOLOG PEN) 100 UNIT/ML pen    insulin glargine (LANTUS PEN) 100  "UNIT/ML pen    insulin pen needle (32G X 4 MM) 32G X 4 MM miscellaneous    lipase-protease-amylase (CREON) 33264-27126-507476 units CPEP per EC capsule    medroxyPROGESTERone (DEPO-PROVERA) 150 MG/ML IM injection    methadone (DOLOPHINE) 5 MG tablet    Methocarbamol 1000 MG TABS    mvw complete formulation (CHEWABLES) tablet    Nutritional Supplements (BOOST HIGH PROTEIN) LIQD    ondansetron (ZOFRAN ODT) 4 MG ODT tab    pantoprazole (PROTONIX) 40 MG EC tablet    pantoprazole (PROTONIX) 40 MG EC tablet    polyethylene glycol (MIRALAX) 17 GM/Dose powder    QUEtiapine (SEROQUEL) 25 MG tablet    QUEtiapine (SEROQUEL) 25 MG tablet    sennosides (SENOKOT) 8.8 MG/5ML syrup    Sharps Container MISC    Sharps Container MISC     No current facility-administered medications for this visit.         ROS:   Reports good physical activity tolerance.  Denies any pedal lesions or vision changes or concerns. Denies any other acute concerns except as noted above.      Exam:    Wt 54.9 kg (121 lb)   BMI 22.13 kg/m    Wt Readings from Last 10 Encounters:   01/09/24 54.9 kg (121 lb)   12/14/23 54.9 kg (121 lb 0.5 oz)   10/05/23 60.8 kg (134 lb 1.6 oz)   10/03/23 57.3 kg (126 lb 6.4 oz)   09/21/23 58.5 kg (129 lb)   08/18/23 57.2 kg (126 lb)   07/12/23 61.7 kg (136 lb 0.4 oz)   06/29/23 60.4 kg (133 lb 1.6 oz)   05/10/23 60.1 kg (132 lb 7.9 oz)   04/20/23 60.6 kg (133 lb 9.6 oz)   Estimated body mass index is 22.13 kg/m  as calculated from the following:    Height as of 9/22/23: 1.575 m (5' 2\").    Weight as of this encounter: 54.9 kg (121 lb).    General: Pleasant, well apperaing in NAD vaping throughout visit.  Psych:  Mood is \"good,\" affect is appropriate.  Thought form and content are fluid and coherent.    HEENT: Eyes and sclera are clear. Extraocular movements are grossly intact without proptosis.  Nares are patent, mucous membranes moist.  Neck: No masses or JVD are noted.    Resp: Easy and unlabored breathing.   Neuro: Alert and " oriented, communicating clearly.     Data:      Recent Labs   Lab Test 12/14/23  1015 10/05/23  1435 09/23/23  0304 09/22/23  2345 09/22/23 2031 09/21/23  1614 09/21/23  1609 08/19/23  0742 08/18/23  1726   A1C 6.6*  --   --  5.5  --   --   --   --   --    TSH  --   --   --   --   --   --   --   --  1.68   LDL 47  --   --   --   --   --  75  --   --    HDL 62  --   --   --   --   --  54  --   --    TRIG 90  --   --   --   --   --  73  --   --    CR 0.72 0.59   < >  --    < >  --   --    < > 0.74   MICROL  --   --   --   --   --  <12.0  --   --   --    AST 37 19   < >  --    < >  --   --    < > 22   ALT 35 11   < >  --    < >  --   --    < > 21    < > = values in this interval not displayed.       Microalbuminuria:  Lab Results   Component Value Date    UMALCR  09/21/2023      Comment:      Unable to calculate, urine albumin and/or urine creatinine is outside detectable limits.  Microalbuminuria is defined as an albumin:creatinine ratio of 17 to 299 for males and 25 to 299 for females. A ratio of albumin:creatinine of 300 or higher is indicative of overt proteinuria.  Due to biologic variability, positive results should be confirmed by a second, first-morning random or 24-hour timed urine specimen. If there is discrepancy, a third specimen is recommended. When 2 out of 3 results are in the microalbuminuria range, this is evidence for incipient nephropathy and warrants increased efforts at glucose control, blood pressure control, and institution of therapy with an angiotensin-converting-enzyme (ACE) inhibitor (if the patient can tolerate it).         Most recent GFRs:    Lab Results   Component Value Date    GFRESTIMATED >90 12/14/2023    GFRESTIMATED >90 10/05/2023    GFRESTIMATED >90 10/02/2023       Lab Results   Component Value Date    CPEPT <0.1 (L) 12/14/2023    GADAB <5.0 06/28/2023    ISCAB <1:4 06/28/2023     FIB-4 Calculation: 0.15 at 10/5/2023  2:35 PM  Calculated from:  SGOT/AST: 19 U/L at 10/5/2023  2:35  PM  SGPT/ALT: 11 U/L at 10/5/2023  2:35 PM  Platelets: 1,093 10e3/uL at 10/5/2023  2:35 PM  Age: 27 years  AST   Date Value Ref Range Status   12/14/2023 37 0 - 45 U/L Final     Comment:     Reference intervals for this test were updated on 6/12/2023 to more accurately reflect our healthy population. There may be differences in the flagging of prior results with similar values performed with this method. Interpretation of those prior results can be made in the context of the updated reference intervals.     Lab Results   Component Value Date    ALT 11 10/05/2023         Most recent eye exam date: : Not Found       Assessment/Plan:    Artie Burger is a 27 year old 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     Post-pancreatectomy diabetes: Due to low islet cell count expect will remain insulin dependent.  She is doing well though weight has decreased and she is down just over 10 pounds from last spring.  She is becoming more insulin independent and requiring far less carbohydrate and correction scale insulin.      We will reduce her Lantus from 8 to 7 units daily.  I am advising she reduce her carbohydrate coverage to just 1 unit per 25 g of carb and asking that she work to give this 10 to 15 minutes before her meals.  I am only going to have her correct blood sugars less than 150 at this point to reduce hypoglycemia.  Recommending that she correct blood sugars greater than 150 with a sliding scale of 1:65    I think that she would benefit from the support of diabetes education and will discuss this with her at return to clinic.    Advised:    Please decrease Lantus to 7 units daily.  If yo are waking up with high blood sugars always >120 and often >150, please increase again to 8 units.    Please try giving just 1 unit : 25 g of carb 10 - 15 minutes before meals.  Please also give sliding scale as follows if BG >150 before eating as follows:    Correction  Scale - 1 unit : 65 mg/dl >150     Do Not give Correction Insulin if Pre-Meal BG less than 150   -215 = 1 unit  -280 = 2 units.  -345 = 3 units.  -410 = 4 units.  -475 = 5 units.  BG >475 = 6 units.  To be given with prandial insulin, and based on pre-meal blood glucose.   Notify provider if glucose greater than or equal 350 mg/dL after administration.    Our goal is average blood sugars <150, without >1% lows.  If still having frequent blood sugars <70 or begin having more BG >250, please let me know.        30 minutes spent on the date of the encounter doing chart review, history and exam, documentation, education and counseling, as well as communication and coordination of care, and further activities as noted above.      It is my privilege to be involved in the care of the above patient.     Caro Cormier PA-C, MPAS  [facility]  Diabetes, Endocrinology, and Metabolism  [phone] Appointments/Nurse  [phone] pager  [phone] nurse line    This note was completed in part using Dragon voice recognition, and may contain word and grammatical errors.  Joined the call at 1/9/2024, 2:24:12 pm.  Left the call at 1/9/2024, 2:46:13 pm.  You were on the call for 22 minutes .  Patient is at home provider is off-site platform is [employer].      Outcome for 01/02/24 10:51 AM: [employer] message sent  Willow Cao LPN   Outcome for 01/05/24 11:32 AM: Data obtained via [employer] website  Willow Cao LPN

## 2024-01-04 ENCOUNTER — DOCUMENTATION ONLY (OUTPATIENT)
Dept: TRANSPLANT | Facility: CLINIC | Age: 29
End: 2024-01-04
Payer: COMMERCIAL

## 2024-01-09 ENCOUNTER — VIRTUAL VISIT (OUTPATIENT)
Dept: ENDOCRINOLOGY | Facility: CLINIC | Age: 29
End: 2024-01-09
Payer: COMMERCIAL

## 2024-01-09 VITALS — BODY MASS INDEX: 22.13 KG/M2 | WEIGHT: 121 LBS

## 2024-01-09 DIAGNOSIS — E13.9 POST-PANCREATECTOMY DIABETES (H): Primary | ICD-10-CM

## 2024-01-09 DIAGNOSIS — R63.4 LOSS OF WEIGHT: ICD-10-CM

## 2024-01-09 DIAGNOSIS — Z90.410 POST-PANCREATECTOMY DIABETES (H): Primary | ICD-10-CM

## 2024-01-09 DIAGNOSIS — E89.1 POST-PANCREATECTOMY DIABETES (H): Primary | ICD-10-CM

## 2024-01-09 PROCEDURE — 99214 OFFICE O/P EST MOD 30 MIN: CPT | Mod: 95 | Performed by: PHYSICIAN ASSISTANT

## 2024-01-09 ASSESSMENT — PAIN SCALES - GENERAL: PAINLEVEL: NO PAIN (0)

## 2024-01-09 NOTE — PATIENT INSTRUCTIONS
Dear Artie,  Please decrease Lantus to 7 units daily.  If yo are waking up with high blood sugars always >120 and often >150, please increase again to 8 units.    Please try giving just 1 unit : 25 g of carb 10 - 15 minutes before meals.  Please also give sliding scale as follows if BG >150 before eating as follows:    Correction Scale - 1 unit : 65 mg/dl >150     Do Not give Correction Insulin if Pre-Meal BG less than 150   -215 = 1 unit  -280 = 2 units.  -345 = 3 units.  -410 = 4 units.  -475 = 5 units.  BG >475 = 6 units.  To be given with prandial insulin, and based on pre-meal blood glucose.   Notify provider if glucose greater than or equal 350 mg/dL after administration.    Our goal is average blood sugars <150, without >1% lows.  If still having frequent blood sugars <70 or begin having more BG >250, please let me know.      My best wishes,    Caro Cormier PA-C, MPAS  [facility]  Diabetes, Endocrinology, and Metabolism  [phone] Appointments/Nurse  [phone] Fax

## 2024-01-09 NOTE — LETTER
1/9/2024       RE: Artie Burger  [address]  [place] [facility] [address]     Dear Colleague,    Thank you for referring your patient, Artie Burger, to the [facility] [place] at [facility]. Please see a copy of my visit note below.               Diabetes Consult Note        Artie Burger  is a 27 year old female who has a past medical history of Alcohol-induced chronic pancreatitis (H), Anxiety, Depression, Gastroesophageal reflux disease, Pancreatic disease, PONV (postoperative nausea and vomiting), and Type 1 diabetes mellitus (H). She is here for follow-up of diabetes.          1/2/2024     9:40 PM   including   1. Since your last visit to our clinic (or if this your first visit, since you last saw your primary care provider), have you experienced any of the following symptoms that may be related to low blood sugars? Weakness    Sweating that wasn't due to exertion    Shaking or trembling    Extreme hunger    Lightheadedness   2. Since your last visit to our clinic (or if this your first visit, since you last saw your primary care provider), have you experienced any of the following symptoms that may be related to prolonged high blood sugars? More thirst than usual   3. Have you had any concerns that you would like to discuss today? Chest pain   4. Do you have any female family members who have had heart attacks or strokes before age 60 or male relatives who have had heart attacks or strokes before age 50? No   5. Do you have any family members who have had complications from diabetes such as kidney disease, heart disease or strokes, retinopathy (a vision problem), or amputations? No   6. Who do you live with?  Myself   Little interest or pleasure in doing things? More than half the days   Feeling down, depressed, or hopeless? Several days   10.  Are you considering a pregnancy or interested in discussing pregnancy prevention  today? No            Today:  Artie reports that things are going well and she is feeling good with better energy sleep and more appetite most days, though this is still not consistent.  She has been stressed struggling with depression anxiety but thinks things are improving and she is looking forward to a cruise this weekend.   she is having more low blood sugars and using a lot less insulin. She is wondering if some of her islet cells might be working.   For example recently gave 2 units: 50 grams carb and was low a few hours later. For 70 g give 3 units and that worked better though her blood sugar might have been higher .      Current Diabetes Medications:  Lantus 8 units qam  NovoLog :has been giving 1 unit of correction if her blood sugar is above 200.  Overall she is giving usually 15 but up to 20 units of short acting insulin a day.    We reviewed glucometer/CGMS data together.  It revealed:    Average blood sugars 135 with 75% time in range and 6% low.  She at times is high overnight but at times is low overnight or in the early morning.  She occasionally has some extended postprandial hyperglycemic excursions but most are limited.  See details below    Artie's PMH, PSH and allergies were reviewed today and pertinent information is summarized above.    Artie's pertinent social and family history are also reviewed today and pertinent information is summarized above.      Current Outpatient Medications   Medication    acetaminophen *SUGAR FREE* (TYLENOL) 32 mg/mL solution    Alcohol Swabs PADS    Alcohol Swabs PADS    aspirin 81 MG EC tablet    blood glucose (NO BRAND SPECIFIED) lancets standard    blood glucose (NO BRAND SPECIFIED) test strip    blood glucose monitoring (NO BRAND SPECIFIED) meter device kit    busPIRone (BUSPAR) 15 MG tablet    citalopram (CELEXA) 40 MG tablet    Continuous Blood Gluc Sensor (FREESTYLE RAJANI 3 SENSOR) MISC    docusate (COLACE) 50 MG/5ML liquid    gabapentin (NEURONTIN)  "600 MG tablet    Glucagon (GVOKE HYPOPEN) 1 MG/0.2ML pen    glucose (BD GLUCOSE) 4 g chewable tablet    HYDROmorphone (DILAUDID) 4 MG tablet    insulin aspart (NOVOLOG PEN) 100 UNIT/ML pen    insulin aspart (NOVOLOG PEN) 100 UNIT/ML pen    insulin aspart (NOVOLOG PEN) 100 UNIT/ML pen    insulin glargine (LANTUS PEN) 100 UNIT/ML pen    insulin pen needle (32G X 4 MM) 32G X 4 MM miscellaneous    lipase-protease-amylase (CREON) 41712-55914-823097 units CPEP per EC capsule    medroxyPROGESTERone (DEPO-PROVERA) 150 MG/ML IM injection    methadone (DOLOPHINE) 5 MG tablet    Methocarbamol 1000 MG TABS    mvw complete formulation (CHEWABLES) tablet    Nutritional Supplements (BOOST HIGH PROTEIN) LIQD    ondansetron (ZOFRAN ODT) 4 MG ODT tab    pantoprazole (PROTONIX) 40 MG EC tablet    pantoprazole (PROTONIX) 40 MG EC tablet    polyethylene glycol (MIRALAX) 17 GM/Dose powder    QUEtiapine (SEROQUEL) 25 MG tablet    QUEtiapine (SEROQUEL) 25 MG tablet    sennosides (SENOKOT) 8.8 MG/5ML syrup    Sharps Container MISC    Sharps Container MISC     No current facility-administered medications for this visit.         ROS:   Reports good physical activity tolerance.  Denies any pedal lesions or vision changes or concerns. Denies any other acute concerns except as noted above.      Exam:    Wt 54.9 kg (121 lb)   BMI 22.13 kg/m    Wt Readings from Last 10 Encounters:   01/09/24 54.9 kg (121 lb)   12/14/23 54.9 kg (121 lb 0.5 oz)   10/05/23 60.8 kg (134 lb 1.6 oz)   10/03/23 57.3 kg (126 lb 6.4 oz)   09/21/23 58.5 kg (129 lb)   08/18/23 57.2 kg (126 lb)   07/12/23 61.7 kg (136 lb 0.4 oz)   06/29/23 60.4 kg (133 lb 1.6 oz)   05/10/23 60.1 kg (132 lb 7.9 oz)   04/20/23 60.6 kg (133 lb 9.6 oz)   Estimated body mass index is 22.13 kg/m  as calculated from the following:    Height as of 9/22/23: 1.575 m (5' 2\").    Weight as of this encounter: 54.9 kg (121 lb).    General: Pleasant, well apperaing in NAD vaping throughout visit.  Psych:  " "Mood is \"good,\" affect is appropriate.  Thought form and content are fluid and coherent.    HEENT: Eyes and sclera are clear. Extraocular movements are grossly intact without proptosis.  Nares are patent, mucous membranes moist.  Neck: No masses or JVD are noted.    Resp: Easy and unlabored breathing.   Neuro: Alert and oriented, communicating clearly.     Data:      Recent Labs   Lab Test 12/14/23  1015 10/05/23  1435 09/23/23  0304 09/22/23  2345 09/22/23  2031 09/21/23  1614 09/21/23  1609 08/19/23  0742 08/18/23  1726   A1C 6.6*  --   --  5.5  --   --   --   --   --    TSH  --   --   --   --   --   --   --   --  1.68   LDL 47  --   --   --   --   --  75  --   --    HDL 62  --   --   --   --   --  54  --   --    TRIG 90  --   --   --   --   --  73  --   --    CR 0.72 0.59   < >  --    < >  --   --    < > 0.74   MICROL  --   --   --   --   --  <12.0  --   --   --    AST 37 19   < >  --    < >  --   --    < > 22   ALT 35 11   < >  --    < >  --   --    < > 21    < > = values in this interval not displayed.       Microalbuminuria:  Lab Results   Component Value Date    [facility]  09/21/2023      Comment:      Unable to calculate, urine albumin and/or urine creatinine is outside detectable limits.  Microalbuminuria is defined as an albumin:creatinine ratio of 17 to 299 for males and 25 to 299 for females. A ratio of albumin:creatinine of 300 or higher is indicative of overt proteinuria.  Due to biologic variability, positive results should be confirmed by a second, first-morning random or 24-hour timed urine specimen. If there is discrepancy, a third specimen is recommended. When 2 out of 3 results are in the microalbuminuria range, this is evidence for incipient nephropathy and warrants increased efforts at glucose control, blood pressure control, and institution of therapy with an angiotensin-converting-enzyme (ACE) inhibitor (if the patient can tolerate it).         Most recent GFRs:    Lab Results   Component Value " Date    GFRESTIMATED >90 12/14/2023    GFRESTIMATED >90 10/05/2023    GFRESTIMATED >90 10/02/2023       Lab Results   Component Value Date    CPEPT <0.1 (L) 12/14/2023    GADAB <5.0 06/28/2023    ISCAB <1:4 06/28/2023     FIB-4 Calculation: 0.15 at 10/5/2023  2:35 PM  Calculated from:  SGOT/AST: 19 U/L at 10/5/2023  2:35 PM  SGPT/ALT: 11 U/L at 10/5/2023  2:35 PM  Platelets: 1,093 10e3/uL at 10/5/2023  2:35 PM  Age: 27 years  AST   Date Value Ref Range Status   12/14/2023 37 0 - 45 U/L Final     Comment:     Reference intervals for this test were updated on 6/12/2023 to more accurately reflect our healthy population. There may be differences in the flagging of prior results with similar values performed with this method. Interpretation of those prior results can be made in the context of the updated reference intervals.     Lab Results   Component Value Date    ALT 11 10/05/2023         Most recent eye exam date: : Not Found       Assessment/Plan:    Artie Burger is a 27 year old 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     Post-pancreatectomy diabetes: Due to low islet cell count expect will remain insulin dependent.  She is doing well though weight has decreased and she is down just over 10 pounds from last spring.  She is becoming more insulin independent and requiring far less carbohydrate and correction scale insulin.      We will reduce her Lantus from 8 to 7 units daily.  I am advising she reduce her carbohydrate coverage to just 1 unit per 25 g of carb and asking that she work to give this 10 to 15 minutes before her meals.  I am only going to have her correct blood sugars less than 150 at this point to reduce hypoglycemia.  Recommending that she correct blood sugars greater than 150 with a sliding scale of 1:65    I think that she would benefit from the support of diabetes education and will discuss this with her at return to  clinic.    Advised:    Please decrease Lantus to 7 units daily.  If yo are waking up with high blood sugars always >120 and often >150, please increase again to 8 units.    Please try giving just 1 unit : 25 g of carb 10 - 15 minutes before meals.  Please also give sliding scale as follows if BG >150 before eating as follows:    Correction Scale - 1 unit : 65 mg/dl >150     Do Not give Correction Insulin if Pre-Meal BG less than 150   -215 = 1 unit  -280 = 2 units.  -345 = 3 units.  -410 = 4 units.  -475 = 5 units.  BG >475 = 6 units.  To be given with prandial insulin, and based on pre-meal blood glucose.   Notify provider if glucose greater than or equal 350 mg/dL after administration.    Our goal is average blood sugars <150, without >1% lows.  If still having frequent blood sugars <70 or begin having more BG >250, please let me know.        30 minutes spent on the date of the encounter doing chart review, history and exam, documentation, education and counseling, as well as communication and coordination of care, and further activities as noted above.      It is my privilege to be involved in the care of the above patient.     Caro Cormier PA-C, MPAS  [facility]  Diabetes, Endocrinology, and Metabolism  [phone] Appointments/Nurse  [phone] pager  [phone] nurse line    This note was completed in part using Dragon voice recognition, and may contain word and grammatical errors.  Joined the call at 1/9/2024, 2:24:12 pm.  Left the call at 1/9/2024, 2:46:13 pm.  You were on the call for 22 minutes .  Patient is at home provider is off-site platform is [email or web address].      Outcome for 01/02/24 10:51 AM: [employer] message sent  Willow Cao LPN   Outcome for 01/05/24 11:32 AM: Data obtained via [employer] website  Willow Cao LPN

## 2024-01-09 NOTE — NURSING NOTE
Is the patient currently in the state of [place]? YES    Visit mode:VIDEO    If the visit is dropped, the patient can be reconnected by: VIDEO VISIT: Text to cell phone:   Telephone Information:   Mobile [phone]       Will anyone else be joining the visit? NO  (If patient encounters technical issues they should call [phone]    How would you like to obtain your AVS? MyChart    Are changes needed to the allergy or medication list? No    Reason for visit: Follow Up    Layla AGUSTIN

## 2024-01-09 NOTE — PROGRESS NOTES
"Virtual Visit Details    Type of service:  Video Visit   Video Start Time: {video visit start/end time for provider to select:948127}  Video End Time:{video visit start/end time for provider to select:252003}    Originating Location (pt. Location): {video visit patient location:163253::\"Home\"}  {PROVIDER LOCATION On-site should be selected for visits conducted from your clinic location or adjoining [facility] hospital, academic office, or other nearby [facility] building. Off-site should be selected for all other provider locations, including home:395019}  Distant Location (provider location):  {virtual location provider:966401}  Platform used for Video Visit: {Virtual Visit Platforms:732314::\"Caktus\"}    "

## 2024-01-10 DIAGNOSIS — G89.18 ACUTE POST-OPERATIVE PAIN: ICD-10-CM

## 2024-01-10 RX ORDER — METHADONE HYDROCHLORIDE 5 MG/1
5 TABLET ORAL EVERY 8 HOURS
Qty: 42 TABLET | Refills: 0 | Status: SHIPPED | OUTPATIENT
Start: 2024-01-10 | End: 2024-01-25

## 2024-01-10 RX ORDER — HYDROMORPHONE HYDROCHLORIDE 4 MG/1
4 TABLET ORAL DAILY
Qty: 25 TABLET | Refills: 0 | Status: SHIPPED | OUTPATIENT
Start: 2024-01-10 | End: 2024-01-23

## 2024-01-11 ENCOUNTER — TELEPHONE (OUTPATIENT)
Dept: ENDOCRINOLOGY | Facility: CLINIC | Age: 29
End: 2024-01-11
Payer: COMMERCIAL

## 2024-01-11 NOTE — TELEPHONE ENCOUNTER
Spoke with patient and scheduled the 3 mo follow-up appointment with Caro Cormier for 4/23/24- per the checkout order. Patient agreeable to diabetes education, message sent to provider to put in referral.

## 2024-01-23 ENCOUNTER — MYC REFILL (OUTPATIENT)
Dept: TRANSPLANT | Facility: CLINIC | Age: 29
End: 2024-01-23
Payer: COMMERCIAL

## 2024-01-23 DIAGNOSIS — G89.18 ACUTE POST-OPERATIVE PAIN: ICD-10-CM

## 2024-01-23 RX ORDER — HYDROMORPHONE HYDROCHLORIDE 4 MG/1
4 TABLET ORAL PRN
Qty: 20 TABLET | Refills: 0 | Status: SHIPPED | OUTPATIENT
Start: 2024-01-23 | End: 2024-02-20

## 2024-01-23 RX ORDER — METHOCARBAMOL 1000 MG/1
1 TABLET, FILM COATED ORAL 2 TIMES DAILY PRN
Qty: 60 TABLET | Refills: 0 | Status: SHIPPED | OUTPATIENT
Start: 2024-01-23 | End: 2024-03-22

## 2024-01-25 ENCOUNTER — MYC REFILL (OUTPATIENT)
Dept: TRANSPLANT | Facility: CLINIC | Age: 29
End: 2024-01-25
Payer: COMMERCIAL

## 2024-01-25 DIAGNOSIS — G89.18 ACUTE POST-OPERATIVE PAIN: ICD-10-CM

## 2024-01-25 RX ORDER — METHADONE HYDROCHLORIDE 5 MG/1
5 TABLET ORAL EVERY 12 HOURS
Qty: 28 TABLET | Refills: 0 | Status: SHIPPED | OUTPATIENT
Start: 2024-01-25 | End: 2024-03-27

## 2024-02-01 ENCOUNTER — VIRTUAL VISIT (OUTPATIENT)
Dept: TRANSPLANT | Facility: CLINIC | Age: 29
End: 2024-02-01
Attending: SURGERY
Payer: COMMERCIAL

## 2024-02-01 DIAGNOSIS — E89.1 POST-PANCREATECTOMY DIABETES (H): Primary | ICD-10-CM

## 2024-02-01 DIAGNOSIS — K85.90 ACUTE ON CHRONIC PANCREATITIS (H): ICD-10-CM

## 2024-02-01 DIAGNOSIS — K86.1 ACUTE ON CHRONIC PANCREATITIS (H): ICD-10-CM

## 2024-02-01 DIAGNOSIS — Z90.410 ACQUIRED TOTAL ABSENCE OF PANCREAS: ICD-10-CM

## 2024-02-01 DIAGNOSIS — Z90.410 POST-PANCREATECTOMY DIABETES (H): Primary | ICD-10-CM

## 2024-02-01 DIAGNOSIS — E13.9 POST-PANCREATECTOMY DIABETES (H): Primary | ICD-10-CM

## 2024-02-01 PROCEDURE — 99213 OFFICE O/P EST LOW 20 MIN: CPT | Mod: 95 | Performed by: SURGERY

## 2024-02-01 NOTE — LETTER
2024         RE: Artie Burger  [address]  [facility] [place] [facility] [address]        Dear Colleague,    Thank you for referring your patient, Artie Burger, to the [facility]. Please see a copy of my visit note below.        Pancreatitis Service - Progress Note    Video visit  Start time: 2:10  End time: 2:225  Patient location: home  Provider location; 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 S/p TPIAT 23. Islet yield poor due to dense fibrosis of pancreas and difficulty with digestion. Postoperative course otherwise uneventful.    -continue PO narcotic wean. On minimal dosing at this poing, recovering well    Doing well with eating and drinking. Minimal abdominal pain, no nausea, normal bowel movements    No issues with incision- no erythema, no induration, no evident hernia.    Returned to work and is tolerating well. Went on a cruise recently and did note some lower energy levels but overall felt good to get out.     RTC: as needed          Faculty: Junito Patel MD  __________________________________________________________________  Transplant History: pancreatitis consult  2023 (Islet), Postoperative day:      Interval History: History is obtained from the patient  S/p TPIAT 23. Tolerated surgery well and had fairly uneventful postoperative course. Pain control initially with potent IV analgesic, transitioned to PO methadone and dilaudid. Slowly weaning off.     Interval history: continues narcotic wean- intermittent use. Eating and drinking well but does sometimes feel bloated and occasional abdominal pain with this. Stooling normally.     Social History     Tobacco Use     Smoking status: Former     Types: Cigarettes     Quit date: 2022     Years since quittin.8     Smokeless tobacco: Current     Tobacco comments:     Vapes daily   Vaping Use     Vaping Use:  Every day     Substances: Nicotine   Substance Use Topics     Alcohol use: Not Currently     Drug use: Yes     Types: Marijuana     Comment: smokes daily     ROS:   A 10-point review of systems was negative except as noted above.    Curent Meds:      Physical Exam:     Admit      Current Vitals:   There were no vitals taken for this visit.         Vital sign ranges:       No data found.  General Appearance: in no apparent distress.   Skin: normal appearance, warm and dry  Heart: deferred- video visit  Lungs: without wheezes, nonlabored respirations  Abdomen: The abdomen is soft, flat. No distention. Incisions appear well-healed.   Extremities: edema: appears absent    Data:   CMP@LABRCNTIPR(na:2,potassium:2,chloride:2,co2:2,g,bun:2,cr:2,gfrestimated:2,gfrestblack:2,mark:2,icapoc:2,icaw:2,ma,phos:2,amylase:2,lipase:2,uamy24:3,albumin:2,bilitotal:2,biliconj:2,bilidelta:2,alkphos:2,ast:2,alt:2,fbili:1)@  CBC@LABRCNTIPR(hgb:2,wbc:2,a,plt:2,a1c:1)@  Coags@LABRCNTIPR(inr:2,ptt:2,Xa:2)@   Urinalysis  Recent Labs   Lab Test 23  1614   COLOR Yellow   APPEARANCE Slightly Cloudy*   URINEGLC Negative   URINEBILI Negative   URINEKETONE Negative   SG 1.021   UBLD Negative   URINEPH 7.0   PROTEIN 10*   NITRITE Negative   LEUKEST Negative   RBCU 2   WBCU <1          Again, thank you for allowing me to participate in the care of your patient.        Sincerely,        Junito Patel MD

## 2024-03-06 DIAGNOSIS — G89.18 ACUTE POST-OPERATIVE PAIN: ICD-10-CM

## 2024-03-07 DIAGNOSIS — Z90.410 POST-PANCREATECTOMY DIABETES (H): Primary | ICD-10-CM

## 2024-03-07 DIAGNOSIS — E13.9 POST-PANCREATECTOMY DIABETES (H): Primary | ICD-10-CM

## 2024-03-07 DIAGNOSIS — E89.1 POST-PANCREATECTOMY DIABETES (H): Primary | ICD-10-CM

## 2024-03-08 DIAGNOSIS — K86.81 EXOCRINE PANCREATIC INSUFFICIENCY: ICD-10-CM

## 2024-03-08 DIAGNOSIS — Z90.410 ACQUIRED TOTAL ABSENCE OF PANCREAS: ICD-10-CM

## 2024-03-08 RX ORDER — HYDROMORPHONE HYDROCHLORIDE 4 MG/1
4 TABLET ORAL EVERY 12 HOURS PRN
Qty: 20 TABLET | Refills: 0 | Status: SHIPPED | OUTPATIENT
Start: 2024-03-08 | End: 2024-03-17

## 2024-03-13 DIAGNOSIS — E13.9 POST-PANCREATECTOMY DIABETES (H): ICD-10-CM

## 2024-03-13 DIAGNOSIS — Z90.410 ACQUIRED TOTAL ABSENCE OF PANCREAS: Primary | ICD-10-CM

## 2024-03-13 DIAGNOSIS — E89.1 POST-PANCREATECTOMY DIABETES (H): ICD-10-CM

## 2024-03-13 DIAGNOSIS — E10.9 TYPE 1 DIABETES MELLITUS (H): ICD-10-CM

## 2024-03-13 DIAGNOSIS — Z90.410 POST-PANCREATECTOMY DIABETES (H): ICD-10-CM

## 2024-03-17 ENCOUNTER — MYC REFILL (OUTPATIENT)
Dept: TRANSPLANT | Facility: CLINIC | Age: 29
End: 2024-03-17
Payer: COMMERCIAL

## 2024-03-17 DIAGNOSIS — G89.18 ACUTE POST-OPERATIVE PAIN: ICD-10-CM

## 2024-03-17 DIAGNOSIS — Z90.410 ACQUIRED TOTAL ABSENCE OF PANCREAS: ICD-10-CM

## 2024-03-18 NOTE — PROGRESS NOTES
Pancreatitis Service - Progress Note    Video visit  Start time: 2:10  End time: 2:225  Patient location: home  Provider location; 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 S/p TPIAT 23. Islet yield poor due to dense fibrosis of pancreas and difficulty with digestion. Postoperative course otherwise uneventful.    -continue PO narcotic wean. On minimal dosing at this poing, recovering well    Doing well with eating and drinking. Minimal abdominal pain, no nausea, normal bowel movements    No issues with incision- no erythema, no induration, no evident hernia.    Returned to work and is tolerating well. Went on a cruise recently and did note some lower energy levels but overall felt good to get out.     RTC: as needed          Faculty: Junito Patel MD  __________________________________________________________________  Transplant History: pancreatitis consult  2023 (Islet), Postoperative day:      Interval History: History is obtained from the patient  S/p TPIAT 23. Tolerated surgery well and had fairly uneventful postoperative course. Pain control initially with potent IV analgesic, transitioned to PO methadone and dilaudid. Slowly weaning off.     Interval history: continues narcotic wean- intermittent use. Eating and drinking well but does sometimes feel bloated and occasional abdominal pain with this. Stooling normally.     Social History     Tobacco Use    Smoking status: Former     Types: Cigarettes     Quit date: 2022     Years since quittin.8    Smokeless tobacco: Current    Tobacco comments:     Vapes daily   Vaping Use    Vaping Use: Every day    Substances: Nicotine   Substance Use Topics    Alcohol use: Not Currently    Drug use: Yes     Types: Marijuana     Comment: smokes daily     ROS:   A 10-point review of systems was negative except as noted above.    Curent Meds:      Physical  Exam:     Admit      Current Vitals:   There were no vitals taken for this visit.         Vital sign ranges:       No data found.  General Appearance: in no apparent distress.   Skin: normal appearance, warm and dry  Heart: deferred- video visit  Lungs: without wheezes, nonlabored respirations  Abdomen: The abdomen is soft, flat. No distention. Incisions appear well-healed.   Extremities: edema: appears absent    Data:   CMP@LABRCNTIPR(na:2,potassium:2,chloride:2,co2:2,g,bun:2,cr:2,gfrestimated:2,gfrestblack:2,mark:2,icapoc:2,icaw:2,ma,phos:2,amylase:2,lipase:2,uamy24:3,albumin:2,bilitotal:2,biliconj:2,bilidelta:2,alkphos:2,ast:2,alt:2,fbili:1)@  CBC@LABRCNTIPR(hgb:2,wbc:2,a,plt:2,a1c:1)@  Coags@LABRCNTIPR(inr:2,ptt:2,Xa:2)@   Urinalysis  Recent Labs   Lab Test 23  1614   COLOR Yellow   APPEARANCE Slightly Cloudy*   URINEGLC Negative   URINEBILI Negative   URINEKETONE Negative   SG 1.021   UBLD Negative   URINEPH 7.0   PROTEIN 10*   NITRITE Negative   LEUKEST Negative   RBCU 2   WBCU <1

## 2024-03-19 RX ORDER — QUETIAPINE FUMARATE 25 MG/1
25 TABLET, FILM COATED ORAL AT BEDTIME
Qty: 30 TABLET | Refills: 0 | Status: SHIPPED | OUTPATIENT
Start: 2024-03-19 | End: 2024-06-11

## 2024-03-19 RX ORDER — HYDROMORPHONE HYDROCHLORIDE 4 MG/1
4 TABLET ORAL EVERY 12 HOURS PRN
Qty: 20 TABLET | Refills: 0 | Status: SHIPPED | OUTPATIENT
Start: 2024-03-19 | End: 2024-03-27

## 2024-03-21 ENCOUNTER — OFFICE VISIT (OUTPATIENT)
Dept: TRANSPLANT | Facility: CLINIC | Age: 29
End: 2024-03-21
Attending: SURGERY
Payer: COMMERCIAL

## 2024-03-21 ENCOUNTER — ANCILLARY PROCEDURE (OUTPATIENT)
Dept: CT IMAGING | Facility: CLINIC | Age: 29
End: 2024-03-21
Payer: COMMERCIAL

## 2024-03-21 ENCOUNTER — LAB (OUTPATIENT)
Dept: LAB | Facility: CLINIC | Age: 29
End: 2024-03-21
Attending: SURGERY
Payer: COMMERCIAL

## 2024-03-21 VITALS
HEART RATE: 77 BPM | DIASTOLIC BLOOD PRESSURE: 63 MMHG | WEIGHT: 125 LBS | SYSTOLIC BLOOD PRESSURE: 101 MMHG | OXYGEN SATURATION: 98 % | BODY MASS INDEX: 22.86 KG/M2

## 2024-03-21 DIAGNOSIS — K43.2 INCISIONAL HERNIA, WITHOUT OBSTRUCTION OR GANGRENE: Primary | ICD-10-CM

## 2024-03-21 DIAGNOSIS — E10.9 TYPE 1 DIABETES MELLITUS (H): ICD-10-CM

## 2024-03-21 DIAGNOSIS — E13.9 POST-PANCREATECTOMY DIABETES (H): ICD-10-CM

## 2024-03-21 DIAGNOSIS — Z90.410 POST-PANCREATECTOMY DIABETES (H): ICD-10-CM

## 2024-03-21 DIAGNOSIS — K86.1 CHRONIC PANCREATITIS (H): ICD-10-CM

## 2024-03-21 DIAGNOSIS — E89.1 POST-PANCREATECTOMY DIABETES (H): ICD-10-CM

## 2024-03-21 DIAGNOSIS — Z90.410 ACQUIRED TOTAL ABSENCE OF PANCREAS: ICD-10-CM

## 2024-03-21 LAB
ALBUMIN SERPL BCG-MCNC: 4.3 G/DL (ref 3.5–5.2)
ALP SERPL-CCNC: 111 U/L (ref 40–150)
ALT SERPL W P-5'-P-CCNC: 25 U/L (ref 0–50)
ANION GAP SERPL CALCULATED.3IONS-SCNC: 11 MMOL/L (ref 7–15)
AST SERPL W P-5'-P-CCNC: 37 U/L (ref 0–45)
BASOPHILS # BLD AUTO: 0.1 10E3/UL (ref 0–0.2)
BASOPHILS NFR BLD AUTO: 1 %
BILIRUB SERPL-MCNC: 0.3 MG/DL
BUN SERPL-MCNC: 14.4 MG/DL (ref 6–20)
CALCIUM SERPL-MCNC: 9.3 MG/DL (ref 8.6–10)
CHLORIDE SERPL-SCNC: 96 MMOL/L (ref 98–107)
CHOLEST SERPL-MCNC: 149 MG/DL
CREAT SERPL-MCNC: 0.89 MG/DL (ref 0.51–0.95)
DEPRECATED HCO3 PLAS-SCNC: 26 MMOL/L (ref 22–29)
EGFRCR SERPLBLD CKD-EPI 2021: 89 ML/MIN/1.73M2
EOSINOPHIL # BLD AUTO: 0.2 10E3/UL (ref 0–0.7)
EOSINOPHIL NFR BLD AUTO: 1 %
ERYTHROCYTE [DISTWIDTH] IN BLOOD BY AUTOMATED COUNT: 14 % (ref 10–15)
FASTING STATUS PATIENT QL REPORTED: YES
FERRITIN SERPL-MCNC: 22 NG/ML (ref 6–175)
GLUCOSE SERPL-MCNC: 207 MG/DL (ref 70–99)
HBA1C MFR BLD: 8.2 %
HCT VFR BLD AUTO: 29.7 % (ref 35–47)
HDLC SERPL-MCNC: 64 MG/DL
HGB BLD-MCNC: 10.1 G/DL (ref 11.7–15.7)
IMM GRANULOCYTES # BLD: 0 10E3/UL
IMM GRANULOCYTES NFR BLD: 0 %
IRON BINDING CAPACITY (ROCHE): 333 UG/DL (ref 240–430)
IRON SATN MFR SERPL: 31 % (ref 15–46)
IRON SERPL-MCNC: 104 UG/DL (ref 37–145)
LDLC SERPL CALC-MCNC: 58 MG/DL
LYMPHOCYTES # BLD AUTO: 4.2 10E3/UL (ref 0.8–5.3)
LYMPHOCYTES NFR BLD AUTO: 35 %
MCH RBC QN AUTO: 30.4 PG (ref 26.5–33)
MCHC RBC AUTO-ENTMCNC: 34 G/DL (ref 31.5–36.5)
MCV RBC AUTO: 90 FL (ref 78–100)
MONOCYTES # BLD AUTO: 1.2 10E3/UL (ref 0–1.3)
MONOCYTES NFR BLD AUTO: 10 %
NEUTROPHILS # BLD AUTO: 6.1 10E3/UL (ref 1.6–8.3)
NEUTROPHILS NFR BLD AUTO: 53 %
NONHDLC SERPL-MCNC: 85 MG/DL
NRBC # BLD AUTO: 0 10E3/UL
NRBC BLD AUTO-RTO: 0 /100
PLATELET # BLD AUTO: 555 10E3/UL (ref 150–450)
POTASSIUM SERPL-SCNC: 4 MMOL/L (ref 3.4–5.3)
PROT SERPL-MCNC: 6.8 G/DL (ref 6.4–8.3)
RBC # BLD AUTO: 3.32 10E6/UL (ref 3.8–5.2)
SODIUM SERPL-SCNC: 133 MMOL/L (ref 135–145)
TRIGL SERPL-MCNC: 134 MG/DL
VIT B12 SERPL-MCNC: 394 PG/ML (ref 232–1245)
WBC # BLD AUTO: 11.7 10E3/UL (ref 4–11)

## 2024-03-21 PROCEDURE — 80053 COMPREHEN METABOLIC PANEL: CPT | Performed by: PATHOLOGY

## 2024-03-21 PROCEDURE — 80061 LIPID PANEL: CPT | Performed by: PATHOLOGY

## 2024-03-21 PROCEDURE — 82607 VITAMIN B-12: CPT | Performed by: SURGERY

## 2024-03-21 PROCEDURE — G0463 HOSPITAL OUTPT CLINIC VISIT: HCPCS | Performed by: SURGERY

## 2024-03-21 PROCEDURE — 84590 ASSAY OF VITAMIN A: CPT | Mod: 90 | Performed by: PATHOLOGY

## 2024-03-21 PROCEDURE — 99000 SPECIMEN HANDLING OFFICE-LAB: CPT | Performed by: PATHOLOGY

## 2024-03-21 PROCEDURE — 74177 CT ABD & PELVIS W/CONTRAST: CPT | Performed by: INTERNAL MEDICINE

## 2024-03-21 PROCEDURE — 83550 IRON BINDING TEST: CPT | Performed by: PATHOLOGY

## 2024-03-21 PROCEDURE — 36415 COLL VENOUS BLD VENIPUNCTURE: CPT | Performed by: SURGERY

## 2024-03-21 PROCEDURE — 84134 ASSAY OF PREALBUMIN: CPT | Performed by: SURGERY

## 2024-03-21 PROCEDURE — 84446 ASSAY OF VITAMIN E: CPT | Mod: 90 | Performed by: PATHOLOGY

## 2024-03-21 PROCEDURE — 84681 ASSAY OF C-PEPTIDE: CPT | Performed by: SURGERY

## 2024-03-21 PROCEDURE — 82725 ASSAY OF BLOOD FATTY ACIDS: CPT | Mod: 90 | Performed by: PATHOLOGY

## 2024-03-21 PROCEDURE — 82728 ASSAY OF FERRITIN: CPT | Performed by: PATHOLOGY

## 2024-03-21 PROCEDURE — 84630 ASSAY OF ZINC: CPT | Mod: 90 | Performed by: PATHOLOGY

## 2024-03-21 PROCEDURE — 83036 HEMOGLOBIN GLYCOSYLATED A1C: CPT | Performed by: SURGERY

## 2024-03-21 PROCEDURE — 36415 COLL VENOUS BLD VENIPUNCTURE: CPT | Performed by: PATHOLOGY

## 2024-03-21 PROCEDURE — 99213 OFFICE O/P EST LOW 20 MIN: CPT | Performed by: SURGERY

## 2024-03-21 PROCEDURE — 83540 ASSAY OF IRON: CPT | Performed by: PATHOLOGY

## 2024-03-21 PROCEDURE — 82306 VITAMIN D 25 HYDROXY: CPT | Performed by: SURGERY

## 2024-03-21 PROCEDURE — 85025 COMPLETE CBC W/AUTO DIFF WBC: CPT | Performed by: PATHOLOGY

## 2024-03-21 RX ORDER — IOPAMIDOL 755 MG/ML
71 INJECTION, SOLUTION INTRAVASCULAR ONCE
Status: COMPLETED | OUTPATIENT
Start: 2024-03-21 | End: 2024-03-21

## 2024-03-21 RX ORDER — LUBIPROSTONE 24 UG/1
24 CAPSULE ORAL 2 TIMES DAILY WITH MEALS
COMMUNITY
Start: 2024-03-07 | End: 2024-06-11

## 2024-03-21 RX ADMIN — IOPAMIDOL 71 ML: 755 INJECTION, SOLUTION INTRAVASCULAR at 10:46

## 2024-03-21 ASSESSMENT — PAIN SCALES - GENERAL: PAINLEVEL: MODERATE PAIN (4)

## 2024-03-21 NOTE — DISCHARGE INSTRUCTIONS

## 2024-03-21 NOTE — Clinical Note
3/21/2024         RE: Artie Burger  [address]  [facility] [address]        Dear Colleague,    Thank you for referring your patient, Artie Burger, to the [facility]. Please see a copy of my visit note below.    No notes on file    Again, thank you for allowing me to participate in the care of your patient.        Sincerely,        Junito Patel MD

## 2024-03-22 ENCOUNTER — MYC REFILL (OUTPATIENT)
Dept: TRANSPLANT | Facility: CLINIC | Age: 29
End: 2024-03-22
Payer: COMMERCIAL

## 2024-03-22 DIAGNOSIS — G89.18 ACUTE POST-OPERATIVE PAIN: ICD-10-CM

## 2024-03-22 LAB
C PEPTIDE SERPL-MCNC: 0.2 NG/ML (ref 0.9–6.9)
PREALB SERPL-MCNC: 19.5 MG/DL (ref 20–40)

## 2024-03-23 LAB
DEPRECATED CALCIDIOL+CALCIFEROL SERPL-MC: <41 UG/L (ref 20–75)
VITAMIN D2 SERPL-MCNC: <5 UG/L
VITAMIN D3 SERPL-MCNC: 36 UG/L
ZINC SERPL-MCNC: 45 UG/DL

## 2024-03-25 LAB
A-TOCOPHEROL VIT E SERPL-MCNC: 7.6 MG/L
ANNOTATION COMMENT IMP: NORMAL
BETA+GAMMA TOCOPHEROL SERPL-MCNC: 1 MG/L
RETINYL PALMITATE SERPL-MCNC: <0.02 MG/L
VIT A SERPL-MCNC: 0.56 MG/L

## 2024-03-25 RX ORDER — METHOCARBAMOL 1000 MG/1
1 TABLET, FILM COATED ORAL 2 TIMES DAILY PRN
Qty: 60 TABLET | Refills: 0 | Status: SHIPPED | OUTPATIENT
Start: 2024-03-25 | End: 2024-06-11

## 2024-03-26 LAB
A-LINOLENATE SERPL-SCNC: 48 NMOL/ML (ref 50–130)
AA SERPL-SCNC: 805 NMOL/ML (ref 520–1490)
ARACHIDATE SERPL-SCNC: 21 NMOL/ML (ref 50–90)
CLINICAL BIOCHEMIST REVIEW: ABNORMAL
DHA SERPL-SCNC: 91 NMOL/ML (ref 30–250)
DOCOSAPENTAENATE W6 SERPL-SCNC: 30 NMOL/ML (ref 10–70)
DOCOSATETRAENOATE SERPL-SCNC: 38 NMOL/ML (ref 10–80)
DOCOSENOATE SERPL-SCNC: 4 NMOL/ML (ref 4–13)
DPA SERPL-SCNC: 60 NMOL/ML (ref 20–210)
EPA SERPL-SCNC: 39 NMOL/ML (ref 14–100)
FA SERPL-SCNC: 9 MMOL/L (ref 7.3–16.8)
G-LINOLENATE SERPL-SCNC: 32 NMOL/ML (ref 16–150)
HEXADECENOATE SERPL-SCNC: 31 NMOL/ML (ref 25–105)
HOMO-G LINOLENATE SERPL-SCNC: 140 NMOL/ML (ref 50–250)
LAURATE SERPL-SCNC: 14 NMOL/ML (ref 6–90)
LINOLEATE SERPL-SCNC: 2350 NMOL/ML (ref 2270–3850)
MEAD ACID SERPL-SCNC: 25 NMOL/ML (ref 7–30)
MONOUNSAT FA SERPL-SCNC: 2.5 MMOL/L (ref 1.3–5.8)
MYRISTATE SERPL-SCNC: 94 NMOL/ML (ref 30–450)
NERVONATE SERPL-SCNC: 69 NMOL/ML (ref 60–100)
OCTADECANOATE SERPL-SCNC: 615 NMOL/ML (ref 590–1170)
OLEATE SERPL-SCNC: 1901 NMOL/ML (ref 650–3500)
PALMITATE SERPL-SCNC: 1964 NMOL/ML (ref 1480–3730)
PALMITOLEATE SERPL-SCNC: 303 NMOL/ML (ref 110–1130)
POLYUNSAT FA SERPL-SCNC: 3.7 MMOL/L (ref 3.2–5.8)
SAT FA SERPL-SCNC: 2.8 MMOL/L (ref 2.5–5.5)
TRIENOATE/AA SERPL-SRTO: 0.03 {RATIO} (ref 0.01–0.04)
VACCENATE SERPL-SCNC: 181 NMOL/ML (ref 280–740)
W3 FA SERPL-SCNC: 0.2 MMOL/L (ref 0.2–0.5)
W6 FA SERPL-SCNC: 3.4 MMOL/L (ref 3–5.4)

## 2024-03-27 ENCOUNTER — PREP FOR PROCEDURE (OUTPATIENT)
Dept: TRANSPLANT | Facility: CLINIC | Age: 29
End: 2024-03-27

## 2024-03-27 ENCOUNTER — MYC REFILL (OUTPATIENT)
Dept: TRANSPLANT | Facility: CLINIC | Age: 29
End: 2024-03-27

## 2024-03-27 ENCOUNTER — VIRTUAL VISIT (OUTPATIENT)
Dept: EDUCATION SERVICES | Facility: CLINIC | Age: 29
End: 2024-03-27
Attending: PHYSICIAN ASSISTANT
Payer: COMMERCIAL

## 2024-03-27 DIAGNOSIS — E13.9 POST-PANCREATECTOMY DIABETES (H): ICD-10-CM

## 2024-03-27 DIAGNOSIS — E89.1 POST-PANCREATECTOMY DIABETES (H): ICD-10-CM

## 2024-03-27 DIAGNOSIS — Z90.410 POST-PANCREATECTOMY DIABETES (H): ICD-10-CM

## 2024-03-27 DIAGNOSIS — R63.4 LOSS OF WEIGHT: ICD-10-CM

## 2024-03-27 DIAGNOSIS — G89.18 ACUTE POST-OPERATIVE PAIN: ICD-10-CM

## 2024-03-27 DIAGNOSIS — K43.2 HERNIA, INCISIONAL: Primary | ICD-10-CM

## 2024-03-27 PROCEDURE — G0108 DIAB MANAGE TRN  PER INDIV: HCPCS | Mod: 95

## 2024-03-27 NOTE — NURSING NOTE
Is the patient currently in the state of [place]? YES    Visit mode:VIDEO    If the visit is dropped, the patient can be reconnected by: VIDEO VISIT: Text to cell phone:   Telephone Information:   Mobile [phone]       Will anyone else be joining the visit? NO  (If patient encounters technical issues they should call [phone])    How would you like to obtain your AVS? MyChart    Are changes needed to the allergy or medication list? Yes pt has medication flagged for removal     Reason for visit: RECHECK (Post pancreatectomy)    Eboni HAIDERF

## 2024-03-27 NOTE — PROGRESS NOTES
Virtual Visit Details    Type of service:  Video Visit   Video Start Time:  10:20  Video End Time: 10:45    Originating Location (pt. Location): Home    Distant Location (provider location):  Off-site  Platform used for Video Visit: Usman        Diabetes Self-Management Education & Support    Artie Burger presents today for education related to Post Pancreatectomy Diabetes    Patient is being treated with:  MDI Insulin  She is accompanied by self    Year of diagnosis: 2023  Referring provider:  Caro Cormier PA-C  Living Situation: alone  Employment: off work due to health issues    PATIENT CONCERNS/REASON FOR REFERRAL: no real concerns, lows throw her into panic attack so she tries to avoid them    ASSESSMENT:    Taking Medication:   Current Diabetes Management per Patient:  Taking diabetes medications? yes:     Diabetes Medication(s)       Diabetic Other       Glucagon (GVOKE HYPOPEN) 1 MG/0.2ML pen Inject the contents of 1 device under the skin into lower abdomen, outer thigh, or outer upper arm as needed for hypoglycemia. If no response after 15 minutes, additional 1 mg dose from a new device may be injected while waiting for emergency assistance.     glucose (BD GLUCOSE) 4 g chewable tablet Take 1 tablet by mouth every hour as needed for low blood sugar       Insulin       insulin aspart (NOVOLOG PEN) 100 UNIT/ML pen Inject 1-8 Units Subcutaneous every 4 hours -179 give 1 units. -219 give 2 units. -259 give 3 units. -299 give 4 units. -239 give 5 units. -379 give 6 units. -419 give 7 units. BG >/= 420 give 8 units.     insulin aspart (NOVOLOG PEN) 100 UNIT/ML pen DOSE:  1 units per 10 grams of carbohydrate.  Only chart total amount of units given.  Do not give if pre-prandial glucose is less than 60 mg/dL. If given at mealtime, administer within 30 minutes of start of meal.     insulin aspart (NOVOLOG PEN) 100 UNIT/ML pen DOSE:  1 units per 10 grams of carbohydrate.  Only chart total amount of units given.  Do not give if pre-prandial glucose is less than 60 mg/dL. If given at mealtime, administer within 30 minutes of start of meal.     insulin glargine (LANTUS PEN) 100 UNIT/ML pen Inject 14 Units Subcutaneous every morning AND 10 Units every evening.        , Injectable Medications: Lantus 7-0-0-0, Problems taking diabetes medications regularly? Yes   Novolog 3 units, sometimes takes after due to not knowing how much she will eat    Monitoring  Patient glucose self monitoring as follows: continuously using a continuous glucose monitor (CGM)  BG meter: Naa 3 CGM  CGM: Freestyle Naa 3  BG results: see blood glucose log above    Patient's most recent   Lab Results   Component Value Date    A1C 8.2 03/21/2024      Patient's A1C goal: <7.0    Activity: does move around doing household chores    Healthy Eating:    Breakfast: (10a) toast or eggs or lee, frozen waffles, coffee with creamer  Snack:  Lunch:(1-3p) leftovers, Coke, water  Snack:   Dinner: (7:30p) soup, Coke, water  Snack: fruit to chips and nuts or granola    Problem Solving:    Patient is at risk of hypoglycemia?: YES, Frequency is one to two times per week, and Feels symptoms when glucose is between 50 and 60  Hospitalizations for hyper or hypoglycemia: No    Healthy Coping and Stress Management:   Sources of stress identified by patient:  Other (please specify):  high due to anxiety    EDUCATION and INSTRUCTION PROVIDED AT THIS VISIT:     We discussed her current [employer] reports.  Some educations was done surrounding basal bolus insulin dosing and how to assess whether insulin dose needs to be changed.  She states she understands this but is not always doing it related to some depression.  She is seeing a counselor once a week and finds it helpful.    She is using an ICR for Novolog but states she never got the sliding scale that Caro Cormier discussed with her at last visit so she is guesstimating for  correction.    Will send her her last instruction sheet and see if she can use that and we scheduled follow up a month from now.  Work on getting timing of Novolog right and using the correction scale instead of guesstimating.    Patient-stated goal written and given to Artie Burger.  Verbalized and demonstrated understanding of instructions.     PLAN:  See patient instructions  AVS printed and given to patient    FOLLOW-UP:    1 month--scheduled  Time spent with patient at today's visit was 30 minutes.      Any diabetes medication dose changes were made via the CDE Protocol and Collaborative Practice Agreement with [place] and  [facility].  A copy of this encounter was provided to patient's referring provider.

## 2024-03-28 RX ORDER — HYDROMORPHONE HYDROCHLORIDE 4 MG/1
4 TABLET ORAL EVERY 8 HOURS PRN
Qty: 40 TABLET | Refills: 0 | Status: SHIPPED | OUTPATIENT
Start: 2024-03-28 | End: 2024-04-08

## 2024-04-01 RX ORDER — LORAZEPAM 0.5 MG/1
0.5 TABLET ORAL
COMMUNITY
Start: 2024-03-20 | End: 2024-08-07

## 2024-04-01 RX ORDER — PROPRANOLOL HYDROCHLORIDE 10 MG/1
10 TABLET ORAL
COMMUNITY
Start: 2024-03-01 | End: 2024-08-07

## 2024-04-01 RX ORDER — SERTRALINE HYDROCHLORIDE 100 MG/1
2 TABLET, FILM COATED ORAL DAILY
COMMUNITY
Start: 2023-12-18 | End: 2024-08-07 | Stop reason: ALTCHOICE

## 2024-04-01 RX ORDER — CALCIUM POLYCARBOPHIL 625 MG
TABLET ORAL
COMMUNITY
Start: 2023-11-24 | End: 2024-06-11

## 2024-04-01 RX ORDER — ACETAMINOPHEN 500 MG
500-1000 TABLET ORAL
COMMUNITY

## 2024-04-01 RX ORDER — HYDROXYZINE HYDROCHLORIDE 25 MG/1
TABLET, FILM COATED ORAL
COMMUNITY
End: 2024-06-11

## 2024-04-03 ENCOUNTER — ANESTHESIA EVENT (OUTPATIENT)
Dept: SURGERY | Facility: CLINIC | Age: 29
End: 2024-04-03
Payer: COMMERCIAL

## 2024-04-03 ASSESSMENT — LIFESTYLE VARIABLES: TOBACCO_USE: 1

## 2024-04-03 NOTE — ANESTHESIA PREPROCEDURE EVALUATION
Anesthesia Pre-Procedure Evaluation    Patient: Artie Burger   MRN: [id number] : 1995        Procedure : Procedure(s):  REPAIR, HERNIA, INGUINAL, BILATERAL, LAPAROSCOPIC, USING TRANSABDOMINAL PREPERITONEAL PATCH          Artie Burger is a 28 year old female with a past medical history significant for ETOH use, pancreatitis, GERD     Past Medical History:   Diagnosis Date    Alcohol-induced chronic pancreatitis (H)     Anxiety     Depression     Gastroesophageal reflux disease     Pancreatic disease     PONV (postoperative nausea and vomiting)     Type 1 diabetes mellitus (H) 2023      Past Surgical History:   Procedure Laterality Date    CHOLECYSTECTOMY N/A 2023    Procedure: Cholecystectomy;  Surgeon: Junito Patel MD;  Location: [facility]    ENDOSCOPIC RETROGRADE CHOLANGIOPANCREATOGRAM COMPLEX N/A 2021    Procedure: ENDOSCOPIC RETROGRADE CHOLANGIOPANCREATOGRAPHY with pancreatic sphincterotomy, dilation, stone removal, stent placement;  Surgeon: Guru Sabino Campuzano MD;  Location: [facility]    ENDOSCOPIC RETROGRADE CHOLANGIOPANCREATOGRAM WITH DIRECT VISUALIZATION SYSTEM AND GENERATOR N/A 2021    Procedure: ENDOSCOPIC RETROGRADE CHOLANGIOPANCREATOGRAPHY, dilation and debridement sludge removal, pancreatic duct stent placement;  Surgeon: Guru Sabino Campuzano MD;  Location: [facility]    ENDOSCOPIC RETROGRADE CHOLANGIOPANCREATOGRAM WITH SPYGLASS N/A 10/6/2021    Procedure: ENDOSCOPIC RETROGRADE CHOLANGIOPANCREATOGRAPHY, WITH DIRECT DUCT VISUALIZATION, USING PANCREATICOBILIARY FIBEROPTIC PROBE, BILE DUCT STENT EXCHANGE, LITHOTRIPSY OF PANCREATIC STONE, SPHINCTEROTOMY, BALLOON DILATION OF PANCREATIC DUCT AND STONE EXTRACTION;  Surgeon: Guru Sabino Campuzano MD;  Location: [facility]    ENDOSCOPIC RETROGRADE CHOLANGIOPANCREATOGRAM WITH SPYGLASS N/A 2021    Procedure: ENDOSCOPIC RETROGRADE CHOLANGIOPANCREATOGRAPHY, with pancreatic  stent exchange, ballon dilation, stone removal, with spyglass direct visualization;  Surgeon: Guru Sabino Campuzano MD;  Location: [facility]    ENDOSCOPIC RETROGRADE CHOLANGIOPANCREATOGRAPHY, EXCHANGE TUBE/STENT N/A 2/16/2022    Procedure: ENDOSCOPIC RETROGRADE CHOLANGIOPANCREATOGRAPH with pancreatic dilation, debris removal and stent exchange.;  Surgeon: Guru Sabino Campuzano MD;  [facility]: [facility]    ENDOSCOPIC RETROGRADE CHOLANGIOPANCREATOGRAPHY, EXCHANGE TUBE/STENT N/A 4/4/2022    Procedure: ENDOSCOPIC RETROGRADE CHOLANGIOPANCREATOGRAPHY, WITH REPLACEMENT OF pancreatic STENT, stone removal;  Surgeon: Guru Sabino Campuzano MD;  [facility]: [facility]    ENDOSCOPIC RETROGRADE CHOLANGIOPANCREATOGRAPHY, EXCHANGE TUBE/STENT N/A 7/13/2022    Procedure: ENDOSCOPIC RETROGRADE CHOLANGIOPANCREATOGRAPHY, WITH pancreatic duct dilation and stent exchange, biliary stent placement, debris removal;  Surgeon: Guru Sabino Campuzano MD;  [facility]    ENDOSCOPIC RETROGRADE CHOLANGIOPANCREATOGRAPHY, EXCHANGE TUBE/STENT N/A 9/14/2022    Procedure: ENDOSCOPIC RETROGRADE CHOLANGIOPANCREATOGRAPHY, WITH pancreatic stone removal, biliary stent removal, pancreatic stent exchange;  Surgeon: Guru Sabino Campuzano MD;  [facility]    ENDOSCOPIC RETROGRADE CHOLANGIOPANCREATOGRAPHY, EXCHANGE TUBE/STENT N/A 11/21/2022    Procedure: ENDOSCOPIC RETROGRADE CHOLANGIOPANCREATOGRAPHY WITH PANCREATIC DUCT STENT EXCHANGE, DILATION, AND DEBRIS REMOVAL;  Surgeon: Guru Sabino Campuzano MD;  [facility]    ENDOSCOPIC RETROGRADE CHOLANGIOPANCREATOGRAPHY, EXCHANGE TUBE/STENT N/A 1/18/2023    Procedure: ENDOSCOPIC RETROGRADE CHOLANGIOPANCREATOGRAPHY stent exchange, dilation, sludge and stone removal;  Surgeon: Guru Sabino Campuzano MD;  [facility]: [facility]    ENDOSCOPIC RETROGRADE CHOLANGIOPANCREATOGRAPHY, EXCHANGE TUBE/STENT N/A 3/13/2023    Procedure:  ENDOSCOPIC RETROGRADE CHOLANGIOPANCREATOGRAPHY, WITH replacement of pancreatic stents, bile duct stent placed,sphinterotomy of bile duct ampulla, balloon sweep of bile duct for sludge,;  Surgeon: Guru Sabino Campuzano MD;  [facility]: [facility]    ENDOSCOPIC RETROGRADE CHOLANGIOPANCREATOGRAPHY, EXCHANGE TUBE/STENT N/A 5/10/2023    Procedure: ENDOSCOPIC RETROGRADE CHOLANGIOPANCREATOGRAPHY with pancreatic stents exchange and stone extraction;  Surgeon: Guru Sabino Campuzano MD;  [facility]    ENDOSCOPIC RETROGRADE CHOLANGIOPANCREATOGRAPHY, EXCHANGE TUBE/STENT N/A 2023    Procedure: ENDOSCOPIC RETROGRADE CHOLANGIOPANCREATOGRAPHY, WITH REPLACEMENT OF STENT X3 AND BALLOON SWEEP OF BILE DUCT FOR STONES;  Surgeon: Guru Sabino Campuzano MD;  [facility]    GASTROJEJUNOSTOMY N/A 2023    Procedure: Gastrojejunostomy;  Surgeon: Junito Patel MD;  [facility]    PANCREATECTOMY, TRANSPLANT AUTO ISLET CELL, COMBINED N/A 2023    Procedure: PANCREATECTOMY, TOTAL, WITH AUTOLOGOUS PANCREATIC ISLET CELL TRANSPLANT;  Surgeon: Junito Patel MD;  [facility]    SPLENECTOMY N/A 2023    Procedure: Splenectomy;  Surgeon: Junito Patel MD;  Location: [facility]      No Known Allergies   Social History     Tobacco Use    Smoking status: Former     Types: Cigarettes     Quit date: 2022     Years since quittin.9    Smokeless tobacco: Current    Tobacco comments:     Vapes daily   Substance Use Topics    Alcohol use: Not Currently      Wt Readings from Last 1 Encounters:   24 56.7 kg (125 lb)        Anesthesia Evaluation   Pt has had prior anesthetic. Type: General.    History of anesthetic complications  - PONV.      ROS/MED HX  ENT/Pulmonary:     (+)                tobacco use, Current use,                       Neurologic:  - neg neurologic ROS     Cardiovascular:  - neg cardiovascular ROS      METS/Exercise Tolerance:     Hematologic:  - neg hematologic  ROS     Musculoskeletal:  - neg musculoskeletal ROS     GI/Hepatic: Comment: Chr. pancreatitis    (+) GERD,                   Renal/Genitourinary:  - neg Renal ROS     Endo:  - neg endo ROS   (+) type I DM (s/p TPAIT September 2023),  Last HgA1c: 5.3, date: 6/28/26,                  Psychiatric/Substance Use:     (+) psychiatric history anxiety and depression alcohol abuse  Recreational drug usage: Cannabis.    Infectious Disease:  - neg infectious disease ROS     Malignancy:  - neg malignancy ROS     Other:            Physical Exam    Airway        Mallampati: I   TM distance: > 3 FB   Neck ROM: full   Mouth opening: > 3 cm    Respiratory Devices and Support         Dental       (+) Completely normal teeth      Cardiovascular   cardiovascular exam normal          Pulmonary   pulmonary exam normal                OUTSIDE LABS:  CBC:   Lab Results   Component Value Date    WBC 11.7 (H) 03/21/2024    WBC 8.6 12/14/2023    HGB 10.1 (L) 03/21/2024    HGB 12.0 12/14/2023    HCT 29.7 (L) 03/21/2024    HCT 34.6 (L) 12/14/2023     (H) 03/21/2024     (H) 12/14/2023     BMP:   Lab Results   Component Value Date     (L) 03/21/2024     12/14/2023    POTASSIUM 4.0 03/21/2024    POTASSIUM 3.8 12/14/2023    CHLORIDE 96 (L) 03/21/2024    CHLORIDE 105 12/14/2023    CO2 26 03/21/2024    CO2 22 12/14/2023    BUN 14.4 03/21/2024    BUN 14.8 12/14/2023    CR 0.89 03/21/2024    CR 0.72 12/14/2023     (H) 03/21/2024     (H) 12/14/2023     COAGS:   Lab Results   Component Value Date    PTT 28 09/27/2023    INR 1.09 10/03/2023    FIBR 313 09/22/2023     POC:   Lab Results   Component Value Date    HCG Negative 07/13/2022    HCGS Negative 08/18/2023     HEPATIC:   Lab Results   Component Value Date    ALBUMIN 4.3 03/21/2024    PROTTOTAL 6.8 03/21/2024    ALT 25 03/21/2024    AST 37 03/21/2024    ALKPHOS 111 03/21/2024    BILITOTAL 0.3  03/21/2024     OTHER:   Lab Results   Component Value Date    PH 7.39 09/22/2023    LACT 2.6 (H) 09/22/2023    A1C 8.2 (H) 03/21/2024    MURTAZA 9.3 03/21/2024    PHOS 3.6 10/02/2023    MAG 2.1 10/02/2023    LIPASE 6 (L) 09/27/2023    AMYLASE 13 (L) 09/27/2023    TSH 1.68 08/18/2023       Anesthesia Plan    ASA Status:  3    NPO Status:  NPO Appropriate    Anesthesia Type: General.     - Airway: ETT   Induction: Intravenous.   Maintenance: Balanced.   Techniques and Equipment:     - Lines/Monitors: BIS     Consents    Anesthesia Plan(s) and associated risks, benefits, and realistic alternatives discussed. Questions answered and patient/representative(s) expressed understanding.     - Discussed: Risks, Benefits and Alternatives for BOTH SEDATION and the PROCEDURE were discussed     - Discussed with:  Patient      - Extended Intubation/Ventilatory Support Discussed: No.      - Patient is DNR/DNI Status: No     Use of blood products discussed: No .     Postoperative Care    Pain management: IV analgesics, Oral pain medications, Multi-modal analgesia, Peripheral nerve block (Single Shot).   PONV prophylaxis: Ondansetron (or other 5HT-3), Dexamethasone or Solumedrol, Scopolamine patch     Comments:               Oscar Cage MD    I have reviewed the pertinent notes and labs in the chart from the past 30 days and (re)examined the patient.  Any updates or changes from those notes are reflected in this note.     # Hyponatremia: Lowest Na = 133 mmol/L in last 30 days, will monitor as appropriate          # DMII: A1C = 8.2 % (Ref range: <5.7 %) within past 6 months

## 2024-04-04 ENCOUNTER — ANESTHESIA (OUTPATIENT)
Dept: SURGERY | Facility: CLINIC | Age: 29
End: 2024-04-04
Payer: COMMERCIAL

## 2024-04-04 ENCOUNTER — HOSPITAL ENCOUNTER (OUTPATIENT)
Facility: CLINIC | Age: 29
Discharge: HOME | End: 2024-04-04
Attending: SURGERY | Admitting: SURGERY
Payer: COMMERCIAL

## 2024-04-04 VITALS
HEIGHT: 62 IN | TEMPERATURE: 98.2 F | OXYGEN SATURATION: 99 % | DIASTOLIC BLOOD PRESSURE: 73 MMHG | HEART RATE: 61 BPM | RESPIRATION RATE: 16 BRPM | SYSTOLIC BLOOD PRESSURE: 113 MMHG | BODY MASS INDEX: 22.03 KG/M2 | WEIGHT: 119.71 LBS

## 2024-04-04 DIAGNOSIS — G89.18 ACUTE POST-OPERATIVE PAIN: Primary | ICD-10-CM

## 2024-04-04 LAB
GLUCOSE BLDC GLUCOMTR-MCNC: 118 MG/DL (ref 70–99)
GLUCOSE BLDC GLUCOMTR-MCNC: 159 MG/DL (ref 70–99)
GLUCOSE BLDC GLUCOMTR-MCNC: 178 MG/DL (ref 70–99)

## 2024-04-04 PROCEDURE — 250N000009 HC RX 250: Performed by: NURSE ANESTHETIST, CERTIFIED REGISTERED

## 2024-04-04 PROCEDURE — 258N000003 HC RX IP 258 OP 636

## 2024-04-04 PROCEDURE — 999N000141 HC STATISTIC PRE-PROCEDURE NURSING ASSESSMENT: Performed by: SURGERY

## 2024-04-04 PROCEDURE — 250N000013 HC RX MED GY IP 250 OP 250 PS 637

## 2024-04-04 PROCEDURE — 250N000009 HC RX 250: Performed by: STUDENT IN AN ORGANIZED HEALTH CARE EDUCATION/TRAINING PROGRAM

## 2024-04-04 PROCEDURE — 250N000011 HC RX IP 250 OP 636: Performed by: STUDENT IN AN ORGANIZED HEALTH CARE EDUCATION/TRAINING PROGRAM

## 2024-04-04 PROCEDURE — 710N000012 HC RECOVERY PHASE 2, PER MINUTE: Performed by: SURGERY

## 2024-04-04 PROCEDURE — 250N000011 HC RX IP 250 OP 636

## 2024-04-04 PROCEDURE — 250N000013 HC RX MED GY IP 250 OP 250 PS 637: Performed by: STUDENT IN AN ORGANIZED HEALTH CARE EDUCATION/TRAINING PROGRAM

## 2024-04-04 PROCEDURE — 250N000024 HC ISOFLURANE, PER MIN: Performed by: SURGERY

## 2024-04-04 PROCEDURE — 272N000001 HC OR GENERAL SUPPLY STERILE: Performed by: SURGERY

## 2024-04-04 PROCEDURE — 360N000076 HC SURGERY LEVEL 3, PER MIN: Performed by: SURGERY

## 2024-04-04 PROCEDURE — 49591 RPR AA HRN 1ST < 3 CM RDC: CPT | Performed by: SURGERY

## 2024-04-04 PROCEDURE — C1781 MESH (IMPLANTABLE): HCPCS | Performed by: SURGERY

## 2024-04-04 PROCEDURE — 710N000010 HC RECOVERY PHASE 1, LEVEL 2, PER MIN: Performed by: SURGERY

## 2024-04-04 PROCEDURE — 49591 RPR AA HRN 1ST < 3 CM RDC: CPT | Performed by: NURSE ANESTHETIST, CERTIFIED REGISTERED

## 2024-04-04 PROCEDURE — 370N000017 HC ANESTHESIA TECHNICAL FEE, PER MIN: Performed by: SURGERY

## 2024-04-04 PROCEDURE — 49591 RPR AA HRN 1ST < 3 CM RDC: CPT | Performed by: ANESTHESIOLOGY

## 2024-04-04 PROCEDURE — 250N000011 HC RX IP 250 OP 636: Performed by: NURSE ANESTHETIST, CERTIFIED REGISTERED

## 2024-04-04 DEVICE — PHASIX ST MESH, 11 CM (4.5"), CIRCLE
Type: IMPLANTABLE DEVICE | Site: ABDOMEN | Status: FUNCTIONAL
Brand: PHASIX

## 2024-04-04 RX ORDER — ONDANSETRON 2 MG/ML
INJECTION INTRAMUSCULAR; INTRAVENOUS PRN
Status: DISCONTINUED | OUTPATIENT
Start: 2024-04-04 | End: 2024-04-04

## 2024-04-04 RX ORDER — SODIUM CHLORIDE, SODIUM LACTATE, POTASSIUM CHLORIDE, CALCIUM CHLORIDE 600; 310; 30; 20 MG/100ML; MG/100ML; MG/100ML; MG/100ML
INJECTION, SOLUTION INTRAVENOUS CONTINUOUS
Status: DISCONTINUED | OUTPATIENT
Start: 2024-04-04 | End: 2024-04-04 | Stop reason: HOSPADM

## 2024-04-04 RX ORDER — HYDROMORPHONE HYDROCHLORIDE 2 MG/1
4 TABLET ORAL EVERY 6 HOURS PRN
Qty: 20 TABLET | Refills: 0 | Status: SHIPPED | OUTPATIENT
Start: 2024-04-04 | End: 2024-04-04

## 2024-04-04 RX ORDER — FENTANYL CITRATE 50 UG/ML
25-50 INJECTION, SOLUTION INTRAMUSCULAR; INTRAVENOUS
Status: DISCONTINUED | OUTPATIENT
Start: 2024-04-04 | End: 2024-04-04 | Stop reason: HOSPADM

## 2024-04-04 RX ORDER — HYDROMORPHONE HYDROCHLORIDE 4 MG/1
4 TABLET ORAL EVERY 6 HOURS PRN
Qty: 12 TABLET | Refills: 0 | Status: SHIPPED | OUTPATIENT
Start: 2024-04-04 | End: 2024-04-07

## 2024-04-04 RX ORDER — NALOXONE HYDROCHLORIDE 0.4 MG/ML
0.4 INJECTION, SOLUTION INTRAMUSCULAR; INTRAVENOUS; SUBCUTANEOUS
Status: DISCONTINUED | OUTPATIENT
Start: 2024-04-04 | End: 2024-04-04 | Stop reason: HOSPADM

## 2024-04-04 RX ORDER — DEXMEDETOMIDINE HYDROCHLORIDE 4 UG/ML
INJECTION, SOLUTION INTRAVENOUS
Status: COMPLETED | OUTPATIENT
Start: 2024-04-04 | End: 2024-04-04

## 2024-04-04 RX ORDER — FENTANYL CITRATE 50 UG/ML
50 INJECTION, SOLUTION INTRAMUSCULAR; INTRAVENOUS EVERY 5 MIN PRN
Status: DISCONTINUED | OUTPATIENT
Start: 2024-04-04 | End: 2024-04-04 | Stop reason: HOSPADM

## 2024-04-04 RX ORDER — ONDANSETRON 2 MG/ML
4 INJECTION INTRAMUSCULAR; INTRAVENOUS EVERY 30 MIN PRN
Status: DISCONTINUED | OUTPATIENT
Start: 2024-04-04 | End: 2024-04-04 | Stop reason: HOSPADM

## 2024-04-04 RX ORDER — DEXAMETHASONE SODIUM PHOSPHATE 10 MG/ML
INJECTION, SOLUTION INTRAMUSCULAR; INTRAVENOUS
Status: COMPLETED | OUTPATIENT
Start: 2024-04-04 | End: 2024-04-04

## 2024-04-04 RX ORDER — OXYCODONE HYDROCHLORIDE 5 MG/1
5 TABLET ORAL
Status: DISCONTINUED | OUTPATIENT
Start: 2024-04-04 | End: 2024-04-04 | Stop reason: HOSPADM

## 2024-04-04 RX ORDER — BUPIVACAINE HYDROCHLORIDE 2.5 MG/ML
INJECTION, SOLUTION EPIDURAL; INFILTRATION; INTRACAUDAL
Status: COMPLETED | OUTPATIENT
Start: 2024-04-04 | End: 2024-04-04

## 2024-04-04 RX ORDER — ACETAMINOPHEN 325 MG/1
975 TABLET ORAL ONCE
Status: COMPLETED | OUTPATIENT
Start: 2024-04-04 | End: 2024-04-04

## 2024-04-04 RX ORDER — PROPOFOL 10 MG/ML
INJECTION, EMULSION INTRAVENOUS CONTINUOUS PRN
Status: DISCONTINUED | OUTPATIENT
Start: 2024-04-04 | End: 2024-04-04

## 2024-04-04 RX ORDER — NALOXONE HYDROCHLORIDE 0.4 MG/ML
0.2 INJECTION, SOLUTION INTRAMUSCULAR; INTRAVENOUS; SUBCUTANEOUS
Status: DISCONTINUED | OUTPATIENT
Start: 2024-04-04 | End: 2024-04-04 | Stop reason: HOSPADM

## 2024-04-04 RX ORDER — NALOXONE HYDROCHLORIDE 0.4 MG/ML
0.1 INJECTION, SOLUTION INTRAMUSCULAR; INTRAVENOUS; SUBCUTANEOUS
Status: DISCONTINUED | OUTPATIENT
Start: 2024-04-04 | End: 2024-04-04 | Stop reason: HOSPADM

## 2024-04-04 RX ORDER — HYDROMORPHONE HCL IN WATER/PF 6 MG/30 ML
0.4 PATIENT CONTROLLED ANALGESIA SYRINGE INTRAVENOUS EVERY 5 MIN PRN
Status: DISCONTINUED | OUTPATIENT
Start: 2024-04-04 | End: 2024-04-04 | Stop reason: HOSPADM

## 2024-04-04 RX ORDER — LIDOCAINE HYDROCHLORIDE 20 MG/ML
INJECTION, SOLUTION INFILTRATION; PERINEURAL PRN
Status: DISCONTINUED | OUTPATIENT
Start: 2024-04-04 | End: 2024-04-04

## 2024-04-04 RX ORDER — CEFAZOLIN SODIUM 1 G/3ML
INJECTION, POWDER, FOR SOLUTION INTRAMUSCULAR; INTRAVENOUS PRN
Status: DISCONTINUED | OUTPATIENT
Start: 2024-04-04 | End: 2024-04-04

## 2024-04-04 RX ORDER — LIDOCAINE 40 MG/G
CREAM TOPICAL
Status: DISCONTINUED | OUTPATIENT
Start: 2024-04-04 | End: 2024-04-04 | Stop reason: HOSPADM

## 2024-04-04 RX ORDER — ONDANSETRON 4 MG/1
4 TABLET, ORALLY DISINTEGRATING ORAL EVERY 30 MIN PRN
Status: DISCONTINUED | OUTPATIENT
Start: 2024-04-04 | End: 2024-04-04 | Stop reason: HOSPADM

## 2024-04-04 RX ORDER — HYDROMORPHONE HCL IN WATER/PF 6 MG/30 ML
0.2 PATIENT CONTROLLED ANALGESIA SYRINGE INTRAVENOUS EVERY 5 MIN PRN
Status: DISCONTINUED | OUTPATIENT
Start: 2024-04-04 | End: 2024-04-04 | Stop reason: HOSPADM

## 2024-04-04 RX ORDER — OXYCODONE HYDROCHLORIDE 10 MG/1
10 TABLET ORAL
Status: COMPLETED | OUTPATIENT
Start: 2024-04-04 | End: 2024-04-04

## 2024-04-04 RX ORDER — FLUMAZENIL 0.1 MG/ML
0.2 INJECTION, SOLUTION INTRAVENOUS
Status: DISCONTINUED | OUTPATIENT
Start: 2024-04-04 | End: 2024-04-04 | Stop reason: HOSPADM

## 2024-04-04 RX ORDER — HYDROMORPHONE HYDROCHLORIDE 4 MG/1
4 TABLET ORAL EVERY 6 HOURS PRN
Qty: 10 TABLET | Refills: 0 | Status: SHIPPED | OUTPATIENT
Start: 2024-04-04 | End: 2024-04-04

## 2024-04-04 RX ORDER — KETOROLAC TROMETHAMINE 30 MG/ML
15 INJECTION, SOLUTION INTRAMUSCULAR; INTRAVENOUS ONCE
Status: COMPLETED | OUTPATIENT
Start: 2024-04-04 | End: 2024-04-04

## 2024-04-04 RX ORDER — FENTANYL CITRATE 50 UG/ML
25 INJECTION, SOLUTION INTRAMUSCULAR; INTRAVENOUS EVERY 5 MIN PRN
Status: DISCONTINUED | OUTPATIENT
Start: 2024-04-04 | End: 2024-04-04 | Stop reason: HOSPADM

## 2024-04-04 RX ORDER — PROPOFOL 10 MG/ML
INJECTION, EMULSION INTRAVENOUS PRN
Status: DISCONTINUED | OUTPATIENT
Start: 2024-04-04 | End: 2024-04-04

## 2024-04-04 RX ORDER — OXYCODONE HYDROCHLORIDE 5 MG/1
5 TABLET ORAL EVERY 6 HOURS PRN
Qty: 12 TABLET | Refills: 0 | Status: SHIPPED | OUTPATIENT
Start: 2024-04-04 | End: 2024-04-04

## 2024-04-04 RX ADMIN — PROPOFOL 180 MG: 10 INJECTION, EMULSION INTRAVENOUS at 07:40

## 2024-04-04 RX ADMIN — FENTANYL CITRATE 50 MCG: 50 INJECTION, SOLUTION INTRAMUSCULAR; INTRAVENOUS at 10:36

## 2024-04-04 RX ADMIN — FENTANYL CITRATE 50 MCG: 50 INJECTION, SOLUTION INTRAMUSCULAR; INTRAVENOUS at 10:44

## 2024-04-04 RX ADMIN — PROPOFOL 150 MCG/KG/MIN: 10 INJECTION, EMULSION INTRAVENOUS at 09:35

## 2024-04-04 RX ADMIN — HYDROMORPHONE HYDROCHLORIDE 0.5 MG: 1 INJECTION, SOLUTION INTRAMUSCULAR; INTRAVENOUS; SUBCUTANEOUS at 10:10

## 2024-04-04 RX ADMIN — OXYCODONE HYDROCHLORIDE 10 MG: 10 TABLET ORAL at 12:43

## 2024-04-04 RX ADMIN — ONDANSETRON 4 MG: 2 INJECTION INTRAMUSCULAR; INTRAVENOUS at 10:04

## 2024-04-04 RX ADMIN — CEFAZOLIN 2 G: 1 INJECTION, POWDER, FOR SOLUTION INTRAMUSCULAR; INTRAVENOUS at 07:45

## 2024-04-04 RX ADMIN — HYDROMORPHONE HYDROCHLORIDE 0.4 MG: 0.2 INJECTION, SOLUTION INTRAMUSCULAR; INTRAVENOUS; SUBCUTANEOUS at 10:51

## 2024-04-04 RX ADMIN — Medication 40 MCG: at 07:00

## 2024-04-04 RX ADMIN — FENTANYL CITRATE 100 MCG: 50 INJECTION INTRAMUSCULAR; INTRAVENOUS at 07:39

## 2024-04-04 RX ADMIN — HYDROMORPHONE HYDROCHLORIDE 0.5 MG: 1 INJECTION, SOLUTION INTRAMUSCULAR; INTRAVENOUS; SUBCUTANEOUS at 10:03

## 2024-04-04 RX ADMIN — BUPIVACAINE HYDROCHLORIDE 53 ML: 2.5 INJECTION, SOLUTION EPIDURAL; INFILTRATION; INTRACAUDAL; PERINEURAL at 07:00

## 2024-04-04 RX ADMIN — KETOROLAC TROMETHAMINE 15 MG: 30 INJECTION, SOLUTION INTRAMUSCULAR at 11:43

## 2024-04-04 RX ADMIN — HYDROMORPHONE HYDROCHLORIDE 0.4 MG: 0.2 INJECTION, SOLUTION INTRAMUSCULAR; INTRAVENOUS; SUBCUTANEOUS at 11:01

## 2024-04-04 RX ADMIN — FENTANYL CITRATE 100 MCG: 50 INJECTION INTRAMUSCULAR; INTRAVENOUS at 10:18

## 2024-04-04 RX ADMIN — PROPOFOL 50 MCG/KG/MIN: 10 INJECTION, EMULSION INTRAVENOUS at 07:42

## 2024-04-04 RX ADMIN — FENTANYL CITRATE 50 MCG: 50 INJECTION INTRAMUSCULAR; INTRAVENOUS at 06:59

## 2024-04-04 RX ADMIN — Medication 20 MG: at 09:40

## 2024-04-04 RX ADMIN — SUGAMMADEX 200 MG: 100 INJECTION, SOLUTION INTRAVENOUS at 10:08

## 2024-04-04 RX ADMIN — MIDAZOLAM 2 MG: 1 INJECTION INTRAMUSCULAR; INTRAVENOUS at 07:27

## 2024-04-04 RX ADMIN — LIDOCAINE HYDROCHLORIDE 100 MG: 20 INJECTION, SOLUTION INFILTRATION; PERINEURAL at 07:39

## 2024-04-04 RX ADMIN — MIDAZOLAM HYDROCHLORIDE 2 MG: 1 INJECTION, SOLUTION INTRAMUSCULAR; INTRAVENOUS at 06:55

## 2024-04-04 RX ADMIN — HYDROMORPHONE HYDROCHLORIDE 0.2 MG: 0.2 INJECTION, SOLUTION INTRAMUSCULAR; INTRAVENOUS; SUBCUTANEOUS at 12:03

## 2024-04-04 RX ADMIN — HYDROMORPHONE HYDROCHLORIDE 0.4 MG: 0.2 INJECTION, SOLUTION INTRAMUSCULAR; INTRAVENOUS; SUBCUTANEOUS at 11:29

## 2024-04-04 RX ADMIN — Medication 50 MG: at 07:41

## 2024-04-04 RX ADMIN — FENTANYL CITRATE 100 MCG: 50 INJECTION INTRAMUSCULAR; INTRAVENOUS at 10:22

## 2024-04-04 RX ADMIN — ACETAMINOPHEN 975 MG: 325 TABLET, FILM COATED ORAL at 06:39

## 2024-04-04 RX ADMIN — Medication 20 MG: at 08:43

## 2024-04-04 RX ADMIN — DEXAMETHASONE SODIUM PHOSPHATE 2 MG: 10 INJECTION, SOLUTION INTRAMUSCULAR; INTRAVENOUS at 07:00

## 2024-04-04 RX ADMIN — FENTANYL CITRATE 50 MCG: 50 INJECTION INTRAMUSCULAR; INTRAVENOUS at 06:56

## 2024-04-04 RX ADMIN — SODIUM CHLORIDE, POTASSIUM CHLORIDE, SODIUM LACTATE AND CALCIUM CHLORIDE: 600; 310; 30; 20 INJECTION, SOLUTION INTRAVENOUS at 07:45

## 2024-04-04 RX ADMIN — HYDROMORPHONE HYDROCHLORIDE 0.4 MG: 0.2 INJECTION, SOLUTION INTRAMUSCULAR; INTRAVENOUS; SUBCUTANEOUS at 11:12

## 2024-04-04 RX ADMIN — ONDANSETRON 4 MG: 2 INJECTION INTRAMUSCULAR; INTRAVENOUS at 07:45

## 2024-04-04 RX ADMIN — FENTANYL CITRATE 50 MCG: 50 INJECTION INTRAMUSCULAR; INTRAVENOUS at 08:28

## 2024-04-04 RX ADMIN — SODIUM CHLORIDE, POTASSIUM CHLORIDE, SODIUM LACTATE AND CALCIUM CHLORIDE: 600; 310; 30; 20 INJECTION, SOLUTION INTRAVENOUS at 06:43

## 2024-04-04 RX ADMIN — HYDROMORPHONE HYDROCHLORIDE 0.5 MG: 1 INJECTION, SOLUTION INTRAMUSCULAR; INTRAVENOUS; SUBCUTANEOUS at 10:15

## 2024-04-04 RX ADMIN — FENTANYL CITRATE 50 MCG: 50 INJECTION INTRAMUSCULAR; INTRAVENOUS at 08:35

## 2024-04-04 ASSESSMENT — ACTIVITIES OF DAILY LIVING (ADL)
ADLS_ACUITY_SCORE: 22

## 2024-04-04 NOTE — H&P
[facility] Transplant Surgery     Artie Burger MRN# [id number]   Age: 28 year old YOB: 1995     Date of Admission:  4/4/2024    Date of Consult:   4/04/24                                Assessment and Plan:   Assessment:   Incisional hernia s/p TPIAT         Plan:   Plan for lap assisted incisional hernia repair      Discussed with staff, Dr. De Leon            Chief Complaint:   Abdominal pain associated with hernia         History of Present Illness:   Patient had TPIAT in September of 2023. She has since developed an incisional hernia. She notes pain associated with the hernia. She  notes no other symptoms.           Past Medical History:     Past Medical History:   Diagnosis Date    Alcohol-induced chronic pancreatitis (H)     Anxiety     Depression     Gastroesophageal reflux disease     Pancreatic disease     PONV (postoperative nausea and vomiting)     Type 1 diabetes mellitus (H) 9/19/2023             Past Surgical History:     Past Surgical History:   Procedure Laterality Date    CHOLECYSTECTOMY N/A 9/22/2023    Procedure: Cholecystectomy;  Surgeon: Junito Patel MD;  Location: [facility] OR    ENDOSCOPIC RETROGRADE CHOLANGIOPANCREATOGRAM COMPLEX N/A 9/13/2021    Procedure: ENDOSCOPIC RETROGRADE CHOLANGIOPANCREATOGRAPHY with pancreatic sphincterotomy, dilation, stone removal, stent placement;  Surgeon: Guru Sabino Campuzano MD;  Location: [facility]    ENDOSCOPIC RETROGRADE CHOLANGIOPANCREATOGRAM WITH DIRECT VISUALIZATION SYSTEM AND GENERATOR N/A 11/1/2021    Procedure: ENDOSCOPIC RETROGRADE CHOLANGIOPANCREATOGRAPHY, dilation and debridement sludge removal, pancreatic duct stent placement;  Surgeon: Guru Sabino Campuzano MD;  Location: [facility]    ENDOSCOPIC RETROGRADE CHOLANGIOPANCREATOGRAM WITH SPYGLASS N/A 10/6/2021    Procedure: ENDOSCOPIC RETROGRADE CHOLANGIOPANCREATOGRAPHY, WITH DIRECT DUCT VISUALIZATION, USING PANCREATICOBILIARY FIBEROPTIC  PROBE, BILE DUCT STENT EXCHANGE, LITHOTRIPSY OF PANCREATIC STONE, SPHINCTEROTOMY, BALLOON DILATION OF PANCREATIC DUCT AND STONE EXTRACTION;  Surgeon: Guru Sabino Campuzano MD;  Location: [facility]    ENDOSCOPIC RETROGRADE CHOLANGIOPANCREATOGRAM WITH SPYGLASS N/A 12/13/2021    Procedure: ENDOSCOPIC RETROGRADE CHOLANGIOPANCREATOGRAPHY, with pancreatic stent exchange, ballon dilation, stone removal, with spyglass direct visualization;  Surgeon: Guru Sabino Campuzano MD;  Location: [facility]    ENDOSCOPIC RETROGRADE CHOLANGIOPANCREATOGRAPHY, EXCHANGE TUBE/STENT N/A 2/16/2022    Procedure: ENDOSCOPIC RETROGRADE CHOLANGIOPANCREATOGRAPH with pancreatic dilation, debris removal and stent exchange.;  Surgeon: Guru Sabino Campuzano MD;  Location: [facility]    ENDOSCOPIC RETROGRADE CHOLANGIOPANCREATOGRAPHY, EXCHANGE TUBE/STENT N/A 4/4/2022    Procedure: ENDOSCOPIC RETROGRADE CHOLANGIOPANCREATOGRAPHY, WITH REPLACEMENT OF pancreatic STENT, stone removal;  Surgeon: Guru Sabino Campuzano MD;  Location: [facility]    ENDOSCOPIC RETROGRADE CHOLANGIOPANCREATOGRAPHY, EXCHANGE TUBE/STENT N/A 7/13/2022    Procedure: ENDOSCOPIC RETROGRADE CHOLANGIOPANCREATOGRAPHY, WITH pancreatic duct dilation and stent exchange, biliary stent placement, debris removal;  Surgeon: Guru Sabino Campuzano MD;  Location: [facility]    ENDOSCOPIC RETROGRADE CHOLANGIOPANCREATOGRAPHY, EXCHANGE TUBE/STENT N/A 9/14/2022    Procedure: ENDOSCOPIC RETROGRADE CHOLANGIOPANCREATOGRAPHY, WITH pancreatic stone removal, biliary stent removal, pancreatic stent exchange;  Surgeon: Guru Sabino Campuzano MD;  Location: [facility]    ENDOSCOPIC RETROGRADE CHOLANGIOPANCREATOGRAPHY, EXCHANGE TUBE/STENT N/A 11/21/2022    Procedure: ENDOSCOPIC RETROGRADE CHOLANGIOPANCREATOGRAPHY WITH PANCREATIC DUCT STENT EXCHANGE, DILATION, AND DEBRIS REMOVAL;  Surgeon: Guru Sabino Campuzano MD;  Location: [facility]     ENDOSCOPIC RETROGRADE CHOLANGIOPANCREATOGRAPHY, EXCHANGE TUBE/STENT N/A 2023    Procedure: ENDOSCOPIC RETROGRADE CHOLANGIOPANCREATOGRAPHY stent exchange, dilation, sludge and stone removal;  Surgeon: Guru Sabino Campuzano MD;  Location: [facility]    ENDOSCOPIC RETROGRADE CHOLANGIOPANCREATOGRAPHY, EXCHANGE TUBE/STENT N/A 3/13/2023    Procedure: ENDOSCOPIC RETROGRADE CHOLANGIOPANCREATOGRAPHY, WITH replacement of pancreatic stents, bile duct stent placed,sphinterotomy of bile duct ampulla, balloon sweep of bile duct for sludge,;  Surgeon: Guru Sabino Campuzano MD;  Location: [facility]    ENDOSCOPIC RETROGRADE CHOLANGIOPANCREATOGRAPHY, EXCHANGE TUBE/STENT N/A 5/10/2023    Procedure: ENDOSCOPIC RETROGRADE CHOLANGIOPANCREATOGRAPHY with pancreatic stents exchange and stone extraction;  Surgeon: Guru Sabino Campuzano MD;  Location: [facility]    ENDOSCOPIC RETROGRADE CHOLANGIOPANCREATOGRAPHY, EXCHANGE TUBE/STENT N/A 2023    Procedure: ENDOSCOPIC RETROGRADE CHOLANGIOPANCREATOGRAPHY, WITH REPLACEMENT OF STENT X3 AND BALLOON SWEEP OF BILE DUCT FOR STONES;  Surgeon: Guru Sabino Campuzano MD;  Location: [facility]    GASTROJEJUNOSTOMY N/A 2023    Procedure: Gastrojejunostomy;  Surgeon: Junito Paetl MD;  Location: [facility]    PANCREATECTOMY, TRANSPLANT AUTO ISLET CELL, COMBINED N/A 2023    Procedure: PANCREATECTOMY, TOTAL, WITH AUTOLOGOUS PANCREATIC ISLET CELL TRANSPLANT;  Surgeon: Junito Patel MD;  Location: [facility]    SPLENECTOMY N/A 2023    Procedure: Splenectomy;  Surgeon: Junito Patel MD;  Location: [facility]             Social History:     Social History     Tobacco Use    Smoking status: Former     Types: Cigarettes     Quit date: 2022     Years since quittin.9    Smokeless tobacco: Current    Tobacco comments:     Vapes daily   Substance Use Topics    Alcohol use: Not Currently             Family  History:   No family history on file.             Allergies:   No Known Allergies          Medications:     Current Facility-Administered Medications   Medication Dose Route Frequency Provider Last Rate Last Admin    bupivacaine (MARCAINE) PF 0.25% + dexamethasone (DECADRON) PF 1 mg + dexmedetomidine (PRECEDEX) 20 mcg injection  20 mL Infiltration Once Bridger Melissa MD        fentaNYL (PF) (SUBLIMAZE) injection 25-50 mcg  25-50 mcg Intravenous Q2 Min PRN Bridger Melissa MD   50 mcg at 04/04/24 0659    flumazenil (ROMAZICON) injection 0.2 mg  0.2 mg Intravenous q1 min prn Bridger Melissa MD        lactated ringers infusion   Intravenous Continuous Oscar Cage  mL/hr at 04/04/24 0643 New Bag at 04/04/24 0643    lidocaine (LMX4) cream   Topical Q1H PRN Gerald Loomis MD        lidocaine (LMX4) cream   Topical Q1H PRN Oscar Cage MD        lidocaine 1 % 0.1-1 mL  0.1-1 mL Other Q1H PRN Gerald Loomis MD        lidocaine 1 % 0.1-1 mL  0.1-1 mL Other Q1H PRN Oscar Cage MD        midazolam (VERSED) injection 1-2 mg  1-2 mg Intravenous Q4 Min PRN Bridger Melissa MD   2 mg at 04/04/24 0655    naloxone (NARCAN) injection 0.2 mg  0.2 mg Intravenous Q2 Min PRN Bridger Melissa MD        Or    naloxone (NARCAN) injection 0.4 mg  0.4 mg Intravenous Q2 Min PRN Bridger Melissa MD        Or    naloxone (NARCAN) injection 0.2 mg  0.2 mg Intramuscular Q2 Min PRN Bridger Melissa MD        Or    naloxone (NARCAN) injection 0.4 mg  0.4 mg Intramuscular Q2 Min PRN Bridger Melissa MD        sodium chloride (PF) 0.9% PF flush 3 mL  3 mL Intracatheter Q8H Gerald Loomis MD        sodium chloride (PF) 0.9% PF flush 3 mL  3 mL Intracatheter q1 min prn Gerald Loomis MD        sodium chloride (PF) 0.9% PF flush 3 mL  3 mL Intracatheter Q8H Oscar Cage MD        sodium chloride (PF) 0.9% PF flush 3 mL  3 mL Intracatheter q1 min prn Oscar Cage MD                   Review of Systems:   CONSTITUTIONAL: NEGATIVE  for fever, chills, change in weight  RESP: NEGATIVE for significant cough or SOB  CV: NEGATIVE for chest pain, palpitations or peripheral edema  GI: NEGATIVE for nausea, abdominal pain, heartburn, or change in bowel habits  MUSCULOSKELETAL: NEGATIVE for edema   PSYCHIATRIC: NEGATIVE for changes in mood or affect            Physical Exam:   All vitals have been reviewed  Pulse:  [55-64] 55  Resp:  [15-26] 15  BP: (118-136)/(80-91) 136/90  SpO2:  [98 %-100 %] 99 %  No intake or output data in the 24 hours ending 04/04/24 0718  Physical Exam:  Neck:   Supple, symmetrical, trachea midline, no adenopathy, thyroid symmetric, not enlarged and no tenderness, skin normal     Hematologic / Lymphatic:   no cervical lymphadenopathy     Abdomen:   Midline scar with small palpable hernia near the umbilicus     Musculoskeletal:   There is no redness, warmth, or swelling of the joints.  Full range of motion noted.  Motor strength is 5 out of 5 all extremities bilaterally.  Tone is normal.     Additional findings:   CV - regular rate  Resp - nonlabored             Data:   All laboratory data reviewed    Results:  BMP  Recent Labs   Lab 04/04/24  0609   *     CBCNo lab results found in last 7 days.  LFTNo lab results found in last 7 days.  Recent Labs   Lab 04/04/24  0609   *       Imaging:  CXR:  CT:         Álvaro De Leon MD

## 2024-04-04 NOTE — ANESTHESIA POSTPROCEDURE EVALUATION
Patient: Artie Burger    Procedure: Procedure(s):  REPAIR, HERNIA, VENTRAL, BILATERAL, LAPAROSCOPIC, USING PHASIX ST MESH       Anesthesia Type:  General    Note:  Disposition: Admission   Postop Pain Control: Uneventful            Sign Out: Well controlled pain   PONV: No   Neuro/Psych: Uneventful            Sign Out: Acceptable/Baseline neuro status   Airway/Respiratory: Uneventful            Sign Out: Acceptable/Baseline resp. status   CV/Hemodynamics: Uneventful            Sign Out: Acceptable CV status; No obvious hypovolemia; No obvious fluid overload   Other NRE: NONE   DID A NON-ROUTINE EVENT OCCUR? No           Last vitals:  Vitals Value Taken Time   /70 04/04/24 1115   Temp     Pulse 62 04/04/24 1129   Resp 14 04/04/24 1129   SpO2 99 % 04/04/24 1129   Vitals shown include unfiled device data.    Electronically Signed By: Dougie Calix MD  April 4, 2024  11:30 AM

## 2024-04-04 NOTE — OP NOTE
Transplant Surgery  Operative Note    Preop Dx:  Incisional hernia  Postop Dx: Same  Procedure: Laparoscopic assisted incisional hernia repair  Surgeon: Dr. Patel  ASSISTANT:  Álvaro De Leon fellow. There was no qualified general surgery resident available to assist during this procedure. Dr. De Leon participated in all aspects of the case under my supervision  Anesthesia: General  EBL: minimal ml  Fluids: n/a  UO: n/a  Drains: none  Specimen: none.  Complications: None  Findings:   Significant adhesions, Stomach adherent to LUQ from prior G-J tube, port placed just inferior to adhesions without event .    Indication: The patient has history of TPIAT with incisional hernia  After discussing the risks and benefits of surgery and potential complications, the patient provided informed consent.     DETAILS OF PROCEDURE:  The patient was brought to the operating room, placed in a supine position.  Perioperative prophylactic IV antibiotics were given.  Anesthesia was adminisitered. The abdomen was prepped and draped in the usual sterile fashion.  Time out was performed.    A 5mm incision was made at Bright's point. The abdomen was insufflated to 15mmHg. Attempted to place 5mm port at this site with optiview, but noted to have adhesions. A secondary incision was made at the LLQ. With the abdomen still insufflated a second port was placed in the LLQ with optiview without event. The abdomen was then broadly inspected with laparoscope. The port in the LUQ was noted to be in adhesions, near the stomach the stomach that was adhesed to the anterior abdominal wall, but no injury from either the port nor varess needle placement were noted. Additional 5mm ports were placed in the RUQ and RLQ. Adhesions were systematically taken down with the aid of the harmonic. A small <1cm defect was noted at the midline. After clearing the abdomen of adhesions for mesh placement, an incision was made overlying the hernia defect. A 10cm  circular phasix ST mesh was selected for repair. 0 PDS stay sutures were placed in 4 quadrants. The mesh was then inserted through the midline incision. A PMS grasper was then used to tac the 4 quadrants of the mesh through the abdominal wall using stab incisions. After the mesh was secure and flat against the abdominal wall we used an absorbable tacker to secure the mesh circumferentially. The fascial defect overlying the mesh was then closed with #1 PDS suture. The abdomen was again inspected and noted to be hemostatic with mesh in satisfactory position. The abdomen was desufflated. All ports and incisions were closed with 4-0 monocryl and glue.     All needle, sponge, and instrument counts were accurate.  The patient tolerated the procedure well without apparent complications and was trasfered to the PACU in good condition.  Faculty was present for critical portions of the procedure.    Attestation: I was present and scrubbed for all critical portions of this case and agree with the findings in this note.   Junito Patel MD

## 2024-04-04 NOTE — BRIEF OP NOTE
"[facility]    Brief Operative Note    Pre-operative diagnosis: Hernia, incisional [K43.2]  Post-operative diagnosis Same as pre-operative diagnosis    Procedure: REPAIR, HERNIA, VENTRAL, BILATERAL, LAPAROSCOPIC, USING PHASIX ST MESH, Bilateral - Abdomen    Surgeon: Surgeon(s) and Role:     * Junito Patel MD - Primary     * Álvaro De Leon MD - Fellow - Assisting  Anesthesia: General with Block   Estimated Blood Loss: 20 mL from 4/4/2024  7:30 AM to 4/4/2024 10:23 AM      Drains: None  Specimens: * No specimens in log *  Findings:   Significant adhesions, Stomach adherent to LUQ from prior G-J tube, port placed just inferior to adhesions without event .  Complications: None.  Implants:   Implant Name Type Inv. Item Serial No.  Lot No. LRB No. Used Action   MESH PHASIX RECONSTRUCTIVE RESORB 4X6\" [id number] - [id number] Mesh MESH PHASIX RECONSTRUCTIVE RESORB 4X6\" [id number]  [facility] [id number] N/A 1 Implanted             "

## 2024-04-04 NOTE — ANESTHESIA CARE TRANSFER NOTE
Patient: Artie Burger    Procedure: Procedure(s):  REPAIR, HERNIA, VENTRAL, BILATERAL, LAPAROSCOPIC, USING PHASIX ST MESH       Diagnosis: Hernia, incisional [K43.2]  Diagnosis Additional Information: No value filed.    Anesthesia Type:   General     Note:    Oropharynx: oropharynx clear of all foreign objects and spontaneously breathing  Level of Consciousness: awake  Oxygen Supplementation: nasal cannula  Level of Supplemental Oxygen (L/min / FiO2): 2  Independent Airway: airway patency satisfactory and stable  Dentition: dentition unchanged  Vital Signs Stable: post-procedure vital signs reviewed and stable  Report to RN Given: handoff report given  Patient transferred to: PACU    Handoff Report: Identifed the Patient, Identified the Reponsible Provider, Reviewed the pertinent medical history, Discussed the surgical course, Reviewed Intra-OP anesthesia mangement and issues during anesthesia, Set expectations for post-procedure period and Allowed opportunity for questions and acknowledgement of understanding    Vitals:  Vitals Value Taken Time   BP     Temp     Pulse 73 04/04/24 1027   Resp 12 04/04/24 1027   SpO2 98 % 04/04/24 1027   Vitals shown include unfiled device data.    Electronically Signed By: ROSIE Corbett CRNA  April 4, 2024  10:28 AM

## 2024-04-04 NOTE — ANESTHESIA PROCEDURE NOTES
Airway       Patient location during procedure: OR       Procedure Start/Stop Times: 4/4/2024 7:43 AM  Staff -        Anesthesiologist:  Gerald Loomis MD       CRNA: Miriam Vásquez APRN CRNA       Performed By: CRNA  Consent for Airway        Urgency: elective  Indications and Patient Condition       Indications for airway management: rolando-procedural       Induction type:intravenous       Mask difficulty assessment: 1 - vent by mask    Final Airway Details       Final airway type: endotracheal airway       Successful airway: ETT - single and Oral  Endotracheal Airway Details        ETT size (mm): 7.0       Cuffed: yes       Successful intubation technique: direct laryngoscopy       DL Blade Type: Marsh 2       Grade View of Cords: 1 (clear and open)       Adjucts: stylet       Position: Right       Measured from: lips       Secured at (cm): 21       Bite block used: None    Post intubation assessment        Placement verified by: capnometry, equal breath sounds and chest rise        Number of attempts at approach: 1       Number of other approaches attempted: 0       Secured with: tape       Ease of procedure: easy       Dentition: Intact and Unchanged    Medication(s) Administered   Medication Administration Time: 4/4/2024 7:43 AM

## 2024-04-04 NOTE — OR NURSING
Pulse:  [55-64] 55  Resp:  [15-26] 15  BP: (118-136)/(80-91) 136/90  SpO2:  [98 %-100 %] 99 %    TAP block performed without complications, 2 mg versed, 100 mcg fentanyl given. SR, VSS, 2L O2 via NC. Pt tolerated well.

## 2024-04-04 NOTE — ANESTHESIA PROCEDURE NOTES
TAP Procedure Note    Pre-Procedure   Staff -        Anesthesiologist:  Jeremías Billings MD       Resident/Fellow: Bridger Melissa MD       Performed By: resident and with residents       Procedure performed by resident/fellow/CRNA in presence of a teaching physician.         Location: pre-op       Procedure Start/Stop Times: 4/4/2024 6:50 AM and 4/4/2024 7:00 AM       Pre-Anesthestic Checklist: patient identified, IV checked, site marked, risks and benefits discussed, informed consent, monitors and equipment checked, pre-op evaluation, at physician/surgeon's request and post-op pain management  Timeout:       Correct Patient: Yes        Correct Procedure: Yes        Correct Site: Yes        Correct Position: Yes        Correct Laterality: Yes        Site Marked: Yes  Procedure Documentation  Procedure: TAP       Laterality: bilateral       Patient Position: supine       Skin prep: Chloraprep       Needle Gauge: 21.        Needle Length (millimeters): 110        Ultrasound guided       1. Ultrasound was used to identify targeted nerve, plexus, vascular marker, or fascial plane and place a needle adjacent to it in real-time.       2. Ultrasound was used to visualize the spread of anesthetic in close proximity to the above referenced structure.       3. A permanent image is entered into the patient's record.       4. The visualized anatomic structures appeared normal.       5. There were no apparent abnormal pathologic findings.    Assessment/Narrative         The placement was negative for: blood aspirated, painful injection and site bleeding       Paresthesias: No.       Bolus given via needle. no blood aspirated via catheter.        Secured via.        Insertion/Infusion Method: Single Shot       Complications: none    Medication(s) Administered   Bupivacaine 0.25% PF (Infiltration) - Infiltration   53 mL - 4/4/2024 7:00:00 AM  Dexmedetomidine 4 mcg/mL (Perineural) - Perineural   40 mcg - 4/4/2024 7:00:00  "AM  Dexamethasone 10 mg/mL PF (Perineural) - Perineural   2 mg - 4/4/2024 7:00:00 AM  Medication Administration Time: 4/4/2024 6:50 AM     Comments:  Bilateral TAP Block      FOR [facility] ONLY:   Pain Team Contact information: please page the Pain Team Via [employer]. Search \"Pain\". During daytime hours, please page the attending first. At night please page the resident first.      "

## 2024-04-04 NOTE — DISCHARGE INSTRUCTIONS
Hernia Repair: What to Expect at Home  Your Recovery  You are likely to have pain for the next few days. You may also feel tired and have less energy than normal. This is common.  You should feel better after a few days and will probably feel much better in 7 days.  For several weeks you may feel discomfort or pulling in the hernia repair when you move. You may have some bruising near the repair site and on your genitals. This is normal. If you have swelling in the genitals, you may be told to wear well-fitting briefs. Mailana bicycle shorts may also provide good support.  This care sheet gives you a general idea about how long it will take for you to recover. But each person recovers at a different pace. Follow the steps below to get better as quickly as possible.  How can you care for yourself at home?  Activity    Rest when you feel tired. Getting enough sleep will help you recover.     Try to walk each day. Start by walking a little more than you did the day before. Bit by bit, increase the amount you walk. Walking boosts blood flow and helps prevent pneumonia and constipation.     Avoid strenuous activities, such as biking, jogging, weight lifting, or aerobic exercise, until your doctor says it is okay.     Avoid lifting anything that would make you strain for 6 weeks. This may include heavy grocery bags and milk containers, a heavy briefcase or backpack, cat litter or dog food bags, a vacuum , or a child.     You may drive when you are no longer taking pain medicine and can quickly move your foot from the gas pedal to the brake. You must also be able to sit comfortably for a long period of time, even if you do not plan to go far. You might get caught in traffic.     Most people are able to return to work within 1 to 2 weeks after surgery.     You may shower 24 to 48 hours after surgery, if your doctor okays it. Pat the cut (incision) dry. Do not take a bath for the first 2 weeks, or until your doctor  tells you it is okay.     Your doctor will tell you when you can have sex again.   Diet    You can eat your normal diet. If your stomach is upset, try bland, low-fat foods like plain rice, broiled chicken, toast, and yogurt.     Drink plenty of fluids (unless your doctor tells you not to).     You may notice that your bowel movements are not regular right after your surgery. This is common. Avoid constipation and straining with bowel movements. You may want to take a fiber supplement every day. If you have not had a bowel movement after a couple of days, ask your doctor about taking a mild laxative.   Medicines    Your doctor will tell you if and when you can restart your medicines. He or she will also give you instructions about taking any new medicines.     If you stopped taking aspirin or some other blood thinner, your doctor will tell you when to start taking it again.     Be safe with medicines. Take pain medicines exactly as directed.  If the doctor gave you a prescription medicine for pain, take it as prescribed.  If you are not taking a prescription pain medicine, take an over-the-counter medicine such as acetaminophen (Tylenol), ibuprofen (Advil, Motrin), or naproxen (Aleve). Read and follow all instructions on the label.  Do not take two or more pain medicines at the same time unless the doctor told you to. Many pain medicines have acetaminophen, which is Tylenol. Too much acetaminophen (Tylenol) can be harmful.     If your doctor prescribed antibiotics, take them as directed. Do not stop taking them just because you feel better. You need to take the full course of antibiotics.     If you think your pain medicine is making you sick to your stomach:  Take your medicine after meals (unless your doctor has told you not to).  Ask your doctor for a different pain medicine.   Incision care    If you have strips of tape on the cut (incision) the doctor made, leave the tape on for a week or until it falls off.      "If you have staples closing the cut, you will need to visit your doctor in 1 to 2 weeks to have them removed.     Wash the area daily with warm, soapy water and pat it dry.   Follow-up care is a key part of your treatment and safety. Be sure to make and go to all appointments, and call your doctor if you are having problems. It's also a good idea to know your test results and keep a list of the medicines you take.  When should you call for help?   Call 911 anytime you think you may need emergency care. For example, call if:    You passed out (lost consciousness).     You are short of breath.   Call your doctor now or seek immediate medical care if:    You have pain that does not get better after you take pain medicine.     You are sick to your stomach and cannot keep fluids down.     You have signs of a blood clot in your leg (called a deep vein thrombosis), such as:  Pain in your calf, back of the knee, thigh, or groin.  Redness and swelling in your leg or groin.     You cannot pass stools or gas.     Bright red blood has soaked through the bandage over your incision.     You have loose stitches, or your incision comes open.     You have signs of infection, such as:  Increased pain, swelling, warmth, or redness.  Red streaks leading from the incision.  Pus draining from the incision.  A fever.   Watch closely for any changes in your health, and be sure to contact your doctor if you have any problems.  Where can you learn more?  Go to https://www.Mobile[employer].net/patiented  Enter G367 in the search box to learn more about \"Hernia Repair: What to Expect at Home.\"  Current as of: July 26, 2023               Content Version: 14.0    7768-3107 [employer].   Care instructions adapted under license by your healthcare professional. If you have questions about a medical condition or this instruction, always ask your healthcare professional. [employer] disclaims any warranty or liability for your " use of this information.  Contacting your Doctor -   To contact a doctor, call Amanda   or:  [phone] and ask for the resident on call for Surgery (answered 24 hours a day)   Emergency Department:  [facility]: [phone]  [facility]: [phone] 911 if you are in need of immediate or emergent help

## 2024-04-04 NOTE — OR NURSING
Spoke with Dr. Buchanan regarding cancelled Dilaudid prescription. MD to put in new prescription to get patient through the weekend.

## 2024-04-08 ENCOUNTER — MYC REFILL (OUTPATIENT)
Dept: TRANSPLANT | Facility: CLINIC | Age: 29
End: 2024-04-08
Payer: COMMERCIAL

## 2024-04-08 DIAGNOSIS — G89.18 ACUTE POST-OPERATIVE PAIN: ICD-10-CM

## 2024-04-09 RX ORDER — HYDROMORPHONE HYDROCHLORIDE 4 MG/1
4 TABLET ORAL EVERY 8 HOURS PRN
Qty: 42 TABLET | Refills: 0 | Status: SHIPPED | OUTPATIENT
Start: 2024-04-11 | End: 2024-04-24

## 2024-04-10 DIAGNOSIS — G89.18 ACUTE POST-OPERATIVE PAIN: ICD-10-CM

## 2024-04-10 RX ORDER — GABAPENTIN 600 MG/1
600 TABLET ORAL 2 TIMES DAILY
Qty: 180 TABLET | Refills: 3 | Status: SHIPPED | OUTPATIENT
Start: 2024-04-10

## 2024-04-11 ENCOUNTER — OFFICE VISIT (OUTPATIENT)
Dept: TRANSPLANT | Facility: CLINIC | Age: 29
End: 2024-04-11
Attending: SURGERY
Payer: COMMERCIAL

## 2024-04-11 VITALS
BODY MASS INDEX: 22.31 KG/M2 | DIASTOLIC BLOOD PRESSURE: 79 MMHG | TEMPERATURE: 97.7 F | HEART RATE: 96 BPM | WEIGHT: 122 LBS | SYSTOLIC BLOOD PRESSURE: 116 MMHG | OXYGEN SATURATION: 97 %

## 2024-04-11 DIAGNOSIS — K43.2 INCISIONAL HERNIA, WITHOUT OBSTRUCTION OR GANGRENE: Primary | ICD-10-CM

## 2024-04-11 PROCEDURE — 99213 OFFICE O/P EST LOW 20 MIN: CPT | Performed by: SURGERY

## 2024-04-11 PROCEDURE — G0463 HOSPITAL OUTPT CLINIC VISIT: HCPCS | Performed by: SURGERY

## 2024-04-11 RX ORDER — LANCETS
EACH MISCELLANEOUS
COMMUNITY
Start: 2024-03-25

## 2024-04-11 RX ORDER — DIAZEPAM 2 MG
2 TABLET ORAL EVERY 6 HOURS PRN
Qty: 20 TABLET | Refills: 1 | Status: SHIPPED | OUTPATIENT
Start: 2024-04-11 | End: 2024-06-11

## 2024-04-11 RX ORDER — PEDIATRIC MULTIVIT 61/D3/VIT K 1500-800
1 CAPSULE ORAL DAILY
COMMUNITY
Start: 2023-11-24 | End: 2024-09-05

## 2024-04-11 RX ORDER — PSEUDOEPHEDRINE HCL 30 MG
100 TABLET ORAL PRN
COMMUNITY
Start: 2023-11-24 | End: 2024-06-11

## 2024-04-11 RX ORDER — IBUPROFEN 200 MG
800 TABLET ORAL PRN
COMMUNITY

## 2024-04-11 RX ORDER — CETIRIZINE HYDROCHLORIDE 10 MG/1
10 TABLET ORAL DAILY
COMMUNITY

## 2024-04-11 ASSESSMENT — PAIN SCALES - GENERAL: PAINLEVEL: SEVERE PAIN (6)

## 2024-04-11 NOTE — NURSING NOTE
Chief Complaint   Patient presents with    RECHECK     r RETURN AUTO ISLET        /79 (BP Location: Right arm, Patient Position: Sitting, Cuff Size: Adult Regular)   Pulse 96   Temp 97.7  F (36.5  C) (Oral)   Wt 55.3 kg (122 lb)   SpO2 97%   BMI 22.31 kg/m      Hudson Ireland CMA on 4/11/2024 at 3:10 PM

## 2024-04-11 NOTE — Clinical Note
4/11/2024         RE: Artie Burger  [address]  [facility] [address]        Dear Colleague,    Thank you for referring your patient, Artie Burger, to the [facility]. Please see a copy of my visit note below.    No notes on file    Again, thank you for allowing me to participate in the care of your patient.        Sincerely,        Junito Patel MD

## 2024-04-15 NOTE — PROGRESS NOTES
Diabetes Consult Note        Artie Burger  is a 28 year old female who has a past medical history of Alcohol-induced chronic pancreatitis (H), Anxiety, Depression, Gastroesophageal reflux disease, Pancreatic disease, PONV (postoperative nausea and vomiting), and Type 1 diabetes mellitus (H). She is here for follow-up of diabetes.      I met her in October 2023 and last saw her in January 2024 when she was doing quite well requiring less insulin and reduced her doses   Including carbohydrate coverage to 1 unit per 25 g and sensitivity to 1 unit per 65 mg/dL.  She was referred to Dm ed and has seen TASHIA Graff, last on 3/27/24.  She was counting carbs but not giving correction.  They reviewed that.  She was hospitalized earlier this month and discharged with  instructions to give 1 unit : 10 g carb and 1u: 40 >140 with meals together with 14 units Lantus qam and 10 qpm.          4/17/2024     1:31 PM   including   1. Since your last visit to our clinic (or if this your first visit, since you last saw your primary care provider), have you experienced any of the following symptoms that may be related to low blood sugars? Sweating that wasn't due to exertion    Shaking or trembling    Extreme hunger    Lightheadedness   2. Since your last visit to our clinic (or if this your first visit, since you last saw your primary care provider), have you experienced any of the following symptoms that may be related to prolonged high blood sugars? More thirst than usual    Fatigue   4. Do you have any female family members who have had heart attacks or strokes before age 60 or male relatives who have had heart attacks or strokes before age 50? No   5. Do you have any family members who have had complications from diabetes such as kidney disease, heart disease or strokes, retinopathy (a vision problem), or amputations? No   6. Who do you live with?  I live alone   Little interest or pleasure in doing things? Several days    Feeling down, depressed, or hopeless? More than half the days   10.  Are you considering a pregnancy or interested in discussing pregnancy prevention today? No            Today:  She had to hernia surgery and more painful then expected.  Still having a lot of internal pain but appetite okay.    She thinks that she is giving 10 - 20 units of Novolog each day.    She struggles with highly variable blood sugars.  She currently is not very active.  She does do physical therapy for rehabilitation but does not find that taxing and does not see lower blood sugars with this for the night following.  We noted that there are days such as April 13 where her blood sugar was recurrently low, and the 16th where it was high all day, but she really cannot think of anything that was different about those days in regards to activity or what she was eating.  She feels confident that he she gives her Lantus every morning and has not missed any doses.  She also does believe she gives NovoLog every time she eats more than 10 g of carbohydrate.       Current Diabetes Medications:  Lantus 8 units qam  NovoLog 1 unit : 30 grams, though at times has tanked with this, so at times giving after she sees where things are going.    Also is using 1: 40 >140.      We reviewed glucometer/CGMS data together.  It revealed:  Average blood sugar 195.  Blood sugar is 45% in range 5% low.  She has days such as the 13 to her blood sugar is generally low all day and at times severely low  Contrasted to a day such as the 16th of her blood sugar is high all day and never came into range.      See details below    Artie's PMH, PSH and allergies were reviewed today and pertinent information is summarized above.    Artie's pertinent social and family history are also reviewed today and pertinent information is summarized above.      Current Outpatient Medications   Medication Sig Dispense Refill    acetaminophen (TYLENOL) 500 MG tablet Take 500-1,000 mg by  mouth      Alcohol Swabs PADS Use before taking blood sugars up to 10 times a day 100 each 3    aspirin 81 MG EC tablet Take 1 tablet (81 mg) by mouth daily 90 tablet 3    blood glucose (NO BRAND SPECIFIED) lancets standard Use to test blood sugars up to 8 times daily as directed. 100 each 3    blood glucose (NO BRAND SPECIFIED) test strip Test blood sugars up to 8 times a day as directed by your provider 200 strip 3    blood glucose monitoring (NO BRAND SPECIFIED) meter device kit Use as directed Per insurance coverage. DM education recommending Accu Chek Guide glucose meter 1 kit 0    blood glucose monitoring (SOFTCLIX) lancets USE TO TEST BLOOD GLUCOSE UP TO EIGHT TIMES DAILY      busPIRone (BUSPAR) 15 MG tablet Take 1 tablet (15 mg) by mouth 2 times daily 120 tablet 0    cetirizine (ZYRTEC) 10 MG tablet Take 10 mg by mouth daily      Continuous Blood Gluc Sensor (FREESTYLE RAJANI 3 SENSOR) MISC 1 Units every 14 days 7 each 4    diazepam (VALIUM) 2 MG tablet Take 1 tablet (2 mg) by mouth every 6 hours as needed for muscle spasms 28 tablet 1    diazepam (VALIUM) 2 MG tablet Take 1 tablet (2 mg) by mouth every 6 hours as needed for anxiety or muscle spasms 20 tablet 1     MG capsule Take 100 mg by mouth as needed      FIBERCON 625 MG tablet Take 1 Tablet (625 mg) by mouth once daily.      gabapentin (NEURONTIN) 600 MG tablet Take 1 tablet (600 mg) by mouth 2 times daily 180 tablet 3    Glucagon (GVOKE HYPOPEN) 1 MG/0.2ML pen Inject the contents of 1 device under the skin into lower abdomen, outer thigh, or outer upper arm as needed for hypoglycemia. If no response after 15 minutes, additional 1 mg dose from a new device may be injected while waiting for emergency assistance. 0.4 mL 3    glucose (BD GLUCOSE) 4 g chewable tablet Take 1 tablet by mouth every hour as needed for low blood sugar 30 tablet 0    HYDROmorphone (DILAUDID) 4 MG tablet Take 1 tablet (4 mg) by mouth every 8 hours as needed for severe pain  42 tablet 0    hydrOXYzine HCl (ATARAX) 25 MG tablet TAKE ONE TO TWO TABLETS BY MOUTH AT BEDTIME AS NEEDED FOR ANXIETY OR SLEEP.      ibuprofen (ADVIL/MOTRIN) 200 MG tablet Take 800 mg by mouth as needed      insulin aspart (NOVOLOG PEN) 100 UNIT/ML pen DOSE:  1 units per 10 grams of carbohydrate.  Only chart total amount of units given.  Do not give if pre-prandial glucose is less than 60 mg/dL. If given at mealtime, administer within 30 minutes of start of meal. 15 mL 3    insulin pen needle (32G X 4 MM) 32G X 4 MM miscellaneous Use up to 8 pen needles daily or as directed. 200 each 11    levonorgestrel (MIRENA) 52 MG (20 mcg/day) IUD 1 Device by Intrauterine route      lipase-protease-amylase (CREON) 84533-18[address]-973181 units CPEP per EC capsule Take 4 capsules with meals and 2 capsules with snacks; approximate daily maximum 16 capsules 480 capsule 11    LORazepam (ATIVAN) 0.5 MG tablet Take 0.5 mg by mouth      lubiprostone (AMITIZA) 24 MCG capsule Take 24 mcg by mouth 2 times daily (with meals)      Methocarbamol 1000 MG TABS Take 1 tablet by mouth 2 times daily as needed (for abdominal pain) 60 tablet 0    mvw complete formulation (SOFTGELS ) capsule Take 1 capsule by mouth daily      ondansetron (ZOFRAN ODT) 4 MG ODT tab Take 1 tablet (4 mg) by mouth every 6 hours as needed for nausea or vomiting 12 tablet 3    pantoprazole (PROTONIX) 40 MG EC tablet Take 1 tablet (40 mg) by mouth daily for 360 days 90 tablet 3    polyethylene glycol (MIRALAX) 17 GM/Dose powder Take 17 g by mouth daily 510 g 3    propranolol (INDERAL) 10 MG tablet Take 10 mg by mouth      QUEtiapine (SEROQUEL) 25 MG tablet Take 1 tablet (25 mg) by mouth at bedtime 30 tablet 0    sertraline (ZOLOFT) 100 MG tablet Take 2 tablets by mouth daily      Alcohol Swabs PADS Use to swab the area of the injection or matthew as directed Per insurance coverage 100 each 0    insulin aspart (NOVOLOG PEN) 100 UNIT/ML pen Inject 1-8 Units Subcutaneous every  "4 hours -179 give 1 units. -219 give 2 units. -259 give 3 units. -299 give 4 units. -239 give 5 units. -379 give 6 units. -419 give 7 units. BG >/= 420 give 8 units. 15 mL 3    insulin aspart (NOVOLOG PEN) 100 UNIT/ML pen DOSE:  1 units per 10 grams of carbohydrate. Only chart total amount of units given.  Do not give if pre-prandial glucose is less than 60 mg/dL. If given at mealtime, administer within 30 minutes of start of meal. 15 mL 3    insulin glargine (LANTUS PEN) 100 UNIT/ML pen Inject 14 Units Subcutaneous every morning AND 10 Units every evening. 15 mL 3    insulin pen needle (32G X 4 MM) 32G X 4 MM miscellaneous Use as directed by provider Per insurance coverage 100 each 3    QUEtiapine (SEROQUEL) 25 MG tablet Take 0.5-1 tablets (12.5-25 mg) by mouth every 6 hours as needed (anxiety) 30 tablet 3     No current facility-administered medications for this visit.         ROS:   Reports good physical activity tolerance.  Denies any pedal lesions or vision changes or concerns. Denies any other acute concerns except as noted above.      Exam:    Wt 56.7 kg (125 lb)   BMI 22.86 kg/m    Wt Readings from Last 10 Encounters:   04/23/24 56.7 kg (125 lb)   04/11/24 55.3 kg (122 lb)   04/04/24 54.3 kg (119 lb 11.4 oz)   03/21/24 56.7 kg (125 lb)   01/09/24 54.9 kg (121 lb)   12/14/23 54.9 kg (121 lb 0.5 oz)   10/05/23 60.8 kg (134 lb 1.6 oz)   10/03/23 57.3 kg (126 lb 6.4 oz)   09/21/23 58.5 kg (129 lb)   08/18/23 57.2 kg (126 lb)   Estimated body mass index is 22.86 kg/m  as calculated from the following:    Height as of 4/4/24: 1.575 m (5' 2\").    Weight as of this encounter: 56.7 kg (125 lb).    General: Pleasant, well apperaing in NAD seated comfortably at home.  Psych:  Mood is \"good,\" affect is appropriate.  Thought form and content are fluid and coherent.    HEENT: Eyes and sclera are clear. Extraocular movements are grossly intact without proptosis.  Nares are patent, " mucous membranes moist.  Neck: No masses or JVD are noted.    Resp: Easy and unlabored breathing.   Neuro: Alert and oriented, communicating clearly.     Data:      Recent Labs   Lab Test 03/21/24  1106 12/14/23  1015 09/22/23 2031 09/21/23  1614 08/19/23  0742 08/18/23  1726   A1C 8.2* 6.6*   < >  --   --   --    TSH  --   --   --   --   --  1.68   LDL 58 47  --   --    < >  --    HDL 64 62  --   --    < >  --    TRIG 134 90  --   --    < >  --    CR 0.89 0.72   < >  --    < > 0.74   MICROL  --   --   --  <12.0  --   --    AST 37 37   < >  --    < > 22   ALT 25 35   < >  --    < > 21    < > = values in this interval not displayed.       Microalbuminuria:  Lab Results   Component Value Date    UMALCR  09/21/2023      Comment:      Unable to calculate, urine albumin and/or urine creatinine is outside detectable limits.  Microalbuminuria is defined as an albumin:creatinine ratio of 17 to 299 for males and 25 to 299 for females. A ratio of albumin:creatinine of 300 or higher is indicative of overt proteinuria.  Due to biologic variability, positive results should be confirmed by a second, first-morning random or 24-hour timed urine specimen. If there is discrepancy, a third specimen is recommended. When 2 out of 3 results are in the microalbuminuria range, this is evidence for incipient nephropathy and warrants increased efforts at glucose control, blood pressure control, and institution of therapy with an angiotensin-converting-enzyme (ACE) inhibitor (if the patient can tolerate it).         Most recent GFRs:    Lab Results   Component Value Date    GFRESTIMATED 89 03/21/2024    GFRESTIMATED >90 12/14/2023    GFRESTIMATED >90 10/05/2023       Lab Results   Component Value Date    CPEPT 0.2 (L) 03/21/2024    GADAB <5.0 06/28/2023    ISCAB <1:4 06/28/2023     FIB-4 Calculation: 0.15 at 10/5/2023  2:35 PM  Calculated from:  SGOT/AST: 19 U/L at 10/5/2023  2:35 PM  SGPT/ALT: 11 U/L at 10/5/2023  2:35 PM  Platelets: 1,093  10e3/uL at 10/5/2023  2:35 PM  Age: 27 years  AST   Date Value Ref Range Status   03/21/2024 37 0 - 45 U/L Final     Comment:     Reference intervals for this test were updated on 6/12/2023 to more accurately reflect our healthy population. There may be differences in the flagging of prior results with similar values performed with this method. Interpretation of those prior results can be made in the context of the updated reference intervals.     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     Post-pancreatectomy diabetes: Due to low islet cell count expect will remain insulin dependent.  She has been struggling to keep her weight up but it is now stable.  Her blood sugars have been quite variable and she has some uncertainty regarding dosing.  She is having excessive hypoglycemia and less symptoms with this, she is dependent on her CGMS and will continue to require this.  It does appear that some of her hypoglycemia follows correction of hyperglycemia and we will relax her correction.  She has had confusion about what sliding scale to use but it sounds like she has been using most consistently a 1 unit per 40 scale.  She agrees to look in [employer] to get the summary from today's visit and use the sliding scale that we will update today.    Advised:    Please continue to give Lantus 8 units daily.    Please continue to give 1 unit : 30 grams of carbohydrate before you eat.   If you blood sugar >150, please also add correction as follows.      Correction Scale - 1 unit : 60 mg/dl >150     Do Not give Correction Insulin if Pre-Meal BG less than 150   -210 = 1 unit  -270 = 2 units.  -330 = 3 units.  -390 = 4 units.  -450 = 5 units.  BG >450 = 6 units.  To be given with mealtime  insulin, and based on blood glucose before your meal.        Do Not give Bedtime Correction Insulin if BG less than 210  -270 = 1 units.  -330 = 2 units.  -390 = 3 units.  -450 = 4 units.  BG >450 = 5 units.  To be given with mealtime insulin, and based on blood glucose before your meal.      Please record doses in isamar to review with Mildred.    She will meet with Mildred Graff tomorrow and education, and is advised to continue to meet with her until her blood sugars are more consistently in the safe range.  I will follow-up with her in 3 months and I also would like her to establish with one of our MD endocrinology providers in 6 months.    45 minutes spent on the date of the encounter doing chart review, history and exam, documentation, education and counseling, as well as communication and coordination of care, and further activities as noted above.      It is my privilege to be involved in the care of the above patient.     Caro Cormier PA-C, MPAS  [facility]  Diabetes, Endocrinology, and Metabolism  [phone] Appointments/Nurse  [phone] pager  [phone] nurse line    This note was completed in part using Dragon voice recognition, and may contain word and grammatical errors.  Joined the call at 4/23/2024, 2:14:44 pm.  Left the call at 4/23/2024, 2:37:42 pm.  You were on the call for 22 minutes 58 seconds .  Patient is at home provider is off-site platform is [employer].      Outcome for 04/19/24 10:15 AM: Data obtained via [employer] website  Willow Cao LPN

## 2024-04-23 ENCOUNTER — VIRTUAL VISIT (OUTPATIENT)
Dept: ENDOCRINOLOGY | Facility: CLINIC | Age: 29
End: 2024-04-23
Payer: COMMERCIAL

## 2024-04-23 VITALS — WEIGHT: 125 LBS | BODY MASS INDEX: 22.86 KG/M2

## 2024-04-23 DIAGNOSIS — E13.9 POST-PANCREATECTOMY DIABETES (H): Primary | ICD-10-CM

## 2024-04-23 DIAGNOSIS — E89.1 POST-PANCREATECTOMY DIABETES (H): Primary | ICD-10-CM

## 2024-04-23 DIAGNOSIS — Z90.410 ACQUIRED TOTAL ABSENCE OF PANCREAS: ICD-10-CM

## 2024-04-23 DIAGNOSIS — Z90.410 POST-PANCREATECTOMY DIABETES (H): Primary | ICD-10-CM

## 2024-04-23 DIAGNOSIS — K43.2 INCISIONAL HERNIA, WITHOUT OBSTRUCTION OR GANGRENE: Primary | ICD-10-CM

## 2024-04-23 PROCEDURE — 99215 OFFICE O/P EST HI 40 MIN: CPT | Mod: 95 | Performed by: PHYSICIAN ASSISTANT

## 2024-04-23 RX ORDER — DIAZEPAM 2 MG
2 TABLET ORAL EVERY 6 HOURS PRN
Qty: 28 TABLET | Refills: 1 | Status: SHIPPED | OUTPATIENT
Start: 2024-04-23 | End: 2024-06-11

## 2024-04-23 NOTE — NURSING NOTE
Is the patient currently in the state of [place]? YES    Visit mode:VIDEO    If the visit is dropped, the patient can be reconnected by: VIDEO VISIT: Text to cell phone:   Telephone Information:   Mobile [phone]       Will anyone else be joining the visit? NO  (If patient encounters technical issues they should call [phone])    How would you like to obtain your AVS? MyChart    Are changes needed to the allergy or medication list? No    Are refills needed on medications prescribed by this physician? YES    Reason for visit: RECHECK and Video Visit    Jadyn AGUSTIN

## 2024-04-23 NOTE — LETTER
4/23/2024       RE: Artie Burger  [address]  [facility] [address]     Dear Colleague,    Thank you for referring your patient, Artie Burger, to the [facility] [place] at . Please see a copy of my visit note below.                 Diabetes Consult Note        Artie Burger  is a 28 year old female who has a past medical history of Alcohol-induced chronic pancreatitis (H), Anxiety, Depression, Gastroesophageal reflux disease, Pancreatic disease, PONV (postoperative nausea and vomiting), and Type 1 diabetes mellitus (H). She is here for follow-up of diabetes.      I met her in October 2023 and last saw her in January 2024 when she was doing quite well requiring less insulin and reduced her doses   Including carbohydrate coverage to 1 unit per 25 g and sensitivity to 1 unit per 65 mg/dL.  She was referred to Dm ed and has seen TASHIA Graff, last on 3/27/24.  She was counting carbs but not giving correction.  They reviewed that.  She was hospitalized earlier this month and discharged with  instructions to give 1 unit : 10 g carb and 1u: 40 >140 with meals together with 14 units Lantus qam and 10 qpm.          4/17/2024     1:31 PM   including   1. Since your last visit to our clinic (or if this your first visit, since you last saw your primary care provider), have you experienced any of the following symptoms that may be related to low blood sugars? Sweating that wasn't due to exertion    Shaking or trembling    Extreme hunger    Lightheadedness   2. Since your last visit to our clinic (or if this your first visit, since you last saw your primary care provider), have you experienced any of the following symptoms that may be related to prolonged high blood sugars? More thirst than usual    Fatigue   4. Do you have any female family members who have had heart attacks or strokes before age 60 or male relatives who have had  heart attacks or strokes before age 50? No   5. Do you have any family members who have had complications from diabetes such as kidney disease, heart disease or strokes, retinopathy (a vision problem), or amputations? No   6. Who do you live with?  I live alone   Little interest or pleasure in doing things? Several days   Feeling down, depressed, or hopeless? More than half the days   10.  Are you considering a pregnancy or interested in discussing pregnancy prevention today? No            Today:  She had to hernia surgery and more painful then expected.  Still having a lot of internal pain but appetite okay.    She thinks that she is giving 10 - 20 units of Novolog each day.    She struggles with highly variable blood sugars.  She currently is not very active.  She does do physical therapy for rehabilitation but does not find that taxing and does not see lower blood sugars with this for the night following.  We noted that there are days such as April 13 where her blood sugar was recurrently low, and the 16th where it was high all day, but she really cannot think of anything that was different about those days in regards to activity or what she was eating.  She feels confident that he she gives her Lantus every morning and has not missed any doses.  She also does believe she gives NovoLog every time she eats more than 10 g of carbohydrate.       Current Diabetes Medications:  Lantus 8 units qam  NovoLog 1 unit : 30 grams, though at times has tanked with this, so at times giving after she sees where things are going.    Also is using 1: 40 >140.      We reviewed glucometer/CGMS data together.  It revealed:  Average blood sugar 195.  Blood sugar is 45% in range 5% low.  She has days such as the 13 to her blood sugar is generally low all day and at times severely low  Contrasted to a day such as the 16th of her blood sugar is high all day and never came into range.      See details below    Artie's PMH, PSH and  allergies were reviewed today and pertinent information is summarized above.    Artie's pertinent social and family history are also reviewed today and pertinent information is summarized above.      Current Outpatient Medications   Medication Sig Dispense Refill    acetaminophen (TYLENOL) 500 MG tablet Take 500-1,000 mg by mouth      Alcohol Swabs PADS Use before taking blood sugars up to 10 times a day 100 each 3    aspirin 81 MG EC tablet Take 1 tablet (81 mg) by mouth daily 90 tablet 3    blood glucose (NO BRAND SPECIFIED) lancets standard Use to test blood sugars up to 8 times daily as directed. 100 each 3    blood glucose (NO BRAND SPECIFIED) test strip Test blood sugars up to 8 times a day as directed by your provider 200 strip 3    blood glucose monitoring (NO BRAND SPECIFIED) meter device kit Use as directed Per insurance coverage. DM education recommending Accu Chek Guide glucose meter 1 kit 0    blood glucose monitoring (SOFTCLIX) lancets USE TO TEST BLOOD GLUCOSE UP TO EIGHT TIMES DAILY      busPIRone (BUSPAR) 15 MG tablet Take 1 tablet (15 mg) by mouth 2 times daily 120 tablet 0    cetirizine (ZYRTEC) 10 MG tablet Take 10 mg by mouth daily      Continuous Blood Gluc Sensor (FREESTYLE RAJANI 3 SENSOR) MISC 1 Units every 14 days 7 each 4    diazepam (VALIUM) 2 MG tablet Take 1 tablet (2 mg) by mouth every 6 hours as needed for muscle spasms 28 tablet 1    diazepam (VALIUM) 2 MG tablet Take 1 tablet (2 mg) by mouth every 6 hours as needed for anxiety or muscle spasms 20 tablet 1     MG capsule Take 100 mg by mouth as needed      FIBERCON 625 MG tablet Take 1 Tablet (625 mg) by mouth once daily.      gabapentin (NEURONTIN) 600 MG tablet Take 1 tablet (600 mg) by mouth 2 times daily 180 tablet 3    Glucagon (GVOKE HYPOPEN) 1 MG/0.2ML pen Inject the contents of 1 device under the skin into lower abdomen, outer thigh, or outer upper arm as needed for hypoglycemia. If no response after 15 minutes,  additional 1 mg dose from a new device may be injected while waiting for emergency assistance. 0.4 mL 3    glucose (BD GLUCOSE) 4 g chewable tablet Take 1 tablet by mouth every hour as needed for low blood sugar 30 tablet 0    HYDROmorphone (DILAUDID) 4 MG tablet Take 1 tablet (4 mg) by mouth every 8 hours as needed for severe pain 42 tablet 0    hydrOXYzine HCl (ATARAX) 25 MG tablet TAKE ONE TO TWO TABLETS BY MOUTH AT BEDTIME AS NEEDED FOR ANXIETY OR SLEEP.      ibuprofen (ADVIL/MOTRIN) 200 MG tablet Take 800 mg by mouth as needed      insulin aspart (NOVOLOG PEN) 100 UNIT/ML pen DOSE:  1 units per 10 grams of carbohydrate.  Only chart total amount of units given.  Do not give if pre-prandial glucose is less than 60 mg/dL. If given at mealtime, administer within 30 minutes of start of meal. 15 mL 3    insulin pen needle (32G X 4 MM) 32G X 4 MM miscellaneous Use up to 8 pen needles daily or as directed. 200 each 11    levonorgestrel (MIRENA) 52 MG (20 mcg/day) IUD 1 Device by Intrauterine route      lipase-protease-amylase (CREON) 17444-56248-967075 units CPEP per EC capsule Take 4 capsules with meals and 2 capsules with snacks; approximate daily maximum 16 capsules 480 capsule 11    LORazepam (ATIVAN) 0.5 MG tablet Take 0.5 mg by mouth      lubiprostone (AMITIZA) 24 MCG capsule Take 24 mcg by mouth 2 times daily (with meals)      Methocarbamol 1000 MG TABS Take 1 tablet by mouth 2 times daily as needed (for abdominal pain) 60 tablet 0    mvw complete formulation (SOFTGELS ) capsule Take 1 capsule by mouth daily      ondansetron (ZOFRAN ODT) 4 MG ODT tab Take 1 tablet (4 mg) by mouth every 6 hours as needed for nausea or vomiting 12 tablet 3    pantoprazole (PROTONIX) 40 MG EC tablet Take 1 tablet (40 mg) by mouth daily for 360 days 90 tablet 3    polyethylene glycol (MIRALAX) 17 GM/Dose powder Take 17 g by mouth daily 510 g 3    propranolol (INDERAL) 10 MG tablet Take 10 mg by mouth      QUEtiapine (SEROQUEL)  25 MG tablet Take 1 tablet (25 mg) by mouth at bedtime 30 tablet 0    sertraline (ZOLOFT) 100 MG tablet Take 2 tablets by mouth daily      Alcohol Swabs PADS Use to swab the area of the injection or matthew as directed Per insurance coverage 100 each 0    insulin aspart (NOVOLOG PEN) 100 UNIT/ML pen Inject 1-8 Units Subcutaneous every 4 hours -179 give 1 units. -219 give 2 units. -259 give 3 units. -299 give 4 units. -239 give 5 units. -379 give 6 units. -419 give 7 units. BG >/= 420 give 8 units. 15 mL 3    insulin aspart (NOVOLOG PEN) 100 UNIT/ML pen DOSE:  1 units per 10 grams of carbohydrate. Only chart total amount of units given.  Do not give if pre-prandial glucose is less than 60 mg/dL. If given at mealtime, administer within 30 minutes of start of meal. 15 mL 3    insulin glargine (LANTUS PEN) 100 UNIT/ML pen Inject 14 Units Subcutaneous every morning AND 10 Units every evening. 15 mL 3    insulin pen needle (32G X 4 MM) 32G X 4 MM miscellaneous Use as directed by provider Per insurance coverage 100 each 3    QUEtiapine (SEROQUEL) 25 MG tablet Take 0.5-1 tablets (12.5-25 mg) by mouth every 6 hours as needed (anxiety) 30 tablet 3     No current facility-administered medications for this visit.         ROS:   Reports good physical activity tolerance.  Denies any pedal lesions or vision changes or concerns. Denies any other acute concerns except as noted above.      Exam:    Wt 56.7 kg (125 lb)   BMI 22.86 kg/m    Wt Readings from Last 10 Encounters:   04/23/24 56.7 kg (125 lb)   04/11/24 55.3 kg (122 lb)   04/04/24 54.3 kg (119 lb 11.4 oz)   03/21/24 56.7 kg (125 lb)   01/09/24 54.9 kg (121 lb)   12/14/23 54.9 kg (121 lb 0.5 oz)   10/05/23 60.8 kg (134 lb 1.6 oz)   10/03/23 57.3 kg (126 lb 6.4 oz)   09/21/23 58.5 kg (129 lb)   08/18/23 57.2 kg (126 lb)   Estimated body mass index is 22.86 kg/m  as calculated from the following:    Height as of 4/4/24: 1.575 m (5'  "2\").    Weight as of this encounter: 56.7 kg (125 lb).    General: Pleasant, well apperaing in NAD seated comfortably at home.  Psych:  Mood is \"good,\" affect is appropriate.  Thought form and content are fluid and coherent.    HEENT: Eyes and sclera are clear. Extraocular movements are grossly intact without proptosis.  Nares are patent, mucous membranes moist.  Neck: No masses or JVD are noted.    Resp: Easy and unlabored breathing.   Neuro: Alert and oriented, communicating clearly.     Data:      Recent Labs   Lab Test 03/21/24  1106 12/14/23  1015 09/22/23  2031 09/21/23  1614 08/19/23  0742 08/18/23  1726   A1C 8.2* 6.6*   < >  --   --   --    TSH  --   --   --   --   --  1.68   LDL 58 47  --   --    < >  --    HDL 64 62  --   --    < >  --    TRIG 134 90  --   --    < >  --    CR 0.89 0.72   < >  --    < > 0.74   MICROL  --   --   --  <12.0  --   --    AST 37 37   < >  --    < > 22   ALT 25 35   < >  --    < > 21    < > = values in this interval not displayed.       Microalbuminuria:  Lab Results   Component Value Date    UMALCR  09/21/2023      Comment:      Unable to calculate, urine albumin and/or urine creatinine is outside detectable limits.  Microalbuminuria is defined as an albumin:creatinine ratio of 17 to 299 for males and 25 to 299 for females. A ratio of albumin:creatinine of 300 or higher is indicative of overt proteinuria.  Due to biologic variability, positive results should be confirmed by a second, first-morning random or 24-hour timed urine specimen. If there is discrepancy, a third specimen is recommended. When 2 out of 3 results are in the microalbuminuria range, this is evidence for incipient nephropathy and warrants increased efforts at glucose control, blood pressure control, and institution of therapy with an angiotensin-converting-enzyme (ACE) inhibitor (if the patient can tolerate it).         Most recent GFRs:    Lab Results   Component Value Date    GFRESTIMATED 89 03/21/2024    " GFRESTIMATED >90 12/14/2023    GFRESTIMATED >90 10/05/2023       Lab Results   Component Value Date    CPEPT 0.2 (L) 03/21/2024    GADAB <5.0 06/28/2023    ISCAB <1:4 06/28/2023     FIB-4 Calculation: 0.15 at 10/5/2023  2:35 PM  Calculated from:  SGOT/AST: 19 U/L at 10/5/2023  2:35 PM  SGPT/ALT: 11 U/L at 10/5/2023  2:35 PM  Platelets: 1,093 10e3/uL at 10/5/2023  2:35 PM  Age: 27 years  AST   Date Value Ref Range Status   03/21/2024 37 0 - 45 U/L Final     Comment:     Reference intervals for this test were updated on 6/12/2023 to more accurately reflect our healthy population. There may be differences in the flagging of prior results with similar values performed with this method. Interpretation of those prior results can be made in the context of the updated reference intervals.     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     Post-pancreatectomy diabetes: Due to low islet cell count expect will remain insulin dependent.  She has been struggling to keep her weight up but it is now stable.  Her blood sugars have been quite variable and she has some uncertainty regarding dosing.  She is having excessive hypoglycemia and less symptoms with this, she is dependent on her CGMS and will continue to require this.  It does appear that some of her hypoglycemia follows correction of hyperglycemia and we will relax her correction.  She has had confusion about what sliding scale to use but it sounds like she has been using most consistently a 1 unit per 40 scale.  [facility] summary from today's visit and use the sliding scale that we will update today.    Advised:    Please continue to give Lantus 8 units daily.    Please continue to give 1 unit : 30 grams of carbohydrate before  you eat.   If you blood sugar >150, please also add correction as follows.      Correction Scale - 1 unit : 60 mg/dl >150     Do Not give Correction Insulin if Pre-Meal BG less than 150   -210 = 1 unit  -270 = 2 units.  -330 = 3 units.  -390 = 4 units.  -450 = 5 units.  BG >450 = 6 units.  To be given with mealtime insulin, and based on blood glucose before your meal.        Do Not give Bedtime Correction Insulin if BG less than 210  -270 = 1 units.  -330 = 2 units.  -390 = 3 units.  -450 = 4 units.  BG >450 = 5 units.  To be given with mealtime insulin, and based on blood glucose before your meal.      Please record doses in isamar to review with Mildred.    She will meet with Mildred Graff tomorrow and education, and is advised to continue to meet with her until her blood sugars are more consistently in the safe range.  I will follow-up with her in 3 months and I also would like her to establish with one of our MD endocrinology providers in 6 months.    45 minutes spent on the date of the encounter doing chart review, history and exam, documentation, education and counseling, as well as communication and coordination of care, and further activities as noted above.      It is my privilege to be involved in the care of the above patient.     Caro Cormier PA-C, MPAS  [facility]  Diabetes, Endocrinology, and Metabolism  [phone] Appointments/Nurse  [phone] pager  [phone] nurse line    This note was completed in part using Dragon voice recognition, and may contain word and grammatical errors.  Joined the call at 4/23/2024, 2:14:44 pm.  Left the call at 4/23/2024, 2:37:42 pm.  You were on the call for 22 minutes 58 seconds .  Patient is at home provider is off-site platform is Mabel.      Outcome for 04/19/24 10:15 AM: Data obtained via [employer] website  Willow Cao LPN

## 2024-04-24 ENCOUNTER — VIRTUAL VISIT (OUTPATIENT)
Dept: EDUCATION SERVICES | Facility: CLINIC | Age: 29
End: 2024-04-24
Payer: COMMERCIAL

## 2024-04-24 ENCOUNTER — MYC REFILL (OUTPATIENT)
Dept: TRANSPLANT | Facility: CLINIC | Age: 29
End: 2024-04-24

## 2024-04-24 DIAGNOSIS — G89.18 ACUTE POST-OPERATIVE PAIN: ICD-10-CM

## 2024-04-24 DIAGNOSIS — E13.9 POST-PANCREATECTOMY DIABETES (H): Primary | ICD-10-CM

## 2024-04-24 DIAGNOSIS — Z90.410 POST-PANCREATECTOMY DIABETES (H): Primary | ICD-10-CM

## 2024-04-24 DIAGNOSIS — E89.1 POST-PANCREATECTOMY DIABETES (H): Primary | ICD-10-CM

## 2024-04-24 PROCEDURE — 99207 PR NO BILLABLE SERVICE THIS VISIT: CPT | Mod: 95

## 2024-04-24 RX ORDER — HYDROMORPHONE HYDROCHLORIDE 4 MG/1
4 TABLET ORAL EVERY 8 HOURS PRN
Qty: 42 TABLET | Refills: 0 | Status: SHIPPED | OUTPATIENT
Start: 2024-04-24 | End: 2024-05-06

## 2024-04-24 NOTE — NURSING NOTE
Is the patient currently in the state of [place]? YES    Visit mode:VIDEO    If the visit is dropped, the patient can be reconnected by: VIDEO VISIT: Text to cell phone:   Telephone Information:   Mobile [phone]       Will anyone else be joining the visit? NO  (If patient encounters technical issues they should call [phone]    How would you like to obtain your AVS? MyChart    Are changes needed to the allergy or medication list? No patient reported no changes to e-check in information for visit. VF did not review e-check in information again with patient due to this.     Are refills needed on medications prescribed by this physician? NO    Reason for visit: RECHECK (Follow up)    Lynn AGUSTIN

## 2024-04-24 NOTE — PROGRESS NOTES
This appointment was rescheduled.  Artie saw Caro Cormier yesterday and has started marking insulin doses in her Naa layo but we just have one insulin dose marked.  Rescheduled to next week. Mildred Graff RN,[facility]

## 2024-04-30 ENCOUNTER — TELEPHONE (OUTPATIENT)
Dept: TRANSPLANT | Facility: CLINIC | Age: 29
End: 2024-04-30
Payer: COMMERCIAL

## 2024-04-30 NOTE — TELEPHONE ENCOUNTER
Talked with Patient today who is still having some persistent abdominal pain.  Had herina surgery a couple of weeks ago which is not the area of pain.  She has an increase in diarrhea 4-6 stools a day and she is taking all her medications including creon appropriately.  Creon seems to be working fine, as her stools change when she forgets.  She has been more anxious lately and does struggle mentally but is getting good care from her PCP and has increased her antidepressants.  Will talk to her next week to see how she is doing

## 2024-05-06 ENCOUNTER — MYC REFILL (OUTPATIENT)
Dept: TRANSPLANT | Facility: CLINIC | Age: 29
End: 2024-05-06
Payer: COMMERCIAL

## 2024-05-06 ENCOUNTER — TELEPHONE (OUTPATIENT)
Dept: ENDOCRINOLOGY | Facility: CLINIC | Age: 29
End: 2024-05-06
Payer: COMMERCIAL

## 2024-05-06 DIAGNOSIS — G89.18 ACUTE POST-OPERATIVE PAIN: ICD-10-CM

## 2024-05-06 NOTE — TELEPHONE ENCOUNTER
Patient confirmed scheduled appointment:  Date: 9/10  Time: 11 am  Visit type: Return diabetes  Provider: Casa  Location: Virtual   Testing/imaging: NA  Additional notes: NA

## 2024-05-07 ENCOUNTER — TELEPHONE (OUTPATIENT)
Dept: TRANSPLANT | Facility: CLINIC | Age: 29
End: 2024-05-07
Payer: COMMERCIAL

## 2024-05-07 RX ORDER — HYDROMORPHONE HYDROCHLORIDE 4 MG/1
4 TABLET ORAL EVERY 8 HOURS PRN
Qty: 42 TABLET | Refills: 0 | Status: SHIPPED | OUTPATIENT
Start: 2024-05-07 | End: 2024-05-16

## 2024-05-07 NOTE — TELEPHONE ENCOUNTER
Patient Call: General  Route to LPN    Reason for call: Jose from [facility]  called in regards of patient's providers NATHAN is inactive and the pharmacy is unable to fill the medication request patient's provider's placed. Pharmacy closes at 6pm and would like to get this resolved before closing hours. More details with call back.     Call back needed? Yes    Return Call Needed  Same as documented in contacts section  When to return call?: Same day: Route High Priority

## 2024-05-08 NOTE — TELEPHONE ENCOUNTER
Talked to pharmacist this morning and told them I reached out to the surgeon and his NATHAN is active so I asked the pharmacy to run her prescription again today.  They will contact us if still not going through

## 2024-05-09 ENCOUNTER — VIRTUAL VISIT (OUTPATIENT)
Dept: EDUCATION SERVICES | Facility: CLINIC | Age: 29
End: 2024-05-09
Payer: COMMERCIAL

## 2024-05-09 DIAGNOSIS — Z90.410 POST-PANCREATECTOMY DIABETES (H): Primary | ICD-10-CM

## 2024-05-09 DIAGNOSIS — E13.9 POST-PANCREATECTOMY DIABETES (H): Primary | ICD-10-CM

## 2024-05-09 DIAGNOSIS — E89.1 POST-PANCREATECTOMY DIABETES (H): Primary | ICD-10-CM

## 2024-05-09 PROCEDURE — G0108 DIAB MANAGE TRN  PER INDIV: HCPCS | Mod: 95

## 2024-05-09 NOTE — PROGRESS NOTES
Diabetes Self-Management Education & Support:  Pre Pump Education    SUBJECTIVE:  Artie Burger presents today for education related to planning for an insulin pump related to Post Pancreatectomy Diabetes    Patient is being treated with:  diet, insulin, and Naa sensor  She is accompanied by self    Year of diagnosis: 8/25/21  Referring provider: Caro Cormier PA-C      CURRENT INSULIN REGIMEN:  Long Acting Insulin: Insulin Glargine U-100  (Lantus, Semglee, Basaglar) 9 units   Rapid Acting Insulin: Novolog 1/10/40/140 units       MONITORING:    Patient glucose self monitoring as follows: continuously using a continuous glucose monitor (CGM)  BG results:       RAPID-ACTING INSULIN CALCULATIONS:  Does patient currently count carbs? yes, counts in grams  Is instruction indicated? No  How is insulin determined for correction: uses correction factor or follows sliding scale    PROBLEM SOLVING:    Frequency and treatment of hypoglycemia: 1-2 times per week  Hospitalizations for hyper or hypoglycemia: No  What is considered a high blood sugar? over 300    How is high BG treated:   Does Patient test for ketones? No      Physical Activity:    What accomodations are made for exercising:  reduce insulin    Diabetes knowledge and skills assessment:   Patient is knowledgeable in diabetes management concepts related to: Healthy Eating, Monitoring, Taking Medication, Problem Solving, and Reducing Risks    Patient needs further education on the following diabetes management concepts: Insulin Pump Concepts Problem solving with insulin pump therapy (BG monitoring; hypoglycemia signs/symptoms, treatment (glucagon) and prevention; hyperglycemia signs/symptoms, treatment and prevention; ketones, DKA signs/symptoms and prevention)    Based on learning assessment above, most appropriate setting for further diabetes education would be: Individual setting.    EDUCATION PROVIDED TODAY  Explained the way an insulin pump works, in terms  that described the function of a basal rate and a bolus calculator.    Demonstrated the operation of the main pumps on the market right now: Omnipod 5, Tandem t:slim X2 and Mobi with Control-IQ, Beta Bionics iLet, and Medtronic MiniMed 780G.    Explained the unique features of each pump.      Explained that having a pump does not take the place of checking his blood glucoses, counting carbs, or remembering to push the button to deliver the bolus.   Described how the insulin delivers insulin through a cannula inserted under the skin and attached to the pump itself, or in the case of Omnipod, controlled wirelessly via blue tooth pairing with the Personal Diabetes Manager (PDM) / Controller.     Reviewed some of the increased risks associated with using a pump, i.e. DKA, especially if not checking BG at least 3-4 times daily or using a continuous glucose monitor (CGM).    Explained in general terms the costs associated with using an insulin pump, including the disposables and insulin.  Explained the process for obtaining a pump:  Approval by diabetes management team, application to the pump company, approval by insurance company, and necessary pre-training, and post-pump follow-up.    Explained the need to follow up on a regular basis with diabetes care team in order to continue to have insulin pumping supplies approved by insurance.     PLAN:  See Patient Instructions, AVS printed and provided to patient today.    Follow-up:   Time Spent: 60 minutes  Encounter Type: Individual     Any diabetes medication dose changes were made via the CDE Protocol Collaborative Practice Agreement with referring provider.  A copy of this encounter was provided to patient's referring provider.

## 2024-05-09 NOTE — NURSING NOTE
Is the patient currently in the state of [place]? YES    Visit mode:VIDEO    If the visit is dropped, the patient can be reconnected by: VIDEO VISIT: Text to cell phone:   Telephone Information:   Mobile [phone]       Will anyone else be joining the visit? NO  (If patient encounters technical issues they should call [phone]    How would you like to obtain your AVS? MyChart    Are changes needed to the allergy or medication list? No, Pt stated no changes to allergies, and Pt stated no med changes so VF did not review lists again with patient due to this.     Are refills needed on medications prescribed by this physician? NO    Reason for visit: RECHECK (Follow up)    Lynn AGUSTIN

## 2024-05-10 ENCOUNTER — TELEPHONE (OUTPATIENT)
Dept: ENDOCRINOLOGY | Facility: CLINIC | Age: 29
End: 2024-05-10
Payer: COMMERCIAL

## 2024-05-10 ENCOUNTER — MYC MEDICAL ADVICE (OUTPATIENT)
Dept: EDUCATION SERVICES | Facility: CLINIC | Age: 29
End: 2024-05-10
Payer: COMMERCIAL

## 2024-05-10 DIAGNOSIS — E89.1 POST-PANCREATECTOMY DIABETES (H): Primary | ICD-10-CM

## 2024-05-10 DIAGNOSIS — E13.9 POST-PANCREATECTOMY DIABETES (H): Primary | ICD-10-CM

## 2024-05-10 DIAGNOSIS — Z90.410 POST-PANCREATECTOMY DIABETES (H): Primary | ICD-10-CM

## 2024-05-10 RX ORDER — PROCHLORPERAZINE 25 MG/1
1 SUPPOSITORY RECTAL
Qty: 9 EACH | Refills: 3 | Status: SHIPPED | OUTPATIENT
Start: 2024-05-10

## 2024-05-10 RX ORDER — INSULIN PMP CART,AUT,G6/7,CNTR
1 EACH SUBCUTANEOUS
Qty: 30 EACH | Refills: 4 | Status: SHIPPED | OUTPATIENT
Start: 2024-05-10

## 2024-05-10 RX ORDER — PROCHLORPERAZINE 25 MG/1
1 SUPPOSITORY RECTAL
Qty: 1 EACH | Refills: 3 | Status: SHIPPED | OUTPATIENT
Start: 2024-05-10 | End: 2024-07-08

## 2024-05-10 RX ORDER — INSULIN PMP CART,AUT,G6/7,CNTR
1 EACH SUBCUTANEOUS
Qty: 1 KIT | Refills: 0 | Status: SHIPPED | OUTPATIENT
Start: 2024-05-10

## 2024-05-10 NOTE — TELEPHONE ENCOUNTER
Prior Authorization Approval    Medication: OMNIPOD 5 G6 INTRO (GEN 5) KIT  Authorization Effective Date: 2/10/2024  Authorization Expiration Date: 5/10/2025  Approved Dose/Quantity: 1 per 720 days  Reference #: [id number]   Insurance Company: [employer] [phone] Fax [phone]  Expected CoPay: $    CoPay Card Available: No    Financial Assistance Needed:   Which Pharmacy is filling the prescription:    Pharmacy Notified:   Patient Notified:          Prior Authorization Approval    Medication: OMNIPOD 5 G6 PODS (GEN 5) MISC  Authorization Effective Date: 2/10/2024  Authorization Expiration Date: 5/10/2025  Approved Dose/Quantity:   Reference #: [id number]   Insurance Company: [employer] [phone] Fax [phone]  Expected CoPay: $    CoPay Card Available: No    Financial Assistance Needed:   Which Pharmacy is filling the prescription:    Pharmacy Notified:   Patient Notified:

## 2024-05-10 NOTE — TELEPHONE ENCOUNTER
Called Artie to discuss lows.  Advised to drop Lantus to 6-7.  She has another glucagon at home. Mildred Graff RN,[facility]

## 2024-05-10 NOTE — TELEPHONE ENCOUNTER
PA Initiation    Medication: OMNIPOD 5 G6 INTRO (GEN 5) KIT  Insurance Company: [employer] - Phone [phone] Fax [phone]  Pharmacy Filling the Rx:    Filling Pharmacy Phone:    Filling Pharmacy Fax:    Start Date: 5/10/2024    [id number]        PA Initiation    Medication: OMNIPOD 5 G6 PODS (GEN 5) MISC  Insurance Company: [employer] - Phone [phone] Fax [phone]  Pharmacy Filling the Rx:    Filling Pharmacy Phone:    Filling Pharmacy Fax:    Start Date: 5/10/2024    [id number]    Approved but can't be pushed through for a test claim yet.

## 2024-05-15 ENCOUNTER — TELEPHONE (OUTPATIENT)
Dept: EDUCATION SERVICES | Facility: CLINIC | Age: 29
End: 2024-05-15
Payer: COMMERCIAL

## 2024-05-15 NOTE — TELEPHONE ENCOUNTER
Called Artie to let her know that I will send orders to Deepali Hawley at [facility] tomorrow.  She will set up the accounts and save her username and passwords for the pump start.  Artie will let me know when the pump start is scheduled. Mildred Graff RN,[facility] [place]

## 2024-05-15 NOTE — TELEPHONE ENCOUNTER
[facility] Call Center    Phone Message    May a detailed message be left on voicemail: yes     Reason for Call: Other: Per pt was told to report when she receives her pump which she will be receiving it tomorrow. Please call pt back. Thank you.      Action Taken: Message routed to:  [facility] ([facility]): DIAB EDUC    Travel Screening: Not Applicable                                                                     ABDOMINAL PAIN

## 2024-05-16 ENCOUNTER — MYC MEDICAL ADVICE (OUTPATIENT)
Dept: EDUCATION SERVICES | Facility: CLINIC | Age: 29
End: 2024-05-16
Payer: COMMERCIAL

## 2024-05-16 ENCOUNTER — MYC REFILL (OUTPATIENT)
Dept: TRANSPLANT | Facility: CLINIC | Age: 29
End: 2024-05-16
Payer: COMMERCIAL

## 2024-05-16 DIAGNOSIS — E89.1 POST-PANCREATECTOMY DIABETES (H): Primary | ICD-10-CM

## 2024-05-16 DIAGNOSIS — Z90.410 POST-PANCREATECTOMY DIABETES (H): Primary | ICD-10-CM

## 2024-05-16 DIAGNOSIS — G89.18 ACUTE POST-OPERATIVE PAIN: ICD-10-CM

## 2024-05-16 DIAGNOSIS — E13.9 POST-PANCREATECTOMY DIABETES (H): Primary | ICD-10-CM

## 2024-05-16 RX ORDER — INSULIN ASPART 100 [IU]/ML
INJECTION, SOLUTION INTRAVENOUS; SUBCUTANEOUS
Qty: 10 ML | Refills: 5 | Status: SHIPPED | OUTPATIENT
Start: 2024-05-16

## 2024-05-16 RX ORDER — THERMOMETER, ELECTRONIC,ORAL
EACH MISCELLANEOUS
Qty: 20 STRIP | Refills: 4 | Status: SHIPPED | OUTPATIENT
Start: 2024-05-16

## 2024-05-16 NOTE — TELEPHONE ENCOUNTER
The patient said she was supposed to get a call back from [facility] to discuss how to setup the Omnipod, please review and follow up thank you.

## 2024-05-16 NOTE — TELEPHONE ENCOUNTER
Artie just finished her learning modules and is expecting to get contact from [facility] regarding pump start.  Advised that it may take a few days to hear from anyone and it may take a few weeks for the pump start appointment.  She was accepting of this. Mildred Graff RN,[facility] [place]

## 2024-05-17 RX ORDER — HYDROMORPHONE HYDROCHLORIDE 4 MG/1
4 TABLET ORAL EVERY 12 HOURS PRN
Qty: 30 TABLET | Refills: 0 | Status: SHIPPED | OUTPATIENT
Start: 2024-05-17 | End: 2024-06-11

## 2024-05-18 NOTE — PROGRESS NOTES
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 S/p TPIAT 23. Islet yield poor due to dense fibrosis of pancreas and difficulty with digestion. Postoperative course otherwise uneventful. Has noted new incisional hernia involving lower aspect of upper midline incision, ~2cm dimension.    Plan for lap repair with mesh. Will work on scheduling in near future. Will renew pain medications until surgery          Faculty: Junito Patel MD  __________________________________________________________________  Transplant History: pancreatitis consult  2023 (Islet), Postoperative day:      Interval History: History is obtained from the patient  S/p TPIAT 23. Tolerated surgery well and had fairly uneventful postoperative course. Pain control initially with potent IV analgesic, transitioned to PO methadone and dilaudid. Slowly weaning off.     Interval history: has done very well overall but now notes a small 'bulge' in the lower aspect of her upper midline incision. It is soft and does increase in size with straining and reduces within the abdomen manually. It is tender when she does reduce it. She denies any overlying skin change, bowel obstructive symptoms, nausea, or other complaints.   Social History     Tobacco Use    Smoking status: Former     Current packs/day: 0.00     Types: Cigarettes     Quit date: 2022     Years since quittin.0    Smokeless tobacco: Never    Tobacco comments:     Vapes daily   Vaping Use    Vaping status: Every Day    Substances: Nicotine   Substance Use Topics    Alcohol use: Not Currently    Drug use: Yes     Types: Marijuana     Comment: smokes daily     ROS:   A 10-point review of systems was negative except as noted above.    Curent Meds:      Physical Exam:     Admit      Current Vitals:   /63   Pulse 77   Wt 56.7 kg (125 lb)   SpO2 98%   BMI 22.86 kg/m            Vital sign ranges:       No data found.  General Appearance: in no apparent distress.   Skin: normal appearance, warm and dry  Heart: RRR, no murmur  Lungs: without wheezes, nonlabored respirations  Abdomen: The abdomen is soft, flat. No distention. Incisions appear well-healed. Has a soft, reducible 2cm diameter incisional hernia in lower aspect of midline incision above piercing. No overlying skin erythema/drainage/breakdown   Extremities: edema: appears absent    Data:   CMP@LABRCNTIPR(na:2,potassium:2,chloride:2,co2:2,g,bun:2,cr:2,gfrestimated:2,gfrestblack:2,mark:2,icapoc:2,icaw:2,ma,phos:2,amylase:2,lipase:2,uamy24:3,albumin:2,bilitotal:2,biliconj:2,bilidelta:2,alkphos:2,ast:2,alt:2,fbili:1)@  CBC@LABRCNTIPR(hgb:2,wbc:2,a,plt:2,a1c:1)@  Coags@LABRCNTIPR(inr:2,ptt:2,Xa:2)@   Urinalysis  Recent Labs   Lab Test 23  1614   COLOR Yellow   APPEARANCE Slightly Cloudy*   URINEGLC Negative   URINEBILI Negative   URINEKETONE Negative   SG 1.021   UBLD Negative   URINEPH 7.0   PROTEIN 10*   NITRITE Negative   LEUKEST Negative   RBCU 2   WBCU <1

## 2024-05-20 DIAGNOSIS — F41.1 GAD (GENERALIZED ANXIETY DISORDER): Primary | ICD-10-CM

## 2024-05-20 DIAGNOSIS — F33.40 RECURRENT MAJOR DEPRESSIVE DISORDER, IN REMISSION (H): ICD-10-CM

## 2024-05-20 DIAGNOSIS — K86.1 CHRONIC PANCREATITIS, UNSPECIFIED PANCREATITIS TYPE (H): ICD-10-CM

## 2024-05-30 ENCOUNTER — TELEPHONE (OUTPATIENT)
Dept: EDUCATION SERVICES | Facility: CLINIC | Age: 29
End: 2024-05-30
Payer: COMMERCIAL

## 2024-06-03 NOTE — PROGRESS NOTES
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 S/p TPIAT 23. Islet yield poor due to dense fibrosis of pancreas and difficulty with digestion. Postoperative course otherwise uneventful.    -continues with narcotic wean. Recovering well. Does have some minimal exocrine insufficiency, and we increased Creon dose. Otherwise no complaints. Planning on a cruise this winter, which we support.     RTC: as needed          Faculty: Junito Patel MD  __________________________________________________________________  Transplant History: pancreatitis consult  2023 (Islet), Postoperative day:      Interval History: History is obtained from the patient  S/p TPIAT 23. Tolerated surgery well and had fairly uneventful postoperative course. Pain control initially with potent IV analgesic, transitioned to PO methadone and dilaudid. Slowly weaning off. Doing very well overall. Has resumed normal diet and is slowly increasing activity. Interested in returning to work but wants to be gradual given physical nature.     Social History     Tobacco Use    Smoking status: Former     Current packs/day: 0.00     Types: Cigarettes     Quit date: 2022     Years since quittin.0    Smokeless tobacco: Never    Tobacco comments:     Vapes daily   Vaping Use    Vaping status: Every Day    Substances: Nicotine   Substance Use Topics    Alcohol use: Not Currently    Drug use: Yes     Types: Marijuana     Comment: smokes daily     ROS:   A 10-point review of systems was negative except as noted above.    Curent Meds:      Physical Exam:     Admit      Current Vitals:   There were no vitals taken for this visit.         Vital sign ranges:       No data found.  General Appearance: in no apparent distress.   Skin:  normal appearance, warm and dry  Heart: normal S1/S2  Lungs: without wheezes, nonlabored respirations  Abdomen: The abdomen is soft, flat, appropriate rolando-incisional tenderness. No erythema/drainage/evident hernia. Incisions and former drain/tube sites all healed without evident hernia  Extremities: edema: appears absent    Data:   CMP@LABRCNTIPR(na:2,potassium:2,chloride:2,co2:2,g,bun:2,cr:2,gfrestimated:2,gfrestblack:2,mark:2,icapoc:2,icaw:2,ma,phos:2,amylase:2,lipase:2,uamy24:3,albumin:2,bilitotal:2,biliconj:2,bilidelta:2,alkphos:2,ast:2,alt:2,fbili:1)@  CBC@LABRCNTIPR(hgb:2,wbc:2,a,plt:2,a1c:1)@  Coags@LABRCNTIPR(inr:2,ptt:2,Xa:2)@   Urinalysis  Recent Labs   Lab Test 23  1614   COLOR Yellow   APPEARANCE Slightly Cloudy*   URINEGLC Negative   URINEBILI Negative   URINEKETONE Negative   SG 1.021   UBLD Negative   URINEPH 7.0   PROTEIN 10*   NITRITE Negative   LEUKEST Negative   RBCU 2   WBCU <1

## 2024-06-06 ENCOUNTER — OFFICE VISIT (OUTPATIENT)
Dept: TRANSPLANT | Facility: CLINIC | Age: 29
End: 2024-06-06
Attending: SURGERY
Payer: COMMERCIAL

## 2024-06-06 VITALS
DIASTOLIC BLOOD PRESSURE: 74 MMHG | WEIGHT: 113.6 LBS | TEMPERATURE: 98 F | SYSTOLIC BLOOD PRESSURE: 112 MMHG | HEART RATE: 87 BPM | BODY MASS INDEX: 20.78 KG/M2 | OXYGEN SATURATION: 99 %

## 2024-06-06 DIAGNOSIS — G89.18 ACUTE POST-OPERATIVE PAIN: Primary | ICD-10-CM

## 2024-06-06 PROCEDURE — 99213 OFFICE O/P EST LOW 20 MIN: CPT | Performed by: SURGERY

## 2024-06-06 PROCEDURE — G0463 HOSPITAL OUTPT CLINIC VISIT: HCPCS | Performed by: SURGERY

## 2024-06-06 ASSESSMENT — PAIN SCALES - GENERAL: PAINLEVEL: MODERATE PAIN (4)

## 2024-06-06 NOTE — LETTER
6/6/2024      Artie Burger  [address]  [facility] [address]      Dear Colleague,    Thank you for referring your patient, Artie Burger, to the [facility]. Please see a copy of my visit note below.        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 S/p TPIAT 9/22/23. Islet yield poor due to dense fibrosis of pancreas and difficulty with digestion. Postoperative course otherwise uneventful. Has noted new incisional hernia involving lower aspect of upper midline incision, ~2cm dimension, s/p lap incisional hernia repair with mesh.    Recovered well overall, largely off of pain medications. Does have a single sore spot in right lower abdomen, a few cm away from incision. This feels like an area of scarring from transfascial suture, and does not have s/s of infection or recurrent hernia.    Recommend expectant management and consider deep tissue massage for myofascial release (PT referral). Can consider trigger point injection but she is not interested at this point as her symptoms are not so severe. She agrees with this plan.       Faculty: Junito Patel MD  __________________________________________________________________  Transplant History: pancreatitis consult  9/22/2023 (Islet), Postoperative day:      Interval History: History is obtained from the patient  S/p TPIAT 9/22/23. Tolerated surgery well and had fairly uneventful postoperative course. Pain control initially with potent IV analgesic, transitioned to PO methadone and dilaudid. Slowly weaning off. S/p incisional hernia repair with mesh as well. Recovering well overall and improving pain, but does have a tender point in the right lower abdomen away from the mesh. It is a bit worse with more aggressive movement (which she is trying to do) and with direct palpation, but it otherwise not particularly  bothersome.   Social History     Tobacco Use     Smoking status: Former     Current packs/day: 0.00     Types: Cigarettes     Quit date: 2022     Years since quittin.1     Smokeless tobacco: Never     Tobacco comments:     Vapes daily   Vaping Use     Vaping status: Every Day     Substances: Nicotine   Substance Use Topics     Alcohol use: Not Currently     Drug use: Yes     Types: Marijuana     Comment: smokes daily     ROS:   A 10-point review of systems was negative except as noted above.    Curent Meds:      Physical Exam:     Admit      Current Vitals:   /74 (BP Location: Right arm, Patient Position: Sitting, Cuff Size: Adult Regular)   Pulse 87   Temp 98  F (36.7  C) (Oral)   Wt 51.5 kg (113 lb 9.6 oz)   SpO2 99%   BMI 20.78 kg/m           Vital sign ranges:       No data found.  General Appearance: in no apparent distress.   Skin: normal appearance, warm and dry  Heart: RRR, no murmur  Lungs: without wheezes, nonlabored respirations  Abdomen: The abdomen is soft, flat. No evident of recurrent hernia. Does have a firm spot in right mid/lower abdomen 3-4 cm away from mesh. Feels firm and tender but not reducible.  No overlying skin erythema/drainage/breakdown   Extremities: edema: appears absent    Data:   CMP@LABRCNTIPR(na:2,potassium:2,chloride:2,co2:2,g,bun:2,cr:2,gfrestimated:2,gfrestblack:2,mark:2,icapoc:2,icaw:2,ma,phos:2,amylase:2,lipase:2,uamy24:3,albumin:2,bilitotal:2,biliconj:2,bilidelta:2,alkphos:2,ast:2,alt:2,fbili:1)@  CBC@LABRCNTIPR(hgb:2,wbc:2,a,plt:2,a1c:1)@  Coags@LABRCNTIPR(inr:2,ptt:2,Xa:2)@   Urinalysis  Recent Labs   Lab Test 23  1614   COLOR Yellow   APPEARANCE Slightly Cloudy*   URINEGLC Negative   URINEBILI Negative   URINEKETONE Negative   SG 1.021   UBLD Negative   URINEPH 7.0   PROTEIN 10*   NITRITE Negative   LEUKEST Negative   RBCU 2   WBCU <1          Again, thank you for allowing me to participate in the care of your patient.         Sincerely,        Junito Patel MD

## 2024-06-06 NOTE — NURSING NOTE
Chief Complaint   Patient presents with    RECHECK     RETURN AUTO ISLET - New abd pain to     /74 (BP Location: Right arm, Patient Position: Sitting, Cuff Size: Adult Regular)   Pulse 87   Temp 98  F (36.7  C) (Oral)   Wt 51.5 kg (113 lb 9.6 oz)   SpO2 99%   BMI 20.78 kg/m      Hudson Ireland CMA on 6/6/2024 at 3:14 PM

## 2024-06-11 DIAGNOSIS — G89.18 ACUTE POST-OPERATIVE PAIN: Primary | ICD-10-CM

## 2024-06-11 DIAGNOSIS — K43.2 HERNIA, INCISIONAL: ICD-10-CM

## 2024-06-11 RX ORDER — CYCLOBENZAPRINE HCL 10 MG
10 TABLET ORAL 2 TIMES DAILY PRN
Qty: 28 TABLET | Refills: 0 | Status: SHIPPED | OUTPATIENT
Start: 2024-06-11 | End: 2024-06-23

## 2024-06-11 NOTE — PROGRESS NOTES
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 S/p TPIAT 23. Islet yield poor due to dense fibrosis of pancreas and difficulty with digestion. Postoperative course otherwise uneventful. Has noted new incisional hernia involving lower aspect of upper midline incision, ~2cm dimension, s/p lap incisional hernia repair with mesh.    Recovered well overall, largely off of pain medications. Does have a single sore spot in right lower abdomen, a few cm away from incision. This feels like an area of scarring from transfascial suture, and does not have s/s of infection or recurrent hernia.    Recommend expectant management and consider deep tissue massage for myofascial release (PT referral). Can consider trigger point injection but she is not interested at this point as her symptoms are not so severe. She agrees with this plan.       Faculty: Junito Patel MD  __________________________________________________________________  Transplant History: pancreatitis consult  2023 (Islet), Postoperative day:      Interval History: History is obtained from the patient  S/p TPIAT 23. Tolerated surgery well and had fairly uneventful postoperative course. Pain control initially with potent IV analgesic, transitioned to PO methadone and dilaudid. Slowly weaning off. S/p incisional hernia repair with mesh as well. Recovering well overall and improving pain, but does have a tender point in the right lower abdomen away from the mesh. It is a bit worse with more aggressive movement (which she is trying to do) and with direct palpation, but it otherwise not particularly bothersome.   Social History     Tobacco Use    Smoking status: Former     Current packs/day: 0.00     Types: Cigarettes     Quit date: 2022     Years since quittin.1    Smokeless tobacco: Never    Tobacco comments:     Vapes daily   Vaping Use     Vaping status: Every Day    Substances: Nicotine   Substance Use Topics    Alcohol use: Not Currently    Drug use: Yes     Types: Marijuana     Comment: smokes daily     ROS:   A 10-point review of systems was negative except as noted above.    Curent Meds:      Physical Exam:     Admit      Current Vitals:   /74 (BP Location: Right arm, Patient Position: Sitting, Cuff Size: Adult Regular)   Pulse 87   Temp 98  F (36.7  C) (Oral)   Wt 51.5 kg (113 lb 9.6 oz)   SpO2 99%   BMI 20.78 kg/m           Vital sign ranges:       No data found.  General Appearance: in no apparent distress.   Skin: normal appearance, warm and dry  Heart: RRR, no murmur  Lungs: without wheezes, nonlabored respirations  Abdomen: The abdomen is soft, flat. No evident of recurrent hernia. Does have a firm spot in right mid/lower abdomen 3-4 cm away from mesh. Feels firm and tender but not reducible.  No overlying skin erythema/drainage/breakdown   Extremities: edema: appears absent    Data:   CMP@LABRCNTIPR(na:2,potassium:2,chloride:2,co2:2,g,bun:2,cr:2,gfrestimated:2,gfrestblack:2,mark:2,icapoc:2,icaw:2,ma,phos:2,amylase:2,lipase:2,uamy24:3,albumin:2,bilitotal:2,biliconj:2,bilidelta:2,alkphos:2,ast:2,alt:2,fbili:1)@  CBC@LABRCNTIPR(hgb:2,wbc:2,a,plt:2,a1c:1)@  Coags@LABRCNTIPR(inr:2,ptt:2,Xa:2)@   Urinalysis  Recent Labs   Lab Test 23  1614   COLOR Yellow   APPEARANCE Slightly Cloudy*   URINEGLC Negative   URINEBILI Negative   URINEKETONE Negative   SG 1.021   UBLD Negative   URINEPH 7.0   PROTEIN 10*   NITRITE Negative   LEUKEST Negative   RBCU 2   WBCU <1

## 2024-06-12 ENCOUNTER — VIRTUAL VISIT (OUTPATIENT)
Dept: EDUCATION SERVICES | Facility: CLINIC | Age: 29
End: 2024-06-12
Payer: COMMERCIAL

## 2024-06-12 VITALS — WEIGHT: 115 LBS | BODY MASS INDEX: 21.16 KG/M2 | HEIGHT: 62 IN

## 2024-06-12 DIAGNOSIS — E89.1 POST-PANCREATECTOMY DIABETES (H): Primary | ICD-10-CM

## 2024-06-12 DIAGNOSIS — Z90.410 POST-PANCREATECTOMY DIABETES (H): Primary | ICD-10-CM

## 2024-06-12 DIAGNOSIS — E13.9 POST-PANCREATECTOMY DIABETES (H): Primary | ICD-10-CM

## 2024-06-12 PROCEDURE — 99207 PR NO BILLABLE SERVICE THIS VISIT: CPT | Mod: 95

## 2024-06-12 ASSESSMENT — PATIENT HEALTH QUESTIONNAIRE - PHQ9: SUM OF ALL RESPONSES TO PHQ QUESTIONS 1-9: 6

## 2024-06-12 ASSESSMENT — PAIN SCALES - GENERAL: PAINLEVEL: NO PAIN (0)

## 2024-06-12 NOTE — PROGRESS NOTES
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 S/p TPIAT 23. Islet yield poor due to dense fibrosis of pancreas and difficulty with digestion. Postoperative course otherwise uneventful. Now also s/p incisional hernia repair.    Recovering well overall. Having pain at hernia repair site as expected, but weaning narcotics and muscle relaxers. Eating well, no other complaints.    RTC ~6 weeks. Continue activity restrictions until that time.           Faculty: Junito Patel MD  __________________________________________________________________  Transplant History: pancreatitis consult  2023 (Islet), Postoperative day:      Interval History: History is obtained from the patient  S/p TPIAT 23. Tolerated surgery well and had fairly uneventful postoperative course. Pain control initially with potent IV analgesic, transitioned to PO methadone and dilaudid. Slowly weaning off. S/p hernia repair 2024    Interval history: overall recoverying well. Pain controlled on PO narcotics and muscle relaxant regimen, starting to decrease. Eating and drinking without issue, moving bowels comfortably. Has adequate pancreatic exocrine enzyme replacement dosing   Social History     Tobacco Use    Smoking status: Former     Current packs/day: 0.00     Types: Cigarettes     Quit date: 2022     Years since quittin.1    Smokeless tobacco: Never    Tobacco comments:     Vapes daily   Vaping Use    Vaping status: Every Day    Substances: Nicotine   Substance Use Topics    Alcohol use: Not Currently    Drug use: Yes     Types: Marijuana     Comment: smokes daily     ROS:   A 10-point review of systems was negative except as noted above.    Curent Meds:      Physical Exam:     Admit      Current Vitals:   /79 (BP Location: Right arm, Patient Position: Sitting, Cuff Size: Adult Regular)   Pulse 96   Temp  97.7  F (36.5  C) (Oral)   Wt 55.3 kg (122 lb)   SpO2 97%   BMI 22.31 kg/m           Vital sign ranges:       No data found.  General Appearance: in no apparent distress.   Skin: normal appearance, warm and dry  Heart: RRR, no murmur  Lungs: without wheezes, nonlabored respirations  Abdomen: The abdomen is soft, flat, appropriate postop tenderness. Incisions healing well, no erythema/drainage/induration. No evident recurrent hernia  Extremities: edema: appears absent    Data:   CMP@LABRCNTIPR(na:2,potassium:2,chloride:2,co2:2,g,bun:2,cr:2,gfrestimated:2,gfrestblack:2,mark:2,icapoc:2,icaw:2,ma,phos:2,amylase:2,lipase:2,uamy24:3,albumin:2,bilitotal:2,biliconj:2,bilidelta:2,alkphos:2,ast:2,alt:2,fbili:1)@  CBC@LABRCNTIPR(hgb:2,wbc:2,a,plt:2,a1c:1)@  Coags@LABRCNTIPR(inr:2,ptt:2,Xa:2)@   Urinalysis  Recent Labs   Lab Test 23  1614   COLOR Yellow   APPEARANCE Slightly Cloudy*   URINEGLC Negative   URINEBILI Negative   URINEKETONE Negative   SG 1.021   UBLD Negative   URINEPH 7.0   PROTEIN 10*   NITRITE Negative   LEUKEST Negative   RBCU 2   WBCU <1

## 2024-06-12 NOTE — NURSING NOTE
Is the patient currently in the state of [place]? YES    Visit mode:VIDEO    If the visit is dropped, the patient can be reconnected by: VIDEO VISIT: Text to cell phone:   Telephone Information:   Mobile [phone]       Will anyone else be joining the visit? NO  (If patient encounters technical issues they should call [phone] :[phone])    How would you like to obtain your AVS? MyChart    Are changes needed to the allergy or medication list? No    Are refills needed on medications prescribed by this physician? NO    Reason for visit: RECHECK    Bobbilynn Grossaint VVF    Depression Response    Patient completed the PHQ-9 assessment for depression and scored >9? Yes  Question 9 on the PHQ-9 was positive for suicidality? No  Does patient have current mental health provider? Yes    Is this a virtual visit? Yes   Does patient have suicidal ideation (positive question 9)? No - offer to place Mental Health Referral.  Patient declined referral/not needed    I personally notified the following: visit provider

## 2024-06-12 NOTE — PROGRESS NOTES
Virtual Visit Details    Type of service:  Video Visit   Video Start Time:  1pm  Video End Time: 1:20pm    Originating Location (pt. 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  Her TIR is 61%.  We talked about ways to increase her time in range.    Artie is going to work on getting the bolus in 10-15 minutes before she eats.  If she is unsure of the amount she will eat, she will bolus for half first and then make up the difference as soon as she finishes eating.    We did not change any pump settings today.        Follow-up:  1 month  Time spent in this visit: 15 minutes (no charge)    Any diabetes medication dose changes were made via the CDE Protocol and Collaborative Practice Agreement. A copy of this encounter was provided to the patient's referring provider.

## 2024-06-23 ENCOUNTER — MYC REFILL (OUTPATIENT)
Dept: TRANSPLANT | Facility: CLINIC | Age: 29
End: 2024-06-23
Payer: COMMERCIAL

## 2024-06-23 DIAGNOSIS — K43.2 HERNIA, INCISIONAL: ICD-10-CM

## 2024-06-23 DIAGNOSIS — G89.18 ACUTE POST-OPERATIVE PAIN: ICD-10-CM

## 2024-06-25 RX ORDER — CYCLOBENZAPRINE HCL 10 MG
10 TABLET ORAL 2 TIMES DAILY PRN
Qty: 28 TABLET | Refills: 0 | Status: SHIPPED | OUTPATIENT
Start: 2024-06-25 | End: 2024-07-15

## 2024-07-08 ENCOUNTER — MYC REFILL (OUTPATIENT)
Dept: EDUCATION SERVICES | Facility: CLINIC | Age: 29
End: 2024-07-08
Payer: COMMERCIAL

## 2024-07-08 DIAGNOSIS — E13.9 POST-PANCREATECTOMY DIABETES (H): ICD-10-CM

## 2024-07-08 DIAGNOSIS — Z90.410 POST-PANCREATECTOMY DIABETES (H): ICD-10-CM

## 2024-07-08 DIAGNOSIS — E89.1 POST-PANCREATECTOMY DIABETES (H): ICD-10-CM

## 2024-07-08 RX ORDER — PROCHLORPERAZINE 25 MG/1
1 SUPPOSITORY RECTAL
Qty: 1 EACH | Refills: 3 | Status: SHIPPED | OUTPATIENT
Start: 2024-07-08

## 2024-07-15 ENCOUNTER — MYC REFILL (OUTPATIENT)
Dept: TRANSPLANT | Facility: CLINIC | Age: 29
End: 2024-07-15
Payer: COMMERCIAL

## 2024-07-15 DIAGNOSIS — K43.2 HERNIA, INCISIONAL: ICD-10-CM

## 2024-07-15 DIAGNOSIS — G89.18 ACUTE POST-OPERATIVE PAIN: ICD-10-CM

## 2024-07-16 RX ORDER — CYCLOBENZAPRINE HCL 10 MG
10 TABLET ORAL 2 TIMES DAILY PRN
Qty: 28 TABLET | Refills: 0 | Status: SHIPPED | OUTPATIENT
Start: 2024-07-16 | End: 2024-08-07

## 2024-07-27 ENCOUNTER — HEALTH MAINTENANCE LETTER (OUTPATIENT)
Age: 29
End: 2024-07-27

## 2024-08-01 ASSESSMENT — ANXIETY QUESTIONNAIRES
8. IF YOU CHECKED OFF ANY PROBLEMS, HOW DIFFICULT HAVE THESE MADE IT FOR YOU TO DO YOUR WORK, TAKE CARE OF THINGS AT HOME, OR GET ALONG WITH OTHER PEOPLE?: SOMEWHAT DIFFICULT
1. FEELING NERVOUS, ANXIOUS, OR ON EDGE: NEARLY EVERY DAY
GAD7 TOTAL SCORE: 12
3. WORRYING TOO MUCH ABOUT DIFFERENT THINGS: NEARLY EVERY DAY
3. WORRYING TOO MUCH ABOUT DIFFERENT THINGS: NEARLY EVERY DAY
GAD7 TOTAL SCORE: 12
2. NOT BEING ABLE TO STOP OR CONTROL WORRYING: MORE THAN HALF THE DAYS
1. FEELING NERVOUS, ANXIOUS, OR ON EDGE: NEARLY EVERY DAY
2. NOT BEING ABLE TO STOP OR CONTROL WORRYING: MORE THAN HALF THE DAYS
7. FEELING AFRAID AS IF SOMETHING AWFUL MIGHT HAPPEN: SEVERAL DAYS
IF YOU CHECKED OFF ANY PROBLEMS ON THIS QUESTIONNAIRE, HOW DIFFICULT HAVE THESE PROBLEMS MADE IT FOR YOU TO DO YOUR WORK, TAKE CARE OF THINGS AT HOME, OR GET ALONG WITH OTHER PEOPLE: SOMEWHAT DIFFICULT
7. FEELING AFRAID AS IF SOMETHING AWFUL MIGHT HAPPEN: SEVERAL DAYS
7. FEELING AFRAID AS IF SOMETHING AWFUL MIGHT HAPPEN: SEVERAL DAYS
GAD7 TOTAL SCORE: 12
8. IF YOU CHECKED OFF ANY PROBLEMS, HOW DIFFICULT HAVE THESE MADE IT FOR YOU TO DO YOUR WORK, TAKE CARE OF THINGS AT HOME, OR GET ALONG WITH OTHER PEOPLE?: SOMEWHAT DIFFICULT
GAD7 TOTAL SCORE: 12
5. BEING SO RESTLESS THAT IT IS HARD TO SIT STILL: SEVERAL DAYS
5. BEING SO RESTLESS THAT IT IS HARD TO SIT STILL: SEVERAL DAYS
4. TROUBLE RELAXING: SEVERAL DAYS
7. FEELING AFRAID AS IF SOMETHING AWFUL MIGHT HAPPEN: SEVERAL DAYS
IF YOU CHECKED OFF ANY PROBLEMS ON THIS QUESTIONNAIRE, HOW DIFFICULT HAVE THESE PROBLEMS MADE IT FOR YOU TO DO YOUR WORK, TAKE CARE OF THINGS AT HOME, OR GET ALONG WITH OTHER PEOPLE: SOMEWHAT DIFFICULT
6. BECOMING EASILY ANNOYED OR IRRITABLE: SEVERAL DAYS
4. TROUBLE RELAXING: SEVERAL DAYS
6. BECOMING EASILY ANNOYED OR IRRITABLE: SEVERAL DAYS
GAD7 TOTAL SCORE: 12
GAD7 TOTAL SCORE: 12

## 2024-08-06 ASSESSMENT — PATIENT HEALTH QUESTIONNAIRE - PHQ9
10. IF YOU CHECKED OFF ANY PROBLEMS, HOW DIFFICULT HAVE THESE PROBLEMS MADE IT FOR YOU TO DO YOUR WORK, TAKE CARE OF THINGS AT HOME, OR GET ALONG WITH OTHER PEOPLE: SOMEWHAT DIFFICULT
10. IF YOU CHECKED OFF ANY PROBLEMS, HOW DIFFICULT HAVE THESE PROBLEMS MADE IT FOR YOU TO DO YOUR WORK, TAKE CARE OF THINGS AT HOME, OR GET ALONG WITH OTHER PEOPLE: SOMEWHAT DIFFICULT
SUM OF ALL RESPONSES TO PHQ QUESTIONS 1-9: 9

## 2024-08-07 ENCOUNTER — VIRTUAL VISIT (OUTPATIENT)
Dept: BEHAVIORAL HEALTH | Facility: CLINIC | Age: 29
End: 2024-08-07
Payer: COMMERCIAL

## 2024-08-07 ENCOUNTER — VIRTUAL VISIT (OUTPATIENT)
Dept: PSYCHIATRY | Facility: CLINIC | Age: 29
End: 2024-08-07
Payer: COMMERCIAL

## 2024-08-07 DIAGNOSIS — F33.1 MODERATE EPISODE OF RECURRENT MAJOR DEPRESSIVE DISORDER (H): Primary | ICD-10-CM

## 2024-08-07 DIAGNOSIS — F41.1 GAD (GENERALIZED ANXIETY DISORDER): ICD-10-CM

## 2024-08-07 DIAGNOSIS — R41.840 ATTENTION AND CONCENTRATION DEFICIT: ICD-10-CM

## 2024-08-07 DIAGNOSIS — F43.9 TRAUMA AND STRESSOR-RELATED DISORDER: ICD-10-CM

## 2024-08-07 DIAGNOSIS — F10.21 ALCOHOL USE DISORDER, SEVERE, IN SUSTAINED REMISSION (H): ICD-10-CM

## 2024-08-07 PROCEDURE — G2211 COMPLEX E/M VISIT ADD ON: HCPCS | Mod: 95 | Performed by: PSYCHIATRY & NEUROLOGY

## 2024-08-07 PROCEDURE — 99204 OFFICE O/P NEW MOD 45 MIN: CPT | Mod: 95 | Performed by: PSYCHIATRY & NEUROLOGY

## 2024-08-07 PROCEDURE — 90791 PSYCH DIAGNOSTIC EVALUATION: CPT | Mod: 95 | Performed by: COUNSELOR

## 2024-08-07 RX ORDER — LORAZEPAM 0.5 MG/1
0.5 TABLET ORAL DAILY PRN
Qty: 10 TABLET | Refills: 1 | Status: SHIPPED | OUTPATIENT
Start: 2024-08-20

## 2024-08-07 RX ORDER — BUPROPION HYDROCHLORIDE 300 MG/1
300 TABLET ORAL EVERY MORNING
Qty: 30 TABLET | Refills: 1 | Status: SHIPPED | OUTPATIENT
Start: 2024-08-16 | End: 2024-09-06

## 2024-08-07 RX ORDER — BUPROPION HYDROCHLORIDE 150 MG/1
150 TABLET ORAL EVERY MORNING
Qty: 14 TABLET | Refills: 0 | Status: SHIPPED | OUTPATIENT
Start: 2024-08-07 | End: 2024-09-05

## 2024-08-07 RX ORDER — PROPRANOLOL HYDROCHLORIDE 20 MG/1
10-30 TABLET ORAL 2 TIMES DAILY PRN
Qty: 60 TABLET | Refills: 1 | Status: SHIPPED | OUTPATIENT
Start: 2024-08-07

## 2024-08-07 RX ORDER — BUSPIRONE HYDROCHLORIDE 10 MG/1
10 TABLET ORAL DAILY
Qty: 120 TABLET | Refills: 0 | Status: SHIPPED
Start: 2024-01-10 | End: 2024-09-06

## 2024-08-07 ASSESSMENT — COLUMBIA-SUICIDE SEVERITY RATING SCALE - C-SSRS
TOTAL  NUMBER OF INTERRUPTED ATTEMPTS LIFETIME: NO
6. HAVE YOU EVER DONE ANYTHING, STARTED TO DO ANYTHING, OR PREPARED TO DO ANYTHING TO END YOUR LIFE?: NO
2. HAVE YOU ACTUALLY HAD ANY THOUGHTS OF KILLING YOURSELF?: NO
TOTAL  NUMBER OF ABORTED OR SELF INTERRUPTED ATTEMPTS LIFETIME: NO
REASONS FOR IDEATION LIFETIME: COMPLETELY TO END OR STOP THE PAIN (YOU COULDN'T GO ON LIVING WITH THE PAIN OR HOW YOU WERE FEELING)
1. HAVE YOU WISHED YOU WERE DEAD OR WISHED YOU COULD GO TO SLEEP AND NOT WAKE UP?: YES
1. IN THE PAST MONTH, HAVE YOU WISHED YOU WERE DEAD OR WISHED YOU COULD GO TO SLEEP AND NOT WAKE UP?: NO
ATTEMPT LIFETIME: NO

## 2024-08-07 NOTE — NURSING NOTE
Current patient location: [address] [place] [address] [address]   [facility] [address]    Is the patient currently in the state of [place]? YES    Visit mode:VIDEO    If the visit is dropped, the patient can be reconnected by: VIDEO VISIT: Text to cell phone:   Telephone Information:   Mobile [phone]       Will anyone else be joining the visit? NO  (If patient encounters technical issues they should call [phone]    How would you like to obtain your AVS? MyChart    Are changes needed to the allergy or medication list? Pt stated no changes to allergies and Pt stated no med changes    Are refills needed on medications prescribed by this physician? NO    Rooming Documentation:  Assigned questionnaire(s) completed      Reason for visit: Consult    Donna HAIDERF

## 2024-08-07 NOTE — PROGRESS NOTES
"Virtual Visit Details    Type of service:  Video Visit   Video Start Time: {video visit start/end time for provider to select:456293}  Video End Time:{video visit start/end time for provider to select:494330}    Originating Location (pt. Location): {video visit patient location:573623::\"Home\"}  {PROVIDER LOCATION On-site should be selected for visits conducted from your clinic location or adjoining [facility] hospital, academic office, or other nearby [facility] building. Off-site should be selected for all other provider locations, including home:409985}  Distant Location (provider location):  {virtual location provider:941337}  Platform used for Video Visit: {Virtual Visit Platforms:529802::\"Ch[employer]\"}  "

## 2024-08-07 NOTE — PROGRESS NOTES
"Telemedicine Visit: The patient's condition can be safely assessed and treated via synchronous audio and visual telemedicine encounter.      Reason for Telemedicine Visit: Patient has requested telehealth visit    Originating Site (Patient Location): Patient's home    Distant Location (provider location):  Off-site    Consent:  The patient/guardian has verbally consented to: the potential risks and benefits of telemedicine (video visit) versus in person care; bill my insurance or make self-payment for services provided; and responsibility for payment of non-covered services.     Mode of Communication:  Video Conference via [employer]    As the provider I attest to compliance with applicable laws and regulations related to telemedicine.                                               Outpatient Psychiatric Evaluation- Standard  Adult    Name:  Artie Burger  : 1995    Source of Referral:  Primary Care Provider: Lila Gallegos CNP   Current Psychotherapist: Sheila Flaherty [facility] [place], [place], weekly        Referral by Deangelo, PhD:  My Clinical Question Is: I'm not sure the patient qualifies for the CCPS, if not she may also be interested in long-term management. Depression/anxiety and she is already connected with a therapist     Identifying Data:  Patient is a 28 year old, partnered / significant other  White Not  or  who presents for initial visit with me.  Patient is currently unemployed/disabled. Patient attended the phone/video session alone. Patient prefers to be called: \"Artie\"    Chief Complaint:  Patient presents with:  Consult      HPI:  Artie Burger is a 28 year old with past history including depression, anxiety, trauma, alcohol use disorder in remission who presents today for psychiatric assessment.     Patient presents today for psychiatric medication evaluation.  Patient with long history of anxiety and depression symptoms dating back to adolescence.  Patient's " case complicated by severe alcohol use disorder that significantly impacted her health.  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  Patient has been doing much better physically since the surgery and since things have stabilized.  Patient has been sober from alcohol for 4 years this month.  The patient continues to struggle with anxiety, depression, and some trauma related symptoms.  Patient also struggles with sleep due to anxiety, nightmares, night sweats.  Patient does use daily medicinal cannabis.  Patient smokes the flower.  Patient often with lower energy and poor motivation.  Feels like poor motivation has been much of her life.  Reports using lorazepam every 3 days or so to help with severe anxiety.  No symptoms of psychosis.  Poor task completion, poor organizational skills.  Often impulsive and can be restless/fidgety.  History of self-injurious behaviors as a teen but none current.  No current suicidal ideation.  No history of suicide attempts.  See [facility] note below and review of symptoms below for additional details.    Psych Meds at Intake:  -Ativan 0.5 mg daily, sometimes twice daily: 1 every few days, still works (gets from primary care provider)   -Sertraline/Zoloft - 50 mg daily (was up to 200 mg daily but decreased due to activation), has been decreasing slowly and no worsening of sxs  -Buspar 10 mg daily -was taking twice daily: helpful for some day to day anxiety, up to 15 mg twice daily  -Gabapentin 600 mg twice daily: for pain and anxiety  -Propranolol 10 mg daily as needed for anxiety (sometimes uses, brings heart rate down, every couple weeks uses)    Past Psych Meds:  Diphenhydramine - anxiety  Trazodone - vivid dreams  Prozac/fluoxetine - not very helpful   Cymbalta - 2021 up to 60 mg  DID NOT WORK FOR PAIN  2022/2023: treated with lexapro 20mg and celexa 40mg during this time.      Per [facility], during today's team-based visit:   update:  ROS above  Stresses:  Medical stressors.  Goals of wanting to obtain PT employment.  Dad's death as teen  Appetite: N/a.  T1D.  Sleep: Poor- related to anxiety.  No sleep study.  Night sweats.  Nightmares  Outpatient Provider updates: Sheila Flaherty [facility]  SI/SIB/HI:  Denies current.  Hx of passive ideation.  Denies previous attempts.  Hx of SIB as teen.  Denies need for safety plan or crisis resources.  AMPARO:  Sober 4 years ETOH.  No meetings.  Friend support.  Daily medical THC use.  Preg:  IUD  Side effects/compliance:  Interventions:  [facility] engaged in completing DA with psychoeducation and tx planning  Most important:  Different meds?  Higher dose?   ADHD testing referral?        Patient reports the following compulsive behaviors and treatment history:  n/a .      Per primary care provider 6/28/24:  SUBJECTIVE:   Artie Burger is a 28 year old female who is here today to follow up on her anxiety and depression. I saw her most recently for the same on 05/21/2024. At that time, we had decreased her sertraline from 200 to 100 mg daily, questioning if her higher dosing was slightly activating for her and increasing her anxiety. PDMP reviewed today. Last prescription was filled for number 30 tablets on 05/20/2024 by Dr. Patel, which she had been using for post-surgical pain. On 09/22/2023, underwent total pancreatectomy with islet cell auto-transplantation splenectomy, and cholecystectomy with the [facility]. She has been struggling with anxiety, has been utilizing lorazepam. Number 20 were filled on 06/10/2024 with an additional number 20 filled on 06/20/2024. Currently, going through 20 lorazepam every 10 days for the last 3 prescriptions.    1. She reports things are not going bad, but minimally better the past couple of weeks than before. The patient believes the weather has helped. She has an  appointment, but it is not until about 08/07/2024 with psychiatry at the [facility]. They are to call her if there is a cancelation. Going down on sertraline from 200 mg to 100 mg helped. She has not had as many panic attacks. Lorazepam has been used twice daily, having been using 2 at one time occasionally as 1 is often ineffective. She reports a lot of fatigue on lorazepam, wanting to nap when taking it and so does not want to use it. The patient talked to Candace about doing EMDR therapy, to combat her flashback anxiety. She also wishes to use more natural remedies and less medication. The patient believes she was struggling mentally due to how she felt physically. She has only been taking 10 mg of BuSpar again per dose, which seems to be working well for her.    2. Her blood sugar has been staying more normal. She now has an insulin pump, an Omnipod, having had it for 3 weeks. The patient was low a couple of times, as she entered too many calories accidentally. She fills the Omnipod every three days.    3. The patient discontinued dilaudid at the beginning of the month. She is doing okay without it. The patient still sometimes has much muscle pain and has been prescribed Flexeril for it, which has been helping. She is not as sore as in the past. The patient also takes gabapentin periodically but not a lot.    4. Concerning her plans, she has a laptop and is looking into getting an online job at home. She does not want to do much physical labor for work, as she does not want another hernia surgery. 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 She also wants to get into a routine.    5. She did see her specialist as she was concerned that she had another hernia. He told her that it was probably just where they anchored the mesh down. A myofascial release was discussed, and she has found a chiropractor, who she will contact.     Hospitalizations for pancreatitis:   2/2018 initial hospitalization,  amylase 400, lipase 7000 4/2018 hospitalization alcoholic hepatitis without ascites, grossly jaundiced, lipase 379, total bilirubin 14, CT with hepatomegaly with severe hepatic stetatosis,   7/2018 acute pancreatitis, alcoholic hepatitis   8/2020 hospitalization for chronic pancreatitis and acute alcohol intoxication,   9/2020 hospitalization for acute on chronic pancreatitis  4/2021 hospitalization for acute on chronic pancreatitis     Past diagnoses include: Depression, anxiety, trauma, alcohol use disorder  Current medications include: has a current medication list which includes the following prescription(s): acetaminophen, relion ketone test, alcohol swabs, aspirin, blood glucose, blood glucose, blood glucose monitoring, blood glucose monitoring, buspirone, cetirizine, dexcom g6 sensor, dexcom g6 transmitter, cyclobenzaprine, gabapentin, glucagon, glucose, ibuprofen, insulin aspart, insulin aspart, omnipod 5 intro (gen 5), omnipod 5 pods (gen 5), insulin pen needle, levonorgestrel, lipase-protease-amylase, lorazepam, mvw complete formulation, ondansetron, pantoprazole, polyethylene glycol, propranolol, and sertraline.   Medication side effects: Yes: Sedation from lorazepam  Current stressors include: Symptoms and see HPI above  Coping mechanisms and supports include: Therapy, Family, Hobbies, and Friends    Psychiatric [facility] of Symptoms [facility] during today's team-based visit:  Depression:     Lack of interest, Excessive or inappropriate guilt, Change in energy level, Difficulties concentrating, Feelings of hopelessness, Feelings of helplessness, Low self-worth, Ruminations, Irritability, Feeling sad, down, or depressed, Withdrawn, and Anger outbursts  Denies SA  Hx of SIB as teen  Denies suicidal ideation intent or plan for the last several months  Hx of passive suicidal ideation without planning or intent in the past  Denies need for safety plan or crisis resources.  Future and goal  oriented.  Geovanna:             Elevated mood  Psychosis:       No Symptoms  Anxiety:           Excessive worry, Nervousness, Physical complaints, such as headaches, stomachaches, muscle tension, Sleep disturbance, Ruminations, Poor concentration, Irritability, and Anger outbursts  Panic:              Palpitations, Shortness of breath, and Sense of impending doom  Hx of.  BS can impact.  Post Traumatic Stress Disorder:  Reexperiencing of trauma, Avoids traumatic stimuli, Nightmares, and Dissociation   Eating Disorder:          No Symptoms  ADD / ADHD:              Inattentive, Difficulties listening, Poor task completion, Poor organizational skills, Distractibility, Impulsive, and Restlessness/fidgety  Conduct Disorder:       No symptoms  Autism Spectrum Disorder:     No symptoms  Obsessive Compulsive Disorder:       No Symptoms    All other ROS negative.     PHQ-9 scores:       4/4/2023    10:27 AM 6/12/2024    12:49 PM 8/6/2024    10:39 PM   PHQ-9 SCORE   PHQ-9 Total Score MyChart   9 (Mild depression)   PHQ-9 Total Score 17 6 9    9       ALAN-7 scores:        2/14/2023     8:51 AM 6/22/2023     4:54 PM 8/1/2024     1:16 PM   ALAN-7 SCORE   Total Score 17 (severe anxiety) 12 (moderate anxiety) 12 (moderate anxiety)   Total Score 17 12 12    12       Medical Review of Systems:  10 systems (general, cardiovascular, respiratory, eyes, ENT, endocrine, GI, , M/S, neurological) were reviewed. Most pertinent finding(s) is/are: Intermittent pains-much better.  The remaining systems are all unremarkable.    A 12-item WHODAS 2.0 assessment was not completed.    Psychiatric History:   Hospitalizations: None   History of Commitment? No   Past Treatment: counseling and medication(s) from physician / PCP  Suicide Attempts: No   Current Suicide Risk: Suicide Assessment Completed Today.  Self-injurious Behavior: Denies and history of self-injurious behaviors as a teen, none since.  Electroconvulsive Therapy (ECT) or Transcranial  Magnetic Stimulation (TMS): No   GeneSight Genetic Testing: No     Past medication trials include but are not limited to:   Psych Meds at Intake:  -Ativan 0.5 mg daily, sometimes twice daily: 1 every few days, still works (gets from primary care provider)   -Sertraline/Zoloft - 50 mg daily (was up to 200 mg daily but decreased due to activation), has been decreasing slowly and no worsening of sxs  -Buspar 10 mg daily -was taking twice daily: helpful for some day to day anxiety, up to 15 mg twice daily  -Gabapentin 600 mg twice daily: for pain and anxiety  -Propranolol 10 mg daily as needed for anxiety (sometimes uses, brings heart rate down, every couple weeks uses)    Past Psych Meds:  Diphenhydramine - anxiety  Trazodone - vivid dreams  Prozac/fluoxetine - not very helpful   Cymbalta - 2021 up to 60 mg  DID NOT WORK FOR PAIN  2022/2023: treated with lexapro 20mg and celexa 40mg during this time.     Substance Use History:  Current Use of Drugs/Alcohol: No EOTH, sober for 4 years this month;  cannabis- uses daily smokes flower a couple joints a day maybe per pt report  Past Use of Drugs/Alcohol: History of significant alcohol abuse, up to a liter per day of liquor; history of withdrawal but no DTs or seizures  Patient reported the following problems as a result of drinking: family problems, relationship problems, and sexual issues.   Patient has received chemical dependency treatment in the past at [facility] for 35 days  Recovery Programming Involvement: None    Tobacco use: History of tobacco use    Based on the clinical interview, there  are not indications of drug or alcohol abuse. Continue to monitor.   Discussed effect of substance use on overall health.     Past Medical History:  Past Medical History:   Diagnosis Date    Alcohol-induced chronic pancreatitis (H)     Anxiety     Depression     Gastroesophageal reflux disease     Pancreatic disease     PONV (postoperative nausea and vomiting)      Type 1 diabetes mellitus (H) 9/19/2023      Surgery:   Past Surgical History:   Procedure Laterality Date    CHOLECYSTECTOMY N/A 9/22/2023    Procedure: Cholecystectomy;  Surgeon: Junito Patel MD;  Location: [facility] OR    ENDOSCOPIC RETROGRADE CHOLANGIOPANCREATOGRAM COMPLEX N/A 9/13/2021    Procedure: ENDOSCOPIC RETROGRADE CHOLANGIOPANCREATOGRAPHY with pancreatic sphincterotomy, dilation, stone removal, stent placement;  Surgeon: Guru Sabino Campuzano MD;  Location: [facility]    ENDOSCOPIC RETROGRADE CHOLANGIOPANCREATOGRAM WITH DIRECT VISUALIZATION SYSTEM AND GENERATOR N/A 11/1/2021    Procedure: ENDOSCOPIC RETROGRADE CHOLANGIOPANCREATOGRAPHY, dilation and debridement sludge removal, pancreatic duct stent placement;  Surgeon: Guru Sabino Campuzano MD;  Location: [facility]    ENDOSCOPIC RETROGRADE CHOLANGIOPANCREATOGRAM WITH SPYGLASS N/A 10/6/2021    Procedure: ENDOSCOPIC RETROGRADE CHOLANGIOPANCREATOGRAPHY, WITH DIRECT DUCT VISUALIZATION, USING PANCREATICOBILIARY FIBEROPTIC PROBE, BILE DUCT STENT EXCHANGE, LITHOTRIPSY OF PANCREATIC STONE, SPHINCTEROTOMY, BALLOON DILATION OF PANCREATIC DUCT AND STONE EXTRACTION;  Surgeon: Guru Sabino Campuzano MD;  Location: [facility]    ENDOSCOPIC RETROGRADE CHOLANGIOPANCREATOGRAM WITH SPYGLASS N/A 12/13/2021    Procedure: ENDOSCOPIC RETROGRADE CHOLANGIOPANCREATOGRAPHY, with pancreatic stent exchange, ballon dilation, stone removal, with spyglass direct visualization;  Surgeon: Guru Sabino Campuzano MD;  Location[facility]    ENDOSCOPIC RETROGRADE CHOLANGIOPANCREATOGRAPHY, EXCHANGE TUBE/STENT N/A 2/16/2022    Procedure: ENDOSCOPIC RETROGRADE CHOLANGIOPANCREATOGRAPH with pancreatic dilation, debris removal and stent exchange.;  Surgeon: Guru Sabino Campuzano MD;  Location: [facility]    ENDOSCOPIC RETROGRADE CHOLANGIOPANCREATOGRAPHY, EXCHANGE TUBE/STENT N/A 4/4/2022    Procedure: ENDOSCOPIC RETROGRADE  CHOLANGIOPANCREATOGRAPHY, WITH REPLACEMENT OF pancreatic STENT, stone removal;  Surgeon: Guru Sabino Campuzano MD;  [facility]    ENDOSCOPIC RETROGRADE CHOLANGIOPANCREATOGRAPHY, EXCHANGE TUBE/STENT N/A 7/13/2022    Procedure: ENDOSCOPIC RETROGRADE CHOLANGIOPANCREATOGRAPHY, WITH pancreatic duct dilation and stent exchange, biliary stent placement, debris removal;  Surgeon: Guru Sabino Campuzano MD;  [facility]    ENDOSCOPIC RETROGRADE CHOLANGIOPANCREATOGRAPHY, EXCHANGE TUBE/STENT N/A 9/14/2022    Procedure: ENDOSCOPIC RETROGRADE CHOLANGIOPANCREATOGRAPHY, WITH pancreatic stone removal, biliary stent removal, pancreatic stent exchange;  Surgeon: Guru Sabino Campuzano MD;  [facility]    ENDOSCOPIC RETROGRADE CHOLANGIOPANCREATOGRAPHY, EXCHANGE TUBE/STENT N/A 11/21/2022    Procedure: ENDOSCOPIC RETROGRADE CHOLANGIOPANCREATOGRAPHY WITH PANCREATIC DUCT STENT EXCHANGE, DILATION, AND DEBRIS REMOVAL;  Surgeon: Guru Sabino Campuzano MD;  [facility]    ENDOSCOPIC RETROGRADE CHOLANGIOPANCREATOGRAPHY, EXCHANGE TUBE/STENT N/A 1/18/2023    Procedure: ENDOSCOPIC RETROGRADE CHOLANGIOPANCREATOGRAPHY stent exchange, dilation, sludge and stone removal;  Surgeon: Guru Sabino Campuzano MD;  [facility]: [facility]    ENDOSCOPIC RETROGRADE CHOLANGIOPANCREATOGRAPHY, EXCHANGE TUBE/STENT N/A 3/13/2023    Procedure: ENDOSCOPIC RETROGRADE CHOLANGIOPANCREATOGRAPHY, WITH replacement of pancreatic stents, bile duct stent placed,sphinterotomy of bile duct ampulla, balloon sweep of bile duct for sludge,;  Surgeon: Guru Sabino Campuzano MD;  [facility]    ENDOSCOPIC RETROGRADE CHOLANGIOPANCREATOGRAPHY, EXCHANGE TUBE/STENT N/A 5/10/2023    Procedure: ENDOSCOPIC RETROGRADE CHOLANGIOPANCREATOGRAPHY with pancreatic stents exchange and stone extraction;  Surgeon: Guru Sabino Campuzano MD;  Location: [facility]    ENDOSCOPIC RETROGRADE  CHOLANGIOPANCREATOGRAPHY, EXCHANGE TUBE/STENT N/A 7/12/2023    Procedure: ENDOSCOPIC RETROGRADE CHOLANGIOPANCREATOGRAPHY, WITH REPLACEMENT OF STENT X3 AND BALLOON SWEEP OF BILE DUCT FOR STONES;  Surgeon: Guru Sabino Campuzano MD;  Location[facility]    GASTROJEJUNOSTOMY N/A 9/22/2023    Procedure: Gastrojejunostomy;  Surgeon: Junito Patel MD;  [facility]    LAPAROSCOPIC HERNIORRHAPHY PREPERITONEAL BILATERAL Bilateral 4/4/2024    Procedure: REPAIR, HERNIA, VENTRAL, BILATERAL, LAPAROSCOPIC, USING PHASIX ST MESH;  Surgeon: Junito Patel MD;  Location: [facility]    PANCREATECTOMY, TRANSPLANT AUTO ISLET CELL, COMBINED N/A 9/22/2023    Procedure: PANCREATECTOMY, TOTAL, WITH AUTOLOGOUS PANCREATIC ISLET CELL TRANSPLANT;  Surgeon: Junito Patel MD;  [facility]    SPLENECTOMY N/A 9/22/2023    Procedure: Splenectomy;  Surgeon: Junito Patel MD;  Location: [facility]     Food and Medicine Allergies:   No Known Allergies  Seizures or Head Injury: No  Diet:  Not discussed, patient is type I diabetic  Exercise: Not discussed  Supplements: Reviewed per Electronic Medical Record Today    Current Medications:    Current Outpatient Medications:     acetaminophen (TYLENOL) 500 MG tablet, Take 500-1,000 mg by mouth, Disp: , Rfl:     acetone urine (RELION KETONE TEST) test strip, Use as part of high blood sugar protocol for pump users, Disp: 20 strip, Rfl: 4    Alcohol Swabs PADS, Use before taking blood sugars up to 10 times a day, Disp: 100 each, Rfl: 3    aspirin 81 MG EC tablet, Take 1 tablet (81 mg) by mouth daily, Disp: 90 tablet, Rfl: 3    blood glucose (NO BRAND SPECIFIED) lancets standard, Use to test blood sugars up to 8 times daily as directed., Disp: 100 each, Rfl: 3    blood glucose (NO BRAND SPECIFIED) test strip, Test blood sugars up to 8 times a day as directed by your provider, Disp: 200 strip, Rfl: 3    blood glucose monitoring  (NO BRAND SPECIFIED) meter device kit, Use as directed Per insurance coverage. DM education recommending Accu Chek Guide glucose meter, Disp: 1 kit, Rfl: 0    blood glucose monitoring (SOFTCLIX) lancets, USE TO TEST BLOOD GLUCOSE UP TO EIGHT TIMES DAILY, Disp: , Rfl:     busPIRone (BUSPAR) 15 MG tablet, Take 1 tablet (15 mg) by mouth 2 times daily, Disp: 120 tablet, Rfl: 0    cetirizine (ZYRTEC) 10 MG tablet, Take 10 mg by mouth daily, Disp: , Rfl:     Continuous Glucose Sensor (DEXCOM G6 SENSOR) MISC, 1 each every 10 days, Disp: 9 each, Rfl: 3    Continuous Glucose Transmitter (DEXCOM G6 TRANSMITTER) MISC, 1 each every 3 months, Disp: 1 each, Rfl: 3    cyclobenzaprine (FLEXERIL) 10 MG tablet, Take 1 tablet (10 mg) by mouth 2 times daily as needed for muscle spasms, Disp: 28 tablet, Rfl: 0    gabapentin (NEURONTIN) 600 MG tablet, Take 1 tablet (600 mg) by mouth 2 times daily, Disp: 180 tablet, Rfl: 3    Glucagon (GVOKE HYPOPEN) 1 MG/0.2ML pen, Inject the contents of 1 device under the skin into lower abdomen, outer thigh, or outer upper arm as needed for hypoglycemia. If no response after 15 minutes, additional 1 mg dose from a new device may be injected while waiting for emergency assistance., Disp: 0.4 mL, Rfl: 3    glucose (BD GLUCOSE) 4 g chewable tablet, Take 1 tablet by mouth every hour as needed for low blood sugar, Disp: 30 tablet, Rfl: 0    ibuprofen (ADVIL/MOTRIN) 200 MG tablet, Take 800 mg by mouth as needed, Disp: , Rfl:     insulin aspart (NOVOLOG PEN) 100 UNIT/ML pen, DOSE:  1 units per 10 grams of carbohydrate.  Only chart total amount of units given.  Do not give if pre-prandial glucose is less than 60 mg/dL. If given at mealtime, administer within 30 minutes of start of meal., Disp: 15 mL, Rfl: 3    insulin aspart (NOVOLOG VIAL) 100 UNITS/ML vial, Up to 30 units daily via insulin pump, Disp: 10 mL, Rfl: 5    Insulin Disposable Pump (OMNIPOD 5 G6 INTRO, GEN 5,) KIT, 1 each every 3 days, Disp: 1 kit,  Rfl: 0    Insulin Disposable Pump (OMNIPOD 5 G6 PODS, GEN 5,) MISC, 1 each every 3 days, Disp: 30 each, Rfl: 4    insulin pen needle (32G X 4 MM) 32G X 4 MM miscellaneous, Use up to 8 pen needles daily or as directed., Disp: 200 each, Rfl: 11    levonorgestrel (MIRENA) 52 MG (20 mcg/day) IUD, 1 Device by Intrauterine route, Disp: , Rfl:     lipase-protease-amylase (CREON) 23667-58708-644565 units CPEP per EC capsule, Take 4 capsules with meals and 2 capsules with snacks; approximate daily maximum 16 capsules, Disp: 480 capsule, Rfl: 11    LORazepam (ATIVAN) 0.5 MG tablet, Take 0.5 mg by mouth, Disp: , Rfl:     mvw complete formulation (SOFTGELS ) capsule, Take 1 capsule by mouth daily, Disp: , Rfl:     ondansetron (ZOFRAN ODT) 4 MG ODT tab, Take 1 tablet (4 mg) by mouth every 6 hours as needed for nausea or vomiting, Disp: 12 tablet, Rfl: 3    pantoprazole (PROTONIX) 40 MG EC tablet, Take 1 tablet (40 mg) by mouth daily for 360 days, Disp: 90 tablet, Rfl: 3    polyethylene glycol (MIRALAX) 17 GM/Dose powder, Take 17 g by mouth daily, Disp: 510 g, Rfl: 3    propranolol (INDERAL) 10 MG tablet, Take 10 mg by mouth, Disp: , Rfl:     sertraline (ZOLOFT) 100 MG tablet, Take 2 tablets by mouth daily, Disp: , Rfl:     The [place] Prescription Monitoring Program has been reviewed and there are no concerns about diversionary activity for controlled substances at this time.      Vital Signs:  None since this is a phone/video visit.     Labs:  Most recent laboratory results reviewed and the pertinent results include:   Lab on 03/21/2024   Component Date Value Ref Range Status    Lauric Acid C12 0 Test 03/21/2024 14  6 - 90 nmol/mL Final    Myristic Acid C14 0 Test 03/21/2024 94  30 - 450 nmol/mL Final    Hexadecenoic Acid Test 03/21/2024 31  25 - 105 nmol/mL Final    Palmitoleic Acid Test 03/21/2024 303  110 - 1130 nmol/mL Final    Palmitic Acid Test 03/21/2024 1964  1480 - 3730 nmol/mL Final    G Linolenic Acid Test  03/21/2024 32  16 - 150 nmol/mL Final    A linolenic Acid Test 03/21/2024 48 (L)  50 - 130 nmol/mL Final    Linoleic Acid Test 03/21/2024 2350  2270 - 3850 nmol/mL Final    Oleic Acid Test 03/21/2024 1901  650 - 3500 nmol/mL Final    Vaccenic Acid Test 03/21/2024 181 (L)  280 - 740 nmol/mL Final    Stearic Acid Test 03/21/2024 615  590 - 1170 nmol/mL Final    EPA C20 5W3 Test 03/21/2024 39  14 - 100 nmol/mL Final    Arachidonic Acid Test 03/21/2024 805  520 - 1490 nmol/mL Final    [place] Acid Test 03/21/2024 25  7 - 30 nmol/mL Final    H G Linolenic Test 03/21/2024 140  50 - 250 nmol/mL Final    Arachidic Acid Test 03/21/2024 21 (L)  50 - 90 nmol/mL Final    DHA C22 6W3 Test 03/21/2024 91  30 - 250 nmol/mL Final    DPA C22 5W6 Test 03/21/2024 30  10 - 70 nmol/mL Final    DPA C22 5W3 Test 03/21/2024 60  20 - 210 nmol/mL Final    DTA C22 4W6 Test 03/21/2024 38  10 - 80 nmol/mL Final    Docosenoic Acid Test 03/21/2024 4  4 - 13 nmol/mL Final    Nervonic Acid Test 03/21/2024 69  60 - 100 nmol/mL Final    Triene Tetraene Ratio Test 03/21/2024 0.031  0.010 - 0.038 Final    Total Saturated Acid Test 03/21/2024 2.8  2.5 - 5.5 mmol/L Final    Total Monounsat Acid Test 03/21/2024 2.5  1.3 - 5.8 mmol/L Final    Total Polyunsat Acid Test 03/21/2024 3.7  3.2 - 5.8 mmol/L Final    Total W3 Test 03/21/2024 0.2  0.2 - 0.5 mmol/L Final    Total W6 Test 03/21/2024 3.4  3.0 - 5.4 mmol/L Final    Total Fatty Acids Test 03/21/2024 9.0  7.3 - 16.8 mmol/L Final    Fatty Acid Interpretation 03/21/2024 SEE NOTE   Final    In this sample, the concentration of alpha-linolenic acid   was reduced. This finding could be suggestive of a   nutritional deficiency of essential fatty acids.     -------------------ADDITIONAL INFORMATION-------------------  Gas Chromatography-Mass Spectrometry (GC-MS) Stable Isotope   Dilution Analysis  This test was developed and its performance characteristics   determined by [facility] in a manner consistent with  CLIA   requirements. This test has not been cleared or approved by   the U.S. Food and Drug Administration.     Test Performed by:  [place] [address]  : Gerald Coley M.D. Ph.D.; CLIA# [id number]    C Peptide 03/21/2024 0.2 (L)  0.9 - 6.9 ng/mL Final    Hemoglobin A1C 03/21/2024 8.2 (H)  <5.7 % Final    Normal <5.7%   Prediabetes 5.7-6.4%    Diabetes 6.5% or higher     Note: Adopted from ADA consensus guidelines.    Vitamin A 03/21/2024 0.56  0.30 - 1.20 mg/L Final    Retinol Palmitate 03/21/2024 <0.02  0.00 - 0.10 mg/L Final    Vitamin A Interp 03/21/2024 Normal   Final      This test was developed and its performance characteristics   determined by [facility]. It has not been cleared or   approved by the US Food and Drug Administration. This test   was performed in a CLIA certified laboratory and is   intended for clinical purposes.  Performed By: [facility]  [address] [place], [place] [id number]  : Enrique Perez MD, PhD  CLIA Number: [id number]    Vitamin B12 03/21/2024 394  232 - 1,245 pg/mL Final    Vitamin E 03/21/2024 7.6  5.5 - 18.0 mg/L Final      This test was developed and its performance characteristics   determined by [facility]. It has not been cleared or   approved by the US Food and Drug Administration. This test   was performed in a CLIA certified laboratory and is   intended for clinical purposes.    Vitamin E Gamma 03/21/2024 1.0  0.0 - 6.0 mg/L Final    Performed By: [facility]  [address] [place] [id number]  : Enrique Perez MD, PhD  CLIA Number: [id number]    25 [place] Vitamin D2 03/21/2024 <5  ug/L Final    25 OH Vitamin D3 03/21/2024 36  ug/L Final    25 OH Vit D Total 03/21/2024 <41  20 - 75 ug/L Final    Season, race, dietary intake, and treatment affect the concentration of 25-hydroxy-Vitamin D. Values may decrease  during winter months and increase during summer months. Values 20-29 ug/L may indicate Vitamin D insufficiency and values <20 ug/L may indicate Vitamin D deficiency.    Sodium 03/21/2024 133 (L)  135 - 145 mmol/L Final    Reference intervals for this test were updated on 09/26/2023 to more accurately reflect our healthy population. There may be differences in the flagging of prior results with similar values performed with this method. Interpretation of those prior results can be made in the context of the updated reference intervals.     Potassium 03/21/2024 4.0  3.4 - 5.3 mmol/L Final    Carbon Dioxide (CO2) 03/21/2024 26  22 - 29 mmol/L Final    Anion Gap 03/21/2024 11  7 - 15 mmol/L Final    Urea Nitrogen 03/21/2024 14.4  6.0 - 20.0 mg/dL Final    Creatinine 03/21/2024 0.89  0.51 - 0.95 mg/dL Final    GFR Estimate 03/21/2024 89  >60 mL/min/1.73m2 Final    Calcium 03/21/2024 9.3  8.6 - 10.0 mg/dL Final    Chloride 03/21/2024 96 (L)  98 - 107 mmol/L Final    Glucose 03/21/2024 207 (H)  70 - 99 mg/dL Final    Alkaline Phosphatase 03/21/2024 111  40 - 150 U/L Final    Reference intervals for this test were updated on 11/14/2023 to more accurately reflect our healthy population. There may be differences in the flagging of prior results with similar values performed with this method. Interpretation of those prior results can be made in the context of the updated reference intervals.    AST 03/21/2024 37  0 - 45 U/L Final    Reference intervals for this test were updated on 6/12/2023 to more accurately reflect our healthy population. There may be differences in the flagging of prior results with similar values performed with this method. Interpretation of those prior results can be made in the context of the updated reference intervals.    ALT 03/21/2024 25  0 - 50 U/L Final    Reference intervals for this test were updated on 6/12/2023 to more accurately reflect our healthy population. There may be differences in the  flagging of prior results with similar values performed with this method. Interpretation of those prior results can be made in the context of the updated reference intervals.      Protein Total 03/21/2024 6.8  6.4 - 8.3 g/dL Final    Albumin 03/21/2024 4.3  3.5 - 5.2 g/dL Final    Bilirubin Total 03/21/2024 0.3  <=1.2 mg/dL Final    Prealbumin 03/21/2024 19.5 (L)  20.0 - 40.0 mg/dL Final    Iron 03/21/2024 104  37 - 145 ug/dL Final    Iron Binding Capacity 03/21/2024 333  240 - 430 ug/dL Final    Iron Sat Index 03/21/2024 31  15 - 46 % Final    Ferritin 03/21/2024 22  6 - 175 ng/mL Final    Zinc, Serum/Plasma 03/21/2024 45.0 (L)  60.0 - 120.0 ug/dL Final    Comment: INTERPRETIVE INFORMATION: Zinc, Serum or Plasma    Elevated results may be due to skin or collection-related   contamination, including the use of a noncertified   metal-free collection/transport tube. If contamination   concerns exist due to elevated levels of serum/plasma zinc,   confirmation with a second specimen collected in a   certified metal-free tube is recommended.    Circulating zinc concentrations are dependent on albumin   status and are depressed with malnutrition.  Zinc may also   be lowered with infection, inflammation, stress, oral   contraceptives, and pregnancy.  Zinc may be elevated with   zinc supplementation or fasting.  Elevated zinc   concentrations may interfere with copper absorption.     This test was developed and its performance characteristics   determined by [facility]. It has not been cleared or   approved by the US Food and Drug Administration. This test   was performed in a CLIA certified laboratory and is   intended for clinical                            purposes.  Performed By: [facility]  [address] [place] [id number]  : Enrique Perez MD, PhD  CLIA Number: [id number]    Cholesterol 03/21/2024 149  <200 mg/dL Final    Triglycerides 03/21/2024 134  <150 mg/dL Final     Direct Measure HDL 03/21/2024 64  >=50 mg/dL Final    LDL Cholesterol Calculated 03/21/2024 58  <=100 mg/dL Final    Non HDL Cholesterol 03/21/2024 85  <130 mg/dL Final    Patient Fasting > 8hrs? 03/21/2024 Yes   Final    WBC Count 03/21/2024 11.7 (H)  4.0 - 11.0 10e3/uL Final    RBC Count 03/21/2024 3.32 (L)  3.80 - 5.20 10e6/uL Final    Hemoglobin 03/21/2024 10.1 (L)  11.7 - 15.7 g/dL Final    Hematocrit 03/21/2024 29.7 (L)  35.0 - 47.0 % Final    MCV 03/21/2024 90  78 - 100 fL Final    MCH 03/21/2024 30.4  26.5 - 33.0 pg Final    MCHC 03/21/2024 34.0  31.5 - 36.5 g/dL Final    RDW 03/21/2024 14.0  10.0 - 15.0 % Final    Platelet Count 03/21/2024 555 (H)  150 - 450 10e3/uL Final    % Neutrophils 03/21/2024 53  % Final    % Lymphocytes 03/21/2024 35  % Final    % Monocytes 03/21/2024 10  % Final    % Eosinophils 03/21/2024 1  % Final    % Basophils 03/21/2024 1  % Final    % Immature Granulocytes 03/21/2024 0  % Final    NRBCs per 100 WBC 03/21/2024 0  <1 /100 Final    Absolute Neutrophils 03/21/2024 6.1  1.6 - 8.3 10e3/uL Final    Absolute Lymphocytes 03/21/2024 4.2  0.8 - 5.3 10e3/uL Final    Absolute Monocytes 03/21/2024 1.2  0.0 - 1.3 10e3/uL Final    Absolute Eosinophils 03/21/2024 0.2  0.0 - 0.7 10e3/uL Final    Absolute Basophils 03/21/2024 0.1  0.0 - 0.2 10e3/uL Final    Absolute Immature Granulocytes 03/21/2024 0.0  <=0.4 10e3/uL Final    Absolute NRBCs 03/21/2024 0.0  10e3/uL Final   Lab on 12/14/2023   Component Date Value Ref Range Status    Lauric Acid C12 0 Test 12/14/2023 12  6 - 90 nmol/mL Final    Myristic Acid C14 0 Test 12/14/2023 90  30 - 450 nmol/mL Final    Hexadecenoic Acid Test 12/14/2023 32  25 - 105 nmol/mL Final    Palmitoleic Acid Test 12/14/2023 268  110 - 1130 nmol/mL Final    Palmitic Acid Test 12/14/2023 2114  1480 - 3730 nmol/mL Final    G Linolenic Acid Test 12/14/2023 33  16 - 150 nmol/mL Final    A linolenic Acid Test 12/14/2023 42 (L)  50 - 130 nmol/mL Final    Linoleic Acid  Test 12/14/2023 2503  2270 - 3850 nmol/mL Final    Oleic Acid Test 12/14/2023 2065  650 - 3500 nmol/mL Final    Vaccenic Acid Test 12/14/2023 186 (L)  280 - 740 nmol/mL Final    Stearic Acid Test 12/14/2023 779  590 - 1170 nmol/mL Final    EPA C20 5W3 Test 12/14/2023 22  14 - 100 nmol/mL Final    Arachidonic Acid Test 12/14/2023 829  520 - 1490 nmol/mL Final    [place] Acid Test 12/14/2023 24  7 - 30 nmol/mL Final    H G Linolenic Test 12/14/2023 157  50 - 250 nmol/mL Final    Arachidic Acid Test 12/14/2023 28 (L)  50 - 90 nmol/mL Final    DHA C22 6W3 Test 12/14/2023 98  30 - 250 nmol/mL Final    DPA C22 5W6 Test 12/14/2023 35  10 - 70 nmol/mL Final    DPA C22 5W3 Test 12/14/2023 41  20 - 210 nmol/mL Final    DTA C22 4W6 Test 12/14/2023 39  10 - 80 nmol/mL Final    Docosenoic Acid Test 12/14/2023 6  4 - 13 nmol/mL Final    Nervonic Acid Test 12/14/2023 104 (H)  60 - 100 nmol/mL Final    Triene Tetraene Ratio Test 12/14/2023 0.028  0.010 - 0.038 Final    Total Saturated Acid Test 12/14/2023 3.1  2.5 - 5.5 mmol/L Final    Total Monounsat Acid Test 12/14/2023 2.7  1.3 - 5.8 mmol/L Final    Total Polyunsat Acid Test 12/14/2023 3.8  3.2 - 5.8 mmol/L Final    Total W3 Test 12/14/2023 0.2  0.2 - 0.5 mmol/L Final    Total W6 Test 12/14/2023 3.6  3.0 - 5.4 mmol/L Final    Total Fatty Acids Test 12/14/2023 9.6  7.3 - 16.8 mmol/L Final    Fatty Acid Interpretation 12/14/2023 SEE NOTE   Final    In this sample, the concentration of alpha-linolenic acid   was reduced. This finding could be suggestive of a   nutritional deficiency of essential fatty acids.     -------------------ADDITIONAL INFORMATION-------------------  Gas Chromatography-Mass Spectrometry (GC-MS) Stable Isotope   Dilution Analysis  This test was developed and its performance characteristics   determined by [facility] in a manner consistent with CLIA   requirements. This test has not been cleared or approved by   the U.S. Food and Drug Administration.     Test  Performed by:  [address] [address]  : Gerald Coley M.D. Ph.D.; CLIA# [id number]    C Peptide 12/14/2023 <0.1 (L)  0.9 - 6.9 ng/mL Final    Hemoglobin A1C 12/14/2023 6.6 (H)  <5.7 % Final    Normal <5.7%   Prediabetes 5.7-6.4%    Diabetes 6.5% or higher     Note: Adopted from ADA consensus guidelines.    Vitamin A 12/14/2023 0.53  0.30 - 1.20 mg/L Final    Retinol Palmitate 12/14/2023 <0.02  0.00 - 0.10 mg/L Final    Vitamin A Interp 12/14/2023 Normal   Final      This test was developed and its performance characteristics   determined by [facility]. It has not been cleared or   approved by the US Food and Drug Administration. This test   was performed in a CLIA certified laboratory and is   intended for clinical purposes.  Performed By: [facility]  [address] [place] [id number]  : Enrique Perez MD, PhD  CLIA Number: [id number]    Vitamin B12 12/14/2023 644  232 - 1,245 pg/mL Final    Vitamin E 12/14/2023 10.0  5.5 - 18.0 mg/L Final      This test was developed and its performance characteristics   determined by [facility]. It has not been cleared or   approved by the US Food and Drug Administration. This test   was performed in a CLIA certified laboratory and is   intended for clinical purposes.    Vitamin E Gamma 12/14/2023 0.4  0.0 - 6.0 mg/L Final    Performed By: [facility]  [address] [place] [id number]  : Enrique Perez MD, PhD  CLIA Number: [id number]    25 [place] Vitamin D2 12/14/2023 <5  ug/L Final    25 [place] Vitamin D3 12/14/2023 59  ug/L Final    25 OH Vit D Total 12/14/2023 <64  20 - 75 ug/L Final    Season, race, dietary intake, and treatment affect the concentration of 25-hydroxy-Vitamin D. Values may decrease during winter months and increase during summer months. Values 20-29 ug/L may indicate Vitamin D insufficiency and  values <20 ug/L may indicate Vitamin D deficiency.    Sodium 12/14/2023 140  135 - 145 mmol/L Final    Reference intervals for this test were updated on 09/26/2023 to more accurately reflect our healthy population. There may be differences in the flagging of prior results with similar values performed with this method. Interpretation of those prior results can be made in the context of the updated reference intervals.     Potassium 12/14/2023 3.8  3.4 - 5.3 mmol/L Final    Carbon Dioxide (CO2) 12/14/2023 22  22 - 29 mmol/L Final    Anion Gap 12/14/2023 13  7 - 15 mmol/L Final    Urea Nitrogen 12/14/2023 14.8  6.0 - 20.0 mg/dL Final    Creatinine 12/14/2023 0.72  0.51 - 0.95 mg/dL Final    GFR Estimate 12/14/2023 >90  >60 mL/min/1.73m2 Final    Calcium 12/14/2023 9.5  8.6 - 10.0 mg/dL Final    Chloride 12/14/2023 105  98 - 107 mmol/L Final    Glucose 12/14/2023 88  70 - 99 mg/dL Final    Alkaline Phosphatase 12/14/2023 85  40 - 150 U/L Final    Reference intervals for this test were updated on 11/14/2023 to more accurately reflect our healthy population. There may be differences in the flagging of prior results with similar values performed with this method. Interpretation of those prior results can be made in the context of the updated reference intervals.    AST 12/14/2023 37  0 - 45 U/L Final    Reference intervals for this test were updated on 6/12/2023 to more accurately reflect our healthy population. There may be differences in the flagging of prior results with similar values performed with this method. Interpretation of those prior results can be made in the context of the updated reference intervals.    ALT 12/14/2023 35  0 - 50 U/L Final    Reference intervals for this test were updated on 6/12/2023 to more accurately reflect our healthy population. There may be differences in the flagging of prior results with similar values performed with this method. Interpretation of those prior results can be made in  the context of the updated reference intervals.      Protein Total 12/14/2023 7.1  6.4 - 8.3 g/dL Final    Albumin 12/14/2023 4.3  3.5 - 5.2 g/dL Final    Bilirubin Total 12/14/2023 0.3  <=1.2 mg/dL Final    Prealbumin 12/14/2023 17.5 (L)  20.0 - 40.0 mg/dL Final    Iron 12/14/2023 51  37 - 145 ug/dL Final    Iron Binding Capacity 12/14/2023 304  240 - 430 ug/dL Final    Iron Sat Index 12/14/2023 17  15 - 46 % Final    Ferritin 12/14/2023 22  6 - 175 ng/mL Final    Zinc, Serum/Plasma 12/14/2023 113.9  60.0 - 120.0 ug/dL Final    Comment: INTERPRETIVE INFORMATION: Zinc, Serum or Plasma    Elevated results may be due to skin or collection-related   contamination, including the use of a noncertified   metal-free collection/transport tube. If contamination   concerns exist due to elevated levels of serum/plasma zinc,   confirmation with a second specimen collected in a   certified metal-free tube is recommended.    Circulating zinc concentrations are dependent on albumin   status and are depressed with malnutrition.  Zinc may also   be lowered with infection, inflammation, stress, oral   contraceptives, and pregnancy.  Zinc may be elevated with   zinc supplementation or fasting.  Elevated zinc   concentrations may interfere with copper absorption.     This test was developed and its performance characteristics   determined by [facility]. It has not been cleared or   approved by the US Food and Drug Administration. This test   was performed in a CLIA certified laboratory and is   intended for clinical                            purposes.  Performed By: [facility]  [address] [place] [id number]  : Enrique Perez MD, PhD  CLIA Number: [id number]    Cholesterol 12/14/2023 127  <200 mg/dL Final    Triglycerides 12/14/2023 90  <150 mg/dL Final    Direct Measure HDL 12/14/2023 62  >=50 mg/dL Final    LDL Cholesterol Calculated 12/14/2023 47  <=100 mg/dL Final    Non HDL  Cholesterol 12/14/2023 65  <130 mg/dL Final    Patient Fasting > 8hrs? 12/14/2023 Yes   Final    WBC Count 12/14/2023 8.6  4.0 - 11.0 10e3/uL Final    RBC Count 12/14/2023 4.08  3.80 - 5.20 10e6/uL Final    Hemoglobin 12/14/2023 12.0  11.7 - 15.7 g/dL Final    Hematocrit 12/14/2023 34.6 (L)  35.0 - 47.0 % Final    MCV 12/14/2023 85  78 - 100 fL Final    MCH 12/14/2023 29.4  26.5 - 33.0 pg Final    MCHC 12/14/2023 34.7  31.5 - 36.5 g/dL Final    RDW 12/14/2023 14.5  10.0 - 15.0 % Final    Platelet Count 12/14/2023 623 (H)  150 - 450 10e3/uL Final    % Neutrophils 12/14/2023 53  % Final    % Lymphocytes 12/14/2023 34  % Final    % Monocytes 12/14/2023 10  % Final    % Eosinophils 12/14/2023 2  % Final    % Basophils 12/14/2023 1  % Final    % Immature Granulocytes 12/14/2023 0  % Final    NRBCs per 100 WBC 12/14/2023 0  <1 /100 Final    Absolute Neutrophils 12/14/2023 4.5  1.6 - 8.3 10e3/uL Final    Absolute Lymphocytes 12/14/2023 3.0  0.8 - 5.3 10e3/uL Final    Absolute Monocytes 12/14/2023 0.9  0.0 - 1.3 10e3/uL Final    Absolute Eosinophils 12/14/2023 0.2  0.0 - 0.7 10e3/uL Final    Absolute Basophils 12/14/2023 0.1  0.0 - 0.2 10e3/uL Final    Absolute Immature Granulocytes 12/14/2023 0.0  <=0.4 10e3/uL Final    Absolute NRBCs 12/14/2023 0.0  10e3/uL Final    GLUCOSE BY METER POCT 12/14/2023 95  70 - 99 mg/dL Final    Glucose 12/14/2023 84  70 - 99 mg/dL Final    Patient Fasting > 8hrs? 12/14/2023 No   Final    C Peptide 12/14/2023 <0.1 (L)  0.9 - 6.9 ng/mL Final    Glucose 12/14/2023 104 (H)  70 - 99 mg/dL Final    Patient Fasting > 8hrs? 12/14/2023 Yes   Final    C Peptide 12/14/2023 <0.1 (L)  0.9 - 6.9 ng/mL Final   Lab on 10/05/2023   Component Date Value Ref Range Status    Sodium 10/05/2023 137  135 - 145 mmol/L Final    Reference intervals for this test were updated on 09/26/2023 to more accurately reflect our healthy population. There may be differences in the flagging of prior results with similar values  performed with this method. Interpretation of those prior results can be made in the context of the updated reference intervals.     Potassium 10/05/2023 4.4  3.4 - 5.3 mmol/L Final    Carbon Dioxide (CO2) 10/05/2023 29  22 - 29 mmol/L Final    Anion Gap 10/05/2023 9  7 - 15 mmol/L Final    Urea Nitrogen 10/05/2023 11.4  6.0 - 20.0 mg/dL Final    Creatinine 10/05/2023 0.59  0.51 - 0.95 mg/dL Final    GFR Estimate 10/05/2023 >90  >60 mL/min/1.73m2 Final    Calcium 10/05/2023 9.7  8.6 - 10.0 mg/dL Final    Chloride 10/05/2023 99  98 - 107 mmol/L Final    Glucose 10/05/2023 77  70 - 99 mg/dL Final    Alkaline Phosphatase 10/05/2023 84  35 - 104 U/L Final    AST 10/05/2023 19  0 - 45 U/L Final    Reference intervals for this test were updated on 6/12/2023 to more accurately reflect our healthy population. There may be differences in the flagging of prior results with similar values performed with this method. Interpretation of those prior results can be made in the context of the updated reference intervals.    ALT 10/05/2023 11  0 - 50 U/L Final    Reference intervals for this test were updated on 6/12/2023 to more accurately reflect our healthy population. There may be differences in the flagging of prior results with similar values performed with this method. Interpretation of those prior results can be made in the context of the updated reference intervals.      Protein Total 10/05/2023 6.4  6.4 - 8.3 g/dL Final    Albumin 10/05/2023 3.7  3.5 - 5.2 g/dL Final    Bilirubin Total 10/05/2023 <0.2  <=1.2 mg/dL Final    WBC Count 10/05/2023 14.8 (H)  4.0 - 11.0 10e3/uL Final    RBC Count 10/05/2023 3.04 (L)  3.80 - 5.20 10e6/uL Final    Hemoglobin 10/05/2023 9.2 (L)  11.7 - 15.7 g/dL Final    Hematocrit 10/05/2023 28.0 (L)  35.0 - 47.0 % Final    MCV 10/05/2023 92  78 - 100 fL Final    MCH 10/05/2023 30.3  26.5 - 33.0 pg Final    MCHC 10/05/2023 32.9  31.5 - 36.5 g/dL Final    RDW 10/05/2023 14.6  10.0 - 15.0 % Final     Platelet Count 10/05/2023 1,093 (HH)  150 - 450 10e3/uL Final    % Neutrophils 10/05/2023 66  % Final    % Lymphocytes 10/05/2023 20  % Final    % Monocytes 10/05/2023 10  % Final    % Eosinophils 10/05/2023 2  % Final    % Basophils 10/05/2023 1  % Final    % Immature Granulocytes 10/05/2023 1  % Final    NRBCs per 100 WBC 10/05/2023 0  <1 /100 Final    Absolute Neutrophils 10/05/2023 9.9 (H)  1.6 - 8.3 10e3/uL Final    Absolute Lymphocytes 10/05/2023 3.0  0.8 - 5.3 10e3/uL Final    Absolute Monocytes 10/05/2023 1.4 (H)  0.0 - 1.3 10e3/uL Final    Absolute Eosinophils 10/05/2023 0.3  0.0 - 0.7 10e3/uL Final    Absolute Basophils 10/05/2023 0.1  0.0 - 0.2 10e3/uL Final    Absolute Immature Granulocytes 10/05/2023 0.1  <=0.4 10e3/uL Final    Absolute NRBCs 10/05/2023 0.0  10e3/uL Final   Lab on 09/21/2023   Component Date Value Ref Range Status    Color Urine 09/21/2023 Yellow  Colorless, Straw, Light Yellow, Yellow Final    Appearance Urine 09/21/2023 Slightly Cloudy (A)  Clear Final    Glucose Urine 09/21/2023 Negative  Negative mg/dL Final    Bilirubin Urine 09/21/2023 Negative  Negative Final    Ketones Urine 09/21/2023 Negative  Negative mg/dL Final    Specific Gravity Urine 09/21/2023 1.021  1.003 - 1.035 Final    Blood Urine 09/21/2023 Negative  Negative Final    pH Urine 09/21/2023 7.0  5.0 - 7.0 Final    Protein Albumin Urine 09/21/2023 10 (A)  Negative mg/dL Final    Urobilinogen Urine 09/21/2023 Normal  Normal, 2.0 mg/dL Final    Nitrite Urine 09/21/2023 Negative  Negative Final    Leukocyte Esterase Urine 09/21/2023 Negative  Negative Final    Mucus Urine 09/21/2023 Present (A)  None Seen /LPF Final    Amorphous Crystals Urine 09/21/2023 Few (A)  None Seen /HPF Final    RBC Urine 09/21/2023 2  <=2 /HPF Final    WBC Urine 09/21/2023 <1  <=5 /HPF Final    Squamous Epithelials Urine 09/21/2023 1  <=1 /HPF Final    Creatinine Urine mg/dL 09/21/2023 163.0  mg/dL Final    The reference ranges have not been  established in urine creatinine. The results should be integrated into the clinical context for interpretation.    Albumin Urine mg/L 09/21/2023 <12.0  mg/L Final    The reference ranges have not been established in urine albumin. The results should be integrated into the clinical context for interpretation.    Albumin Urine mg/g Cr 09/21/2023    Final    Unable to calculate, urine albumin and/or urine creatinine is outside detectable limits.  Microalbuminuria is defined as an albumin:creatinine ratio of 17 to 299 for males and 25 to 299 for females. A ratio of albumin:creatinine of 300 or higher is indicative of overt proteinuria.  Due to biologic variability, positive results should be confirmed by a second, first-morning random or 24-hour timed urine specimen. If there is discrepancy, a third specimen is recommended. When 2 out of 3 results are in the microalbuminuria range, this is evidence for incipient nephropathy and warrants increased efforts at glucose control, blood pressure control, and institution of therapy with an angiotensin-converting-enzyme (ACE) inhibitor (if the patient can tolerate it).      INR 09/21/2023 1.13  0.85 - 1.15 Final    Vitamin E 09/21/2023 6.6  5.5 - 18.0 mg/L Final      This test was developed and its performance characteristics   determined by [facility]. It has not been cleared or   approved by the US Food and Drug Administration. This test   was performed in a CLIA certified laboratory and is   intended for clinical purposes.    Vitamin E Gamma 09/21/2023 1.3  0.0 - 6.0 mg/L Final    Performed By: [facility]  [address] [place] [address]  : Enrique Perez MD, PhD  CLIA Number: [id number]    Vitamin A 09/21/2023 0.77  0.30 - 1.20 mg/L Final    Retinol Palmitate 09/21/2023 <0.02  0.00 - 0.10 mg/L Final    Vitamin A Interp 09/21/2023 Normal   Final      This test was developed and its performance characteristics   determined by  [facility]. It has not been cleared or   approved by the US Food and Drug Administration. This test   was performed in a CLIA certified laboratory and is   intended for clinical purposes.  Performed By: [facility]  [address] [place] [id number]  : Enrique Perez MD, PhD  CLIA Number: [id number]    25 OH Vitamin D2 09/21/2023 <5  ug/L Final    25 OH Vitamin D3 09/21/2023 55  ug/L Final    25 OH Vit D Total 09/21/2023 <60  20 - 75 ug/L Final    Season, race, dietary intake, and treatment affect the concentration of 25-hydroxy-Vitamin D. Values may decrease during winter months and increase during summer months. Values 20-29 ug/L may indicate Vitamin D insufficiency and values <20 ug/L may indicate Vitamin D deficiency.    Cholesterol 09/21/2023 144  <200 mg/dL Final    Triglycerides 09/21/2023 73  <150 mg/dL Final    Direct Measure HDL 09/21/2023 54  >=50 mg/dL Final    LDL Cholesterol Calculated 09/21/2023 75  <=100 mg/dL Final    Non HDL Cholesterol 09/21/2023 90  <130 mg/dL Final    Prealbumin 09/21/2023 24  15 - 45 mg/dL Final    WBC Count 09/21/2023 14.1 (H)  4.0 - 11.0 10e3/uL Final    RBC Count 09/21/2023 4.02  3.80 - 5.20 10e6/uL Final    Hemoglobin 09/21/2023 11.9  11.7 - 15.7 g/dL Final    Hematocrit 09/21/2023 35.2  35.0 - 47.0 % Final    MCV 09/21/2023 88  78 - 100 fL Final    MCH 09/21/2023 29.6  26.5 - 33.0 pg Final    MCHC 09/21/2023 33.8  31.5 - 36.5 g/dL Final    RDW 09/21/2023 14.0  10.0 - 15.0 % Final    Platelet Count 09/21/2023 431  150 - 450 10e3/uL Final    % Neutrophils 09/21/2023 68  % Final    % Lymphocytes 09/21/2023 22  % Final    % Monocytes 09/21/2023 8  % Final    % Eosinophils 09/21/2023 1  % Final    % Basophils 09/21/2023 1  % Final    % Immature Granulocytes 09/21/2023 0  % Final    NRBCs per 100 WBC 09/21/2023 0  <1 /100 Final    Absolute Neutrophils 09/21/2023 9.8 (H)  1.6 - 8.3 10e3/uL Final    Absolute Lymphocytes 09/21/2023 3.0   0.8 - 5.3 10e3/uL Final    Absolute Monocytes 09/21/2023 1.1  0.0 - 1.3 10e3/uL Final    Absolute Eosinophils 09/21/2023 0.1  0.0 - 0.7 10e3/uL Final    Absolute Basophils 09/21/2023 0.1  0.0 - 0.2 10e3/uL Final    Absolute Immature Granulocytes 09/21/2023 0.0  <=0.4 10e3/uL Final    Absolute NRBCs 09/21/2023 0.0  10e3/uL Final    ABO/RH(D) 09/21/2023 A POS   Final    Antibody Screen 09/21/2023 Negative  Negative Final    SPECIMEN EXPIRATION DATE 09/21/2023 [id number]   Final     Most recent EKG from 9/22/2023 reviewed. QTc interval 460.      Family History:   Patient reported family history includes: No family history on file.  Mental Illness History: Unknown  Substance Abuse History: Yes: Father struggled with drug use  Suicide History: Denies  Medications: Unknown     Social History:    Social History                [per patient report]   Financial- What are your current financial sources?: parents;county assistance;partner, Does your finances cause stress?: does  Employment- What is your employment status?: unemployed, If you work in a paying job or as a volunteer, describe the job and how long you have held it: : I do not Did you serve in the ?: did not  Living situation- What is your housing situation?: staying in own home/apartment  Feels safe at home- Yes  Household / family- Name: Yisel Burger, Age: 56, Relationship: Mother, Living in same house?: no, Name: Sandra Burger, Age: 37, Relationship: Sister, Living in same house?: no  Relationships- What is your current relationship status? : has a partner or significant other, What is your sexual orientation?: bi-sexual  Children- Do you have children?: no  Social/spiritual support- Who are the most supportive people in your life?  : partner;mother;pets;friends;therapist  Cultural- What is your cultural background? : , What are ethnic, cultural, or Denominational influences that may be useful to know about you (for example history of experiencing  discrimination, growing up rural/urban, valuing culturally specific treatments)?  : My family was never very Gnosticism, I am currently agnostic, What is your preferred language?  : English  Education- What is your highest education? : some college  Early history- Where did you grow up?: other, If other, please list below:: [facility], Who took care of you as a child?: biological mother  Raised by- How would you describe your parent's relationships?:  / , How old were you when this happened?: 7  Siblings- Do you have siblings?: yes, How many full siblings do you have?: 1  Quality of family relationships- How would you describe your current family relationships?: stable and meaningful  Legal- Have you been involved with the legal system (child custody, order for protection, DWI, etc.)?: have not, Do you have a ?  : does not      Firearms/Weapons Access: No: Patient denies   Service: No    Legal History:  No: Patient denies any legal history    Significant Losses / Trauma / Abuse / Neglect Issues:  There are indications or report of significant loss, trauma, abuse or neglect issues related to: See HPI and social history above for pertinent information .   Issues of possible neglect are not present.     Comprehensive Examination (limited due to virtual visit format, phone/video):  Vital Signs:  Vitals: There were no vitals taken for this visit.  General/Constitutional:  Appearance: awake, alert, adequately groomed, appeared stated age and no apparent distress  Attitude:  cooperative, pleasant  Eye Contact:  good  Musculoskeletal:  Muscle Strength and Tone: no gross abnormalities by observation  Psychomotor Behavior:  no evidence of tardive dyskinesia, dystonia, or tics  Gait and Station: normal, no gross abnormalities noted by observation  Psychiatric:  Speech:  clear, coherent, regular rate, rhythm, and volume  Associations:  no loose associations  Thought Process:  logical,  linear and goal oriented  Thought Content:  no evidence of suicidal ideation or homicidal ideation, no evidence of psychotic thought, no auditory hallucinations present and no visual hallucinations present  Mood:  anxious and depressed  Affect:  appropriate and in normal range and mood congruent  Insight:  fair  Judgment:  intact, adequate for safety  Impulse Control:  intact  Neurological:  Oriented to:  person, place, time, and situation  Attention Span and Concentration:  normal  Language: intact  Recent and Remote Memory:  Intact to interview. Not formally assessed. No amnesia.  Fund of Knowledge: appropriate    Strengths and Opportunities Per [facility] [place] [facility], during today's team-based visit:  Patient identified the following strengths or resources that will help them succeed in treatment: friends / good social support, family support, insight, intelligence, and sober support group / recovery support . Things that may interfere with the patient's success in treatment include: physical health concerns.     Suicide Risk Assessment:  Today Artie Burger reports no suicidal ideation. Based on all available evidence including the factors cited above, Artie Burger does not appear to be at imminent risk for self-harm, does not meet criteria for a 72-hr hold, and therefore remains appropriate for ongoing outpatient level of care.  A thorough assessment of risk factors related to suicide and self-harm have been reviewed and are noted above. The patient convincingly denies acute suicidality on several occasions. Local community safety resources were provided for patient to use if needed. There was no deceit detected, and the patient presented in a manner that was believable.     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 3/2023: gabapentin up to 600-900mg TID became ineffective, switched to lyrica early March.  Initial dose 50mg BID, increase to 100mg BID after 2 weeks, then increase to 100mg TID after 2 weeks (if needed).      Iron deficiency anemia secondary to inadequate dietary iron intake 10/28/2021     Priority: Medium     Formatting of this note might be different from the original. 10/28/2021 hgb 10.6, iron 32.  Trial iron BID-if intolerant infusions.  Symptomatic with fatigue.      Chronic pancreatitis (H) 08/25/2021     Priority: Medium     Added automatically from request for surgery [id number]      Osteopenia of hip 08/10/2021     Priority: Medium     Formatting of this note might be different from the original. DEXA 8/2021: normal l spine, hip T -1.8.  Recommended calcium/vit D supplementation.  Repeat DEXA 1-2 years.      Insomnia 07/07/2021     Priority: Medium     Formatting of this note might be different from the original. 7/7/2021 No issue with sleep onset, struggles at times with sleep maintenance. 9/8/2021 Rare PRN quetiapine stops \"head from racing\", effective Failed: benadryl-\"anxiety inducing,\" trazodone \"vivid dreams and anxiety " "inducing.\"      Current moderate episode of major depressive disorder (H) 01/24/2019     Priority: Medium     Formatting of this note might be different from the original. Previous tx: prozac 7/7/2021: doing well, decreasing cymbalta from 60 to 30mg 8/10/2021: Increased Cymbalta back to 60 mg.  Stressors with home ownership and her ongoing medical issues along with college course work.      Panic attacks 01/24/2019     Priority: Medium    Alcohol abuse, in remission 07/25/2018     Priority: Medium     Formatting of this note might be different from the original. 8/21/2020         Impression:  Artie Burger is a 28-year-old with past psychiatric history including depression, anxiety, trauma, alcohol dependence with medical complications who presents today for psychiatric evaluation.  Patient with history of mental health symptoms dating back to childhood/adolescence.  Patient has been on handful of different psychiatric medications to help with symptoms.  Patient's history was complicated by alcohol use disorder affecting pancreas and leading to multiple surgeries.  Patient has not been sober from alcohol for 4 years.  Denies any current need for chemical dependency resources.  Denies any major alcohol cravings.  Patient reports some symptoms consistent with possible ADHD diagnosis.  Given comorbid mental health concerns and struggles referral for psychological testing has been placed.  Patient is hopeful for some additional motivation and energy.  We will replace her sertraline with Wellbutrin XL.  We will continue buspirone for anxiety.  Patient on gabapentin for pain and also possibly helping with anxiety and decreased alcohol cravings.  Patient is using lorazepam quite frequently, every 2-3 days per patient report.  Next refill will be for only 10 tablets to last 30 days at a time.  We will also increase propranolol some to take as needed to hopefully lessen reliance on lorazepam.  Could also consider a future " increase of gabapentin.  Patient with IUD to protect against pregnancy.  Psychotherapy encouraged.  Denies that medicinal cannabis use causing any problems.    -Ativan 0.5 mg daily, sometimes twice daily: 1 every few days, still works (gets from primary care provider)   -Sertraline/Zoloft - 50 mg daily (was up to 200 mg daily but decreased due to activation), has been decreasing slowly and no worsening of sxs  -Buspar 10 mg daily -was taking twice daily: helpful for some day to day anxiety, up to 15 mg twice daily  -Gabapentin 600 mg twice daily: for pain and anxiety  -Propranolol 10 mg daily as needed for anxiety (sometimes uses, brings heart rate down, every couple weeks uses)          Medication side effects and alternatives reviewed. Health promotion activities recommended and reviewed today. All questions addressed. Education and counseling completed regarding risks and benefits of medications and psychotherapy options. Recommend therapy for additional support.     Treatment Plan:  Continue BuSpar/buspirone 10 mg daily for anxiety.  Continue lorazepam/Ativan 0.5 mg daily as needed for panic attacks/severe anxiety only.  Refilled for 10 tablets per 30 days only.  Continue to try to lessen reliance on lorazepam.  Continue gabapentin 600 mg twice daily for pain and anxiety-as prescribed by other prescriber.  Increase propranolol up to 10-30 mg twice daily as needed for anxiety symptoms.  Discontinue sertraline  Start Wellbutrin  mg daily for depression.  After 1-2 weeks on 150 mg okay to increase up to 300 mg daily.  Referral placed for ADHD testing.  See below for additional external testing options if wait is too long internally within [facility].  Continue therapy as planned.  Continue all other cares per primary care provider.   Continue all other medications as reviewed per electronic medical record today.   Safety plan reviewed. To the Emergency Department as needed or call after hours crisis  "line at [phone] or [phone]. [place] Crisis Text Line: Text [place] to 977463  or  Suicide LifeLine Chat: suicidepreventionlifeline.org/chat  Schedule an appointment with me in 4-6 weeks or sooner as needed.  Call [facility] at [phone] to schedule.  Follow up with primary care provider as planned or sooner if needed for acute medical concerns.  Call the psychiatric nurse line with medication questions or concerns at [phone].  ClearApphart may be used to communicate with your provider, but this is not intended to be used for emergencies.    Patient Education:  Risks of benzodiazepine (Ativan, Xanax, Klonopin, Valium, etc) use including, but not limited to, sedation, tolerance, risk for addiction/dependence. Do not drink alcohol while taking benzodiazepines due to risk of trouble breathing and potential death. Do not drive or operate heavy machinery until it is known how the drug affects you. Discuss with physician or pharmacist before ever taking a benzodiazepine with a narcotic/opioid pain medication.     Get Out of Your Mind & Into Your Life   By Troy Prcie    The Happiness Trap: How to Stop Struggling and Start Living  By Siddharth Horne    The Reality Slap: Finding Peace & Fulfillment When Life Hurts  By Siddharth Horne    Things Might Go Terribly, Horribly Wrong: A Guide to Life Liberated from Anxiety  By Shirley Layton, PhD    Stress Less, Live More: How Acceptance and Commitment Therapy Can Help You Live a Busy Yet Balanced Life  By Yves Hadley    Finding Life Beyond Trauma: Using Acceptance and Commitment Therapy to Heal From Post-Traumatic Stress and Trauma-Related Problems  By Marii Tang and Emerald Reyes    Other ACT Skills References     Siddharth Horne on [employer] - Demonstrates several different skills to deal with difficult thoughts and feelings     Book:  \"A Liberated Mind: How to Pivot Toward What Matters\" by Troy Price     Psychological flexibility: How love " turns pain into purpose  Tedx Talk by Dr. Price discussing his struggle with anxiety and panic disorder which motivated him to develop ACT therapy (Acceptance and Commitment Therapy)     You Are Not Your Thoughts    Additional ADHD testing locations outside of [place]:  [facility] [place]  [email or web address]    [facility]  [email or web address]  Child-Adult ADHD/Autism/[place]    [facility]  [email or web address]     [facility] [place]  [email or web address]    [facility]  [email or web address]    Care team has reviewed attendance agreement with patient. Patient advised that two failed appointments within 6 months may lead to termination of current episode of care.     Community Resources:    National Suicide Prevention Lifeline: 790.562.7390 (TTY: 847.607.4104). Call anytime for help.  (www.suicidepreventionlifeline.org)  National [place] [employer] Mental Illness (www.bryan.org): 795.762.2549 or 513-658-6179.   Mental Health Association (www.mentalhealth.org): 482.248.9558 or 650-676-1092.  [place] Crisis Text Line: Text [place] to 088488  Suicide LifeLine Chat: suicideHeart Buddy.org/chat    Administrative Billing:   Phone Call/Video Duration: 21 Minutes  Start: 9:31a  Stop: 9:52a    The longitudinal plan of care for the diagnosis(es)/condition(s) as documented were addressed during this visit. Due to the added complexity in care, I will continue to support Artie in the subsequent management and with ongoing continuity of care.    Episode of Care #: 1    Patient Status:  Patient will continue to be seen for ongoing consultation and stabilization.    Signed:   Agnes Singleton DO  [facility]    Disclaimer: This note consists of symbols derived from keyboarding, dictation and/or voice recognition software. As a result, there may be errors in the script that have gone undetected. Please consider this when interpreting information found in  this chart.

## 2024-08-07 NOTE — PROGRESS NOTES
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 Credentials:  Daina Daniel MA [facility]  August 7, 2024

## 2024-08-14 ENCOUNTER — VIRTUAL VISIT (OUTPATIENT)
Dept: EDUCATION SERVICES | Facility: CLINIC | Age: 29
End: 2024-08-14
Payer: COMMERCIAL

## 2024-08-14 DIAGNOSIS — E13.9 POST-PANCREATECTOMY DIABETES (H): Primary | ICD-10-CM

## 2024-08-14 DIAGNOSIS — Z90.410 POST-PANCREATECTOMY DIABETES (H): Primary | ICD-10-CM

## 2024-08-14 DIAGNOSIS — E10.9 TYPE 1 DIABETES MELLITUS (H): ICD-10-CM

## 2024-08-14 DIAGNOSIS — E89.1 POST-PANCREATECTOMY DIABETES (H): Primary | ICD-10-CM

## 2024-08-14 PROCEDURE — 99207 PR NO BILLABLE SERVICE THIS VISIT: CPT | Mod: 95

## 2024-08-14 NOTE — NURSING NOTE
Is the patient currently in the state of [place]? YES    Visit mode:VIDEO    If the visit is dropped, the patient can be reconnected by: VIDEO VISIT: Send to e-mail at: [email or web address]    Will anyone else be joining the visit? NO  (If patient encounters technical issues they should call [phone]    How would you like to obtain your AVS? MyChart    Are changes needed to the allergy or medication list? No    Are refills needed on medications prescribed by this physician? NO    Reason for visit: Follow Up    Tori AGUSTIN

## 2024-08-14 NOTE — PROGRESS NOTES
Virtual Visit Details    Type of service:  Video Visit   Video Start Time:  10a  Video End Time: 10:15 a    Originating Location (pt. Location): Home    Distant Location (provider location):  On-site  Platform used for Video Visit: [employer]      Diabetes Self-Management Education & Support Virtual Visit- Short    Artie Burger contacted today for education related to Post Pancreatectomy Diabetes    Patient is being treated with:  Omnipod 5 Insulin Pump    Referring Provider: Caro Cormier PA-C      Purpose of today's visit:  Recheck pump      Subjective/Objective:                  Assessment/Plan:  TIR has increased since last adjustment.  Will change correct above from 140 to 130 today to see if this helps  She is still enjoying the pump and feels like it has improved quality of life  Follow-up:  9/4/24  Time spent in this visit: 15 minutes (no charge)    Any diabetes medication dose changes were made via the CDE Protocol and Collaborative Practice Agreement. A copy of this encounter was provided to the patient's referring provider.

## 2024-08-19 ENCOUNTER — MYC MEDICAL ADVICE (OUTPATIENT)
Dept: PSYCHIATRY | Facility: CLINIC | Age: 29
End: 2024-08-19
Payer: COMMERCIAL

## 2024-08-19 DIAGNOSIS — F41.1 GAD (GENERALIZED ANXIETY DISORDER): ICD-10-CM

## 2024-08-19 DIAGNOSIS — F33.1 MODERATE EPISODE OF RECURRENT MAJOR DEPRESSIVE DISORDER (H): Primary | ICD-10-CM

## 2024-08-19 RX ORDER — SERTRALINE HYDROCHLORIDE 25 MG/1
25 TABLET, FILM COATED ORAL DAILY
Qty: 90 TABLET | Refills: 0 | Status: SHIPPED | OUTPATIENT
Start: 2024-08-19 | End: 2024-09-06 | Stop reason: ALTCHOICE

## 2024-08-30 NOTE — PROGRESS NOTES
Diabetes Consult Note        Artie Burger  is a 28 year old female who has a past medical history of Alcohol-induced chronic pancreatitis (H), Anxiety, Depression, Gastroesophageal reflux disease, Pancreatic disease, PONV (postoperative nausea and vomiting), and Type 1 diabetes mellitus (H). She is here for follow-up of diabetes.      I met her in October 2023 and last saw her in January 2024 when she was doing quite well requiring less insulin and reduced her doses   Including carbohydrate coverage to 1 unit per 25 g and sensitivity to 1 unit per 65 mg/dL.  She was referred to Dm ed and has seen TASHIA Graff, last on 3/27/24.  She was counting carbs but not giving correction.  They reviewed that.  She was hospitalized earlier this month and discharged with  instructions to give 1 unit : 10 g carb and 1u: 40 >140 with meals together with 14 units Lantus qam and 10 qpm.        When I last saw Brittany in April 2024 she was struggling with a lot of hypoglycemia and we discussed the possibility of transitioning to OmniPod 5.  Since that point in time she has worked with Mildred Graff to transition to OmniPod 5.  She also continues to work with psychologist Daina Daniel.          9/5/2024    10:04 AM   including   1. Since your last visit to our clinic (or if this your first visit, since you last saw your primary care provider), have you experienced any of the following symptoms that may be related to low blood sugars? Weakness    Sweating that wasn't due to exertion    Extreme hunger    Lightheadedness   2. Since your last visit to our clinic (or if this your first visit, since you last saw your primary care provider), have you experienced any of the following symptoms that may be related to prolonged high blood sugars? No, I have not experienced any of these symptoms   4. Do you have any female family members who have had heart attacks or strokes before age 60 or male relatives who have had heart attacks  or strokes before age 50? No   5. Do you have any family members who have had complications from diabetes such as kidney disease, heart disease or strokes, retinopathy (a vision problem), or amputations? No   6. Who do you live with?  Alone   Little interest or pleasure in doing things? Several days   Feeling down, depressed, or hopeless? Several days   10.  Are you considering a pregnancy or interested in discussing pregnancy prevention today? No        Today:  Artie feels that things are going well.  She is liking the OmniPod as she feels it is more accurate and she appreciates less pokes and feels she is having a lot less low blood sugars than she was previously.  Her pain has improved and she is eating better and has been able to put on some weight.  She does remember to take her Creon more often.  She is not struggling with low mood but she did have an appointment with psychiatry in early August and earlier this month and they are tweaking her meds she does feel things are getting better.  She continues to see a therapist weekly and they have started EMDR for some past trauma.  She has done this before so knows things may get worse before they get better and has resources that she will lean on if needed.  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  She does not have any particular questions or concerns today.  She is not having any difficulties with vision or otherwise.  She has noted that she needs to give more bolus for liquid carbohydrates.  She lives with her cat.    Current Diabetes Medications:  Insulin via OmniPod pump.        We reviewed glucometer/CGMS data together.  It revealed:    Blood sugar that is 23% in range and 2% low, which is a marked improvement and reduction of low and increase in time in range.  She is currently using 21.6 units of insulin per day and giving 7.4 bolus per day.      See details below    Artie's PMH,  PSH and allergies were reviewed today and pertinent information is summarized above.    Artie's pertinent social and family history are also reviewed today and pertinent information is summarized above.      Current Outpatient Medications   Medication Sig Dispense Refill    acetaminophen (TYLENOL) 500 MG tablet Take 500-1,000 mg by mouth      acetone urine (RELION KETONE TEST) test strip Use as part of high blood sugar protocol for pump users 20 strip 4    Alcohol Swabs PADS Use before taking blood sugars up to 10 times a day 100 each 3    blood glucose (NO BRAND SPECIFIED) lancets standard Use to test blood sugars up to 8 times daily as directed. 100 each 3    blood glucose (NO BRAND SPECIFIED) test strip Test blood sugars up to 8 times a day as directed by your provider 200 strip 3    blood glucose monitoring (NO BRAND SPECIFIED) meter device kit Use as directed Per insurance coverage. DM education recommending Accu Chek Guide glucose meter 1 kit 0    blood glucose monitoring (SOFTCLIX) lancets USE TO TEST BLOOD GLUCOSE UP TO EIGHT TIMES DAILY      buPROPion (WELLBUTRIN XL) 300 MG 24 hr tablet Take 1 tablet (300 mg) by mouth every morning. 90 tablet 1    busPIRone (BUSPAR) 10 MG tablet Take 1 tablet (10 mg) by mouth daily. 90 tablet 1    cetirizine (ZYRTEC) 10 MG tablet Take 10 mg by mouth daily      Continuous Glucose Sensor (DEXCOM G6 SENSOR) MISC 1 each every 10 days 9 each 3    Continuous Glucose Transmitter (DEXCOM G6 TRANSMITTER) MISC 1 each every 3 months 1 each 3    gabapentin (NEURONTIN) 600 MG tablet Take 1 tablet (600 mg) by mouth 2 times daily 180 tablet 3    Glucagon (GVOKE HYPOPEN) 1 MG/0.2ML pen Inject the contents of 1 device under the skin into lower abdomen, outer thigh, or outer upper arm as needed for hypoglycemia. If no response after 15 minutes, additional 1 mg dose from a new device may be injected while waiting for emergency assistance. 0.4 mL 3    glucose (BD GLUCOSE) 4 g chewable tablet  Take 1 tablet by mouth every hour as needed for low blood sugar 30 tablet 0    ibuprofen (ADVIL/MOTRIN) 200 MG tablet Take 800 mg by mouth as needed      insulin aspart (NOVOLOG PEN) 100 UNIT/ML pen DOSE:  1 units per 10 grams of carbohydrate.  Only chart total amount of units given.  Do not give if pre-prandial glucose is less than 60 mg/dL. If given at mealtime, administer within 30 minutes of start of meal. 15 mL 3    insulin aspart (NOVOLOG VIAL) 100 UNITS/ML vial Up to 30 units daily via insulin pump 10 mL 5    Insulin Disposable Pump (OMNIPOD 5 G6 INTRO, GEN 5,) KIT 1 each every 3 days 1 kit 0    Insulin Disposable Pump (OMNIPOD 5 G6 PODS, GEN 5,) MISC 1 each every 3 days 30 each 4    insulin pen needle (32G X 4 MM) 32G X 4 MM miscellaneous Use up to 8 pen needles daily or as directed. 200 each 11    levonorgestrel (MIRENA) 52 MG (20 mcg/day) IUD 1 Device by Intrauterine route      lipase-protease-amylase (CREON) 00908-55[address]-860286 units CPEP per EC capsule Take 4 capsules with meals and 2 capsules with snacks; approximate daily maximum 16 capsules 480 capsule 11    LORazepam (ATIVAN) 0.5 MG tablet Take 1 tablet (0.5 mg) by mouth daily as needed (panic attacks) 10 TABS TO LAST 30 DAYS 10 tablet 1    ondansetron (ZOFRAN ODT) 4 MG ODT tab Take 1 tablet (4 mg) by mouth every 6 hours as needed for nausea or vomiting 12 tablet 3    pantoprazole (PROTONIX) 40 MG EC tablet Take 1 tablet (40 mg) by mouth daily for 360 days 90 tablet 3    polyethylene glycol (MIRALAX) 17 GM/Dose powder Take 17 g by mouth daily 510 g 3    propranolol (INDERAL) 20 MG tablet Take 0.5-1.5 tablets (10-30 mg) by mouth 2 times daily as needed (anxiety) 60 tablet 1     No current facility-administered medications for this visit.         ROS:   Reports good physical activity tolerance.  Denies any pedal lesions or vision changes or concerns. Denies any other acute concerns except as noted above.      Exam:    Wt 61.7 kg (136 lb)   BMI 24.87  "kg/m    Wt Readings from Last 10 Encounters:   09/10/24 61.7 kg (136 lb)   09/06/24 61.7 kg (136 lb)   06/12/24 52.2 kg (115 lb)   06/06/24 51.5 kg (113 lb 9.6 oz)   04/23/24 56.7 kg (125 lb)   04/11/24 55.3 kg (122 lb)   04/04/24 54.3 kg (119 lb 11.4 oz)   03/21/24 56.7 kg (125 lb)   01/09/24 54.9 kg (121 lb)   12/14/23 54.9 kg (121 lb 0.5 oz)   Estimated body mass index is 24.87 kg/m  as calculated from the following:    Height as of 9/6/24: 1.575 m (5' 2\").    Weight as of this encounter: 61.7 kg (136 lb).    General: Pleasant, well apperaing in NAD seated comfortably at home   With cat on sofa with her.    Psych:  Mood is \"good,\" affect is appropriate.  Thought form and content are fluid and coherent.    HEENT: Eyes and sclera are clear. Extraocular movements are grossly intact without proptosis.  Nares are patent, mucous membranes moist.  Neck: No masses or JVD are noted.    Resp: Easy and unlabored breathing.   Neuro: Alert and oriented, communicating clearly.     Data:      Recent Labs   Lab Test 03/21/24  1106 12/14/23  1015 09/22/23  2031 09/21/23  1614 08/19/23  0742 08/18/23  1726   A1C 8.2* 6.6*   < >  --   --   --    TSH  --   --   --   --   --  1.68   LDL 58 47  --   --    < >  --    HDL 64 62  --   --    < >  --    TRIG 134 90  --   --    < >  --    CR 0.89 0.72   < >  --    < > 0.74   MICROL  --   --   --  <12.0  --   --    AST 37 37   < >  --    < > 22   ALT 25 35   < >  --    < > 21    < > = values in this interval not displayed.       Microalbuminuria:  Lab Results   Component Value Date    [facility]  09/21/2023      Comment:      Unable to calculate, urine albumin and/or urine creatinine is outside detectable limits.  Microalbuminuria is defined as an albumin:creatinine ratio of 17 to 299 for males and 25 to 299 for females. A ratio of albumin:creatinine of 300 or higher is indicative of overt proteinuria.  Due to biologic variability, positive results should be confirmed by a second, first-morning " random or 24-hour timed urine specimen. If there is discrepancy, a third specimen is recommended. When 2 out of 3 results are in the microalbuminuria range, this is evidence for incipient nephropathy and warrants increased efforts at glucose control, blood pressure control, and institution of therapy with an angiotensin-converting-enzyme (ACE) inhibitor (if the patient can tolerate it).         Most recent GFRs:    Lab Results   Component Value Date    GFRESTIMATED 89 03/21/2024    GFRESTIMATED >90 12/14/2023    GFRESTIMATED >90 10/05/2023       Lab Results   Component Value Date    CPEPT 0.2 (L) 03/21/2024    GADAB <5.0 06/28/2023    ISCAB <1:4 06/28/2023     FIB-4 Calculation: 0.15 at 10/5/2023  2:35 PM  Calculated from:  SGOT/AST: 19 U/L at 10/5/2023  2:35 PM  SGPT/ALT: 11 U/L at 10/5/2023  2:35 PM  Platelets: 1,093 10e3/uL at 10/5/2023  2:35 PM  Age: 27 years  AST   Date Value Ref Range Status   03/21/2024 37 0 - 45 U/L Final     Comment:     Reference intervals for this test were updated on 6/12/2023 to more accurately reflect our healthy population. There may be differences in the flagging of prior results with similar values performed with this method. Interpretation of those prior results can be made in the context of the updated reference intervals.     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     Post-pancreatectomy diabetes: Though recent A1c was elevated continuous glucose monitor shows improvement in her CGMS each week and her blood sugars now are at goal in the last 2 weeks.  She is heartily encouraged in her good work.  I am encouraging her to follow-up as needed with Mildred Graff and they will meet again in no more than 3 months.  I will plan to see her back in clinic in 6 months.  As  long as control remains good I do not anticipate needing to screen for complications until 4 years from now.  We did review ADA recommendations for physical activity and strength training and I am encouraging her to work towards 150 minutes of aerobic exercise per week and strength or resistance training 2-3 times per week.     24 minutes spent on the date of the encounter doing chart review, history and exam, documentation, education and counseling, as well as communication and coordination of care, and further activities as noted above.      It is my privilege to be involved in the care of the above patient.     Caro Cormier PA-C, MPAS  [facility]  Diabetes, Endocrinology, and Metabolism  [phone] Appointments/Nurse  [phone] pager  [phone] nurse line    This note was completed in part using Dragon voice recognition, and may contain word and grammatical errors.  Joined the call at 11:10  Left the call at 11:30  Patient is at home provider is off-site platform is [email or web address].      Outcome for 04/19/24 10:15 AM: Data obtained via [employer] website  Willow Cao LPN                           Outcome for 09/06/24 11:01 AM: Data obtained via Dexcom and [employer] website  Pamela Puente MA

## 2024-09-03 ASSESSMENT — ANXIETY QUESTIONNAIRES
GAD7 TOTAL SCORE: 8
8. IF YOU CHECKED OFF ANY PROBLEMS, HOW DIFFICULT HAVE THESE MADE IT FOR YOU TO DO YOUR WORK, TAKE CARE OF THINGS AT HOME, OR GET ALONG WITH OTHER PEOPLE?: SOMEWHAT DIFFICULT
GAD7 TOTAL SCORE: 8
8. IF YOU CHECKED OFF ANY PROBLEMS, HOW DIFFICULT HAVE THESE MADE IT FOR YOU TO DO YOUR WORK, TAKE CARE OF THINGS AT HOME, OR GET ALONG WITH OTHER PEOPLE?: SOMEWHAT DIFFICULT
7. FEELING AFRAID AS IF SOMETHING AWFUL MIGHT HAPPEN: SEVERAL DAYS
7. FEELING AFRAID AS IF SOMETHING AWFUL MIGHT HAPPEN: SEVERAL DAYS

## 2024-09-04 ENCOUNTER — VIRTUAL VISIT (OUTPATIENT)
Dept: EDUCATION SERVICES | Facility: CLINIC | Age: 29
End: 2024-09-04
Payer: COMMERCIAL

## 2024-09-04 ENCOUNTER — MYC MEDICAL ADVICE (OUTPATIENT)
Dept: EDUCATION SERVICES | Facility: CLINIC | Age: 29
End: 2024-09-04

## 2024-09-04 DIAGNOSIS — E89.1 POST-PANCREATECTOMY DIABETES (H): Primary | ICD-10-CM

## 2024-09-04 DIAGNOSIS — Z90.410 POST-PANCREATECTOMY DIABETES (H): Primary | ICD-10-CM

## 2024-09-04 DIAGNOSIS — E13.9 POST-PANCREATECTOMY DIABETES (H): Primary | ICD-10-CM

## 2024-09-04 PROCEDURE — 99207 PR NO BILLABLE SERVICE THIS VISIT: CPT | Mod: 95

## 2024-09-04 PROCEDURE — 99207 PR NO BILLABLE SERVICE THIS VISIT: CPT

## 2024-09-04 NOTE — PROGRESS NOTES
Diabetes Self-Management Education & Support Virtual Visit- Short    Artie Burger contacted today for education related to Post Pancreatectomy Diabetes.    Patient is being treated with:  Omnipod 5 Insulin Pump    Referring Provider: Caro Cormier PA-C    Purpose of today's visit:  Recheck pump    Subjective/Objective:            Assessment/Plan:  TIR has improved from 62% of the time to 69% of the time.  Still rising with food quite a bit although yesterday she kept dropping low with boluses so I am hesitant to lower ICR today.  I'm also not convinced that it is too low overall.  She is drinking some pop and coffee with syrup in it and she does bolus for it but she may be underestimating the carbs on them.  She is going to look at 2 hr pp after a pop or coffee and bolus more aggressively if she needs to.    Follow-up:  Get an a1c through primary clinic prior to appt with Caro Cormier  9/10/24 as scheduled with Caro Cormier  Time spent in this visit: 15 minutes (no charge)    Any diabetes medication dose changes were made via the CDE Protocol and Collaborative Practice Agreement. A copy of this encounter was provided to the patient's referring provider.

## 2024-09-04 NOTE — NURSING NOTE
Current patient location: [facility] [address]   [facility] [address]    Is the patient currently in the state of [place]? YES    Visit mode:VIDEO    If the visit is dropped, the patient can be reconnected by: VIDEO VISIT: Text to cell phone:   Telephone Information:   Mobile [phone]       Will anyone else be joining the visit? NO  (If patient encounters technical issues they should call [phone]    How would you like to obtain your AVS? MyChart    Are changes needed to the allergy or medication list? Pt stated no med changes    Are refills needed on medications prescribed by this physician? NO    Rooming Documentation:  Not applicable      Reason for visit: TANNER HAIDERF

## 2024-09-05 ASSESSMENT — PATIENT HEALTH QUESTIONNAIRE - PHQ9
SUM OF ALL RESPONSES TO PHQ QUESTIONS 1-9: 11
SUM OF ALL RESPONSES TO PHQ QUESTIONS 1-9: 11
10. IF YOU CHECKED OFF ANY PROBLEMS, HOW DIFFICULT HAVE THESE PROBLEMS MADE IT FOR YOU TO DO YOUR WORK, TAKE CARE OF THINGS AT HOME, OR GET ALONG WITH OTHER PEOPLE: SOMEWHAT DIFFICULT

## 2024-09-05 NOTE — PROGRESS NOTES
[facility]  September 6, 2024      Behavioral Health Clinician Progress Note    Patient Name: Artie Burger           Service Type:  Individual      Service Location:   MyChart / Email (patient reached)     Session Start Time: 1030am  Session End Time: 1054am      Session Length: 16 - 37      Attendees: Client     Service Modality:  Video Visit:      Provider verified identity through the following two step process.  Patient provided:  Patient is known previously to provider    Telemedicine Visit: The patient's condition can be safely assessed and treated via synchronous audio and visual telemedicine encounter.      Reason for Telemedicine Visit: Services only offered telehealth    Originating Site (Patient Location): Patient's home    Distant Site (Provider Location): [facility] MENTAL HEALTH & ADDI[facility]    Consent:  The patient/guardian has verbally consented to: the potential risks and benefits of telemedicine (video visit) versus in person care; bill my insurance or make self-payment for services provided; and responsibility for payment of non-covered services.     Patient would like the video invitation sent by:  My Chart    Mode of Communication:  Video Conference via [facility]    Distant Location (Provider):  On-site    As the provider I attest to compliance with applicable laws and regulations related to telemedicine.    Visit Activities (Refresh list every visit): [facility] Only    Diagnostic Assessment Date: 8/7/24 Daina Daniel MA [facility]  Treatment Plan Review Date: 9/6/24  See Flowsheets for today's PHQ-9 and ALAN-7 results  Previous PHQ-9:       6/12/2024    12:49 PM 8/6/2024    10:39 PM 9/5/2024    10:01 AM   PHQ-9 SCORE   PHQ-9 Total Score MyChart  9 (Mild depression) 11 (Moderate depression)   PHQ-9 Total Score 6 9    9 11     Previous ALAN-7:       6/22/2023     4:54 PM 8/1/2024     1:16 PM 9/3/2024    11:30 AM   ALAN-7 SCORE   Total Score 12  (moderate anxiety) 12 (moderate anxiety) 8 (mild anxiety)   Total Score 12 12    12 8    8           DATA  Extended Session (60+ minutes): No  Interactive Complexity: No  Crisis: No  [facility] Patient: No    Treatment Objective(s) Addressed in This Session:  Target Behavior(s): disease management/lifestyle changes reducing sx    Depressed Mood: Decrease frequency and intensity of feeling down, depressed, hopeless  Anxiety: will develop more effective coping skills to manage anxiety symptoms    Current Stressors / Issues:  MH update: stopped zoloft, start wellbutrin.  Got brain zaps.  Did slower taper.  On 12mg for the last week Zoloft. On 300mg Wellbutrin. Anxiety is better.  Better motivation.  Mood is lifted. Getting get out of bed and things done.   Anxiety is better now after medication taper is address.  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    Appetite: T1D  Sleep: High fatigue.  On going anemia  Outpatient Provider updates: ADHD testing 10/14 in [place].  [facility]  started EMDR again  SI/SIB/HI: Denies current.  Hx of passive ideation.  Denies previous attempts.  Hx of SIB as teen.  Denies need for safety plan or crisis resources.  AMPARO:  Sober 4 years ETOH.  No meetings.  Friend support.  Daily medical THC use.  Preg:  IUD  Side effects/compliance:  Interventions:  [facility] discussed TIPP skills for anxiety mgmt  Most important:  Doing better after med change.      8/7  MH update:  ROS above  Stresses:  Medical stressors.  Goals of wanting to obtain PT employment.  Dad's death as teen  Appetite: N/a.  T1D.  Sleep: Poor- related to anxiety.  No sleep study.  Night sweats.  Nightmares  Outpatient Provider updates: [facility]  SI/SIB/HI:  Denies current.  Hx of passive ideation.  Denies previous attempts.  Hx of SIB as teen.  Denies need for safety plan or crisis resources.  AMPARO:  Sober 4 years ETOH.  No meetings.  Friend  support.  Daily medical THC use.  Preg:  IUD  Side effects/compliance:  Interventions:  [facility] engaged in completing DA with psychoeducation and tx planning  Most important:  Different meds?  Higher dose?   ADHD testing referral?      Progress on Treatment Objective(s) / Homework:  New Objective established this session - CONTEMPLATION (Considering change and yet undecided); Intervened by assessing the negative and positive thinking (ambivalence) about behavior change    Motivational Interviewing    MI Intervention: Co-Developed Goal: reducing sx and Expressed Empathy/Understanding     Change Talk Expressed by the Patient: Desire to change Ability to change    Provider Response to Change Talk: E - Evoked more info from patient about behavior change and A - Affirmed patient's thoughts, decisions, or attempts at behavior change    Also provided psychoeducation about behavioral health condition, symptoms, and treatment options    Assessments completed prior to visit:  The following assessments were completed by patient for this visit:  PHQ2:       9/4/2024    10:47 AM 8/14/2024     9:44 AM 6/12/2024    12:49 PM 4/23/2024     1:54 PM 3/27/2024    10:04 AM 1/9/2024     1:50 PM 4/4/2023    10:27 AM   PHQ-2 ( 1999 [employer])   Q1: Little interest or pleasure in doing things 1 1 1 1 1 0 1   Q2: Feeling down, depressed or hopeless 1 1 2 1 1 0 2   PHQ-2 Score 2 2 3 2 2 0 3     GAD2:       8/1/2024     1:16 PM 9/3/2024    11:30 AM   ALAN-2   Feeling nervous, anxious, or on edge 3 2   Not being able to stop or control worrying 3 1   ALAN-2 Total Score 6    6 3    3       Care Plan review completed: Yes    Medication Review:  Changes to psychiatric medications, see updated Medication List in EPIC.     Medication Compliance:  Yes    Changes in Health Issues:   None reported    Chemical Use Review:   Substance Use: Chemical use reviewed, no active concerns identified      Tobacco Use: No current tobacco use.      Assessment: Current  Emotional / Mental Status (status of significant symptoms):  Risk status (Self / Other harm or suicidal ideation)  Patient has had a history of suicidal ideation: hx of passive ideation without planning intent or previous attempts and self-injurious behavior: hx of such as teen  Patient denies current fears or concerns for personal safety.  Patient denies current or recent suicidal ideation or behaviors.  Patient denies current or recent homicidal ideation or behaviors.  Patient denies current or recent self injurious behavior or ideation.  Patient denies other safety concerns.  A safety and risk management plan has not been developed at this time, however patient was encouraged to call [place] [phone] should there be a change in any of these risk factors.    Appearance:   Appropriate   Eye Contact:   Good   Psychomotor Behavior: Normal   Attitude:   Cooperative   Orientation:   All  Speech   Rate / Production: Normal    Volume:  Normal   Mood:    Normal  Affect:    Appropriate   Thought Content:  Clear   Thought Form:  Coherent  Logical   Insight:    Good     Diagnoses:  1. Moderate episode of recurrent major depressive disorder (H)    2. ALAN (generalized anxiety disorder)    3. Trauma and stressor-related disorder    4. Alcohol use disorder, severe, in sustained remission (H)        Collateral Reports Completed:  Communicated with: Dr Singleton    Plan: (Homework, other):  Patient was given information about behavioral services and encouraged to schedule a follow up appointment with the clinic [facility] as needed.  She was also given information about mental health symptoms and treatment options .  CD Recommendations: Maintain Sobriety.       Daina Daniel [facility]    ______________________________________________________________________    Integrated Primary Care Behavioral Health Treatment Plan    Individual Treatment Plan    Patient's Name: Artie Burger   YOB: 1995  Date of Creation: 9/6/24  Date  "Treatment Plan Last Reviewed/Revised: 9/6/24    DSM5 Diagnoses:   1. Moderate episode of recurrent major depressive disorder (H)    2. ALAN (generalized anxiety disorder)    3. Trauma and stressor-related disorder    4. Alcohol use disorder, severe, in sustained remission (H)      Psychosocial / Contextual Factors: Medical Complexities, Trauma History, and Occupational Issues  PROMIS (reviewed every 90 days):   The following assessments were completed by patient for this visit:  PROMIS 10-Global Health (only subscores and total score):       6/22/2023     4:55 PM 8/1/2024     1:16 PM   PROMIS-10 Scores Only   Global Mental Health Score 9 11    11   Global Physical Health Score 8 14    14   PROMIS TOTAL - SUBSCORES 17 25    25        Referral / Collaboration:  Referral to another professional/service is not indicated at this time..    Anticipated number of session for this episode of care: 6-9 sessions  Anticipation frequency of session: Monthly  Anticipated Duration of each session: 16-37 minutes  Treatment plan will be reviewed in 90 days or when goals have been changed.       MeasurableTreatment Goal(s) related to diagnosis / functional impairment(s)  Goal 1: Patient will reducing trauma sx   \"I will know I've met my goal when I don't feel triggered by my history .\"     Objective #A (Patient Action)    Patient will  attend and complete EMDR  Status: New - Date: 9/6/24      Intervention(s)  [facility] will provide support through CBT, MI, Acceptance and Commitment Therapy, Dialectic Behavioral Therapy and problem solving model to explore and overcome barriers.        Patient has reviewed and agreed to the above plan.    Written by  Daina Daniel LPCC, [facility]     "

## 2024-09-06 ENCOUNTER — VIRTUAL VISIT (OUTPATIENT)
Dept: PSYCHIATRY | Facility: CLINIC | Age: 29
End: 2024-09-06
Payer: COMMERCIAL

## 2024-09-06 ENCOUNTER — VIRTUAL VISIT (OUTPATIENT)
Dept: BEHAVIORAL HEALTH | Facility: CLINIC | Age: 29
End: 2024-09-06
Payer: COMMERCIAL

## 2024-09-06 VITALS — WEIGHT: 136 LBS | BODY MASS INDEX: 25.03 KG/M2 | HEIGHT: 62 IN

## 2024-09-06 DIAGNOSIS — F43.9 TRAUMA AND STRESSOR-RELATED DISORDER: ICD-10-CM

## 2024-09-06 DIAGNOSIS — F10.21 ALCOHOL USE DISORDER, SEVERE, IN SUSTAINED REMISSION (H): ICD-10-CM

## 2024-09-06 DIAGNOSIS — F33.41 RECURRENT MAJOR DEPRESSIVE DISORDER, IN PARTIAL REMISSION (H): Primary | ICD-10-CM

## 2024-09-06 DIAGNOSIS — F41.1 GAD (GENERALIZED ANXIETY DISORDER): ICD-10-CM

## 2024-09-06 DIAGNOSIS — F33.1 MODERATE EPISODE OF RECURRENT MAJOR DEPRESSIVE DISORDER (H): Primary | ICD-10-CM

## 2024-09-06 PROCEDURE — 90832 PSYTX W PT 30 MINUTES: CPT | Mod: 95 | Performed by: COUNSELOR

## 2024-09-06 PROCEDURE — 99214 OFFICE O/P EST MOD 30 MIN: CPT | Mod: 95 | Performed by: PSYCHIATRY & NEUROLOGY

## 2024-09-06 PROCEDURE — G2211 COMPLEX E/M VISIT ADD ON: HCPCS | Mod: 95 | Performed by: PSYCHIATRY & NEUROLOGY

## 2024-09-06 RX ORDER — BUSPIRONE HYDROCHLORIDE 10 MG/1
10 TABLET ORAL DAILY
Qty: 90 TABLET | Refills: 1 | Status: SHIPPED | OUTPATIENT
Start: 2024-09-06

## 2024-09-06 RX ORDER — BUPROPION HYDROCHLORIDE 300 MG/1
300 TABLET ORAL EVERY MORNING
Qty: 90 TABLET | Refills: 1 | Status: SHIPPED | OUTPATIENT
Start: 2024-09-06

## 2024-09-06 ASSESSMENT — PAIN SCALES - GENERAL: PAINLEVEL: NO PAIN (0)

## 2024-09-06 NOTE — NURSING NOTE
Current patient location: [facility] [address]   [facility] [id number]    Is the patient currently in the state of [place]? YES    Visit mode:VIDEO    If the visit is dropped, the patient can be reconnected by: VIDEO VISIT: Text to cell phone:   Telephone Information:   Mobile [phone]       Will anyone else be joining the visit? NO  (If patient encounters technical issues they should call [phone]    How would you like to obtain your AVS? MyChart    Are changes needed to the allergy or medication list? No    Are refills needed on medications prescribed by this physician? NO    Rooming Documentation:  Questionnaire(s) completed      Reason for visit: RECHECK    Fany HAIDERF

## 2024-09-06 NOTE — PROGRESS NOTES
"Telemedicine Visit: The patient's condition can be safely assessed and treated via synchronous audio and visual telemedicine encounter.      Reason for Telemedicine Visit: Patient has requested telehealth visit    Originating Site (Patient Location): Patient's home    Distant Location (provider location):  On-site    Consent:  The patient/guardian has verbally consented to: the potential risks and benefits of telemedicine (video visit) versus in person care; bill my insurance or make self-payment for services provided; and responsibility for payment of non-covered services.     Mode of Communication:  Video Conference via [employer]    As the provider I attest to compliance with applicable laws and regulations related to telemedicine.         Outpatient Psychiatric Progress Note    Name: Artie Burger   : 1995                    Primary Care Provider: Lila Gallegos CNP   Therapist: Rigo Ulrich, weekly      PHQ-9 scores:      2024    12:49 PM 2024    10:39 PM 2024    10:01 AM   PHQ-9 SCORE   PHQ-9 Total Score MyChart  9 (Mild depression) 11 (Moderate depression)   PHQ-9 Total Score 6 9    9 11       ALAN-7 scores:      2023     4:54 PM 2024     1:16 PM 9/3/2024    11:30 AM   ALAN-7 SCORE   Total Score 12 (moderate anxiety) 12 (moderate anxiety) 8 (mild anxiety)   Total Score 12 12    12 8    8       Patient Identification:  Patient is a 28 year old, partnered / significant other  White Not  or  female  who presents for return visit with me.  Patient is currently unemployed. Patient attended the phone/video session alone. Patient prefers to be called: \"Artie\".    Interim History:  I last saw Artie Burger for outpatient psychiatry Consultation on 2024. During that appointment, we:    Continue BuSpar/buspirone 10 mg daily for anxiety.  Continue lorazepam/Ativan 0.5 mg daily as needed for panic attacks/severe anxiety only.  Refilled " for 10 tablets per 30 days only.  Continue to try to lessen reliance on lorazepam.  Continue gabapentin 600 mg twice daily for pain and anxiety-as prescribed by other prescriber.  Increase propranolol up to 10-30 mg twice daily as needed for anxiety symptoms.  Discontinue sertraline  Start Wellbutrin  mg daily for depression.  After 1-2 weeks on 150 mg okay to increase up to 300 mg daily.  Referral placed for ADHD testing.  See below for additional external testing options if wait is too long internally within [facility].  Continue therapy as planned.    9/6: Overall improvement on Wellbutrin  mg daily. Tolerating well We slowed down the sertraline taper due to some discontinuation sxs. Now on 12.5 mg of sertraline. No problems from Wellbutrin - sleeping better actually. No safety concerns. No SI. See [facility] note below for additional details.     Per [facility], Daina Daniel [facility], during today's team-based visit:  MH update: stopped zoloft, start wellbutrin.  Got brain zaps.  Did slower taper.  On 12mg for the last week Zoloft. On 300mg Wellbutrin. Anxiety is better.  Better motivation.  Mood is lifted. Getting get out of bed and things done.   Anxiety is better now after medication taper is address.  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    Appetite: T1D  Sleep: High fatigue.  On going anemia  Outpatient Provider updates: ADHD testing 10/14 in [place].  [facility] Flaherty [facility]  started EMDR again  SI/SIB/HI: Denies current.  Hx of passive ideation.  Denies previous attempts.  Hx of SIB as teen.  Denies need for safety plan or crisis resources.  AMPARO:  Sober 4 years ETOH.  No meetings.  Friend support.  Daily medical THC use.  Preg:  IUD  Side effects/compliance:  Interventions:  [facility] discussed TIPP skills for anxiety mgmt  Most important:  Doing better after med change.      Past Psychiatric Med Trials:  Psych Meds at Intake:  -Ativan 0.5 mg  daily, sometimes twice daily: 1 every few days, still works (gets from primary care provider)   -Sertraline/Zoloft - 50 mg daily (was up to 200 mg daily but decreased due to activation), has been decreasing slowly and no worsening of sxs  -Buspar 10 mg daily -was taking twice daily: helpful for some day to day anxiety, up to 15 mg twice daily  -Gabapentin 600 mg twice daily: for pain and anxiety  -Propranolol 10 mg daily as needed for anxiety (sometimes uses, brings heart rate down, every couple weeks uses)     Past Psych Meds:  Diphenhydramine - anxiety  Trazodone - vivid dreams  Prozac/fluoxetine - not very helpful   Cymbalta - 2021 up to 60 mg  DID NOT WORK FOR PAIN  2022/2023: treated with lexapro 20mg and celexa 40mg during this time.     Psychiatric ROS:  See HPI above for all pertinent positives and negatives. Rest of systems negative.     PHQ9 and GAD7 scores were reviewed today if completed.   Medication side effects: Denies  Current stressors include: Symptoms and see HPI above  Coping mechanisms and supports include: Therapy, Family, Hobbies, and Friends    Current medications include:   Current Outpatient Medications   Medication Sig Dispense Refill    acetaminophen (TYLENOL) 500 MG tablet Take 500-1,000 mg by mouth      acetone urine (RELION KETONE TEST) test strip Use as part of high blood sugar protocol for pump users 20 strip 4    Alcohol Swabs PADS Use before taking blood sugars up to 10 times a day 100 each 3    blood glucose (NO BRAND SPECIFIED) lancets standard Use to test blood sugars up to 8 times daily as directed. 100 each 3    blood glucose (NO BRAND SPECIFIED) test strip Test blood sugars up to 8 times a day as directed by your provider 200 strip 3    blood glucose monitoring (NO BRAND SPECIFIED) meter device kit Use as directed Per insurance coverage. DM education recommending Accu Chek Guide glucose meter 1 kit 0    blood glucose monitoring (SOFTCLIX) lancets USE TO TEST BLOOD GLUCOSE UP  TO EIGHT TIMES DAILY      buPROPion (WELLBUTRIN XL) 300 MG 24 hr tablet Take 1 tablet (300 mg) by mouth every morning 30 tablet 1    busPIRone (BUSPAR) 10 MG tablet Take 1 tablet (10 mg) by mouth daily 120 tablet 0    cetirizine (ZYRTEC) 10 MG tablet Take 10 mg by mouth daily      Continuous Glucose Sensor (DEXCOM G6 SENSOR) MISC 1 each every 10 days 9 each 3    Continuous Glucose Transmitter (DEXCOM G6 TRANSMITTER) MISC 1 each every 3 months 1 each 3    gabapentin (NEURONTIN) 600 MG tablet Take 1 tablet (600 mg) by mouth 2 times daily 180 tablet 3    Glucagon (GVOKE HYPOPEN) 1 MG/0.2ML pen Inject the contents of 1 device under the skin into lower abdomen, outer thigh, or outer upper arm as needed for hypoglycemia. If no response after 15 minutes, additional 1 mg dose from a new device may be injected while waiting for emergency assistance. 0.4 mL 3    glucose (BD GLUCOSE) 4 g chewable tablet Take 1 tablet by mouth every hour as needed for low blood sugar 30 tablet 0    ibuprofen (ADVIL/MOTRIN) 200 MG tablet Take 800 mg by mouth as needed      insulin aspart (NOVOLOG PEN) 100 UNIT/ML pen DOSE:  1 units per 10 grams of carbohydrate.  Only chart total amount of units given.  Do not give if pre-prandial glucose is less than 60 mg/dL. If given at mealtime, administer within 30 minutes of start of meal. 15 mL 3    insulin aspart (NOVOLOG VIAL) 100 UNITS/ML vial Up to 30 units daily via insulin pump 10 mL 5    Insulin Disposable Pump (OMNIPOD 5 G6 INTRO, GEN 5,) KIT 1 each every 3 days 1 kit 0    Insulin Disposable Pump (OMNIPOD 5 G6 PODS, GEN 5,) MISC 1 each every 3 days 30 each 4    insulin pen needle (32G X 4 MM) 32G X 4 MM miscellaneous Use up to 8 pen needles daily or as directed. 200 each 11    levonorgestrel (MIRENA) 52 MG (20 mcg/day) IUD 1 Device by Intrauterine route      lipase-protease-amylase (CREON) 07107-28416-304451 units CPEP per EC capsule Take 4 capsules with meals and 2 capsules with snacks;  approximate daily maximum 16 capsules 480 capsule 11    LORazepam (ATIVAN) 0.5 MG tablet Take 1 tablet (0.5 mg) by mouth daily as needed (panic attacks) 10 TABS TO LAST 30 DAYS 10 tablet 1    ondansetron (ZOFRAN ODT) 4 MG ODT tab Take 1 tablet (4 mg) by mouth every 6 hours as needed for nausea or vomiting 12 tablet 3    pantoprazole (PROTONIX) 40 MG EC tablet Take 1 tablet (40 mg) by mouth daily for 360 days 90 tablet 3    polyethylene glycol (MIRALAX) 17 GM/Dose powder Take 17 g by mouth daily 510 g 3    propranolol (INDERAL) 20 MG tablet Take 0.5-1.5 tablets (10-30 mg) by mouth 2 times daily as needed (anxiety) 60 tablet 1    sertraline (ZOLOFT) 25 MG tablet Take 1 tablet (25 mg) by mouth daily 90 tablet 0     No current facility-administered medications for this visit.       Past Medical/Surgical History:  Past Medical History:   Diagnosis Date    Alcohol-induced chronic pancreatitis (H)     Anxiety     Depression     Gastroesophageal reflux disease     Pancreatic disease     PONV (postoperative nausea and vomiting)     Type 1 diabetes mellitus (H) 9/19/2023      has a past medical history of Alcohol-induced chronic pancreatitis (H), Anxiety, Depression, Gastroesophageal reflux disease, Pancreatic disease, PONV (postoperative nausea and vomiting), and Type 1 diabetes mellitus (H) (9/19/2023).    She has no past medical history of Antiplatelet or antithrombotic long-term use or Uncomplicated asthma.    Social History:  Reviewed. No changes to social history except as noted above in HPI.    Vital Signs:   None. This is phone/video visit.     Labs:  Most recent laboratory results reviewed and the pertinent results include:   Lab Results   Component Value Date    A1C 8.2 03/21/2024    A1C 6.6 12/14/2023    A1C 5.5 09/22/2023    A1C 5.3 06/28/2023       Review of Systems:  10 systems (general, cardiovascular, respiratory, eyes, ENT, endocrine, GI, , M/S, neurological) were reviewed. Most pertinent finding(s) is/are:  intermittent pain. The remaining systems are all unremarkable.    Mental Status Examination (limited as this is by phone/video):  Appearance: Awake, alert, appears stated age, no acute distress, well-groomed   Attitude:  cooperative, pleasant   Motor: No gross abnormalities observed via video, not formally tested   Oriented to:  person, place, time, and situation  Attention Span and Concentration:  normal  Speech:  clear, coherent, regular rate, rhythm, and volume  Language: intact  Mood:  better  Affect:  appropriate and in normal range and mood congruent  Associations:  no loose associations  Thought Process:  logical, linear and goal oriented  Thought Content:  no evidence of suicidal ideation or homicidal ideation, no evidence of psychotic thought, no auditory hallucinations present and no visual hallucinations present  Recent and Remote Memory:  Intact to interview. Not formally assessed. No amnesia.  Fund of Knowledge: appropriate  Insight:  good  Judgment:  intact, adequate for safety  Impulse Control:  intact    Suicide Risk Assessment:  Today Artie Burger reports no suicidal ideation. Based on all available evidence including the factors cited above, Artie Burger does not appear to be at imminent risk for self-harm, does not meet criteria for a 72-hr hold, and therefore remains appropriate for ongoing outpatient level of care.  A thorough assessment of risk factors related to suicide and self-harm have been reviewed and are noted above. The patient convincingly denies suicidality on several occasions. Local community safety resources reviewed for patient to use if needed. There was no deceit detected, and the patient presented in a manner that was believable.     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 3/2023: gabapentin up to 600-900mg TID became ineffective, switched to lyrica early March.  Initial dose 50mg BID, increase to 100mg BID after 2 weeks, then increase to 100mg TID after 2 weeks (if needed).      Iron deficiency anemia secondary to inadequate dietary iron intake 10/28/2021     Priority: Medium     Formatting of this note might be different from the original. 10/28/2021 hgb 10.6, iron 32.  Trial iron BID-if intolerant infusions.  Symptomatic with fatigue.      Chronic pancreatitis (H) 08/25/2021     Priority: Medium     Added automatically from request for surgery [id number]      Osteopenia of hip 08/10/2021     Priority: Medium     Formatting of this note might be different from the original. DEXA 8/2021: normal l spine, hip T -1.8.  Recommended calcium/vit D supplementation.  Repeat DEXA 1-2 years.      Insomnia 07/07/2021     Priority: Medium     Formatting of this note might be different from the original. 7/7/2021 No issue with sleep onset, struggles at times with sleep maintenance. 9/8/2021 Rare PRN quetiapine stops \"head from racing\", effective Failed: benadryl-\"anxiety inducing,\" trazodone \"vivid dreams and anxiety inducing.\"      " Current moderate episode of major depressive disorder (H) 01/24/2019     Priority: Medium     Formatting of this note might be different from the original. Previous tx: prozac 7/7/2021: doing well, decreasing cymbalta from 60 to 30mg 8/10/2021: Increased Cymbalta back to 60 mg.  Stressors with home ownership and her ongoing medical issues along with college course work.      Panic attacks 01/24/2019     Priority: Medium    Alcohol abuse, in remission 07/25/2018     Priority: Medium     Formatting of this note might be different from the original. 8/21/2020         Psychosocial & Contextual Factors: see HPI above    Assessment:  From Intake, 8/7/2024:  Artie Burger is a 28-year-old with past psychiatric history including depression, anxiety, trauma, alcohol dependence with medical complications who presents today for psychiatric evaluation.  Patient with history of mental health symptoms dating back to childhood/adolescence.  Patient has been on handful of different psychiatric medications to help with symptoms.  Patient's history was complicated by alcohol use disorder affecting pancreas and leading to multiple surgeries.  Patient has not been sober from alcohol for 4 years.  Denies any current need for chemical dependency resources.  Denies any major alcohol cravings.  Patient reports some symptoms consistent with possible ADHD diagnosis.  Given comorbid mental health concerns and struggles referral for psychological testing has been placed.  Patient is hopeful for some additional motivation and energy.  We will replace her sertraline with Wellbutrin XL.  We will continue buspirone for anxiety.  Patient on gabapentin for pain and also possibly helping with anxiety and decreased alcohol cravings.  Patient is using lorazepam quite frequently, every 2-3 days per patient report.  Next refill will be for only 10 tablets to last 30 days at a time.  We will also increase propranolol some to take as needed to hopefully  lessen reliance on lorazepam.  Could also consider a future increase of gabapentin.  Patient with IUD to protect against pregnancy.  Psychotherapy encouraged.  Denies that medicinal cannabis use causing any problems.     9/6/2024:  Patient overall doing significantly better on Wellbutrin XL.  Tolerating well.  Is still taking 12.5 mg of sertraline and will try discontinuing.  Has ADHD testing coming up next month.  Patient will be seen back for follow-up after ADHD testing.  Cannabis use but denies problematic use.  No acute safety concerns.  No SI.    Medication side effects and alternatives were reviewed. Health promotion activities recommended and reviewed today. All questions addressed. Education and counseling completed regarding risks and benefits of medications and psychotherapy options. Recommend therapy for additional support.     Treatment Plan:  Continue BuSpar/buspirone 10 mg daily for anxiety.  Continue lorazepam/Ativan 0.5 mg daily as needed for panic attacks/severe anxiety only.  Refilled for 10 tablets per 30 days only.  Continue to try to lessen reliance on lorazepam.  Continue gabapentin 600 mg twice daily for pain and anxiety-as prescribed by other prescriber.  Continue propranolol up to 10-30 mg twice daily as needed for anxiety symptoms.  Continue Wellbutrin  mg daily for depression.    Discontinue sertraline.   Keep appts for ADHD testing.  See below for additional external testing options if wait is too long internally within [facility].  Continue all other cares per primary care provider.   Continue all other medications as reviewed per electronic medical record today.   Safety plan reviewed. To the Emergency Department as needed or call after hours crisis line at [phone] or [phone]. [place] Crisis Text Line. Text [place] to 473551 or Suicide LifeLine Chat: suicidepreventionlifeline.org/chat  Continue therapy as planned.   Schedule an appointment with me in 2 months or  sooner as needed. Call [facility] at [phone] to schedule.  Follow up with primary care provider as planned or for acute medical concerns.  Call the psychiatric nurse line with medication questions or concerns at [phone].  [employer] may be used to communicate with your provider, but this is not intended to be used for emergencies.    Administrative Billing:   Phone Call/Video Duration: 10 Minutes  Start: 11:04a  Stop: 11:14a    The longitudinal plan of care for the diagnosis(es)/condition(s) as documented were addressed during this visit. Due to the added complexity in care, I will continue to support Angeliquearia in the subsequent management and with ongoing continuity of care.    Episode of Care #: 2    Patient Status:  Patient will continue to be seen for ongoing consultation and stabilization.    Signed:   Agnes Singleton DO  [facility]    Disclaimer: This note consists of symbols derived from keyboarding, dictation and/or voice recognition software. As a result, there may be errors in the script that have gone undetected. Please consider this when interpreting information found in this chart.

## 2024-09-06 NOTE — PROGRESS NOTES
"Virtual Visit Details    Type of service:  Video Visit   Video Start Time: {video visit start/end time for provider to select:605829}  Video End Time:{video visit start/end time for provider to select:854067}    Originating Location (pt. Location): {video visit patient location:768728::\"Home\"}  {PROVIDER LOCATION On-site should be selected for visits conducted from your clinic location or adjoining [facility] hospital, academic office, or other nearby [facility] building. Off-site should be selected for all other provider locations, including home:543941}  Distant Location (provider location):  {virtual location provider:418877}  Platform used for Video Visit: {Virtual Visit Platforms:806720::\"[employer]\"}  "

## 2024-09-06 NOTE — PATIENT INSTRUCTIONS
Treatment Plan:  Continue BuSpar/buspirone 10 mg daily for anxiety.  Continue lorazepam/Ativan 0.5 mg daily as needed for panic attacks/severe anxiety only.  Refilled for 10 tablets per 30 days only.  Continue to try to lessen reliance on lorazepam.  Continue gabapentin 600 mg twice daily for pain and anxiety-as prescribed by other prescriber.  Continue propranolol up to 10-30 mg twice daily as needed for anxiety symptoms.  Continue Wellbutrin  mg daily for depression.    Discontinue sertraline.   Keep appts for ADHD testing.  See below for additional external testing options if wait is too long internally within [facility].  Continue all other cares per primary care provider.   Continue all other medications as reviewed per electronic medical record today.   Safety plan reviewed. To the Emergency Department as needed or call after hours crisis line at [phone] or [phone]. [place] Crisis Text Line. Text [place] to 770680 or Suicide LifeLine Chat: suicidepreAKSEL GROUPline.org/chat  Continue therapy as planned.   Schedule an appointment with me in 2 months or sooner as needed. Call [facility] at [phone] to schedule.  Follow up with primary care provider as planned or for acute medical concerns.  Call the psychiatric nurse line with medication questions or concerns at [phone].  MyChart may be used to communicate with your provider, but this is not intended to be used for emergencies.    Patient Education   Collaborative Care Psychiatry Service  What to Expect  Here's what to expect from your Collaborative Care Psychiatry Service (CCPS).   About CCPS  CCPS means 2 people work together to help you get better. You'll meet with a behavioral health clinician and a psychiatric doctor. A behavioral health clinician helps people with mental health problems by talking with them. A psychiatric doctor helps people by giving them medicine.  How it works  At every visit, you'll see the  "behavioral health clinician (BHC) first. They'll talk with you about how you're doing and teach you how to feel better.   Then you'll see the psychiatric doctor. This doctor can help you deal with troubling thoughts and feelings by giving you medicine. They'll make sure you know the plan for your care.   CCPS usually takes 3 to 6 visits. If you need more visits, we may have you start seeing a different psychiatric doctor for ongoing care.  If you have any questions or concerns, we'll be glad to talk with you.  About visits  Be open  At your visits, please talk openly about your problems. It may feel hard, but it's the best way for us to help you.  Cancelling visits  If you can't come to your visit, please call us right away at [phone]. If you don't cancel at least 24 hours (1 full day) before your visit, that's \"late cancellation.\"  Being late to visits  Being very late is the same as not showing up. You will be a \"no show\" if:  Your appointment starts with a BHC, and you're more than 15 minutes late for a 30-minute (half hour) visit. This will also cancel your appointment with the psychiatric doctor.  Your appointment is with a psychiatric doctor only, and you're more than 15 minutes late for a 30-minute (half hour) visit.  Your appointment is with a psychiatric doctor only, and you're more than 30 minutes late for a 60-minute (full hour) visit.  If you cancel late or don't show up 2 times within 6 months, we may end your care.   Getting help between visits  If you need help between visits, you can call us Monday to Friday from 8 a.m. to 4:30 p.m. at [phone].  Emergency care  Call 911 or go to the nearest emergency department if your life or someone else's life is in danger.  Call 968 anytime to reach the national Suicide and Crisis hotline.  Medicine refills  To refill your medicine, call your pharmacy. You can also call [facility] at [phone], Monday to Friday, 8 " a.m. to 4:30 p.m. It can take 1 to 3 business days to get a refill.   Forms, letters, and tests  You may have papers to fill out, like FMLA, short-term disability, and workability. We can help you with these forms at your visits, but you must have an appointment. You may need more than 1 visit for this, to be in an intensive therapy program, or both.  Before we can give you medicine for ADHD, we may refer you to get tested for it or confirm it another way.  We may not be able to give you an emotional support animal letter.  We don't do mental health checks ordered by the court.   We don't do mental health testing, but we can refer you to get tested.   Thank you for choosing us for your care.  For informational purposes only. Not to replace the advice of your health care provider. Copyright   2022 [facility]. All rights reserved. [employer] 322352 - 12/22.

## 2024-09-10 ENCOUNTER — VIRTUAL VISIT (OUTPATIENT)
Dept: ENDOCRINOLOGY | Facility: CLINIC | Age: 29
End: 2024-09-10
Payer: COMMERCIAL

## 2024-09-10 VITALS — BODY MASS INDEX: 24.87 KG/M2 | WEIGHT: 136 LBS

## 2024-09-10 DIAGNOSIS — E13.9 POST-PANCREATECTOMY DIABETES (H): Primary | ICD-10-CM

## 2024-09-10 DIAGNOSIS — Z90.410 POST-PANCREATECTOMY DIABETES (H): Primary | ICD-10-CM

## 2024-09-10 DIAGNOSIS — E89.1 POST-PANCREATECTOMY DIABETES (H): Primary | ICD-10-CM

## 2024-09-10 PROCEDURE — 99213 OFFICE O/P EST LOW 20 MIN: CPT | Mod: 95 | Performed by: PHYSICIAN ASSISTANT

## 2024-09-10 NOTE — LETTER
9/10/2024       RE: Artie Burger  [address]  [facility] [address]     Dear Colleague,    Thank you for referring your patient, Artie Burger, to the [facility] [place] [facility]. Please see a copy of my visit note below.                     Diabetes Consult Note        Artie Burger  is a 28 year old female who has a past medical history of Alcohol-induced chronic pancreatitis (H), Anxiety, Depression, Gastroesophageal reflux disease, Pancreatic disease, PONV (postoperative nausea and vomiting), and Type 1 diabetes mellitus (H). She is here for follow-up of diabetes.      I met her in October 2023 and last saw her in January 2024 when she was doing quite well requiring less insulin and reduced her doses   Including carbohydrate coverage to 1 unit per 25 g and sensitivity to 1 unit per 65 mg/dL.  She was referred to Dm ed and has seen TASHIA Graff, last on 3/27/24.  She was counting carbs but not giving correction.  They reviewed that.  She was hospitalized earlier this month and discharged with  instructions to give 1 unit : 10 g carb and 1u: 40 >140 with meals together with 14 units Lantus qam and 10 qpm.        When I last saw Brittany in April 2024 she was struggling with a lot of hypoglycemia and we discussed the possibility of transitioning to OmniPod 5.  Since that point in time she has worked with Mildred Graff to transition to OmniPod 5.  She also continues to work with psychologist Daina Daniel.          9/5/2024    10:04 AM   including   1. Since your last visit to our clinic (or if this your first visit, since you last saw your primary care provider), have you experienced any of the following symptoms that may be related to low blood sugars? Weakness    Sweating that wasn't due to exertion    Extreme hunger    Lightheadedness   2. Since your last visit to our clinic (or if this your first visit, since you last saw  your primary care provider), have you experienced any of the following symptoms that may be related to prolonged high blood sugars? No, I have not experienced any of these symptoms   4. Do you have any female family members who have had heart attacks or strokes before age 60 or male relatives who have had heart attacks or strokes before age 50? No   5. Do you have any family members who have had complications from diabetes such as kidney disease, heart disease or strokes, retinopathy (a vision problem), or amputations? No   6. Who do you live with?  Alone   Little interest or pleasure in doing things? Several days   Feeling down, depressed, or hopeless? Several days   10.  Are you considering a pregnancy or interested in discussing pregnancy prevention today? No        Today:  Artie feels that things are going well.  She is liking the OmniPod as she feels it is more accurate and she appreciates less pokes and feels she is having a lot less low blood sugars than she was previously.  Her pain has improved and she is eating better and has been able to put on some weight.  She does remember to take her Creon more often.  She is not struggling with low mood but she did have an appointment with psychiatry in early August and earlier this month and they are tweaking her meds she does feel things are getting better.  She continues to see a therapist weekly and they have started EMDR for some past trauma.  She has done this before so knows things may get worse before they get better and has resources that she will lean on if needed.  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  She does not have any particular questions or concerns today.  She is not having any difficulties with vision or otherwise.  She has noted that she needs to give more bolus for liquid carbohydrates.  She lives with her cat.    Current Diabetes Medications:  Insulin via OmniPod  pump.        We reviewed glucometer/CGMS data together.  It revealed:    Blood sugar that is 23% in range and 2% low, which is a marked improvement and reduction of low and increase in time in range.  She is currently using 21.6 units of insulin per day and giving 7.4 bolus per day.      See details below    Artie's PMH, PSH and allergies were reviewed today and pertinent information is summarized above.    Artie's pertinent social and family history are also reviewed today and pertinent information is summarized above.      Current Outpatient Medications   Medication Sig Dispense Refill     acetaminophen (TYLENOL) 500 MG tablet Take 500-1,000 mg by mouth       acetone urine (RELION KETONE TEST) test strip Use as part of high blood sugar protocol for pump users 20 strip 4     Alcohol Swabs PADS Use before taking blood sugars up to 10 times a day 100 each 3     blood glucose (NO BRAND SPECIFIED) lancets standard Use to test blood sugars up to 8 times daily as directed. 100 each 3     blood glucose (NO BRAND SPECIFIED) test strip Test blood sugars up to 8 times a day as directed by your provider 200 strip 3     blood glucose monitoring (NO BRAND SPECIFIED) meter device kit Use as directed Per insurance coverage. DM education recommending Accu Chek Guide glucose meter 1 kit 0     blood glucose monitoring (SOFTCLIX) lancets USE TO TEST BLOOD GLUCOSE UP TO EIGHT TIMES DAILY       buPROPion (WELLBUTRIN XL) 300 MG 24 hr tablet Take 1 tablet (300 mg) by mouth every morning. 90 tablet 1     busPIRone (BUSPAR) 10 MG tablet Take 1 tablet (10 mg) by mouth daily. 90 tablet 1     cetirizine (ZYRTEC) 10 MG tablet Take 10 mg by mouth daily       Continuous Glucose Sensor (DEXCOM G6 SENSOR) MISC 1 each every 10 days 9 each 3     Continuous Glucose Transmitter (DEXCOM G6 TRANSMITTER) MISC 1 each every 3 months 1 each 3     gabapentin (NEURONTIN) 600 MG tablet Take 1 tablet (600 mg) by mouth 2 times daily 180 tablet 3     Glucagon  (GVOKE HYPOPEN) 1 MG/0.2ML pen Inject the contents of 1 device under the skin into lower abdomen, outer thigh, or outer upper arm as needed for hypoglycemia. If no response after 15 minutes, additional 1 mg dose from a new device may be injected while waiting for emergency assistance. 0.4 mL 3     glucose (BD GLUCOSE) 4 g chewable tablet Take 1 tablet by mouth every hour as needed for low blood sugar 30 tablet 0     ibuprofen (ADVIL/MOTRIN) 200 MG tablet Take 800 mg by mouth as needed       insulin aspart (NOVOLOG PEN) 100 UNIT/ML pen DOSE:  1 units per 10 grams of carbohydrate.  Only chart total amount of units given.  Do not give if pre-prandial glucose is less than 60 mg/dL. If given at mealtime, administer within 30 minutes of start of meal. 15 mL 3     insulin aspart (NOVOLOG VIAL) 100 UNITS/ML vial Up to 30 units daily via insulin pump 10 mL 5     Insulin Disposable Pump (OMNIPOD 5 G6 INTRO, GEN 5,) KIT 1 each every 3 days 1 kit 0     Insulin Disposable Pump (OMNIPOD 5 G6 PODS, GEN 5,) MISC 1 each every 3 days 30 each 4     insulin pen needle (32G X 4 MM) 32G X 4 MM miscellaneous Use up to 8 pen needles daily or as directed. 200 each 11     levonorgestrel (MIRENA) 52 MG (20 mcg/day) IUD 1 Device by Intrauterine route       lipase-protease-amylase (CREON) 60279-86485-592459 units CPEP per EC capsule Take 4 capsules with meals and 2 capsules with snacks; approximate daily maximum 16 capsules 480 capsule 11     LORazepam (ATIVAN) 0.5 MG tablet Take 1 tablet (0.5 mg) by mouth daily as needed (panic attacks) 10 TABS TO LAST 30 DAYS 10 tablet 1     ondansetron (ZOFRAN ODT) 4 MG ODT tab Take 1 tablet (4 mg) by mouth every 6 hours as needed for nausea or vomiting 12 tablet 3     pantoprazole (PROTONIX) 40 MG EC tablet Take 1 tablet (40 mg) by mouth daily for 360 days 90 tablet 3     polyethylene glycol (MIRALAX) 17 GM/Dose powder Take 17 g by mouth daily 510 g 3     propranolol (INDERAL) 20 MG tablet Take 0.5-1.5  "tablets (10-30 mg) by mouth 2 times daily as needed (anxiety) 60 tablet 1     No current facility-administered medications for this visit.         ROS:   Reports good physical activity tolerance.  Denies any pedal lesions or vision changes or concerns. Denies any other acute concerns except as noted above.      Exam:    Wt 61.7 kg (136 lb)   BMI 24.87 kg/m    Wt Readings from Last 10 Encounters:   09/10/24 61.7 kg (136 lb)   09/06/24 61.7 kg (136 lb)   06/12/24 52.2 kg (115 lb)   06/06/24 51.5 kg (113 lb 9.6 oz)   04/23/24 56.7 kg (125 lb)   04/11/24 55.3 kg (122 lb)   04/04/24 54.3 kg (119 lb 11.4 oz)   03/21/24 56.7 kg (125 lb)   01/09/24 54.9 kg (121 lb)   12/14/23 54.9 kg (121 lb 0.5 oz)   Estimated body mass index is 24.87 kg/m  as calculated from the following:    Height as of 9/6/24: 1.575 m (5' 2\").    Weight as of this encounter: 61.7 kg (136 lb).    General: Pleasant, well apperaing in NAD seated comfortably at home   With cat on sofa with her.    Psych:  Mood is \"good,\" affect is appropriate.  Thought form and content are fluid and coherent.    HEENT: Eyes and sclera are clear. Extraocular movements are grossly intact without proptosis.  Nares are patent, mucous membranes moist.  Neck: No masses or JVD are noted.    Resp: Easy and unlabored breathing.   Neuro: Alert and oriented, communicating clearly.     Data:      Recent Labs   Lab Test 03/21/24  1106 12/14/23  1015 09/22/23  2031 09/21/23  1614 08/19/23  0742 08/18/23  1726   A1C 8.2* 6.6*   < >  --   --   --    TSH  --   --   --   --   --  1.68   LDL 58 47  --   --    < >  --    HDL 64 62  --   --    < >  --    TRIG 134 90  --   --    < >  --    CR 0.89 0.72   < >  --    < > 0.74   MICROL  --   --   --  <12.0  --   --    AST 37 37   < >  --    < > 22   ALT 25 35   < >  --    < > 21    < > = values in this interval not displayed.       Microalbuminuria:  Lab Results   Component Value Date    [facility]  09/21/2023      Comment:      Unable to calculate, " urine albumin and/or urine creatinine is outside detectable limits.  Microalbuminuria is defined as an albumin:creatinine ratio of 17 to 299 for males and 25 to 299 for females. A ratio of albumin:creatinine of 300 or higher is indicative of overt proteinuria.  Due to biologic variability, positive results should be confirmed by a second, first-morning random or 24-hour timed urine specimen. If there is discrepancy, a third specimen is recommended. When 2 out of 3 results are in the microalbuminuria range, this is evidence for incipient nephropathy and warrants increased efforts at glucose control, blood pressure control, and institution of therapy with an angiotensin-converting-enzyme (ACE) inhibitor (if the patient can tolerate it).         Most recent GFRs:    Lab Results   Component Value Date    GFRESTIMATED 89 03/21/2024    GFRESTIMATED >90 12/14/2023    GFRESTIMATED >90 10/05/2023       Lab Results   Component Value Date    CPEPT 0.2 (L) 03/21/2024    GADAB <5.0 06/28/2023    ISCAB <1:4 06/28/2023     FIB-4 Calculation: 0.15 at 10/5/2023  2:35 PM  Calculated from:  SGOT/AST: 19 U/L at 10/5/2023  2:35 PM  SGPT/ALT: 11 U/L at 10/5/2023  2:35 PM  Platelets: 1,093 10e3/uL at 10/5/2023  2:35 PM  Age: 27 years  AST   Date Value Ref Range Status   03/21/2024 37 0 - 45 U/L Final     Comment:     Reference intervals for this test were updated on 6/12/2023 to more accurately reflect our healthy population. There may be differences in the flagging of prior results with similar values performed with this method. Interpretation of those prior results can be made in the context of the updated reference intervals.     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     Post-pancreatectomy diabetes: Though recent A1c  was elevated continuous glucose monitor shows improvement in her CGMS each week and her blood sugars now are at goal in the last 2 weeks.  She is heartily encouraged in her good work.  I am encouraging her to follow-up as needed with Mildred Graff and they will meet again in no more than 3 months.  I will plan to see her back in clinic in 6 months.  As long as control remains good I do not anticipate needing to screen for complications until 4 years from now.  We did review ADA recommendations for physical activity and strength training and I am encouraging her to work towards 150 minutes of aerobic exercise per week and strength or resistance training 2-3 times per week.     24 minutes spent on the date of the encounter doing chart review, history and exam, documentation, education and counseling, as well as communication and coordination of care, and further activities as noted above.      It is my privilege to be involved in the care of the above patient.     Caro Cormier PA-C, MPAS  [facility]  Diabetes, Endocrinology, and Metabolism  [phone] Appointments/Nurse  [phone] pager  [phone] nurse line    This note was completed in part using Dragon voice recognition, and may contain word and grammatical errors.  Joined the call at 11:10  Left the call at 11:30  Patient is at home provider is off-site platform is [employer].      Outcome for 04/19/24 10:15 AM: Data obtained via Naa website  Willow Cao LPN                           Outcome for 09/06/24 11:01 AM: Data obtained via Dexcom and [employer] website  Pamela Puente MA                          Again, thank you for allowing me to participate in the care of your patient.      Sincerely,    Caro Cormier PA-C

## 2024-09-10 NOTE — PATIENT INSTRUCTIONS
It was bridgette to meet with you today!  Please keep up your excellent work, it is paying off with great blood sugar management.  Please do work towards the American diabetes Association goals of 150 minutes of aerobic exercise or activity each week and strength or resistance training 2-3 times per week.  Of course please dedicate adequate time to taking good care of yourself and your mental health!    My best wishes,    Caro Cormier PA-C, MPAS  [facility]  Diabetes, Endocrinology, and Metabolism  [phone] Appointments/Nurse  [phone] Fax

## 2024-09-10 NOTE — NURSING NOTE
Current patient location:  [place]    Is the patient currently in the state of [place]? YES    Visit mode:VIDEO    If the visit is dropped, the patient can be reconnected by: VIDEO VISIT: Text to cell phone:   Telephone Information:   Mobile [phone]       Will anyone else be joining the visit? NO  (If patient encounters technical issues they should call [phone] :[phone])    How would you like to obtain your AVS? MyChart    Are changes needed to the allergy or medication list? No    Are refills needed on medications prescribed by this physician? NO    Rooming Documentation:  Questionnaire(s) completed      Reason for visit: Video Visit and RECHECK    Jadyn AGUSTIN

## 2024-09-17 DIAGNOSIS — K86.81 EXOCRINE PANCREATIC INSUFFICIENCY: ICD-10-CM

## 2024-09-17 DIAGNOSIS — Z90.410 ACQUIRED TOTAL ABSENCE OF PANCREAS: ICD-10-CM

## 2024-09-17 DIAGNOSIS — E89.1 POST-PANCREATECTOMY DIABETES (H): Primary | ICD-10-CM

## 2024-09-17 DIAGNOSIS — Z90.410 POST-PANCREATECTOMY DIABETES (H): Primary | ICD-10-CM

## 2024-09-17 DIAGNOSIS — E13.9 POST-PANCREATECTOMY DIABETES (H): Primary | ICD-10-CM

## 2024-10-03 ENCOUNTER — ALLIED HEALTH/NURSE VISIT (OUTPATIENT)
Dept: TRANSPLANT | Facility: CLINIC | Age: 29
End: 2024-10-03
Attending: SURGERY
Payer: COMMERCIAL

## 2024-10-03 ENCOUNTER — OFFICE VISIT (OUTPATIENT)
Dept: TRANSPLANT | Facility: CLINIC | Age: 29
End: 2024-10-03
Attending: SURGERY
Payer: COMMERCIAL

## 2024-10-03 ENCOUNTER — LAB (OUTPATIENT)
Dept: LAB | Facility: CLINIC | Age: 29
End: 2024-10-03
Attending: PEDIATRICS
Payer: COMMERCIAL

## 2024-10-03 VITALS
OXYGEN SATURATION: 95 % | HEART RATE: 69 BPM | TEMPERATURE: 98.4 F | DIASTOLIC BLOOD PRESSURE: 74 MMHG | RESPIRATION RATE: 16 BRPM | HEIGHT: 62 IN | WEIGHT: 141.09 LBS | SYSTOLIC BLOOD PRESSURE: 107 MMHG | BODY MASS INDEX: 25.96 KG/M2

## 2024-10-03 VITALS — WEIGHT: 141.09 LBS | BODY MASS INDEX: 25.81 KG/M2

## 2024-10-03 DIAGNOSIS — Z90.410 ACQUIRED TOTAL ABSENCE OF PANCREAS: ICD-10-CM

## 2024-10-03 DIAGNOSIS — E89.1 POST-PANCREATECTOMY DIABETES (H): ICD-10-CM

## 2024-10-03 DIAGNOSIS — Z90.410 POST-PANCREATECTOMY DIABETES (H): Primary | ICD-10-CM

## 2024-10-03 DIAGNOSIS — Z90.410 POST-PANCREATECTOMY DIABETES (H): ICD-10-CM

## 2024-10-03 DIAGNOSIS — E13.9 POST-PANCREATECTOMY DIABETES (H): Primary | ICD-10-CM

## 2024-10-03 DIAGNOSIS — Z48.298 AFTERCARE FOLLOWING ORGAN TRANSPLANT: Primary | ICD-10-CM

## 2024-10-03 DIAGNOSIS — K86.81 EXOCRINE PANCREATIC INSUFFICIENCY: ICD-10-CM

## 2024-10-03 DIAGNOSIS — E13.9 POST-PANCREATECTOMY DIABETES (H): ICD-10-CM

## 2024-10-03 DIAGNOSIS — E89.1 POST-PANCREATECTOMY DIABETES (H): Primary | ICD-10-CM

## 2024-10-03 LAB
ALBUMIN SERPL BCG-MCNC: 4.3 G/DL (ref 3.5–5.2)
ALP SERPL-CCNC: 125 U/L (ref 40–150)
ALT SERPL W P-5'-P-CCNC: 21 U/L (ref 0–50)
ANION GAP SERPL CALCULATED.3IONS-SCNC: 12 MMOL/L (ref 7–15)
AST SERPL W P-5'-P-CCNC: 34 U/L (ref 0–45)
BASOPHILS # BLD AUTO: 0.1 10E3/UL (ref 0–0.2)
BASOPHILS NFR BLD AUTO: 1 %
BILIRUB SERPL-MCNC: 0.2 MG/DL
BUN SERPL-MCNC: 16.8 MG/DL (ref 6–20)
CALCIUM SERPL-MCNC: 9.2 MG/DL (ref 8.8–10.4)
CHLORIDE SERPL-SCNC: 101 MMOL/L (ref 98–107)
CHOLEST SERPL-MCNC: 163 MG/DL
CREAT SERPL-MCNC: 0.9 MG/DL (ref 0.51–0.95)
EGFRCR SERPLBLD CKD-EPI 2021: 88 ML/MIN/1.73M2
EOSINOPHIL # BLD AUTO: 0.2 10E3/UL (ref 0–0.7)
EOSINOPHIL NFR BLD AUTO: 2 %
ERYTHROCYTE [DISTWIDTH] IN BLOOD BY AUTOMATED COUNT: 15.2 % (ref 10–15)
EST. AVERAGE GLUCOSE BLD GHB EST-MCNC: 186 MG/DL
FASTING STATUS PATIENT QL REPORTED: YES
FERRITIN SERPL-MCNC: 9 NG/ML (ref 6–175)
GLUCOSE SERPL-MCNC: 166 MG/DL (ref 70–99)
GLUCOSE SERPL-MCNC: 261 MG/DL (ref 70–99)
GLUCOSE SERPL-MCNC: 273 MG/DL (ref 70–99)
HBA1C MFR BLD: 8.1 %
HCO3 SERPL-SCNC: 23 MMOL/L (ref 22–29)
HCT VFR BLD AUTO: 31.5 % (ref 35–47)
HDLC SERPL-MCNC: 70 MG/DL
HGB BLD-MCNC: 10.6 G/DL (ref 11.7–15.7)
IMM GRANULOCYTES # BLD: 0 10E3/UL
IMM GRANULOCYTES NFR BLD: 0 %
IRON BINDING CAPACITY (ROCHE): 384 UG/DL (ref 240–430)
IRON SATN MFR SERPL: 5 % (ref 15–46)
IRON SERPL-MCNC: 20 UG/DL (ref 37–145)
LDLC SERPL CALC-MCNC: 75 MG/DL
LYMPHOCYTES # BLD AUTO: 2.8 10E3/UL (ref 0.8–5.3)
LYMPHOCYTES NFR BLD AUTO: 33 %
MCH RBC QN AUTO: 28.6 PG (ref 26.5–33)
MCHC RBC AUTO-ENTMCNC: 33.7 G/DL (ref 31.5–36.5)
MCV RBC AUTO: 85 FL (ref 78–100)
MONOCYTES # BLD AUTO: 0.9 10E3/UL (ref 0–1.3)
MONOCYTES NFR BLD AUTO: 11 %
NEUTROPHILS # BLD AUTO: 4.4 10E3/UL (ref 1.6–8.3)
NEUTROPHILS NFR BLD AUTO: 52 %
NONHDLC SERPL-MCNC: 93 MG/DL
NRBC # BLD AUTO: 0 10E3/UL
NRBC BLD AUTO-RTO: 0 /100
PLATELET # BLD AUTO: 584 10E3/UL (ref 150–450)
POTASSIUM SERPL-SCNC: 4.5 MMOL/L (ref 3.4–5.3)
PREALB SERPL-MCNC: 18.3 MG/DL (ref 20–40)
PROT SERPL-MCNC: 7 G/DL (ref 6.4–8.3)
RBC # BLD AUTO: 3.71 10E6/UL (ref 3.8–5.2)
SODIUM SERPL-SCNC: 136 MMOL/L (ref 135–145)
TRIGL SERPL-MCNC: 89 MG/DL
VIT B12 SERPL-MCNC: 388 PG/ML (ref 232–1245)
WBC # BLD AUTO: 8.4 10E3/UL (ref 4–11)

## 2024-10-03 PROCEDURE — 80053 COMPREHEN METABOLIC PANEL: CPT | Performed by: PATHOLOGY

## 2024-10-03 PROCEDURE — 99000 SPECIMEN HANDLING OFFICE-LAB: CPT | Performed by: PATHOLOGY

## 2024-10-03 PROCEDURE — 83036 HEMOGLOBIN GLYCOSYLATED A1C: CPT | Performed by: PEDIATRICS

## 2024-10-03 PROCEDURE — 82607 VITAMIN B-12: CPT | Performed by: PEDIATRICS

## 2024-10-03 PROCEDURE — 83550 IRON BINDING TEST: CPT | Performed by: PATHOLOGY

## 2024-10-03 PROCEDURE — 84446 ASSAY OF VITAMIN E: CPT | Mod: 90 | Performed by: PATHOLOGY

## 2024-10-03 PROCEDURE — 84590 ASSAY OF VITAMIN A: CPT | Mod: 90 | Performed by: PATHOLOGY

## 2024-10-03 PROCEDURE — 80061 LIPID PANEL: CPT | Performed by: PATHOLOGY

## 2024-10-03 PROCEDURE — 84681 ASSAY OF C-PEPTIDE: CPT | Performed by: PEDIATRICS

## 2024-10-03 PROCEDURE — 82725 ASSAY OF BLOOD FATTY ACIDS: CPT | Mod: 90 | Performed by: PATHOLOGY

## 2024-10-03 PROCEDURE — 84630 ASSAY OF ZINC: CPT | Mod: 90 | Performed by: PATHOLOGY

## 2024-10-03 PROCEDURE — 36415 COLL VENOUS BLD VENIPUNCTURE: CPT | Performed by: PATHOLOGY

## 2024-10-03 PROCEDURE — 85025 COMPLETE CBC W/AUTO DIFF WBC: CPT | Performed by: PATHOLOGY

## 2024-10-03 PROCEDURE — 83540 ASSAY OF IRON: CPT | Performed by: PATHOLOGY

## 2024-10-03 PROCEDURE — 82728 ASSAY OF FERRITIN: CPT | Performed by: PATHOLOGY

## 2024-10-03 PROCEDURE — G0463 HOSPITAL OUTPT CLINIC VISIT: HCPCS | Performed by: SURGERY

## 2024-10-03 PROCEDURE — 82306 VITAMIN D 25 HYDROXY: CPT | Performed by: PEDIATRICS

## 2024-10-03 PROCEDURE — 99213 OFFICE O/P EST LOW 20 MIN: CPT | Performed by: SURGERY

## 2024-10-03 PROCEDURE — 84134 ASSAY OF PREALBUMIN: CPT | Performed by: PEDIATRICS

## 2024-10-03 ASSESSMENT — PAIN SCALES - GENERAL: PAINLEVEL: NO PAIN (0)

## 2024-10-03 NOTE — NURSING NOTE
"Chief Complaint   Patient presents with    RECHECK     1 year post TPIAT     Vital signs:  Temp: 98.4  F (36.9  C) Temp src: Oral BP: 107/74 Pulse: 69   Resp: 16 SpO2: 95 %     Height: 157.5 cm (5' 2.01\") Weight: 64 kg (141 lb 1.5 oz)  Estimated body mass index is 25.8 kg/m  as calculated from the following:    Height as of this encounter: 1.575 m (5' 2.01\").    Weight as of this encounter: 64 kg (141 lb 1.5 oz).      Matilda Laureano, [facility]  10/3/2024 1:30 PM    "

## 2024-10-03 NOTE — Clinical Note
10/3/2024      Artie Burger  [address]  [place] [facility] [place] [facility] [address]      Dear Colleague,    Thank you for referring your patient, Artie Burger, to the [facility]. Please see a copy of my visit note below.    No notes on file    Again, thank you for allowing me to participate in the care of your patient.        Sincerely,        Junito Patel MD

## 2024-10-03 NOTE — NURSING NOTE
Administered Boost: 360 ml at 11:13    FBS: 166    Called lab with boost time : Called at 11:15 and advised of tests at 12:13 pm and 1:13 pm    Patient is aware and given time to return to lab:  Yes,and was informed of tests at 12:13 pm and 1:13 pm    Hudson Ireland CMA on 10/3/2024 at 11:16 AM

## 2024-10-04 LAB
C PEPTIDE SERPL-MCNC: 0.2 NG/ML (ref 0.9–6.9)
C PEPTIDE SERPL-MCNC: 0.3 NG/ML (ref 0.9–6.9)
C PEPTIDE SERPL-MCNC: <0.1 NG/ML (ref 0.9–6.9)
FASTING STATUS PATIENT QL REPORTED: YES

## 2024-10-05 ENCOUNTER — HEALTH MAINTENANCE LETTER (OUTPATIENT)
Age: 29
End: 2024-10-05

## 2024-10-05 LAB — ZINC SERPL-MCNC: 57.3 UG/DL

## 2024-10-06 LAB
A-TOCOPHEROL VIT E SERPL-MCNC: 6.4 MG/L
ANNOTATION COMMENT IMP: NORMAL
BETA+GAMMA TOCOPHEROL SERPL-MCNC: 1.6 MG/L
RETINYL PALMITATE SERPL-MCNC: <0.02 MG/L
VIT A SERPL-MCNC: 0.41 MG/L

## 2024-10-07 LAB
DEPRECATED CALCIDIOL+CALCIFEROL SERPL-MC: <41 UG/L (ref 20–75)
VITAMIN D2 SERPL-MCNC: <5 UG/L
VITAMIN D3 SERPL-MCNC: 36 UG/L

## 2024-10-08 LAB
A-LINOLENATE SERPL-SCNC: 68 NMOL/ML (ref 50–130)
AA SERPL-SCNC: 736 NMOL/ML (ref 520–1490)
ARACHIDATE SERPL-SCNC: 30 NMOL/ML (ref 50–90)
CLINICAL BIOCHEMIST REVIEW: ABNORMAL
DHA SERPL-SCNC: 92 NMOL/ML (ref 30–250)
DOCOSAPENTAENATE W6 SERPL-SCNC: 26 NMOL/ML (ref 10–70)
DOCOSATETRAENOATE SERPL-SCNC: 29 NMOL/ML (ref 10–80)
DOCOSENOATE SERPL-SCNC: 8 NMOL/ML (ref 4–13)
DPA SERPL-SCNC: 54 NMOL/ML (ref 20–210)
EPA SERPL-SCNC: 40 NMOL/ML (ref 14–100)
FA SERPL-SCNC: 11.5 MMOL/L (ref 7.3–16.8)
G-LINOLENATE SERPL-SCNC: 48 NMOL/ML (ref 16–150)
HEXADECENOATE SERPL-SCNC: 50 NMOL/ML (ref 25–105)
HOMO-G LINOLENATE SERPL-SCNC: 208 NMOL/ML (ref 50–250)
LAURATE SERPL-SCNC: 19 NMOL/ML (ref 6–90)
LINOLEATE SERPL-SCNC: 3523 NMOL/ML (ref 2270–3850)
MEAD ACID SERPL-SCNC: 33 NMOL/ML (ref 7–30)
MONOUNSAT FA SERPL-SCNC: 3 MMOL/L (ref 1.3–5.8)
MYRISTATE SERPL-SCNC: 115 NMOL/ML (ref 30–450)
NERVONATE SERPL-SCNC: 114 NMOL/ML (ref 60–100)
OCTADECANOATE SERPL-SCNC: 787 NMOL/ML (ref 590–1170)
OLEATE SERPL-SCNC: 2276 NMOL/ML (ref 650–3500)
PALMITATE SERPL-SCNC: 2530 NMOL/ML (ref 1480–3730)
PALMITOLEATE SERPL-SCNC: 338 NMOL/ML (ref 110–1130)
POLYUNSAT FA SERPL-SCNC: 4.9 MMOL/L (ref 3.2–5.8)
SAT FA SERPL-SCNC: 3.6 MMOL/L (ref 2.5–5.5)
TRIENOATE/AA SERPL-SRTO: 0.04 {RATIO} (ref 0.01–0.04)
VACCENATE SERPL-SCNC: 234 NMOL/ML (ref 280–740)
W3 FA SERPL-SCNC: 0.3 MMOL/L (ref 0.2–0.5)
W6 FA SERPL-SCNC: 4.6 MMOL/L (ref 3–5.4)

## 2024-10-14 ENCOUNTER — VIRTUAL VISIT (OUTPATIENT)
Dept: PSYCHOLOGY | Facility: CLINIC | Age: 29
End: 2024-10-14
Attending: PSYCHIATRY & NEUROLOGY
Payer: COMMERCIAL

## 2024-10-14 DIAGNOSIS — F33.1 MAJOR DEPRESSIVE DISORDER, RECURRENT EPISODE, MODERATE (H): Primary | ICD-10-CM

## 2024-10-14 DIAGNOSIS — F41.1 GENERALIZED ANXIETY DISORDER: ICD-10-CM

## 2024-10-14 DIAGNOSIS — Z13.39 ADHD (ATTENTION DEFICIT HYPERACTIVITY DISORDER) EVALUATION: ICD-10-CM

## 2024-10-14 PROCEDURE — 90832 PSYTX W PT 30 MINUTES: CPT | Mod: 95

## 2024-10-14 ASSESSMENT — COLUMBIA-SUICIDE SEVERITY RATING SCALE - C-SSRS
3. HAVE YOU BEEN THINKING ABOUT HOW YOU MIGHT KILL YOURSELF?: NO
2. HAVE YOU ACTUALLY HAD ANY THOUGHTS OF KILLING YOURSELF?: YES
2. HAVE YOU ACTUALLY HAD ANY THOUGHTS OF KILLING YOURSELF?: PASSIVE SI
1. IN THE PAST MONTH, HAVE YOU WISHED YOU WERE DEAD OR WISHED YOU COULD GO TO SLEEP AND NOT WAKE UP?: NO
1. HAVE YOU WISHED YOU WERE DEAD OR WISHED YOU COULD GO TO SLEEP AND NOT WAKE UP?: YES
2. HAVE YOU ACTUALLY HAD ANY THOUGHTS OF KILLING YOURSELF?: NO
4. HAVE YOU HAD THESE THOUGHTS AND HAD SOME INTENTION OF ACTING ON THEM?: NO
5. HAVE YOU STARTED TO WORK OUT OR WORKED OUT THE DETAILS OF HOW TO KILL YOURSELF? DO YOU INTEND TO CARRY OUT THIS PLAN?: NO

## 2024-10-14 ASSESSMENT — ANXIETY QUESTIONNAIRES
2. NOT BEING ABLE TO STOP OR CONTROL WORRYING: MORE THAN HALF THE DAYS
7. FEELING AFRAID AS IF SOMETHING AWFUL MIGHT HAPPEN: SEVERAL DAYS
GAD7 TOTAL SCORE: 12
3. WORRYING TOO MUCH ABOUT DIFFERENT THINGS: MORE THAN HALF THE DAYS
5. BEING SO RESTLESS THAT IT IS HARD TO SIT STILL: SEVERAL DAYS
1. FEELING NERVOUS, ANXIOUS, OR ON EDGE: NEARLY EVERY DAY
IF YOU CHECKED OFF ANY PROBLEMS ON THIS QUESTIONNAIRE, HOW DIFFICULT HAVE THESE PROBLEMS MADE IT FOR YOU TO DO YOUR WORK, TAKE CARE OF THINGS AT HOME, OR GET ALONG WITH OTHER PEOPLE: SOMEWHAT DIFFICULT
6. BECOMING EASILY ANNOYED OR IRRITABLE: SEVERAL DAYS
GAD7 TOTAL SCORE: 12

## 2024-10-14 ASSESSMENT — PATIENT HEALTH QUESTIONNAIRE - PHQ9
SUM OF ALL RESPONSES TO PHQ QUESTIONS 1-9: 15
5. POOR APPETITE OR OVEREATING: MORE THAN HALF THE DAYS
10. IF YOU CHECKED OFF ANY PROBLEMS, HOW DIFFICULT HAVE THESE PROBLEMS MADE IT FOR YOU TO DO YOUR WORK, TAKE CARE OF THINGS AT HOME, OR GET ALONG WITH OTHER PEOPLE: VERY DIFFICULT
SUM OF ALL RESPONSES TO PHQ QUESTIONS 1-9: 15

## 2024-10-14 NOTE — PROGRESS NOTES
[facility]   Mental Health & Addiction Services     Progress Note - Initial Visit    Client Name:  Artie Burger Date: 2024         Service Type: Individual     Visit Start Time: 2:30 pm  Visit End Time: 3:04 pm    Visit #: 1    Attendees: Client attended alone    Service Modality:  Video Visit:      Provider verified identity through the following two step process.  Patient provided:  Patient  and Patient address    Telemedicine Visit: The patient's condition can be safely assessed and treated via synchronous audio and visual telemedicine encounter.      Reason for Telemedicine Visit: Services only offered telehealth    Originating Site (Patient Location): Patient's home    Distant Site (Provider Location): Provider Remote Setting- Home Office    Consent:  The patient/guardian has verbally consented to: the potential risks and benefits of telemedicine (video visit) versus in person care; bill my insurance or make self-payment for services provided; and responsibility for payment of non-covered services.     Patient would like the video invitation sent by:  Send to e-mail at: [email or web address]    Mode of Communication:  Video Conference via [facility]    Distant Location (Provider):  Off-site    As the provider I attest to compliance with applicable laws and regulations related to telemedicine.       DATA:  Extended Session (53+ minutes): No  Interactive Complexity: No   Crisis: No     Presenting Concerns/Current Stressors:   Patient presented to session to initiate the ADHD evaluation process.           2024    10:39 PM 2024    10:01 AM 10/14/2024     1:17 PM   PHQ   PHQ-9 Total Score 9    9 11 15   Q9: Thoughts of better off dead/self-harm past 2 weeks Not at all Not at all Not at all            2024     1:16 PM 9/3/2024    11:30 AM 10/14/2024     2:45 PM   ALAN-7 SCORE   Total Score 12 (moderate anxiety) 8 (mild anxiety)    Total Score 12    12 8    8 12        ASSESSMENT:  Mental Status Assessment:  Appearance:   Appropriate   Eye Contact:   Good   Psychomotor Behavior: Normal   Attitude:   Cooperative   Orientation:   All  Speech   Rate / Production: Normal/ Responsive   Volume:  Normal   Mood:    Normal  Affect:    Appropriate   Thought Content:  Clear   Thought Form:  Coherent   Insight:    Good       Safety Issues and Plan for Safety and Risk Management:   [place] Suicide Severity Rating Scale (Lifetime/Recent)      4/4/2024     6:12 AM 8/7/2024     9:31 AM 10/14/2024     2:32 PM   [place] Suicide Severity Rating (Lifetime/Recent)   Q1 Wished to be Dead (Past Month) 0-->no     Q2 Suicidal Thoughts (Past Month) 0-->no     Q6 Suicide Behavior (Lifetime) 0-->no     Level of Risk per Screen no risks indicated     Q1 Wish to be Dead (Lifetime)  Y Y   Wish to be Dead Description (Lifetime)   6 yrs ago - passive SI   1. Wish to be Dead (Past 1 Month)  N N   Q2 Non-Specific Active Suicidal Thoughts (Lifetime)  N Y   Non-Specific Active Suicidal Thought Description (Lifetime)   passive SI   2. Non-Specific Active Suicidal Thoughts (Past 1 Month)   N   3. Active Suicidal Ideation with any Methods (Not Plan) Without Intent to Act (Lifetime)   N   Q4 Active Suicidal Ideation with Some Intent to Act, Without Specific Plan (Lifetime)   N   Q5 Active Suicidal Ideation with Specific Plan and Intent (Lifetime)   N   Most Severe Ideation Rating (Lifetime)  1    Frequency (Lifetime)  2    Duration (Lifetime)  1    Controllability (Lifetime)  1    Deterrents (Lifetime)  1    Reasons for Ideation (Lifetime)  5    Actual Attempt (Lifetime)  N    Has subject engaged in non-suicidal self-injurious behavior? (Lifetime)  Y Y   Has subject engaged in non-suicidal self-injurious behavior? (Past 3 Months)  N    Interrupted Attempts (Lifetime)  N    Aborted or Self-Interrupted Attempt (Lifetime)  N    Preparatory Acts or Behavior (Lifetime)  N    Calculated C-SSRS Risk Score  (Lifetime/Recent)  No Risk Indicated No Risk Indicated     Patient denies current fears or concerns for personal safety.  Patient denies current or recent suicidal ideation or behaviors.  Patient denies current or recent homicidal ideation or behaviors.  Patient denies current or recent self injurious behavior or ideation.  Patient denies other safety concerns.  Recommended that patient call 911 or go to the local ED should there be a change in any of these risk factors.   Patient reports there are no firearms in the house.    Diagnostic Criteria:  A. Excessive anxiety and worry, occurring more days than not for at least 6 months about a number of events or activities.   B. The individual finds it difficult to control the worry.  C. The anxiety and worry are associated with 3 or more of 6 symptoms.  D. The anxiety, worry, or physical symptoms cause clinically significant distress or impairment in social, occupational, or other important areas of functioning.  E. The disturbance is not attributable to the physiological effects of a substance (e.g., a drug of abuse, a medication) or another medical condition (e.g., hyperthyroidism).  F. The disturbance is not better explained by another mental disorder (e.g., anxiety or worry about having panic attacks in panic disorder, negative evaluation in social anxiety disorder [social phobia], contamination or other obsessions in obsessive-compulsive disorder, separation from attachment figures in separation anxiety disorder, reminders of traumatic events in posttraumatic stress disorder, gaining weight in anorexia nervosa, physical complaints in somatic symptom disorder, perceived appearance flaws in body dysmorphic disorder, having a serious illness in illness anxiety disorder, or the content of delusional beliefs in schizophrenia or delusional disorder).     A. Five (or more) symptoms have been present during the same 2-week period and represent a change from previous  functioning; at least one of the symptoms is either (1) depressed mood or (2) loss of interest or pleasure.   Depressed mood.   Diminished interest or pleasure in all, or almost all, activities.   Significant appetite change.  Significant sleep change.   Fatigue or loss of energy.   Feelings of worthlessness or inappropriate guilt.   Diminished ability to think or concentrate, or indecisiveness.   Psychomotor agitation or lethargy.   Recurrent thoughts of death.   B. The symptoms cause clinically significant distress or impairment in social, occupational, or other important areas of functioning.  C. The episode is not attributable to the physiological effects of a substance or to another medical condition.  D. The occurence of major depressive episode is not better explained by other thought / psychotic disorders.  E. There has never been a manic episode or hypomanic episode.        DSM5 Diagnoses: (Sustained by DSM5 Criteria Listed Above)  Diagnoses:     Generalized anxiety disorder  Major depressive disorder, recurrent episode, moderate  Attention deficit hyperactivity disorder (ADHD) evaluation    Psychosocial & Contextual Factors: previous mental health concerns, physical health concerns    PROMIS-10 Scores        6/22/2023     4:55 PM 8/1/2024     1:16 PM 10/11/2024    10:08 AM   PROMIS-10 Total Score w/o Sub Scores   PROMIS TOTAL - SUBSCORES  25    25 24       Information is confidential and restricted. Go to Review Flowsheets to unlock data.    Multiple values from one day are sorted in reverse-chronological order        Intervention:              Reviewed symptoms and history of presenting concern. Patient endorsed symptoms consistent with depression , anxiety , OCD, trauma, and ADHD. Patient denied symptoms associated with rajni, panic, Autism, perceptual difficulties, and disordered eating. Unable to complete diagnostic intake, will be completed in next session. Offered suggestions about behavioral changes  to assist with concentration.    CBT: socratic questioning, positive reinforcement  EFT: empathetic attunement, emotion checking, emotion naming  MI: open ended questions, affirmations, reflections        Attendance Agreement:  Client has not signed the attendance agreement. Discussed expectations at beginning of this first session and patient agreed.       PLAN:  Provider will continue Diagnostic Assessment in next session. Patient will complete Keena questionnaires  prior to next session (10/21/2024).    Patient meets the following risk assessment and triage: Patient has no change in safety concerns. Committed to safety and agreed to follow previously developed safety plan.    Medical necessity criteria is warranted in order to: Measure behavioral factors that impact disease management in scenarios that include but are not limited to: (a) pre-surgical evaluation to identify psychological factors that may potentially affect or complicate the outcome of surgical procedures and/or aftercare (e.g., spinal surgery, bariatric surgery); (b) assessment of emotional/personality factors impacting physical disease management and ability to comply with and benefit from medical interventions; (c) assessment of psychological factors in chronic pain patients; and (d) compliance to treatment regimens, Measure psychological barriers and strengths to aid in treatment planning, including but not limited to the selection of treatment options when several different approaches may be indicated, to determine treatment prognosis and outcomes, or to identify reasons for poor response to treatment, Perform symptom measurement to objectively measure treatment effectiveness and/or determine the need to refer for pharmacological treatment or other medical evaluation (e.g., based on severity and chronicity of symptoms), and Evaluate primary symptoms of impaired attention and concentration that can occur in many neurological and psychiatric  conditions.    Medical necessity for psychological assessment is warranted as a result of the following: (1) A specific clinical question is posed that relates to the condition/symptoms being addressed (2) The question cannot be adequately addressed by clinical interview and/or behavioral observation (3) Results of psychological testing are reasonably expected to provide an answer to the query (4) It is reasonably expected that the testing will provide information leading to a clearer diagnosis and/or guide treatment planning with an expectation of improved clinical outcome.    I acknowledge that, based upon current clinical information, the patient and I have reviewed and discussed issues pertaining to the purpose of therapy/testing, potential therapeutic goals, procedures, risks and benefits, and estimated duration of therapy/testing. Issues pertaining to fees/insurance and confidentiality were also addressed with the patient, who indicated understanding and elected to continue with appointments. I will not be providing any experimental procedures and, if we agree that a change in clinical procedure would be more beneficial, I will obtain specific consent for that procedure or refer you to another provider who has expertise in that area.       Tamanna Claudio MA  Doctoral Psychology Intern  10/14/2024 at 3:13 PM   Answers submitted by the patient for this visit:  Patient Health Questionnaire (Submitted on 10/14/2024)  If you checked off any problems, how difficult have these problems made it for you to do your work, take care of things at home, or get along with other people?: Very difficult  PHQ9 TOTAL SCORE: 15

## 2024-10-21 ENCOUNTER — VIRTUAL VISIT (OUTPATIENT)
Dept: PSYCHOLOGY | Facility: CLINIC | Age: 29
End: 2024-10-21
Payer: COMMERCIAL

## 2024-10-21 DIAGNOSIS — F41.1 GENERALIZED ANXIETY DISORDER: ICD-10-CM

## 2024-10-21 DIAGNOSIS — F33.1 MAJOR DEPRESSIVE DISORDER, RECURRENT EPISODE, MODERATE (H): Primary | ICD-10-CM

## 2024-10-21 DIAGNOSIS — Z13.39 ADHD (ATTENTION DEFICIT HYPERACTIVITY DISORDER) EVALUATION: ICD-10-CM

## 2024-10-21 PROCEDURE — 90791 PSYCH DIAGNOSTIC EVALUATION: CPT | Mod: 95

## 2024-10-21 ASSESSMENT — PATIENT HEALTH QUESTIONNAIRE - PHQ9
SUM OF ALL RESPONSES TO PHQ QUESTIONS 1-9: 15
SUM OF ALL RESPONSES TO PHQ QUESTIONS 1-9: 15
10. IF YOU CHECKED OFF ANY PROBLEMS, HOW DIFFICULT HAVE THESE PROBLEMS MADE IT FOR YOU TO DO YOUR WORK, TAKE CARE OF THINGS AT HOME, OR GET ALONG WITH OTHER PEOPLE: VERY DIFFICULT

## 2024-10-21 NOTE — PROGRESS NOTES
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 Potential errors may result and are unintentional.     Tamanna Claudio MA  Doctoral Psychology Intern  10/21/2024 at 1:20 PM

## 2024-10-23 ENCOUNTER — MYC REFILL (OUTPATIENT)
Dept: PSYCHIATRY | Facility: CLINIC | Age: 29
End: 2024-10-23
Payer: COMMERCIAL

## 2024-10-23 DIAGNOSIS — F41.1 GAD (GENERALIZED ANXIETY DISORDER): ICD-10-CM

## 2024-10-24 RX ORDER — LORAZEPAM 0.5 MG/1
0.5 TABLET ORAL DAILY PRN
Qty: 10 TABLET | Refills: 1 | Status: SHIPPED | OUTPATIENT
Start: 2024-10-24

## 2024-10-24 NOTE — TELEPHONE ENCOUNTER
Date of Last Office Visit: 9/6/24  Date of Next Office Visit:  11/4/24  No shows since last visit: No  More than one patient-initiated cancellation (with reschedule) since last seen in clinic? No    []Medication refilled per  Medication Refill in Ambulatory Care  policy.  [x]Medication unable to be refilled by RN due to criteria not met as indicated below:    []Eligibility: has not had a provider visit within last 6 months   []Supervision: no future appointment; < 7 days before next appointment   []Compliance: no shows; cancellations; lapse in therapy   []Verification: order discrepancy; may need modification...   [] > 30-day supply request   []Advanced refill request: > 7 days before refill date   [x]Controlled medication   []Medication not included in policy   []Review: new med; med adjusted <= 30 days; safety alert; requires lab monitoring...   []Scope of Practice: refill request processed by LPN/MA   []Other:      Medication(s) requested:     -  LORazepam (ATIVAN) 0.5 MG tablet  Date last ordered: 8/20/24  Qty: 10  Refills: 1  Appropriate for refill? Yes    Any Controlled Substance(s)? Yes   [place]   Other [place] ?: Yes   If yes, are any new medications? No    Pay: 0   Showing 1-15 of 51 Items View    of 4   Filled  Written  Sold  ID  Drug  QTY  Days  Prescriber  RX #  Dispenser  Refill  Daily Dose*  Pymt Type      10/21/2024 04/10/2024 10/21/2024 1 Gabapentin 600 Mg Tablet 60.00 30 Ka Bobby [id number] Ait (5176) 6/3  [place]   09/24/2024 04/10/2024 09/24/2024 1 Gabapentin 600 Mg Tablet 60.00 30 Ka Bobby [id number] Ait (5176) 5/3  [place]   08/27/2024 04/10/2024 08/27/2024 1 Gabapentin 600 Mg Tablet 60.00 30 Ka Bobby [id number] Ait (5176) 4/3  [place]   08/20/2024 08/07/2024 08/20/2024 1 Lorazepam 0.5 Mg Tablet 10.00 30 Al Bau [id number] Ait (5176) 0/1 0.17 L[place]   07/31/2024 04/10/2024 07/31/2024 1 Gabapentin 600 Mg Tablet 60.00 30 Ka Bobby [id number] [facility] (5176) 3/3  Comm Maicol JEROME    07/23/2024 07/23/2024 07/24/2024 1 Lorazepam 0.5 Mg Tablet 20.00 10 Li Tho [id number] Ait (5176) 0/0 1.00 [place]   07/08/2024 07/08/2024 07/08/2024 1 Lorazepam 0.5 Mg Tablet 20.00 10 Em Gor [id number] Ait (5176) 0/0 1.00 [place]   07/01/2024 04/10/2024 07/01/2024 1 Gabapentin 600 Mg Tablet 60.00 30 Ka Bobby             Requested medication(s) verified as identical to current order? Yes    Any lapse in adherence to medication(s) greater than 5 days? N/A     Additional action taken? none.      Last visit treatment plan:   9/6/24  Treatment Plan:  Continue BuSpar/buspirone 10 mg daily for anxiety.  Continue lorazepam/Ativan 0.5 mg daily as needed for panic attacks/severe anxiety only.  Refilled for 10 tablets per 30 days only.  Continue to try to lessen reliance on lorazepam.  Continue gabapentin 600 mg twice daily for pain and anxiety-as prescribed by other prescriber.  Continue propranolol up to 10-30 mg twice daily as needed for anxiety symptoms.  Continue Wellbutrin  mg daily for depression.    Discontinue sertraline.   Keep appts for ADHD testing.  See below for additional external testing options if wait is too long internally within [facility].  Continue all other cares per primary care provider.   Continue all other medications as reviewed per electronic medical record today.   Safety plan reviewed. To the Emergency Department as needed or call after hours crisis line at [phone] or [phone]. [place] Crisis Text Line. Text [place] to 097670 or Suicide LifeLine Chat: suicidepreventionlifeline.org/chat  Continue therapy as planned.   Schedule an appointment with me in 2 months or sooner as needed. Call [facility] at [phone] to schedule.  Follow up with primary care provider as planned or for acute medical concerns.  Call the psychiatric nurse line with medication questions or concerns at [phone].  Picuriohart may be used to communicate with your provider, but this  is not intended to be used for emergencies.     Administrative Billing:   Phone Call/Video Duration: 10 Minutes  Start: 11:04a  Stop: 11:14a     The longitudinal plan of care for the diagnosis(es)/condition(s) as documented were addressed during this visit. Due to the added complexity in care, I will continue to support Angeliquestevejalen in the subsequent management and with ongoing continuity of care.     Episode of Care #: 2     Patient Status:  Patient will continue to be seen for ongoing consultation and stabilization.     Signed:   Agnes Singleton, DO  [facility]    Any medication(s) require lab monitoring? No

## 2024-10-31 NOTE — PROGRESS NOTES
[facility]  November 4, 2024      Behavioral Health Clinician Progress Note    Patient Name: Artie Burger           Service Type:  Individual      Service Location:   MyChart / Email (patient reached)     Session Start Time: 1035am  Session End Time: 1056am      Session Length: 16 - 37      Attendees: Client     Service Modality:  Video Visit:      Provider verified identity through the following two step process.  Patient provided:  Patient is known previously to provider    Telemedicine Visit: The patient's condition can be safely assessed and treated via synchronous audio and visual telemedicine encounter.      Reason for Telemedicine Visit: Services only offered telehealth    Originating Site (Patient Location): Patient's home    Distant Site (Provider Location): [facility] MENTAL HEALTH & ADDICTION [facility]    Consent:  The patient/guardian has verbally consented to: the potential risks and benefits of telemedicine (video visit) versus in person care; bill my insurance or make self-payment for services provided; and responsibility for payment of non-covered services.     Patient would like the video invitation sent by:  My Chart    Mode of Communication:  Video Conference via [facility]    Distant Location (Provider):  On-site    As the provider I attest to compliance with applicable laws and regulations related to telemedicine.    Visit Activities (Refresh list every visit): [facility] Only    Diagnostic Assessment Date: 8/7/24 Daina Daniel MA [facility]; 10/21/24 Tamanna Claudio  Treatment Plan Review Date: 9/6/24  See Flowsheets for today's PHQ-9 and ALAN-7 results  Previous PHQ-9:       9/5/2024    10:01 AM 10/14/2024     1:17 PM 10/21/2024    10:17 AM   PHQ-9 SCORE   PHQ-9 Total Score Lazarat 11 (Moderate depression) 15 (Moderately severe depression) 15 (Moderately severe depression)   PHQ-9 Total Score 11 15 15     Previous ALAN-7:       8/1/2024     1:16 PM  9/3/2024    11:30 AM 10/14/2024     2:45 PM   ALAN-7 SCORE   Total Score 12 (moderate anxiety) 8 (mild anxiety)    Total Score 12    12 8    8 12           DATA  Extended Session (60+ minutes): No  Interactive Complexity: No  Crisis: No  [facility] Patient: No    Treatment Objective(s) Addressed in This Session:  Target Behavior(s): disease management/lifestyle changes reducing sx    Depressed Mood: Decrease frequency and intensity of feeling down, depressed, hopeless  Anxiety: will develop more effective coping skills to manage anxiety symptoms    Current Stressors / Issues:  MH update:  Depression has increased.  Lack of motivation.  Sadness.  Apathy.  Anxiety a little higher.  Feeling more emotional and tearful.  Stresses:  Celebrated birthday.  Reduced ADL's.   Relationship is going well.  Starting school January.  Has to figure out $.  On academic probation from younger years.  Only going to try one class.  Appetite: T1D  Sleep: Increased.  Waking up a lot more at night.   Outpatient Provider updates: Started ADHD testing process.  [facility] EM[facility]  SI/SIB/HI: Denies current.  Hx of passive ideation.  Denies previous attempts.  Hx of SIB as teen.  Denies need for safety plan or crisis resources.  AMPARO:  Sober 4 years ETOH.  No meetings.  Friend support.  Daily medical THC use.  Side effects/compliance:  Interventions:  [facility] engaged in validation and support  Most important:  Depression sx a bit higher    9/6  MH update: stopped zoloft, start wellbutrin.  Got brain zaps.  Did slower taper.  On 12mg for the last week Zoloft. On 300mg Wellbutrin. Anxiety is better.  Better motivation.  Mood is lifted. Getting get out of bed and things done.   Anxiety is better now after medication taper is address.  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    Appetite: T1D  Sleep: High fatigue.  On going anemia  Outpatient Provider updates: ADHD testing 10/14 in [place].   [facility]  started EMDR again  SI/SIB/HI: Denies current.  Hx of passive ideation.  Denies previous attempts.  Hx of SIB as teen.  Denies need for safety plan or crisis resources.  AMPARO:  Sober 4 years ETOH.  No meetings.  Friend support.  Daily medical THC use.  Side effects/compliance:  Interventions:  [facility] discussed TIPP skills for anxiety mgmt  Most important:  Doing better after med change.      8/7  MH update:  ROS above  Stresses:  Medical stressors.  Goals of wanting to obtain PT employment.  Dad's death as teen  Appetite: N/a.  T1D.  Sleep: Poor- related to anxiety.  No sleep study.  Night sweats.  Nightmares  Outpatient Provider updates: [facility]  SI/SIB/HI:  Denies current.  Hx of passive ideation.  Denies previous attempts.  Hx of SIB as teen.  Denies need for safety plan or crisis resources.  AMPARO:  Sober 4 years ETOH.  No meetings.  Friend support.  Daily medical THC use.  Preg:  IUD  Side effects/compliance:  Interventions:  [facility] engaged in completing DA with psychoeducation and tx planning  Most important:  Different meds?  Higher dose?   ADHD testing referral?    Progress on Treatment Objective(s) / Homework:  New Objective established this session - CONTEMPLATION (Considering change and yet undecided); Intervened by assessing the negative and positive thinking (ambivalence) about behavior change    Motivational Interviewing    MI Intervention: Co-Developed Goal: reducing sx and Expressed Empathy/Understanding     Change Talk Expressed by the Patient: Desire to change Ability to change    Provider Response to Change Talk: E - Evoked more info from patient about behavior change and A - Affirmed patient's thoughts, decisions, or attempts at behavior change    Also provided psychoeducation about behavioral health condition, symptoms, and treatment options    Assessments completed prior to visit:  The following assessments were completed by patient for  this visit:  PHQ2:       9/4/2024    10:47 AM 8/14/2024     9:44 AM 6/12/2024    12:49 PM 4/23/2024     1:54 PM 3/27/2024    10:04 AM 1/9/2024     1:50 PM 4/4/2023    10:27 AM   PHQ-2 ( 1999 [employer])   Q1: Little interest or pleasure in doing things 1 1 1 1 1 0 1   Q2: Feeling down, depressed or hopeless 1 1 2 1 1 0 2   PHQ-2 Score 2 2 3 2 2 0 3     GAD2:       8/1/2024     1:16 PM 9/3/2024    11:30 AM 10/11/2024    10:06 AM 10/21/2024    10:17 AM   ALAN-2   Feeling nervous, anxious, or on edge 3  2  1  1    Not being able to stop or control worrying 3  1  1  1    ALAN-2 Total Score 6    6 3    3 2 2       Patient-reported    Multiple values from one day are sorted in reverse-chronological order       Care Plan review completed: Yes    Medication Review:  Changes to psychiatric medications, see updated Medication List in EPIC.     Medication Compliance:  Yes    Changes in Health Issues:   None reported    Chemical Use Review:   Substance Use: Chemical use reviewed, no active concerns identified      Tobacco Use: No current tobacco use.      Assessment: Current Emotional / Mental Status (status of significant symptoms):  Risk status (Self / Other harm or suicidal ideation)  Patient has had a history of suicidal ideation: hx of passive ideation without planning intent or previous attempts and self-injurious behavior: hx of such as teen  Patient denies current fears or concerns for personal safety.  Patient denies current or recent suicidal ideation or behaviors.  Patient denies current or recent homicidal ideation or behaviors.  Patient denies current or recent self injurious behavior or ideation.  Patient denies other safety concerns.  A safety and risk management plan has not been developed at this time, however patient was encouraged to call [place] [phone] should there be a change in any of these risk factors.    Appearance:   Appropriate   Eye Contact:   Good   Psychomotor Behavior: Normal    Attitude:   Cooperative   Orientation:   All  Speech   Rate / Production: Normal    Volume:  Normal   Mood:    Normal  Affect:    Appropriate   Thought Content:  Clear   Thought Form:  Coherent  Logical   Insight:    Good     Diagnoses:  1. Moderate episode of recurrent major depressive disorder (H)    2. ALAN (generalized anxiety disorder)    3. Trauma and stressor-related disorder    4. Alcohol use disorder, severe, in sustained remission (H)          Collateral Reports Completed:  Communicated with: Dr Singleton    Plan: (Homework, other):  Patient was given information about behavioral services and encouraged to schedule a follow up appointment with the clinic [facility] as needed.  She was also given information about mental health symptoms and treatment options .  CD Recommendations: Maintain Sobriety.       Daina Daniel, [facility]    ______________________________________________________________________    Integrated Primary Care Behavioral Health Treatment Plan    Individual Treatment Plan    Patient's Name: Artie Burger   YOB: 1995  Date of Creation: 9/6/24  Date Treatment Plan Last Reviewed/Revised: 9/6/24    DSM5 Diagnoses:   1. Moderate episode of recurrent major depressive disorder (H)    2. ALAN (generalized anxiety disorder)    3. Trauma and stressor-related disorder    4. Alcohol use disorder, severe, in sustained remission (H)      Psychosocial / Contextual Factors: Medical Complexities, Trauma History, and Occupational Issues  PROMIS (reviewed every 90 days):   The following assessments were completed by patient for this visit:  PROMIS 10-Global Health (only subscores and total score):       6/22/2023     4:55 PM 8/1/2024     1:16 PM 10/11/2024    10:08 AM   PROMIS-10 Scores Only   Global Mental Health Score 9 11    11 11   Global Physical Health Score 8 14    14 13   PROMIS TOTAL - SUBSCORES 17 25    25 24        Referral / Collaboration:  Referral to another professional/service is not  "indicated at this time..    Anticipated number of session for this episode of care: 6-9 sessions  Anticipation frequency of session: Monthly  Anticipated Duration of each session: 16-37 minutes  Treatment plan will be reviewed in 90 days or when goals have been changed.       MeasurableTreatment Goal(s) related to diagnosis / functional impairment(s)  Goal 1: Patient will reducing trauma sx   \"I will know I've met my goal when I don't feel triggered by my history .\"     Objective #A (Patient Action)    Patient will  attend and complete EMDR  Status: New - Date: 9/6/24      Intervention(s)  [facility] will provide support through CBT, MI, Acceptance and Commitment Therapy, Dialectic Behavioral Therapy and problem solving model to explore and overcome barriers.        Patient has reviewed and agreed to the above plan.    Written by  Daina Daniel LPCC, [facility]     "

## 2024-11-04 ENCOUNTER — VIRTUAL VISIT (OUTPATIENT)
Dept: BEHAVIORAL HEALTH | Facility: CLINIC | Age: 29
End: 2024-11-04
Payer: COMMERCIAL

## 2024-11-04 ENCOUNTER — VIRTUAL VISIT (OUTPATIENT)
Dept: PSYCHIATRY | Facility: CLINIC | Age: 29
End: 2024-11-04
Payer: COMMERCIAL

## 2024-11-04 DIAGNOSIS — F33.1 MODERATE EPISODE OF RECURRENT MAJOR DEPRESSIVE DISORDER (H): ICD-10-CM

## 2024-11-04 DIAGNOSIS — F41.1 GAD (GENERALIZED ANXIETY DISORDER): ICD-10-CM

## 2024-11-04 DIAGNOSIS — F10.21 ALCOHOL USE DISORDER, SEVERE, IN SUSTAINED REMISSION (H): ICD-10-CM

## 2024-11-04 DIAGNOSIS — F43.9 TRAUMA AND STRESSOR-RELATED DISORDER: ICD-10-CM

## 2024-11-04 DIAGNOSIS — F41.1 GAD (GENERALIZED ANXIETY DISORDER): Primary | ICD-10-CM

## 2024-11-04 DIAGNOSIS — F33.1 MODERATE EPISODE OF RECURRENT MAJOR DEPRESSIVE DISORDER (H): Primary | ICD-10-CM

## 2024-11-04 PROCEDURE — 99214 OFFICE O/P EST MOD 30 MIN: CPT | Mod: 95 | Performed by: PSYCHIATRY & NEUROLOGY

## 2024-11-04 PROCEDURE — G2211 COMPLEX E/M VISIT ADD ON: HCPCS | Mod: 95 | Performed by: PSYCHIATRY & NEUROLOGY

## 2024-11-04 PROCEDURE — 90832 PSYTX W PT 30 MINUTES: CPT | Mod: 95 | Performed by: COUNSELOR

## 2024-11-04 RX ORDER — BUPROPION HYDROCHLORIDE 150 MG/1
150 TABLET ORAL EVERY MORNING
Qty: 90 TABLET | Refills: 0 | Status: SHIPPED | OUTPATIENT
Start: 2024-11-04

## 2024-11-04 RX ORDER — BUSPIRONE HYDROCHLORIDE 10 MG/1
10 TABLET ORAL DAILY
Qty: 90 TABLET | Refills: 1 | Status: SHIPPED | OUTPATIENT
Start: 2024-11-04

## 2024-11-04 ASSESSMENT — PAIN SCALES - GENERAL: PAINLEVEL_OUTOF10: NO PAIN (0)

## 2024-11-04 ASSESSMENT — PATIENT HEALTH QUESTIONNAIRE - PHQ9
10. IF YOU CHECKED OFF ANY PROBLEMS, HOW DIFFICULT HAVE THESE PROBLEMS MADE IT FOR YOU TO DO YOUR WORK, TAKE CARE OF THINGS AT HOME, OR GET ALONG WITH OTHER PEOPLE: VERY DIFFICULT
SUM OF ALL RESPONSES TO PHQ QUESTIONS 1-9: 16
SUM OF ALL RESPONSES TO PHQ QUESTIONS 1-9: 16

## 2024-11-04 NOTE — NURSING NOTE
Is the patient currently in the state of [place]? YES    Current patient location: [address] [place] [address]   [facility] [id number]    Visit mode:VIDEO    If the visit is dropped, the patient can be reconnected by: VIDEO VISIT: Text to cell phone:   Telephone Information:   Mobile [phone]       Will anyone else be joining the visit? No  (If patient encounters technical issues they should call [phone])    Are changes needed to the allergy or medication list? Yes Medications flagged for removal; and Pt stated no changes to allergies    Are refills needed on medications prescribed by this physician? Yes    Rooming Documentation: Questionnaire(s) completed.    Reason for visit: RECHLOUISA Mccann, VVF

## 2024-11-04 NOTE — PROGRESS NOTES
"Telemedicine Visit: The patient's condition can be safely assessed and treated via synchronous audio and visual telemedicine encounter.      Reason for Telemedicine Visit: Patient has requested telehealth visit    Originating Site (Patient Location): Patient's home    Distant Location (provider location):  On-site    Consent:  The patient/guardian has verbally consented to: the potential risks and benefits of telemedicine (video visit) versus in person care; bill my insurance or make self-payment for services provided; and responsibility for payment of non-covered services.     Mode of Communication:  Video Conference via [employer]    As the provider I attest to compliance with applicable laws and regulations related to telemedicine.         Outpatient Psychiatric Progress Note    Name: Artie Burger   : 1995                    Primary Care Provider: Lila Gallegos CNP   Therapist: Rigo Ulrich, weekly      PHQ-9 scores:      10/14/2024     1:17 PM 10/21/2024    10:17 AM 2024    10:23 AM   PHQ-9 SCORE   PHQ-9 Total Score MyChart 15 (Moderately severe depression) 15 (Moderately severe depression) 16 (Moderately severe depression)   PHQ-9 Total Score 15 15 16        Patient-reported       ALAN-7 scores:      2024     1:16 PM 9/3/2024    11:30 AM 10/14/2024     2:45 PM   ALAN-7 SCORE   Total Score 12 (moderate anxiety) 8 (mild anxiety)    Total Score 12    12 8    8 12       Patient Identification:  Patient is a 29 year old, partnered / significant other  White Not  or  female  who presents for return visit with me.  Patient is currently unemployed. Patient attended the phone/video session alone. Patient prefers to be called: \"Artie\".    Interim History:  I last saw Artie Burger for outpatient psychiatry return visit on 2024. During that appointment, we:    Continue BuSpar/buspirone 10 mg daily for anxiety.  Continue lorazepam/Ativan 0.5 mg " daily as needed for panic attacks/severe anxiety only.  Refilled for 10 tablets per 30 days only.  Continue to try to lessen reliance on lorazepam.  Continue gabapentin 600 mg twice daily for pain and anxiety-as prescribed by other prescriber.  Continue propranolol up to 10-30 mg twice daily as needed for anxiety symptoms.  Continue Wellbutrin  mg daily for depression.    Discontinue sertraline.   Keep appts for ADHD testing.  See below for additional external testing options if wait is too long internally within [facility].    11/04: Patient overall with some heightened anxiety symptoms.  Anxiety little higher also.  Some worsening emotionality.  Feels like has been tolerating Wellbutrin well.  Does not feel like there have been any side effects for that Wellbutrin has worsened symptoms.  May be starting school soon.  Going through ADHD testing.  Waking up more at night.  No problematic drug or alcohol use.  No SI.  No acute safety concerns.    Per [facility], [facility], during today's team-based visit:  MH update:  Depression has increased.  Lack of motivation.  Sadness.  Apathy.  Anxiety a little higher.  Feeling more emotional and tearful.  Stresses:  Celebrated birthday.  Reduced ADL's.   Relationship is going well.  Starting school January.  Has to figure out $.  On academic probation from younger years.  Only going to try one class.  Appetite: T1D  Sleep: Increased.  Waking up a lot more at night.   Outpatient Provider updates: Started ADHD testing process.  Sheila Flaherty [facility] EMDR  SI/SIB/HI: Denies current.  Hx of passive ideation.  Denies previous attempts.  Hx of SIB as teen.  Denies need for safety plan or crisis resources.  AMPARO:  Sober 4 years ETOH.  No meetings.  Friend support.  Daily medical THC use.  Side effects/compliance:  Interventions:  [facility] engaged in validation and support  Most important:  Depression sx a bit higher    Past Psychiatric Med Trials:  Psych Meds  at Intake:  -Ativan 0.5 mg daily, sometimes twice daily: 1 every few days, still works (gets from primary care provider)   -Sertraline/Zoloft - 50 mg daily (was up to 200 mg daily but decreased due to activation), has been decreasing slowly and no worsening of sxs  -Buspar 10 mg daily -was taking twice daily: helpful for some day to day anxiety, up to 15 mg twice daily  -Gabapentin 600 mg twice daily: for pain and anxiety  -Propranolol 10 mg daily as needed for anxiety (sometimes uses, brings heart rate down, every couple weeks uses)     Past Psych Meds:  Diphenhydramine - anxiety  Trazodone - vivid dreams  Prozac/fluoxetine - not very helpful   Cymbalta - 2021 up to 60 mg  DID NOT WORK FOR PAIN  2022/2023: treated with lexapro 20mg and celexa 40mg during this time.     Psychiatric ROS:  See HPI above for all pertinent positives and negatives. Rest of systems negative.     PHQ9 and GAD7 scores were reviewed today if completed.   Medication side effects: Denies  Current stressors include: Symptoms and see HPI above  Coping mechanisms and supports include: Therapy, Family, Hobbies, and Friends    Current medications include:   Current Outpatient Medications   Medication Sig Dispense Refill    busPIRone (BUSPAR) 10 MG tablet Take 1 tablet (10 mg) by mouth daily. 90 tablet 1    acetaminophen (TYLENOL) 500 MG tablet Take 500-1,000 mg by mouth      acetone urine (RELION KETONE TEST) test strip Use as part of high blood sugar protocol for pump users 20 strip 4    Alcohol Swabs PADS Use before taking blood sugars up to 10 times a day 100 each 3    blood glucose (NO BRAND SPECIFIED) lancets standard Use to test blood sugars up to 8 times daily as directed. 100 each 3    blood glucose (NO BRAND SPECIFIED) test strip Test blood sugars up to 8 times a day as directed by your provider 200 strip 3    blood glucose monitoring (NO BRAND SPECIFIED) meter device kit Use as directed Per insurance coverage. DM education recommending  Accu Chek Guide glucose meter 1 kit 0    blood glucose monitoring (SOFTCLIX) lancets USE TO TEST BLOOD GLUCOSE UP TO EIGHT TIMES DAILY      buPROPion (WELLBUTRIN XL) 300 MG 24 hr tablet Take 1 tablet (300 mg) by mouth every morning. 90 tablet 1    cetirizine (ZYRTEC) 10 MG tablet Take 10 mg by mouth daily      Continuous Glucose Sensor (DEXCOM G6 SENSOR) MISC 1 each every 10 days 9 each 3    Continuous Glucose Transmitter (DEXCOM G6 TRANSMITTER) MISC 1 each every 3 months 1 each 3    gabapentin (NEURONTIN) 600 MG tablet Take 1 tablet (600 mg) by mouth 2 times daily 180 tablet 3    Glucagon (GVOKE HYPOPEN) 1 MG/0.2ML pen Inject the contents of 1 device under the skin into lower abdomen, outer thigh, or outer upper arm as needed for hypoglycemia. If no response after 15 minutes, additional 1 mg dose from a new device may be injected while waiting for emergency assistance. 0.4 mL 3    glucose (BD GLUCOSE) 4 g chewable tablet Take 1 tablet by mouth every hour as needed for low blood sugar 30 tablet 0    ibuprofen (ADVIL/MOTRIN) 200 MG tablet Take 800 mg by mouth as needed      insulin aspart (NOVOLOG PEN) 100 UNIT/ML pen DOSE:  1 units per 10 grams of carbohydrate.  Only chart total amount of units given.  Do not give if pre-prandial glucose is less than 60 mg/dL. If given at mealtime, administer within 30 minutes of start of meal. 15 mL 3    insulin aspart (NOVOLOG VIAL) 100 UNITS/ML vial Up to 30 units daily via insulin pump 10 mL 5    Insulin Disposable Pump (OMNIPOD 5 G6 INTRO, GEN 5,) KIT 1 each every 3 days 1 kit 0    Insulin Disposable Pump (OMNIPOD 5 G6 PODS, GEN 5,) MISC 1 each every 3 days 30 each 4    insulin pen needle (32G X 4 MM) 32G X 4 MM miscellaneous Use up to 8 pen needles daily or as directed. 200 each 11    levonorgestrel (MIRENA) 52 MG (20 mcg/day) IUD 1 Device by Intrauterine route      lipase-protease-amylase (CREON) 98051-29664-660437 units CPEP per EC capsule Take 4 capsules with meals and 2  capsules with snacks; approximate daily maximum 16 capsules 480 capsule 11    LORazepam (ATIVAN) 0.5 MG tablet Take 1 tablet (0.5 mg) by mouth daily as needed (panic attacks). 10 TABS TO LAST 30 DAYS 10 tablet 1    Nutritional Supplements (BOOST HIGH PROTEIN) LIQD After above baseline labs are drawn, give: 6 mL/kg to maximum of 360 mL; the beverage is to be consumed within 5 minutes.      ondansetron (ZOFRAN ODT) 4 MG ODT tab Take 1 tablet (4 mg) by mouth every 6 hours as needed for nausea or vomiting 12 tablet 3    polyethylene glycol (MIRALAX) 17 GM/Dose powder Take 17 g by mouth daily 510 g 3    propranolol (INDERAL) 20 MG tablet Take 0.5-1.5 tablets (10-30 mg) by mouth 2 times daily as needed (anxiety) 60 tablet 1     No current facility-administered medications for this visit.       Past Medical/Surgical History:  Past Medical History:   Diagnosis Date    Alcohol-induced chronic pancreatitis (H)     Anxiety     Depression     Gastroesophageal reflux disease     Pancreatic disease     PONV (postoperative nausea and vomiting)     Type 1 diabetes mellitus (H) 9/19/2023      has a past medical history of Alcohol-induced chronic pancreatitis (H), Anxiety, Depression, Gastroesophageal reflux disease, Pancreatic disease, PONV (postoperative nausea and vomiting), and Type 1 diabetes mellitus (H) (9/19/2023).    She has no past medical history of Antiplatelet or antithrombotic long-term use or Uncomplicated asthma.    Social History:  Reviewed. No changes to social history except as noted above in HPI.    Vital Signs:   None. This is phone/video visit.     Labs:  Most recent laboratory results reviewed and the pertinent results include:   Lab Results   Component Value Date    A1C 8.2 03/21/2024    A1C 6.6 12/14/2023    A1C 5.5 09/22/2023    A1C 5.3 06/28/2023       Review of Systems:  10 systems (general, cardiovascular, respiratory, eyes, ENT, endocrine, GI, , M/S, neurological) were reviewed. Most pertinent  finding(s) is/are: intermittent pain. The remaining systems are all unremarkable.    Mental Status Examination (limited as this is by phone/video):  Appearance: Awake, alert, appears stated age, no acute distress, well-groomed   Attitude:  cooperative, pleasant   Motor: No gross abnormalities observed via video, not formally tested   Oriented to:  person, place, time, and situation  Attention Span and Concentration:  normal  Speech:  clear, coherent, regular rate, rhythm, and volume  Language: intact  Mood: More down, anxious  Affect:  appropriate and in normal range and mood congruent  Associations:  no loose associations  Thought Process:  logical, linear and goal oriented  Thought Content:  no evidence of suicidal ideation or homicidal ideation, no evidence of psychotic thought, no auditory hallucinations present and no visual hallucinations present  Recent and Remote Memory:  Intact to interview. Not formally assessed. No amnesia.  Fund of Knowledge: appropriate  Insight:  good  Judgment:  intact, adequate for safety  Impulse Control:  intact    Suicide Risk Assessment:  Today Artie Burger reports no suicidal ideation. Based on all available evidence including the factors cited above, Artie Burger does not appear to be at imminent risk for self-harm, does not meet criteria for a 72-hr hold, and therefore remains appropriate for ongoing outpatient level of care.  A thorough assessment of risk factors related to suicide and self-harm have been reviewed and are noted above. The patient convincingly denies suicidality on several occasions. Local community safety resources reviewed for patient to use if needed. There was no deceit detected, and the patient presented in a manner that was believable.     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 3/2023: gabapentin up to 600-900mg TID became ineffective, switched to lyrica early March.  Initial dose 50mg BID, increase to 100mg BID after 2 weeks, then increase to 100mg TID after 2 weeks (if needed).      Iron deficiency anemia secondary to inadequate dietary iron intake 10/28/2021     Priority: Medium     Formatting of this note might be different from the original. 10/28/2021 hgb 10.6, iron 32.  Trial iron BID-if intolerant infusions.  Symptomatic with fatigue.      Chronic pancreatitis (H) 08/25/2021     Priority: Medium     Added automatically from request for surgery [id number]      Osteopenia of hip 08/10/2021     Priority: Medium     Formatting of this note might be different from the original. DEXA 8/2021: normal l spine, hip T -1.8.  Recommended calcium/vit D supplementation.  Repeat DEXA 1-2 years.      Insomnia 07/07/2021     Priority: Medium     Formatting of this note might be different from the original. 7/7/2021 No issue with sleep onset, struggles at times with sleep maintenance. 9/8/2021 Rare PRN quetiapine stops \"head from racing\", effective Failed: benadryl-\"anxiety inducing,\" trazodone \"vivid dreams and anxiety " "inducing.\"      Current moderate episode of major depressive disorder (H) 01/24/2019     Priority: Medium     Formatting of this note might be different from the original. Previous tx: prozac 7/7/2021: doing well, decreasing cymbalta from 60 to 30mg 8/10/2021: Increased Cymbalta back to 60 mg.  Stressors with home ownership and her ongoing medical issues along with college course work.      Panic attacks 01/24/2019     Priority: Medium    Alcohol abuse, in remission 07/25/2018     Priority: Medium     Formatting of this note might be different from the original. 8/21/2020         Psychosocial & Contextual Factors: see HPI above    Assessment:  From Intake, 8/7/2024:  Artie Burger is a 28-year-old with past psychiatric history including depression, anxiety, trauma, alcohol dependence with medical complications who presents today for psychiatric evaluation.  Patient with history of mental health symptoms dating back to childhood/adolescence.  Patient has been on handful of different psychiatric medications to help with symptoms.  Patient's history was complicated by alcohol use disorder affecting pancreas and leading to multiple surgeries.  Patient has now been sober from alcohol for 4 years.  Denies any current need for chemical dependency resources.  Denies any major alcohol cravings.  Patient reports some symptoms consistent with possible ADHD diagnosis.  Given comorbid mental health concerns and struggles referral for psychological testing has been placed.  Patient is hopeful for some additional motivation and energy.  We will replace her sertraline with Wellbutrin XL.  We will continue buspirone for anxiety.  Patient on gabapentin for pain and also possibly helping with anxiety and decreased alcohol cravings.  Patient is using lorazepam quite frequently, every 2-3 days per patient report.  Next refill will be for only 10 tablets to last 30 days at a time.  We will also increase propranolol some to take as " needed to hopefully lessen reliance on lorazepam.  Could also consider a future increase of gabapentin.  Patient with IUD to protect against pregnancy.  Psychotherapy encouraged.  Denies that medicinal cannabis use causing any problems.     9/6/2024:  Patient overall doing significantly better on Wellbutrin XL.  Tolerating well.  Is still taking 12.5 mg of sertraline and will try discontinuing.  Has ADHD testing coming up next month.  Patient will be seen back for follow-up after ADHD testing.  Cannabis use but denies problematic use.  No acute safety concerns.  No SI.    11/04/2024:  Patient potentially with some regression of depression symptoms.  Tolerating Wellbutrin well so we will increase Wellbutrin XL to 450 mg daily.  Currently undergoing ADHD testing.  Discussed may need to decrease Wellbutrin XL if testing affirmative for ADHD and if we explore stimulants.  No acute safety concerns.  No SI.  No problematic drug or alcohol use.    Medication side effects and alternatives were reviewed. Health promotion activities recommended and reviewed today. All questions addressed. Education and counseling completed regarding risks and benefits of medications and psychotherapy options. Recommend therapy for additional support.     Treatment Plan:  Continue BuSpar/buspirone 10 mg daily for anxiety.  Continue lorazepam/Ativan 0.5 mg daily as needed for panic attacks/severe anxiety only.  Refilled for 10 tablets per 30 days only.  Continue to try to lessen reliance on lorazepam.  Continue gabapentin 600 mg twice daily for pain and anxiety-as prescribed by other prescriber.  Continue propranolol up to 10-30 mg twice daily as needed for anxiety symptoms.  Increase Wellbutrin XL to 450 mg daily for depression.    Keep appts for ADHD testing.  SEND RESULTS TO FAX: [phone] OR UPLOAD TO [employer]  Continue all other cares per primary care provider.   Continue all other medications as reviewed per electronic medical record  today.   Safety plan reviewed. To the Emergency Department as needed or call after hours crisis line at [phone] or [phone]. [place] Crisis Text Line. Text [place] to 525813 or Suicide LifeLine Chat: suicidepreventionlifeline.org/chat  Continue therapy as planned.   Schedule an appointment with me in 4-6 weeks or sooner as needed. Call [facility] at [phone] to schedule.  Follow up with primary care provider as planned or for acute medical concerns.  Call the psychiatric nurse line with medication questions or concerns at [phone].  [employer] may be used to communicate with your provider, but this is not intended to be used for emergencies.    Administrative Billing:   Phone Call/Video Duration: 10 Minutes  Start: 11:04a  Stop: 11:14a    The longitudinal plan of care for the diagnosis(es)/condition(s) as documented were addressed during this visit. Due to the added complexity in care, I will continue to support Angeliquearia in the subsequent management and with ongoing continuity of care.    Episode of Care #: 3    Patient Status:  Patient will continue to be seen for ongoing consultation and stabilization.    Signed:   Agnes Singleton DO  [facility]    Disclaimer: This note consists of symbols derived from keyboarding, dictation and/or voice recognition software. As a result, there may be errors in the script that have gone undetected. Please consider this when interpreting information found in this chart.

## 2024-11-04 NOTE — PATIENT INSTRUCTIONS
Treatment Plan:  Continue BuSpar/buspirone 10 mg daily for anxiety.  Continue lorazepam/Ativan 0.5 mg daily as needed for panic attacks/severe anxiety only.  Refilled for 10 tablets per 30 days only.  Continue to try to lessen reliance on lorazepam.  Continue gabapentin 600 mg twice daily for pain and anxiety-as prescribed by other prescriber.  Continue propranolol up to 10-30 mg twice daily as needed for anxiety symptoms.  Increase Wellbutrin XL to 450 mg daily for depression.    Keep appts for ADHD testing.  SEND RESULTS TO FAX: [phone] OR UPLOAD TO [employer]  Continue all other cares per primary care provider.   Continue all other medications as reviewed per electronic medical record today.   Safety plan reviewed. To the Emergency Department as needed or call after hours crisis line at [phone] or [phone]. [place] Crisis Text Line. Text [place] to 712485 or Suicide LifeLine Chat: suicidepreventionNutriticsline.org/chat  Continue therapy as planned.   Schedule an appointment with me in 4-6 weeks or sooner as needed. Call [facility] at [phone] to schedule.  Follow up with primary care provider as planned or for acute medical concerns.  Call the psychiatric nurse line with medication questions or concerns at [phone].  [employer] may be used to communicate with your provider, but this is not intended to be used for emergencies.    Patient Education   Collaborative Care Psychiatry Service  What to Expect  Here's what to expect from your Collaborative Care Psychiatry Service (CCPS).   About CCPS  CCPS means 2 people work together to help you get better. You'll meet with a behavioral health clinician and a psychiatric doctor. A behavioral health clinician helps people with mental health problems by talking with them. A psychiatric doctor helps people by giving them medicine.  How it works  At every visit, you'll see the behavioral health clinician (BHC) first. They'll talk with you about how  "you're doing and teach you how to feel better.   Then you'll see the psychiatric doctor. This doctor can help you deal with troubling thoughts and feelings by giving you medicine. They'll make sure you know the plan for your care.   CCPS usually takes 3 to 6 visits. If you need more visits, we may have you start seeing a different psychiatric doctor for ongoing care.  If you have any questions or concerns, we'll be glad to talk with you.  About visits  Be open  At your visits, please talk openly about your problems. It may feel hard, but it's the best way for us to help you.  Cancelling visits  If you can't come to your visit, please call us right away at [phone]. If you don't cancel at least 24 hours (1 full day) before your visit, that's \"late cancellation.\"  Being late to visits  Being very late is the same as not showing up. You will be a \"no show\" if:  Your appointment starts with a [facility], and you're more than 15 minutes late for a 30-minute (half hour) visit. This will also cancel your appointment with the psychiatric doctor.  Your appointment is with a psychiatric doctor only, and you're more than 15 minutes late for a 30-minute (half hour) visit.  Your appointment is with a psychiatric doctor only, and you're more than 30 minutes late for a 60-minute (full hour) visit.  If you cancel late or don't show up 2 times within 6 months, we may end your care.   Getting help between visits  If you need help between visits, you can call us Monday to Friday from 8 a.m. to 4:30 p.m. at [phone].  Emergency care  Call 911 or go to the nearest emergency department if your life or someone else's life is in danger.  Call 778 anytime to reach the national Suicide and Crisis hotline.  Medicine refills  To refill your medicine, call your pharmacy. You can also call [facility]'s [facility] at [phone], Monday to Friday, 8 a.m. to 4:30 p.m. It can take 1 to 3 business days to get a refill.   Forms, " letters, and tests  You may have papers to fill out, like FMLA, short-term disability, and workability. We can help you with these forms at your visits, but you must have an appointment. You may need more than 1 visit for this, to be in an intensive therapy program, or both.  Before we can give you medicine for ADHD, we may refer you to get tested for it or confirm it another way.  We may not be able to give you an emotional support animal letter.  We don't do mental health checks ordered by the court.   We don't do mental health testing, but we can refer you to get tested.   Thank you for choosing us for your care.  For informational purposes only. Not to replace the advice of your health care provider. Copyright   2022 [facility] [employer] [facility]. All rights reserved. [employer] [id number] - 12/22.

## 2024-11-06 ENCOUNTER — VIRTUAL VISIT (OUTPATIENT)
Dept: EDUCATION SERVICES | Facility: CLINIC | Age: 29
End: 2024-11-06
Payer: COMMERCIAL

## 2024-11-06 DIAGNOSIS — E89.1 POST-PANCREATECTOMY DIABETES (H): Primary | ICD-10-CM

## 2024-11-06 DIAGNOSIS — Z90.410 POST-PANCREATECTOMY DIABETES (H): Primary | ICD-10-CM

## 2024-11-06 DIAGNOSIS — E13.9 POST-PANCREATECTOMY DIABETES (H): Primary | ICD-10-CM

## 2024-11-06 PROCEDURE — 99207 PR NO BILLABLE SERVICE THIS VISIT: CPT | Mod: 95

## 2024-11-06 RX ORDER — ACYCLOVIR 400 MG/1
TABLET ORAL
Qty: 3 EACH | Refills: 5 | Status: SHIPPED | OUTPATIENT
Start: 2024-11-06

## 2024-11-06 RX ORDER — INSULIN PMP CART,AUT,G6/7,CNTR
1 EACH SUBCUTANEOUS
Qty: 30 EACH | Refills: 4 | Status: SHIPPED | OUTPATIENT
Start: 2024-11-06

## 2024-11-06 ASSESSMENT — PAIN SCALES - GENERAL: PAINLEVEL_OUTOF10: MILD PAIN (2)

## 2024-11-06 NOTE — NURSING NOTE
Current patient location: [address] [place] [address] [address]VE   [facility] [address]    Is the patient currently in the state of [place]? YES    Visit mode:VIDEO    If the visit is dropped, the patient can be reconnected by: VIDEO VISIT: Text to cell phone:   Telephone Information:   Mobile [phone]       Will anyone else be joining the visit? NO  (If patient encounters technical issues they should call [phone]    Are changes needed to the allergy or medication list? No    Are refills needed on medications prescribed by this physician? Discuss with provider    Rooming Documentation:  Not applicable    Reason for visit: Diabetes Education    Daina AGUSTIN

## 2024-11-06 NOTE — PROGRESS NOTES
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  TIR at 71% right now.  GMI is 7.1%.  She forgets to bolus once in a while but overall has improved with this and is trying to bolus 10-15 minutes before eating.    She is still using G6 but will upgrade to G7 today.  We discussed how to do that.  She will get the g7 layo on her phone.    Follow-up:  As needed  Time spent in this visit: 15 minutes     Any diabetes medication dose changes were made via the CDE Protocol and Collaborative Practice Agreement. A copy of this encounter was provided to the patient's referring provider.   Answers submitted by the patient for this visit:  Questionnaire about: Diabetes problem (Submitted on 11/2/2024)  Chief Complaint: Diabetes problem

## 2024-11-06 NOTE — PROGRESS NOTES
Virtual Visit Details    Type of service:  Video Visit   Video Start Time:  8a  Video End Time: 8:15 a    Originating Location (pt. Location): Home    Distant Location (provider location):  Off-site  Platform used for Video Visit: Usman

## 2024-11-07 ENCOUNTER — PSYCHE (OUTPATIENT)
Dept: PSYCHOLOGY | Facility: CLINIC | Age: 29
End: 2024-11-07
Payer: COMMERCIAL

## 2024-11-07 DIAGNOSIS — Z13.39 ADHD (ATTENTION DEFICIT HYPERACTIVITY DISORDER) EVALUATION: ICD-10-CM

## 2024-11-07 DIAGNOSIS — F41.1 GENERALIZED ANXIETY DISORDER: ICD-10-CM

## 2024-11-07 DIAGNOSIS — F33.1 MAJOR DEPRESSIVE DISORDER, RECURRENT EPISODE, MODERATE (H): Primary | ICD-10-CM

## 2024-11-07 PROCEDURE — 99207 PR NO CHARGE LOS: CPT

## 2024-11-07 ASSESSMENT — ANXIETY QUESTIONNAIRES
IF YOU CHECKED OFF ANY PROBLEMS ON THIS QUESTIONNAIRE, HOW DIFFICULT HAVE THESE PROBLEMS MADE IT FOR YOU TO DO YOUR WORK, TAKE CARE OF THINGS AT HOME, OR GET ALONG WITH OTHER PEOPLE: SOMEWHAT DIFFICULT
4. TROUBLE RELAXING: MORE THAN HALF THE DAYS
7. FEELING AFRAID AS IF SOMETHING AWFUL MIGHT HAPPEN: MORE THAN HALF THE DAYS
GAD7 TOTAL SCORE: 12
7. FEELING AFRAID AS IF SOMETHING AWFUL MIGHT HAPPEN: MORE THAN HALF THE DAYS
5. BEING SO RESTLESS THAT IT IS HARD TO SIT STILL: SEVERAL DAYS
1. FEELING NERVOUS, ANXIOUS, OR ON EDGE: MORE THAN HALF THE DAYS
2. NOT BEING ABLE TO STOP OR CONTROL WORRYING: MORE THAN HALF THE DAYS
6. BECOMING EASILY ANNOYED OR IRRITABLE: SEVERAL DAYS
8. IF YOU CHECKED OFF ANY PROBLEMS, HOW DIFFICULT HAVE THESE MADE IT FOR YOU TO DO YOUR WORK, TAKE CARE OF THINGS AT HOME, OR GET ALONG WITH OTHER PEOPLE?: SOMEWHAT DIFFICULT
GAD7 TOTAL SCORE: 12
GAD7 TOTAL SCORE: 12
3. WORRYING TOO MUCH ABOUT DIFFERENT THINGS: MORE THAN HALF THE DAYS

## 2024-11-07 ASSESSMENT — PATIENT HEALTH QUESTIONNAIRE - PHQ9
SUM OF ALL RESPONSES TO PHQ QUESTIONS 1-9: 19
10. IF YOU CHECKED OFF ANY PROBLEMS, HOW DIFFICULT HAVE THESE PROBLEMS MADE IT FOR YOU TO DO YOUR WORK, TAKE CARE OF THINGS AT HOME, OR GET ALONG WITH OTHER PEOPLE: VERY DIFFICULT
SUM OF ALL RESPONSES TO PHQ QUESTIONS 1-9: 19

## 2024-11-07 NOTE — PROGRESS NOTES
[facility]   Mental Health & Addiction Services     Progress Note    Patient Name: Artie Burger  Date: November 7, 2024       Service Type:  Individual      Session Start Time: 12:07 pm  Session End Time:  2:04 pm     Session Length: 117 min    Session #: 3    Attendees: Client attended alone    Service Modality:  In-person      DATA  Interactive Complexity: No  Crisis: No       Progress Since Last Session (Related to Symptoms / Goals / Homework):   Symptoms: No change      Homework: Patient completed Keena Questionnaires prior to in-person testing appointment.      Episode of Care Goals: N/A - no ongoing care provided.      Current / Ongoing Stressors and Concerns:   Patient attended session to initiate ADHD evaluation. Patient reported symptoms of ADHD, depression, anxiety, panic, and OCD.     Treatment Objective(s) Addressed in This Session:   Completing cognitive testing to measure reported problems with ADHD.     Intervention:   Began session by orienting patient to testing process. Patient had the opportunity to ask questions and express concerns. Patient indicated that she understood and consented to continue. Completed MMPI-3 and completed WAIS-IV. No breaks were taken during the testing process. Ended by discussing and scheduling feedback session. See time breakdown:     Orientation to testing process: 3 min  MMPI-3: 29 min  WAIS-IV: 82 min  Wrap-up and feedback scheduling: 3 min    Assessments completed prior to visit:  The following assessments were completed by patient for this visit:  PHQ9:       8/6/2024    10:39 PM 9/5/2024    10:01 AM 10/14/2024     1:17 PM 10/21/2024    10:17 AM 11/4/2024    10:23 AM 11/7/2024    12:08 PM 11/7/2024    12:09 PM   PHQ-9 SCORE   PHQ-9 Total Score [facility] 9 (Mild depression) 11 (Moderate depression) 15 (Moderately severe depression) 15 (Moderately severe depression) 16 (Moderately severe depression)  19 (Moderately severe depression)   PHQ-9  Total Score 9    9 11 15 15 16  19         Patient-reported    Multiple values from one day are sorted in reverse-chronological order     GAD7:       2/14/2023     8:51 AM 6/22/2023     4:54 PM 8/1/2024     1:16 PM 9/3/2024    11:30 AM 10/14/2024     2:45 PM 11/7/2024    12:09 PM   ALAN-7 SCORE   Total Score 17 (severe anxiety) 12 (moderate anxiety) 12 (moderate anxiety) 8 (mild anxiety)  12 (moderate anxiety)   Total Score 17 12 12    12 8    8 12 12        Patient-reported    Multiple values from one day are sorted in reverse-chronological order     PROMIS 10-Global Health (only subscores and total score):       6/22/2023     4:55 PM 8/1/2024     1:16 PM 10/11/2024    10:08 AM 11/2/2024     9:41 AM   PROMIS-10 Scores Only   Global Mental Health Score  11    11 11 7    7   Global Physical Health Score  14    14 13 11    11   PROMIS TOTAL - SUBSCORES  25    25 24 18    18       Information is confidential and restricted. Go to Review Flowsheets to unlock data.    Multiple values from one day are sorted in reverse-chronological order     [place] Suicide Severity Rating Scale (Lifetime/Recent)      4/4/2024     6:12 AM 8/7/2024     9:31 AM 10/14/2024     2:32 PM   [place]iken Suicide Severity Rating (Lifetime/Recent)   Q1 Wished to be Dead (Past Month) 0-->no     Q2 Suicidal Thoughts (Past Month) 0-->no     Q6 Suicide Behavior (Lifetime) 0-->no     Level of Risk per Screen no risks indicated     Q1 Wish to be Dead (Lifetime)  Y Y   Wish to be Dead Description (Lifetime)   6 yrs ago - passive SI   1. Wish to be Dead (Past 1 Month)  N N   Q2 Non-Specific Active Suicidal Thoughts (Lifetime)  N Y   Non-Specific Active Suicidal Thought Description (Lifetime)   passive SI   2. Non-Specific Active Suicidal Thoughts (Past 1 Month)   N   3. Active Suicidal Ideation with any Methods (Not Plan) Without Intent to Act (Lifetime)   N   Q4 Active Suicidal Ideation with Some Intent to Act, Without Specific Plan (Lifetime)   N   Q5  Active Suicidal Ideation with Specific Plan and Intent (Lifetime)   N   Most Severe Ideation Rating (Lifetime)  1    Frequency (Lifetime)  2    Duration (Lifetime)  1    Controllability (Lifetime)  1    Deterrents (Lifetime)  1    Reasons for Ideation (Lifetime)  5    Actual Attempt (Lifetime)  N    Has subject engaged in non-suicidal self-injurious behavior? (Lifetime)  Y Y   Has subject engaged in non-suicidal self-injurious behavior? (Past 3 Months)  N    Interrupted Attempts (Lifetime)  N    Aborted or Self-Interrupted Attempt (Lifetime)  N    Preparatory Acts or Behavior (Lifetime)  N    Calculated C-SSRS Risk Score (Lifetime/Recent)  No Risk Indicated No Risk Indicated        ASSESSMENT: Current Emotional / Mental Status (status of significant symptoms):   Risk status (Self / Other harm or suicidal ideation)   Patient denies current fears or concerns for personal safety.   Patient denies current or recent suicidal ideation or behaviors.   Patient denies current or recent homicidal ideation or behaviors.   Patient denies current or recent self injurious behavior or ideation.   Patient denies other safety concerns.   Patient reports there has been no change in risk factors since their last session.     Patient reports there has been no change in protective factors since their last session.     Recommended that patient call 911 or go to the local ED should there be a change in any of these risk factors     Appearance:   Appropriate    Eye Contact:   Good    Psychomotor Behavior: Normal    Attitude:   Cooperative    Orientation:   All   Speech    Rate / Production: Normal     Volume:  Normal    Mood:    Normal   Affect:    Appropriate    Thought Content:  Clear    Thought Form:  Coherent  Logical    Insight:    Good     Diagnosis:  Major depressive disorder, recurrent episode, moderate  Generalized anxiety disorder  Attention deficit hyperactivity disorder (ADHD) evaluation      PLAN:  Clinician will compile data  in preparation for final feedback. Feedback session scheduled for 11/19/2024.    Tamanna Claudio MA  Doctoral Psychology Intern  11/7/2024 at 2:35 PM

## 2024-11-19 ENCOUNTER — VIRTUAL VISIT (OUTPATIENT)
Dept: PSYCHOLOGY | Facility: CLINIC | Age: 29
End: 2024-11-19
Payer: COMMERCIAL

## 2024-11-19 DIAGNOSIS — F41.1 GENERALIZED ANXIETY DISORDER: ICD-10-CM

## 2024-11-19 DIAGNOSIS — F33.1 MAJOR DEPRESSIVE DISORDER, RECURRENT EPISODE, MODERATE (H): Primary | ICD-10-CM

## 2024-11-19 PROCEDURE — 96137 PSYCL/NRPSYC TST PHY/QHP EA: CPT | Mod: 95

## 2024-11-19 PROCEDURE — 96136 PSYCL/NRPSYC TST PHY/QHP 1ST: CPT | Mod: 95

## 2024-11-19 PROCEDURE — 96131 PSYCL TST EVAL PHYS/QHP EA: CPT | Mod: 95

## 2024-11-19 PROCEDURE — 96130 PSYCL TST EVAL PHYS/QHP 1ST: CPT | Mod: 95

## 2024-11-19 NOTE — PROGRESS NOTES
[facility]   Mental Health & Addiction Services     Progress Note - ADHD Feedback Session     Patient Name: Artie Burger  Date: 2024       Service Type:  Individual       Session Start Time: 9:00 am  Session End Time:  9:42 am     Session Length: 42 minutes    Session #: 4    Attendees: Patient attended alone    Service Modality: Video Visit:      Provider verified identity through the following two step process.  Patient provided:  Patient  and Patient address    Telemedicine Visit: The patient's condition can be safely assessed and treated via synchronous audio and visual telemedicine encounter.      Reason for Telemedicine Visit: Services only offered telehealth    Originating Site (Patient Location): Patient's home    Distant Site (Provider Location): Provider Remote Setting- Home Office    Consent:  The patient/guardian has verbally consented to: the potential risks and benefits of telemedicine (video visit) versus in person care; bill my insurance or make self-payment for services provided; and responsibility for payment of non-covered services.     Patient would like the video invitation sent by:  Send to e-mail at: [email or web address]    Mode of Communication:  Video Conference via [facility]    Distant Location (Provider):  Off-site    As the provider I attest to compliance with applicable laws and regulations related to telemedicine.          10/21/2024    10:17 AM 2024    10:23 AM 2024    12:08 PM   PHQ   PHQ-9 Total Score 15 16  19    Q9: Thoughts of better off dead/self-harm past 2 weeks Not at all  Not at all  Not at all        Patient-reported           9/3/2024    11:30 AM 10/14/2024     2:45 PM 2024    12:09 PM   ALAN-7 SCORE   Total Score 8 (mild anxiety)  12 (moderate anxiety)   Total Score 8    8 12 12        Patient-reported    Multiple values from one day are sorted in reverse-chronological order          DATA      Progress Since Last Session (Related to Symptoms / Goals / Homework):   Symptoms: Stable.    Homework: Completed.      Treatment Objective(s) Addressed in This Session:   Provided feedback on ADHD evaluation. Reviewed test results in depth. Plan of care and recommendations were discussed based on testing data. See full report attached on secondary note in this encounter.     Intervention:   Provided feedback to patient regarding testing results, diagnoses, and treatment recommendations. Test results are not consistent with an ADHD diagnosis. Symptoms are better explained by depression and anxiety disorders. Personalized suggestions regarding symptoms were offered. Patient had the opportunity to ask questions; she expressed understanding.        ASSESSMENT: Current Emotional / Mental Status (status of significant symptoms):   Risk status (Self / Other harm or suicidal ideation)   Patient denies current fears or concerns for personal safety.   Patient denies current or recent suicidal ideation or behaviors.   Patient denies current or recent homicidal ideation or behaviors.   Patient denies current or recent self injurious behavior or ideation.   Patient denies other safety concerns.   Patient reports there has been no change in risk factors since their last session.     Patient reports there has been no change in protective factors since their last session.     Recommended that patient call 911 or go to the local ED should there be a change in any of these risk factors     Appearance:   Appropriate    Eye Contact:   Good    Psychomotor Behavior: Normal    Attitude:   Cooperative    Orientation:   All   Speech    Rate / Production: Normal     Volume:  Normal    Mood:    Normal   Affect:    Appropriate    Thought Content:  Clear    Thought Form:  Coherent  Logical    Insight:    Good      Medication Review:   Changes to psychiatric medications, see updated Medication List in EPIC.      Medication  Compliance:   Yes     Changes in Health Issues:   None reported     Chemical Use Review:   Substance Use: Chemical use reviewed, no active concerns identified      Nicotine Use: Current use: Vaping    Diagnosis:  Major depressive disorder, recurrent episode, moderate  Generalized anxiety disorder      PLAN:   Recommendations are outlined in full evaluation report (attached to this encounter).   Patient indicated understanding and will contact the clinic if there are further questions.    Report routed to referring provider.    Parts of this documentation may have been completed using dictation software. Potential errors may result and are unintentional.       Tamanna Claudio  Doctoral Psychology Intern       Psychological Testing Services Summary       Testing Evaluation Services Base: 66197  (first 60 mins) Add-on: 82362  (each addtl 60 mins)   Record Review and Clarify Referral Question   2:20pm-2:30pm on 10/14/2024 10 minutes   Patient Symptom Management   2:30pm-2:40pm on 10/14/2024 10 minutes   Clinical Decision Making/Battery Modification   3:15pm-3:30pm on 10/14/2024 15 minutes   Integration/Report Generation   1:30pm-2:30pm on 11/6/2024 ([place])  8:00am-8:30am on 11/15/2024 (WAIS-IV)  8:30am-9:15am on 11/15/2024 (MMPI-3)  10:00am-11:00am on 11/15/2024 (Final Report)   60 minutes  30 minutes  45 minutes  60 minutes   Interactive Feedback Session   9:00am-9:42am on 11/19/2024 42 minutes   Post-Service Work   4:00pm-4:15pm on 11/19/2024 15 minutes   Total Time: 287 minutes   Total Units: 1 4       Test Administration and Scoring Base: 50914  (first 30 mins) Add-on: 84994  (each addtl 30 mins)   Test Administration (Face-to-Face)  12:10pm-1:32pm on 11/7/2024 (WAIS-IV)  1:32pm-2:01pm on 11/7/2024 (MMPI-3)   82 minutes  29 minutes   Scoring (Non-Face-to-Face)   2:30pm-2:35pm on 11/7/2024 (WAIS-IV)   5 minutes   Total Time: 116 minutes   Total Units: 1 3       Diagnoses:   Major depressive disorder, recurrent  episode, moderate  Generalized anxiety disorder

## 2024-11-19 NOTE — PROGRESS NOTES
Psychological Assessment Report    Patient: Artie Burger  YOB: 1995  MRN: [id number]  Date(s) of assessment: 10/14/2024; 10/21/2024; 11/07/2024  Referral Source:   Reason for Referral: assessing reported deficits in executive functioning     IDENTIFYING INFORMATION AND BRIEF HISTORY OF PRESENTING PROBLEM: Artie is a 29-year-old  female who presented to the initial diagnostic intake appointments on 10/14/2024 and 10/21/2024 (see diagnostic intake dated 10/21/2024 for more detailed background information) due to primary concerns with attention and concentration.     As a child, the patient indicated problems with attention and concentration beginning around middle school. The patient reported issues with procrastination and motivation, especially noticeably in high school. The patient reported often avoiding tasks that required sustained mental effort and was consistently forgetful in her daily activities. Additionally, the patient reported losing things often and would occasionally blurt out answers. The patient also indicated that around the age of 7 years old, her parents , making her childhood slightly unstable. The patient reported attending some college and struggling with similar issues regarding attention and concentration. Currently, the patient indicates difficulties in motivation and procrastination that are impacting home-life and social relationships.     Currently, the patient reported experiencing the following symptoms:  Difficulty sustaining attention (impacting home/relationships)  Does not listen when spoken to directly  Does not follow through with tasks   Difficulty organizing   Avoids/dislikes tasks that require sustained mental effort   Loses things often   Is easily distracted   Is forgetful   Is fidgety   Blurts out information (feels the need to blurt things out in case she forgets what she is saying)    Mental Health History: The patient reported a  previous history of depression and anxiety. Patient indicated that her depressive symptoms began in elementary, around the time that her parents . Patient reported that her anxiety symptoms began around 13 years old. Patient indicated that she had her first panic attach at around 14 years old. She has received mental health services in the past and is currently prescribed Wellbutrin and Ativan. Patient denies any psychiatric hospitalization. The patient denied a history of social anxiety, phobic responses, symptoms of obsessive-compulsive disorder, and perceptual difficulties. The patient denied issues with sexual compulsivity, gambling, and disordered eating.    Developmental History: The patient reported that she believes that she is the product of a full-term pregnancy and there were no complications during her mother's pregnancy or birth. The patient reported that she believes she met all her developmental milestones on time. She denied a history of head injuries, learning disorders, and special educational programming. The patient recalled learning problems in attention and concentration.    Chemical Dependence/Substance Abuse History: The patient reports a family history of substance use. She indicates her biological father previously abusing substances. The patient reports a history of chemical or substance abuse. She indicates receiving chemical dependency treatment in the past at [facility] in [place] for alcohol use in 2016/2017. Patient indicated that prior to recovery, she was drinking nearly a liter a day of alcohol. Currently, she is now sober for four years. Patient also reported previous nicotine usage where she smoked cigarettes but quit three years ago. She now reports vaping often. Patient is also prescribed medical marijuana and uses every day. Patient indicates 1-2 cups of coffee per day and would occasionally drink soda.      SOURCES OF DATA/ASSESSMENT: Review  of medical and psychiatric records, consideration of behavioral observations during the testing (if applicable), and the results of the psychological tests are all considered in the preparation of this psychological test report. It is important to note that test results comprise a hypothesis of the patient's mental health concerns and are not an independent or conclusive assessment. Test results are combined with the patient's available medical, psychological, behavioral data for an integrated interpretation and report. Due to virtual/remote administration, certain aspects of the assessment process were impacted, such as access to direct patient observation, and maintaining an environment conducive to testing. As such, external factors have the potential to affect the validity of data collected.    TESTS ADMINISTERED:  Wechsler Adult Intelligence Scale - 4th Edition (WAIS-IV)   Keena Adult ADHD Rating Scale-IV: Self and Other Reports (BAARS-IV)  Keena Functional Impairment Scale: Self and Other Reports (BFIS)  Keena Deficits in Executive Functioning Scale (BDEFS)  Generalized Anxiety Disorder Questionnaire (ALAN-7)  Patient Health Questionnaire- 9 (PHQ-9)  [place] Multiphasic Personality Inventory - 3 (MMPI - 3)     BEHAVIORAL OBSERVATIONS: The patient was pleasant and cooperative throughout all interview and explanation of testing process. The patient was oriented to person, place, and time. Mood was neutral. Eye contact was adequate, and speech was at normal rate and rhythm. Motor activity was appropriate. Due to virtual/remote administration, direct patient observation was not possible during the testing process, and it is unknown if the patient was able to maintain an environment conducive to testing. As such, external factors have the potential to affect the validity of data collected.     TEST RESULTS: Test results comprise a hypothesis of the patient's mental health concerns and are not an independent  or conclusive assessment. Test results are combined with the patient's available medical, psychological, behavioral, and observational data for an integrated interpretation and report.    The Wechsler Adult Intelligence Scale 4th Edition   The WAIS-IV is an individually administered instrument designed to assess the intellectual abilities of examinees. It provides scores that represent intellectual functioning in four specific cognitive domains: Verbal Comprehension, Perceptual Organization, Working Memory, and Processing Speed.    Index Standard Score Percentile Rank Descriptive Category   Verbal Comprehension (VCI) 96 39 Average   Perceptual Reasoning (ZAIRA) 113 81 High Average   Working Memory (WMI) 108 70 Average    Processing Speed (PSI) 79 8 Borderline    Full Scale (FSIQ) 100 50 Average   General Ability (GAI) 104 61 Average     Subtests Scaled Score Percentile Rank   VCI Subtests     Similarities 11 63   Vocabulary 9 37   Information 8 25   ZAIRA Subtests     Block Design  11 63   Matrix Reasoning 14 91   Visual Puzzles 12 75   WMI Subtests     Digit Span 12 75   Arithmetic 11 63   PSI Subtests     Symbol Search 6 9   Coding 6 9     Test data indicate there is no significant discrepancy between the patient's verbal and nonverbal reasoning and problem-solving abilities; therefore, the FSIQ (SS = 100, 50th percentile), which falls in the average range, is an accurate representation of the patient's overall intellectual abilities.      The VCI is a measure of verbal concept formation, verbal reasoning, and knowledge acquired from one's environment. Test data indicate that the patient's verbal abilities are in the average range (VCI SS = 96, 39th percentile).      The ZAIRA is a measure of perceptual and fluid reasoning, spatial processing, and visual-motor integration. The patient's overall ability to use nonverbal reasoning skills is in the high average range (ZAIRA SS = 113, 81st percentile).     The WMI provides a  "measure of the examinee's working memory abilities. Working memory tasks require the ability to temporarily retain information in memory, perform some mental operation on, or manipulation of, it and produce a result. It involves attention, concentration, mental control, and reasoning. Test data indicate the patient's overall working memory abilities are in the average range (WMI SS = 108, 70th percentile).     The PSI provides a measure of the examinee's ability to quickly and correctly scan, sequence, or discriminate simple visual information. This composite also measures short-term visual memory, attention, and visual-motor coordination. Test data indicate the patient's overall processing abilities are in the borderline range (PSI SS = 79, 8th percentile).     Keena Adult ADHD Rating Scale-IV: Self and Other Reports (BAARS-IV)  The BAARS-IV assesses for symptoms of ADHD that are experienced in one's daily life. This assessment measure includes self and collateral rating scales designed to provide information regarding current and childhood symptoms of ADHD including inattention, hyperactivity, and impulsivity. Self-report scores are reported as percentiles. Scores at the 76th-83rd percentile are considered marginal, scores at the 84th-92nd percentile are considered borderline, scores at the 93rd-95th percentile are considered mild, scores at the 96th-98th percentile are considered moderate, and those at the 99th percentile are considered severe. Collateral or \"other\" rating scales are reported as number of symptoms observed in comparison to those reported by the client. Norms and percentile scores are not available for collateral reports.     Current Symptoms Scale--Self Report:   Client completed the self-report inventory of current symptoms. The results indicate that the client's Total ADHD Score was 49 which places the patient in the 98th percentile for overall ADHD symptoms. In addition, the client endorsed " 7/9 (98th percentile) Inattention symptoms, 2/9 (88th percentile) Hyperactivity-Impulsivity symptoms, and 7/9 (97th percentile) Sluggish Cognitive Tempo symptoms. Client indicated that the reported symptoms have resulted in impaired functioning in school, home, work, and social relationships. Overall, the results suggest the client is experiencing moderate ADHD symptoms.     Current Symptoms Scale--Other Report:  Client's boyfriend completed the collateral report inventory of current symptoms. Based on the collateral contact's observation of symptoms, the client demonstrates 2/9 Inattention symptoms, 1/5 Hyperactivity symptoms, 0/4 Impulsivity symptoms, and 5/9 Sluggish Cognitive Tempo symptoms. The client's Total ADHD Score was 34. The collateral contact indicated the client demonstrates impaired functioning in school, home, work, and social relationships.  The collateral- and self-report scores are significantly different. The collateral report indicates less severe symptoms.    Childhood Symptoms Scale--Self-Report:  Client completed the self-report inventory of childhood symptoms. The results indicate that the client's Total ADHD Score was 34 which places the patient in the 76-84th percentile for overall ADHD symptoms in childhood. In addition, the client endorsed 3/9 (85th percentile) Inattention symptoms and 0/9 (1-75th percentile) Hyperactivity-Impulsivity symptoms. Client indicated that the reported symptoms resulted in impaired functioning in school and home. Overall, the results suggest the client experienced marginal symptoms of ADHD as a child.     Childhood Symptoms Scale--Other Report:  Client's mother completed the collateral report inventory of childhood symptoms. Based on the collateral contact's recollection of client's childhood symptoms, the client demonstrated 3/9 Inattention symptoms and 0/9 Hyperactivity-Impulsivity symptoms. The client's Total ADHD Score was 33. The collateral contact  "indicated the client demonstrates impaired functioning in school and home. The collateral- and self-report scores are not significantly different.                           Keena Functional Impairment Scale: Self and Other Reports (BFIS)  The BFIS is used to assess an individuals' psychosocial impairment in major life/daily activities that may be due to a mental health disorder. This assessment measure includes self and collateral rating scales. Self-report scores are reported as percentiles. Scores at the 76th-83rd percentile are considered marginal, scores at the 84th-92nd percentile are considered borderline, scores at the 93rd-95th percentile are considered mild, scores at the 96th-98th percentile are considered moderate, and those at the 99th percentile are considered severe. Collateral or \"other\" rating scales are reported as number of symptoms observed in comparison to those reported by the client. Norms and percentile scores are not available for collateral reports.     Results indicate the client identified impairment (scores at or greater than 93rd percentile) in the following areas: home-chores, social-strangers, sexual relations, daily responsibilities, self-care routines, and health maintenance. The client's Mean Impairment Score was 4.90 (87th percentile) indicating the client is reporting borderline impairment in functioning across domains. Client's boyfriend completed the collateral rating scale, which indicated discrepant results. The collateral contact's scores were generally lower than the client's report.     Keena Deficits in Executive Functioning Scale (BDEFS)  The BDEFS is a measure used for evaluating dimensions of adult executive functioning in daily life. This assessment measure includes self and collateral rating scales. Self-report scores are reported as percentiles. Scores at the 76th-83rd percentile are considered marginal, scores at the 84th-92nd percentile are considered " "borderline, scores at the 93rd-95th percentile are considered mild, scores at the 96th-98th percentile are considered moderate, and those at the 99th percentile are considered severe. Collateral or \"other\" rating scales are reported as number of symptoms observed in comparison to those reported by the client. Norms and percentile scores are not available for collateral reports.     Results indicate the client's Total Executive Functioning Score was 175 (83rd percentile). The ADHD-Executive Functioning Index score was 22 (81st percentile). These scores suggest the client has marginal deficits in executive functioning. Results indicate the client identified significant deficits in the following areas: self-management to time (moderate deficits), self-organization/problem-solving (marginal deficits), self-restraint (no significant deficits), self-motivation (borderline deficits) and self-regulation of emotions (marginal deficits). Client's boyfriend completed the collateral rating scale, which indicated similar results.     Generalized Anxiety Disorder Questionnaire (ALAN-7)  This questionnaire is designed to assess for anxiety in adults. Based on the score, the patient is experiencing mild symptoms of anxiety. Client identified the following symptoms of anxiety: feeling on edge/nervous/anxious, difficulty controlling worry, worrying about many different things, trouble relaxing, being restless, becoming easily annoyed or irritable, and feeling something awful might happen.    Patient Health Questionnaire- 9 (PHQ-9)   This questionnaire is designed to assess for depression in adults. Based on the score, the patient is experiencing moderate-severe symptoms of depression. Client identified the following symptoms of depression: depressed mood, lack of interest, difficulty with sleep, feeling tired or having little energy, poor appetite or overeating, feeling bad about self, poor concentration, and restlessness or lethargy. "     [place] Multiphasic Personality Inventory - 3 (MMPI-3)    The MMPI-3 was administered to evaluate current level of emotional distress. Validity profile indicates that the patient appears to have answered in a generally straightforward and consistent manner, and obtained results are considered to be reliable and valid in representing current psychological status. No items were omitted.      Somatic/Cognitive Dysfunction: The patient endorsed a variety of items that suggests multiple somatic complaints, including head pain, or gastrointestinal issues. She reports experiences relating to poor health and feeling weak/tired. She overall reports a diffuse pattern of cognitive difficulties. Individuals with similar profiles may also experience challenges with attention and concentration.     Emotional Dysfunction: The patient endorsed a variety of items that suggests significant emotional distress. The patient indicates feeling overwhelmed and unhappy. The patient indicated a lack of energy and feeling socially introverted and socially disengaged. She also reported symptoms consistent with anxiety, anger, and fear. The patient may experience intrusive ideation, be self-critical, and worries excessively. She indicated feeling hopeless or helpless and reports a history of suicidal ideation. The patient reported self-doubt and is prone to rumination or lacking confidence. She also reports being passive, indecisive, and inefficacious. She overall indicates anxiety-related experiences, which can include sleep difficulties and panic.    Thought Dysfunction: The patient's thought processes are likely rational and realistic.    Behavioral Dysfunction: The patient endorsed items that suggest past or current substance usage, specifically cannabis. She also reports impulsive behavior, such as nonplanful behavior or poor impulse control. The patient reported disconstrained behavior, where she may act out impulsively or seek  sensation and excitement.     Interpersonal Functioning: The patient reported being passive and submissive and not enjoying the feelings of being in charge. The patient indicates not enjoying social situations and tends to avoid social events. The patient can be described as introverted, having difficulty forming relationships, and emotionally restricted. She overall may be easily embarrassed and uncomfortable around others.    SUMMARY: Artie is a 29 year old female who completed psychological testing remotely/virtually. Testing was requested to provide updated diagnostic clarification and necessary treatment recommendations.    Patient first completed a diagnostic interview in which mental health symptoms, ADHD symptoms, and background information was gathered. Patient self-reported 7 symptoms of inattention and 2 symptoms of hyperactivity/impulsive and indicated that her abilities to function within her home environment and within her relationships are significantly impaired. Further, her self-reported symptoms on Keena measures of ADHD symptoms were consistent with this information. Although the patient reported significant symptoms in her currently, both her and her mother did not indicate significant symptoms in her as a child. An objective measure of personality indicated that the patient is endorsing a variety of somatic and cognitive complaints, such as gastrointestinal problems, head pain, and complaints with attention and concentration. The patient also reported significant emotional distress. She reported symptoms consistent with anxiety and worry, and may experience negative emotions like self-doubt or helplessness. The patient's though processes are likely stable at this time. However, she reports behavior patterns consistent with poor impulsive control or nonplanful behavior. She indicates active substance use, particular daily cannabis intake. Lastly, the patient reports not enjoying social events  and tends to be uncomfortable in social situations. She is likely described as introverted and may have difficulty forming close relationships.    An objective measure of intellectual functioning was also administered. Results first indicated that the patient's verbal comprehension score was in the average range. She was able to demonstrate a fund of knowledge comparable to peers. The patient was also able to define vocabulary words in the average range. Perceptual reasoning was scored in the high average range, indicating a high level of ability to solve two-dimensional puzzles as well as three-dimensional puzzles comparable to peers. Additionally, the patient's working memory score was in the average range. This indicates the ability to complete mental mathematical problems and repeating digit spans comparable to peers. Lastly, processing speed was in the borderline range, indicating difficulty scanning and responding to visual stimuli less effectively than peers.     ADHD is characterized by statistically significant discrepancies between higher-order (verbal comprehension and perceptual reasoning) and second order (working memory and processing speed). In this case, discrepancy was indicated for perceptual reasoning, as it was displayed as a strength in comparison to processing speed. However, the patient's working memory index was in the average range and did not indicate discrepancy for the higher order. The patient also noted that she felt that she could have gone a lot faster during the processing speed subtests (symbol search and coding). This indicates that there is a possibility that the patient's processing speed index could potentially be higher. In addition to the patient's intellectual functioning, the presence of childhood symptoms and executive dysfunction are found to be subclinical. Although the patient is reporting moderate symptoms of ADHD, it is likely the presence of the patient's distress is  better explained by depression and anxiety symptoms, as evidenced by the patient's currently intellectual functioning and personality and emotional functioning. The patient's difficulties regarding attention and concentration seemed to have started around the time that she was experiencing family conflict, therefore, it is possible that those symptoms could be attributed to that context. The presence of depression and anxiety can cause difficulty in performing tasks that require careful attention as well as lead to poor concentration. The presence of depression can decrease processing speed, which can lead to slower response times as well as an increased difficulty performing tasks that require careful attention. Anxiety and worry can also hinder an individual's ability to focus on important tasks and remember important events. As such, ADHD is ruled out.     Referral Question Response: DSM-5 criteria for ADHD:   A. Symptom Count - Are there sufficient symptoms for the diagnosis? Yes, patient did endorse sufficient symptoms of inattention.   B. Onset - Were several symptoms present before 12 years of age? No, reported symptoms were subclinical.  C. Pervasiveness - Are several symptoms present in at least two settings? Yes, patient reported that symptoms are problematic at home and within relationships.  D. Impairment - Do symptoms interfere with or reduce the quality of functioning? Yes, patient is unable to complete daily tasks and maintain optimal attention.  E. Exclusions - Are symptoms better explained by another disorder or factor? Yes, symptoms are better explained by anxiety and depression symptoms. Difficulties are not explained by an organic basis of inattention.     The patient meets the following DSM-5 criteria for generalized anxiety disorder:  A. Excessive anxiety and worry, occurring more days than not for at least 6 months about a number of events or activities.   B. The individual finds it difficult  to control the worry.  C. The anxiety and worry are associated with 3 or more of 6 symptoms.  D. The anxiety, worry, or physical symptoms cause clinically significant distress or impairment in social, occupational, or other important areas of functioning.  E. The disturbance is not attributable to the physiological effects of a substance (e.g., a drug of abuse, a medication) or another medical condition (e.g., hyperthyroidism).  F. The disturbance is not better explained by another mental disorder.    The patient also meets the following DSM-5 criteria for major depressive disorder:  A. Five (or more) symptoms have been present during the same 2-week period and represent a change from previous functioning; at least one of the symptoms is either (1) depressed mood or (2) loss of interest or pleasure.   Depressed mood.   Diminished interest or pleasure in all, or almost all, activities.   Significant appetite change.  Significant sleep change.   Fatigue or loss of energy.   Feelings of worthlessness or inappropriate guilt.   Diminished ability to think or concentrate, or indecisiveness.   Psychomotor agitation or lethargy.  Recurrent thoughts of death.   B. The symptoms cause clinically significant distress or impairment in social, occupational, or other important areas of functioning.  C. The episode is not attributable to the physiological effects of a substance or to another medical condition.  D. The occurrence of major depressive episode is not better explained by other thought / psychotic disorders.  E. There has never been a manic episode or hypomanic episode.     DIAGNOSES:  F41.1 Generalized Anxiety Disorder  F33.1 Major Depressive Disorder, recurrent episode, moderate    PLAN OF CARE:  Discuss the following with your primary care provider:  Continue the use of your psychotropic medication. This may help alleviate some of the patient's depression and anxiety symptoms. If the patient feels that there needs to  be a change in her medication, it is encouraged that she reaches out to her PCP.    Continue individual psychotherapy to alleviate mood symptoms. Research indicates that outcomes are best with both medication and therapy.     RECOMMENDATIONS:  Due to the patient's reported attention, concentration, and mood difficulties, the following health/lifestyle changes when combined, can significantly improve symptoms:   Avoid simple carbohydrates at breakfast. Aim for only complex carbohydrates and lean protein for your morning meal.   Engage in aerobic exercise 3 times per week for 30 minutes, ensuring that your heart rate stays within your training zone. Further, reading the book,  Spark,  by Oscar Arriola M.D. can help the patient understand the benefits of exercise on the brain.   Research suggest that taking a high-quality multi-vitamin and antioxidant (1/2 cup of blueberries) daily in conjunction with balanced nutrition can be helpful.  Aim for the high end of daily water intake: around 72 ounces per day.  Ensure regular meals and snacks to maintain optimal attention.    The following may be beneficial in managing some of the patient's attention and concentration difficulties:  Due to the patient's difficulties with attention and concentration, consider working in a completely distraction-free area while completing tasks. Workspaces should be completely clear except for the materials needed for the current task. Both visual and auditory distractions should be decreased as much as possible.  Considering decreased ability to focus and maintain attention, it is recommended that the patient take frequent breaks while completing tasks. This will help to maintain attention and effort. The patient may benefit from the use of a Novadiol Timer. The timer works by using built-in break times. After working on a task consistently for 25 minutes, the timer reminds the user to take a five-minute break before continuing, etc. A  "Pomodoro timer can be downloaded as a free layo to a phone or tablet.  Due to the patient's attentional and concentration symptoms, it is recommended to increase organization with the use of lists and calendars. Significantly increasing structure to the day and adhering to a set schedule can increase your ability to complete responsibilities, track deadline, etc. Breaking these tasks down into their component parts and recording them in a calendar/planner will likely be beneficial. Patient would benefit from setting feasible timelines for completion of activities. By establishing clear priorities for completing tasks, you can more likely complete the most important tasks first. The patient may also choose to elect to a friend or family member to help hold them accountable.    Avoid multitasking. Attempting to work on multiple tasks and projects the same increases the likelihood that an error will occur. Focus on one task at a time.    Due to the patient's difficulties with sleep, it recommended to engage in a relaxing activity up to the point of sleep. The patient may want to spend time reading, drawing/ coloring, practicing mindfulness, listening (not watching) to podcasts, music, etc., or try the [employer]eq[employer] layo, which is free to download and may aid falling asleep more easily.    The patient may benefit from engaging in mindfulness practices. This may include breathing techniques, apps that provide guided meditation, or more interactive activities such as coloring.    Develop a \"coping skills jar/box.\" This entails designating a certain container to hold slips of paper with distraction technique ideas written on each slip of paper. Distraction techniques may include listening to a certain type of music, playing on game on your phone, doing a breathing exercise, spending time with a pet, calling a certain individual, looking at a magazine, working on a puzzle, etc. When feeling distressed, choose a slip of paper " from the container and engage in that activity rather than focusing on the problem.      Tamanna Claudio MA  Doctoral Psychology Intern  11/19/2024 at 4:13 PM

## 2024-11-27 DIAGNOSIS — E89.1 POST-PANCREATECTOMY DIABETES (H): Primary | ICD-10-CM

## 2024-11-27 DIAGNOSIS — Z90.410 ACQUIRED TOTAL ABSENCE OF PANCREAS: ICD-10-CM

## 2024-11-27 DIAGNOSIS — Z90.410 POST-PANCREATECTOMY DIABETES (H): Primary | ICD-10-CM

## 2024-11-27 DIAGNOSIS — K86.81 EXOCRINE PANCREATIC INSUFFICIENCY: ICD-10-CM

## 2024-11-27 DIAGNOSIS — E13.9 POST-PANCREATECTOMY DIABETES (H): Primary | ICD-10-CM

## 2024-11-27 RX ORDER — PANTOPRAZOLE SODIUM 40 MG/1
40 TABLET, DELAYED RELEASE ORAL DAILY
Qty: 90 TABLET | Refills: 3 | Status: SHIPPED | OUTPATIENT
Start: 2024-11-27

## 2024-12-08 ASSESSMENT — PAIN SCALES - PAIN ENJOYMENT GENERAL ACTIVITY SCALE (PEG)
INTERFERED_GENERAL_ACTIVITY: 1
AVG_PAIN_PASTWEEK: 3
PEG_TOTALSCORE: 1.67
INTERFERED_ENJOYMENT_LIFE: 1

## 2024-12-10 NOTE — PROGRESS NOTES
[facility]     Date of visit: 12/11/2024      Assessment:   Artie Burger is a 29 year old female with a past medical history significant for ETOH use, pancreatitis, GERD who presents in follow up for medical cannabis re-cert, abdominal pain.      Abdominal pain - Her pain is likely secondary to chronic pancreatitis, though possible some other underlying GI process present. Of note, symptoms are intermittent and seem to be relieved with bowel movements, which may be suggestive of IBS. She has been using ibuprofen and acetaminophen PRN and medical cannabis, which she finds helpful for symptoms and appetite. She has not appreciated much improvement in pain with more conservative measures and is interested in procedure. Her pain is primarily located over epigastric region, more severe after means. Pain likely mixed etiology, neuropathic and abdominal wall.      Assigned to [facility] nursing team.     Visit Diagnoses:  1. Chronic pain syndrome    2. Abdominal pain, generalized    3. Medical cannabis use        Plan:  Diagnosis reviewed, treatment option addressed, and risk/benifits discussed.  Self-care instructions given.  I am recommending a multidisciplinary treatment plan to help this patient better manage their pain.      Medication Management:   Medical cannabis - Renewed certification for [place] medical cannabis today     Follow up with ROSIE Park CNP in 3  years if interested in medical cannabis renewal.     Review of Electronic Chart: Today I have also reviewed available medical information in the patient's medical record at [facility] ([facility]) and Care Everywhere (if available), including relevant provider notes, laboratory work, and imaging.     Jayde Del Toro DNP, ROSIE, AGNP-C  [facility]     -------------------------------------------------    Subjective:    Chief complaint:   Chief Complaint   Patient presents with    Pain       Interval history:  Artie  Tao is a 29 year old female last seen on 11/30/23.      Recommendations/plan at the last visit included:  Physical therapy - continue activity as tolerated at home.      Pain Psychologist to address relaxation, behavioral change, coping style, and other factors important to improvement.  NO - Let me know if you are interested in exploring pain psychology to support chronic pain experience.      Diagnostic Studies:  None      Medication Management:   Medical cannabis - Renewed certification for [place] medical [place] today     Potential procedures:   None      Other Orders/Referrals:   None      Follow up with ROSIE Park CNP in around 11 months if she would like to continue medical cannabis.    Since her last visit, Artie Burger reports:  -She is stable overall.   -Here today for medical cannabis renewal.   -No new concerns.     PEG: A Three-Item Scale Assessing Pain Intensity and Interference    What number best describes your PAIN ON AVERAGE in the past week? (Patient-Rptd) 3    What number best describes how, during the past week, pain has interfered with your ENJOYMENT OF LIFE? (Patient-Rptd) 1    What number best describes how, during the past week, pain has interfered with your GENERAL ACTIVITY? (Patient-Rptd) 1    PEG Total Score: (Patient-Rptd) 1.67    Estrella CÁRDENAS, Rufino KA, Holli MJ, Phyllis TA, Del Toro J, Eric JM, Chel SM, Demetrio K. Development and initial validation of the PEG, a 3-item scale assessing pain intensity and interference. Journal of General Internal Medicine. 2009 Akhil;24:733-738.        HPI/Interval Hx from last visit:  -Her pain overall has improved since last visit.   -She had TPIAT, states this went well and she is feeling a lot better.   -She continues to use medical cannabis for intermittent abdominal pain. Also helpful for nausea.   -She is currently using flower and vape products.   -She is here today for medical cannabis renewal.       Current Pain Treatments:                 Medical cannabis - renewed today      Current MME: 0     Review of [place] Prescription Monitoring Program (): YES     Past pain treatments:  S/P TPIAT      Medications:  Current Outpatient Medications   Medication Sig Dispense Refill    acetaminophen (TYLENOL) 500 MG tablet Take 500-1,000 mg by mouth      acetone urine (RELION KETONE TEST) test strip Use as part of high blood sugar protocol for pump users 20 strip 4    Alcohol Swabs PADS Use before taking blood sugars up to 10 times a day 100 each 3    blood glucose (NO BRAND SPECIFIED) lancets standard Use to test blood sugars up to 8 times daily as directed. 100 each 3    blood glucose (NO BRAND SPECIFIED) test strip Test blood sugars up to 8 times a day as directed by your provider 200 strip 3    blood glucose monitoring (NO BRAND SPECIFIED) meter device kit Use as directed Per insurance coverage. DM education recommending Accu Chek Guide glucose meter 1 kit 0    blood glucose monitoring (SOFTCLIX) lancets USE TO TEST BLOOD GLUCOSE UP TO EIGHT TIMES DAILY      buPROPion (WELLBUTRIN XL) 150 MG 24 hr tablet Take 1 tablet (150 mg) by mouth every morning. TAKE WITH 300 mg tab for total dose 450 mg daily. 90 tablet 0    buPROPion (WELLBUTRIN XL) 300 MG 24 hr tablet Take 1 tablet (300 mg) by mouth every morning. 90 tablet 1    busPIRone (BUSPAR) 10 MG tablet Take 2 tablets (20 mg) by mouth 2 times daily. 180 tablet 1    cetirizine (ZYRTEC) 10 MG tablet Take 10 mg by mouth daily      Continuous Glucose Sensor (DEXCOM G6 SENSOR) MISC 1 each every 10 days 9 each 3    Continuous Glucose Sensor (DEXCOM G7 SENSOR) MISC Change every 10 days. 3 each 5    Continuous Glucose Transmitter (DEXCOM G6 TRANSMITTER) MISC 1 each every 3 months 1 each 3    gabapentin (NEURONTIN) 600 MG tablet Take 1 tablet (600 mg) by mouth 2 times daily 180 tablet 3    Glucagon (GVOKE HYPOPEN) 1 MG/0.2ML pen Inject the contents of 1 device under the skin into lower abdomen, outer thigh, or outer  upper arm as needed for hypoglycemia. If no response after 15 minutes, additional 1 mg dose from a new device may be injected while waiting for emergency assistance. 0.4 mL 3    glucose (BD GLUCOSE) 4 g chewable tablet Take 1 tablet by mouth every hour as needed for low blood sugar 30 tablet 0    ibuprofen (ADVIL/MOTRIN) 200 MG tablet Take 800 mg by mouth as needed      insulin aspart (NOVOLOG PEN) 100 UNIT/ML pen DOSE:  1 units per 10 grams of carbohydrate.  Only chart total amount of units given.  Do not give if pre-prandial glucose is less than 60 mg/dL. If given at mealtime, administer within 30 minutes of start of meal. 15 mL 3    insulin aspart (NOVOLOG VIAL) 100 UNITS/ML vial Up to 30 units daily via insulin pump 30 mL 4    Insulin Disposable Pump (OMNIPOD 5 G6 INTRO, GEN 5,) KIT 1 each every 3 days 1 kit 0    Insulin Disposable Pump (OMNIPOD 5 G6 PODS, GEN 5,) MISC 1 each every 3 days 30 each 4    Insulin Disposable Pump (OMNIPOD 5 PODS, GEN 5,) MISC 1 each every 3 days. 30 each 4    insulin pen needle (32G X 4 MM) 32G X 4 MM miscellaneous Use up to 8 pen needles daily or as directed. 200 each 11    levonorgestrel (MIRENA) 52 MG (20 mcg/day) IUD 1 Device by Intrauterine route      lipase-protease-amylase (CREON) 61583-56280-380474 units CPEP per EC capsule Take 4 capsules with meals and 2 capsules with snacks; approximate daily maximum 16 capsules 480 capsule 11    LORazepam (ATIVAN) 0.5 MG tablet Take 1 tablet (0.5 mg) by mouth daily as needed (panic attacks). 10 TABS TO LAST 30 DAYS 10 tablet 1    Nutritional Supplements (BOOST HIGH PROTEIN) LIQD After above baseline labs are drawn, give: 6 mL/kg to maximum of 360 mL; the beverage is to be consumed within 5 minutes.      ondansetron (ZOFRAN ODT) 4 MG ODT tab Take 1 tablet (4 mg) by mouth every 6 hours as needed for nausea or vomiting 12 tablet 3    pantoprazole (PROTONIX) 40 MG EC tablet Take 1 tablet (40 mg) by mouth daily. 90 tablet 3    polyethylene  glycol (MIRALAX) 17 GM/Dose powder Take 17 g by mouth daily 510 g 3    propranolol (INDERAL) 20 MG tablet Take 0.5-1.5 tablets (10-30 mg) by mouth 2 times daily as needed (anxiety) 60 tablet 1       Medical History: any changes in medical history since they were last seen? No      Objective:    Physical Exam:  Blood pressure 114/79, pulse 77, not currently breastfeeding.  Constitutional: Well developed, well nourished, appears stated age.  Gait: Intact  HEENT: Head atraumatic, normocephalic. Eyes without conjunctival injection or jaundice. Oropharynx clear. Neck supple. No obvious neck masses.  Skin: No rash, lesions, or petechiae of exposed skin.   Psychiatric/mental status: Alert, without lethargy or stupor. Speech fluent. Appropriate affect. Mood normal. Able to follow commands without difficulty.     Diagnostic Tests/Imaging/Labs:  Reviewed last CMP from 10/3/24 - hepatic and renal function intact.     BILLING TIME DOCUMENTATION:   The total TIME spent on this patient on the date of the encounter/appointment was 20 minutes.      TOTAL TIME includes:   Time spent preparing to see the patient (reviewing records and tests)   Time spent face to face (or over the phone) with the patient   Time spent ordering tests, medications, procedures and referrals   Time spent Referring and communicating with other healthcare professionals   Time spent documenting clinical information in Epic

## 2024-12-11 ENCOUNTER — OFFICE VISIT (OUTPATIENT)
Dept: PALLIATIVE MEDICINE | Facility: CLINIC | Age: 29
End: 2024-12-11
Payer: COMMERCIAL

## 2024-12-11 VITALS — DIASTOLIC BLOOD PRESSURE: 79 MMHG | HEART RATE: 77 BPM | SYSTOLIC BLOOD PRESSURE: 114 MMHG

## 2024-12-11 DIAGNOSIS — G89.4 CHRONIC PAIN SYNDROME: Primary | ICD-10-CM

## 2024-12-11 DIAGNOSIS — Z79.899 MEDICAL CANNABIS USE: ICD-10-CM

## 2024-12-11 DIAGNOSIS — R10.84 ABDOMINAL PAIN, GENERALIZED: ICD-10-CM

## 2024-12-11 PROCEDURE — 99214 OFFICE O/P EST MOD 30 MIN: CPT

## 2024-12-11 ASSESSMENT — PAIN SCALES - GENERAL: PAINLEVEL_OUTOF10: MILD PAIN (2)

## 2024-12-11 NOTE — PATIENT INSTRUCTIONS
Medical cannabis renewed today.   Follow up in 3 years for medical cannabis renewal. You will need to get a new referral from primary care.     ----------------------------------------------------------------  Clinic Number:  [phone]   Call with any questions about your care and for scheduling assistance.   Calls are returned Monday through Friday between 8 AM and 4:30 PM. We usually get back to you within 2 business days depending on the issue/request.    If we are prescribing your medications:  For opioid medication refills, call the clinic or send a Applyful message 7 days in advance.  Please include:  Name of requested medication  Name of the pharmacy.  For non-opioid medications, call your pharmacy directly to request a refill. Please allow 3-4 days to be processed.   Per [place] State Law:  All controlled substance prescriptions must be filled within 30 days of being written.    For those controlled substances allowing refills, pickup must occur within 30 days of last fill.      We believe regular attendance is key to your success in our program!    Any time you are unable to keep your appointment we ask that you call us at least 24 hours in advance to cancel.This will allow us to offer the appointment time to another patient.   Multiple missed appointments may lead to dismissal from the clinic.

## 2025-01-15 LAB
A-LINOLENATE SERPL-SCNC: 68 NMOL/ML (ref 50–130)
AA SERPL-SCNC: 1312 NMOL/ML (ref 520–1490)
ARACHIDATE SERPL-SCNC: 30 NMOL/ML (ref 50–90)
CLINICAL BIOCHEMIST REVIEW: ABNORMAL
DHA SERPL-SCNC: 92 NMOL/ML (ref 30–250)
DOCOSAPENTAENATE W6 SERPL-SCNC: 26 NMOL/ML (ref 10–70)
DOCOSATETRAENOATE SERPL-SCNC: 29 NMOL/ML (ref 10–80)
DOCOSENOATE SERPL-SCNC: 8 NMOL/ML (ref 4–13)
DPA SERPL-SCNC: 54 NMOL/ML (ref 20–210)
EPA SERPL-SCNC: 40 NMOL/ML (ref 14–100)
FA SERPL-SCNC: 11.5 MMOL/L (ref 7.3–16.8)
G-LINOLENATE SERPL-SCNC: 48 NMOL/ML (ref 16–150)
HEXADECENOATE SERPL-SCNC: 50 NMOL/ML (ref 25–105)
HOMO-G LINOLENATE SERPL-SCNC: 208 NMOL/ML (ref 50–250)
LAURATE SERPL-SCNC: 19 NMOL/ML (ref 6–90)
LINOLEATE SERPL-SCNC: 3523 NMOL/ML (ref 2270–3850)
MEAD ACID SERPL-SCNC: 33 NMOL/ML (ref 7–30)
MONOUNSAT FA SERPL-SCNC: 3 MMOL/L (ref 1.3–5.8)
MYRISTATE SERPL-SCNC: 115 NMOL/ML (ref 30–450)
NERVONATE SERPL-SCNC: 114 NMOL/ML (ref 60–100)
OCTADECANOATE SERPL-SCNC: 787 NMOL/ML (ref 590–1170)
OLEATE SERPL-SCNC: 2276 NMOL/ML (ref 650–3500)
PALMITATE SERPL-SCNC: 2530 NMOL/ML (ref 1480–3730)
PALMITOLEATE SERPL-SCNC: 338 NMOL/ML (ref 110–1130)
POLYUNSAT FA SERPL-SCNC: 4.9 MMOL/L (ref 3.2–5.8)
SAT FA SERPL-SCNC: 3.6 MMOL/L (ref 2.5–5.5)
TRIENOATE/AA SERPL-SRTO: 0.03 {RATIO} (ref 0.01–0.04)
VACCENATE SERPL-SCNC: 234 NMOL/ML (ref 280–740)
W3 FA SERPL-SCNC: 0.3 MMOL/L (ref 0.2–0.5)
W6 FA SERPL-SCNC: 4.6 MMOL/L (ref 3–5.4)

## 2025-01-19 ENCOUNTER — HEALTH MAINTENANCE LETTER (OUTPATIENT)
Age: 30
End: 2025-01-19

## 2025-01-27 ASSESSMENT — ANXIETY QUESTIONNAIRES
7. FEELING AFRAID AS IF SOMETHING AWFUL MIGHT HAPPEN: SEVERAL DAYS
6. BECOMING EASILY ANNOYED OR IRRITABLE: SEVERAL DAYS
5. BEING SO RESTLESS THAT IT IS HARD TO SIT STILL: MORE THAN HALF THE DAYS
2. NOT BEING ABLE TO STOP OR CONTROL WORRYING: MORE THAN HALF THE DAYS
GAD7 TOTAL SCORE: 14
8. IF YOU CHECKED OFF ANY PROBLEMS, HOW DIFFICULT HAVE THESE MADE IT FOR YOU TO DO YOUR WORK, TAKE CARE OF THINGS AT HOME, OR GET ALONG WITH OTHER PEOPLE?: SOMEWHAT DIFFICULT
4. TROUBLE RELAXING: NEARLY EVERY DAY
4. TROUBLE RELAXING: NEARLY EVERY DAY
8. IF YOU CHECKED OFF ANY PROBLEMS, HOW DIFFICULT HAVE THESE MADE IT FOR YOU TO DO YOUR WORK, TAKE CARE OF THINGS AT HOME, OR GET ALONG WITH OTHER PEOPLE?: SOMEWHAT DIFFICULT
7. FEELING AFRAID AS IF SOMETHING AWFUL MIGHT HAPPEN: SEVERAL DAYS
1. FEELING NERVOUS, ANXIOUS, OR ON EDGE: NEARLY EVERY DAY
6. BECOMING EASILY ANNOYED OR IRRITABLE: SEVERAL DAYS
1. FEELING NERVOUS, ANXIOUS, OR ON EDGE: NEARLY EVERY DAY
IF YOU CHECKED OFF ANY PROBLEMS ON THIS QUESTIONNAIRE, HOW DIFFICULT HAVE THESE PROBLEMS MADE IT FOR YOU TO DO YOUR WORK, TAKE CARE OF THINGS AT HOME, OR GET ALONG WITH OTHER PEOPLE: SOMEWHAT DIFFICULT
2. NOT BEING ABLE TO STOP OR CONTROL WORRYING: MORE THAN HALF THE DAYS
3. WORRYING TOO MUCH ABOUT DIFFERENT THINGS: MORE THAN HALF THE DAYS
3. WORRYING TOO MUCH ABOUT DIFFERENT THINGS: MORE THAN HALF THE DAYS
GAD7 TOTAL SCORE: 14
GAD7 TOTAL SCORE: 14
IF YOU CHECKED OFF ANY PROBLEMS ON THIS QUESTIONNAIRE, HOW DIFFICULT HAVE THESE PROBLEMS MADE IT FOR YOU TO DO YOUR WORK, TAKE CARE OF THINGS AT HOME, OR GET ALONG WITH OTHER PEOPLE: SOMEWHAT DIFFICULT
GAD7 TOTAL SCORE: 14
5. BEING SO RESTLESS THAT IT IS HARD TO SIT STILL: MORE THAN HALF THE DAYS

## 2025-01-28 ENCOUNTER — TELEPHONE (OUTPATIENT)
Dept: TRANSPLANT | Facility: CLINIC | Age: 30
End: 2025-01-28
Payer: COMMERCIAL

## 2025-01-29 NOTE — PROGRESS NOTES
[facility] Psychiatry Services [facility]  1/30/25      Behavioral Health Clinician Progress Note    Patient Name: Artie Burger           Service Type:  Individual      Service Location:   [facility] / Email (patient reached)     Session Start Time: 10am  Session End Time: 1026am      Session Length: 16 - 37      Attendees: Client     Service Modality:  Video Visit:      Provider verified identity through the following two step process.  Patient provided:  Patient is known previously to provider    Telemedicine Visit: The patient's condition can be safely assessed and treated via synchronous audio and visual telemedicine encounter.      Reason for Telemedicine Visit: Services only offered telehealth    Originating Site (Patient Location): Patient's home    Distant Site (Provider Location): Provider Remote Setting- Home Office    Consent:  The patient/guardian has verbally consented to: the potential risks and benefits of telemedicine (video visit) versus in person care; bill my insurance or make self-payment for services provided; and responsibility for payment of non-covered services.     Patient would like the video invitation sent by:  My Chart    Mode of Communication:  Video Conference via Amwell    Distant Location (Provider):  Off-site    As the provider I attest to compliance with applicable laws and regulations related to telemedicine.    Visit Activities (Refresh list every visit): [facility] Only    Diagnostic Assessment Date: 8/7/24 Daina Daniel MA [facility]; 10/21/24 Tamanna Claudio  Treatment Plan Review Date: 12/6/24  See Flowsheets for today's PHQ-9 and ALAN-7 results  Previous PHQ-9:       11/7/2024    12:09 PM 12/5/2024     9:17 AM 1/30/2025     9:49 AM   PHQ-9 SCORE   PHQ-9 Total Score Hari 19 (Moderately severe depression) 13 (Moderate depression) 11 (Moderate depression)   PHQ-9 Total Score  13  11        Patient-reported    Proxy-reported     Previous ALAN-7:       11/7/2024    12:09  PM 12/5/2024     9:18 AM 1/27/2025     8:02 PM   ALAN-7 SCORE   Total Score 12 (moderate anxiety) 9 (mild anxiety) 14 (moderate anxiety)   Total Score 12  9  14        Patient-reported     DATA  Extended Session (60+ minutes): No  Interactive Complexity: No  Crisis: No  [facility] Patient: No    Treatment Objective(s) Addressed in This Session:  Target Behavior(s): disease management/lifestyle changes reducing sx    Depressed Mood: Decrease frequency and intensity of feeling down, depressed, hopeless  Anxiety: will develop more effective coping skills to manage anxiety symptoms    Current Stressors / Issues:  MH update:  Increased buspar.  Using walking pad.  Making breakfast for self now.  Feeling better. Using organization tools to help.  Mood is better.  Anxiety is higher.  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  Taking notes to try and manage concentration, doing diane.  Appetite: T1D.  Sleep: Nightmares increased.  Anxiety induced.  Outpatient Provider updates: Completed ADHD testing.  Did not confirm diagnosis.  [facility]  SI/SIB/HI: Denies current.  Hx of passive ideation.  Denies previous attempts.  Hx of SIB as teen.  Denies need for safety plan or crisis resources.  AMPARO:  Sober 4 years ETOH.  No meetings.  Friend support.  Daily medical THC use.  Side effects/compliance:  Interventions:  [facility] engaged in discussion about educational stressors with validation and support.  Most important:  started OTC Vit D.  Mood is better.  Better energy and motivation.  Anxiety worse. At night worse/nightmares trauma related.    Next time practice telling story.    12/6  MH update:  Had a good thanksgiving.  Rented community space for [employer].  The increase in medication has been helpful.  Stresses:  Financial stress.  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  Fighting with partner.  Appetite: T1D  Sleep: anxiety dreams.  Outpatient Provider updates: Completed ADHD  testing.  Did not confirm diagnosis.  [facility] EMDR  SI/SIB/HI: Denies current.  Hx of passive ideation.  Denies previous attempts.  Hx of SIB as teen.  Denies need for safety plan or crisis resources.  AMPARO:  Sober 4 years ETOH.  No meetings.  Friend support.  Daily medical THC use.  Side effects/compliance:  Interventions:  [facility] engaged in processing results of ADHD testing  Most important:  Frustrated with test results.   Increase of meds was helpful.   Couple days of brain zaps?    11/4  MH update:  Depression has increased.  Lack of motivation.  Sadness.  Apathy.  Anxiety a little higher.  Feeling more emotional and tearful.  Stresses:  Celebrated birthday.  Reduced ADL's.   Relationship is going well.  Starting school January.  Has to figure out $.  On academic probation from younger years.  Only going to try one class.  Appetite: T1D  Sleep: Increased.  Waking up a lot more at night.   Outpatient Provider updates: Started ADHD testing process.  [facility] EM[facility]  SI/SIB/HI: Denies current.  Hx of passive ideation.  Denies previous attempts.  Hx of SIB as teen.  Denies need for safety plan or crisis resources.  AMPARO:  Sober 4 years ETOH.  No meetings.  Friend support.  Daily medical THC use.  Side effects/compliance:  Interventions:  [facility] engaged in validation and support  Most important:  Depression sx a bit higher    9/6  MH update: stopped zoloft, start wellbutrin.  Got brain zaps.  Did slower taper.  On 12mg for the last week Zoloft. On 300mg Wellbutrin. Anxiety is better.  Better motivation.  Mood is lifted. Getting get out of bed and things done.   Anxiety is better now after medication taper is address.  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    Appetite: T1D  Sleep: High fatigue.  On going anemia  Outpatient Provider updates: ADHD testing 10/14 in [place].  [facility]  started EMDR  again  SI/SIB/HI: Denies current.  Hx of passive ideation.  Denies previous attempts.  Hx of SIB as teen.  Denies need for safety plan or crisis resources.  AMPARO:  Sober 4 years ETOH.  No meetings.  Friend support.  Daily medical THC use.  Side effects/compliance:  Interventions:  [facility] discussed TIPP skills for anxiety mgmt  Most important:  Doing better after med change.      8/7  MH update:  ROS above  Stresses:  Medical stressors.  Goals of wanting to obtain PT employment.  Dad's death as teen  Appetite: N/a.  T1D.  Sleep: Poor- related to anxiety.  No sleep study.  Night sweats.  Nightmares  [facility]  SI/SIB/HI:  Denies current.  Hx of passive ideation.  Denies previous attempts.  Hx of SIB as teen.  Denies need for safety plan or crisis resources.  AMPARO:  Sober 4 years ETOH.  No meetings.  Friend support.  Daily medical THC use.  Preg:  IUD  Side effects/compliance:  Interventions:  [facility] engaged in completing DA with psychoeducation and tx planning  Most important:  Different meds?  Higher dose?   ADHD testing referral?    Progress on Treatment Objective(s) / Homework:  Satisfactory progress - ACTION (Actively working towards change); Intervened by reinforcing change plan / affirming steps taken    Motivational Interviewing    MI Intervention: Co-Developed Goal: reducing sx and Expressed Empathy/Understanding     Change Talk Expressed by the Patient: Desire to change Ability to change    Provider Response to Change Talk: E - Evoked more info from patient about behavior change and A - Affirmed patient's thoughts, decisions, or attempts at behavior change    Also provided psychoeducation about behavioral health condition, symptoms, and treatment options    Assessments completed prior to visit:  The following assessments were completed by patient for this visit:  N/a    Care Plan review completed: Yes    Medication Review:  Changes to psychiatric medications,  see updated Medication List in EPIC.     Medication Compliance:  Yes    Changes in Health Issues:   None reported    Chemical Use Review:   Substance Use: Chemical use reviewed, no active concerns identified      Tobacco Use: No current tobacco use.      Assessment: Current Emotional / Mental Status (status of significant symptoms):  Risk status (Self / Other harm or suicidal ideation)  Patient has had a history of suicidal ideation: hx of passive ideation without planning intent or previous attempts and self-injurious behavior: hx of such as teen  Patient denies current fears or concerns for personal safety.  Patient denies current or recent suicidal ideation or behaviors.  Patient denies current or recent homicidal ideation or behaviors.  Patient denies current or recent self injurious behavior or ideation.  Patient denies other safety concerns.  A safety and risk management plan has not been developed at this time, however patient was encouraged to call [place] [phone] should there be a change in any of these risk factors.    Appearance:   Appropriate   Eye Contact:   Good   Psychomotor Behavior: Normal   Attitude:   Cooperative   Orientation:   All  Speech   Rate / Production: Normal    Volume:  Normal   Mood:    Normal  Affect:    Appropriate   Thought Content:  Clear   Thought Form:  Coherent  Logical   Insight:    Good     Diagnoses:  1. Moderate episode of recurrent major depressive disorder (H)    2. ALAN (generalized anxiety disorder)    3. Trauma and stressor-related disorder    4. Alcohol use disorder, severe, in sustained remission (H)          Collateral Reports Completed:  Communicated with: Dr Singleton    Plan: (Homework, other):  Patient was given information about behavioral services and encouraged to schedule a follow up appointment with the clinic [facility] as needed.  She was also given information about mental health symptoms and treatment options .  CD Recommendations: Maintain Sobriety.  "      Daina Daniel, [facility]    ______________________________________________________________________    Integrated Primary Care Behavioral Health Treatment Plan    Individual Treatment Plan    Patient's Name: Artie Burger   YOB: 1995  Date of Creation: 9/6/24  Date Treatment Plan Last Reviewed/Revised: 12/6/24    DSM5 Diagnoses:   1. Moderate episode of recurrent major depressive disorder (H)    2. ALAN (generalized anxiety disorder)    3. Trauma and stressor-related disorder    4. Alcohol use disorder, severe, in sustained remission (H)      Psychosocial / Contextual Factors: Medical Complexities, Trauma History, and Occupational Issues  PROMIS (reviewed every 90 days):   The following assessments were completed by patient for this visit:  PROMIS 10-Global Health (only subscores and total score):       6/22/2023     4:55 PM 8/1/2024     1:16 PM 10/11/2024    10:08 AM 11/2/2024     9:41 AM   PROMIS-10 Scores Only   Global Mental Health Score 9 11    11 11 7    7   Global Physical Health Score 8 14    14 13 11    11   PROMIS TOTAL - SUBSCORES 17 25    25 24 18    18        Referral / Collaboration:  Referral to another professional/service is not indicated at this time..    Anticipated number of session for this episode of care: 6-9 sessions  Anticipation frequency of session: Monthly  Anticipated Duration of each session: 16-37 minutes  Treatment plan will be reviewed in 90 days or when goals have been changed.       MeasurableTreatment Goal(s) related to diagnosis / functional impairment(s)  Goal 1: Patient will reducing trauma sx   \"I will know I've met my goal when I don't feel triggered by my history .\"     Objective #A (Patient Action)    Patient will  attend and complete EMDR  Status: Continued - Date(s):12/6/24     Intervention(s)  [facility] will provide support through CBT, MI, Acceptance and Commitment Therapy, Dialectic Behavioral Therapy and problem solving model to explore and overcome " barriers.        Patient has reviewed and agreed to the above plan.    Written by  Daina Daniel, [facility], [facility] [place]

## 2025-01-30 ENCOUNTER — VIRTUAL VISIT (OUTPATIENT)
Dept: PSYCHIATRY | Facility: CLINIC | Age: 30
End: 2025-01-30
Payer: COMMERCIAL

## 2025-01-30 ENCOUNTER — VIRTUAL VISIT (OUTPATIENT)
Dept: BEHAVIORAL HEALTH | Facility: CLINIC | Age: 30
End: 2025-01-30
Payer: COMMERCIAL

## 2025-01-30 DIAGNOSIS — F43.9 TRAUMA AND STRESSOR-RELATED DISORDER: ICD-10-CM

## 2025-01-30 DIAGNOSIS — F41.1 GAD (GENERALIZED ANXIETY DISORDER): Primary | ICD-10-CM

## 2025-01-30 DIAGNOSIS — F33.41 RECURRENT MAJOR DEPRESSIVE DISORDER, IN PARTIAL REMISSION: ICD-10-CM

## 2025-01-30 DIAGNOSIS — F10.21 ALCOHOL USE DISORDER, SEVERE, IN SUSTAINED REMISSION (H): ICD-10-CM

## 2025-01-30 DIAGNOSIS — G47.00 INSOMNIA, UNSPECIFIED TYPE: ICD-10-CM

## 2025-01-30 DIAGNOSIS — F41.1 GAD (GENERALIZED ANXIETY DISORDER): ICD-10-CM

## 2025-01-30 DIAGNOSIS — F33.1 MODERATE EPISODE OF RECURRENT MAJOR DEPRESSIVE DISORDER (H): Primary | ICD-10-CM

## 2025-01-30 RX ORDER — BUPROPION HYDROCHLORIDE 300 MG/1
300 TABLET ORAL EVERY MORNING
Qty: 90 TABLET | Refills: 1 | Status: SHIPPED | OUTPATIENT
Start: 2025-01-30

## 2025-01-30 RX ORDER — LORAZEPAM 0.5 MG/1
0.5 TABLET ORAL DAILY PRN
Qty: 10 TABLET | Refills: 0 | Status: SHIPPED | OUTPATIENT
Start: 2025-01-30

## 2025-01-30 RX ORDER — BUPROPION HYDROCHLORIDE 150 MG/1
150 TABLET ORAL EVERY MORNING
Qty: 90 TABLET | Refills: 1 | Status: SHIPPED | OUTPATIENT
Start: 2025-01-30

## 2025-01-30 RX ORDER — GUANFACINE 1 MG/1
1 TABLET, EXTENDED RELEASE ORAL AT BEDTIME
Qty: 14 TABLET | Refills: 0 | Status: SHIPPED | OUTPATIENT
Start: 2025-01-30

## 2025-01-30 RX ORDER — GUANFACINE 2 MG/1
2 TABLET, EXTENDED RELEASE ORAL AT BEDTIME
Qty: 30 TABLET | Refills: 1 | Status: SHIPPED | OUTPATIENT
Start: 2025-02-11

## 2025-01-30 ASSESSMENT — PATIENT HEALTH QUESTIONNAIRE - PHQ9
10. IF YOU CHECKED OFF ANY PROBLEMS, HOW DIFFICULT HAVE THESE PROBLEMS MADE IT FOR YOU TO DO YOUR WORK, TAKE CARE OF THINGS AT HOME, OR GET ALONG WITH OTHER PEOPLE: SOMEWHAT DIFFICULT
SUM OF ALL RESPONSES TO PHQ QUESTIONS 1-9: 11
SUM OF ALL RESPONSES TO PHQ QUESTIONS 1-9: 11

## 2025-01-30 ASSESSMENT — PAIN SCALES - GENERAL: PAINLEVEL_OUTOF10: NO PAIN (0)

## 2025-01-30 NOTE — PATIENT INSTRUCTIONS
Treatment Plan:  Discontinue BuSpar/buspirone: take 10 mg twice daily for 5 days then stop.   Discontinue propranolol   Start guanfacine ER for trauma, insomnia, anxiety: take 1 mg at bedtime for 2 weeks then increase to 2 mg at bedtime as tolerated.    Continue lorazepam/Ativan 0.5 mg daily as needed for panic attacks/severe anxiety only.  Refilled for 10 tablets per 30 days only.  Continue to try to lessen reliance on lorazepam.  Continue gabapentin 600 mg twice daily for pain and anxiety-as prescribed by other prescriber.  Continue Wellbutrin  mg daily for depression.    Continue all other cares per primary care provider.   Continue all other medications as reviewed per electronic medical record today.   Safety plan reviewed. To the Emergency Department as needed or call after hours crisis line at [phone] or [phone]. [place] Crisis Text Line. Text [place] to 302527 or Suicide LifeLine Chat: suicidepreIo Therapeuticsline.org/chat  Continue therapy as planned.   Schedule an appointment with me in 6 weeks or sooner as needed. Call [facility] at [phone] to schedule.  Follow up with primary care provider as planned or for acute medical concerns.  Call the psychiatric nurse line with medication questions or concerns at [phone].  Plumzihart may be used to communicate with your provider, but this is not intended to be used for emergencies.    Risks of benzodiazepine (Ativan, Xanax, Klonopin, Valium, etc) use including, but not limited to, sedation, tolerance, risk for addiction/dependence. Do not drink alcohol while taking benzodiazepines due to risk of trouble breathing and potential death. Do not drive or operate heavy machinery until it is known how the drug affects you. Discuss with physician or pharmacist before ever taking a benzodiazepine with a narcotic/opioid pain medication.

## 2025-01-30 NOTE — NURSING NOTE
Is the patient currently in the state of [place]? YES    Current patient location: [address] [place] [address]   [facility] [id number]    Visit mode:Video    If the visit is dropped, the patient can be reconnected by: VIDEO VISIT: Text to cell phone:   Telephone Information:   Mobile [phone]       Will anyone else be joining the visit? No  (If patient encounters technical issues they should call [phone])    Are changes needed to the allergy or medication list? No    Are refills needed on medications prescribed by this physician? No    Rooming Documentation: Questionnaire(s) completed.    Reason for visit: RECHNIKOLE Pat

## 2025-01-30 NOTE — PROGRESS NOTES
"Virtual Visit Details    Type of service:  Video Visit   Video Start Time: {video visit start/end time for provider to select:811289}  Video End Time:{video visit start/end time for provider to select:123154}    Originating Location (pt. Location): {video visit patient location:913558::\"Home\"}  {PROVIDER LOCATION On-site should be selected for visits conducted from your clinic location or adjoining [facility] hospital, academic office, or other nearby [facility] building. Off-site should be selected for all other provider locations, including home:782364}  Distant Location (provider location):  {virtual location provider:631976}  Platform used for Video Visit: {Virtual Visit Platforms:203137::\"Welcome Real-time\"}  "

## 2025-01-30 NOTE — PROGRESS NOTES
"Telemedicine Visit: The patient's condition can be safely assessed and treated via synchronous audio and visual telemedicine encounter.      Reason for Telemedicine Visit: Patient has requested telehealth visit    Originating Site (Patient Location): Patient's home    Distant Location (provider location):  Off-Site    Consent:  The patient/guardian has verbally consented to: the potential risks and benefits of telemedicine (video visit) versus in person care; bill my insurance or make self-payment for services provided; and responsibility for payment of non-covered services.     Mode of Communication:  Video Conference via [employer]    As the provider I attest to compliance with applicable laws and regulations related to telemedicine.         Outpatient Psychiatric Progress Note    Name: Artie Burger   : 1995                    Primary Care Provider: Lila Gallegos CNP   Therapist: Rigo Ulrich, weekly      PHQ-9 scores:      2024    12:09 PM 2024     9:17 AM 2025     9:49 AM   PHQ-9 SCORE   PHQ-9 Total Score MyChart 19 (Moderately severe depression) 13 (Moderate depression) 11 (Moderate depression)   PHQ-9 Total Score  13  11        Patient-reported    Proxy-reported       ALAN-7 scores:      2024    12:09 PM 2024     9:18 AM 2025     8:02 PM   ALAN-7 SCORE   Total Score 12 (moderate anxiety) 9 (mild anxiety) 14 (moderate anxiety)   Total Score 12  9  14        Patient-reported       Patient Identification:  Patient is a 29 year old, partnered / significant other  White Not  or  female  who presents for return visit with me.  Patient is currently unemployed. Patient attended the phone/video session alone. Patient prefers to be called: \"Artie\".    Interim History:  I last saw Artie Burger for outpatient psychiatry return visit on 2024. During that appointment, we:    Increase BuSpar/buspirone: take 10 mg twice daily " for 1 week then 15 mg twice daily for 1 week then 20 mg twice daily as tolerated (for anxiety).  Continue lorazepam/Ativan 0.5 mg daily as needed for panic attacks/severe anxiety only.  Refilled for 10 tablets per 30 days only.  Continue to try to lessen reliance on lorazepam.  Continue gabapentin 600 mg twice daily for pain and anxiety-as prescribed by other prescriber.  Continue propranolol up to 10-30 mg twice daily as needed for anxiety symptoms.  Continue Wellbutrin  mg daily for depression.    Continue all other cares per primary care provider.       1/30: Patient overall doing okay.  Had a good holiday season.  Celebrated 2-year anniversary with partner.  He got her a walking pad which she has found helpful for improving her mental health symptoms by increasing activity.  Mood is remained relatively stable.  Anxiety is continued to be quite high, maybe even higher.  Sleep disruptions.  Sweating at night.  Worsening dreams/nightmares.  See [facility] note below for additional details.  No acute safety concerns.  No SI.  No problematic drug or alcohol use.    Per [facility], [facility], during today's team-based visit:  MH update:  Increased buspar.  Using walking pad.  Making breakfast for self now.  Feeling better. Using organization tools to help.  Mood is better.  Anxiety is higher.  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  Taking notes to try and manage concentration, doing diane.  Appetite: T1D.  Sleep: Nightmares increased.  Anxiety induced.  Outpatient Provider updates: Completed ADHD testing.  Did not confirm diagnosis.  Sheila Flaherty [facility] EMDR  SI/SIB/HI: Denies current.  Hx of passive ideation.  Denies previous attempts.  Hx of SIB as teen.  Denies need for safety plan or crisis resources.  AMPARO:  Sober 4 years ETOH.  No meetings.  Friend support.  Daily medical THC use.  Side effects/compliance:  Interventions:  [facility] engaged in discussion about  educational stressors with validation and support.  Most important:  started OTC Vit D.  Mood is better.  Better energy and motivation.  Anxiety worse. At night worse/nightmares trauma related.     Next time practice telling story.    Past Psychiatric Med Trials:  Psych Meds at Intake:  -Ativan 0.5 mg daily, sometimes twice daily: 1 every few days, still works (gets from primary care provider)   -Sertraline/Zoloft - 50 mg daily (was up to 200 mg daily but decreased due to activation), has been decreasing slowly and no worsening of sxs  -Buspar 10 mg daily -was taking twice daily: helpful for some day to day anxiety, up to 15 mg twice daily  -Gabapentin 600 mg twice daily: for pain and anxiety  -Propranolol 10 mg daily as needed for anxiety (sometimes uses, brings heart rate down, every couple weeks uses)     Past Psych Meds:  Diphenhydramine - anxiety  Trazodone - vivid dreams  Prozac/fluoxetine - not very helpful   Cymbalta - 2021 up to 60 mg  DID NOT WORK FOR PAIN  2022/2023: treated with lexapro 20mg and celexa 40mg during this time.     Psychiatric ROS:  See HPI above for all pertinent positives and negatives. Rest of systems negative.     PHQ9 and GAD7 scores were reviewed today if completed.   Medication side effects: I wonder about worsening anxiety, sleep, sweating, anxiety dreams related to buspirone or Wellbutrin XL  Current stressors include: Symptoms and see HPI above  Coping mechanisms and supports include: Therapy, Family, Hobbies, and Friends    Current medications include:   Current Outpatient Medications   Medication Sig Dispense Refill    acetaminophen (TYLENOL) 500 MG tablet Take 500-1,000 mg by mouth      acetone urine (RELION KETONE TEST) test strip Use as part of high blood sugar protocol for pump users 20 strip 4    Alcohol Swabs PADS Use before taking blood sugars up to 10 times a day 100 each 3    blood glucose (NO BRAND SPECIFIED) lancets standard Use to test blood sugars up to 8 times  daily as directed. 100 each 3    blood glucose (NO BRAND SPECIFIED) test strip Test blood sugars up to 8 times a day as directed by your provider 200 strip 3    blood glucose monitoring (NO BRAND SPECIFIED) meter device kit Use as directed Per insurance coverage. DM education recommending Accu Chek Guide glucose meter 1 kit 0    blood glucose monitoring (SOFTCLIX) lancets USE TO TEST BLOOD GLUCOSE UP TO EIGHT TIMES DAILY      buPROPion (WELLBUTRIN XL) 150 MG 24 hr tablet Take 1 tablet (150 mg) by mouth every morning. TAKE WITH 300 mg tab for total dose 450 mg daily. 90 tablet 0    buPROPion (WELLBUTRIN XL) 300 MG 24 hr tablet Take 1 tablet (300 mg) by mouth every morning. 90 tablet 1    busPIRone (BUSPAR) 10 MG tablet Take 2 tablets (20 mg) by mouth 2 times daily. 180 tablet 1    cetirizine (ZYRTEC) 10 MG tablet Take 10 mg by mouth daily      Continuous Glucose Sensor (DEXCOM G7 SENSOR) MISC Change every 10 days. 3 each 5    gabapentin (NEURONTIN) 600 MG tablet Take 1 tablet (600 mg) by mouth 2 times daily 180 tablet 3    Glucagon (GVOKE HYPOPEN) 1 MG/0.2ML pen Inject the contents of 1 device under the skin into lower abdomen, outer thigh, or outer upper arm as needed for hypoglycemia. If no response after 15 minutes, additional 1 mg dose from a new device may be injected while waiting for emergency assistance. 0.4 mL 3    glucose (BD GLUCOSE) 4 g chewable tablet Take 1 tablet by mouth every hour as needed for low blood sugar 30 tablet 0    ibuprofen (ADVIL/MOTRIN) 200 MG tablet Take 800 mg by mouth as needed      insulin aspart (NOVOLOG PEN) 100 UNIT/ML pen DOSE:  1 units per 10 grams of carbohydrate.  Only chart total amount of units given.  Do not give if pre-prandial glucose is less than 60 mg/dL. If given at mealtime, administer within 30 minutes of start of meal. 15 mL 3    insulin aspart (NOVOLOG VIAL) 100 UNITS/ML vial Up to 30 units daily via insulin pump 30 mL 4    Insulin Disposable Pump (OMNIPOD 5 G6  INTRO, GEN 5,) KIT 1 each every 3 days 1 kit 0    Insulin Disposable Pump (OMNIPOD 5 G6 PODS, GEN 5,) MISC 1 each every 3 days 30 each 4    Insulin Disposable Pump (OMNIPOD 5 PODS, GEN 5,) MISC 1 each every 3 days. 30 each 4    insulin pen needle (32G X 4 MM) 32G X 4 MM miscellaneous Use up to 8 pen needles daily or as directed. 200 each 11    levonorgestrel (MIRENA) 52 MG (20 mcg/day) IUD 1 Device by Intrauterine route      lipase-protease-amylase (CREON) 03389-27437-105718 units CPEP per EC capsule Take 4 capsules with meals and 2 capsules with snacks; approximate daily maximum 16 capsules 480 capsule 11    LORazepam (ATIVAN) 0.5 MG tablet Take 1 tablet (0.5 mg) by mouth daily as needed (panic attacks). 10 TABS TO LAST 30 DAYS 10 tablet 0    ondansetron (ZOFRAN ODT) 4 MG ODT tab Take 1 tablet (4 mg) by mouth every 6 hours as needed for nausea or vomiting 12 tablet 3    pantoprazole (PROTONIX) 40 MG EC tablet Take 1 tablet (40 mg) by mouth daily. 90 tablet 3    polyethylene glycol (MIRALAX) 17 GM/Dose powder Take 17 g by mouth daily 510 g 3    propranolol (INDERAL) 20 MG tablet Take 0.5-1.5 tablets (10-30 mg) by mouth 2 times daily as needed (anxiety) 60 tablet 1    Continuous Glucose Sensor (DEXCOM G6 SENSOR) MISC 1 each every 10 days 9 each 3    Continuous Glucose Transmitter (DEXCOM G6 TRANSMITTER) MISC 1 each every 3 months 1 each 3    Nutritional Supplements (BOOST HIGH PROTEIN) LIQD After above baseline labs are drawn, give: 6 mL/kg to maximum of 360 mL; the beverage is to be consumed within 5 minutes.       No current facility-administered medications for this visit.     [place]:  01/15/2025 04/10/2024 1 Gabapentin 600 Mg Tablet 60.00 30 Ka Bobby [id number] [facility] (5176) 9/3  [place]   01/03/2025 01/03/2025 1 Lorazepam 0.5 Mg Tablet 10.00 30 Al Bau [id number] [facility] (5176) 0/0 0.17 [place]   12/16/2024 04/10/2024 1 Gabapentin 600 Mg Tablet 60.00 30 Ka Bobby [id number] [facility] (5176) 8/3  [place]   11/22/2024 10/24/2024  1 Lorazepam 0.5 Mg Tablet 10.00 30 Al u [id number] [facility] (5176) 1/1 0.17 [place]       Past Medical/Surgical History:  Past Medical History:   Diagnosis Date    Alcohol-induced chronic pancreatitis (H)     Anxiety     Depression     Gastroesophageal reflux disease     Pancreatic disease     PONV (postoperative nausea and vomiting)     Type 1 diabetes mellitus (H) 9/19/2023      has a past medical history of Alcohol-induced chronic pancreatitis (H), Anxiety, Depression, Gastroesophageal reflux disease, Pancreatic disease, PONV (postoperative nausea and vomiting), and Type 1 diabetes mellitus (H) (9/19/2023).    She has no past medical history of Antiplatelet or antithrombotic long-term use or Uncomplicated asthma.    Social History:  Reviewed. No changes to social history except as noted above in HPI.    Vital Signs:   None. This is phone/video visit.     Labs:  Most recent laboratory results reviewed and the pertinent results include:   Lab Results   Component Value Date    A1C 8.2 03/21/2024    A1C 6.6 12/14/2023    A1C 5.5 09/22/2023    A1C 5.3 06/28/2023       Review of Systems:  10 systems (general, cardiovascular, respiratory, eyes, ENT, endocrine, GI, , M/S, neurological) were reviewed. Most pertinent finding(s) is/are: intermittent pain. The remaining systems are all unremarkable.    Mental Status Examination (limited as this is by phone/video):  Appearance: Awake, alert, appears stated age, no acute distress, well-groomed   Attitude:  cooperative, pleasant   Motor: No gross abnormalities observed via video, not formally tested   Oriented to:  person, place, time, and situation  Attention Span and Concentration:  normal  Speech:  clear, coherent, regular rate, rhythm, and volume  Language: intact  Mood: Pretty good, anxious  Affect:  appropriate and in normal range and mood congruent  Associations:  no loose associations  Thought Process:  logical, linear and goal oriented  Thought Content:  no evidence  of suicidal ideation or homicidal ideation, no evidence of psychotic thought, no auditory hallucinations present and no visual hallucinations present  Recent and Remote Memory:  Intact to interview. Not formally assessed. No amnesia.  Fund of Knowledge: appropriate  Insight:  good  Judgment:  intact, adequate for safety  Impulse Control:  intact    Suicide Risk Assessment:  Today Artie Burger reports no suicidal ideation. Based on all available evidence including the factors cited above, Artie Burger does not appear to be at imminent risk for self-harm, does not meet criteria for a 72-hr hold, and therefore remains appropriate for ongoing outpatient level of care.  A thorough assessment of risk factors related to suicide and self-harm have been reviewed and are noted above. The patient convincingly denies suicidality on several occasions. Local community safety resources reviewed for patient to use if needed. There was no deceit detected, and the patient presented in a manner that was believable.     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 3/2023: gabapentin up to 600-900mg TID became ineffective, switched to lyrica early March.  Initial dose 50mg BID, increase to 100mg BID after 2 weeks, then increase to 100mg TID after 2 weeks (if needed).      Iron deficiency anemia secondary to inadequate dietary iron intake 10/28/2021     Priority: Medium     Formatting of this note might be different from the original. 10/28/2021 hgb 10.6, iron 32.  Trial iron BID-if intolerant infusions.  Symptomatic with fatigue.      Chronic pancreatitis (H) 08/25/2021     Priority: Medium     Added automatically from request for surgery [id number]      Osteopenia of hip 08/10/2021     Priority: Medium     Formatting of this note might be different from the original. DEXA 8/2021: normal l spine, hip T -1.8.  Recommended calcium/vit D supplementation.  Repeat DEXA 1-2 years.      Insomnia 07/07/2021     Priority: Medium     Formatting of this note might be different from the original. 7/7/2021 No issue with sleep onset, struggles at times with sleep maintenance. 9/8/2021 Rare PRN quetiapine stops \"head from racing\", effective Failed: benadryl-\"anxiety inducing,\" trazodone \"vivid dreams and anxiety inducing.\"      Current moderate episode of major depressive disorder (H) 01/24/2019     Priority: Medium     Formatting of this note might be different from the original. Previous tx: prozac 7/7/2021: doing well, decreasing cymbalta from 60 to 30mg 8/10/2021: Increased Cymbalta back to 60 mg.  Stressors with home ownership and her ongoing medical issues along with college course work.      Panic attacks 01/24/2019     Priority: Medium    Alcohol abuse, in remission 07/25/2018     Priority: Medium     Formatting of this note might be different from the original. 8/21/2020         Psychosocial & Contextual Factors: see HPI above    Assessment:  From Intake, 8/7/2024:  Artie " Tao is a 28-year-old with past psychiatric history including depression, anxiety, trauma, alcohol dependence with medical complications who presents today for psychiatric evaluation.  Patient with history of mental health symptoms dating back to childhood/adolescence.  Patient has been on handful of different psychiatric medications to help with symptoms.  Patient's history was complicated by alcohol use disorder affecting pancreas and leading to multiple surgeries.  Patient has now been sober from alcohol for 4 years.  Denies any current need for chemical dependency resources.  Denies any major alcohol cravings.  Patient reports some symptoms consistent with possible ADHD diagnosis.  Given comorbid mental health concerns and struggles referral for psychological testing has been placed.  Patient is hopeful for some additional motivation and energy.  We will replace her sertraline with Wellbutrin XL.  We will continue buspirone for anxiety.  Patient on gabapentin for pain and also possibly helping with anxiety and decreased alcohol cravings.  Patient is using lorazepam quite frequently, every 2-3 days per patient report.  Next refill will be for only 10 tablets to last 30 days at a time.  We will also increase propranolol some to take as needed to hopefully lessen reliance on lorazepam.  Could also consider a future increase of gabapentin.  Patient with IUD to protect against pregnancy.  Psychotherapy encouraged.  Denies that medicinal cannabis use causing any problems.     9/6/2024:  Patient overall doing significantly better on Wellbutrin XL.  Tolerating well.  Is still taking 12.5 mg of sertraline and will try discontinuing.  Has ADHD testing coming up next month.  Patient will be seen back for follow-up after ADHD testing.  Cannabis use but denies problematic use.  No acute safety concerns.  No SI.    11/04/2024:  Patient potentially with some regression of depression symptoms.  Tolerating Wellbutrin well so  we will increase Wellbutrin XL to 450 mg daily.  Currently undergoing ADHD testing.  Discussed may need to decrease Wellbutrin XL if testing affirmative for ADHD and if we explore stimulants.  No acute safety concerns.  No SI.  No problematic drug or alcohol use.    12/06/2024:  Patient with recent ADHD psychological testing that was not affirmative for ADHD diagnosis.  Was affirmative for depression and anxiety diagnoses.  Patient feels like Wellbutrin XL increase more helpful for depression symptoms.  Tolerating well with no major negative side effects.  Still some ongoing symptoms.  Patient is agreeable to increasing buspirone as an augment to Wellbutrin XL to see if further helpful for her mental health symptoms.  Continues in psychotherapy.  No acute safety concerns.  No acute suicidality.  No problematic drug or alcohol use.    1/30/2025:  Overall patient doing relatively okay mood wise.  Worsening anxiety, sleep, dream since last visit.  I do wonder if patient experiencing some side effects/over-activation from addition of buspirone.  Could also be from Wellbutrin XL dose at 450 mg.  The increase Wellbutrin XL continues to be quite helpful for depression and so we will try to balance this out with addition of guanfacine ER.  Guanfacine ER might also be helpful for trauma related symptoms, sleep, and anxiety.  Since starting guanfacine ER, we will discontinue propranolol since only used once or twice a month.  No acute safety concerns.  No acute suicidality.  No problematic drug or alcohol use.    Medication side effects and alternatives were reviewed. Health promotion activities recommended and reviewed today. All questions addressed. Education and counseling completed regarding risks and benefits of medications and psychotherapy options. Recommend therapy for additional support.     Treatment Plan:  Discontinue BuSpar/buspirone: take 10 mg twice daily for 5 days then stop.   Discontinue propranolol   Start  guanfacine ER for trauma, insomnia, anxiety: take 1 mg at bedtime for 2 weeks then increase to 2 mg at bedtime as tolerated.    Continue lorazepam/Ativan 0.5 mg daily as needed for panic attacks/severe anxiety only.  Refilled for 10 tablets per 30 days only.  Continue to try to lessen reliance on lorazepam.  Continue gabapentin 600 mg twice daily for pain and anxiety-as prescribed by other prescriber.  Continue Wellbutrin  mg daily for depression.    Continue all other cares per primary care provider.   Continue all other medications as reviewed per electronic medical record today.   Safety plan reviewed. To the Emergency Department as needed or call after hours crisis line at [phone] or [phone]. [place] Crisis Text Line. Text [place] to 367662 or Suicide LifeLine Chat: suicidepreventionlifeline.org/chat  Continue therapy as planned.   Schedule an appointment with me in 6 weeks or sooner as needed. Call [facility] at [phone] to schedule.  Follow up with primary care provider as planned or for acute medical concerns.  Call the psychiatric nurse line with medication questions or concerns at [phone].  [employer] may be used to communicate with your provider, but this is not intended to be used for emergencies.    Risks of benzodiazepine (Ativan, Xanax, Klonopin, Valium, etc) use including, but not limited to, sedation, tolerance, risk for addiction/dependence. Do not drink alcohol while taking benzodiazepines due to risk of trouble breathing and potential death. Do not drive or operate heavy machinery until it is known how the drug affects you. Discuss with physician or pharmacist before ever taking a benzodiazepine with a narcotic/opioid pain medication.     Administrative Billing:   Phone Call/Video Duration: 11 Minutes  Start: 10:31a  Stop: 10:42a    The longitudinal plan of care for the diagnosis(es)/condition(s) as documented were addressed during this visit. Due to the added  complexity in care, I will continue to support Artie in the subsequent management and with ongoing continuity of care.    Episode of Care #: 5    Patient Status:  Patient will continue to be seen for ongoing consultation and stabilization.    Signed:   Agnes Singleton DO  [facility]    Disclaimer: This note consists of symbols derived from keyboarding, dictation and/or voice recognition software. As a result, there may be errors in the script that have gone undetected. Please consider this when interpreting information found in this chart.

## 2025-02-27 ENCOUNTER — MYC REFILL (OUTPATIENT)
Dept: PSYCHIATRY | Facility: CLINIC | Age: 30
End: 2025-02-27
Payer: COMMERCIAL

## 2025-02-27 DIAGNOSIS — F41.1 GAD (GENERALIZED ANXIETY DISORDER): ICD-10-CM

## 2025-02-27 RX ORDER — LORAZEPAM 0.5 MG/1
0.5 TABLET ORAL DAILY PRN
Qty: 10 TABLET | Refills: 0 | Status: SHIPPED | OUTPATIENT
Start: 2025-03-03

## 2025-02-27 NOTE — TELEPHONE ENCOUNTER
Date of Last Office Visit: 1/30/2025  Date of Next Office Visit:  3/13/2025  No shows since last visit: No  More than one patient-initiated cancellation (with reschedule) since last seen in clinic? No    []Medication refilled per  Medication Refill in Ambulatory Care  policy.  [x]Medication unable to be refilled by RN due to criteria not met as indicated below:    []Eligibility: has not had a provider visit within last 6 months   []Supervision: no future appointment; < 7 days before next appointment   []Compliance: no shows; cancellations; lapse in therapy   []Verification: order discrepancy; may need modification...   [] > 30-day supply request   []Advanced refill request: > 7 days before refill date   []Controlled medication   []Medication not included in policy   []Review: new med; med adjusted <= 30 days; safety alert; requires lab monitoring...   [x]Scope of Practice: refill request processed by LPN/MA   []Other:      Medication(s) requested:     -  LORazepam (ATIVAN) 0.5 MG tablet   Date last ordered: 1/30/2025  Qty: 10  Refills: 0  Appropriate for refill? Provider to review.            Any Controlled Substance(s)? Yes   [place]  checked? No; not current delegate      Requested medication(s) verified as identical to current order? Yes    Any lapse in adherence to medication(s) greater than 5 days? Unknown     Additional action taken? routed encounter to provider for review.      Last visit treatment plan:   Treatment Plan:  Discontinue BuSpar/buspirone: take 10 mg twice daily for 5 days then stop.   Discontinue propranolol   Start guanfacine ER for trauma, insomnia, anxiety: take 1 mg at bedtime for 2 weeks then increase to 2 mg at bedtime as tolerated.    Continue lorazepam/Ativan 0.5 mg daily as needed for panic attacks/severe anxiety only.  Refilled for 10 tablets per 30 days only.  Continue to try to lessen reliance on lorazepam.  Continue gabapentin 600 mg twice daily for pain and anxiety-as prescribed by  other prescriber.  Continue Wellbutrin  mg daily for depression.    Continue all other cares per primary care provider.   Continue all other medications as reviewed per electronic medical record today.   Safety plan reviewed. To the Emergency Department as needed or call after hours crisis line at [phone] or [phone]. [place] Crisis Text Line. Text [place] to 925966 or Suicide LifeLine Chat: suicidepreventionTestiveline.org/chat  Continue therapy as planned.   Schedule an appointment with me in 6 weeks or sooner as needed. Call [facility] at [phone] to schedule.  Follow up with primary care provider as planned or for acute medical concerns.  Call the psychiatric nurse line with medication questions or concerns at [phone].  [employer] may be used to communicate with your provider, but this is not intended to be used for emergencies.    Any medication(s) require lab monitoring? No    Speedy Canales MA on 2/27/2025 at 10:01 AM

## 2025-03-04 NOTE — PROGRESS NOTES
Diabetes Consult Note        Artie Burger  is a 29 year old female who has a past medical history of Alcohol-induced chronic pancreatitis (H), Anxiety, Depression, Gastroesophageal reflux disease, Pancreatic disease, PONV (postoperative nausea and vomiting), and Type 1 diabetes mellitus (H). She is here for follow-up of diabetes.      Recent clinic notes:    I met her in October 2023 and last saw her in January 2024 when she was doing quite well requiring less insulin and reduced her doses   Including carbohydrate coverage to 1 unit per 25 g and sensitivity to 1 unit per 65 mg/dL.  She was referred to Dm ed and has seen TAHSIA Graff, last on 3/27/24.  She was counting carbs but not giving correction.  They reviewed that.  She was hospitalized earlier this month and discharged with  instructions to give 1 unit : 10 g carb and 1u: 40 >140 with meals together with 14 units Lantus qam and 10 qpm.        April 2024: she was struggling with a lot of hypoglycemia and we discussed the possibility of transitioning to OmniPod 5.  Since that point in time she has worked with Mildred Graff to transition to OmniPod 5.  She also continues to work with psychologist Daina Daniel.    September 2024: Blood sugar management was good.  I encouraged her to work to meet ADA physical activity recommendations as well as make time for her mental health.    Today:    Chart notes and lab reviewed I note she continues to be active and psychiatry and psychology.      3/10/2025    10:57 PM   including   1. Since your last visit to our clinic (or if this your first visit, since you last saw your primary care provider), have you experienced any of the following symptoms that may be related to low blood sugars? Sweating that wasn't due to exertion    Shaking or trembling    Extreme hunger   2. Since your last visit to our clinic (or if this your first visit, since you last saw your primary care provider), have you experienced any of  the following symptoms that may be related to prolonged high blood sugars? Fatigue   4. Do you have any female family members who have had heart attacks or strokes before age 60 or male relatives who have had heart attacks or strokes before age 50? No   5. Do you have any family members who have had complications from diabetes such as kidney disease, heart disease or strokes, retinopathy (a vision problem), or amputations? No   6. Who do you live with?  Alone   Little interest or pleasure in doing things? Several days   Feeling down, depressed, or hopeless? Several days   10.  Are you considering a pregnancy or interested in discussing pregnancy prevention today? No        Today:  Artie reports that she has been struggling with her mood a bit.  She is working on this by seeing her psychiatry and therapist frequently.  She thinks her mood was down more last week as both her boyfriend and her mom were out of town.  She is working to become more physically active.  Her boyfriend got her a walking pad for [place] and she uses this off and listening to a podcast.  She notes this is really helpful as she tends to be very cold and it is hard for her to exercise outside when it is cold out.  She likes to dance and sometimes dances around her house when she is cleaning.  She was a cheerleader in high school enjoys dancing in college and also did enjoy some [place].She is bothered by frequent low blood sugars particularly overnight.  She reach out to her transplant coordinator Drew who is shared that this might be related to her islet cells working better.  She wonders about using the activity setting in her OmniPod.    Current Diabetes Medications:  Insulin via OmniPod pump.        We reviewed glucometer/CGMS data together.  It revealed:  Her blood sugars in range 64% of the time 2% low 1% very low.  Average blood sugars at 158 with a coefficient of variance of 36.3%.  Blood sugars range from low to 340.  She is using an  average of 21.1 units/day which is stable from when I last saw her.  She is giving 6.6 bolus per day.  Entering 357 g of carb per day.  Her connected Apple Watch registers an average of 2377 steps per day.    See details below    Artie's PMH, PSH and allergies were reviewed today and pertinent information is summarized above.    Artie's pertinent social and family history are also reviewed today and pertinent information is summarized above.      Current Outpatient Medications   Medication Sig Dispense Refill    acetaminophen (TYLENOL) 500 MG tablet Take 500-1,000 mg by mouth      acetone urine (RELION KETONE TEST) test strip Use as part of high blood sugar protocol for pump users 20 strip 4    Alcohol Swabs PADS Use before taking blood sugars up to 10 times a day 100 each 3    blood glucose (NO BRAND SPECIFIED) lancets standard Use to test blood sugars up to 8 times daily as directed. 100 each 3    blood glucose (NO BRAND SPECIFIED) test strip Test blood sugars up to 8 times a day as directed by your provider 200 strip 3    blood glucose monitoring (NO BRAND SPECIFIED) meter device kit Use as directed Per insurance coverage. DM education recommending Accu Chek Guide glucose meter 1 kit 0    blood glucose monitoring (SOFTCLIX) lancets USE TO TEST BLOOD GLUCOSE UP TO EIGHT TIMES DAILY      buPROPion (WELLBUTRIN XL) 150 MG 24 hr tablet Take 1 tablet (150 mg) by mouth every morning. TAKE WITH 300 mg tab for total dose 450 mg daily. 90 tablet 1    buPROPion (WELLBUTRIN XL) 300 MG 24 hr tablet Take 1 tablet (300 mg) by mouth every morning. 90 tablet 1    cetirizine (ZYRTEC) 10 MG tablet Take 10 mg by mouth daily      Continuous Glucose Sensor (DEXCOM G6 SENSOR) MISC 1 each every 10 days 9 each 3    Continuous Glucose Sensor (DEXCOM G7 SENSOR) MISC Change every 10 days. 3 each 5    Continuous Glucose Transmitter (DEXCOM G6 TRANSMITTER) MISC 1 each every 3 months 1 each 3    gabapentin (NEURONTIN) 600 MG tablet Take 1  tablet (600 mg) by mouth 2 times daily 180 tablet 3    Glucagon (GVOKE HYPOPEN) 1 MG/0.2ML pen Inject the contents of 1 device under the skin into lower abdomen, outer thigh, or outer upper arm as needed for hypoglycemia. If no response after 15 minutes, additional 1 mg dose from a new device may be injected while waiting for emergency assistance. 0.4 mL 3    glucose (BD GLUCOSE) 4 g chewable tablet Take 1 tablet by mouth every hour as needed for low blood sugar 30 tablet 0    guanFACINE (INTUNIV) 1 MG TB24 24 hr tablet Take 1 tablet (1 mg) by mouth at bedtime. 14 tablet 0    guanFACINE (INTUNIV) 2 MG TB24 24 hr tablet Take 1 tablet (2 mg) by mouth at bedtime. 30 tablet 1    ibuprofen (ADVIL/MOTRIN) 200 MG tablet Take 800 mg by mouth as needed      insulin aspart (NOVOLOG PEN) 100 UNIT/ML pen DOSE:  1 units per 10 grams of carbohydrate.  Only chart total amount of units given.  Do not give if pre-prandial glucose is less than 60 mg/dL. If given at mealtime, administer within 30 minutes of start of meal. 15 mL 3    insulin aspart (NOVOLOG VIAL) 100 UNITS/ML vial Up to 30 units daily via insulin pump 30 mL 4    Insulin Disposable Pump (OMNIPOD 5 G6 INTRO, GEN 5,) KIT 1 each every 3 days 1 kit 0    Insulin Disposable Pump (OMNIPOD 5 G6 PODS, GEN 5,) MISC 1 each every 3 days 30 each 4    Insulin Disposable Pump (OMNIPOD 5 PODS, GEN 5,) MISC 1 each every 3 days. 30 each 4    insulin pen needle (32G X 4 MM) 32G X 4 MM miscellaneous Use up to 8 pen needles daily or as directed. 200 each 11    levonorgestrel (MIRENA) 52 MG (20 mcg/day) IUD 1 Device by Intrauterine route      lipase-protease-amylase (CREON) 04697-03807-724984 units CPEP per EC capsule Take 4 capsules with meals and 2 capsules with snacks; approximate daily maximum 16 capsules 480 capsule 11    LORazepam (ATIVAN) 0.5 MG tablet Take 1 tablet (0.5 mg) by mouth daily as needed (panic attacks). 10 TABS TO LAST 30 DAYS 10 tablet 0    Nutritional Supplements (BOOST  "HIGH PROTEIN) LIQD After above baseline labs are drawn, give: 6 mL/kg to maximum of 360 mL; the beverage is to be consumed within 5 minutes.      ondansetron (ZOFRAN ODT) 4 MG ODT tab Take 1 tablet (4 mg) by mouth every 6 hours as needed for nausea or vomiting 12 tablet 3    pantoprazole (PROTONIX) 40 MG EC tablet Take 1 tablet (40 mg) by mouth daily. 90 tablet 3    polyethylene glycol (MIRALAX) 17 GM/Dose powder Take 17 g by mouth daily 510 g 3     No current facility-administered medications for this visit.         ROS:   Reports good physical activity tolerance.  Denies any pedal lesions or vision changes or concerns. Denies any other acute concerns except as noted above.      Exam:    There were no vitals taken for this visit.  Wt Readings from Last 10 Encounters:   10/03/24 64 kg (141 lb 1.5 oz)   10/03/24 64 kg (141 lb 1.5 oz)   09/10/24 61.7 kg (136 lb)   09/06/24 61.7 kg (136 lb)   06/12/24 52.2 kg (115 lb)   06/06/24 51.5 kg (113 lb 9.6 oz)   04/23/24 56.7 kg (125 lb)   04/11/24 55.3 kg (122 lb)   04/04/24 54.3 kg (119 lb 11.4 oz)   03/21/24 56.7 kg (125 lb)   Estimated body mass index is 25.8 kg/m  as calculated from the following:    Height as of 10/3/24: 1.575 m (5' 2.01\").    Weight as of 10/3/24: 64 kg (141 lb 1.5 oz).    General: Pleasant, well apperaing in NAD seated comfortably at home     Psych:  Mood is \"good,\" affect is appropriate.  Thought form and content are fluid and coherent.    HEENT: Eyes and sclera are clear. Extraocular movements are grossly intact without proptosis.  Nares are patent, mucous membranes moist.  Neck: No masses or JVD are noted.    Resp: Easy and unlabored breathing.   Neuro: Alert and oriented, communicating clearly.     Data:      Recent Labs   Lab Test 10/03/24  1035 03/21/24  1106 09/22/23 2031 09/21/23  1614 08/19/23  0742 08/18/23  1726   A1C 8.1* 8.2*   < >  --   --   --    TSH  --   --   --   --   --  1.68   LDL 75 58   < >  --    < >  --    HDL 70 64   < >  --   "  < >  --    TRIG 89 134   < >  --    < >  --    CR 0.90 0.89   < >  --    < > 0.74   MICROL  --   --   --  <12.0  --   --    AST 34 37   < >  --    < > 22   ALT 21 25   < >  --    < > 21    < > = values in this interval not displayed.       Microalbuminuria:  Lab Results   Component Value Date    UMALCR  09/21/2023      Comment:      Unable to calculate, urine albumin and/or urine creatinine is outside detectable limits.  Microalbuminuria is defined as an albumin:creatinine ratio of 17 to 299 for males and 25 to 299 for females. A ratio of albumin:creatinine of 300 or higher is indicative of overt proteinuria.  Due to biologic variability, positive results should be confirmed by a second, first-morning random or 24-hour timed urine specimen. If there is discrepancy, a third specimen is recommended. When 2 out of 3 results are in the microalbuminuria range, this is evidence for incipient nephropathy and warrants increased efforts at glucose control, blood pressure control, and institution of therapy with an angiotensin-converting-enzyme (ACE) inhibitor (if the patient can tolerate it).         Most recent GFRs:    Lab Results   Component Value Date    GFRESTIMATED 88 10/03/2024    GFRESTIMATED 89 03/21/2024    GFRESTIMATED >90 12/14/2023       Lab Results   Component Value Date    CPEPT 0.3 (L) 10/03/2024    GADAB <5.0 06/28/2023    ISCAB <1:4 06/28/2023     FIB-4 Calculation: 0.15 at 10/5/2023  2:35 PM  Calculated from:  SGOT/AST: 19 U/L at 10/5/2023  2:35 PM  SGPT/ALT: 11 U/L at 10/5/2023  2:35 PM  Platelets: 1,093 10e3/uL at 10/5/2023  2:35 PM  Age: 27 years  AST   Date Value Ref Range Status   10/03/2024 34 0 - 45 U/L Final     Lab Results   Component Value Date    ALT 11 10/05/2023         Most recent eye exam date: : Not Found       Assessment/Plan:    Artie Burger is a 28 year old 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     Post-pancreatectomy  diabetes:   Most recent A1c in October was far above goal but recent CGM data shows her very close to goal.  She is also ever struggling with hypoglycemia particularly overnight which is disrupting her sleep.  We extended her active insulin time from 2 to 3 hours to help prevent recurrent hypoglycemia.  We also set a slightly higher overnight blood sugar target of 130 from midnight to 8 AM.  She will continue to have a blood sugar target of 120 during the day.  She is working to become more active and we did some education regarding the use of the activity feature, asking her to start this 1 hour before any planned activity and often before the meal prior to her activity.  She agrees to meet with diabetes education in 3 to 4 weeks to follow-up the pump setting changes that we made today and perhaps again reviewed use of activity mode when she has had a chance to try it.     Depression: She is continuing to be proactive in this regard encouraged in her good work seeing her psychiatrist and psychologist regularly and working to become more physically active .    30 minutes spent on the date of the encounter doing chart review, history and exam, documentation, education and counseling, as well as communication and coordination of care, and further activities as noted above.      It is my privilege to be involved in the care of the above patient.     Caro Cormier PA-C, MPAS  [facility]  Diabetes, Endocrinology, and Metabolism  [phone] Appointments/Nurse  [phone] pager  [phone] nurse line    This note was completed in part using Dragon voice recognition, and may contain word and grammatical errors.                Virtual Visit Details    Type of service:  Video Visit    Joined the call at 3/11/2025, 9:32:44 am.  Left the call at 3/11/2025, 9:54:52 am.  You were on the call for 22 minutes 8 seconds .    Originating Location (pt. Location): Home    Distant Location (provider  location):  Off-site  Platform used for Video Visit: Usman    Outcome for 03/04/25 9:34 AM: Data uploaded on Dexcom and eduplanet KK  Christopher Ellis MA  Outcome for 03/07/25 10:42 AM: Data obtained via Dexcom and [employer] website  Pamela Puente MA

## 2025-03-11 ENCOUNTER — VIRTUAL VISIT (OUTPATIENT)
Dept: ENDOCRINOLOGY | Facility: CLINIC | Age: 30
End: 2025-03-11
Payer: COMMERCIAL

## 2025-03-11 DIAGNOSIS — Z90.410 POST-PANCREATECTOMY DIABETES (H): Primary | ICD-10-CM

## 2025-03-11 DIAGNOSIS — E89.1 POST-PANCREATECTOMY DIABETES (H): Primary | ICD-10-CM

## 2025-03-11 DIAGNOSIS — E16.2 HYPOGLYCEMIA: ICD-10-CM

## 2025-03-11 DIAGNOSIS — E13.9 POST-PANCREATECTOMY DIABETES (H): Primary | ICD-10-CM

## 2025-03-11 PROCEDURE — 98006 SYNCH AUDIO-VIDEO EST MOD 30: CPT | Performed by: PHYSICIAN ASSISTANT

## 2025-03-11 ASSESSMENT — PAIN SCALES - GENERAL: PAINLEVEL_OUTOF10: NO PAIN (0)

## 2025-03-11 NOTE — LETTER
3/11/2025       RE: Artie Burger  [address]  [facility] [address]     Dear Colleague,    Thank you for referring your patient, Artie Burger, to the [facility]OLOG[facility] [place] at [facility]. Please see a copy of my visit note below.                     Diabetes Consult Note        Artie Burger  is a 29 year old female who has a past medical history of Alcohol-induced chronic pancreatitis (H), Anxiety, Depression, Gastroesophageal reflux disease, Pancreatic disease, PONV (postoperative nausea and vomiting), and Type 1 diabetes mellitus (H). She is here for follow-up of diabetes.      Recent clinic notes:    I met her in October 2023 and last saw her in January 2024 when she was doing quite well requiring less insulin and reduced her doses   Including carbohydrate coverage to 1 unit per 25 g and sensitivity to 1 unit per 65 mg/dL.  She was referred to Dm ed and has seen TASHIA Graff, last on 3/27/24.  She was counting carbs but not giving correction.  They reviewed that.  She was hospitalized earlier this month and discharged with  instructions to give 1 unit : 10 g carb and 1u: 40 >140 with meals together with 14 units Lantus qam and 10 qpm.        April 2024: she was struggling with a lot of hypoglycemia and we discussed the possibility of transitioning to OmniPod 5.  Since that point in time she has worked with Mildred Graff to transition to OmniPod 5.  She also continues to work with psychologist Daina Daniel.    September 2024: Blood sugar management was good.  I encouraged her to work to meet ADA physical activity recommendations as well as make time for her mental health.    Today:    Chart notes and lab reviewed I note she continues to be active and psychiatry and psychology.      3/10/2025    10:57 PM   including   1. Since your last visit to our clinic (or if this your first visit, since you last saw your primary care provider),  have you experienced any of the following symptoms that may be related to low blood sugars? Sweating that wasn't due to exertion    Shaking or trembling    Extreme hunger   2. Since your last visit to our clinic (or if this your first visit, since you last saw your primary care provider), have you experienced any of the following symptoms that may be related to prolonged high blood sugars? Fatigue   4. Do you have any female family members who have had heart attacks or strokes before age 60 or male relatives who have had heart attacks or strokes before age 50? No   5. Do you have any family members who have had complications from diabetes such as kidney disease, heart disease or strokes, retinopathy (a vision problem), or amputations? No   6. Who do you live with?  Alone   Little interest or pleasure in doing things? Several days   Feeling down, depressed, or hopeless? Several days   10.  Are you considering a pregnancy or interested in discussing pregnancy prevention today? No        Today:  Artie reports that she has been struggling with her mood a bit.  She is working on this by seeing her psychiatry and therapist frequently.  She thinks her mood was down more last week as both her boyfriend and her mom were out of town.  She is working to become more physically active.  Her boyfriend got her a walking pad for Karl and she uses this off and listening to a podcast.  She notes this is really helpful as she tends to be very cold and it is hard for her to exercise outside when it is cold out.  She likes to dance and sometimes dances around her house when she is cleaning.  She was a cheerleader in high school enjoys dancing in college and also did enjoy some Alcides.She is bothered by frequent low blood sugars particularly overnight.  She reach out to her transplant coordinator Drew who is shared that this might be related to her islet cells working better.  She wonders about using the activity setting in her  OmniPod.    Current Diabetes Medications:  Insulin via OmniPod pump.        We reviewed glucometer/CGMS data together.  It revealed:  Her blood sugars in range 64% of the time 2% low 1% very low.  Average blood sugars at 158 with a coefficient of variance of 36.3%.  Blood sugars range from low to 340.  She is using an average of 21.1 units/day which is stable from when I last saw her.  She is giving 6.6 bolus per day.  Entering 357 g of carb per day.  Her connected Apple Watch registers an average of 2377 steps per day.    See details below    Artie's PMH, PSH and allergies were reviewed today and pertinent information is summarized above.    Artie's pertinent social and family history are also reviewed today and pertinent information is summarized above.      Current Outpatient Medications   Medication Sig Dispense Refill     acetaminophen (TYLENOL) 500 MG tablet Take 500-1,000 mg by mouth       acetone urine (RELION KETONE TEST) test strip Use as part of high blood sugar protocol for pump users 20 strip 4     Alcohol Swabs PADS Use before taking blood sugars up to 10 times a day 100 each 3     blood glucose (NO BRAND SPECIFIED) lancets standard Use to test blood sugars up to 8 times daily as directed. 100 each 3     blood glucose (NO BRAND SPECIFIED) test strip Test blood sugars up to 8 times a day as directed by your provider 200 strip 3     blood glucose monitoring (NO BRAND SPECIFIED) meter device kit Use as directed Per insurance coverage. DM education recommending Accu Chek Guide glucose meter 1 kit 0     blood glucose monitoring (SOFTCLIX) lancets USE TO TEST BLOOD GLUCOSE UP TO EIGHT TIMES DAILY       buPROPion (WELLBUTRIN XL) 150 MG 24 hr tablet Take 1 tablet (150 mg) by mouth every morning. TAKE WITH 300 mg tab for total dose 450 mg daily. 90 tablet 1     buPROPion (WELLBUTRIN XL) 300 MG 24 hr tablet Take 1 tablet (300 mg) by mouth every morning. 90 tablet 1     cetirizine (ZYRTEC) 10 MG tablet Take  10 mg by mouth daily       Continuous Glucose Sensor (DEXCOM G6 SENSOR) MISC 1 each every 10 days 9 each 3     Continuous Glucose Sensor (DEXCOM G7 SENSOR) MISC Change every 10 days. 3 each 5     Continuous Glucose Transmitter (DEXCOM G6 TRANSMITTER) MISC 1 each every 3 months 1 each 3     gabapentin (NEURONTIN) 600 MG tablet Take 1 tablet (600 mg) by mouth 2 times daily 180 tablet 3     Glucagon (GVOKE HYPOPEN) 1 MG/0.2ML pen Inject the contents of 1 device under the skin into lower abdomen, outer thigh, or outer upper arm as needed for hypoglycemia. If no response after 15 minutes, additional 1 mg dose from a new device may be injected while waiting for emergency assistance. 0.4 mL 3     glucose (BD GLUCOSE) 4 g chewable tablet Take 1 tablet by mouth every hour as needed for low blood sugar 30 tablet 0     guanFACINE (INTUNIV) 1 MG TB24 24 hr tablet Take 1 tablet (1 mg) by mouth at bedtime. 14 tablet 0     guanFACINE (INTUNIV) 2 MG TB24 24 hr tablet Take 1 tablet (2 mg) by mouth at bedtime. 30 tablet 1     ibuprofen (ADVIL/MOTRIN) 200 MG tablet Take 800 mg by mouth as needed       insulin aspart (NOVOLOG PEN) 100 UNIT/ML pen DOSE:  1 units per 10 grams of carbohydrate.  Only chart total amount of units given.  Do not give if pre-prandial glucose is less than 60 mg/dL. If given at mealtime, administer within 30 minutes of start of meal. 15 mL 3     insulin aspart (NOVOLOG VIAL) 100 UNITS/ML vial Up to 30 units daily via insulin pump 30 mL 4     Insulin Disposable Pump (OMNIPOD 5 G6 INTRO, GEN 5,) KIT 1 each every 3 days 1 kit 0     Insulin Disposable Pump (OMNIPOD 5 G6 PODS, GEN 5,) MISC 1 each every 3 days 30 each 4     Insulin Disposable Pump (OMNIPOD 5 PODS, GEN 5,) MISC 1 each every 3 days. 30 each 4     insulin pen needle (32G X 4 MM) 32G X 4 MM miscellaneous Use up to 8 pen needles daily or as directed. 200 each 11     levonorgestrel (MIRENA) 52 MG (20 mcg/day) IUD 1 Device by Intrauterine route        "lipase-protease-amylase (CREON) 19783-58410-073258 units CPEP per EC capsule Take 4 capsules with meals and 2 capsules with snacks; approximate daily maximum 16 capsules 480 capsule 11     LORazepam (ATIVAN) 0.5 MG tablet Take 1 tablet (0.5 mg) by mouth daily as needed (panic attacks). 10 TABS TO LAST 30 DAYS 10 tablet 0     Nutritional Supplements (BOOST HIGH PROTEIN) LIQD After above baseline labs are drawn, give: 6 mL/kg to maximum of 360 mL; the beverage is to be consumed within 5 minutes.       ondansetron (ZOFRAN ODT) 4 MG ODT tab Take 1 tablet (4 mg) by mouth every 6 hours as needed for nausea or vomiting 12 tablet 3     pantoprazole (PROTONIX) 40 MG EC tablet Take 1 tablet (40 mg) by mouth daily. 90 tablet 3     polyethylene glycol (MIRALAX) 17 GM/Dose powder Take 17 g by mouth daily 510 g 3     No current facility-administered medications for this visit.         ROS:   Reports good physical activity tolerance.  Denies any pedal lesions or vision changes or concerns. Denies any other acute concerns except as noted above.      Exam:    There were no vitals taken for this visit.  Wt Readings from Last 10 Encounters:   10/03/24 64 kg (141 lb 1.5 oz)   10/03/24 64 kg (141 lb 1.5 oz)   09/10/24 61.7 kg (136 lb)   09/06/24 61.7 kg (136 lb)   06/12/24 52.2 kg (115 lb)   06/06/24 51.5 kg (113 lb 9.6 oz)   04/23/24 56.7 kg (125 lb)   04/11/24 55.3 kg (122 lb)   04/04/24 54.3 kg (119 lb 11.4 oz)   03/21/24 56.7 kg (125 lb)   Estimated body mass index is 25.8 kg/m  as calculated from the following:    Height as of 10/3/24: 1.575 m (5' 2.01\").    Weight as of 10/3/24: 64 kg (141 lb 1.5 oz).    General: Pleasant, well apperaing in NAD seated comfortably at home     Psych:  Mood is \"good,\" affect is appropriate.  Thought form and content are fluid and coherent.    HEENT: Eyes and sclera are clear. Extraocular movements are grossly intact without proptosis.  Nares are patent, mucous membranes moist.  Neck: No masses or JVD " are noted.    Resp: Easy and unlabored breathing.   Neuro: Alert and oriented, communicating clearly.     Data:      Recent Labs   Lab Test 10/03/24  1035 03/21/24  1106 09/22/23  2031 09/21/23  1614 08/19/23  0742 08/18/23  1726   A1C 8.1* 8.2*   < >  --   --   --    TSH  --   --   --   --   --  1.68   LDL 75 58   < >  --    < >  --    HDL 70 64   < >  --    < >  --    TRIG 89 134   < >  --    < >  --    CR 0.90 0.89   < >  --    < > 0.74   MICROL  --   --   --  <12.0  --   --    AST 34 37   < >  --    < > 22   ALT 21 25   < >  --    < > 21    < > = values in this interval not displayed.       Microalbuminuria:  Lab Results   Component Value Date    UMALCR  09/21/2023      Comment:      Unable to calculate, urine albumin and/or urine creatinine is outside detectable limits.  Microalbuminuria is defined as an albumin:creatinine ratio of 17 to 299 for males and 25 to 299 for females. A ratio of albumin:creatinine of 300 or higher is indicative of overt proteinuria.  Due to biologic variability, positive results should be confirmed by a second, first-morning random or 24-hour timed urine specimen. If there is discrepancy, a third specimen is recommended. When 2 out of 3 results are in the microalbuminuria range, this is evidence for incipient nephropathy and warrants increased efforts at glucose control, blood pressure control, and institution of therapy with an angiotensin-converting-enzyme (ACE) inhibitor (if the patient can tolerate it).         Most recent GFRs:    Lab Results   Component Value Date    GFRESTIMATED 88 10/03/2024    GFRESTIMATED 89 03/21/2024    GFRESTIMATED >90 12/14/2023       Lab Results   Component Value Date    CPEPT 0.3 (L) 10/03/2024    GADAB <5.0 06/28/2023    ISCAB <1:4 06/28/2023     FIB-4 Calculation: 0.15 at 10/5/2023  2:35 PM  Calculated from:  SGOT/AST: 19 U/L at 10/5/2023  2:35 PM  SGPT/ALT: 11 U/L at 10/5/2023  2:35 PM  Platelets: 1,093 10e3/uL at 10/5/2023  2:35 PM  Age: 27  years  AST   Date Value Ref Range Status   10/03/2024 34 0 - 45 U/L Final     Lab Results   Component Value Date    ALT 11 10/05/2023         Most recent eye exam date: : Not Found       Assessment/Plan:    Artie Burger is a 28 year old 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     Post-pancreatectomy diabetes:   Most recent A1c in October was far above goal but recent CGM data shows her very close to goal.  She is also ever struggling with hypoglycemia particularly overnight which is disrupting her sleep.  We extended her active insulin time from 2 to 3 hours to help prevent recurrent hypoglycemia.  We also set a slightly higher overnight blood sugar target of 130 from midnight to 8 AM.  She will continue to have a blood sugar target of 120 during the day.  She is working to become more active and we did some education regarding the use of the activity feature, asking her to start this 1 hour before any planned activity and often before the meal prior to her activity.  She agrees to meet with diabetes education in 3 to 4 weeks to follow-up the pump setting changes that we made today and perhaps again reviewed use of activity mode when she has had a chance to try it.     Depression: She is continuing to be proactive in this regard encouraged in her good work seeing her psychiatrist and psychologist regularly and working to become more physically active .    30 minutes spent on the date of the encounter doing chart review, history and exam, documentation, education and counseling, as well as communication and coordination of care, and further activities as noted above.      It is my privilege to be involved in the care of the above patient.     Caro Cormier PA-C, MPAS  [facility]  Diabetes, Endocrinology, and Metabolism  [phone] Appointments/Nurse  [phone] pager  [phone] nurse line    This note was completed in part  using Dragon voice recognition, and may contain word and grammatical errors.                Virtual Visit Details    Type of service:  Video Visit    Joined the call at 3/11/2025, 9:32:44 am.  Left the call at 3/11/2025, 9:54:52 am.  You were on the call for 22 minutes 8 seconds .    Originating Location (pt. Location): Home    Distant Location (provider location):  Off-site  Platform used for Video Visit: Usman    Outcome for 03/04/25 9:34 AM: Data uploaded on Dexcom and Glooko  Christopher Ellis MA  Outcome for 03/07/25 10:42 AM: Data obtained via Dexcom and Glooko website  Pamela Puente MA                                    Again, thank you for allowing me to participate in the care of your patient.      Sincerely,    Caro Cormier PA-C

## 2025-03-11 NOTE — PATIENT INSTRUCTIONS
Dear Artie,  I hope that the changes that we made today are helpful and that you are able to try activity mode. Please reach out if having more lows.    My best wishes,    Caro Cormier PA-C, MPAS  [facility]  Diabetes, Endocrinology, and Metabolism  [phone] Appointments/Nurse  [phone] Fax

## 2025-03-11 NOTE — NURSING NOTE
Is the patient currently in the state of [place]? YES    Location: boyfriend's house    Visit mode:VIDEO    If the visit is dropped, the patient can be reconnected by: VIDEO VISIT: Text to cell phone:   Telephone Information:   Mobile [phone]       Will anyone else be joining the visit? NO  (If patient encounters technical issues they should call [phone]    Are changes needed to the allergy or medication list? No    Are refills needed on medications prescribed by this physician? NO    Reason for visit: TANNER Ray, Virtual Visit Facilitator    QNR Status: completed

## 2025-03-12 ASSESSMENT — ANXIETY QUESTIONNAIRES
2. NOT BEING ABLE TO STOP OR CONTROL WORRYING: NEARLY EVERY DAY
1. FEELING NERVOUS, ANXIOUS, OR ON EDGE: NEARLY EVERY DAY
7. FEELING AFRAID AS IF SOMETHING AWFUL MIGHT HAPPEN: SEVERAL DAYS
7. FEELING AFRAID AS IF SOMETHING AWFUL MIGHT HAPPEN: SEVERAL DAYS
5. BEING SO RESTLESS THAT IT IS HARD TO SIT STILL: NEARLY EVERY DAY
8. IF YOU CHECKED OFF ANY PROBLEMS, HOW DIFFICULT HAVE THESE MADE IT FOR YOU TO DO YOUR WORK, TAKE CARE OF THINGS AT HOME, OR GET ALONG WITH OTHER PEOPLE?: VERY DIFFICULT
4. TROUBLE RELAXING: NEARLY EVERY DAY
GAD7 TOTAL SCORE: 17
2. NOT BEING ABLE TO STOP OR CONTROL WORRYING: NEARLY EVERY DAY
4. TROUBLE RELAXING: NEARLY EVERY DAY
GAD7 TOTAL SCORE: 17
IF YOU CHECKED OFF ANY PROBLEMS ON THIS QUESTIONNAIRE, HOW DIFFICULT HAVE THESE PROBLEMS MADE IT FOR YOU TO DO YOUR WORK, TAKE CARE OF THINGS AT HOME, OR GET ALONG WITH OTHER PEOPLE: VERY DIFFICULT
6. BECOMING EASILY ANNOYED OR IRRITABLE: SEVERAL DAYS
GAD7 TOTAL SCORE: 17
3. WORRYING TOO MUCH ABOUT DIFFERENT THINGS: NEARLY EVERY DAY
GAD7 TOTAL SCORE: 17
6. BECOMING EASILY ANNOYED OR IRRITABLE: SEVERAL DAYS
1. FEELING NERVOUS, ANXIOUS, OR ON EDGE: NEARLY EVERY DAY
IF YOU CHECKED OFF ANY PROBLEMS ON THIS QUESTIONNAIRE, HOW DIFFICULT HAVE THESE PROBLEMS MADE IT FOR YOU TO DO YOUR WORK, TAKE CARE OF THINGS AT HOME, OR GET ALONG WITH OTHER PEOPLE: VERY DIFFICULT
5. BEING SO RESTLESS THAT IT IS HARD TO SIT STILL: NEARLY EVERY DAY
GAD7 TOTAL SCORE: 17
3. WORRYING TOO MUCH ABOUT DIFFERENT THINGS: NEARLY EVERY DAY
8. IF YOU CHECKED OFF ANY PROBLEMS, HOW DIFFICULT HAVE THESE MADE IT FOR YOU TO DO YOUR WORK, TAKE CARE OF THINGS AT HOME, OR GET ALONG WITH OTHER PEOPLE?: VERY DIFFICULT
7. FEELING AFRAID AS IF SOMETHING AWFUL MIGHT HAPPEN: SEVERAL DAYS

## 2025-03-12 ASSESSMENT — PATIENT HEALTH QUESTIONNAIRE - PHQ9
10. IF YOU CHECKED OFF ANY PROBLEMS, HOW DIFFICULT HAVE THESE PROBLEMS MADE IT FOR YOU TO DO YOUR WORK, TAKE CARE OF THINGS AT HOME, OR GET ALONG WITH OTHER PEOPLE: EXTREMELY DIFFICULT
SUM OF ALL RESPONSES TO PHQ QUESTIONS 1-9: 18
SUM OF ALL RESPONSES TO PHQ QUESTIONS 1-9: 18

## 2025-03-12 NOTE — PROGRESS NOTES
[facility]  3/13/25      Behavioral Health Clinician Progress Note    Patient Name: Artie Burger           Service Type:  Individual      [place]   [facility] [place]patient reached)     Session Start Time: 1035am  Session End Time: 1100am      Session Length: 16 - 37      Attendees: Client     Service Modality:  Video Visit:      Provider verified identity through the following two step process.  Patient provided:  Patient is known previously to provider    Telemedicine Visit: The patient's condition can be safely assessed and treated via synchronous audio and visual telemedicine encounter.      Reason for Telemedicine Visit: Services only offered telehealth    Originating Site (Patient Location): Patient's home    Distant Site (Provider Location): Provider Remote Setting- Home Office    Consent:  The patient/guardian has verbally consented to: the potential risks and benefits of telemedicine (video visit) versus in person care; bill my insurance or make self-payment for services provided; and responsibility for payment of non-covered services.     Patient would like the video invitation sent by:  My Chart    Mode of Communication:  Video Conference via Amwell    Distant Location (Provider):  Off-site    As the provider I attest to compliance with applicable laws and regulations related to telemedicine.    Visit Activities (Refresh list every visit): [facility] Only    Diagnostic Assessment Date: 8/7/24 Daina Daniel MA [facility]; 10/21/24 Tamanna Claudio  Treatment Plan Review Date: 3/13/25  See Flowsheets for today's PHQ-9 and ALAN-7 results  Previous PHQ-9:       12/5/2024     9:17 AM 1/30/2025     9:49 AM 3/12/2025     8:21 PM   PHQ-9 SCORE   PHQ-9 Total Score Hari 13 (Moderate depression) 11 (Moderate depression) 18 (Moderately severe depression)   PHQ-9 Total Score 13  11  18        Patient-reported    Proxy-reported     Previous ALAN-7:       12/5/2024      9:18 AM 1/27/2025     8:02 PM 3/12/2025     8:22 PM   ALAN-7 SCORE   Total Score 9 (mild anxiety) 14 (moderate anxiety) 17 (severe anxiety)   Total Score 9  14  17        Patient-reported     DATA  Extended Session (60+ minutes): No  Interactive Complexity: No  Crisis: No  BH Patient: No    Treatment Objective(s) Addressed in This Session:  Target Behavior(s): disease management/lifestyle changes reducing sx    Depressed Mood: Decrease frequency and intensity of feeling down, depressed, hopeless  Anxiety: will develop more effective coping skills to manage anxiety symptoms    Current Stressors / Issues:  MH update:  Unmotivated.  Down.  Struggling.  Anxiety high.  Seeing friends, trying to be social.  Of note BS has been a bit more unstable at night- adjusted meds  Stresses:  BF was gone for the week.  Mom was in [place].  School is done.  Has to take exam.  Wants to move into BF by May.  Appetite: T1D  Sleep: Better.  Reduced bad dreams.  Outpatient Provider updates: Completed ADHD testing.  Did not confirm diagnosis.  [facility] EMDR  SI/SIB/HI: Denies current.  Hx of passive ideation.  Denies previous attempts.  Hx of SIB as teen.  Denies need for safety plan or crisis resources.  AMPARO: Sober  Side effects/compliance:  Interventions:  [facility] engaged in discussion motivation and goals to work towards  Most important:  low motivation.  Depression and anxiety higher.    1/23  MH update:  Increased buspar.  Using walking pad.  Making breakfast for self now.  Feeling better. Using organization tools to help.  Mood is better.  Anxiety is higher.  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  Taking notes to try and manage concentration, doing diane.  Appetite: T1D.  Sleep: Nightmares increased.  Anxiety induced.  Outpatient Provider updates: Completed ADHD testing.  Did not confirm diagnosis.  [facility] EMDR  SI/SIB/HI: Denies current.  Hx of  passive ideation.  Denies previous attempts.  Hx of SIB as teen.  Denies need for safety plan or crisis resources.  AMPARO:  Sober 4 years ETOH.  No meetings.  Friend support.  Daily medical THC use.  Side effects/compliance:  Interventions:  [facility] engaged in discussion about educational stressors with validation and support.  Most important:  started OTC Vit D.  Mood is better.  Better energy and motivation.  Anxiety worse. At night worse/nightmares trauma related.    Next time practice telling story.    12/6  MH update:  Had a good thanksgiving.  Rented community space for [employer].  The increase in medication has been helpful.  Stresses:  Financial stress.  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  Fighting with partner.  Appetite: T1D  Sleep: anxiety dreams.  Outpatient Provider updates: Completed ADHD testing.  Did not confirm diagnosis.  [facility] Fla[facility] EMDR  SI/SIB/HI: Denies current.  Hx of passive ideation.  Denies previous attempts.  Hx of SIB as teen.  Denies need for safety plan or crisis resources.  AMPARO:  Sober 4 years ETOH.  No meetings.  Friend support.  Daily medical THC use.  Side effects/compliance:  Interventions:  [facility] engaged in processing results of ADHD testing  Most important:  Frustrated with test results.   Increase of meds was helpful.   Couple days of brain zaps?    11/4   update:  Depression has increased.  Lack of motivation.  Sadness.  Apathy.  Anxiety a little higher.  Feeling more emotional and tearful.  Stresses:  Celebrated birthday.  Reduced ADL's.   Relationship is going well.  Starting school January.  Has to figure out $.  On academic probation from younger years.  Only going to try one class.  Appetite: T1D  Sleep: Increased.  Waking up a lot more at night.   Outpatient Provider updates: Started ADHD testing process.  [facility] EMDR  SI/SIB/HI: Denies current.  Hx of passive ideation.  Denies previous attempts.  Hx of SIB as teen.   Denies need for safety plan or crisis resources.  AMPARO:  Sober 4 years ETOH.  No meetings.  Friend support.  Daily medical THC use.  Side effects/compliance:  Interventions:  [facility] engaged in validation and support  Most important:  Depression sx a bit higher    9/6  MH update: stopped zoloft, start wellbutrin.  Got brain zaps.  Did slower taper.  On 12mg for the last week Zoloft. On 300mg Wellbutrin. Anxiety is better.  Better motivation.  Mood is lifted. Getting get out of bed and things done.   Anxiety is better now after medication taper is address.  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    Appetite: T1D  Sleep: High fatigue.  On going anemia  Outpatient Provider updates: ADHD testing 10/14 in [place].  [facility] Fla[facility]  started EMDR again  SI/SIB/HI: Denies current.  Hx of passive ideation.  Denies previous attempts.  Hx of SIB as teen.  Denies need for safety plan or crisis resources.  AMPARO:  Sober 4 years ETOH.  No meetings.  Friend support.  Daily medical THC use.  Side effects/compliance:  Interventions:  [facility] discussed TIPP skills for anxiety mgmt  Most important:  Doing better after med change.      8/7  MH update:  ROS above  Stresses:  Medical stressors.  Goals of wanting to obtain PT employment.  Dad's death as teen  Appetite: N/a.  T1D.  Sleep: Poor- related to anxiety.  No sleep study.  Night sweats.  Nightmares  Outpatient Provider updates: [facility] Fla[facility]  SI/SIB/HI:  Denies current.  Hx of passive ideation.  Denies previous attempts.  Hx of SIB as teen.  Denies need for safety plan or crisis resources.  AMPARO:  Sober 4 years ETOH.  No meetings.  Friend support.  Daily medical THC use.  Preg:  IUD  Side effects/compliance:  Interventions:  [facility] engaged in completing DA with psychoeducation and tx planning  Most important:  Different meds?  Higher dose?   ADHD testing referral?    Progress on Treatment Objective(s) /  Homework:  Satisfactory progress - ACTION (Actively working towards change); Intervened by reinforcing change plan / affirming steps taken    Motivational Interviewing    MI Intervention: Co-Developed Goal: reducing sx and Expressed Empathy/Understanding     Change Talk Expressed by the Patient: Desire to change Ability to change    Provider Response to Change Talk: E - Evoked more info from patient about behavior change and A - Affirmed patient's thoughts, decisions, or attempts at behavior change    Also provided psychoeducation about behavioral health condition, symptoms, and treatment options    Assessments completed prior to visit:  The following assessments were completed by patient for this visit:  N/a    Care Plan review completed: Yes    Medication Review:  Changes to psychiatric medications, see updated Medication List in EPIC.     Medication Compliance:  Yes    Changes in Health Issues:   None reported    Chemical Use Review:   Substance Use: Chemical use reviewed, no active concerns identified      Tobacco Use: No current tobacco use.      Assessment: Current Emotional / Mental Status (status of significant symptoms):  Risk status (Self / Other harm or suicidal ideation)  Patient has had a history of suicidal ideation: hx of passive ideation without planning intent or previous attempts and self-injurious behavior: hx of such as teen  Patient denies current fears or concerns for personal safety.  Patient denies current or recent suicidal ideation or behaviors.  Patient denies current or recent homicidal ideation or behaviors.  Patient denies current or recent self injurious behavior or ideation.  Patient denies other safety concerns.  A safety and risk management plan has not been developed at this time, however patient was encouraged to call [facility] should there be a change in any of these risk factors.    Appearance:   Appropriate   Eye Contact:   Good   Psychomotor Behavior: Normal    Attitude:   Cooperative   Orientation:   All  Speech   Rate / Production: Normal    Volume:  Normal   Mood:    Normal  Affect:    Appropriate   Thought Content:  Clear   Thought Form:  Coherent  Logical   Insight:    Good     Diagnoses:  1. Moderate episode of recurrent major depressive disorder (H)    2. ALAN (generalized anxiety disorder)    3. Trauma and stressor-related disorder    4. Alcohol use disorder, severe, in sustained remission (H)      Collateral Reports Completed:  Communicated with: Dr Singleton    Plan: (Homework, other):  Patient was given information about behavioral services and encouraged to schedule a follow up appointment with the clinic [facility] as needed.  She was also given information about mental health symptoms and treatment options .  CD Recommendations: Maintain Sobriety.       Daina Daniel [facility]    ______________________________________________________________________    Integrated Primary Care Behavioral Health Treatment Plan    Individual Treatment Plan    Patient's Name: Artie Burger   YOB: 1995  Date of Creation: 9/6/24  Date Treatment Plan Last Reviewed/Revised: 3/13/25    DSM5 Diagnoses:   1. Moderate episode of recurrent major depressive disorder (H)    2. ALAN (generalized anxiety disorder)    3. Trauma and stressor-related disorder    4. Alcohol use disorder, severe, in sustained remission (H)      Psychosocial / Contextual Factors: Medical Complexities, Trauma History, and Occupational Issues  PROMIS (reviewed every 90 days):   The following assessments were completed by patient for this visit:  PROMIS 10-Global Health (only subscores and total score):       6/22/2023     4:55 PM 8/1/2024     1:16 PM 10/11/2024    10:08 AM 11/2/2024     9:41 AM 3/12/2025     8:23 PM   PROMIS-10 Scores Only   Global Mental Health Score 9 11    11 11 7    7 8   Global Physical Health Score 8 14    14 13 11    11 11   PROMIS TOTAL - SUBSCORES 17 25    25 24 18    18 19        Referral  "/ Collaboration:  Referral to another professional/service is not indicated at this time..    Anticipated number of session for this episode of care: 6-9 sessions  Anticipation frequency of session: Monthly  Anticipated Duration of each session: 16-37 minutes  Treatment plan will be reviewed in 90 days or when goals have been changed.       MeasurableTreatment Goal(s) related to diagnosis / functional impairment(s)  Goal 1: Patient will reducing trauma sx   \"I will know I've met my goal when I don't feel triggered by my history .\"     Objective #A (Patient Action)    Patient will  attend and complete EMDR  Status: Continued - Date(s):12/6/24 , 3/13/25    Intervention(s)  [facility] will provide support through CBT, MI, Acceptance and Commitment Therapy, Dialectic Behavioral Therapy and problem solving model to explore and overcome barriers.        Patient has reviewed and agreed to the above plan.    Written by  Daina Daniel LPCC, C     "

## 2025-03-13 ENCOUNTER — VIRTUAL VISIT (OUTPATIENT)
Dept: PSYCHIATRY | Facility: CLINIC | Age: 30
End: 2025-03-13
Payer: COMMERCIAL

## 2025-03-13 ENCOUNTER — MYC MEDICAL ADVICE (OUTPATIENT)
Dept: PSYCHIATRY | Facility: CLINIC | Age: 30
End: 2025-03-13
Payer: COMMERCIAL

## 2025-03-13 ENCOUNTER — VIRTUAL VISIT (OUTPATIENT)
Dept: BEHAVIORAL HEALTH | Facility: CLINIC | Age: 30
End: 2025-03-13
Payer: COMMERCIAL

## 2025-03-13 DIAGNOSIS — F33.1 MODERATE EPISODE OF RECURRENT MAJOR DEPRESSIVE DISORDER (H): Primary | ICD-10-CM

## 2025-03-13 DIAGNOSIS — G47.00 INSOMNIA, UNSPECIFIED TYPE: ICD-10-CM

## 2025-03-13 DIAGNOSIS — F41.1 GAD (GENERALIZED ANXIETY DISORDER): ICD-10-CM

## 2025-03-13 DIAGNOSIS — F10.21 ALCOHOL USE DISORDER, SEVERE, IN SUSTAINED REMISSION (H): ICD-10-CM

## 2025-03-13 DIAGNOSIS — F43.9 TRAUMA AND STRESSOR-RELATED DISORDER: ICD-10-CM

## 2025-03-13 PROCEDURE — G2211 COMPLEX E/M VISIT ADD ON: HCPCS | Performed by: PSYCHIATRY & NEUROLOGY

## 2025-03-13 PROCEDURE — 98006 SYNCH AUDIO-VIDEO EST MOD 30: CPT | Performed by: PSYCHIATRY & NEUROLOGY

## 2025-03-13 RX ORDER — VILAZODONE HYDROCHLORIDE 10 MG/1
10 TABLET ORAL DAILY
Qty: 30 TABLET | Refills: 1 | Status: SHIPPED | OUTPATIENT
Start: 2025-03-13

## 2025-03-13 ASSESSMENT — PAIN SCALES - GENERAL: PAINLEVEL_OUTOF10: NO PAIN (0)

## 2025-03-13 NOTE — PROGRESS NOTES
"Virtual Visit Details    Type of service:  Video Visit   Video Start Time: {video visit start/end time for provider to select:129862}  Video End Time:{video visit start/end time for provider to select:627615}    Originating Location (pt. Location): {video visit patient location:082276::\"Home\"}  {PROVIDER LOCATION On-site should be selected for visits conducted from your clinic location or adjoining [facility] hospital, academic office, or other nearby [facility] building. Off-site should be selected for all other provider locations, including home:412998}  Distant Location (provider location):  {virtual location provider:220766}  Platform used for Video Visit: {Virtual Visit Platforms:524191::\"InvitedHome\"}          Answers submitted by the patient for this visit:  Patient Health Questionnaire (G7) (Submitted on 3/12/2025)  ALAN 7 TOTAL SCORE: 17    "

## 2025-03-13 NOTE — PATIENT INSTRUCTIONS
Treatment Plan:  Continue guanfacine ER 2 mg for trauma, insomnia, anxiety.  Continue lorazepam/Ativan 0.5 mg daily as needed for panic attacks/severe anxiety only.  Refilled for 10 tablets per 30 days only.  Continue to try to lessen reliance on lorazepam.  Continue gabapentin 600 mg twice daily for pain and anxiety-as prescribed by other prescriber.  Decrease Wellbutrin XL to 300 mg daily for depression.    Start Viibryd/vilazodone 10 mg daily for depression, anxiety.   Continue all other cares per primary care provider.   Continue all other medications as reviewed per electronic medical record today.   Safety plan reviewed. To the Emergency Department as needed or call after hours crisis line at [phone] or [phone]. [place] Crisis Text Line. Text [place] to [address] or Suicide LifeLine Chat: suicidepreventionlifeline.org/chat  Continue therapy as planned.   Schedule an appointment with me in 4-6 weeks or sooner as needed. Call [facility] at [phone] to schedule.  Follow up with primary care provider as planned or for acute medical concerns.  Call the psychiatric nurse line with medication questions or concerns at [phone].  [email or web address] may be used to communicate with your provider, but this is not intended to be used for emergencies.    Risks of benzodiazepine (Ativan, Xanax, Klonopin, Valium, etc) use including, but not limited to, sedation, tolerance, risk for addiction/dependence. Do not drink alcohol while taking benzodiazepines due to risk of trouble breathing and potential death. Do not drive or operate heavy machinery until it is known how the drug affects you. Discuss with physician or pharmacist before ever taking a benzodiazepine with a narcotic/opioid pain medication.     Patient Education   Collaborative Care Psychiatry Service  What to Expect  Here's what to expect from your Collaborative Care Psychiatry Service (CCPS).   About CCPS  CCPS means 2 people work together to help  "you get better. You'll meet with a behavioral health clinician and a psychiatric doctor. A behavioral health clinician helps people with mental health problems by talking with them. A psychiatric doctor helps people by giving them medicine.  How it works  At every visit, you'll see the behavioral health clinician (BHC) first. They'll talk with you about how you're doing and teach you how to feel better.   Then you'll see the psychiatric doctor. This doctor can help you deal with troubling thoughts and feelings by giving you medicine. They'll make sure you know the plan for your care.   CCPS usually takes 3 to 6 visits. If you need more visits, we may have you start seeing a different psychiatric doctor for ongoing care.  If you have any questions or concerns, we'll be glad to talk with you.  About visits  Be open  At your visits, please talk openly about your problems. It may feel hard, but it's the best way for us to help you.  Cancelling visits  If you can't come to your visit, please call us right away at [phone]. If you don't cancel at least 24 hours (1 full day) before your visit, that's \"late cancellation.\"  Being late to visits  Being very late is the same as not showing up. You will be a \"no show\" if:  Your appointment starts with a BHC, and you're more than 15 minutes late for a 30-minute (half hour) visit. This will also cancel your appointment with the psychiatric doctor.  Your appointment is with a psychiatric doctor only, and you're more than 15 minutes late for a 30-minute (half hour) visit.  Your appointment is with a psychiatric doctor only, and you're more than 30 minutes late for a 60-minute (full hour) visit.  If you cancel late or don't show up 2 times within 6 months, we may end your care.   Getting help between visits  If you need help between visits, you can call us Monday to Friday from 8 a.m. to 4:30 p.m. at [phone].  Emergency care  Call 911 or go to the nearest emergency " department if your life or someone else's life is in danger.  Call 988 anytime to reach the national Suicide and Crisis hotline.  Medicine refills  To refill your medicine, call your pharmacy. You can also call [facility]'s Behavioral Access at [phone], Monday to Friday, 8 a.m. to 4:30 p.m. It can take 1 to 3 business days to get a refill.   Forms, letters, and tests  You may have papers to fill out, like FMLA, short-term disability, and workability. We can help you with these forms at your visits, but you must have an appointment. You may need more than 1 visit for this, to be in an intensive therapy program, or both.  Before we can give you medicine for ADHD, we may refer you to get tested for it or confirm it another way.  We may not be able to give you an emotional support animal letter.  We don't do mental health checks ordered by the court.   We don't do mental health testing, but we can refer you to get tested.   Thank you for choosing us for your care.  For informational purposes only. Not to replace the advice of your health care provider. Copyright   2022 [facility]. All rights reserved. [employer] 177789 - Rev 11/24.

## 2025-03-13 NOTE — NURSING NOTE
Is the patient currently in the state of [place]? YES    Current patient location: [address] [place] [address]   [facility] [address]    Visit mode:Video    If the visit is dropped, the patient can be reconnected by: VIDEO VISIT: Text to cell phone:   Telephone Information:   Mobile [phone]       Will anyone else be joining the visit? No  (If patient encounters technical issues they should call [phone])    Are changes needed to the allergy or medication list? Pt stated no changes to allergies and Pt stated no med changes    Are refills needed on medications prescribed by this physician? No    Rooming Documentation: Questionnaire(s) completed.    Reason for visit: RECHECK     Olivia Mccann, VVF

## 2025-03-13 NOTE — PROGRESS NOTES
"Telemedicine Visit: The patient's condition can be safely assessed and treated via synchronous audio and visual telemedicine encounter.      Reason for Telemedicine Visit: Patient has requested telehealth visit    Originating Site (Patient Location): Patient's home    Distant Location (provider location):  Off-Site    Consent:  The patient/guardian has verbally consented to: the potential risks and benefits of telemedicine (video visit) versus in person care; bill my insurance or make self-payment for services provided; and responsibility for payment of non-covered services.     Mode of Communication:  Video Conference via [employer]    As the provider I attest to compliance with applicable laws and regulations related to telemedicine.         Outpatient Psychiatric Progress Note    Name: Artie Burger   : 1995                    Primary Care Provider: Lila Gallegos CNP   Therapist: Rigo Ulrich, weekly      PHQ-9 scores:      2024     9:17 AM 2025     9:49 AM 3/12/2025     8:21 PM   PHQ-9 SCORE   PHQ-9 Total Score MyChart 13 (Moderate depression) 11 (Moderate depression) 18 (Moderately severe depression)   PHQ-9 Total Score 13  11  18        Patient-reported    Proxy-reported       ALAN-7 scores:      2024     9:18 AM 2025     8:02 PM 3/12/2025     8:22 PM   ALAN-7 SCORE   Total Score 9 (mild anxiety) 14 (moderate anxiety) 17 (severe anxiety)   Total Score 9  14  17        Patient-reported       Patient Identification:  Patient is a 29 year old, partnered / significant other  White Not  or  female  who presents for return visit with me.  Patient is currently unemployed. Patient attended the phone/video session alone. Patient prefers to be called: \"Artie\".    Interim History:  I last saw Artie Burger for outpatient psychiatry return visit on 2025. During that appointment, we:    Discontinue BuSpar/buspirone: take 10 mg twice daily " for 5 days then stop.   Discontinue propranolol   Start guanfacine ER for trauma, insomnia, anxiety: take 1 mg at bedtime for 2 weeks then increase to 2 mg at bedtime as tolerated.    Continue lorazepam/Ativan 0.5 mg daily as needed for panic attacks/severe anxiety only.  Refilled for 10 tablets per 30 days only.  Continue to try to lessen reliance on lorazepam.  Continue gabapentin 600 mg twice daily for pain and anxiety-as prescribed by other prescriber.  Continue Wellbutrin  mg daily for depression.    Continue all other cares per primary care provider.     3/13: Pt feeling more down and depressed. Higher anxiety. Struggling to get motivated. Has been more isolated since BF and mother were both away for a period of time. Has felt guanfacine ER has been helpful for sleep and well-tolerated. Does not think worsening mood/MH symptoms are related to starting the guanfacine. No acute safety concerns. No acute suicidality. No problematic drug or alcohol use. See [facility] note below for additional details.     Per [facility], Daina [facility], during today's team-based visit:  MH update:  Unmotivated.  Down.  Struggling.  Anxiety high.  Seeing friends, trying to be social.  Of note BS has been a bit more unstable at night- adjusted meds  Stresses:  BF was gone for the week.  Mom was in [place].  School is done.  Has to take exam.  Wants to move into  by May.  Appetite: T1D  Sleep: Better.  Reduced bad dreams.  Outpatient Provider updates: Completed ADHD testing.  Did not confirm diagnosis.  [facility] EMDR  SI/SIB/HI: Denies current.  Hx of passive ideation.  Denies previous attempts.  Hx of SIB as teen.  Denies need for safety plan or crisis resources.  AMPARO: Sober  Side effects/compliance:  Interventions:  [facility] engaged in discussion motivation and goals to work towards  Most important:  low motivation.  Depression and anxiety higher.    Past Psychiatric Med Trials:  Psych Meds at Intake:  -Ativan  0.5 mg daily, sometimes twice daily: 1 every few days, still works (gets from primary care provider)   -Sertraline/Zoloft - 50 mg daily (was up to 200 mg daily but decreased due to activation), has been decreasing slowly and no worsening of sxs  -Buspar 10 mg daily -was taking twice daily: helpful for some day to day anxiety, up to 15 mg twice daily  -Gabapentin 600 mg twice daily: for pain and anxiety  -Propranolol 10 mg daily as needed for anxiety (sometimes uses, brings heart rate down, every couple weeks uses)     Past Psych Meds:  Diphenhydramine - anxiety  Trazodone - vivid dreams  Prozac/fluoxetine - not very helpful   Cymbalta - 2021 up to 60 mg  DID NOT WORK FOR PAIN  2022/2023: treated with lexapro 20mg and celexa 40mg during this time.     Psychiatric ROS:  See HPI above for all pertinent positives and negatives. Rest of systems negative.     PHQ9 and GAD7 scores were reviewed today if completed.   Medication side effects: I wonder about worsening anxiety, sleep, sweating, anxiety dreams related to buspirone or Wellbutrin XL  Current stressors include: Symptoms and see HPI above  Coping mechanisms and supports include: Therapy, Family, Hobbies, and Friends    Current medications include:   Current Outpatient Medications   Medication Sig Dispense Refill    acetaminophen (TYLENOL) 500 MG tablet Take 500-1,000 mg by mouth      acetone urine (RELION KETONE TEST) test strip Use as part of high blood sugar protocol for pump users 20 strip 4    Alcohol Swabs PADS Use before taking blood sugars up to 10 times a day 100 each 3    blood glucose (NO BRAND SPECIFIED) lancets standard Use to test blood sugars up to 8 times daily as directed. 100 each 3    blood glucose (NO BRAND SPECIFIED) test strip Test blood sugars up to 8 times a day as directed by your provider 200 strip 3    blood glucose monitoring (NO BRAND SPECIFIED) meter device kit Use as directed Per insurance coverage. DM education recommending Accu Chek  Guide glucose meter 1 kit 0    blood glucose monitoring (SOFTCLIX) lancets USE TO TEST BLOOD GLUCOSE UP TO EIGHT TIMES DAILY      buPROPion (WELLBUTRIN XL) 150 MG 24 hr tablet Take 1 tablet (150 mg) by mouth every morning. TAKE WITH 300 mg tab for total dose 450 mg daily. 90 tablet 1    buPROPion (WELLBUTRIN XL) 300 MG 24 hr tablet Take 1 tablet (300 mg) by mouth every morning. 90 tablet 1    cetirizine (ZYRTEC) 10 MG tablet Take 10 mg by mouth daily      Continuous Glucose Sensor (DEXCOM G7 SENSOR) MISC Change every 10 days. 3 each 5    gabapentin (NEURONTIN) 600 MG tablet Take 1 tablet (600 mg) by mouth 2 times daily 180 tablet 3    Glucagon (GVOKE HYPOPEN) 1 MG/0.2ML pen Inject the contents of 1 device under the skin into lower abdomen, outer thigh, or outer upper arm as needed for hypoglycemia. If no response after 15 minutes, additional 1 mg dose from a new device may be injected while waiting for emergency assistance. 0.4 mL 3    glucose (BD GLUCOSE) 4 g chewable tablet Take 1 tablet by mouth every hour as needed for low blood sugar 30 tablet 0    guanFACINE (INTUNIV) 1 MG TB24 24 hr tablet Take 1 tablet (1 mg) by mouth at bedtime. 14 tablet 0    guanFACINE (INTUNIV) 2 MG TB24 24 hr tablet Take 1 tablet (2 mg) by mouth at bedtime. 30 tablet 1    ibuprofen (ADVIL/MOTRIN) 200 MG tablet Take 800 mg by mouth as needed      insulin aspart (NOVOLOG PEN) 100 UNIT/ML pen DOSE:  1 units per 10 grams of carbohydrate.  Only chart total amount of units given.  Do not give if pre-prandial glucose is less than 60 mg/dL. If given at mealtime, administer within 30 minutes of start of meal. 15 mL 3    insulin aspart (NOVOLOG VIAL) 100 UNITS/ML vial Up to 30 units daily via insulin pump 30 mL 4    Insulin Disposable Pump (OMNIPOD 5 PODS, GEN 5,) MISC 1 each every 3 days. 30 each 4    insulin pen needle (32G X 4 MM) 32G X 4 MM miscellaneous Use up to 8 pen needles daily or as directed. 200 each 11    levonorgestrel (MIRENA) 52  MG (20 mcg/day) IUD 1 Device by Intrauterine route      lipase-protease-amylase (CREON) 73595-00660-376099 units CPEP per EC capsule Take 4 capsules with meals and 2 capsules with snacks; approximate daily maximum 16 capsules 480 capsule 11    LORazepam (ATIVAN) 0.5 MG tablet Take 1 tablet (0.5 mg) by mouth daily as needed (panic attacks). 10 TABS TO LAST 30 DAYS 10 tablet 0    ondansetron (ZOFRAN ODT) 4 MG ODT tab Take 1 tablet (4 mg) by mouth every 6 hours as needed for nausea or vomiting 12 tablet 3    pantoprazole (PROTONIX) 40 MG EC tablet Take 1 tablet (40 mg) by mouth daily. 90 tablet 3    polyethylene glycol (MIRALAX) 17 GM/Dose powder Take 17 g by mouth daily 510 g 3     No current facility-administered medications for this visit.     [place]:  03/12/2025 04/10/2024 1 Gabapentin 600 Mg Tablet 60.00 30 Ka Bobby [id number] Ait (5176) 11/3  [place]   02/11/2025 04/10/2024 1 Gabapentin 600 Mg Tablet 60.00 30 Ka Bobby [id number] Ait (5176) 10/3  [place]   02/01/2025 01/30/2025 1 Lorazepam 0.5 Mg Tablet 10.00 30 Al Bau [id number] Ait (5176) 0/0 0.17 L[place]       Past Medical/Surgical History:  Past Medical History:   Diagnosis Date    Alcohol-induced chronic pancreatitis (H)     Anxiety     Depression     Gastroesophageal reflux disease     Pancreatic disease     PONV (postoperative nausea and vomiting)     Type 1 diabetes mellitus (H) 9/19/2023      has a past medical history of Alcohol-induced chronic pancreatitis (H), Anxiety, Depression, Gastroesophageal reflux disease, Pancreatic disease, PONV (postoperative nausea and vomiting), and Type 1 diabetes mellitus (H) (9/19/2023).    She has no past medical history of Antiplatelet or antithrombotic long-term use or Uncomplicated asthma.    Social History:  Reviewed. No changes to social history except as noted above in HPI.    Vital Signs:   None. This is phone/video visit.     Labs:  Most recent laboratory results reviewed and the pertinent results include:   Lab  Results   Component Value Date    A1C 8.2 03/21/2024    A1C 6.6 12/14/2023    A1C 5.5 09/22/2023    A1C 5.3 06/28/2023       Review of Systems:  10 systems (general, cardiovascular, respiratory, eyes, ENT, endocrine, GI, , M/S, neurological) were reviewed. Most pertinent finding(s) is/are: intermittent pain. The remaining systems are all unremarkable.    Mental Status Examination (limited as this is by phone/video):  Appearance: Awake, alert, appears stated age, no acute distress, well-groomed   Attitude:  cooperative, pleasant   Motor: No gross abnormalities observed via video, not formally tested   Oriented to:  person, place, time, and situation  Attention Span and Concentration:  normal  Speech:  clear, coherent, regular rate, rhythm, and volume  Language: intact  Mood: depressed, anxiety  Affect:  appropriate and in normal range and mood congruent  Associations:  no loose associations  Thought Process:  logical, linear and goal oriented  Thought Content:  no evidence of acute suicidal ideation or homicidal ideation, no evidence of psychotic thought, no auditory hallucinations present and no visual hallucinations present  Recent and Remote Memory:  Intact to interview. Not formally assessed. No amnesia.  Fund of Knowledge: appropriate  Insight:  good  Judgment:  intact, adequate for safety  Impulse Control:  intact    Suicide Risk Assessment:  Today Artie Burger reports no suicidal ideation. Based on all available evidence including the factors cited above, Artie Burger does not appear to be at imminent risk for self-harm, does not meet criteria for a 72-hr hold, and therefore remains appropriate for ongoing outpatient level of care.  A thorough assessment of risk factors related to suicide and self-harm have been reviewed and are noted above. The patient convincingly denies suicidality on several occasions. Local community safety resources reviewed for patient to use if needed. There was no deceit  detected, and the patient presented in a manner that was believable.     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 3/2023: gabapentin up to 600-900mg TID became ineffective, switched to lyrica early March.  Initial dose 50mg BID, increase to 100mg BID after 2 weeks, then increase to 100mg TID after 2 weeks (if needed).      Iron deficiency anemia secondary to inadequate dietary iron intake 10/28/2021     Priority: Medium     Formatting of this note might be different from the original. 10/28/2021 hgb 10.6, iron 32.  Trial iron BID-if intolerant infusions.  Symptomatic with fatigue.      Chronic pancreatitis (H) 08/25/2021     Priority: Medium     Added automatically from request for surgery [id number]      Osteopenia of hip 08/10/2021     Priority: Medium     Formatting of this note might be different from the original. DEXA 8/2021: normal l spine, hip T -1.8.  Recommended calcium/vit D supplementation.  Repeat DEXA 1-2 years.      Insomnia  "07/07/2021     Priority: Medium     Formatting of this note might be different from the original. 7/7/2021 No issue with sleep onset, struggles at times with sleep maintenance. 9/8/2021 Rare PRN quetiapine stops \"head from racing\", effective Failed: benadryl-\"anxiety inducing,\" trazodone \"vivid dreams and anxiety inducing.\"      Current moderate episode of major depressive disorder (H) 01/24/2019     Priority: Medium     Formatting of this note might be different from the original. Previous tx: prozac 7/7/2021: doing well, decreasing cymbalta from 60 to 30mg 8/10/2021: Increased Cymbalta back to 60 mg.  Stressors with home ownership and her ongoing medical issues along with college course work.      Panic attacks 01/24/2019     Priority: Medium    Alcohol abuse, in remission 07/25/2018     Priority: Medium     Formatting of this note might be different from the original. 8/21/2020         Psychosocial & Contextual Factors: see HPI above    Assessment:  From Intake, 8/7/2024:  Artie Burger is a 28-year-old with past psychiatric history including depression, anxiety, trauma, alcohol dependence with medical complications who presents today for psychiatric evaluation.  Patient with history of mental health symptoms dating back to childhood/adolescence.  Patient has been on handful of different psychiatric medications to help with symptoms.  Patient's history was complicated by alcohol use disorder affecting pancreas and leading to multiple surgeries.  Patient has now been sober from alcohol for 4 years.  Denies any current need for chemical dependency resources.  Denies any major alcohol cravings.  Patient reports some symptoms consistent with possible ADHD diagnosis.  Given comorbid mental health concerns and struggles referral for psychological testing has been placed.  Patient is hopeful for some additional motivation and energy.  We will replace her sertraline with Wellbutrin XL.  We will continue buspirone for " anxiety.  Patient on gabapentin for pain and also possibly helping with anxiety and decreased alcohol cravings.  Patient is using lorazepam quite frequently, every 2-3 days per patient report.  Next refill will be for only 10 tablets to last 30 days at a time.  We will also increase propranolol some to take as needed to hopefully lessen reliance on lorazepam.  Could also consider a future increase of gabapentin.  Patient with IUD to protect against pregnancy.  Psychotherapy encouraged.  Denies that medicinal cannabis use causing any problems.     9/6/2024:  Patient overall doing significantly better on Wellbutrin XL.  Tolerating well.  Is still taking 12.5 mg of sertraline and will try discontinuing.  Has ADHD testing coming up next month.  Patient will be seen back for follow-up after ADHD testing.  Cannabis use but denies problematic use.  No acute safety concerns.  No SI.    11/04/2024:  Patient potentially with some regression of depression symptoms.  Tolerating Wellbutrin well so we will increase Wellbutrin XL to 450 mg daily.  Currently undergoing ADHD testing.  Discussed may need to decrease Wellbutrin XL if testing affirmative for ADHD and if we explore stimulants.  No acute safety concerns.  No SI.  No problematic drug or alcohol use.    12/06/2024:  Patient with recent ADHD psychological testing that was not affirmative for ADHD diagnosis.  Was affirmative for depression and anxiety diagnoses.  Patient feels like Wellbutrin XL increase more helpful for depression symptoms.  Tolerating well with no major negative side effects.  Still some ongoing symptoms.  Patient is agreeable to increasing buspirone as an augment to Wellbutrin XL to see if further helpful for her mental health symptoms.  Continues in psychotherapy.  No acute safety concerns.  No acute suicidality.  No problematic drug or alcohol use.    1/30/2025:  Overall patient doing relatively okay mood wise.  Worsening anxiety, sleep, dream since  last visit.  I do wonder if patient experiencing some side effects/over-activation from addition of buspirone.  Could also be from Wellbutrin XL dose at 450 mg.  The increase Wellbutrin XL continues to be quite helpful for depression and so we will try to balance this out with addition of guanfacine ER.  Guanfacine ER might also be helpful for trauma related symptoms, sleep, and anxiety.  Since starting guanfacine ER, we will discontinue propranolol since only used once or twice a month.  No acute safety concerns.  No acute suicidality.  No problematic drug or alcohol use.    3/13/2025:  Pt struggling with worsening depression symptoms and higher anxiety. Pt agreeable to decrease Wellbutrin XL to 300 mg daily and add Viibryd for mood/anxiety. Discussed [facility] at [facility] if mental health symptoms continue to worsen. No acute safety concerns today. No acute suicidality. No problematic drug or alcohol use.     Medication side effects and alternatives were reviewed. Health promotion activities recommended and reviewed today. All questions addressed. Education and counseling completed regarding risks and benefits of medications and psychotherapy options. Recommend therapy for additional support.     Treatment Plan:  Continue guanfacine ER 2 mg for trauma, insomnia, anxiety.  Continue lorazepam/Ativan 0.5 mg daily as needed for panic attacks/severe anxiety only.  Refilled for 10 tablets per 30 days only.  Continue to try to lessen reliance on lorazepam.  Continue gabapentin 600 mg twice daily for pain and anxiety-as prescribed by other prescriber.  Decrease Wellbutrin XL to 300 mg daily for depression.    Start Viibryd/vilazodone 10 mg daily for depression, anxiety.   Continue all other cares per primary care provider.   Continue all other medications as reviewed per electronic medical record today.   Safety plan reviewed. To the Emergency Department as needed or call after hours crisis line at [phone] or  [phone]. [place] Crisis Text Line. Text [place] to 296948 or Suicide LifeLine Chat: suicidepreventionlifeline.org/chat  Continue therapy as planned.   Schedule an appointment with me in 4-6 weeks or sooner as needed. Call [facility] at [phone] to schedule.  Follow up with primary care provider as planned or for acute medical concerns.  Call the psychiatric nurse line with medication questions or concerns at [phone].  MyChart may be used to communicate with your provider, but this is not intended to be used for emergencies.    Risks of benzodiazepine (Ativan, Xanax, Klonopin, Valium, etc) use including, but not limited to, sedation, tolerance, risk for addiction/dependence. Do not drink alcohol while taking benzodiazepines due to risk of trouble breathing and potential death. Do not drive or operate heavy machinery until it is known how the drug affects you. Discuss with physician or pharmacist before ever taking a benzodiazepine with a narcotic/opioid pain medication.     Administrative Billing:   Phone Call/Video Duration: 20 Minutes  Start: 11:05a  Stop: 11:25a    The longitudinal plan of care for the diagnosis(es)/condition(s) as documented were addressed during this visit. Due to the added complexity in care, I will continue to support Artie in the subsequent management and with ongoing continuity of care.    Episode of Care #: 6    Patient Status:  Patient will continue to be seen for ongoing consultation and stabilization.    Signed:   Agnes Singleton DO  [facility]    Disclaimer: This note consists of symbols derived from keyboarding, dictation and/or voice recognition software. As a result, there may be errors in the script that have gone undetected. Please consider this when interpreting information found in this chart.

## 2025-03-18 RX ORDER — GUANFACINE 2 MG/1
2 TABLET, EXTENDED RELEASE ORAL AT BEDTIME
Qty: 30 TABLET | Refills: 1 | Status: SHIPPED | OUTPATIENT
Start: 2025-03-18

## 2025-03-19 ENCOUNTER — TELEPHONE (OUTPATIENT)
Dept: PSYCHIATRY | Facility: CLINIC | Age: 30
End: 2025-03-19
Payer: COMMERCIAL

## 2025-03-19 NOTE — TELEPHONE ENCOUNTER
Prior Authorization Retail Medication Request    Medication/Dose:   Diagnosis and ICD code (if different than what is on RX):  NA  New/renewal/insurance change PA/secondary ins. PA:  Previously Tried and Failed:  NA  Rationale:  Na    Insurance   Primary: BLUE PLUS   Insurance ID:  [id number]     Secondary (if applicable):HANSA  Insurance ID:  HANSA    Pharmacy Information (if different than what is on RX)  Name:  NA  Phone:  HANSA  Fax:NA    Clinic Information  Preferred routing pool for dept communication: Psych Rn - [place]

## 2025-03-20 NOTE — TELEPHONE ENCOUNTER
Pharmacy does not have brand name in stock and unable to order it. It's the only pharmacy within 25 miles. Pt wants PA for generic.

## 2025-03-21 NOTE — TELEPHONE ENCOUNTER
PA Initiation    Medication: VILAZODONE HCL 10 MG PO TABS  Insurance Company: Blue Plus PMAP - Phone [phone] Fax [phone]  Pharmacy Filling the Rx: [facility] - [place] - [address] [place] [address] [address]  Filling Pharmacy Phone: [phone]  Filling Pharmacy Fax: [phone]  Start Date: 3/21/2025

## 2025-03-25 NOTE — TELEPHONE ENCOUNTER
Prior Authorization Approval    Medication: VILAZODONE HCL 10 MG PO TABS  Authorization Effective Date: 1/1/2025  Authorization Expiration Date: 3/22/2026  Approved Dose/Quantity:       Reference #:     Insurance Company: Blue Plus PMAP - Phone [phone] Fax [phone]  Expected CoPay: $    CoPay Card Available:      Financial Assistance Needed:   Which Pharmacy is filling the prescription: [facility] - [place], [place] - [address] [place] [address] [address]  Pharmacy Notified: YES  Patient Notified: Instructed pharmacy to notify patient once order is ready.

## 2025-03-27 ENCOUNTER — VIRTUAL VISIT (OUTPATIENT)
Dept: EDUCATION SERVICES | Facility: CLINIC | Age: 30
End: 2025-03-27
Payer: COMMERCIAL

## 2025-03-27 DIAGNOSIS — E89.1 POST-PANCREATECTOMY DIABETES (H): ICD-10-CM

## 2025-03-27 DIAGNOSIS — E16.2 HYPOGLYCEMIA: ICD-10-CM

## 2025-03-27 DIAGNOSIS — Z90.410 POST-PANCREATECTOMY DIABETES (H): ICD-10-CM

## 2025-03-27 DIAGNOSIS — E13.9 POST-PANCREATECTOMY DIABETES (H): ICD-10-CM

## 2025-03-27 ASSESSMENT — PAIN SCALES - GENERAL: PAINLEVEL_OUTOF10: NO PAIN (0)

## 2025-03-27 NOTE — NURSING NOTE
Current patient location: [address] [place] [address]   [facility] [address]    Is the patient currently in the state of [place]? YES    Visit mode: VIDEO    If the visit is dropped, the patient can be reconnected by:VIDEO VISIT: Text to cell phone:   Telephone Information:   Mobile [phone]       Will anyone else be joining the visit? NO  (If patient encounters technical issues they should call [phone]    Are changes needed to the allergy or medication list? No    Are refills needed on medications prescribed by this physician? NO    Rooming Documentation:  Patient declined to complete questionnaire(s)    Pt reports symptoms of depression, but sees a mental health provider already and feels her treatment is working well.    Reason for visit: Diabetes Education    Melina AGUSTIN

## 2025-03-27 NOTE — PROGRESS NOTES
Virtual Visit Details    Type of service:  Video Visit   Start time 10:30am  End time 11:15am  Time spent 45 minutes  Originating Location (pt. Location): Home    Distant Location (provider location):  Off-site  Platform used for Video Visit: Usman    Diabetes Self-Management Education & Support    Artie Burger presents today for education related to Post Pancreatectomy Diabetes    Patient is being treated with:  Tandem X2 Insulin Pump  She is accompanied by self  Referring provider:  Caro Cormier    PATIENT CONCERNS/REASON FOR REFERRAL   Patient was referred to review her pump reports after changes were made by Caro Cormier to help reduce incidence of hypoglycemia. Patient is noticing a bit more high glucose now.        ASSESSMENT:    Taking Medication:     Current Diabetes Management per Patient:  Taking diabetes medications? yes:     Diabetes Medication(s)       Diabetic Other       Glucagon (GVOKE HYPOPEN) 1 MG/0.2ML pen Inject the contents of 1 device under the skin into lower abdomen, outer thigh, or outer upper arm as needed for hypoglycemia. If no response after 15 minutes, additional 1 mg dose from a new device may be injected while waiting for emergency assistance.     glucose (BD GLUCOSE) 4 g chewable tablet Take 1 tablet by mouth every hour as needed for low blood sugar       Insulin       insulin aspart (NOVOLOG PEN) 100 UNIT/ML pen DOSE:  1 units per 10 grams of carbohydrate.  Only chart total amount of units given.  Do not give if pre-prandial glucose is less than 60 mg/dL. If given at mealtime, administer within 30 minutes of start of meal.     insulin aspart (NOVOLOG VIAL) 100 UNITS/ML vial Up to 30 units daily via insulin pump            Monitoring                      Patient's most recent   Lab Results   Component Value Date    A1C 8.1 10/03/2024      Patient's A1C goal: <7.0    Healthy Eating:   carb counting     Problem Solving:      Patient is at risk of hypoglycemia?:  YES  Hospitalizations for hyper or hypoglycemia: Unknown    EDUCATION and INSTRUCTION PROVIDED AT THIS VISIT:           Patient-stated goal written and given to Artie Burger.  Verbalized and demonstrated understanding of instructions.     PLAN:  Changes made at last appointment with Caro Cormier: Active insulin time changed from 2 to 3 hours to help prevent recurrent hypoglycemia. Sightly higher overnight blood sugar target of 130 from midnight to 8 AM. Continue to have a blood sugar target of 120 during the day.  These changes have helped reduce hypoglycemia since last visit.  We discussed some behaviors that may be contributing to highs after meals versus strengthening insulin to carb ratio. Patient would like to work on pre-meal bolusing (admits that sometimes her boluses are delayed) and then we will follow up to reassess settings and view her reports.   See patient instructions  AVS printed and given to patient    FOLLOW-UP:    4/18    Time spent with patient at today's visit was 45 minutes.        Any diabetes medication initiation or dose changes were made via the [facility] Standing Orders per the patient's referring provider. A copy of this encounter was shared with the provider.

## 2025-03-30 NOTE — PROGRESS NOTES
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 S/p TPIAT 23. Islet yield poor due to dense fibrosis of pancreas and difficulty with digestion. Postoperative course otherwise uneventful. Has noted new incisional hernia involving lower aspect of upper midline incision, ~2cm dimension, s/p lap incisional hernia repair with mesh.    Seen today for one year follow up. Recovering well, pain eliminated and eating/drinking well. No issues with exocrine insufficiency. Blood sugars managed.     RTC as needed or annually.      Faculty: Junito Patel MD  __________________________________________________________________  Transplant History: pancreatitis consult  2023 (Islet), Postoperative day:      Interval History: History is obtained from the patient  S/p TPIAT 23 and hernia repair subsequently. Recovered well overall from both. Here for annual follow up. Doing very well overall. Pain resolved, eating well, no complaints.   Social History     Tobacco Use    Smoking status: Former     Current packs/day: 0.00     Types: Vaping Device, Cigarettes     Quit date: 2022     Years since quittin.9    Smokeless tobacco: Never    Tobacco comments:     Vapes daily   Vaping Use    Vaping status: Every Day    Substances: Nicotine   Substance Use Topics    Alcohol use: Not Currently    Drug use: Yes     Types: Marijuana     Comment: smokes daily     ROS:   A 10-point review of systems was negative except as noted above.    Curent Meds:      Physical Exam:     Admit      Current Vitals:   /74 (BP Location: Right arm, Patient Position: Sitting, Cuff Size: Adult Regular)   Pulse 69   Temp 98.4  F (36.9  C) (Oral)   Resp 16   Ht 1.575 m (5' 2.01\")   Wt 64 kg (141 lb 1.5 oz)   SpO2 95%   BMI 25.80 kg/m           Vital sign ranges:       No data found.  General Appearance: in no apparent distress. "   Skin: normal appearance, warm and dry  Heart: RRR, no murmur  Lungs: without wheezes, nonlabored respirations  Abdomen: The abdomen is soft, flat, nontender. No hernia  Extremities: edema: appears absent    Data:   CMP@LABRCNTIPR(na:2,potassium:2,chloride:2,co2:2,g,bun:2,cr:2,gfrestimated:2,gfrestblack:2,mark:2,icapoc:2,icaw:2,ma,phos:2,amylase:2,lipase:2,uamy24:3,albumin:2,bilitotal:2,biliconj:2,bilidelta:2,alkphos:2,ast:2,alt:2,fbili:1)@  CBC@LABRCNTIPR(hgb:2,wbc:2,a,plt:2,a1c:1)@  Coags@LABRCNTIPR(inr:2,ptt:2,Xa:2)@   Urinalysis  Recent Labs   Lab Test 23  1614   COLOR Yellow   APPEARANCE Slightly Cloudy*   URINEGLC Negative   URINEBILI Negative   URINEKETONE Negative   SG 1.021   UBLD Negative   URINEPH 7.0   PROTEIN 10*   NITRITE Negative   LEUKEST Negative   RBCU 2   WBCU <1

## 2025-04-01 ENCOUNTER — MYC REFILL (OUTPATIENT)
Dept: PSYCHIATRY | Facility: CLINIC | Age: 30
End: 2025-04-01
Payer: COMMERCIAL

## 2025-04-01 ENCOUNTER — TELEPHONE (OUTPATIENT)
Dept: PSYCHIATRY | Facility: CLINIC | Age: 30
End: 2025-04-01
Payer: COMMERCIAL

## 2025-04-01 DIAGNOSIS — F41.1 GAD (GENERALIZED ANXIETY DISORDER): ICD-10-CM

## 2025-04-01 RX ORDER — LORAZEPAM 0.5 MG/1
0.5 TABLET ORAL DAILY PRN
Qty: 10 TABLET | Refills: 0 | Status: SHIPPED | OUTPATIENT
Start: 2025-04-01

## 2025-04-01 NOTE — TELEPHONE ENCOUNTER
Date of Last Office Visit: 03/13/2025  Date of Next Office Visit:  04/25/2025  No shows since last visit: No  More than one patient-initiated cancellation (with reschedule) since last seen in clinic? No    []Medication refilled per  Medication Refill in Ambulatory Care  policy.  [x]Scope of Practice: refill request processed by LPN/MA  []Medication unable to be refilled by RN due to criteria not met as indicated below:    []Eligibility: has not had a provider visit within last 6 months   []Supervision: no future appointment; < 7 days before next appointment   []Compliance: no shows; cancellations; lapse in therapy   []Verification: order discrepancy; may need modification...   [] > 30-day supply request   []Advanced refill request: > 7 days before refill date   [x]Controlled medication   []Medication not included in policy   []Review: new med; med adjusted <= 30 days; safety alert; requires lab monitoring...   []Other:      Medication(s) requested:     -  LORazepam (ATIVAN) 0.5 MG tablet   Date last ordered: 03/03/2025  Qty: 10  Refills: 0  Appropriate for refill? Provider to review.            Any Controlled Substance(s)? Yes   [place]  checked? N/A      Requested medication(s) verified as identical to current order? Yes    Any lapse in adherence to medication(s) greater than 5 days? No      Additional action taken? contacted patient via via voicemail message at [phone], Left vm for patient to find out if she was out of the above mentioned medication. I asked patient to call the clinic back. Pt called back and she stated that she is out of this medication , cued up medication/order(s), and routed encounter to provider for review.      Last visit treatment plan:   Schedule an appointment with me in 4-6 weeks   Continue guanfacine ER 2 mg for trauma, insomnia, anxiety.  Continue lorazepam/Ativan 0.5 mg daily as needed for panic attacks/severe anxiety only.  Refilled for 10 tablets per 30 days only.  Continue to  try to lessen reliance on lorazepam.  Continue gabapentin 600 mg twice daily for pain and anxiety-as prescribed by other prescriber.  Decrease Wellbutrin XL to 300 mg daily for depression.    Start Viibryd/vilazodone 10 mg daily for depression, anxiety.     Any medication(s) require lab monitoring? No    Hailey Pena MA on 4/1/2025 at 10:33 AM                hair removal not indicated

## 2025-04-01 NOTE — TELEPHONE ENCOUNTER
Reason for call:  Medication   If this is a refill request, has the caller requested the refill from the pharmacy already? Yes  Will the patient be using a [facility]? No  Name of the pharmacy and phone number for the current request: [facility] [phone]    Name of the medication requested:   LORazepam (ATIVAN) 0.5 MG tablet Take 1 tablet (0.5 mg) by mouth daily as needed (panic attacks). 10 TABS TO LAST 30 DAYS       Other request: pt out of medication past 3 days    Phone number to reach patient:  Home number on file [phone] (home)    Best Time:  any    Can we leave a detailed message on this number?  NO    Travel screening: Not Applicable

## 2025-04-08 DIAGNOSIS — G89.18 ACUTE POST-OPERATIVE PAIN: ICD-10-CM

## 2025-04-09 RX ORDER — GABAPENTIN 600 MG/1
600 TABLET ORAL 2 TIMES DAILY
Qty: 180 TABLET | Refills: 1 | Status: SHIPPED | OUTPATIENT
Start: 2025-04-09

## 2025-04-18 ENCOUNTER — VIRTUAL VISIT (OUTPATIENT)
Dept: EDUCATION SERVICES | Facility: CLINIC | Age: 30
End: 2025-04-18
Payer: COMMERCIAL

## 2025-04-18 DIAGNOSIS — Z90.410 POST-PANCREATECTOMY DIABETES (H): Primary | ICD-10-CM

## 2025-04-18 DIAGNOSIS — E13.9 POST-PANCREATECTOMY DIABETES (H): Primary | ICD-10-CM

## 2025-04-18 DIAGNOSIS — E89.1 POST-PANCREATECTOMY DIABETES (H): Primary | ICD-10-CM

## 2025-04-18 PROCEDURE — 99207 PR DIABETES SELF MANAGEMENT: CPT | Mod: 95 | Performed by: NUTRITIONIST

## 2025-04-18 PROCEDURE — G0108 DIAB MANAGE TRN  PER INDIV: HCPCS | Mod: 95 | Performed by: NUTRITIONIST

## 2025-04-18 NOTE — NURSING NOTE
Current patient location: [address] [place] [address]   [facility] [address]    Is the patient currently in the state of [place]? YES    Visit mode: VIDEO    If the visit is dropped, the patient can be reconnected by:VIDEO VISIT: Send to e-mail at: [email or web address]    Will anyone else be joining the visit? NO  (If patient encounters technical issues they should call [phone]    Are changes needed to the allergy or medication list? No    Are refills needed on medications prescribed by this physician? NO    Rooming Documentation:  Questionnaire(s) not done per department protocol    Reason for visit: Diabetes Education    Shelby Kocher VVF

## 2025-04-18 NOTE — PROGRESS NOTES
Virtual Visit Details    Type of service:  Video Visit   Start time: 4/18/2025 10:27 AM CDT End time: 4/18/2025 11:09 AM CDT  Time spent 42 minutes  Originating Location (pt. 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 yes:     Diabetes Medication(s)       Diabetic Other       Glucagon (GVOKE HYPOPEN) 1 MG/0.2ML pen Inject the contents of 1 device under the skin into lower abdomen, outer thigh, or outer upper arm as needed for hypoglycemia. If no response after 15 minutes, additional 1 mg dose from a new device may be injected while waiting for emergency assistance.     glucose (BD GLUCOSE) 4 g chewable tablet Take 1 tablet by mouth every hour as needed for low blood sugar       Insulin       insulin aspart (NOVOLOG PEN) 100 UNIT/ML pen DOSE:  1 units per 10 grams of carbohydrate.  Only chart total amount of units given.  Do not give if pre-prandial glucose is less than 60 mg/dL. If given at mealtime, administer within 30 minutes of start of meal.     insulin aspart (NOVOLOG VIAL) 100 UNITS/ML vial Up to 30 units daily via insulin pump            Monitoring                                        Patient's most recent   Lab Results   Component Value Date    A1C 8.1 10/03/2024      Patient's A1C goal: <7.0    Healthy Eating:   carb counting     Problem Solving:      Patient is at risk of hypoglycemia?: YES  Hospitalizations for hyper or hypoglycemia: Unknown    EDUCATION and INSTRUCTION PROVIDED AT THIS VISIT:    Reviewed reports  with patient. She is concerned about elevated glucose at overnight. Has been working on bolusing before meals during the day.     Patient-stated goal written and given to Artie Burger.  Verbalized and demonstrated understanding of instructions.     PLAN:  Encouraged to bolus for all meals. May want to consider adjusting correction at next visit.  Change active insulin time to 2.5 (was 3)  Change target back to 120 overnight 12am-8am.  Let us know if you start to see more lows.   See patient instructions  AVS printed and given to patient    FOLLOW-UP:    One month    Any diabetes medication initiation or dose changes [facility] per the patient's referring provider. A copy of this encounter was shared with the provider.

## 2025-04-23 ASSESSMENT — ANXIETY QUESTIONNAIRES
2. NOT BEING ABLE TO STOP OR CONTROL WORRYING: NEARLY EVERY DAY
4. TROUBLE RELAXING: NEARLY EVERY DAY
7. FEELING AFRAID AS IF SOMETHING AWFUL MIGHT HAPPEN: SEVERAL DAYS
7. FEELING AFRAID AS IF SOMETHING AWFUL MIGHT HAPPEN: SEVERAL DAYS
GAD7 TOTAL SCORE: 17
4. TROUBLE RELAXING: NEARLY EVERY DAY
5. BEING SO RESTLESS THAT IT IS HARD TO SIT STILL: MORE THAN HALF THE DAYS
IF YOU CHECKED OFF ANY PROBLEMS ON THIS QUESTIONNAIRE, HOW DIFFICULT HAVE THESE PROBLEMS MADE IT FOR YOU TO DO YOUR WORK, TAKE CARE OF THINGS AT HOME, OR GET ALONG WITH OTHER PEOPLE: EXTREMELY DIFFICULT
6. BECOMING EASILY ANNOYED OR IRRITABLE: MORE THAN HALF THE DAYS
6. BECOMING EASILY ANNOYED OR IRRITABLE: MORE THAN HALF THE DAYS
8. IF YOU CHECKED OFF ANY PROBLEMS, HOW DIFFICULT HAVE THESE MADE IT FOR YOU TO DO YOUR WORK, TAKE CARE OF THINGS AT HOME, OR GET ALONG WITH OTHER PEOPLE?: EXTREMELY DIFFICULT
8. IF YOU CHECKED OFF ANY PROBLEMS, HOW DIFFICULT HAVE THESE MADE IT FOR YOU TO DO YOUR WORK, TAKE CARE OF THINGS AT HOME, OR GET ALONG WITH OTHER PEOPLE?: EXTREMELY DIFFICULT
1. FEELING NERVOUS, ANXIOUS, OR ON EDGE: NEARLY EVERY DAY
GAD7 TOTAL SCORE: 17
5. BEING SO RESTLESS THAT IT IS HARD TO SIT STILL: MORE THAN HALF THE DAYS
3. WORRYING TOO MUCH ABOUT DIFFERENT THINGS: NEARLY EVERY DAY
3. WORRYING TOO MUCH ABOUT DIFFERENT THINGS: NEARLY EVERY DAY
IF YOU CHECKED OFF ANY PROBLEMS ON THIS QUESTIONNAIRE, HOW DIFFICULT HAVE THESE PROBLEMS MADE IT FOR YOU TO DO YOUR WORK, TAKE CARE OF THINGS AT HOME, OR GET ALONG WITH OTHER PEOPLE: EXTREMELY DIFFICULT
GAD7 TOTAL SCORE: 17
GAD7 TOTAL SCORE: 17
2. NOT BEING ABLE TO STOP OR CONTROL WORRYING: NEARLY EVERY DAY
GAD7 TOTAL SCORE: 17
1. FEELING NERVOUS, ANXIOUS, OR ON EDGE: NEARLY EVERY DAY
7. FEELING AFRAID AS IF SOMETHING AWFUL MIGHT HAPPEN: SEVERAL DAYS

## 2025-04-23 ASSESSMENT — PATIENT HEALTH QUESTIONNAIRE - PHQ9
10. IF YOU CHECKED OFF ANY PROBLEMS, HOW DIFFICULT HAVE THESE PROBLEMS MADE IT FOR YOU TO DO YOUR WORK, TAKE CARE OF THINGS AT HOME, OR GET ALONG WITH OTHER PEOPLE: EXTREMELY DIFFICULT
SUM OF ALL RESPONSES TO PHQ QUESTIONS 1-9: 19
SUM OF ALL RESPONSES TO PHQ QUESTIONS 1-9: 19

## 2025-04-23 NOTE — PROGRESS NOTES
[facility]ry [facility]  4/24/25      Behavioral Health Clinician Progress Note    Patient Name: Artie Burger           Service Type:  Individual      Service Location:   [facility] / Email (patient reached)     Session Start Time: 1030a  Session End Time: 1058am      Session Length: 16 - 37      Attendees: Client     Service Modality:  Video Visit:      Provider verified identity through the following two step process.  Patient provided:  Patient is known previously to provider    Telemedicine Visit: The patient's condition can be safely assessed and treated via synchronous audio and visual telemedicine encounter.      Reason for Telemedicine Visit: Services only offered telehealth    Originating Site (Patient Location): Patient's home    Distant Site (Provider Location): Provider Remote Setting- Home Office    Consent:  The patient/guardian has verbally consented to: the potential risks and benefits of telemedicine (video visit) versus in person care; bill my insurance or make self-payment for services provided; and responsibility for payment of non-covered services.     Patient would like the video invitation sent by:  My Chart    Mode of Communication:  Video Conference via [facility]    Distant Location (Provider):  Off-site    As the provider I attest to compliance with applicable laws and regulations related to telemedicine.    Visit Activities (Refresh list every visit): [facility] Only    Diagnostic Assessment Date: 8/7/24 Daina Daniel MA [facility]; 10/21/24 Tamanna Claudio  Treatment Plan Review Date: 3/13/25  See Flowsheets for today's PHQ-9 and ALAN-7 results  Previous PHQ-9:       1/30/2025     9:49 AM 3/12/2025     8:21 PM 4/23/2025     7:04 PM   PHQ-9 SCORE   PHQ-9 Total Score Hari 11 (Moderate depression) 18 (Moderately severe depression) 19 (Moderately severe depression)   PHQ-9 Total Score 11  18  19        Patient-reported    Proxy-reported     Previous ALAN-7:        1/27/2025     8:02 PM 3/12/2025     8:22 PM 4/23/2025     7:05 PM   ALAN-7 SCORE   Total Score 14 (moderate anxiety) 17 (severe anxiety) 17 (severe anxiety)   Total Score 14  17  17        Patient-reported     DATA  Extended Session (60+ minutes): No  Interactive Complexity: No  Crisis: No  [facility] Patient: No    Treatment Objective(s) Addressed in This Session:  Target Behavior(s): disease management/lifestyle changes reducing sx    Depressed Mood: Decrease frequency and intensity of feeling down, depressed, hopeless  Anxiety: will develop more effective coping skills to manage anxiety symptoms    Current Stressors / Issues:  MH update:  Worse.  Very tearful.  Even less motivation. Sleeping a lot.  Apathy.  Stresses:  Did Easter with B/f.  Trying to work to help with depression.  Appetite: n/a  Sleep: n/a  Outpatient Provider updates: Completed ADHD testing.  Did not confirm diagnosis.  [facility] EMDR  SI/SIB/HI: Denies current.  Hx of passive ideation.  Denies previous attempts.  Hx of SIB as teen.  Denies need for safety plan or crisis resources.  AMPARO: Sober  Side effects/compliance:  Interventions:  [facility] engaged in discussion about perception and rules, inventory of things that feel good/not  Most important: wonders about genesight testing at this point.  Frustrated with meds.  Not feeling, better, if not worse depression wise.  Anxiety worse.  Jaw clenching- anxiety.     3/13  MH update:  Unmotivated.  Down.  Struggling.  Anxiety high.  Seeing friends, trying to be social.  Of note BS has been a bit more unstable at night- adjusted meds  Stresses:  BF was gone for the week.  Mom was in [place].  School is done.  Has to take exam.  Wants to move into BF by May.  Appetite: T1D  Sleep: Better.  Reduced bad dreams.  Outpatient Provider updates: Completed ADHD testing.  Did not confirm diagnosis.  [facility] EMDR  SI/SIB/HI: Denies current.  Hx of passive ideation.   Denies previous attempts.  Hx of SIB as teen.  Denies need for safety plan or crisis resources.  AMPARO: Sober  Side effects/compliance:  Interventions:  [facility] engaged in discussion motivation and goals to work towards  Most important:  low motivation.  Depression and anxiety higher.    1/23  MH update:  Increased buspar.  Using walking pad.  Making breakfast for self now.  Feeling better. Using organization tools to help.  Mood is better.  Anxiety is higher.  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  Taking notes to try and manage concentration, doing diane.  Appetite: T1D.  Sleep: Nightmares increased.  Anxiety induced.  Outpatient Provider updates: Completed ADHD testing.  Did not confirm diagnosis.  [facility] EMDR  SI/SIB/HI: Denies current.  Hx of passive ideation.  Denies previous attempts.  Hx of SIB as teen.  Denies need for safety plan or crisis resources.  AMPARO:  Sober 4 years ETOH.  No meetings.  Friend support.  Daily medical THC use.  Side effects/compliance:  Interventions:  [facility] engaged in discussion about educational stressors with validation and support.  Most important:  started OTC Vit D.  Mood is better.  Better energy and motivation.  Anxiety worse. At night worse/nightmares trauma related.    Next time practice telling story.    12/6   update:  Had a good thanksgiving.  Rented community space for xmas.  The increase in medication has been helpful.  Stresses:  Financial stress.  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  Fighting with partner.  Appetite: T1D  Sleep: anxiety dreams.  Outpatient Provider updates: Completed ADHD testing.  Did not confirm diagnosis.  [facility] EMDR  SI/SIB/HI: Denies current.  Hx of passive ideation.  Denies previous attempts.  Hx of SIB as teen.  Denies need for safety plan or crisis resources.  AMPARO:  Sober 4 years ETOH.  No meetings.  Friend support.  Daily medical THC use.  Side  effects/compliance:  Interventions:  [facility] engaged in processing results of ADHD testing  Most important:  Frustrated with test results.   Increase of meds was helpful.   Couple days of brain zaps?    11/4  MH update:  Depression has increased.  Lack of motivation.  Sadness.  Apathy.  Anxiety a little higher.  Feeling more emotional and tearful.  Stresses:  Celebrated birthday.  Reduced ADL's.   Relationship is going well.  Starting school January.  Has to figure out $.  On academic probation from younger years.  Only going to try one class.  Appetite: T1D  Sleep: Increased.  Waking up a lot more at night.   Outpatient Provider updates: Started ADHD testing process.  [facility] EMDR  SI/SIB/HI: Denies current.  Hx of passive ideation.  Denies previous attempts.  Hx of SIB as teen.  Denies need for safety plan or crisis resources.  AMPARO:  Sober 4 years ETOH.  No meetings.  Friend support.  Daily medical THC use.  Side effects/compliance:  Interventions:  [facility] engaged in validation and support  Most important:  Depression sx a bit higher    9/6  MH update: stopped zoloft, start wellbutrin.  Got brain zaps.  Did slower taper.  On 12mg for the last week Zoloft. On 300mg Wellbutrin. Anxiety is better.  Better motivation.  Mood is lifted. Getting get out of bed and things done.   Anxiety is better now after medication taper is address.  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    Appetite: T1D  Sleep: High fatigue.  On going anemia  Outpatient Provider updates: ADHD testing 10/14 in [place].  [facility]  started EMDR again  SI/SIB/HI: Denies current.  Hx of passive ideation.  Denies previous attempts.  Hx of SIB as teen.  Denies need for safety plan or crisis resources.  AMPARO:  Sober 4 years ETOH.  No meetings.  Friend support.  Daily medical THC use.  Side effects/compliance:  Interventions:  [facility] discussed TIPP skills for anxiety  mgmt  Most important:  Doing better after med change.      8/7  MH update:  ROS above  Stresses:  Medical stressors.  Goals of wanting to obtain PT employment.  Dad's death as teen  Appetite: N/a.  T1D.  Sleep: Poor- related to anxiety.  No sleep study.  Night sweats.  Nightmares  Outpatient Provider updates: [facility]  SI/SIB/HI:  Denies current.  Hx of passive ideation.  Denies previous attempts.  Hx of SIB as teen.  Denies need for safety plan or crisis resources.  AMPARO:  Sober 4 years ETOH.  No meetings.  Friend support.  Daily medical THC use.  Preg:  IUD  Side effects/compliance:  Interventions:  [facility] engaged in completing DA with psychoeducation and tx planning  Most important:  Different meds?  Higher dose?   ADHD testing referral?    Progress on Treatment Objective(s) / Homework:  Satisfactory progress - ACTION (Actively working towards change); Intervened by reinforcing change plan / affirming steps taken    Motivational Interviewing    MI Intervention: Co-Developed Goal: reducing sx and Expressed Empathy/Understanding     Change Talk Expressed by the Patient: Desire to change Ability to change    Provider Response to Change Talk: E - Evoked more info from patient about behavior change and A - Affirmed patient's thoughts, decisions, or attempts at behavior change    Also provided psychoeducation about behavioral health condition, symptoms, and treatment options    Assessments completed prior to visit:  The following assessments were completed by patient for this visit:  N/a    Care Plan review completed: Yes    Medication Review:  Changes to psychiatric medications, see updated Medication List in EPIC.     Medication Compliance:  Yes    Changes in Health Issues:   None reported    Chemical Use Review:   Substance Use: Chemical use reviewed, no active concerns identified      Tobacco Use: No current tobacco use.      Assessment: Current Emotional / Mental Status (status of  significant symptoms):  Risk status (Self / Other harm or suicidal ideation)  Patient has had a history of suicidal ideation: hx of passive ideation without planning intent or previous attempts and self-injurious behavior: hx of such as teen  Patient denies current fears or concerns for personal safety.  Patient denies current or recent suicidal ideation or behaviors.  Patient denies current or recent homicidal ideation or behaviors.  Patient denies current or recent self injurious behavior or ideation.  Patient denies other safety concerns.  A safety and risk management plan has not been developed at this time, however patient was encouraged to call [place] [phone] should there be a change in any of these risk factors.    Appearance:   Appropriate   Eye Contact:   Good   Psychomotor Behavior: Normal   Attitude:   Cooperative   Orientation:   All  Speech   Rate / Production: Normal    Volume:  Normal   Mood:    Normal  Affect:    Appropriate   Thought Content:  Clear   Thought Form:  Coherent  Logical   Insight:    Good     Diagnoses:  1. Moderate episode of recurrent major depressive disorder (H)    2. ALAN (generalized anxiety disorder)    3. Trauma and stressor-related disorder    4. Alcohol use disorder, severe, in sustained remission (H)        Collateral Reports Completed:  Communicated with: Dr Singleton    Plan: (Homework, other):  Patient was given information about behavioral services and encouraged to schedule a follow up appointment with the clinic [facility] as needed.  She was also given information about mental health symptoms and treatment options .  CD Recommendations: Maintain Sobriety.       [facility]aina Daniel [facility]    ______________________________________________________________________    Integrated Primary Care Behavioral Health Treatment Plan    Individual Treatment Plan    Patient's Name: Artie Burger   YOB: 1995  Date of Creation: 9/6/24  Date Treatment Plan Last Reviewed/Revised:  "3/13/25    DSM5 Diagnoses:   1. Moderate episode of recurrent major depressive disorder (H)    2. ALAN (generalized anxiety disorder)    3. Trauma and stressor-related disorder    4. Alcohol use disorder, severe, in sustained remission (H)      Psychosocial / Contextual Factors: Medical Complexities, Trauma History, and Occupational Issues  PROMIS (reviewed every 90 days):   The following assessments were completed by patient for this visit:  PROMIS 10-Global Health (only subscores and total score):       6/22/2023     4:55 PM 8/1/2024     1:16 PM 10/11/2024    10:08 AM 11/2/2024     9:41 AM 3/12/2025     8:23 PM   PROMIS-10 Scores Only   Global Mental Health Score 9 11    11 11 7    7 8   Global Physical Health Score 8 14    14 13 11    11 11   PROMIS TOTAL - SUBSCORES 17 25    25 24 18    18 19        Referral / Collaboration:  Referral to another professional/service is not indicated at this time..    Anticipated number of session for this episode of care: 6-9 sessions  Anticipation frequency of session: Monthly  Anticipated Duration of each session: 16-37 minutes  Treatment plan will be reviewed in 90 days or when goals have been changed.       MeasurableTreatment Goal(s) related to diagnosis / functional impairment(s)  Goal 1: Patient will reducing trauma sx   \"I will know I've met my goal when I don't feel triggered by my history .\"     Objective #A (Patient Action)    Patient will  attend and complete EMDR  Status: Continued - Date(s):12/6/24 , 3/13/25    Intervention(s)  [facility] will provide support through CBT, MI, Acceptance and Commitment Therapy, Dialectic Behavioral Therapy and problem solving model to explore and overcome barriers.        Patient has reviewed and agreed to the above plan.    Written by  Daina Daniel LPCC, [facility]     "

## 2025-04-24 ENCOUNTER — VIRTUAL VISIT (OUTPATIENT)
Dept: PSYCHIATRY | Facility: CLINIC | Age: 30
End: 2025-04-24
Payer: COMMERCIAL

## 2025-04-24 ENCOUNTER — VIRTUAL VISIT (OUTPATIENT)
Dept: BEHAVIORAL HEALTH | Facility: CLINIC | Age: 30
End: 2025-04-24
Payer: COMMERCIAL

## 2025-04-24 VITALS — HEIGHT: 62 IN | WEIGHT: 135 LBS | BODY MASS INDEX: 24.84 KG/M2

## 2025-04-24 DIAGNOSIS — F41.1 GAD (GENERALIZED ANXIETY DISORDER): ICD-10-CM

## 2025-04-24 DIAGNOSIS — F10.21 ALCOHOL USE DISORDER, SEVERE, IN SUSTAINED REMISSION (H): ICD-10-CM

## 2025-04-24 DIAGNOSIS — F33.1 MODERATE EPISODE OF RECURRENT MAJOR DEPRESSIVE DISORDER (H): Primary | ICD-10-CM

## 2025-04-24 DIAGNOSIS — F43.9 TRAUMA AND STRESSOR-RELATED DISORDER: ICD-10-CM

## 2025-04-24 DIAGNOSIS — G47.00 INSOMNIA, UNSPECIFIED TYPE: ICD-10-CM

## 2025-04-24 RX ORDER — BUPROPION HYDROCHLORIDE 300 MG/1
300 TABLET ORAL EVERY MORNING
Qty: 90 TABLET | Refills: 1 | Status: CANCELLED | OUTPATIENT
Start: 2025-04-24

## 2025-04-24 RX ORDER — GUANFACINE 2 MG/1
2 TABLET, EXTENDED RELEASE ORAL AT BEDTIME
Qty: 30 TABLET | Refills: 1 | Status: SHIPPED | OUTPATIENT
Start: 2025-04-24

## 2025-04-24 RX ORDER — LORAZEPAM 0.5 MG/1
0.5 TABLET ORAL DAILY PRN
Qty: 10 TABLET | Refills: 0 | Status: SHIPPED | OUTPATIENT
Start: 2025-04-24

## 2025-04-24 RX ORDER — BUSPIRONE HYDROCHLORIDE 15 MG/1
15 TABLET ORAL 2 TIMES DAILY
Qty: 60 TABLET | Refills: 1 | Status: SHIPPED | OUTPATIENT
Start: 2025-04-24

## 2025-04-24 ASSESSMENT — PAIN SCALES - GENERAL: PAINLEVEL_OUTOF10: MODERATE PAIN (5)

## 2025-04-24 NOTE — PROGRESS NOTES
"Virtual Visit Details    Type of service:  Video Visit   Video Start Time: {video visit start/end time for provider to select:327788}  Video End Time:{video visit start/end time for provider to select:007042}    Originating Location (pt. Location): {video visit patient location:307521::\"Home\"}  {PROVIDER LOCATION On-site should be selected for visits conducted from your clinic location or adjoining [facility] hospital, academic office, or other nearby [facility] building. Off-site should be selected for all other provider locations, including home:142526}  Distant Location (provider location):  {virtual location provider:260582}  Platform used for Video Visit: {Virtual Visit Platforms:463234::\"Via\"}  "

## 2025-04-24 NOTE — PROGRESS NOTES
"Telemedicine Visit: The patient's condition can be safely assessed and treated via synchronous audio and visual telemedicine encounter.      Reason for Telemedicine Visit: Patient has requested telehealth visit    Originating Site (Patient Location): Patient's home    Distant Location (provider location):  Off-Site    Consent:  The patient/guardian has verbally consented to: the potential risks and benefits of telemedicine (video visit) versus in person care; bill my insurance or make self-payment for services provided; and responsibility for payment of non-covered services.     Mode of Communication:  Video Conference via [employer]Grid    As the provider I attest to compliance with applicable laws and regulations related to telemedicine.         Outpatient Psychiatric Progress Note    Name: Artie Burger   : 1995                    Primary Care Provider: Lila Gallegos CNP   Therapist: Rigo Ulrich, weekly      PHQ-9 scores:      2025     9:49 AM 3/12/2025     8:21 PM 2025     7:04 PM   PHQ-9 SCORE   PHQ-9 Total Score MyChart 11 (Moderate depression) 18 (Moderately severe depression) 19 (Moderately severe depression)   PHQ-9 Total Score 11  18  19        Patient-reported    Proxy-reported       ALAN-7 scores:      2025     8:02 PM 3/12/2025     8:22 PM 2025     7:05 PM   ALAN-7 SCORE   Total Score 14 (moderate anxiety) 17 (severe anxiety) 17 (severe anxiety)   Total Score 14  17  17        Patient-reported       Patient Identification:  Patient is a 29 year old, partnered / significant other  White Not  or  female  who presents for return visit with me.  Patient is currently unemployed. Patient attended the phone/video session alone. Patient prefers to be called: \"Artie\".    Interim History:  I last saw Artie Burger for outpatient psychiatry return visit on 3/13/2025. During that appointment, we:    Continue guanfacine ER 2 mg for trauma, " insomnia, anxiety.  Continue lorazepam/Ativan 0.5 mg daily as needed for panic attacks/severe anxiety only.  Refilled for 10 tablets per 30 days only.  Continue to try to lessen reliance on lorazepam.  Continue gabapentin 600 mg twice daily for pain and anxiety-as prescribed by other prescriber.  Decrease Wellbutrin XL to 300 mg daily for depression.    Start Viibryd/vilazodone 10 mg daily for depression, anxiety.     4/24: Patient with ongoing depression symptoms and worsening anxiety, sleep.  Also having some jaw clenching related to anxiety.  Tearful.  Did reduce Wellbutrin XL to 300 mg daily.  Taking Viibryd 10 mg daily with good tolerability and no notable side effects.  Used 10 tablets of 0.5 mg lorazepam already since April 1.  Using mostly to help with sleep since sleep has been pretty restless.  No acute safety concerns.  No acute suicidality.  No problematic drug or alcohol use.  Looking forward to going [place] to [employer] this summer.  See [facility] note below for additional details.    Per [facility], Daina Daniel [facility], during today's team-based visit:  Current Stressors / Issues:  MH update:  Worse.  Very tearful.  Even less motivation. Sleeping a lot.  Apathy.  Stresses:  Did Easter with B/f.  Trying to work to help with depression.  Appetite: n/a  Sleep: n/a  Outpatient Provider updates: Completed ADHD testing.  Did not confirm diagnosis.  Sheila Flaherty [facility] EMDR  SI/SIB/HI: Denies current.  Hx of passive ideation.  Denies previous attempts.  Hx of SIB as teen.  Denies need for safety plan or crisis resources.  AMPARO: Sober  Side effects/compliance:  Interventions:  [facility] engaged in discussion about perception and rules, inventory of things that feel good/not  Most important: wonders about genesight testing at this point.  Frustrated with meds.  Not feeling, better, if not worse depression wise.  Anxiety worse.  Jaw clenching- anxiety.    Past Psychiatric Med Trials:  Psych Meds at  Intake:  -Ativan 0.5 mg daily, sometimes twice daily: 1 every few days, still works (gets from primary care provider)   -Sertraline/Zoloft - 50 mg daily (was up to 200 mg daily but decreased due to activation), has been decreasing slowly and no worsening of sxs  -Buspar 10 mg daily -was taking twice daily: helpful for some day to day anxiety, up to 15 mg twice daily  -Gabapentin 600 mg twice daily: for pain and anxiety  -Propranolol 10 mg daily as needed for anxiety (sometimes uses, brings heart rate down, every couple weeks uses)     Past Psych Meds:  Diphenhydramine - anxiety  Trazodone - vivid dreams  Prozac/fluoxetine - not very helpful although appears only took 10 mg daily?  Cymbalta - 2021 up to 60 mg  DID NOT WORK FOR PAIN  2022/2023: treated with lexapro 20mg and celexa 40mg during this time.     Psychiatric ROS:  See HPI above for all pertinent positives and negatives. Rest of systems negative.     PHQ9 and GAD7 scores were reviewed today if completed.   Medication side effects: Worsening anxiety, jaw clenching, sleep, etc. from Wellbutrin XL?  Current stressors include: Symptoms and see HPI above  Coping mechanisms and supports include: Therapy, Family, Hobbies, and Friends    Current medications include:   Current Outpatient Medications   Medication Sig Dispense Refill    acetaminophen (TYLENOL) 500 MG tablet Take 500-1,000 mg by mouth      acetone urine (RELION KETONE TEST) test strip Use as part of high blood sugar protocol for pump users 20 strip 4    Alcohol Swabs PADS Use before taking blood sugars up to 10 times a day 100 each 3    blood glucose (NO BRAND SPECIFIED) lancets standard Use to test blood sugars up to 8 times daily as directed. 100 each 3    blood glucose (NO BRAND SPECIFIED) test strip Test blood sugars up to 8 times a day as directed by your provider 200 strip 3    blood glucose monitoring (NO BRAND SPECIFIED) meter device kit Use as directed Per insurance coverage. DM education  recommending Accu Chek Guide glucose meter 1 kit 0    blood glucose monitoring (SOFTCLIX) lancets USE TO TEST BLOOD GLUCOSE UP TO EIGHT TIMES DAILY      buPROPion (WELLBUTRIN XL) 300 MG 24 hr tablet Take 1 tablet (300 mg) by mouth every morning. 90 tablet 1    cetirizine (ZYRTEC) 10 MG tablet Take 10 mg by mouth daily      Continuous Glucose Sensor (DEXCOM G7 SENSOR) MISC Change every 10 days. 3 each 5    gabapentin (NEURONTIN) 600 MG tablet Take 1 tablet (600 mg) by mouth 2 times daily. 180 tablet 1    Glucagon (GVOKE HYPOPEN) 1 MG/0.2ML pen Inject the contents of 1 device under the skin into lower abdomen, outer thigh, or outer upper arm as needed for hypoglycemia. If no response after 15 minutes, additional 1 mg dose from a new device may be injected while waiting for emergency assistance. 0.4 mL 3    glucose (BD GLUCOSE) 4 g chewable tablet Take 1 tablet by mouth every hour as needed for low blood sugar 30 tablet 0    guanFACINE (INTUNIV) 2 MG TB24 24 hr tablet Take 1 tablet (2 mg) by mouth at bedtime. 30 tablet 1    ibuprofen (ADVIL/MOTRIN) 200 MG tablet Take 800 mg by mouth as needed      insulin aspart (NOVOLOG PEN) 100 UNIT/ML pen DOSE:  1 units per 10 grams of carbohydrate.  Only chart total amount of units given.  Do not give if pre-prandial glucose is less than 60 mg/dL. If given at mealtime, administer within 30 minutes of start of meal. 15 mL 3    insulin aspart (NOVOLOG VIAL) 100 UNITS/ML vial Up to 30 units daily via insulin pump 30 mL 4    Insulin Disposable Pump (OMNIPOD 5 PODS, GEN 5,) MISC 1 each every 3 days. 30 each 4    insulin pen needle (32G X 4 MM) 32G X 4 MM miscellaneous Use up to 8 pen needles daily or as directed. 200 each 11    levonorgestrel (MIRENA) 52 MG (20 mcg/day) IUD 1 Device by Intrauterine route      lipase-protease-amylase (CREON) 68602-02596-483194 units CPEP per EC capsule Take 4 capsules with meals and 2 capsules with snacks; approximate daily maximum 16 capsules 480  capsule 11    LORazepam (ATIVAN) 0.5 MG tablet Take 1 tablet (0.5 mg) by mouth daily as needed for anxiety (and or panic attacks). 10 TABS TO LAST 30 DAYS 10 tablet 0    ondansetron (ZOFRAN ODT) 4 MG ODT tab Take 1 tablet (4 mg) by mouth every 6 hours as needed for nausea or vomiting 12 tablet 3    pantoprazole (PROTONIX) 40 MG EC tablet Take 1 tablet (40 mg) by mouth daily. 90 tablet 3    polyethylene glycol (MIRALAX) 17 GM/Dose powder Take 17 g by mouth daily 510 g 3    vilazodone (VIIBRYD) 10 MG TABS tablet Take 1 tablet (10 mg) by mouth daily. 30 tablet 1     No current facility-administered medications for this visit.     [place]:  04/09/2025 04/09/2025 1 Gabapentin 600 Mg Tablet 60.00 30 Ka Bobby [id number] Ait (5176) 0/1  [place]   04/01/2025 04/01/2025 1 Lorazepam 0.5 Mg Tablet 10.00 30 Al Kru [id number] Ait (5176) 0/0 0.17 L[place]   03/12/2025 04/10/2024 1 Gabapentin 600 Mg Tablet 60.00 30 Ka Bobby [id number] Ait (5176) 11/3  [place]       Past Medical/Surgical History:  Past Medical History:   Diagnosis Date    Alcohol-induced chronic pancreatitis (H)     Anxiety     Depression     Gastroesophageal reflux disease     Pancreatic disease     PONV (postoperative nausea and vomiting)     Type 1 diabetes mellitus (H) 9/19/2023      has a past medical history of Alcohol-induced chronic pancreatitis (H), Anxiety, Depression, Gastroesophageal reflux disease, Pancreatic disease, PONV (postoperative nausea and vomiting), and Type 1 diabetes mellitus (H) (9/19/2023).    She has no past medical history of Antiplatelet or antithrombotic long-term use or Uncomplicated asthma.    Social History:  Reviewed. No changes to social history except as noted above in HPI.    Vital Signs:   None. This is phone/video visit.     Labs:  Most recent laboratory results reviewed and the pertinent results include:   Lab Results   Component Value Date    A1C 8.2 03/21/2024    A1C 6.6 12/14/2023    A1C 5.5 09/22/2023    A1C 5.3 06/28/2023        Review of Systems:  10 systems (general, cardiovascular, respiratory, eyes, ENT, endocrine, GI, , M/S, neurological) were reviewed. Most pertinent finding(s) is/are: intermittent pain. The remaining systems are all unremarkable.    Mental Status Examination (limited as this is by phone/video):  Appearance: Awake, alert, appears stated age, no acute distress, well-groomed   Attitude:  cooperative, pleasant   Motor: No gross abnormalities observed via video, not formally tested   Oriented to:  person, place, time, and situation  Attention Span and Concentration:  normal  Speech:  clear, coherent, regular rate, rhythm, and volume  Language: intact  Mood: depressed, anxious  Affect:  appropriate and in normal range and mood congruent  Associations:  no loose associations  Thought Process:  logical, linear and goal oriented  Thought Content:  no evidence of acute suicidal ideation or homicidal ideation, no evidence of psychotic thought, no auditory hallucinations present and no visual hallucinations present  Recent and Remote Memory:  Intact to interview. Not formally assessed. No amnesia.  Fund of Knowledge: appropriate  Insight:  good  Judgment:  intact, adequate for safety  Impulse Control:  intact    Suicide Risk Assessment:  Today Artie Burger reports no suicidal ideation. Based on all available evidence including the factors cited above, Artie Burger does not appear to be at imminent risk for self-harm, does not meet criteria for a 72-hr hold, and therefore remains appropriate for ongoing outpatient level of care.  A thorough assessment of risk factors related to suicide and self-harm have been reviewed and are noted above. The patient convincingly denies suicidality on several occasions. Local community safety resources reviewed for patient to use if needed. There was no deceit detected, and the patient presented in a manner that was believable.     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 3/2023: gabapentin up to 600-900mg TID became ineffective, switched to lyrica early March.  Initial dose 50mg BID, increase to 100mg BID after 2 weeks, then increase to 100mg TID after 2 weeks (if needed).      Iron deficiency anemia secondary to inadequate dietary iron intake 10/28/2021     Priority: Medium     Formatting of this note might be different from the original. 10/28/2021 hgb 10.6, iron 32.  Trial iron BID-if intolerant infusions.  Symptomatic with fatigue.      Chronic pancreatitis (H) 08/25/2021     Priority: Medium     Added automatically from request for surgery [id number]      Osteopenia of hip 08/10/2021     Priority: Medium     Formatting of this note might be different from the original. DEXA 8/2021: normal l spine, hip T -1.8.  Recommended calcium/vit D supplementation.  Repeat DEXA 1-2 years.      Insomnia 07/07/2021     Priority: Medium     Formatting of this note might be different from the original. 7/7/2021 No issue with sleep onset,  "struggles at times with sleep maintenance. 9/8/2021 Rare PRN quetiapine stops \"head from racing\", effective Failed: benadryl-\"anxiety inducing,\" trazodone \"vivid dreams and anxiety inducing.\"      Current moderate episode of major depressive disorder (H) 01/24/2019     Priority: Medium     Formatting of this note might be different from the original. Previous tx: prozac 7/7/2021: doing well, decreasing cymbalta from 60 to 30mg 8/10/2021: Increased Cymbalta back to 60 mg.  Stressors with home ownership and her ongoing medical issues along with college course work.      Panic attacks 01/24/2019     Priority: Medium    Alcohol abuse, in remission 07/25/2018     Priority: Medium     Formatting of this note might be different from the original. 8/21/2020         Psychosocial & Contextual Factors: see HPI above    Assessment:  From Intake, 8/7/2024:  Artie Burger is a 28-year-old with past psychiatric history including depression, anxiety, trauma, alcohol dependence with medical complications who presents today for psychiatric evaluation.  Patient with history of mental health symptoms dating back to childhood/adolescence.  Patient has been on handful of different psychiatric medications to help with symptoms.  Patient's history was complicated by alcohol use disorder affecting pancreas and leading to multiple surgeries.  Patient has now been sober from alcohol for 4 years.  Denies any current need for chemical dependency resources.  Denies any major alcohol cravings.  Patient reports some symptoms consistent with possible ADHD diagnosis.  Given comorbid mental health concerns and struggles referral for psychological testing has been placed.  Patient is hopeful for some additional motivation and energy.  We will replace her sertraline with Wellbutrin XL.  We will continue buspirone for anxiety.  Patient on gabapentin for pain and also possibly helping with anxiety and decreased alcohol cravings.  Patient is using " lorazepam quite frequently, every 2-3 days per patient report.  Next refill will be for only 10 tablets to last 30 days at a time.  We will also increase propranolol some to take as needed to hopefully lessen reliance on lorazepam.  Could also consider a future increase of gabapentin.  Patient with IUD to protect against pregnancy.  Psychotherapy encouraged.  Denies that medicinal cannabis use causing any problems.     9/6/2024:  Patient overall doing significantly better on Wellbutrin XL.  Tolerating well.  Is still taking 12.5 mg of sertraline and will try discontinuing.  Has ADHD testing coming up next month.  Patient will be seen back for follow-up after ADHD testing.  Cannabis use but denies problematic use.  No acute safety concerns.  No SI.    11/04/2024:  Patient potentially with some regression of depression symptoms.  Tolerating Wellbutrin well so we will increase Wellbutrin XL to 450 mg daily.  Currently undergoing ADHD testing.  Discussed may need to decrease Wellbutrin XL if testing affirmative for ADHD and if we explore stimulants.  No acute safety concerns.  No SI.  No problematic drug or alcohol use.    12/06/2024:  Patient with recent ADHD psychological testing that was not affirmative for ADHD diagnosis.  Was affirmative for depression and anxiety diagnoses.  Patient feels like Wellbutrin XL increase more helpful for depression symptoms.  Tolerating well with no major negative side effects.  Still some ongoing symptoms.  Patient is agreeable to increasing buspirone as an augment to Wellbutrin XL to see if further helpful for her mental health symptoms.  Continues in psychotherapy.  No acute safety concerns.  No acute suicidality.  No problematic drug or alcohol use.    1/30/2025:  Overall patient doing relatively okay mood wise.  Worsening anxiety, sleep, dream since last visit.  I do wonder if patient experiencing some side effects/over-activation from addition of buspirone.  Could also be from  Wellbutrin XL dose at 450 mg.  The increase Wellbutrin XL continues to be quite helpful for depression and so we will try to balance this out with addition of guanfacine ER.  Guanfacine ER might also be helpful for trauma related symptoms, sleep, and anxiety.  Since starting guanfacine ER, we will discontinue propranolol since only used once or twice a month.  No acute safety concerns.  No acute suicidality.  No problematic drug or alcohol use.    3/13/2025:  Pt struggling with worsening depression symptoms and higher anxiety. Pt agreeable to decrease Wellbutrin XL to 300 mg daily and add Viibryd for mood/anxiety. Discussed [facility]PATH at [facility] if mental health symptoms continue to worsen. No acute safety concerns today. No acute suicidality. No problematic drug or alcohol use.     4/24/2025:  Patient with ongoing depression symptoms and worsening anxiety.  Buspirone has been helpful in the past for anxiety and so patient agreeable to adding back buspirone.  Patient may also reduce Wellbutrin XL further to see if some of the anxiety improves.  Wellbutrin XL could also be contributing to jaw clenching and sleep disruptions.  Although Viibryd not given a fair trial quite yet, did discuss that Prozac might be a better fit for energy, motivation, etc. since we have been reducing Wellbutrin XL.  Could always consider coming back to Viibryd and increasing dose depending on how aforementioned changes go.  Again reviewed patient can go to the empath unit at [facility] if symptoms worsen and she is needing higher level of care.  No acute safety concerns.  No acute suicidality today.  No problematic drug or alcohol use.  Discussed possibility of long-term psychiatric care referral at an upcoming visit.    Medication side effects and alternatives were reviewed. Health promotion activities recommended and reviewed today. All questions addressed. Education and counseling completed regarding risks and benefits of  medications and psychotherapy options. Recommend therapy for additional support.     Treatment Plan:  Continue guanfacine ER 2 mg for trauma, insomnia, anxiety.  Continue lorazepam/Ativan 0.5 mg daily as needed for panic attacks/severe anxiety only.  Refilled for 10 tablets per 30 days only.  Continue to try to lessen reliance on lorazepam.  One-time early refill provided today.  Continue gabapentin 600 mg twice daily for pain and anxiety-as prescribed by other prescriber.  Continue Wellbutrin XL to 300 mg daily for depression.    Discussed okay to trial further reduction to 150 mg daily if anxiety symptoms, sleep disruptions, and jaw clenching continue.  Continue Viibryd/vilazodone 10 mg daily for depression, anxiety.   If we start Prozac, we will discontinue Viibryd.  Message sent to you to check in on you in 2 weeks regarding possibility of adding Prozac.  Restart BuSpar/buspirone for anxiety: Take 5-10 mg twice daily as tolerated and can increase up to 15 mg twice daily as tolerated before next appointment.   Can trial doxylamine succinate 12.5-25 mg at bedtime as needed for sleep (can find over-the-counter).   [email or web address] testing kit ordered and will be mailed to your home as discussed at your visit.  Please check with your insurance company and [email or web address] regarding any possible financial responsibility.  I review testing results with patients during a clinic visit only.  You can request a copy of your [employer] testing results directly from [email or web address], or wait until your appointment when I can send you a copy with a secure text link or email link.  Continue all other cares per primary care provider.   Continue all other medications as reviewed per electronic medical record today.   Safety plan reviewed. To the Emergency Department as needed or call after hours crisis line at [phone] or [phone]. [place] Crisis Text Line. Text [place] to 802642 or Suicide LifeLine Chat:  suicidepreventionlifeline.org/chat  Continue therapy as planned.   Schedule an appointment with me in 6 weeks or sooner as needed. Call [facility] at [phone] to schedule.  Follow up with primary care provider as planned or for acute medical concerns.  Call the psychiatric nurse line with medication questions or concerns at [phone].  MyChart may be used to communicate with your provider, but this is not intended to be used for emergencies.    Risks of benzodiazepine (Ativan, Xanax, Klonopin, Valium, etc) use including, but not limited to, sedation, tolerance, risk for addiction/dependence. Do not drink alcohol while taking benzodiazepines due to risk of trouble breathing and potential death. Do not drive or operate heavy machinery until it is known how the drug affects you. Discuss with physician or pharmacist before ever taking a benzodiazepine with a narcotic/opioid pain medication.     Administrative Billing:   Phone Call/Video Duration: 16 Minutes  Start: 11:06a  Stop: 11:22a    The longitudinal plan of care for the diagnosis(es)/condition(s) as documented were addressed during this visit. Due to the added complexity in care, I will continue to support Artie in the subsequent management and with ongoing continuity of care.    Episode of Care #: 7    Patient Status:  Patient will continue to be seen for ongoing consultation and stabilization.    We will start to consider long-term psychiatry referral at an upcoming visit as discussed with patient today.    Signed:   Agnes Singleton DO  [facility]    Disclaimer: This note consists of symbols derived from keyboarding, dictation and/or voice recognition software. As a result, there may be errors in the script that have gone undetected. Please consider this when interpreting information found in this chart.

## 2025-04-24 NOTE — PATIENT INSTRUCTIONS
Treatment Plan:  Continue guanfacine ER 2 mg for trauma, insomnia, anxiety.  Continue lorazepam/Ativan 0.5 mg daily as needed for panic attacks/severe anxiety only.  Refilled for 10 tablets per 30 days only.  Continue to try to lessen reliance on lorazepam.  One-time early refill provided today.  Continue gabapentin 600 mg twice daily for pain and anxiety-as prescribed by other prescriber.  Continue Wellbutrin XL to 300 mg daily for depression.    Discussed okay to trial further reduction to 150 mg daily if anxiety symptoms, sleep disruptions, and jaw clenching continue.  Continue Viibryd/vilazodone 10 mg daily for depression, anxiety.   If we start Prozac, we will discontinue Viibryd.  Message sent to you to check in on you in 2 weeks regarding possibility of adding Prozac.  Restart BuSpar/buspirone for anxiety: Take 5-10 mg twice daily as tolerated and can increase up to 15 mg twice daily as tolerated before next appointment.  Can trial doxylamine succinate 12.5-25 mg at bedtime as needed for sleep (can find over-the-counter).   [employer] testing kit ordered and will be mailed to your home as discussed at your visit.  Please check with your insurance company and [employer] regarding any possible financial responsibility.  I review testing results with patients during a clinic visit only.  You can request a copy of your [employer] testing results directly from [employer], or wait until your appointment when I can send you a copy with a secure text link or email link.  Continue all other cares per primary care provider.   Continue all other medications as reviewed per electronic medical record today.   Safety plan reviewed. To the Emergency Department as needed or call after hours crisis line at [phone] or [phone]. [place] Crisis Text Line. Text [place] to 521106 or Suicide LifeLine Chat: suicidepreventionlifeline.org/chat  Continue therapy as planned.   Schedule an appointment with me in 6 weeks or sooner as  needed. Call [facility] at [phone] to schedule.  Follow up with primary care provider as planned or for acute medical concerns.  Call the psychiatric nurse line with medication questions or concerns at [phone].  MyChart may be used to communicate with your provider, but this is not intended to be used for emergencies.    Risks of benzodiazepine (Ativan, Xanax, Klonopin, Valium, etc) use including, but not limited to, sedation, tolerance, risk for addiction/dependence. Do not drink alcohol while taking benzodiazepines due to risk of trouble breathing and potential death. Do not drive or operate heavy machinery until it is known how the drug affects you. Discuss with physician or pharmacist before ever taking a benzodiazepine with a narcotic/opioid pain medication.     Patient Education   Collaborative Care Psychiatry Service  What to Expect  Here's what to expect from your Collaborative Care Psychiatry Service (CCPS).   About CCPS  CCPS means 2 people work together to help you get better. You'll meet with a behavioral health clinician and a psychiatric doctor. A behavioral health clinician helps people with mental health problems by talking with them. A psychiatric doctor helps people by giving them medicine.  How it works  At every visit, you'll see the behavioral health clinician (BHC) first. They'll talk with you about how you're doing and teach you how to feel better.   Then you'll see the psychiatric doctor. This doctor can help you deal with troubling thoughts and feelings by giving you medicine. They'll make sure you know the plan for your care.   CCPS usually takes 3 to 6 visits. If you need more visits, we may have you start seeing a different psychiatric doctor for ongoing care.  If you have any questions or concerns, we'll be glad to talk with you.  About visits  Be open  At your visits, please talk openly about your problems. It may feel hard, but it's the best way for us to  "help you.  Cancelling visits  If you can't come to your visit, please call us right away at [phone]. If you don't cancel at least 24 hours (1 full day) before your visit, that's \"late cancellation.\"  Being late to visits  Being very late is the same as not showing up. You will be a \"no show\" if:  Your appointment starts with a [facility], and you're more than 15 minutes late for a 30-minute (half hour) visit. This will also cancel your appointment with the psychiatric doctor.  Your appointment is with a psychiatric doctor only, and you're more than 15 minutes late for a 30-minute (half hour) visit.  Your appointment is with a psychiatric doctor only, and you're more than 30 minutes late for a 60-minute (full hour) visit.  If you cancel late or don't show up 2 times within 6 months, we may end your care.   Getting help between visits  If you need help between visits, you can call us Monday to Friday from 8 a.m. to 4:30 p.m. at [phone].  Emergency care  Call 911 or go to the nearest emergency department if your life or someone else's life is in danger.  Call 988 anytime to reach the national Suicide and Crisis hotline.  Medicine refills  To refill your medicine, call your pharmacy. You can also call [facility]'s Behavioral Access at [phone], Monday to Friday, 8 a.m. to 4:30 p.m. It can take 1 to 3 business days to get a refill.   Forms, letters, and tests  You may have papers to fill out, like FMLA, short-term disability, and workability. We can help you with these forms at your visits, but you must have an appointment. You may need more than 1 visit for this, to be in an intensive therapy program, or both.  Before we can give you medicine for ADHD, we may refer you to get tested for it or confirm it another way.  We may not be able to give you an emotional support animal letter.  We don't do mental health checks ordered by the court.   We don't do mental health testing, but we can refer you to " get tested.   Thank you for choosing us for your care.  For informational purposes only. Not to replace the advice of your health care provider. Copyright   2022 [employer]. All rights reserved. [employer] 274253 - Rev 11/24.

## 2025-04-24 NOTE — NURSING NOTE
Current patient location: [address] [place] [address] [address]VE   [facility] [address]    Is the patient currently in the state of [place]? YES    Visit mode: VIDEO    If the visit is dropped, the patient can be reconnected by:VIDEO VISIT: Send to e-mail at: [email or web address]    Will anyone else be joining the visit? NO  (If patient encounters technical issues they should call [phone]    Are changes needed to the allergy or medication list? No    Are refills needed on medications prescribed by this physician? YES    Rooming Documentation:  Questionnaire(s) completed    Reason for visit: RECHECK    Fany AGUSTIN

## 2025-04-30 SDOH — HEALTH STABILITY: PHYSICAL HEALTH: ON AVERAGE, HOW MANY DAYS PER WEEK DO YOU ENGAGE IN MODERATE TO STRENUOUS EXERCISE (LIKE A BRISK WALK)?: 1 DAY

## 2025-04-30 SDOH — HEALTH STABILITY: PHYSICAL HEALTH: ON AVERAGE, HOW MANY MINUTES DO YOU ENGAGE IN EXERCISE AT THIS LEVEL?: 20 MIN

## 2025-04-30 ASSESSMENT — SOCIAL DETERMINANTS OF HEALTH (SDOH): HOW OFTEN DO YOU GET TOGETHER WITH FRIENDS OR RELATIVES?: ONCE A WEEK

## 2025-05-01 ENCOUNTER — OFFICE VISIT (OUTPATIENT)
Dept: FAMILY MEDICINE | Facility: CLINIC | Age: 30
End: 2025-05-01
Attending: SURGERY
Payer: COMMERCIAL

## 2025-05-01 VITALS
OXYGEN SATURATION: 98 % | SYSTOLIC BLOOD PRESSURE: 128 MMHG | RESPIRATION RATE: 18 BRPM | HEART RATE: 100 BPM | WEIGHT: 146.4 LBS | DIASTOLIC BLOOD PRESSURE: 86 MMHG | BODY MASS INDEX: 26.94 KG/M2 | TEMPERATURE: 98.4 F | HEIGHT: 62 IN

## 2025-05-01 DIAGNOSIS — Z71.89 ACP (ADVANCE CARE PLANNING): ICD-10-CM

## 2025-05-01 DIAGNOSIS — Z94.83 S/P PANCREATIC ISLET CELL TRANSPLANTATION (H): ICD-10-CM

## 2025-05-01 DIAGNOSIS — Z12.4 CERVICAL CANCER SCREENING: ICD-10-CM

## 2025-05-01 DIAGNOSIS — Z11.4 SCREENING FOR HIV (HUMAN IMMUNODEFICIENCY VIRUS): ICD-10-CM

## 2025-05-01 DIAGNOSIS — F41.1 GAD (GENERALIZED ANXIETY DISORDER): ICD-10-CM

## 2025-05-01 DIAGNOSIS — Z23 NEED FOR PNEUMOCOCCAL VACCINATION: ICD-10-CM

## 2025-05-01 DIAGNOSIS — Z90.410 POST-PANCREATECTOMY DIABETES (H): ICD-10-CM

## 2025-05-01 DIAGNOSIS — Z76.89 ENCOUNTER TO ESTABLISH CARE: ICD-10-CM

## 2025-05-01 DIAGNOSIS — Z00.00 ROUTINE GENERAL MEDICAL EXAMINATION AT A HEALTH CARE FACILITY: Primary | ICD-10-CM

## 2025-05-01 DIAGNOSIS — E10.9 TYPE 1 DIABETES MELLITUS WITHOUT COMPLICATION (H): ICD-10-CM

## 2025-05-01 DIAGNOSIS — E89.1 POST-PANCREATECTOMY DIABETES (H): ICD-10-CM

## 2025-05-01 DIAGNOSIS — J30.1 SEASONAL ALLERGIC RHINITIS DUE TO POLLEN: ICD-10-CM

## 2025-05-01 DIAGNOSIS — F43.9 TRAUMA AND STRESSOR-RELATED DISORDER: ICD-10-CM

## 2025-05-01 DIAGNOSIS — F33.1 MODERATE EPISODE OF RECURRENT MAJOR DEPRESSIVE DISORDER (H): ICD-10-CM

## 2025-05-01 DIAGNOSIS — G47.00 INSOMNIA, UNSPECIFIED TYPE: ICD-10-CM

## 2025-05-01 DIAGNOSIS — Z90.81 ACQUIRED ASPLENIA: ICD-10-CM

## 2025-05-01 DIAGNOSIS — E13.9 POST-PANCREATECTOMY DIABETES (H): ICD-10-CM

## 2025-05-01 PROBLEM — G89.18 ACUTE POST-OPERATIVE PAIN: Status: RESOLVED | Noted: 2023-10-03 | Resolved: 2025-05-01

## 2025-05-01 PROBLEM — E34.9: Status: RESOLVED | Noted: 2023-09-19 | Resolved: 2025-05-01

## 2025-05-01 PROBLEM — Z97.5 IUD (INTRAUTERINE DEVICE) IN PLACE: Status: ACTIVE | Noted: 2024-01-11

## 2025-05-01 PROBLEM — D50.8 IRON DEFICIENCY ANEMIA SECONDARY TO INADEQUATE DIETARY IRON INTAKE: Status: ACTIVE | Noted: 2021-10-28

## 2025-05-01 RX ORDER — GUANFACINE 2 MG/1
2 TABLET, EXTENDED RELEASE ORAL AT BEDTIME
Qty: 90 TABLET | Refills: 3 | Status: SHIPPED | OUTPATIENT
Start: 2025-05-01

## 2025-05-01 RX ORDER — CETIRIZINE HYDROCHLORIDE 10 MG/1
10 TABLET ORAL DAILY
Qty: 90 TABLET | Refills: 3 | Status: SHIPPED | OUTPATIENT
Start: 2025-05-01

## 2025-05-01 RX ORDER — PANTOPRAZOLE SODIUM 40 MG/1
40 TABLET, DELAYED RELEASE ORAL DAILY
Qty: 90 TABLET | Refills: 3 | Status: SHIPPED | OUTPATIENT
Start: 2025-05-01

## 2025-05-01 NOTE — PROGRESS NOTES
Preventive Care Visit  [facility] [place]  ROSIE Thurman CNP, Family Medicine  May 1, 2025      Assessment & Plan     Encounter to establish care  Routine general medical examination at a health care facility   Recent blood work completed by endocrinology and transplant team reviewed  - REVIEW OF HEALTH MAINTENANCE PROTOCOL ORDERS    S/P pancreatic islet cell transplantation (H)  Follows with the transplant team. Reports doing well  - pantoprazole (PROTONIX) 40 MG EC tablet; Take 1 tablet (40 mg) by mouth daily.  - Primary Care Referral    Type 1 diabetes mellitus without complication (H)  Post-pancreatectomy diabetes (H)  Follows with endocrinology, Controlled  Foot exam,  2+ pulses. No ulcers. No complaints of foot pain or paresthesias  - Albumin Random Urine Quantitative with Creat Ratio; Future  - PRIMARY CARE FOLLOW-UP SCHEDULING; Future    ALAN (generalized anxiety disorder)  Moderate episode of recurrent major depressive disorder (H)  Trauma and stressor-related disorder  She follows with psychiatry. She is currently on Wellbutrin, buspirone, lorazepam PRN and gabapentin. Denies SI    Insomnia, unspecified type  - guanFACINE (INTUNIV) 2 MG TB24 24 hr tablet; Take 1 tablet (2 mg) by mouth at bedtime.    Seasonal allergic rhinitis due to pollen  - cetirizine (ZYRTEC) 10 MG tablet; Take 1 tablet (10 mg) by mouth daily.    Screening for HIV (human immunodeficiency virus)  - HIV Antigen Antibody Combo    Cervical cancer screening  - HPV and Gynecologic Cytology Panel - Recommended Age 30 - 65 Years    Need for pneumococcal vaccination  - Pneumococcal 20 Valent Conjugate (Prevnar 20)    Acquired asplenia   Awareness.Will need shingles vaccine. Declined today    ACP (advance care planning)   Explained and discussed planning of medical care in the event of loss of decision-making abilities.        BMI  Estimated body mass index is 26.78 kg/m  as calculated from the following:    Height as  "of this encounter: 1.575 m (5' 2\").    Weight as of this encounter: 66.4 kg (146 lb 6.4 oz).       Counseling  Appropriate preventive services were addressed with this patient via screening, questionnaire, or discussion as appropriate for fall prevention, nutrition, physical activity, Tobacco-use cessation, social engagement, weight loss and cognition.  Checklist reviewing preventive services available has been given to the patient.  Reviewed patient's diet, addressing concerns and/or questions.   She is at risk for lack of exercise and has been provided with information to increase physical activity for the benefit of her well-being.   The patient was instructed to see the dentist every 6 months.   She is at risk for psychosocial distress and has been provided with information to reduce risk.   The patient's PHQ-9 score is consistent with moderate depression. She was provided with information regarding depression.         Juan Alva is a 29 year old, presenting for the following:  Establish Care        5/1/2025     1:29 PM   Additional Questions   Roomed by Mariah   Accompanied by Self         5/1/2025     1:29 PM   Patient Reported Additional Medications   Patient reports taking the following new medications No          HPI  Annual physical         Advance Care Planning    Discussed advance care planning with patient; informed AVS has link to [facility].        4/30/2025   General Health   How would you rate your overall physical health? (!) FAIR   Feel stress (tense, anxious, or unable to sleep) Very much   (!) STRESS CONCERN      4/30/2025   Nutrition   Three or more servings of calcium each day? (!) NO   Diet: Diabetic   How many servings of fruit and vegetables per day? (!) 0-1   How many sweetened beverages each day? (!) 2         4/30/2025   Exercise   Days per week of moderate/strenous exercise 1 day   Average minutes spent exercising at this level 20 min   (!) EXERCISE CONCERN      " 4/30/2025   Social Factors   Frequency of gathering with friends or relatives Once a week   Worry food won't last until get money to buy more No   Food not last or not have enough money for food? Yes   Do you have housing? (Housing is defined as stable permanent housing and does not include staying outside in a car, in a tent, in an abandoned building, in an overnight shelter, or couch-surfing.) Yes   Are you worried about losing your housing? No   Lack of transportation? No   Unable to get utilities (heat,electricity)? No   (!) FOOD SECURITY CONCERN PRESENT      4/30/2025   Dental   Dentist two times every year? (!) NO       Today's PHQ-9 Score:       4/30/2025     1:00 PM   PHQ-9 SCORE   PHQ-9 Total Score MyChart 19 (Moderately severe depression)   PHQ-9 Total Score 19        Patient-reported         4/30/2025   Substance Use   Alcohol more than 3/day or more than 7/wk No   Do you use any other substances recreationally? No     Social History     Tobacco Use    Smoking status: Former     Current packs/day: 0.00     Types: Cigarettes     Quit date: 05/2022     Years since quitting: 3.0    Smokeless tobacco: Never    Tobacco comments:     Vapes daily   Vaping Use    Vaping status: Every Day    Substances: Nicotine   Substance Use Topics    Alcohol use: Not Currently    Drug use: Yes     Types: Marijuana     Comment: smokes daily                  4/30/2025   One time HIV Screening   Previous HIV test? I don't know         4/30/2025   STI Screening   New sexual partner(s) since last STI/HIV test? No     History of abnormal Pap smear: No - age 30-64 HPV with reflex Pap every 5 years recommended             4/30/2025   Contraception/Family Planning   Questions about contraception or family planning No        Reviewed and updated as needed this visit by Provider                          Review of Systems  Constitutional, HEENT, cardiovascular, pulmonary, GI, , musculoskeletal, neuro, skin, endocrine and psych systems  "are negative, except as otherwise noted.     Objective    Exam  /86 (BP Location: Left arm, Patient Position: Sitting, Cuff Size: Adult Regular)   Pulse 100   Temp 98.4  F (36.9  C) (Temporal)   Resp 18   Ht 1.575 m (5' 2\")   Wt 66.4 kg (146 lb 6.4 oz)   LMP 04/26/2025   SpO2 98%   BMI 26.78 kg/m     Estimated body mass index is 26.78 kg/m  as calculated from the following:    Height as of this encounter: 1.575 m (5' 2\").    Weight as of this encounter: 66.4 kg (146 lb 6.4 oz).    Physical Exam  GENERAL: alert and no distress  EYES: Eyes grossly normal to inspection, PERRL and conjunctivae and sclerae normal  HENT: ear canals and TM's normal, nose and mouth without ulcers or lesions  NECK: no adenopathy, no asymmetry, masses, or scars  RESP: lungs clear to auscultation - no rales, rhonchi or wheezes  CV: regular rate and rhythm, normal S1 S2, no S3 or S4, no murmur, click or rub, no peripheral edema  ABDOMEN: soft, nontender, no hepatosplenomegaly, no masses and bowel sounds normal   (female): normal female external genitalia, normal urethral meatus, normal vaginal mucosa  MS: no gross musculoskeletal defects noted, no edema  SKIN: no suspicious lesions or rashes  NEURO: Normal strength and tone, mentation intact and speech normal  PSYCH: mentation appears normal, affect normal/bright  Foot exam: 2+ pulses. No ulcers. No complaints of foot pain or paresthesias    Signed Electronically by: ROSIE Thurman CNP    "

## 2025-05-01 NOTE — PATIENT INSTRUCTIONS
At [facility], we strive to deliver an exceptional experience to you, every time we see you. If you receive a survey, please let us know what we are doing well and/or what we could improve upon, as we do value your feedback.  If you have MyChart, you can expect to receive results automatically within 24 hours of their completion.  Your provider will send a note interpreting your results as well.   If you do not have MyChart, you should receive your results in about a week by mail.    Your care team:                            Family Medicine Internal Medicine   MD Juan Noe, MD Sarah Faith, MD Nader Pugh, MD Lisa Bonilla, PA-C    Danny Florence, MD Pediatrics   Dinora Collins, MD Shaniqua Campos, MD Anna Humphrey, APRN CNP Rubia VELEZ CNP   Alea Live, MD Mia Ramirez, MD Guera Flores, CNP     Leanne Pelletier, PA-C Same-Day Provider (No follow-up visits)   ROSIE Thurman, [place] Sr, PA-C    Lynn Meek PA-C     Clinic hours: Monday - Thursday 7 am-6 pm; Fridays 7 am-5 pm.   Urgent care: Monday - Friday 10 am- 8 pm; Saturday and Sunday 9 am-5 pm.    Clinic: [phone]       [facility]: Monday - Thursday 8 am - 7 pm; Friday 8 am - 6 pm  [facility]: [phone]     Patient Education   Preventive Care Advice   This is general advice given by our system to help you stay healthy. However, your care team may have specific advice just for you. Please talk to your care team about your preventive care needs.  Nutrition  Eat 5 or more servings of fruits and vegetables each day.  Try wheat bread, brown rice and whole grain pasta (instead of white bread, rice, and pasta).  Get enough calcium and vitamin D. Check the label on foods and aim for 100% of the RDA (recommended daily allowance).  Lifestyle  Exercise at least 150 minutes each week  (30  minutes a day, 5 days a week).  Do muscle strengthening activities 2 days a week. These help control your weight and prevent disease.  No smoking.  Wear sunscreen to prevent skin cancer.  Have a dental exam and cleaning every 6 months.  Yearly exams  See your health care team every year to talk about:  Any changes in your health.  Any medicines your care team has prescribed.  Preventive care, family planning, and ways to prevent chronic diseases.  Shots (vaccines)   HPV shots (up to age 26), if you've never had them before.  Hepatitis B shots (up to age 59), if you've never had them before.  COVID-19 shot: Get this shot when it's due.  Flu shot: Get a flu shot every year.  Tetanus shot: Get a tetanus shot every 10 years.  Pneumococcal, hepatitis A, and RSV shots: Ask your care team if you need these based on your risk.  Shingles shot (for age 50 and up)  General health tests  Diabetes screening:  Starting at age 35, Get screened for diabetes at least every 3 years.  If you are younger than age 35, ask your care team if you should be screened for diabetes.  Cholesterol test: At age 39, start having a cholesterol test every 5 years, or more often if advised.  Bone density scan (DEXA): At age 50, ask your care team if you should have this scan for osteoporosis (brittle bones).  Hepatitis C: Get tested at least once in your life.  STIs (sexually transmitted infections)  Before age 24: Ask your care team if you should be screened for STIs.  After age 24: Get screened for STIs if you're at risk. You are at risk for STIs (including HIV) if:  You are sexually active with more than one person.  You don't use condoms every time.  You or a partner was diagnosed with a sexually transmitted infection.  If you are at risk for HIV, ask about PrEP medicine to prevent HIV.  Get tested for HIV at least once in your life, whether you are at risk for HIV or not.  Cancer screening tests  Cervical cancer screening: If you have a cervix,  begin getting regular cervical cancer screening tests starting at age 21.  Breast cancer scan (mammogram): If you've ever had breasts, begin having regular mammograms starting at age 40. This is a scan to check for breast cancer.  Colon cancer screening: It is important to start screening for colon cancer at age 45.  Have a colonoscopy test every 10 years (or more often if you're at risk) Or, ask your provider about stool tests like a FIT test every year or Cologuard test every 3 years.  To learn more about your testing options, visit:   .  For help making a decision, visit:   https://bit.ly/my67255.  Prostate cancer screening test: If you have a prostate, ask your care team if a prostate cancer screening test (PSA) at age 55 is right for you.  Lung cancer screening: If you are a current or former smoker ages 50 to 80, ask your care team if ongoing lung cancer screenings are right for you.  For informational purposes only. Not to replace the advice of your health care provider. Copyright   2023 [facility] [employer] All rights reserved. Clinically reviewed by the [facility]. [employer] 764361 - REV 01/24.  Learning About Stress  What is stress?     Stress is your body's response to a hard situation. Your body can have a physical, emotional, or mental response. Stress is a fact of life for most people, and it affects everyone differently. What causes stress for you may not be stressful for someone else.  A lot of things can cause stress. You may feel stress when you go on a job interview, take a test, or run a race. This kind of short-term stress is normal and even useful. It can help you if you need to work hard or react quickly. For example, stress can help you finish an important job on time.  Long-term stress is caused by ongoing stressful situations or events. Examples of long-term stress include long-term health problems, ongoing problems at work, or conflicts in your family.  Long-term stress can harm your health.  How does stress affect your health?  When you are stressed, your body responds as though you are in danger. It makes hormones that speed up your heart, make you breathe faster, and give you a burst of energy. This is called the fight-or-flight stress response. If the stress is over quickly, your body goes back to normal and no harm is done.  But if stress happens too often or lasts too long, it can have bad effects. Long-term stress can make you more likely to get sick, and it can make symptoms of some diseases worse. If you tense up when you are stressed, you may develop neck, shoulder, or low back pain. Stress is linked to high blood pressure and heart disease.  Stress also harms your emotional health. It can make you miller, tense, or depressed. Your relationships may suffer, and you may not do well at work or school.  What can you do to manage stress?  You can try these things to help manage stress:   Do something active. Exercise or activity can help reduce stress. Walking is a great way to get started. Even everyday activities such as housecleaning or yard work can help.  Try yoga or km chi. These techniques combine exercise and meditation. You may need some training at first to learn them.  Do something you enjoy. For example, listen to music or go to a movie. Practice your hobby or do volunteer work.  Meditate. This can help you relax, because you are not worrying about what happened before or what may happen in the future.  Do guided imagery. Imagine yourself in any setting that helps you feel calm. You can use online videos, books, or a teacher to guide you.  Do breathing exercises. For example:  From a standing position, bend forward from the waist with your knees slightly bent. Let your arms dangle close to the floor.  Breathe in slowly and deeply as you return to a standing position. Roll up slowly and lift your head last.  Hold your breath for just a few seconds  "in the standing position.  Breathe out slowly and bend forward from the waist.  Let your feelings out. Talk, laugh, cry, and express anger when you need to. Talking with supportive friends or family, a counselor, or a ai leader about your feelings is a healthy way to relieve stress. Avoid discussing your feelings with people who make you feel worse.  Write. It may help to write about things that are bothering you. This helps you find out how much stress you feel and what is causing it. When you know this, you can find better ways to cope.  What can you do to prevent stress?  You might try some of these things to help prevent stress:  Manage your time. This helps you find time to do the things you want and need to do.  Get enough sleep. Your body recovers from the stresses of the day while you are sleeping.  Get support. Your family, friends, and community can make a difference in how you experience stress.  Limit your news feed. Avoid or limit time on social media or news that may make you feel stressed.  Do something active. Exercise or activity can help reduce stress. Walking is a great way to get started.  Where can you learn more?  Go to https://www.[employer].com.net/patiented  Enter N032 in the search box to learn more about \"Learning About Stress.\"  Current as of: October 24, 2024  Content Version: 14.4    7589-9025 [employer].   Care instructions adapted under license by your healthcare professional. If you have questions about a medical condition or this instruction, always ask your healthcare professional. [employer] disclaims any warranty or liability for your use of this information.    Learning About Depression Screening  What is depression screening?  Depression screening is a way to see if you have depression symptoms. It may be done by a doctor or counselor. It's often part of a routine checkup. That's because your mental health is just as important as your physical " "health.  Depression is a mental health condition that affects how you feel, think, and act. You may:  Have less energy.  Lose interest in your daily activities.  Feel sad and grouchy for a long time.  Depression is very common. It affects people of all ages.  Many things can lead to depression. Some people become depressed after they have a stroke or find out they have a major illness like cancer or heart disease. The death of a loved one or a breakup may lead to depression. It can run in families. Most experts believe that a combination of inherited genes and stressful life events can cause it.  What happens during screening?  You may be asked to fill out a form about your depression symptoms. You and the doctor will discuss your answers. The doctor may ask you more questions to learn more about how you think, act, and feel.  What happens after screening?  If you have symptoms of depression, your doctor will talk to you about your options.  Doctors usually treat depression with medicines or counseling. Often, combining the two works best. Many people don't get help because they think that they'll get over the depression on their own. But people with depression may not get better unless they get treatment.  The cause of depression is not well understood. There may be many factors involved. But if you have depression, it's not your fault.  A serious symptom of depression is thinking about death or suicide. If you or someone you care about talks about this or about feeling hopeless, get help right away.  It's important to know that depression can be treated. Medicine, counseling, and self-care may help.  Where can you learn more?  Go to https://www.[employer].net/patiented  Enter T185 in the search box to learn more about \"Learning About Depression Screening.\"  Current as of: July 31, 2024  Content Version: 14.4    7930-8391 [facility] [employer].   Care instructions adapted under license by [facility] " professional. If you have questions about a medical condition or this instruction, always ask your healthcare professional. [employer] disclaims any warranty or liability for your use of this information.

## 2025-05-01 NOTE — NURSING NOTE
Prior to immunization administration, verified patients identity using patient s name and date of birth. Please see Immunization Activity for additional information.     Screening Questionnaire for Adult Immunization    Are you sick today?   No   Do you have allergies to medications, food, a vaccine component or latex?   No   Have you ever had a serious reaction after receiving a vaccination?   No   Do you have a long-term health problem with heart, lung, kidney, or metabolic disease (e.g., diabetes), asthma, a blood disorder, no spleen, complement component deficiency, a cochlear implant, or a spinal fluid leak?  Are you on long-term aspirin therapy?   No   Do you have cancer, leukemia, HIV/AIDS, or any other immune system problem?   No   Do you have a parent, brother, or sister with an immune system problem?   No   In the past 3 months, have you taken medications that affect  your immune system, such as prednisone, other steroids, or anticancer drugs; drugs for the treatment of rheumatoid arthritis, Crohn s disease, or psoriasis; or have you had radiation treatments?   No   Have you had a seizure, or a brain or other nervous system problem?   No   During the past year, have you received a transfusion of blood or blood    products, or been given immune (gamma) globulin or antiviral drug?   No   For women: Are you pregnant or is there a chance you could become       pregnant during the next month?   No   Have you received any vaccinations in the past 4 weeks?   No     Immunization questionnaire answers were all negative.      Patient instructed to remain in clinic for 15 minutes afterwards, and to report any adverse reactions.     Screening performed by Sheron Arnold MA on 5/1/2025 at 2:22 PM.

## 2025-05-02 LAB
CREAT UR-MCNC: 57 MG/DL
MICROALBUMIN UR-MCNC: <12 MG/L
MICROALBUMIN/CREAT UR: NORMAL MG/G{CREAT}

## 2025-05-04 ENCOUNTER — MYC MEDICAL ADVICE (OUTPATIENT)
Dept: PSYCHIATRY | Facility: CLINIC | Age: 30
End: 2025-05-04
Payer: COMMERCIAL

## 2025-05-05 ENCOUNTER — MYC MEDICAL ADVICE (OUTPATIENT)
Dept: PSYCHIATRY | Facility: CLINIC | Age: 30
End: 2025-05-05
Payer: COMMERCIAL

## 2025-05-07 LAB
BKR AP ASSOCIATED HPV REPORT: NORMAL
BKR LAB AP GYN ADEQUACY: NORMAL
BKR LAB AP GYN INTERPRETATION: NORMAL
BKR LAB AP LMP: NORMAL
BKR LAB AP PREVIOUS ABNORMAL: NORMAL
PATH REPORT.COMMENTS IMP SPEC: NORMAL
PATH REPORT.COMMENTS IMP SPEC: NORMAL
PATH REPORT.RELEVANT HX SPEC: NORMAL

## 2025-05-09 DIAGNOSIS — E13.9 POST-PANCREATECTOMY DIABETES (H): ICD-10-CM

## 2025-05-09 DIAGNOSIS — Z90.410 POST-PANCREATECTOMY DIABETES (H): ICD-10-CM

## 2025-05-09 DIAGNOSIS — E89.1 POST-PANCREATECTOMY DIABETES (H): ICD-10-CM

## 2025-05-12 ENCOUNTER — RESULTS FOLLOW-UP (OUTPATIENT)
Dept: OBGYN | Facility: CLINIC | Age: 30
End: 2025-05-12

## 2025-05-12 DIAGNOSIS — Z12.4 CERVICAL CANCER SCREENING: Primary | ICD-10-CM

## 2025-05-12 RX ORDER — ACYCLOVIR 400 MG/1
TABLET ORAL
Qty: 9 EACH | Refills: 2 | Status: SHIPPED | OUTPATIENT
Start: 2025-05-12

## 2025-05-12 NOTE — TELEPHONE ENCOUNTER
Last Written Prescription:  Continuous Glucose Sensor (DEXCOM G7 SENSOR) MISC 3 each 5 11/6/2024     ----------------------  Last Visit Date: 3/11/25  Future Visit Date: 7/8/25  ----------------------    Refill decision: Medication refilled per  Medication Refill in Ambulatory Care  policy.      Request from pharmacy:  Requested Prescriptions   Pending Prescriptions Disp Refills    Continuous Glucose Sensor (DEXCOM G7 SENSOR) MISC [Pharmacy Med Name: DEXCOM G7 SENSOR  MISC]  5     Sig: CHANGE EVERY 10 DAYS       There is no refill protocol information for this order

## 2025-05-13 ENCOUNTER — MYC MEDICAL ADVICE (OUTPATIENT)
Dept: ENDOCRINOLOGY | Facility: CLINIC | Age: 30
End: 2025-05-13
Payer: COMMERCIAL

## 2025-05-13 NOTE — TELEPHONE ENCOUNTER
Talked with patient. A dexcom was delivered today and she is back up in running in automated mode. We updated her manual basal rate to 0.3 today to be better in line with her basal needs in auto mode.

## 2025-05-15 ENCOUNTER — VIRTUAL VISIT (OUTPATIENT)
Dept: EDUCATION SERVICES | Facility: CLINIC | Age: 30
End: 2025-05-15
Payer: COMMERCIAL

## 2025-05-15 DIAGNOSIS — E10.9 TYPE 1 DIABETES MELLITUS (H): Primary | ICD-10-CM

## 2025-05-15 NOTE — NURSING NOTE
Current patient location: [place]    Is the patient currently in the state of [place]? YES    Visit mode: VIDEO    If the visit is dropped, the patient can be reconnected by:VIDEO VISIT: Text to cell phone:   Telephone Information:   Mobile [phone]       Will anyone else be joining the visit? NO  (If patient encounters technical issues they should call [phone]    Are changes needed to the allergy or medication list? E-check in was reviewed/completed for today's visit. VF did not review this information again with Artie due to this.       Are refills needed on medications prescribed by this physician? Discuss with provider    Rooming Documentation:  Not applicable    Reason for visit: RECHECK    Lynn AGUSTIN

## 2025-05-15 NOTE — PROGRESS NOTES
Joined the call at 5/15/2025, 11:33:03 am.  Left the call at 5/15/2025, 12:02:51 pm.  You were on the call for 29 minutes 47 seconds.    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 yes:     Diabetes Medication(s)       Diabetic Other       Glucagon (GVOKE HYPOPEN) 1 MG/0.2ML pen Inject the contents of 1 device under the skin into lower abdomen, outer thigh, or outer upper arm as needed for hypoglycemia. If no response after 15 minutes, additional 1 mg dose from a new device may be injected while waiting for emergency assistance.     glucose (BD GLUCOSE) 4 g chewable tablet Take 1 tablet by mouth every hour as needed for low blood sugar       Insulin       insulin aspart (NOVOLOG PEN) 100 UNIT/ML pen DOSE:  1 units per 10 grams of carbohydrate.  Only chart total amount of units given.  Do not give if pre-prandial glucose is less than 60 mg/dL. If given at mealtime, administer within 30 minutes of start of meal.     insulin aspart (NOVOLOG VIAL) 100 UNITS/ML vial Up to 30 units daily via insulin pump            Monitoring                                            Patient's most recent   Lab Results   Component Value Date    A1C 8.1 10/03/2024      Patient's A1C goal: <7.0    Healthy Eating:   carb counting     Problem Solving:      Patient is at risk of hypoglycemia?: YES  Hospitalizations for hyper or hypoglycemia: Unknown    EDUCATION and INSTRUCTION PROVIDED AT THIS VISIT:    Overnights have improved since last visit. Patient had a recent sensor issue. Some lows before that and after placement of new sensor (first 24 hours).  Some carb counting inaccuracies. Patient will work on this. Pre-bolusing about 10-15 minutes before meals.     Patient-stated goal written and given to Artie Burger.  Verbalized and demonstrated understanding of instructions.     PLAN:  No changes to settings today. Will give her more time at current settings.   Let us know if you start to see more lows.   See patient instructions  AVS printed and given to patient    FOLLOW-UP:    With Caro    Any diabetes medication initiation or dose changes [facility] per the patient's referring provider. A copy of this encounter was shared with the provider.

## 2025-05-18 ENCOUNTER — HEALTH MAINTENANCE LETTER (OUTPATIENT)
Age: 30
End: 2025-05-18

## 2025-05-27 ENCOUNTER — MYC REFILL (OUTPATIENT)
Dept: PSYCHIATRY | Facility: CLINIC | Age: 30
End: 2025-05-27
Payer: COMMERCIAL

## 2025-05-27 DIAGNOSIS — F41.1 GAD (GENERALIZED ANXIETY DISORDER): ICD-10-CM

## 2025-05-27 RX ORDER — LORAZEPAM 0.5 MG/1
0.5 TABLET ORAL DAILY PRN
Qty: 10 TABLET | Refills: 0 | Status: SHIPPED | OUTPATIENT
Start: 2025-05-27

## 2025-05-27 NOTE — TELEPHONE ENCOUNTER
Date of Last Office Visit: 4/24/25  Date of Next Office Visit: 6/5/25  No shows since last visit: No  More than one patient-initiated cancellation (with reschedule) since last seen in clinic? No    []Medication refilled per  Medication Refill in Ambulatory Care  policy.  []Scope of Practice: refill request processed by LPN/MA  [x]Medication unable to be refilled by RN due to criteria not met as indicated below:    []Eligibility: has not had a provider visit within last 6 months   []Supervision: no future appointment; < 7 days before next appointment   []Compliance: no shows; cancellations; lapse in therapy   []Verification: order discrepancy; may need modification...   [] > 30-day supply request   []Advanced refill request: > 7 days before refill date   [x]Controlled medication   []Medication not included in policy   []Review: new med; med adjusted <= 30 days; safety alert; requires lab monitoring...   []Other:      Medication(s) requested:     -  LORazepam (ATIVAN) 0.5 MG tablet   Date last ordered: 4/24/25  Qty: 10  Refills: 0  Appropriate for refill? Yes    Any Controlled Substance(s)? Yes   [place]  checked? N/A      Requested medication(s) verified as identical to current order? Yes    Any lapse in adherence to medication(s) greater than 5 days? No      Additional action taken? cued up medication/order(s).      Last visit treatment plan:   Continue guanfacine ER 2 mg for trauma, insomnia, anxiety.  Continue lorazepam/Ativan 0.5 mg daily as needed for panic attacks/severe anxiety only.  Refilled for 10 tablets per 30 days only.  Continue to try to lessen reliance on lorazepam.  One-time early refill provided today.  Continue gabapentin 600 mg twice daily for pain and anxiety-as prescribed by other prescriber.  Continue Wellbutrin XL to 300 mg daily for depression.    Discussed okay to trial further reduction to 150 mg daily if anxiety symptoms, sleep disruptions, and jaw clenching continue.  Continue  Viibryd/vilazodone 10 mg daily for depression, anxiety.   If we start Prozac, we will discontinue Viibryd.  Message sent to you to check in on you in 2 weeks regarding possibility of adding Prozac.  Restart BuSpar/buspirone for anxiety: Take 5-10 mg twice daily as tolerated and can increase up to 15 mg twice daily as tolerated before next appointment.   Can trial doxylamine succinate 12.5-25 mg at bedtime as needed for sleep (can find over-the-counter).   [employer] testing kit ordered and will be mailed to your home as discussed at your visit.  Please check with your insurance company and [employer] regarding any possible financial responsibility.  I review testing results with patients during a clinic visit only.  You can request a copy of your [employer] testing results directly from [employer], or wait until your appointment when I can send you a copy with a secure text link or email link.    Any medication(s) require lab monitoring? No    Radha Raymond RN on 5/27/2025 at 3:10 PM

## 2025-06-04 NOTE — PROGRESS NOTES
[facility] Psychiatry Services [facility]  6/5/25      Behavioral Health Clinician Progress Note    Patient Name: Artie Burger           Service Type:  Individual      Service Location:   [facility] / Email (patient reached)     Session Start Time: 2pm  Session End Time: 227pm      Session Length: 16 - 37      Attendees: Client     Service Modality:  Video Visit:      Provider verified identity through the following two step process.  Patient provided:  Patient is known previously to provider    Telemedicine Visit: The patient's condition can be safely assessed and treated via synchronous audio and visual telemedicine encounter.      Reason for Telemedicine Visit: Services only offered telehealth    Originating Site (Patient Location): Patient's other in the car with mom driving to appt    Distant Site (Provider Location): Provider Remote Setting- Home Office    Consent:  The patient/guardian has verbally consented to: the potential risks and benefits of telemedicine (video visit) versus in person care; bill my insurance or make self-payment for services provided; and responsibility for payment of non-covered services.     Patient would like the video invitation sent by:  My Chart    Mode of Communication:  Video Conference via [facility]    Distant Location (Provider):  Off-site    As the provider I attest to compliance with applicable laws and regulations related to telemedicine.    Visit Activities (Refresh list every visit): [facility] Only    Diagnostic Assessment Date: 8/7/24 Daina Daniel MA [facility]; 10/21/24 Tamanna Claudio  Treatment Plan Review Date: 3/13/25  See Flowsheets for today's PHQ-9 and ALAN-7 results  Previous PHQ-9:       4/23/2025     7:04 PM 4/30/2025     1:00 PM 6/5/2025    11:06 AM   PHQ-9 SCORE   PHQ-9 Total Score [facility] 19 (Moderately severe depression) 19 (Moderately severe depression) 19 (Moderately severe depression)   PHQ-9 Total Score 19  19  19        Patient-reported      Previous ALAN-7:       3/12/2025     8:22 PM 4/23/2025     7:05 PM 6/5/2025    11:07 AM   ALAN-7 SCORE   Total Score 17 (severe anxiety) 17 (severe anxiety) 11 (moderate anxiety)   Total Score 17  17  11        Patient-reported     DATA  Extended Session (60+ minutes): No  Interactive Complexity: No  Crisis: No  [facility] Patient: No    Treatment Objective(s) Addressed in This Session:  Target Behavior(s): disease management/lifestyle changes reducing sx    Depressed Mood: Decrease frequency and intensity of feeling down, depressed, hopeless  Anxiety: will develop more effective coping skills to manage anxiety symptoms    Current Stressors / Issues:  MH update:  Doing okay.   Not taking prozac due to libido concerns.    Stresses:  fight with partner this AM/ not good enough thoughts (actions)  Appetite: n/a  Sleep: n/a  Outpatient Provider updates: [facility]. 4th appt today.  Will also be doing the Ketamine assisted therapist during some of the appts.  [facility] EM[facility]  SI/SIB/HI: Denies current.  Hx of passive ideation.  Denies previous attempts.  Hx of SIB as teen.  Denies need for safety plan or crisis resources.  AMPARO: Sober  Side effects/compliance:  Interventions:  [facility] engaged in conversation about interpersonal skills within relationship  Most important:  Doing okay.  Off prozac for a week.  No paired psychiatry appt due to Ketamine appt today.  PT agreeable.  Will call and schedule follow up for a few weeks.  Aware to message if needs anything else/sooner appt    4/24  MH update:  Worse.  Very tearful.  Even less motivation. Sleeping a lot.  Apathy.  Stresses:  Did Easter with B/f.  Trying to work to help with depression.  Appetite: n/a  Sleep: n/a  Outpatient Provider updates: Completed ADHD testing.  Did not confirm diagnosis.  [facility] EM[facility]  SI/SIB/HI: Denies current.  Hx of passive ideation.  Denies previous attempts.  Hx of SIB as  teen.  Denies need for safety plan or crisis resources.  AMPARO: Sober  Side effects/compliance:  Interventions:  [facility] engaged in discussion about perception and rules, inventory of things that feel good/not  Most important: wonders about genesight testing at this point.  Frustrated with meds.  Not feeling, better, if not worse depression wise.  Anxiety worse.  Jaw clenching- anxiety.     3/13  MH update:  Unmotivated.  Down.  Struggling.  Anxiety high.  Seeing friends, trying to be social.  Of note BS has been a bit more unstable at night- adjusted meds  Stresses:  BF was gone for the week.  Mom was in [place].  School is done.  Has to take exam.  Wants to move into BF by May.  Appetite: T1D  Sleep: Better.  Reduced bad dreams.  Outpatient Provider updates: Completed ADHD testing.  Did not confirm diagnosis.  [facility] EMDR  SI/SIB/HI: Denies current.  Hx of passive ideation.  Denies previous attempts.  Hx of SIB as teen.  Denies need for safety plan or crisis resources.  AMPARO: Sober  Side effects/compliance:  Interventions:  [facility] engaged in discussion motivation and goals to work towards  Most important:  low motivation.  Depression and anxiety higher.    1/23  MH update:  Increased buspar.  Using walking pad.  Making breakfast for self now.  Feeling better. Using organization tools to help.  Mood is better.  Anxiety is higher.  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  Taking notes to try and manage concentration, doing diane.  Appetite: T1D.  Sleep: Nightmares increased.  Anxiety induced.  Outpatient Provider updates: Completed ADHD testing.  Did not confirm diagnosis.  [facility] EM[facility]  SI/SIB/HI: Denies current.  Hx of passive ideation.  Denies previous attempts.  Hx of SIB as teen.  Denies need for safety plan or crisis resources.  AMPARO:  Sober 4 years ETOH.  No meetings.  Friend support.  Daily medical THC use.  Side  effects/compliance:  Interventions:  [facility] engaged in discussion about educational stressors with validation and support.  Most important:  started OTC Vit D.  Mood is better.  Better energy and motivation.  Anxiety worse. At night worse/nightmares trauma related.    Next time practice telling story.    12/6  MH update:  Had a good thanksgiving.  Rented community space for [place].  The increase in medication has been helpful.  Stresses:  Financial stress.  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  Fighting with partner.  Appetite: T1D  Sleep: anxiety dreams.  Outpatient Provider updates: Completed ADHD testing.  Did not confirm diagnosis.  [facility] EM[facility]  SI/SIB/HI: Denies current.  Hx of passive ideation.  Denies previous attempts.  Hx of SIB as teen.  Denies need for safety plan or crisis resources.  AMPARO:  Sober 4 years ETOH.  No meetings.  Friend support.  Daily medical THC use.  Side effects/compliance:  Interventions:  [facility] engaged in processing results of ADHD testing  Most important:  Frustrated with test results.   Increase of meds was helpful.   Couple days of brain zaps?    11/4   update:  Depression has increased.  Lack of motivation.  Sadness.  Apathy.  Anxiety a little higher.  Feeling more emotional and tearful.  Stresses:  Celebrated birthday.  Reduced ADL's.   Relationship is going well.  Starting school January.  Has to figure out $.  On academic probation from younger years.  Only going to try one class.  Appetite: T1D  Sleep: Increased.  Waking up a lot more at night.   Outpatient Provider updates: Started ADHD testing process.  [facility] EM[facility]  SI/SIB/HI: Denies current.  Hx of passive ideation.  Denies previous attempts.  Hx of SIB as teen.  Denies need for safety plan or crisis resources.  AMPARO:  Sober 4 years ETOH.  No meetings.  Friend support.  Daily medical THC use.  Side effects/compliance:  Interventions:  [facility] engaged in validation and  support  Most important:  Depression sx a bit higher    9/6  MH update: stopped zoloft, start wellbutrin.  Got brain zaps.  Did slower taper.  On 12mg for the last week Zoloft. On 300mg Wellbutrin. Anxiety is better.  Better motivation.  Mood is lifted. Getting get out of bed and things done.   Anxiety is better now after medication taper is address.  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    Appetite: T1D  Sleep: High fatigue.  On going anemia  Outpatient Provider updates: ADHD testing 10/14 in [place].  [facility]  started EMDR again  SI/SIB/HI: Denies current.  Hx of passive ideation.  Denies previous attempts.  Hx of SIB as teen.  Denies need for safety plan or crisis resources.  AMPARO:  Sober 4 years ETOH.  No meetings.  Friend support.  Daily medical THC use.  Side effects/compliance:  Interventions:  [facility] discussed TIPP skills for anxiety mgmt  Most important:  Doing better after med change.      8/7  MH update:  ROS above  Stresses:  Medical stressors.  Goals of wanting to obtain PT employment.  Dad's death as teen  Appetite: N/a.  T1D.  Sleep: Poor- related to anxiety.  No sleep study.  Night sweats.  Nightmares  Outpatient Provider updates: [facility]  SI/SIB/HI:  Denies current.  Hx of passive ideation.  Denies previous attempts.  Hx of SIB as teen.  Denies need for safety plan or crisis resources.  AMPARO:  Sober 4 years ETOH.  No meetings.  Friend support.  Daily medical THC use.  Preg:  IUD  Side effects/compliance:  Interventions:  [facility] engaged in completing DA with psychoeducation and tx planning  Most important:  Different meds?  Higher dose?   ADHD testing referral?    Progress on Treatment Objective(s) / Homework:  Satisfactory progress - ACTION (Actively working towards change); Intervened by reinforcing change plan / affirming steps taken    Motivational Interviewing    MI Intervention: Co-Developed Goal:  reducing sx and Expressed Empathy/Understanding     Change Talk Expressed by the Patient: Desire to change Ability to change    Provider Response to Change Talk: E - Evoked more info from patient about behavior change and A - Affirmed patient's thoughts, decisions, or attempts at behavior change    Also provided psychoeducation about behavioral health condition, symptoms, and treatment options    Assessments completed prior to visit:  The following assessments were completed by patient for this visit:  N/a    Care Plan review completed: Yes    Medication Review:  Changes to psychiatric medications, see updated Medication List in EPIC.     Medication Compliance:  Yes    Changes in Health Issues:   None reported    Chemical Use Review:   Substance Use: Chemical use reviewed, no active concerns identified      Tobacco Use: No current tobacco use.      Assessment: Current Emotional / Mental Status (status of significant symptoms):  Risk status (Self / Other harm or suicidal ideation)  Patient has had a history of suicidal ideation: hx of passive ideation without planning intent or previous attempts and self-injurious behavior: hx of such as teen  Patient denies current fears or concerns for personal safety.  Patient denies current or recent suicidal ideation or behaviors.  Patient denies current or recent homicidal ideation or behaviors.  Patient denies current or recent self injurious behavior or ideation.  Patient denies other safety concerns.  A safety and risk management plan has not been developed at this time, however patient was encouraged to call [place] [phone] should there be a change in any of these risk factors.    Appearance:   Appropriate   Eye Contact:   Good   Psychomotor Behavior: Normal   Attitude:   Cooperative   Orientation:   All  Speech   Rate / Production: Normal    Volume:  Normal   Mood:    Normal  Affect:    Appropriate   Thought Content:  Clear   Thought Form:  Coherent  Logical    Insight:    Good     Diagnoses:  1. Moderate episode of recurrent major depressive disorder (H)    2. ALAN (generalized anxiety disorder)    3. Trauma and stressor-related disorder    4. Alcohol use disorder, severe, in sustained remission (H)          Collateral Reports Completed:  Communicated with: Dr Singleton    Plan: (Homework, other):  Patient was given information about behavioral services and encouraged to schedule a follow up appointment with the clinic [facility] as needed.  She was also given information about mental health symptoms and treatment options .  CD Recommendations: Maintain Sobriety.       Daina Daniel [facility]    ______________________________________________________________________    Integrated Primary Care Behavioral Health Treatment Plan    Individual Treatment Plan    Patient's Name: Artie Burger   YOB: 1995  Date of Creation: 9/6/24  Date Treatment Plan Last Reviewed/Revised: 3/13/25    DSM5 Diagnoses:   1. Moderate episode of recurrent major depressive disorder (H)    2. ALAN (generalized anxiety disorder)    3. Trauma and stressor-related disorder    4. Alcohol use disorder, severe, in sustained remission (H)      Psychosocial / Contextual Factors: Medical Complexities, Trauma History, and Occupational Issues  PROMIS (reviewed every 90 days):   The following assessments were completed by patient for this visit:  PROMIS 10-Global Health (only subscores and total score):       6/22/2023     4:55 PM 8/1/2024     1:16 PM 10/11/2024    10:08 AM 11/2/2024     9:41 AM 3/12/2025     8:23 PM   PROMIS-10 Scores Only   Global Mental Health Score 9 11    11 11 7    7 8   Global Physical Health Score 8 14    14 13 11    11 11   PROMIS TOTAL - SUBSCORES 17 25    25 24 18    18 19        Referral / Collaboration:  Referral to another professional/service is not indicated at this time..    Anticipated number of session for this episode of care: 6-9 sessions  Anticipation frequency of session:  "Monthly  Anticipated Duration of each session: 16-37 minutes  Treatment plan will be reviewed in 90 days or when goals have been changed.       MeasurableTreatment Goal(s) related to diagnosis / functional impairment(s)  Goal 1: Patient will reducing trauma sx   \"I will know I've met my goal when I don't feel triggered by my history .\"     Objective #A (Patient Action)    Patient will attend and complete EMDR  Status: Continued - Date(s):12/6/24 , 3/13/25    Intervention(s)  [facility] will provide support through CBT, MI, Acceptance and Commitment Therapy, Dialectic Behavioral Therapy and problem solving model to explore and overcome barriers.        Patient has reviewed and agreed to the above plan.    Written by  Daina Daniel LPCC, C     "

## 2025-06-05 ENCOUNTER — VIRTUAL VISIT (OUTPATIENT)
Dept: BEHAVIORAL HEALTH | Facility: CLINIC | Age: 30
End: 2025-06-05
Payer: COMMERCIAL

## 2025-06-05 DIAGNOSIS — F41.1 GAD (GENERALIZED ANXIETY DISORDER): ICD-10-CM

## 2025-06-05 DIAGNOSIS — F33.41 RECURRENT MAJOR DEPRESSIVE DISORDER, IN PARTIAL REMISSION: ICD-10-CM

## 2025-06-05 DIAGNOSIS — F43.9 TRAUMA AND STRESSOR-RELATED DISORDER: ICD-10-CM

## 2025-06-05 DIAGNOSIS — F10.21 ALCOHOL USE DISORDER, SEVERE, IN SUSTAINED REMISSION (H): ICD-10-CM

## 2025-06-05 DIAGNOSIS — F33.1 MODERATE EPISODE OF RECURRENT MAJOR DEPRESSIVE DISORDER (H): Primary | ICD-10-CM

## 2025-06-05 RX ORDER — BUSPIRONE HYDROCHLORIDE 15 MG/1
15 TABLET ORAL 2 TIMES DAILY
Qty: 60 TABLET | Refills: 1 | Status: SHIPPED | OUTPATIENT
Start: 2025-06-05

## 2025-06-05 RX ORDER — BUPROPION HYDROCHLORIDE 300 MG/1
300 TABLET ORAL EVERY MORNING
Qty: 90 TABLET | Refills: 1 | Status: SHIPPED | OUTPATIENT
Start: 2025-06-05

## 2025-06-05 ASSESSMENT — ANXIETY QUESTIONNAIRES
2. NOT BEING ABLE TO STOP OR CONTROL WORRYING: SEVERAL DAYS
IF YOU CHECKED OFF ANY PROBLEMS ON THIS QUESTIONNAIRE, HOW DIFFICULT HAVE THESE PROBLEMS MADE IT FOR YOU TO DO YOUR WORK, TAKE CARE OF THINGS AT HOME, OR GET ALONG WITH OTHER PEOPLE: VERY DIFFICULT
4. TROUBLE RELAXING: MORE THAN HALF THE DAYS
GAD7 TOTAL SCORE: 11
6. BECOMING EASILY ANNOYED OR IRRITABLE: SEVERAL DAYS
GAD7 TOTAL SCORE: 11
8. IF YOU CHECKED OFF ANY PROBLEMS, HOW DIFFICULT HAVE THESE MADE IT FOR YOU TO DO YOUR WORK, TAKE CARE OF THINGS AT HOME, OR GET ALONG WITH OTHER PEOPLE?: VERY DIFFICULT
7. FEELING AFRAID AS IF SOMETHING AWFUL MIGHT HAPPEN: SEVERAL DAYS
7. FEELING AFRAID AS IF SOMETHING AWFUL MIGHT HAPPEN: SEVERAL DAYS
5. BEING SO RESTLESS THAT IT IS HARD TO SIT STILL: SEVERAL DAYS
3. WORRYING TOO MUCH ABOUT DIFFERENT THINGS: NEARLY EVERY DAY
GAD7 TOTAL SCORE: 11
1. FEELING NERVOUS, ANXIOUS, OR ON EDGE: MORE THAN HALF THE DAYS

## 2025-06-10 ENCOUNTER — TELEPHONE (OUTPATIENT)
Dept: ENDOCRINOLOGY | Facility: CLINIC | Age: 30
End: 2025-06-10
Payer: COMMERCIAL

## 2025-06-10 ENCOUNTER — MYC MEDICAL ADVICE (OUTPATIENT)
Dept: PSYCHIATRY | Facility: CLINIC | Age: 30
End: 2025-06-10
Payer: COMMERCIAL

## 2025-06-10 NOTE — TELEPHONE ENCOUNTER
PA RENEWAL Initiation    Medication: OMNIPOD 5 [id number] PODS GEN 5 MISC  Insurance Company: [employer] [place] - Phone [phone] Fax [phone]  Pharmacy Filling the Rx:    Filling Pharmacy Phone:    Filling Pharmacy Fax:    Start Date: 6/10/2025    [id number]

## 2025-06-10 NOTE — TELEPHONE ENCOUNTER
Please route determinations to the Pharm Diabetes pool ([id number]).      Thank you!    Diabetes Care Services Team   [facility]  [address] [place] [address]

## 2025-06-11 NOTE — TELEPHONE ENCOUNTER
Prior Authorization RENEWAL Approval    Medication: OMNIPOD 5 [id number] PODS GEN 5 MISC  Authorization Effective Date: 3/13/2025  Authorization Expiration Date: 6/11/2026  Approved Dose/Quantity: 10 per 30 days  Reference #: [id number]   Insurance Company: [employer] [place] - Phone [phone] Fax [phone]  Expected CoPay: $    CoPay Card Available:      Financial Assistance Needed:   Which Pharmacy is filling the prescription:    Pharmacy Notified:   Patient Notified:

## 2025-06-27 NOTE — PROGRESS NOTES
Diabetes Consult Note        Artie Burger  is a 29 year old female who has a past medical history of Alcohol-induced chronic pancreatitis (H), Anxiety, Depression, Gastroesophageal reflux disease, Pancreatic disease, PONV (postoperative nausea and vomiting), and Type 1 diabetes mellitus (H). She is here for follow-up of diabetes.      Recent clinic notes:    I met her in October 2023 and last saw her in January 2024 when she was doing quite well requiring less insulin and reduced her doses   Including carbohydrate coverage to 1 unit per 25 g and sensitivity to 1 unit per 65 mg/dL.  She was referred to Dm ed and has seen TASHIA Graff, last on 3/27/24.  She was counting carbs but not giving correction.  They reviewed that.  She was hospitalized earlier this month and discharged with  instructions to give 1 unit : 10 g carb and 1u: 40 >140 with meals together with 14 units Lantus qam and 10 qpm.        April 2024: she was struggling with a lot of hypoglycemia and we discussed the possibility of transitioning to OmniPod 5.  Since that point in time she has worked with Mildred Graff to transition to OmniPod 5.  She also continues to work with psychologist Daina Daniel.    September 2024: Blood sugar management was good.  I encouraged her to work to meet ADA physical activity recommendations as well as make time for her mental health.    March 2025: She was struggling with mood a bit but working with psychiatry and a therapist and becoming more physically active.  She was struggling with some low blood sugars overnight and wondered if this might be related to her islet cells working better.  We discussed the use of activity mode with her OmniPod 5 system.  Blood sugars were in range 34% of the time with 3% low including 1% very low, and ranging from low to 340.  She was entering 357 g of carb daily, and per her Apple Watch getting an average of 2300 steps per day.  We extended her active insulin time from 2  to 3 hours in hopes of preventing recurrent hypoglycemia, we also increased the overnight blood sugar target to 130 from midnight to 8 AM.  She agreed to meet with diabetes education following her visit to follow up the pump setting changes that we made it work again at reducing hypoglycemia if needed as well as managing activity.         Today:    Chart notes and lab reviewed I note she continues to be active and psychiatry and psychology.  She has met with NAHOMI Andrews in education and support.        7/5/2025     7:18 PM   including   1. Since your last visit to our clinic (or if this your first visit, since you last saw your primary care provider), have you experienced any of the following symptoms that may be related to low blood sugars? Sweating that wasn't due to exertion    Shaking or trembling   2. Since your last visit to our clinic (or if this your first visit, since you last saw your primary care provider), have you experienced any of the following symptoms that may be related to prolonged high blood sugars? More thirst than usual    Increased urination   3. Have you had any concerns that you would like to discuss today? Chest pain   4. Do you have any female family members who have had heart attacks or strokes before age 60 or male relatives who have had heart attacks or strokes before age 50? No   5. Do you have any family members who have had complications from diabetes such as kidney disease, heart disease or strokes, retinopathy (a vision problem), or amputations? No   6. Who do you live with?  Alone   Little interest or pleasure in doing things? Several days   Feeling down, depressed, or hopeless? Several days   10.  Are you considering a pregnancy or interested in discussing pregnancy prevention today? No        Today:  Artie reports that she has been feeling good.  She is looking forward to taking an exam to be coming a peer substance abuse  and wondering about accommodations for this.    She  is glad to see that she is having less low blood sugars, but notes that they often are high after low and she is working to not overcorrect lows.  She is now rarely with nausea, is eating well.    She had a prolonged low early the morning of July 4.  Reports was hungry when came home from bar (does not drink alcohol at all herself) and did give bolus late that may not have ate all she thought she would.  On another afternoon low, she thinks it is also possible/likely she gave bolus late.  In both instances, BG was already high, automation working to correct and then carb bolus added.      She also notes night sweats, this is ongoing since prior to pancreatectomy.  She has Mirena and thus only spots at times, no menses.  Denies fever chills, new pains or headache, vision changes, difficulties with balance, other concerns.      Current Diabetes Medications:  Insulin via OmniPod pump.        We reviewed glucometer/CGMS data together.  It revealed:  Her time in range is stable at 62%.  Fortunately hypoglycemia is reduced send to just 1% low including less than 1% very low.  Her coefficient of variance remains high at 34%.  In her OmniPod 5 she is using an average of 25 units/day, 49% of which is basal.  Her basal rate is 0.3 units/h which accounts for 7.2 units/day.  ISF is at 125 and insulin to carb ratios 1 unit per 30 g.  She is entering an average of 403 g of carb per day, with an average of 6.9 entries per day.      See details below    Artie's PMH, PSH and allergies were reviewed today and pertinent information is summarized above.    Artie's pertinent social and family history are also reviewed today and pertinent information is summarized above.      Current Outpatient Medications   Medication Sig Dispense Refill    acetaminophen (TYLENOL) 500 MG tablet Take 500-1,000 mg by mouth      acetone urine (RELION KETONE TEST) test strip Use as part of high blood sugar protocol for pump users 20 strip 4    Alcohol  Swabs PADS Use before taking blood sugars up to 10 times a day 100 each 3    blood glucose (NO BRAND SPECIFIED) lancets standard Use to test blood sugars up to 8 times daily as directed. 100 each 3    blood glucose (NO BRAND SPECIFIED) test strip Test blood sugars up to 8 times a day as directed by your provider 200 strip 3    blood glucose monitoring (NO BRAND SPECIFIED) meter device kit Use as directed Per insurance coverage. DM education recommending Accu Chek Guide glucose meter 1 kit 0    blood glucose monitoring (SOFTCLIX) lancets USE TO TEST BLOOD GLUCOSE UP TO EIGHT TIMES DAILY      busPIRone (BUSPAR) 15 MG tablet Take 1 tablet (15 mg) by mouth 2 times daily. 60 tablet 1    cetirizine (ZYRTEC) 10 MG tablet Take 1 tablet (10 mg) by mouth daily. 90 tablet 3    Continuous Glucose Sensor (DEXCOM G7 SENSOR) MISC Change every 10 days. 9 each 2    dextromethorphan-buPROPion ER (AUVELITY)  MG ER tablet Take 1 tablet by mouth 2 times daily. 60 tablet 2    gabapentin (NEURONTIN) 600 MG tablet Take 1 tablet (600 mg) by mouth 2 times daily. 180 tablet 1    Glucagon (GVOKE HYPOPEN) 1 MG/0.2ML pen Inject the contents of 1 device under the skin into lower abdomen, outer thigh, or outer upper arm as needed for hypoglycemia. If no response after 15 minutes, additional 1 mg dose from a new device may be injected while waiting for emergency assistance. 0.4 mL 3    glucose (BD GLUCOSE) 4 g chewable tablet Take 1 tablet by mouth every hour as needed for low blood sugar 30 tablet 0    guanFACINE (INTUNIV) 2 MG TB24 24 hr tablet Take 1 tablet (2 mg) by mouth at bedtime. 90 tablet 3    ibuprofen (ADVIL/MOTRIN) 200 MG tablet Take 800 mg by mouth as needed      insulin aspart (NOVOLOG PEN) 100 UNIT/ML pen DOSE:  1 units per 10 grams of carbohydrate.  Only chart total amount of units given.  Do not give if pre-prandial glucose is less than 60 mg/dL. If given at mealtime, administer within 30 minutes of start of meal. 15 mL 3     "insulin aspart (NOVOLOG VIAL) 100 UNITS/ML vial Up to 30 units daily via insulin pump 30 mL 4    Insulin Disposable Pump (OMNIPOD 5 PODS, GEN 5,) MISC 1 each every 3 days. 30 each 4    insulin pen needle (32G X 4 MM) 32G X 4 MM miscellaneous Use up to 8 pen needles daily or as directed. 200 each 11    levonorgestrel (MIRENA) 52 MG (20 mcg/day) IUD 1 Device by Intrauterine route      lipase-protease-amylase (CREON) 04107-32423-657149 units CPEP per EC capsule Take 4 capsules with meals and 2 capsules with snacks; approximate daily maximum 16 capsules 480 capsule 11    LORazepam (ATIVAN) 0.5 MG tablet Take 1 tablet (0.5 mg) by mouth daily as needed for anxiety (and or panic attacks). 10 TABS TO LAST 30 DAYS 10 tablet 0    ondansetron (ZOFRAN ODT) 4 MG ODT tab Take 1 tablet (4 mg) by mouth every 6 hours as needed for nausea or vomiting 12 tablet 3    pantoprazole (PROTONIX) 40 MG EC tablet Take 1 tablet (40 mg) by mouth daily. 90 tablet 3    polyethylene glycol (MIRALAX) 17 GM/Dose powder Take 17 g by mouth daily 510 g 3     No current facility-administered medications for this visit.         ROS:   Reports good physical activity tolerance.  Denies any pedal lesions or vision changes or concerns. Denies any other acute concerns except as noted above.      Exam:    Wt 68 kg (150 lb)   BMI 27.44 kg/m    Wt Readings from Last 10 Encounters:   07/08/25 68 kg (150 lb)   06/05/25 65.8 kg (145 lb)   05/01/25 66.4 kg (146 lb 6.4 oz)   04/24/25 61.2 kg (135 lb)   10/03/24 64 kg (141 lb 1.5 oz)   10/03/24 64 kg (141 lb 1.5 oz)   09/10/24 61.7 kg (136 lb)   09/06/24 61.7 kg (136 lb)   06/12/24 52.2 kg (115 lb)   06/06/24 51.5 kg (113 lb 9.6 oz)   Estimated body mass index is 27.44 kg/m  as calculated from the following:    Height as of 6/5/25: 1.575 m (5' 2\").    Weight as of this encounter: 68 kg (150 lb).    General: Pleasant, well apperaing in NAD seated comfortably at home     Psych:  Mood is \"good,\" affect is appropriate.  " Thought form and content are fluid and coherent.    HEENT: Eyes and sclera are clear. Extraocular movements are grossly intact without proptosis.  Nares are patent, mucous membranes moist.  Neck: No masses or JVD are noted.    Resp: Easy and unlabored breathing.   Neuro: Alert and oriented, communicating clearly.     Data:      Recent Labs   Lab Test 05/01/25  1427 10/03/24  1035 03/21/24  1106 09/22/23  2031 09/21/23  1614 08/19/23  0742 08/18/23  1726   A1C  --  8.1* 8.2*   < >  --   --   --    TSH  --   --   --   --   --   --  1.68   LDL  --  75 58   < >  --    < >  --    HDL  --  70 64   < >  --    < >  --    TRIG  --  89 134   < >  --    < >  --    CR  --  0.90 0.89   < >  --    < > 0.74   MICROL <12.0  --   --   --  <12.0  --   --    AST  --  34 37   < >  --    < > 22   ALT  --  21 25   < >  --    < > 21    < > = values in this interval not displayed.       Microalbuminuria:  Lab Results   Component Value Date    [facility]  05/01/2025      Comment:      Unable to calculate, urine albumin and/or urine creatinine is outside detectable limits.  Microalbuminuria is defined as an albumin:creatinine ratio of 17 to 299 for males and 25 to 299 for females. A ratio of albumin:creatinine of 300 or higher is indicative of overt proteinuria.  Due to biologic variability, positive results should be confirmed by a second, first-morning random or 24-hour timed urine specimen. If there is discrepancy, a third specimen is recommended. When 2 out of 3 results are in the microalbuminuria range, this is evidence for incipient nephropathy and warrants increased efforts at glucose control, blood pressure control, and institution of therapy with an angiotensin-converting-enzyme (ACE) inhibitor (if the patient can tolerate it).      [facility]  09/21/2023      Comment:      Unable to calculate, urine albumin and/or urine creatinine is outside detectable limits.  Microalbuminuria is defined as an albumin:creatinine ratio of 17 to 299 for  males and 25 to 299 for females. A ratio of albumin:creatinine of 300 or higher is indicative of overt proteinuria.  Due to biologic variability, positive results should be confirmed by a second, first-morning random or 24-hour timed urine specimen. If there is discrepancy, a third specimen is recommended. When 2 out of 3 results are in the microalbuminuria range, this is evidence for incipient nephropathy and warrants increased efforts at glucose control, blood pressure control, and institution of therapy with an angiotensin-converting-enzyme (ACE) inhibitor (if the patient can tolerate it).         Most recent GFRs:    Lab Results   Component Value Date    GFRESTIMATED 88 10/03/2024    GFRESTIMATED 89 03/21/2024    GFRESTIMATED >90 12/14/2023       Lab Results   Component Value Date    CPEPT 0.3 (L) 10/03/2024    GADAB <5.0 06/28/2023    ISCAB <1:4 06/28/2023     FIB-4 Calculation: 0.15 at 10/5/2023  2:35 PM  Calculated from:  SGOT/AST: 19 U/L at 10/5/2023  2:35 PM  SGPT/ALT: 11 U/L at 10/5/2023  2:35 PM  Platelets: 1,093 10e3/uL at 10/5/2023  2:35 PM  Age: 27 years  AST   Date Value Ref Range Status   10/03/2024 34 0 - 45 U/L Final     Lab Results   Component Value Date    ALT 11 10/05/2023         Most recent eye exam date: : Not Found       Assessment/Plan:    Artie Burger is a 28 year old 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     Post-pancreatectomy diabetes:   Fortunately her hypoglycemia is significantly reduced, and blood sugars remain near goal though continuing to be highly variable.  Lows appear related late bolus and counseled regarding this:  IF you give a bolus late, consider giving just a correction or entering NO MORE than one-half the carbs you are still consuming to prevent lows by adding more insulin to that which Omnipod is already giving to correct the high blood sugar resulting from the missed premeal bolus.      Depression: She  continues to work with her psychiatrist and psychologist regularly and working to become more physically active .    Diabetes complication:  None known.  Recent eye set, has baseline eye exam.  Recommend annual eye exams to monitor for DMR in 2027.    Statin not indicated.  BP at goal.  No ualbu.      Night sweats:  will check TSHR - recc f/u with PCP.  No red flags appreciated.      23 minutes spent on the date of the encounter doing chart review, history and exam, documentation, education and counseling, as well as communication and coordination of care, and further activities as noted above.  An additional 5-10 minutes reviewing CGM and pump data prior to meeting.      It is my privilege to be involved in the care of the above patient.     Caro Cormier PA-C, MPAS  [facility]  Diabetes, Endocrinology, and Metabolism  [phone] Appointments/Nurse  [phone] pager  [phone] nurse line    This note was completed in part using Dragon voice recognition, and may contain word and grammatical errors.                Virtual Visit Details    Type of service:  Video Visit    Joined the call at 3/11/2025, 9:32:44 am.  Left the call at 3/11/2025, 9:54:52 am.  You were on the call for 22 minutes 8 seconds .    Originating Location (pt. Location): Home    Distant Location (provider location):  Off-site  Platform used for Video Visit: Epidemic Sound    Outcome for 03/04/25 9:34 AM: Data uploaded on Dexcom and [employer]  Christopher Ellis MA  Outcome for 03/07/25 10:42 AM: Data obtained via Dexcom and [employer] website  Pamela Puente MA                                      Virtual Visit Details    Type of service:  Video Visit   Video Start Time: 11:39  Video End Time:12:02    Originating Location (pt. Location): Home    Distant Location (provider location):  Off-site  Platform used for Video Visit: Epidemic Sound    Outcome for 06/27/25 8:04 AM: Data uploaded on Dexcom and Gloric Puente MA  Outcome for  07/07/25 11:40 AM: Data obtained via Dexcom and [employer] website  Christopher Ellis MA

## 2025-07-01 ENCOUNTER — MYC REFILL (OUTPATIENT)
Dept: PSYCHIATRY | Facility: CLINIC | Age: 30
End: 2025-07-01
Payer: COMMERCIAL

## 2025-07-01 DIAGNOSIS — F41.1 GAD (GENERALIZED ANXIETY DISORDER): ICD-10-CM

## 2025-07-01 NOTE — TELEPHONE ENCOUNTER
Date of Last Office Visit: 6/5/25  Date of Next Office Visit: None; routing for [facility] to assist pt with scheduling.    No shows since last visit: No  More than one patient-initiated cancellation (with reschedule) since last seen in clinic? No    []Medication refilled per  Medication Refill in Ambulatory Care  policy.  [x]Medication unable to be refilled by RN due to criteria not met as indicated below:    []Eligibility: has not had a provider visit within last 6 months   [x]Supervision: no future appointment; < 7 days before next appointment   []Compliance: no shows; cancellations; lapse in therapy   []Verification: order discrepancy; may need modification...   [] > 30-day supply request   []Advanced refill request: > 7 days before refill date   [x]Controlled medication   []Medication not included in policy   []Review: new med; med adjusted <= 30 days; safety alert; requires lab monitoring...   []Scope of Practice: refill request processed by LPN/MA   []Other:      Medication(s) requested:     -      LORazepam (ATIVAN) 0.5 MG tablet Date last ordered: 5/27/25  Qty: 10  Refills: 0  Appropriate for refill? Yes    Any Controlled Substance(s)? Yes   [place]  checked? N/A      Requested medication(s) verified as identical to current order? Yes    Any lapse in adherence to medication(s) greater than 5 days? No      Additional action taken? none.      Last visit treatment plan:     6/5/25    Treatment Plan:  Continue guanfacine ER 2 mg for trauma, insomnia, anxiety.  Continue lorazepam/Ativan 0.5 mg daily as needed for panic attacks/severe anxiety only.  Refilled for 10 tablets per 30 days only.  Continue to try to lessen reliance on lorazepam.  One-time early refill provided today.  Continue gabapentin 600 mg twice daily for pain and anxiety-as prescribed by other prescriber.  Continue Wellbutrin XL to 300 mg daily for depression.    Discussed okay to trial further reduction to 150 mg daily if anxiety symptoms, sleep  disruptions, and jaw clenching continue.  Continue Viibryd/vilazodone 10 mg daily for depression, anxiety.   If we start Prozac, we will discontinue Viibryd.  Message sent to you to check in on you in 2 weeks regarding possibility of adding Prozac.  Restart BuSpar/buspirone for anxiety: Take 5-10 mg twice daily as tolerated and can increase up to 15 mg twice daily as tolerated before next appointment.   Can trial doxylamine succinate 12.5-25 mg at bedtime as needed for sleep (can find over-the-counter).   [employer] testing kit ordered and will be mailed to your home as discussed at your visit.  Please check with your insurance company and [employer] regarding any possible financial responsibility.  I review testing results with patients during a clinic visit only.  You can request a copy of your [employer] testing results directly from [employer], or wait until your appointment when I can send you a copy with a secure text link or email link.  Continue all other cares per primary care provider.   Continue all other medications as reviewed per electronic medical record today.   Safety plan reviewed. To the Emergency Department as needed or call after hours crisis line at [phone] or [phone]. [place] Crisis Text Line. Text [place] to 889670 or Suicide LifeLine Chat: suicidepreventionlifeline.org/chat  Continue therapy as planned.   Schedule an appointment with me in 6 weeks or sooner as needed. Call [facility] at [phone] to schedule.  Follow up with primary care provider as planned or for acute medical concerns.  Call the psychiatric nurse line with medication questions or concerns at [phone].  CaseTrekt may be used to communicate with your provider, but this is not intended to be used for emergencies.            Any medication(s) require lab monitoring? No    Rodrick Wilkes RN on 7/1/2025 at 1:37 PM

## 2025-07-02 RX ORDER — LORAZEPAM 0.5 MG/1
0.5 TABLET ORAL DAILY PRN
Qty: 10 TABLET | Refills: 0 | Status: SHIPPED | OUTPATIENT
Start: 2025-07-02

## 2025-07-02 NOTE — PROGRESS NOTES
[facility]  7/3/25      Behavioral Health Clinician Progress Note    Patient Name: Artie Burger           Service Type:  Individual      Service Location:   [facility] / Email (patient reached)     Session Start Time: 10am  Session End Time: 1030am      Session Length: 16 - 37      Attendees: Client     Service Modality:  Video Visit:      Provider verified identity through the following two step process.  Patient provided:  Patient is known previously to provider    Telemedicine Visit: The patient's condition can be safely assessed and treated via synchronous audio and visual telemedicine encounter.      Reason for Telemedicine Visit: Services only offered telehealth    Originating Site (Patient Location): Patient's home    Distant Site (Provider Location): Provider Remote Setting- Home Office    Consent:  The patient/guardian has verbally consented to: the potential risks and benefits of telemedicine (video visit) versus in person care; bill my insurance or make self-payment for services provided; and responsibility for payment of non-covered services.     Patient would like the video invitation sent by:  My Chart    Mode of Communication:  Video Conference via Amwell    Distant Location (Provider):  Off-site    As the provider I attest to compliance with applicable laws and regulations related to telemedicine.    Visit Activities (Refresh list every visit): [facility] Only    Diagnostic Assessment Date: 8/7/24 Daina Daniel MA [facility]; 10/21/24 Tamanna Claudio  Treatment Plan Review Date: 7/3/25  See Flowsheets for today's PHQ-9 and ALAN-7 results  Previous PHQ-9:       4/23/2025     7:04 PM 4/30/2025     1:00 PM 6/5/2025    11:06 AM   PHQ-9 SCORE   PHQ-9 Total Score [facility] 19 (Moderately severe depression) 19 (Moderately severe depression) 19 (Moderately severe depression)   PHQ-9 Total Score 19  19  19        Patient-reported     Previous ALAN-7:       3/12/2025     8:22  PM 4/23/2025     7:05 PM 6/5/2025    11:07 AM   ALAN-7 SCORE   Total Score 17 (severe anxiety) 17 (severe anxiety) 11 (moderate anxiety)   Total Score 17  17  11        Patient-reported     DATA  Extended Session (60+ minutes): No  Interactive Complexity: No  Crisis: No  BH Patient: No    Treatment Objective(s) Addressed in This Session:  Target Behavior(s): disease management/lifestyle changes reducing sx    Depressed Mood: Decrease frequency and intensity of feeling down, depressed, hopeless  Anxiety: will develop more effective coping skills to manage anxiety symptoms    Current Stressors / Issues:  MH update:  Better. Ketamine seems to be helping.  More energy, less naps.  Less dread feelings.  Partner notices change and improvement.  Mom says more positive.   Stresses:  partner relationship  Appetite: n/a  Sleep: n/a  Outpatient Provider updates: Ketamine [facility]. 10th appt today.  Once a week.  Doing Ketamine assisted therapist during some of the appts.  Sheila Flaherty [facility] EMDR  SI/SIB/HI: Denies current.  Hx of passive ideation.  Denies previous attempts.  Hx of SIB as teen.  Denies need for safety plan or crisis resources.  AMPARO:  Sober  Side effects/compliance:  Interventions:  [facility] engaged in conversation about communication styles within in relationship  Most important:  still off prozac.  Wanting to talk about genesight.  Better!    6/5  MH update:  Doing okay.   Not taking prozac due to libido concerns.    Stresses:  fight with partner this AM/ not good enough thoughts (actions)  Appetite: n/a  Sleep: n/a  Outpatient Provider updates: Ketamine [facility]. 4th appt today.  Will also be doing the Ketamine assisted therapist during some of the appts.  Sheila Flaherty [facility] EMDR  SI/SIB/HI: Denies current.  Hx of passive ideation.  Denies previous attempts.  Hx of SIB as teen.  Denies need for safety plan or crisis resources.  AMPARO: Sober  Side  effects/compliance:  Interventions:  C engaged in conversation about interpersonal skills within relationship  Most important:  Doing okay.  Off prozac for a week.  No paired psychiatry appt due to Ketamine appt today.  PT agreeable.  Will call and schedule follow up for a few weeks.  Aware to message if needs anything else/sooner appt    4/24   update:  Worse.  Very tearful.  Even less motivation. Sleeping a lot.  Apathy.  Stresses:  Did Easter with B/f.  Trying to work to help with depression.  Appetite: n/a  Sleep: n/a  Outpatient Provider updates: Completed ADHD testing.  Did not confirm diagnosis.  Sheila Flaherty [facility] Counsel[facility] EMDR  SI/SIB/HI: Denies current.  Hx of passive ideation.  Denies previous attempts.  Hx of SIB as teen.  Denies need for safety plan or crisis resources.  AMPARO: Sober  Side effects/compliance:  Interventions:  C engaged in discussion about perception and rules, inventory of things that feel good/not  Most important: wonders about genesight testing at this point.  Frustrated with meds.  Not feeling, better, if not worse depression wise.  Anxiety worse.  Jaw clenching- anxiety.     3/13   update:  Unmotivated.  Down.  Struggling.  Anxiety high.  Seeing friends, trying to be social.  Of note BS has been a bit more unstable at night- adjusted meds  Stresses:  BF was gone for the week.  Mom was in [place].  School is done.  Has to take exam.  Wants to move into BF by May.  Appetite: T1D  Sleep: Better.  Reduced bad dreams.  Outpatient Provider updates: Completed ADHD testing.  Did not confirm diagnosis.  Sheila Flaherty [facility] Counsel[facility] EMDR  SI/SIB/HI: Denies current.  Hx of passive ideation.  Denies previous attempts.  Hx of SIB as teen.  Denies need for safety plan or crisis resources.  AMPARO: Sober  Side effects/compliance:  Interventions:  [facility] engaged in discussion motivation and goals to work towards  Most important:  low motivation.  Depression and anxiety  higher.    1/23  MH update:  Increased buspar.  Using walking pad.  Making breakfast for self now.  Feeling better. Using organization tools to help.  Mood is better.  Anxiety is higher.  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  Taking notes to try and manage concentration, doing diane.  Appetite: T1D.  Sleep: Nightmares increased.  Anxiety induced.  Outpatient Provider updates: Completed ADHD testing.  Did not confirm diagnosis.  Sheila Flaherty [facility] EMDR  SI/SIB/HI: Denies current.  Hx of passive ideation.  Denies previous attempts.  Hx of SIB as teen.  Denies need for safety plan or crisis resources.  AMPARO:  Sober 4 years ETOH.  No meetings.  Friend support.  Daily medical THC use.  Side effects/compliance:  Interventions:  [facility] engaged in discussion about educational stressors with validation and support.  Most important:  started OTC Vit D.  Mood is better.  Better energy and motivation.  Anxiety worse. At night worse/nightmares trauma related.    Next time practice telling story.    12/6  MH update:  Had a good thanksgiving.  Rented community space for [place].  The increase in medication has been helpful.  Stresses:  Financial stress.  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  Fighting with partner.  Appetite: T1D  Sleep: anxiety dreams.  Outpatient Provider updates: Completed ADHD testing.  Did not confirm diagnosis.  Sheila Flaherty [facility] EMDR  SI/SIB/HI: Denies current.  Hx of passive ideation.  Denies previous attempts.  Hx of SIB as teen.  Denies need for safety plan or crisis resources.  AMPARO:  Sober 4 years ETOH.  No meetings.  Friend support.  Daily medical THC use.  Side effects/compliance:  Interventions:  [facility] engaged in processing results of ADHD testing  Most important:  Frustrated with test results.   Increase of meds was helpful.   Couple days of brain zaps?    11/4  MH update:  Depression has increased.  Lack of motivation.  Sadness.   Apathy.  Anxiety a little higher.  Feeling more emotional and tearful.  Stresses:  Celebrated birthday.  Reduced ADL's.   Relationship is going well.  Starting school January.  Has to figure out $.  On academic probation from younger years.  Only going to try one class.  Appetite: T1D  Sleep: Increased.  Waking up a lot more at night.   Outpatient Provider updates: Started ADHD testing process.  Sheila Flaherty [facility] EMDR  SI/SIB/HI: Denies current.  Hx of passive ideation.  Denies previous attempts.  Hx of SIB as teen.  Denies need for safety plan or crisis resources.  AMPARO:  Sober 4 years ETOH.  No meetings.  Friend support.  Daily medical THC use.  Side effects/compliance:  Interventions:  [facility] engaged in validation and support  Most important:  Depression sx a bit higher    9/6  MH update: stopped zoloft, start wellbutrin.  Got brain zaps.  Did slower taper.  On 12mg for the last week Zoloft. On 300mg Wellbutrin. Anxiety is better.  Better motivation.  Mood is lifted. Getting get out of bed and things done.   Anxiety is better now after medication taper is address.  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    Appetite: T1D  Sleep: High fatigue.  On going anemia  Outpatient Provider updates: ADHD testing 10/14 in [place].  Sheila Flaherty [facility]  started EMDR again  SI/SIB/HI: Denies current.  Hx of passive ideation.  Denies previous attempts.  Hx of SIB as teen.  Denies need for safety plan or crisis resources.  AMPARO:  Sober 4 years ETOH.  No meetings.  Friend support.  Daily medical THC use.  Side effects/compliance:  Interventions:  [facility] discussed TIPP skills for anxiety mgmt  Most important:  Doing better after med change.      8/7  MH update:  ROS above  Stresses:  Medical stressors.  Goals of wanting to obtain PT employment.  Dad's death as teen  Appetite: N/a.  T1D.  Sleep: Poor- related to anxiety.  No sleep study.  Night sweats.   [facility]ish  Outpatient Provider updates: Sheila Flaherty [facility]  SI/SIB/HI:  Denies current.  Hx of passive ideation.  Denies previous attempts.  Hx of SIB as teen.  Denies need for safety plan or crisis resources.  AMPARO:  Sober 4 years ETOH.  No meetings.  Friend support.  Daily medical THC use.  Preg:  IUD  Side effects/compliance:  Interventions:  [facility] engaged in completing DA with psychoeducation and tx planning  Most important:  Different meds?  Higher dose?   ADHD testing referral?    Progress on Treatment Objective(s) / Homework:  Satisfactory progress - ACTION (Actively working towards change); Intervened by reinforcing change plan / affirming steps taken    Motivational Interviewing    MI Intervention: Co-Developed Goal: reducing sx and Expressed Empathy/Understanding     Change Talk Expressed by the Patient: Desire to change Ability to change    Provider Response to Change Talk: E - Evoked more info from patient about behavior change and A - Affirmed patient's thoughts, decisions, or attempts at behavior change    Also provided psychoeducation about behavioral health condition, symptoms, and treatment options    Assessments completed prior to visit:  The following assessments were completed by patient for this visit:  N/a    Care Plan review completed: Yes    Medication Review:  Changes to psychiatric medications, see updated Medication List in EPIC.     Medication Compliance:  Yes    Changes in Health Issues:   None reported    Chemical Use Review:   Substance Use: Chemical use reviewed, no active concerns identified      Tobacco Use: No current tobacco use.      Assessment: Current Emotional / Mental Status (status of significant symptoms):  Risk status (Self / Other harm or suicidal ideation)  Patient has had a history of suicidal ideation: hx of passive ideation without planning intent or previous attempts and self-injurious behavior: hx of such as teen  Patient denies current fears or  concerns for personal safety.  Patient denies current or recent suicidal ideation or behaviors.  Patient denies current or recent homicidal ideation or behaviors.  Patient denies current or recent self injurious behavior or ideation.  Patient denies other safety concerns.  A safety and risk management plan has not been developed at this time, however patient was encouraged to call [place] [phone] should there be a change in any of these risk factors.    Appearance:   Appropriate   Eye Contact:   Good   Psychomotor Behavior: Normal   Attitude:   Cooperative   Orientation:   All  Speech   Rate / Production: Normal    Volume:  Normal   Mood:    Normal  Affect:    Appropriate   Thought Content:  Clear   Thought Form:  Coherent  Logical   Insight:    Good     Diagnoses:  1. Moderate episode of recurrent major depressive disorder (H)    2. ALAN (generalized anxiety disorder)    3. Trauma and stressor-related disorder    4. Alcohol use disorder, severe, in sustained remission (H)        Collateral Reports Completed:  Communicated with: Dr Singleton    Plan: (Homework, other):  Patient was given information about behavioral services and encouraged to schedule a follow up appointment with the clinic [facility] as needed.  She was also given information about mental health symptoms and treatment options .  CD Recommendations: Maintain Sobriety.       Daina Daniel [facility]    ______________________________________________________________________    Integrated Primary Care Behavioral Health Treatment Plan    Individual Treatment Plan    Patient's Name: Artie Burger   YOB: 1995  Date of Creation: 9/6/24  Date Treatment Plan Last Reviewed/Revised: 7/3/25    DSM5 Diagnoses:   1. Moderate episode of recurrent major depressive disorder (H)    2. ALAN (generalized anxiety disorder)    3. Trauma and stressor-related disorder    4. Alcohol use disorder, severe, in sustained remission (H)      Psychosocial / Contextual Factors:  "Medical Complexities, Trauma History, and Occupational Issues  PROMIS (reviewed every 90 days):   The following assessments were completed by patient for this visit:  PROMIS 10-Global Health (only subscores and total score):       6/22/2023     4:55 PM 8/1/2024     1:16 PM 10/11/2024    10:08 AM 11/2/2024     9:41 AM 3/12/2025     8:23 PM 7/3/2025     8:57 AM   PROMIS-10 Scores Only   Global Mental Health Score 9 11    11 11 7    7 8 10   Global Physical Health Score 8 14    14 13 11    11 11 15   PROMIS TOTAL - SUBSCORES 17 25    25 24 18    18 19 25        Referral / Collaboration:  Referral to another professional/service is not indicated at this time..    Anticipated number of session for this episode of care: 6-9 sessions  Anticipation frequency of session: Monthly  Anticipated Duration of each session: 16-37 minutes  Treatment plan will be reviewed in 90 days or when goals have been changed.       MeasurableTreatment Goal(s) related to diagnosis / functional impairment(s)  Goal 1: Patient will reducing trauma sx   \"I will know I've met my goal when I don't feel triggered by my history .\"     Objective #A (Patient Action)    Patient will attend and complete EMDR  Status: Continued - Date(s):12/6/24 , 3/13/25, 7/3/25    Intervention(s)  [facility] will provide support through CBT, MI, Acceptance and Commitment Therapy, Dialectic Behavioral Therapy and problem solving model to explore and overcome barriers.        Patient has reviewed and agreed to the above plan.    Written by  Daina Daniel [facility], [facility]     "

## 2025-07-03 ENCOUNTER — VIRTUAL VISIT (OUTPATIENT)
Dept: BEHAVIORAL HEALTH | Facility: CLINIC | Age: 30
End: 2025-07-03
Payer: COMMERCIAL

## 2025-07-03 ENCOUNTER — VIRTUAL VISIT (OUTPATIENT)
Dept: PSYCHIATRY | Facility: CLINIC | Age: 30
End: 2025-07-03
Payer: COMMERCIAL

## 2025-07-03 DIAGNOSIS — F32.9 TREATMENT-RESISTANT DEPRESSION: ICD-10-CM

## 2025-07-03 DIAGNOSIS — F10.21 ALCOHOL USE DISORDER, SEVERE, IN SUSTAINED REMISSION (H): ICD-10-CM

## 2025-07-03 DIAGNOSIS — F43.9 TRAUMA AND STRESSOR-RELATED DISORDER: ICD-10-CM

## 2025-07-03 DIAGNOSIS — F41.1 GAD (GENERALIZED ANXIETY DISORDER): ICD-10-CM

## 2025-07-03 DIAGNOSIS — F33.41 RECURRENT MAJOR DEPRESSIVE DISORDER, IN PARTIAL REMISSION: Primary | ICD-10-CM

## 2025-07-03 DIAGNOSIS — F33.1 MODERATE EPISODE OF RECURRENT MAJOR DEPRESSIVE DISORDER (H): Primary | ICD-10-CM

## 2025-07-03 ASSESSMENT — PAIN SCALES - GENERAL: PAINLEVEL_OUTOF10: NO PAIN (0)

## 2025-07-03 NOTE — PROGRESS NOTES
"Telemedicine Visit: The patient's condition can be safely assessed and treated via synchronous audio and visual telemedicine encounter.      Reason for Telemedicine Visit: Patient has requested telehealth visit    Originating Site (Patient Location): Patient's home    Distant Location (provider location):  Off-Site    Consent:  The patient/guardian has verbally consented to: the potential risks and benefits of telemedicine (video visit) versus in person care; bill my insurance or make self-payment for services provided; and responsibility for payment of non-covered services.     Mode of Communication:  Video Conference via Humanoid    As the provider I attest to compliance with applicable laws and regulations related to telemedicine.         Outpatient Psychiatric Progress Note    Name: Artie Burger   : 1995                    Primary Care Provider: Lila Gallegos CNP   Therapist: Rigo Ulrich, weekly      PHQ-9 scores:      2025     7:04 PM 2025     1:00 PM 2025    11:06 AM   PHQ-9 SCORE   PHQ-9 Total Score MyChart 19 (Moderately severe depression) 19 (Moderately severe depression) 19 (Moderately severe depression)   PHQ-9 Total Score 19  19  19        Patient-reported       ALAN-7 scores:      3/12/2025     8:22 PM 2025     7:05 PM 2025    11:07 AM   ALAN-7 SCORE   Total Score 17 (severe anxiety) 17 (severe anxiety) 11 (moderate anxiety)   Total Score 17  17  11        Patient-reported       Patient Identification:  Patient is a 29 year old, partnered / significant other  White Not  or  female  who presents for return visit with me.  Patient is currently unemployed. Patient attended the phone/video session alone. Patient prefers to be called: \"Artie\".    Interim History:  I last saw Artie Burger for outpatient psychiatry return visit on 2025. During that appointment, we:    Continue guanfacine ER 2 mg for trauma, insomnia, " anxiety.  Continue lorazepam/Ativan 0.5 mg daily as needed for panic attacks/severe anxiety only.  Refilled for 10 tablets per 30 days only.  Continue to try to lessen reliance on lorazepam.  One-time early refill provided today.  Continue gabapentin 600 mg twice daily for pain and anxiety-as prescribed by other prescriber.  Continue Wellbutrin XL to 300 mg daily for depression.    Discussed okay to trial further reduction to 150 mg daily if anxiety symptoms, sleep disruptions, and jaw clenching continue.  Continue Viibryd/vilazodone 10 mg daily for depression, anxiety.   If we start Prozac, we will discontinue Viibryd.  Message sent to you to check in on you in 2 weeks regarding possibility of adding Prozac.  Restart BuSpar/buspirone for anxiety: Take 5-10 mg twice daily as tolerated and can increase up to 15 mg twice daily as tolerated before next appointment.   Can trial doxylamine succinate 12.5-25 mg at bedtime as needed for sleep (can find over-the-counter).   [employer] testing kit ordered and will be mailed to your home as discussed at your visit.  Please check with your insurance company and [employer] regarding any possible financial responsibility.  I review testing results with patients during a clinic visit only.  You can request a copy of your GeneSight testing results directly from [employer], or wait until your appointment when I can send you a copy with a secure text link or email link.      7/3: Since last visit, Prozac was started and lieu of Viibryd.  Then patient started ketamine and stopped Prozac.  Patient overall starting to feel better.  Depression is lifting.  Has been undergoing ketamine treatments for the past 6 weeks.  Tolerating well and finding them incredibly helpful.  Started to feel quite a bit better after the first couple of sessions even.  No longer taking Prozac.  Interested in reviewing GeneSight testing today.  No acute suicidality.  No problematic drug or alcohol use.  See [facility]  note below for additional details.    Per [facility], [facility]ain[facility], during today's team-based visit:  Current Stressors / Issues:  MH update:  Better. Ketamine seems to be helping.  More energy, less naps.  Less dread feelings.  Partner notices change and improvement.  Mom says more positive.   Stresses:  partner relationship  Appetite: n/a  Sleep: n/a  Outpatient Provider updates: Ketamine [facility]. 10th appt today.  Once a week.  Doing Ketamine assisted therapist during some of the appts.  Sheila Flaherty [facility] EMDR  SI/SIB/HI: Denies current.  Hx of passive ideation.  Denies previous attempts.  Hx of SIB as teen.  Denies need for safety plan or crisis resources.  AMPARO:  Sober  Side effects/compliance:  Interventions:  [facility] engaged in conversation about communication styles within in relationship  Most important:  still off prozac.  Wanting to talk about genesight.  Better!    Past Psychiatric Med Trials:  Psych Meds at Intake:  -Ativan 0.5 mg daily, sometimes twice daily: 1 every few days, still works (gets from primary care provider)   -Sertraline/Zoloft - 50 mg daily (was up to 200 mg daily but decreased due to activation), has been decreasing slowly and no worsening of sxs  -Buspar 10 mg daily -was taking twice daily: helpful for some day to day anxiety, up to 15 mg twice daily  -Gabapentin 600 mg twice daily: for pain and anxiety  -Propranolol 10 mg daily as needed for anxiety (sometimes uses, brings heart rate down, every couple weeks uses)     Past Psych Meds:  Diphenhydramine - anxiety  Trazodone - vivid dreams  Prozac/fluoxetine - not very helpful although appears only took 10 mg daily?  Cymbalta - 2021 up to 60 mg  DID NOT WORK FOR PAIN  2022/2023: treated with lexapro 20mg and celexa 40mg during this time.     Psychiatric ROS:  See HPI above for all pertinent positives and negatives. Rest of systems negative.     PHQ9 and GAD7 scores were reviewed today if completed.    Medication side effects: Denies any major  Current stressors include: Symptoms and see HPI above  Coping mechanisms and supports include: Therapy, Family, Hobbies, and Friends    Current medications include:   Current Outpatient Medications   Medication Sig Dispense Refill    acetaminophen (TYLENOL) 500 MG tablet Take 500-1,000 mg by mouth      acetone urine (RELION KETONE TEST) test strip Use as part of high blood sugar protocol for pump users 20 strip 4    Alcohol Swabs PADS Use before taking blood sugars up to 10 times a day 100 each 3    blood glucose (NO BRAND SPECIFIED) lancets standard Use to test blood sugars up to 8 times daily as directed. 100 each 3    blood glucose (NO BRAND SPECIFIED) test strip Test blood sugars up to 8 times a day as directed by your provider 200 strip 3    blood glucose monitoring (NO BRAND SPECIFIED) meter device kit Use as directed Per insurance coverage. DM education recommending Accu Chek Guide glucose meter 1 kit 0    blood glucose monitoring (SOFTCLIX) lancets USE TO TEST BLOOD GLUCOSE UP TO EIGHT TIMES DAILY      buPROPion (WELLBUTRIN XL) 300 MG 24 hr tablet Take 1 tablet (300 mg) by mouth every morning. 90 tablet 1    busPIRone (BUSPAR) 15 MG tablet Take 1 tablet (15 mg) by mouth 2 times daily. 60 tablet 1    cetirizine (ZYRTEC) 10 MG tablet Take 1 tablet (10 mg) by mouth daily. 90 tablet 3    Continuous Glucose Sensor (DEXCOM G7 SENSOR) MISC Change every 10 days. 9 each 2    FLUoxetine (PROZAC) 20 MG capsule Take 1 capsule (20 mg) by mouth daily. 30 capsule 1    gabapentin (NEURONTIN) 600 MG tablet Take 1 tablet (600 mg) by mouth 2 times daily. 180 tablet 1    Glucagon (GVOKE HYPOPEN) 1 MG/0.2ML pen Inject the contents of 1 device under the skin into lower abdomen, outer thigh, or outer upper arm as needed for hypoglycemia. If no response after 15 minutes, additional 1 mg dose from a new device may be injected while waiting for emergency assistance. 0.4 mL 3    glucose (BD  GLUCOSE) 4 g chewable tablet Take 1 tablet by mouth every hour as needed for low blood sugar 30 tablet 0    guanFACINE (INTUNIV) 2 MG TB24 24 hr tablet Take 1 tablet (2 mg) by mouth at bedtime. 90 tablet 3    ibuprofen (ADVIL/MOTRIN) 200 MG tablet Take 800 mg by mouth as needed      insulin aspart (NOVOLOG PEN) 100 UNIT/ML pen DOSE:  1 units per 10 grams of carbohydrate.  Only chart total amount of units given.  Do not give if pre-prandial glucose is less than 60 mg/dL. If given at mealtime, administer within 30 minutes of start of meal. 15 mL 3    insulin aspart (NOVOLOG VIAL) 100 UNITS/ML vial Up to 30 units daily via insulin pump 30 mL 4    Insulin Disposable Pump (OMNIPOD 5 PODS, GEN 5,) MISC 1 each every 3 days. 30 each 4    insulin pen needle (32G X 4 MM) 32G X 4 MM miscellaneous Use up to 8 pen needles daily or as directed. 200 each 11    levonorgestrel (MIRENA) 52 MG (20 mcg/day) IUD 1 Device by Intrauterine route      lipase-protease-amylase (CREON) 24792-05471-331942 units CPEP per EC capsule Take 4 capsules with meals and 2 capsules with snacks; approximate daily maximum 16 capsules 480 capsule 11    LORazepam (ATIVAN) 0.5 MG tablet Take 1 tablet (0.5 mg) by mouth daily as needed for anxiety (and or panic attacks). 10 TABS TO LAST 30 DAYS 10 tablet 0    ondansetron (ZOFRAN ODT) 4 MG ODT tab Take 1 tablet (4 mg) by mouth every 6 hours as needed for nausea or vomiting 12 tablet 3    pantoprazole (PROTONIX) 40 MG EC tablet Take 1 tablet (40 mg) by mouth daily. 90 tablet 3    polyethylene glycol (MIRALAX) 17 GM/Dose powder Take 17 g by mouth daily 510 g 3     No current facility-administered medications for this visit.     [place]:  07/02/2025 07/02/2025 1 Lorazepam 0.5 Mg Tablet 10.00 30 Al Bau [id number] Ait (5176) 0/0 0.17 [place]   06/05/2025 04/09/2025 1 Gabapentin 600 Mg Tablet 60.00 30 Ka Bobby [id number] Ait (5176) 2/1  [place]   05/27/2025 05/27/2025 1 Lorazepam 0.5 Mg Tablet 10.00 30 Ja Gin [id number]  Ait (5176) 0/0 0.17 [place]       Past Medical/Surgical History:  Past Medical History:   Diagnosis Date    Alcohol-induced chronic pancreatitis (H)     Anxiety     Depression     Gastroesophageal reflux disease     Pancreatic disease     PONV (postoperative nausea and vomiting)     Type 1 diabetes mellitus (H) 9/19/2023      has a past medical history of Alcohol-induced chronic pancreatitis (H), Anxiety, Depression, Gastroesophageal reflux disease, Pancreatic disease, PONV (postoperative nausea and vomiting), and Type 1 diabetes mellitus (H) (9/19/2023).    She has no past medical history of Antiplatelet or antithrombotic long-term use or Uncomplicated asthma.    Social History:  Reviewed. No changes to social history except as noted above in HPI.    Vital Signs:   None. This is phone/video visit.     Labs:  Most recent laboratory results reviewed and the pertinent results include:   Lab Results   Component Value Date    A1C 8.2 03/21/2024    A1C 6.6 12/14/2023    A1C 5.5 09/22/2023    A1C 5.3 06/28/2023       Review of Systems:  10 systems (general, cardiovascular, respiratory, eyes, ENT, endocrine, GI, , M/S, neurological) were reviewed. Most pertinent finding(s) is/are: intermittent pain. The remaining systems are all unremarkable.    Mental Status Examination (limited as this is by phone/video):  Appearance: Awake, alert, appears stated age, no acute distress, well-groomed   Attitude:  cooperative, pleasant   Motor: No gross abnormalities observed via video, not formally tested   Oriented to:  person, place, time, and situation  Attention Span and Concentration:  normal  Speech:  clear, coherent, regular rate, rhythm, and volume  Language: intact  Mood: better  Affect:  appropriate and in normal range and mood congruent  Associations:  no loose associations  Thought Process:  logical, linear and goal oriented  Thought Content:  no evidence of acute suicidal ideation or homicidal ideation, no evidence  of psychotic thought, no auditory hallucinations present and no visual hallucinations present  Recent and Remote Memory:  Intact to interview. Not formally assessed. No amnesia.  Fund of Knowledge: appropriate  Insight:  good  Judgment:  intact, adequate for safety  Impulse Control:  intact    Suicide Risk Assessment:  Today Artie Burger reports no suicidal ideation. Based on all available evidence including the factors cited above, Artie Burger does not appear to be at imminent risk for self-harm, does not meet criteria for a 72-hr hold, and therefore remains appropriate for ongoing outpatient level of care.  A thorough assessment of risk factors related to suicide and self-harm have been reviewed and are noted above. The patient convincingly denies suicidality on several occasions. Local community safety resources reviewed for patient to use if needed. There was no deceit detected, and the patient presented in a manner that was believable.     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 Plan: cotest in 3 years, due to post solid organ transplant.       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 3/2023: gabapentin up to 600-900mg TID became ineffective, switched to lyrica early March.  Initial dose 50mg BID, increase to 100mg BID after 2 weeks, then increase to 100mg TID after 2 weeks (if needed).      Iron deficiency anemia secondary to inadequate dietary iron intake 10/28/2021     Priority: Medium     Formatting of this note might be different from the original. 10/28/2021 hgb 10.6, iron 32.  Trial iron BID-if intolerant infusions.  Symptomatic with fatigue.    10/28/2021 hgb 10.6, iron 32.  Trial iron BID-if intolerant infusions.  Symptomatic with fatigue.      Chronic pancreatitis (H) 08/25/2021     Priority: Medium     Added automatically from request for surgery [id number]      Osteopenia of hip 08/10/2021     Priority: Medium     Formatting of this note might be different from the original. DEXA 8/2021: normal l spine, hip T -1.8.  Recommended calcium/vit D supplementation.  Repeat DEXA 1-2 years.      Insomnia 07/07/2021     Priority: Medium     Formatting of this note might be different from the original. 7/7/2021 No issue with sleep onset, struggles at times with sleep maintenance. 9/8/2021 Rare PRN quetiapine stops \"head from racing\", effective Failed: benadryl-\"anxiety inducing,\" trazodone \"vivid dreams and anxiety inducing.\"      Current moderate episode of major depressive disorder (H) 01/24/2019     Priority: Medium     Formatting of this note might be different from the original. Previous tx: prozac 7/7/2021: doing " well, decreasing cymbalta from 60 to 30mg 8/10/2021: Increased Cymbalta back to 60 mg.  Stressors with home ownership and her ongoing medical issues along with college course work.      Panic attacks 01/24/2019     Priority: Medium    Alcohol abuse, in remission 07/25/2018     Priority: Medium     Formatting of this note might be different from the original. 8/21/2020         Psychosocial & Contextual Factors: see HPI above    Assessment:  From Intake, 8/7/2024:  Artie Burger is a 28-year-old with past psychiatric history including depression, anxiety, trauma, alcohol dependence with medical complications who presents today for psychiatric evaluation.  Patient with history of mental health symptoms dating back to childhood/adolescence.  Patient has been on handful of different psychiatric medications to help with symptoms.  Patient's history was complicated by alcohol use disorder affecting pancreas and leading to multiple surgeries.  Patient has now been sober from alcohol for 4 years.  Denies any current need for chemical dependency resources.  Denies any major alcohol cravings.  Patient reports some symptoms consistent with possible ADHD diagnosis.  Given comorbid mental health concerns and struggles referral for psychological testing has been placed.  Patient is hopeful for some additional motivation and energy.  We will replace her sertraline with Wellbutrin XL.  We will continue buspirone for anxiety.  Patient on gabapentin for pain and also possibly helping with anxiety and decreased alcohol cravings.  Patient is using lorazepam quite frequently, every 2-3 days per patient report.  Next refill will be for only 10 tablets to last 30 days at a time.  We will also increase propranolol some to take as needed to hopefully lessen reliance on lorazepam.  Could also consider a future increase of gabapentin.  Patient with IUD to protect against pregnancy.  Psychotherapy encouraged.  Denies that medicinal cannabis  use causing any problems.     9/6/2024:  Patient overall doing significantly better on Wellbutrin XL.  Tolerating well.  Is still taking 12.5 mg of sertraline and will try discontinuing.  Has ADHD testing coming up next month.  Patient will be seen back for follow-up after ADHD testing.  Cannabis use but denies problematic use.  No acute safety concerns.  No SI.    11/04/2024:  Patient potentially with some regression of depression symptoms.  Tolerating Wellbutrin well so we will increase Wellbutrin XL to 450 mg daily.  Currently undergoing ADHD testing.  Discussed may need to decrease Wellbutrin XL if testing affirmative for ADHD and if we explore stimulants.  No acute safety concerns.  No SI.  No problematic drug or alcohol use.    12/06/2024:  Patient with recent ADHD psychological testing that was not affirmative for ADHD diagnosis.  Was affirmative for depression and anxiety diagnoses.  Patient feels like Wellbutrin XL increase more helpful for depression symptoms.  Tolerating well with no major negative side effects.  Still some ongoing symptoms.  Patient is agreeable to increasing buspirone as an augment to Wellbutrin XL to see if further helpful for her mental health symptoms.  Continues in psychotherapy.  No acute safety concerns.  No acute suicidality.  No problematic drug or alcohol use.    1/30/2025:  Overall patient doing relatively okay mood wise.  Worsening anxiety, sleep, dream since last visit.  I do wonder if patient experiencing some side effects/over-activation from addition of buspirone.  Could also be from Wellbutrin XL dose at 450 mg.  The increase Wellbutrin XL continues to be quite helpful for depression and so we will try to balance this out with addition of guanfacine ER.  Guanfacine ER might also be helpful for trauma related symptoms, sleep, and anxiety.  Since starting guanfacine ER, we will discontinue propranolol since only used once or twice a month.  No acute safety concerns.  No  acute suicidality.  No problematic drug or alcohol use.    3/13/2025:  Pt struggling with worsening depression symptoms and higher anxiety. Pt agreeable to decrease Wellbutrin XL to 300 mg daily and add Viibryd for mood/anxiety. Discussed [facility]PATH at [facility] if mental health symptoms continue to worsen. No acute safety concerns today. No acute suicidality. No problematic drug or alcohol use.     4/24/2025:  Patient with ongoing depression symptoms and worsening anxiety.  Buspirone has been helpful in the past for anxiety and so patient agreeable to adding back buspirone.  Patient may also reduce Wellbutrin XL further to see if some of the anxiety improves.  Wellbutrin XL could also be contributing to jaw clenching and sleep disruptions.  Although Viibryd not given a fair trial quite yet, did discuss that Prozac might be a better fit for energy, motivation, etc. since we have been reducing Wellbutrin XL.  Could always consider coming back to Viibryd and increasing dose depending on how aforementioned changes go.  Again reviewed patient can go to the empath unit at [facility] if symptoms worsen and she is needing higher level of care.  No acute safety concerns.  No acute suicidality today.  No problematic drug or alcohol use.  Discussed possibility of long-term psychiatric care referral at an upcoming visit.    7/3/2025:  Patient with notable improvements with depression symptoms since starting ketamine.  Encouraged to continue working with ketamine clinic and to continue to follow their recommendations.  Has been undergoing ketamine treatment for 6 weeks now.  Tolerating well.  Since ketamine has been so effective, we discussed transitioning Wellbutrin XL to Auvelity.  Dextromethorphan has mechanism that can enhance/prolong ketamine treatment effects.  If not covered by insurance, we will continue Wellbutrin XL.  In the future, could also always consider trial with Viibryd since metabolized normally and since  previous trial was inadequate-not high enough dose and also was not taken long enough.  Reviewed GeneSight testing today.  No acute safety concerns.  No acute suicidality.  No problematic drug or alcohol use.  Psychotherapy encouraged.    GeneSigh abnormalities:  CYP2B6 intermediate metabolizer  CYP2D6 poor metabolizer  UGT1A4 ultra-rapid metabolizer  Normal folic acid conversion    Medication side effects and alternatives were reviewed. Health promotion activities recommended and reviewed today. All questions addressed. Education and counseling completed regarding risks and benefits of medications and psychotherapy options. Recommend therapy for additional support.     Treatment Plan:  Continue ketamine treatment as planned.   Continue guanfacine ER 2 mg for trauma, insomnia, anxiety.  Continue lorazepam/Ativan 0.5 mg daily as needed for panic attacks/severe anxiety only.  Refilled for 10 tablets per 30 days only.  Continue to try to lessen reliance on lorazepam.  Continue gabapentin 600 mg twice daily for pain and anxiety-as prescribed by other prescriber.  Continue BuSpar/buspirone 15 mg twice daily for anxiety.   Discontinue Wellbutrin XL   Start Auvelity  mg twice daily for depression.   Can trial doxylamine succinate 12.5-25 mg at bedtime as needed for sleep (can find over-the-counter).   Continue all other cares per primary care provider.   Continue all other medications as reviewed per electronic medical record today.   Safety plan reviewed. To the Emergency Department as needed or call after hours crisis line at [phone] or [phone]. [place] Crisis Text Line. Text [place] to 060853 or Suicide LifeLine Chat: suicidepreventionlifeline.org/chat  Continue therapy as planned.   Schedule an appointment with me in 6 weeks or sooner as needed. Call [facility] at [phone] to schedule.  Follow up with primary care provider as planned or for acute medical concerns.  Call the psychiatric  nurse line with medication questions or concerns at [phone].  MyChart may be used to communicate with your provider, but this is not intended to be used for emergencies.    Risks of benzodiazepine (Ativan, Xanax, Klonopin, Valium, etc) use including, but not limited to, sedation, tolerance, risk for addiction/dependence. Do not drink alcohol while taking benzodiazepines due to risk of trouble breathing and potential death. Do not drive or operate heavy machinery until it is known how the drug affects you. Discuss with physician or pharmacist before ever taking a benzodiazepine with a narcotic/opioid pain medication.     Administrative Billing:   Phone Call/Video Duration: 15 Minutes  Start: 10:35a  Stop: 10:51a    The longitudinal plan of care for the diagnosis(es)/condition(s) as documented were addressed during this visit. Due to the added complexity in care, I will continue to support Artie in the subsequent management and with ongoing continuity of care.    Episode of Care #: 8    Patient Status:  Patient will continue to be seen for ongoing consultation and stabilization.    We will start to consider long-term psychiatry referral at an upcoming visit as discussed with patient today.    Signed:   Agnes Singleton DO  [facility] Psychiatry    Disclaimer: This note consists of symbols derived from keyboarding, dictation and/or voice recognition software. As a result, there may be errors in the script that have gone undetected. Please consider this when interpreting information found in this chart.

## 2025-07-03 NOTE — PATIENT INSTRUCTIONS
Treatment Plan:  Continue ketamine treatment as planned.   Continue guanfacine ER 2 mg for trauma, insomnia, anxiety.  Continue lorazepam/Ativan 0.5 mg daily as needed for panic attacks/severe anxiety only.  Refilled for 10 tablets per 30 days only.  Continue to try to lessen reliance on lorazepam.  Continue gabapentin 600 mg twice daily for pain and anxiety-as prescribed by other prescriber.  Continue BuSpar/buspirone 15 mg twice daily for anxiety.   Discontinue Wellbutrin XL   Start Auvelity  mg twice daily for depression.   Can trial doxylamine succinate 12.5-25 mg at bedtime as needed for sleep (can find over-the-counter).   Continue all other cares per primary care provider.   Continue all other medications as reviewed per electronic medical record today.   Safety plan reviewed. To the Emergency Department as needed or call after hours crisis line at [phone] or [phone]. [place] Crisis Text Line. Text [place] to 930613 or Suicide LifeLine Chat: suicidepreventionBOSS Metricsline.org/chat  Continue therapy as planned.   Schedule an appointment with me in 6 weeks or sooner as needed. Call [facility] at [phone] to schedule.  Follow up with primary care provider as planned or for acute medical concerns.  Call the psychiatric nurse line with medication questions or concerns at [phone].  MyChart may be used to communicate with your provider, but this is not intended to be used for emergencies.    Risks of benzodiazepine (Ativan, Xanax, Klonopin, Valium, etc) use including, but not limited to, sedation, tolerance, risk for addiction/dependence. Do not drink alcohol while taking benzodiazepines due to risk of trouble breathing and potential death. Do not drive or operate heavy machinery until it is known how the drug affects you. Discuss with physician or pharmacist before ever taking a benzodiazepine with a narcotic/opioid pain medication.     Patient Education   Collaborative Care  "Psychiatry Service  What to Expect  Here's what to expect from your Collaborative Care Psychiatry Service (CCPS).   About CCPS  CCPS means 2 people work together to help you get better. You'll meet with a behavioral health clinician and a psychiatric doctor. A behavioral health clinician helps people with mental health problems by talking with them. A psychiatric doctor helps people by giving them medicine.  How it works  At every visit, you'll see the behavioral health clinician (BHC) first. They'll talk with you about how you're doing and teach you how to feel better.   Then you'll see the psychiatric doctor. This doctor can help you deal with troubling thoughts and feelings by giving you medicine. They'll make sure you know the plan for your care.   CCPS usually takes 3 to 6 visits. If you need more visits, we may have you start seeing a different psychiatric doctor for ongoing care.  If you have any questions or concerns, we'll be glad to talk with you.  About visits  Be open  At your visits, please talk openly about your problems. It may feel hard, but it's the best way for us to help you.  Cancelling visits  If you can't come to your visit, please call us right away at [phone]. If you don't cancel at least 24 hours (1 full day) before your visit, that's \"late cancellation.\"  Being late to visits  Being very late is the same as not showing up. You will be a \"no show\" if:  Your appointment starts with a BHC, and you're more than 15 minutes late for a 30-minute (half hour) visit. This will also cancel your appointment with the psychiatric doctor.  Your appointment is with a psychiatric doctor only, and you're more than 15 minutes late for a 30-minute (half hour) visit.  Your appointment is with a psychiatric doctor only, and you're more than 30 minutes late for a 60-minute (full hour) visit.  If you cancel late or don't show up 2 times within 6 months, we may end your care.   Getting help between " visits  If you need help between visits, you can call us Monday to Friday from 8 a.m. to 4:30 p.m. at [phone].  Emergency care  Call 911 or go to the nearest emergency department if your life or someone else's life is in danger.  Call 988 anytime to reach the national Suicide and Crisis hotline.  Medicine refills  To refill your medicine, call your pharmacy. You can also call [facility]'s Behavioral Access at [phone], Monday to Friday, 8 a.m. to 4:30 p.m. It can take 1 to 3 business days to get a refill.   Forms, letters, and tests  You may have papers to fill out, like FMLA, short-term disability, and workability. We can help you with these forms at your visits, but you must have an appointment. You may need more than 1 visit for this, to be in an intensive therapy program, or both.  Before we can give you medicine for ADHD, we may refer you to get tested for it or confirm it another way.  We may not be able to give you an emotional support animal letter.  We don't do mental health checks ordered by the court.   We don't do mental health testing, but we can refer you to get tested.   Thank you for choosing us for your care.  For informational purposes only. Not to replace the advice of your health care provider. Copyright   2022 [facility]. All rights reserved. [employer] 981962 - Rev 11/24.

## 2025-07-03 NOTE — NURSING NOTE
Current patient location: [address] [place] [address] AVE   [facility] [id number]    Is the patient currently in the state of [place]? YES    Visit mode: VIDEO    If the visit is dropped, the patient can be reconnected by:VIDEO VISIT: Text to cell phone:   Telephone Information:   Mobile [phone]       Will anyone else be joining the visit? NO  (If patient encounters technical issues they should call [phone])    Are changes needed to the allergy or medication list? Pt stated no changes to allergies and Pt stated no med changes    Are refills needed on medications prescribed by this physician? NO    Rooming Documentation:  Questionnaire(s) completed    Reason for visit: TANNER AGUSTIN

## 2025-07-07 ENCOUNTER — TELEPHONE (OUTPATIENT)
Dept: PSYCHIATRY | Facility: CLINIC | Age: 30
End: 2025-07-07
Payer: COMMERCIAL

## 2025-07-07 NOTE — TELEPHONE ENCOUNTER
Prior Authorization Retail Medication Request    Medication/Dose: dextromethorphan-buPROPion ER (AUVELITY)  MG ER tablet  Diagnosis and ICD code (if different than what is on RX):    New/renewal/insurance change PA/secondary ins. PA:  Previously Tried and Failed:    Rationale:      Insurance   Primary Visit Coverage      Payer Plan Sponsor Code Group Number Group Name   BLUE PLUS BLUE PLUS ADVANTAGE [place] [id number]          Clinic Information  Preferred routing pool for dept communication: CCPS RN pool

## 2025-07-08 ENCOUNTER — VIRTUAL VISIT (OUTPATIENT)
Dept: ENDOCRINOLOGY | Facility: CLINIC | Age: 30
End: 2025-07-08
Payer: COMMERCIAL

## 2025-07-08 VITALS — WEIGHT: 150 LBS | BODY MASS INDEX: 27.44 KG/M2

## 2025-07-08 DIAGNOSIS — E13.9 POST-PANCREATECTOMY DIABETES (H): Primary | ICD-10-CM

## 2025-07-08 DIAGNOSIS — R61 NIGHT SWEATS: ICD-10-CM

## 2025-07-08 DIAGNOSIS — Z90.410 POST-PANCREATECTOMY DIABETES (H): Primary | ICD-10-CM

## 2025-07-08 DIAGNOSIS — E89.1 POST-PANCREATECTOMY DIABETES (H): Primary | ICD-10-CM

## 2025-07-08 PROCEDURE — 98005 SYNCH AUDIO-VIDEO EST LOW 20: CPT | Mod: 25 | Performed by: PHYSICIAN ASSISTANT

## 2025-07-08 PROCEDURE — 95251 CONT GLUC MNTR ANALYSIS I&R: CPT | Performed by: PHYSICIAN ASSISTANT

## 2025-07-08 ASSESSMENT — PAIN SCALES - GENERAL: PAINLEVEL_OUTOF10: MODERATE PAIN (4)

## 2025-07-08 NOTE — LETTER
7/8/2025       RE: Artie Burger  [address]  [facility] [address]     Dear Colleague,    Thank you for referring your patient, Artie Burger, to the [facility] [place] at [facility]. Please see a copy of my visit note below.                     Diabetes Consult Note        Artie Burger  is a 29 year old female who has a past medical history of Alcohol-induced chronic pancreatitis (H), Anxiety, Depression, Gastroesophageal reflux disease, Pancreatic disease, PONV (postoperative nausea and vomiting), and Type 1 diabetes mellitus (H). She is here for follow-up of diabetes.      Recent clinic notes:    I met her in October 2023 and last saw her in January 2024 when she was doing quite well requiring less insulin and reduced her doses   Including carbohydrate coverage to 1 unit per 25 g and sensitivity to 1 unit per 65 mg/dL.  She was referred to Dm ed and has seen TASHIA Graff, last on 3/27/24.  She was counting carbs but not giving correction.  They reviewed that.  She was hospitalized earlier this month and discharged with  instructions to give 1 unit : 10 g carb and 1u: 40 >140 with meals together with 14 units Lantus qam and 10 qpm.        April 2024: she was struggling with a lot of hypoglycemia and we discussed the possibility of transitioning to OmniPod 5.  Since that point in time she has worked with Mildred Graff to transition to OmniPod 5.  She also continues to work with psychologist Daina Daniel.    September 2024: Blood sugar management was good.  I encouraged her to work to meet ADA physical activity recommendations as well as make time for her mental health.    March 2025: She was struggling with mood a bit but working with psychiatry and a therapist and becoming more physically active.  She was struggling with some low blood sugars overnight and wondered if this might be related to her islet cells working better.  We  discussed the use of activity mode with her OmniPod 5 system.  Blood sugars were in range 34% of the time with 3% low including 1% very low, and ranging from low to 340.  She was entering 357 g of carb daily, and per her Apple Watch getting an average of 2300 steps per day.  We extended her active insulin time from 2 to 3 hours in hopes of preventing recurrent hypoglycemia, we also increased the overnight blood sugar target to 130 from midnight to 8 AM.  She agreed to meet with diabetes education following her visit to follow up the pump setting changes that we made it work again at reducing hypoglycemia if needed as well as managing activity.         Today:    Chart notes and lab reviewed I note she continues to be active and psychiatry and psychology.  She has met with NAHOMI Andrews in education and support.        7/5/2025     7:18 PM   including   1. Since your last visit to our clinic (or if this your first visit, since you last saw your primary care provider), have you experienced any of the following symptoms that may be related to low blood sugars? Sweating that wasn't due to exertion    Shaking or trembling   2. Since your last visit to our clinic (or if this your first visit, since you last saw your primary care provider), have you experienced any of the following symptoms that may be related to prolonged high blood sugars? More thirst than usual    Increased urination   3. Have you had any concerns that you would like to discuss today? Chest pain   4. Do you have any female family members who have had heart attacks or strokes before age 60 or male relatives who have had heart attacks or strokes before age 50? No   5. Do you have any family members who have had complications from diabetes such as kidney disease, heart disease or strokes, retinopathy (a vision problem), or amputations? No   6. Who do you live with?  Alone   Little interest or pleasure in doing things? Several days   Feeling down, depressed, or  hopeless? Several days   10.  Are you considering a pregnancy or interested in discussing pregnancy prevention today? No        Today:  Artie reports that she has been feeling good.  She is looking forward to taking an exam to be coming a peer substance abuse  and wondering about accommodations for this.    She is glad to see that she is having less low blood sugars, but notes that they often are high after low and she is working to not overcorrect lows.  She is now rarely with nausea, is eating well.    She had a prolonged low early the morning of July 4.  Reports was hungry when came home from bar (does not drink alcohol at all herself) and did give bolus late that may not have ate all she thought she would.  On another afternoon low, she thinks it is also possible/likely she gave bolus late.  In both instances, BG was already high, automation working to correct and then carb bolus added.      She also notes night sweats, this is ongoing since prior to pancreatectomy.  She has Mirena and thus only spots at times, no menses.  Denies fever chills, new pains or headache, vision changes, difficulties with balance, other concerns.      Current Diabetes Medications:  Insulin via OmniPod pump.        We reviewed glucometer/CGMS data together.  It revealed:  Her time in range is stable at 62%.  Fortunately hypoglycemia is reduced send to just 1% low including less than 1% very low.  Her coefficient of variance remains high at 34%.  In her OmniPod 5 she is using an average of 25 units/day, 49% of which is basal.  Her basal rate is 0.3 units/h which accounts for 7.2 units/day.  ISF is at 125 and insulin to carb ratios 1 unit per 30 g.  She is entering an average of 403 g of carb per day, with an average of 6.9 entries per day.      See details below    Artie's PMH, PSH and allergies were reviewed today and pertinent information is summarized above.    Artie's pertinent social and family history are also reviewed  today and pertinent information is summarized above.      Current Outpatient Medications   Medication Sig Dispense Refill     acetaminophen (TYLENOL) 500 MG tablet Take 500-1,000 mg by mouth       acetone urine (RELION KETONE TEST) test strip Use as part of high blood sugar protocol for pump users 20 strip 4     Alcohol Swabs PADS Use before taking blood sugars up to 10 times a day 100 each 3     blood glucose (NO BRAND SPECIFIED) lancets standard Use to test blood sugars up to 8 times daily as directed. 100 each 3     blood glucose (NO BRAND SPECIFIED) test strip Test blood sugars up to 8 times a day as directed by your provider 200 strip 3     blood glucose monitoring (NO BRAND SPECIFIED) meter device kit Use as directed Per insurance coverage. DM education recommending Accu Chek Guide glucose meter 1 kit 0     blood glucose monitoring (SOFTCLIX) lancets USE TO TEST BLOOD GLUCOSE UP TO EIGHT TIMES DAILY       busPIRone (BUSPAR) 15 MG tablet Take 1 tablet (15 mg) by mouth 2 times daily. 60 tablet 1     cetirizine (ZYRTEC) 10 MG tablet Take 1 tablet (10 mg) by mouth daily. 90 tablet 3     Continuous Glucose Sensor (DEXCOM G7 SENSOR) MISC Change every 10 days. 9 each 2     dextromethorphan-buPROPion ER (AUVELITY)  MG ER tablet Take 1 tablet by mouth 2 times daily. 60 tablet 2     gabapentin (NEURONTIN) 600 MG tablet Take 1 tablet (600 mg) by mouth 2 times daily. 180 tablet 1     Glucagon (GVOKE HYPOPEN) 1 MG/0.2ML pen Inject the contents of 1 device under the skin into lower abdomen, outer thigh, or outer upper arm as needed for hypoglycemia. If no response after 15 minutes, additional 1 mg dose from a new device may be injected while waiting for emergency assistance. 0.4 mL 3     glucose (BD GLUCOSE) 4 g chewable tablet Take 1 tablet by mouth every hour as needed for low blood sugar 30 tablet 0     guanFACINE (INTUNIV) 2 MG TB24 24 hr tablet Take 1 tablet (2 mg) by mouth at bedtime. 90 tablet 3     ibuprofen  (ADVIL/MOTRIN) 200 MG tablet Take 800 mg by mouth as needed       insulin aspart (NOVOLOG PEN) 100 UNIT/ML pen DOSE:  1 units per 10 grams of carbohydrate.  Only chart total amount of units given.  Do not give if pre-prandial glucose is less than 60 mg/dL. If given at mealtime, administer within 30 minutes of start of meal. 15 mL 3     insulin aspart (NOVOLOG VIAL) 100 UNITS/ML vial Up to 30 units daily via insulin pump 30 mL 4     Insulin Disposable Pump (OMNIPOD 5 PODS, GEN 5,) MISC 1 each every 3 days. 30 each 4     insulin pen needle (32G X 4 MM) 32G X 4 MM miscellaneous Use up to 8 pen needles daily or as directed. 200 each 11     levonorgestrel (MIRENA) 52 MG (20 mcg/day) IUD 1 Device by Intrauterine route       lipase-protease-amylase (CREON) 55349-87666-767917 units CPEP per EC capsule Take 4 capsules with meals and 2 capsules with snacks; approximate daily maximum 16 capsules 480 capsule 11     LORazepam (ATIVAN) 0.5 MG tablet Take 1 tablet (0.5 mg) by mouth daily as needed for anxiety (and or panic attacks). 10 TABS TO LAST 30 DAYS 10 tablet 0     ondansetron (ZOFRAN ODT) 4 MG ODT tab Take 1 tablet (4 mg) by mouth every 6 hours as needed for nausea or vomiting 12 tablet 3     pantoprazole (PROTONIX) 40 MG EC tablet Take 1 tablet (40 mg) by mouth daily. 90 tablet 3     polyethylene glycol (MIRALAX) 17 GM/Dose powder Take 17 g by mouth daily 510 g 3     No current facility-administered medications for this visit.         ROS:   Reports good physical activity tolerance.  Denies any pedal lesions or vision changes or concerns. Denies any other acute concerns except as noted above.      Exam:    Wt 68 kg (150 lb)   BMI 27.44 kg/m    Wt Readings from Last 10 Encounters:   07/08/25 68 kg (150 lb)   06/05/25 65.8 kg (145 lb)   05/01/25 66.4 kg (146 lb 6.4 oz)   04/24/25 61.2 kg (135 lb)   10/03/24 64 kg (141 lb 1.5 oz)   10/03/24 64 kg (141 lb 1.5 oz)   09/10/24 61.7 kg (136 lb)   09/06/24 61.7 kg (136 lb)  "  06/12/24 52.2 kg (115 lb)   06/06/24 51.5 kg (113 lb 9.6 oz)   Estimated body mass index is 27.44 kg/m  as calculated from the following:    Height as of 6/5/25: 1.575 m (5' 2\").    Weight as of this encounter: 68 kg (150 lb).    General: Pleasant, well apperaing in NAD seated comfortably at home     Psych:  Mood is \"good,\" affect is appropriate.  Thought form and content are fluid and coherent.    HEENT: Eyes and sclera are clear. Extraocular movements are grossly intact without proptosis.  Nares are patent, mucous membranes moist.  Neck: No masses or JVD are noted.    Resp: Easy and unlabored breathing.   Neuro: Alert and oriented, communicating clearly.     Data:      Recent Labs   Lab Test 05/01/25  1427 10/03/24  1035 03/21/24  1106 09/22/23  2031 09/21/23  1614 08/19/23  0742 08/18/23  1726   A1C  --  8.1* 8.2*   < >  --   --   --    TSH  --   --   --   --   --   --  1.68   LDL  --  75 58   < >  --    < >  --    HDL  --  70 64   < >  --    < >  --    TRIG  --  89 134   < >  --    < >  --    CR  --  0.90 0.89   < >  --    < > 0.74   MICROL <12.0  --   --   --  <12.0  --   --    AST  --  34 37   < >  --    < > 22   ALT  --  21 25   < >  --    < > 21    < > = values in this interval not displayed.       Microalbuminuria:  Lab Results   Component Value Date    [facility]  05/01/2025      Comment:      Unable to calculate, urine albumin and/or urine creatinine is outside detectable limits.  Microalbuminuria is defined as an albumin:creatinine ratio of 17 to 299 for males and 25 to 299 for females. A ratio of albumin:creatinine of 300 or higher is indicative of overt proteinuria.  Due to biologic variability, positive results should be confirmed by a second, first-morning random or 24-hour timed urine specimen. If there is discrepancy, a third specimen is recommended. When 2 out of 3 results are in the microalbuminuria range, this is evidence for incipient nephropathy and warrants increased efforts at glucose control, " blood pressure control, and institution of therapy with an angiotensin-converting-enzyme (ACE) inhibitor (if the patient can tolerate it).      [facility]  09/21/2023      Comment:      Unable to calculate, urine albumin and/or urine creatinine is outside detectable limits.  Microalbuminuria is defined as an albumin:creatinine ratio of 17 to 299 for males and 25 to 299 for females. A ratio of albumin:creatinine of 300 or higher is indicative of overt proteinuria.  Due to biologic variability, positive results should be confirmed by a second, first-morning random or 24-hour timed urine specimen. If there is discrepancy, a third specimen is recommended. When 2 out of 3 results are in the microalbuminuria range, this is evidence for incipient nephropathy and warrants increased efforts at glucose control, blood pressure control, and institution of therapy with an angiotensin-converting-enzyme (ACE) inhibitor (if the patient can tolerate it).         Most recent GFRs:    Lab Results   Component Value Date    GFRESTIMATED 88 10/03/2024    GFRESTIMATED 89 03/21/2024    GFRESTIMATED >90 12/14/2023       Lab Results   Component Value Date    CPEPT 0.3 (L) 10/03/2024    GADAB <5.0 06/28/2023    ISCAB <1:4 06/28/2023     FIB-4 Calculation: 0.15 at 10/5/2023  2:35 PM  Calculated from:  SGOT/AST: 19 U/L at 10/5/2023  2:35 PM  SGPT/ALT: 11 U/L at 10/5/2023  2:35 PM  Platelets: 1,093 10e3/uL at 10/5/2023  2:35 PM  Age: 27 years  AST   Date Value Ref Range Status   10/03/2024 34 0 - 45 U/L Final     Lab Results   Component Value Date    ALT 11 10/05/2023         Most recent eye exam date: : Not Found       Assessment/Plan:    Artie Burger is a 28 year old 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     Post-pancreatectomy diabetes:   Fortunately her hypoglycemia is significantly reduced, and blood sugars remain near goal though continuing to be highly variable.  Lows appear  related late bolus and counseled regarding this:  IF you give a bolus late, consider giving just a correction or entering NO MORE than one-half the carbs you are still consuming to prevent lows by adding more insulin to that which Omnipod is already giving to correct the high blood sugar resulting from the missed premeal bolus.      Depression: She continues to work with her psychiatrist and psychologist regularly and working to become more physically active .    Diabetes complication:  None known.  Recent eye set, has baseline eye exam.  Recommend annual eye exams to monitor for DMR in 2027.    Statin not indicated.  BP at goal.  No ualbu.      Night sweats:  will check TSHR - recc f/u with PCP.  No red flags appreciated.      23 minutes spent on the date of the encounter doing chart review, history and exam, documentation, education and counseling, as well as communication and coordination of care, and further activities as noted above.  An additional 5-10 minutes reviewing CGM and pump data prior to meeting.      It is my privilege to be involved in the care of the above patient.     Caro Cormier PA-C, MPAS  [facility]  Diabetes, Endocrinology, and Metabolism  [phone] Appointments/Nurse  [phone] pager  [phone] nurse line    This note was completed in part using Dragon voice recognition, and may contain word and grammatical errors.                Virtual Visit Details    Type of service:  Video Visit    Joined the call at 3/11/2025, 9:32:44 am.  Left the call at 3/11/2025, 9:54:52 am.  You were on the call for 22 minutes 8 seconds .    Originating Location (pt. Location): Home    Distant Location (provider location):  Off-site  Platform used for Video Visit: Usman    Outcome for 03/04/25 9:34 AM: Data uploaded on Dexcom and Glooko  Christopher Ellis MA  Outcome for 03/07/25 10:42 AM: Data obtained via Dexcom and [employer] website  Pamela Puente  MA                                      Virtual Visit Details    Type of service:  Video Visit   Video Start Time: 11:39  Video End Time:12:02    Originating Location (pt. Location): Home    Distant Location (provider location):  Off-site  Platform used for Video Visit: Usman    Outcome for 06/27/25 8:04 AM: Data uploaded on Dexcom and Glooko  Pamela Puente MA  Outcome for 07/07/25 11:40 AM: Data obtained via Dexcom and [employer]ook[employer] website  Christopher Ellis MA                          Again, thank you for allowing me to participate in the care of your patient.      Sincerely,    Caro Cormier PA-C

## 2025-07-08 NOTE — TELEPHONE ENCOUNTER
[facility]ail Pharm[facility] Prior Authorization Team   Phone: [phone]    PA Initiation    Medication: AUVELITY  MG PO TBCR  Insurance Company: [id number] - Phone [phone] Fax [phone]  Pharmacy Filling the Rx: [facility] - [place], [place] - [address] [place] [address] [address]  Filling Pharmacy Phone: [phone]  Filling Pharmacy Fax:    Start Date: 7/8/2025    MOE GOTTI (Ramirez: [id number])

## 2025-07-08 NOTE — NURSING NOTE
Is the patient currently in the state of [place]? yes    Location: home    Visit mode:VIDEO    If the visit is dropped, the patient can be reconnected by: VIDEO VISIT: Text to cell phone:   Telephone Information:   Mobile [phone]       Will anyone else be joining the visit? NO  (If patient encounters technical issues they should call [phone]    Are changes needed to the allergy or medication list? No    Are refills needed on medications prescribed by this physician? NO    Reason for visit: TANNER Ray, Virtual Visit Facilitator    QNR Status: NA

## 2025-07-09 NOTE — TELEPHONE ENCOUNTER
Prior Authorization Approval    Medication: AUVELITY  MG PO TBCR  Authorization Effective Date: 4/9/2025  Authorization Expiration Date: 7/8/2026  Insurance Company: [employer] - Phone [phone] Fax [phone]  Which Pharmacy is filling the prescription: [facility] - [place] [address] [address]  Pharmacy Notified: YES  Patient Notified: YES (faxed approval letter to pharmacy and notified patient via [employer] message)

## 2025-07-15 ENCOUNTER — TELEPHONE (OUTPATIENT)
Dept: ENDOCRINOLOGY | Facility: CLINIC | Age: 30
End: 2025-07-15
Payer: COMMERCIAL

## 2025-07-17 NOTE — PATIENT INSTRUCTIONS
Dear Jose Alberto,  Please work to give bolus BEFORE you eat.   With Omnipod 5 software update you can now give extended bolus and then cancel if you do not end up eating as much as you though you would.    IF you give a bolus late, consider giving just a correction or entering NO MORE than one-half the carbs you are still consuming to prevent lows by adding more insulin to that which Omnipod is already giving to correct the high blood sugar resulting from the missed premeal bolus.    Good luck on your exam!    My best wishes,    Caro Cormier PA-C, MPAS  [facility]  Diabetes, Endocrinology, and Metabolism  [phone] Appointments/Nurse  [phone] Fax

## 2025-08-04 ENCOUNTER — MYC REFILL (OUTPATIENT)
Dept: PSYCHIATRY | Facility: CLINIC | Age: 30
End: 2025-08-04
Payer: COMMERCIAL

## 2025-08-04 DIAGNOSIS — F41.1 GAD (GENERALIZED ANXIETY DISORDER): ICD-10-CM

## 2025-08-04 RX ORDER — LORAZEPAM 0.5 MG/1
0.5 TABLET ORAL DAILY PRN
Qty: 10 TABLET | Refills: 0 | Status: SHIPPED | OUTPATIENT
Start: 2025-08-04

## 2025-08-18 ENCOUNTER — PATIENT OUTREACH (OUTPATIENT)
Dept: CARE COORDINATION | Facility: CLINIC | Age: 30
End: 2025-08-18
Payer: COMMERCIAL

## 2025-08-31 ENCOUNTER — HEALTH MAINTENANCE LETTER (OUTPATIENT)
Age: 30
End: 2025-08-31

## (undated) DEVICE — ENDO ENDO DISTAL MAJ-2315

## (undated) DEVICE — BIOPSY VALVE BIOSHIELD 00711135

## (undated) DEVICE — ENDO BASKET RETRIEVAL F/BILE DUCT STN FG-V431P

## (undated) DEVICE — INFLATION DEVICE BIG 60 ENDO-AN6012

## (undated) DEVICE — SOL WATER IRRIG 1000ML BOTTLE 2F7114

## (undated) DEVICE — ENDO BITE BLOCK ADULT OMNI-BLOC

## (undated) DEVICE — DRAPE ISOLATION BAG 1003

## (undated) DEVICE — SU PDS II 1 CTX 36" Z371T

## (undated) DEVICE — SU SILK 2-0 SH 30" K833H

## (undated) DEVICE — ENDO SNARE POLYPECTOMY OVAL 15MM LOOP SD-240U-15

## (undated) DEVICE — ENDO CAP AND TUBING STERILE FOR ENDOGATOR  100130

## (undated) DEVICE — WIPES FOLEY CARE SURESTEP PROVON DFC100

## (undated) DEVICE — NDL COUNTER 40CT  31142311

## (undated) DEVICE — DEVICE FIXATION ABSORBLE TACK 5MM SINGLE ABSTACK30X

## (undated) DEVICE — WIRE GUIDE 0.025"X270CM STR VISIGLIDE G-240-2527S

## (undated) DEVICE — SUTURE BOOTS 051003PBX

## (undated) DEVICE — INTR ENDOSCOPIC STENT FUSION OASIS 09.0FRX200CM

## (undated) DEVICE — SUCTION MANIFOLD NEPTUNE 2 SYS 4 PORT 0702-020-000

## (undated) DEVICE — DRAIN JACKSON PRATT RESERVOIR 100ML SU130-1305

## (undated) DEVICE — ESU PENCIL SMOKE EVAC W/ROCKER SWITCH 0703-047-000

## (undated) DEVICE — GUIDEWIRE NOVAGOLD .018X480CM STR TIP M00552010

## (undated) DEVICE — SU PDS II 5-0 RB-1 27" Z303H

## (undated) DEVICE — ESU HARMONIC ACE LAP SHEARS STRYKER ACE+ 7 5MMX36CM HARH36

## (undated) DEVICE — DRAPE IOBAN INCISE 23X17" 6650EZ

## (undated) DEVICE — WIRE GUIDE 0.025"X270CM SHORT ANG VISIGLIDE 2 G-260-2527A

## (undated) DEVICE — KIT ENDO FIRST STEP DISINFECTANT 200ML W/POUCH EP-4

## (undated) DEVICE — COVER CAMERA IN-LIGHT DISP LT-C02

## (undated) DEVICE — STPL LINEAR CUT 75MM TLC75

## (undated) DEVICE — SU PROLENE 5-0 RB-1DA 36"  8556H

## (undated) DEVICE — PACK ENDOSCOPY GI CUSTOM UMMC

## (undated) DEVICE — TUBING SUCTION 10'X3/16" N510

## (undated) DEVICE — GLOVE BIOGEL PI ULTRATOUCH G SZ 7.5 42175

## (undated) DEVICE — WIRE GUIDE TRACER METRO DIRECT .021"X480CM STR TIP G26862

## (undated) DEVICE — WIRE GUIDE 0.025"X270CM SHORT STR VISIGLIDE 2 G-260-2527S

## (undated) DEVICE — CLIP HORIZON LG ORANGE 004200

## (undated) DEVICE — SU VICRYL 3-0 SH 27" UND J416H

## (undated) DEVICE — CATH RETRIEVAL BALLOON EXTRACTOR PRO RX-S INJ ABOVE 9-12MM

## (undated) DEVICE — SPHINCTEROTOME 5.5FRX25MM OMNI-TOME FS-OMNI-35 G31911

## (undated) DEVICE — ESU GROUND PAD ADULT W/CORD E7507

## (undated) DEVICE — ENDO TUBING CO2 SMARTCAP STERILE DISP 100145CO2EXT

## (undated) DEVICE — PROBE LITHOTRIPTOR 1.9MM FOR SPYGLASS 9-195-371DS

## (undated) DEVICE — TUBING PRESSURE W/MALE TO FEMALE CONNECTOR 24" 50P124

## (undated) DEVICE — NDL INSUFFLATION 13GA 120MM C2201

## (undated) DEVICE — STPL RELOAD LINEAR CUT 75MM TCR75

## (undated) DEVICE — ENDO DEVICE LOCKING AND BIOPSY CAP M00545261

## (undated) DEVICE — SYR 03ML LL W/O NDL 309657

## (undated) DEVICE — SU SILK 2-0 TIE 12X30" A305H

## (undated) DEVICE — KIT CONNECTOR FOR OLYMPUS ENDOSCOPES DEFENDO 100310

## (undated) DEVICE — CATH SPYGLASS DS 2 DELIVERY & ACCESS M00546610

## (undated) DEVICE — PREP CHLORAPREP 26ML TINTED HI-LITE ORANGE 930815

## (undated) DEVICE — ENDO FUSION OMNI-TOME G31903

## (undated) DEVICE — WIRE GUIDE 0.025"X450CM STR VISIGLIDE G-240-2545S

## (undated) DEVICE — SU PDS II 0 CT-1 27" Z340H

## (undated) DEVICE — Device

## (undated) DEVICE — WIRE GUIDE 0.025"X270CM ANG VISIGLIDE G-240-2527A

## (undated) DEVICE — ENDO CATH BALLOON DILATION HURRICANE 04MMX4X180CM M00545900

## (undated) DEVICE — SU PROLENE 6-0 RB-2DA 30" 8711H

## (undated) DEVICE — SU SILK 1 TIE 6X30" A307H

## (undated) DEVICE — SU PROLENE 4-0 SHDA 36" 8521H

## (undated) DEVICE — GEL ULTRASOUND AQUASONIC 20GM 01-01

## (undated) DEVICE — CLIP HORIZON SM RED WIDE SLOT 001201

## (undated) DEVICE — WIPE PREMOIST CLEANSING WASHCLOTHS 7988

## (undated) DEVICE — SUTURE PDS2 5-0 C-1 30IN 2 ARM MFL VIOL ABS

## (undated) DEVICE — ENDO EXTRACTOR BALLOON 09-12MM 4690

## (undated) DEVICE — SU SILK 3-0 SH CR 8X18" C013D

## (undated) DEVICE — LABEL MEDICATION SYSTEM 3303-P

## (undated) DEVICE — STENT GEENEN PANCREA 5FRX3CM G22107 GPSO-5-3
Type: IMPLANTABLE DEVICE | Site: PANCREATIC DUCT | Status: NON-FUNCTIONAL
Removed: 2021-09-13

## (undated) DEVICE — ESU LIGASURE IMPACT OPEN SEALER/DVDR CVD LG JAW LF4418

## (undated) DEVICE — GUIDEWIRE TERUMO .035X260CM EXCHANGE ANG GS3509

## (undated) DEVICE — DRAPE SHEET REV FOLD 3/4 9349

## (undated) DEVICE — SU PDS II 4-0 SH 27" Z315H

## (undated) DEVICE — ESU ELEC BLADE 6" COATED E1450-6

## (undated) DEVICE — ENDO FUSION OMNI-TOME 21 FS-OMNI-21 G48675

## (undated) DEVICE — ENDO BASKET RETRIEVAL TRAPEZOID WIREGUIDED  2.5CM M00510880

## (undated) DEVICE — CANNULA BILIARY CONTOUR ERCP .018"X210CM 5-4-3 TIP

## (undated) DEVICE — LINEN TOWEL PACK X6 WHITE 5487

## (undated) DEVICE — SPONGE LAP 18X18" X8435

## (undated) DEVICE — SPONGE KITTNER 30-101

## (undated) DEVICE — SU SILK 3-0 SH 30" K832H

## (undated) DEVICE — SU MONOCRYL 4-0 PS-2 27" UND Y426H

## (undated) DEVICE — TUBE TRANS GASTROJEJUNOSTOMY ENFIT 22FRX45CM

## (undated) DEVICE — STPL LINEAR CUT 55MM TLC55

## (undated) DEVICE — SU CHROMIC 3-0 SH 27" G122H

## (undated) DEVICE — ENDO TROCAR FIRST ENTRY KII FIOS Z-THRD 05X100MM CTF03

## (undated) DEVICE — SU DERMABOND ADVANCED .7ML DNX12

## (undated) DEVICE — CATH TRAY FOLEY SURESTEP 16FR W/URNE MTR STLK LATEX A303316A

## (undated) DEVICE — LINEN TOWEL PACK X30 5481

## (undated) DEVICE — ENDO TROCAR SLEEVE KII Z-THREADED 05X100MM CTS02

## (undated) DEVICE — DRAIN JACKSON PRATT CHANNEL 19FR ROUND HUBLESS SIL JP-2230

## (undated) DEVICE — GUIDEWIRE TERUMO .035X260 3CM STR TIP GS3504

## (undated) DEVICE — SURGICEL ABSORBABLE HEMOSTAT SNOW 4"X4" 2083

## (undated) DEVICE — BLADE CLIPPER SGL USE 9680

## (undated) DEVICE — ENDO ADPT CATH ROTATABLE SIDE ARM G22677

## (undated) DEVICE — SU PDS II 5-0 RB-1 DA 30" Z320H

## (undated) DEVICE — ENDO BITE BLOCK PED

## (undated) DEVICE — CATH SPYSCOPE DIGITAL ACCESS DS DISP M00546600

## (undated) DEVICE — TUBING IV 69" STERILE 1C8160S

## (undated) DEVICE — TUBING SMOKE EVAC PNEUMOCLEAR HIGH FLOW 0620050250

## (undated) DEVICE — SOL NACL 0.9% IRRIG 1000ML BOTTLE 2F7124

## (undated) DEVICE — CATH BALLOON DILATOR STERLING OTW 3.5X60X150 H74939032350610

## (undated) DEVICE — SU SILK 4-0 TIE 12X30" A303H

## (undated) DEVICE — NDL BLUNT 15GA 1.5"

## (undated) DEVICE — GUIDEWIRE NOVAGOLD .018X260CM STR TIP M00552000

## (undated) DEVICE — NDL COUNTER 20CT 31142493

## (undated) DEVICE — CATHETER ERCP GLO-TIP 5.5FRX200CM GT-1-T

## (undated) DEVICE — CONNECTOR STOPCOCK 3 WAY MALE LL HI-FLO MX9311L

## (undated) DEVICE — SUCTION IRR STRYKERFLOW II W/TIP 250-070-520

## (undated) DEVICE — WIRE GUIDE 0.025"X450CM ANG VISIGLIDE G-240-2545A

## (undated) DEVICE — SU PDS II 0 TP-1 60" Z991G

## (undated) DEVICE — ESU ENDO SCISSORS 5MM CVD 5DCS

## (undated) DEVICE — SU CHROMIC 4-0 RB-1 27" U203H

## (undated) DEVICE — SU PDS II 6-0 RB-2DA 30" Z149H

## (undated) DEVICE — SU SILK 0 TIE 6X30" A306H

## (undated) DEVICE — DRSG MEDIPORE 3 1/2X13 3/4" 3573

## (undated) DEVICE — ESU GROUND PAD ADULT REM W/15' CORD E7507DB

## (undated) DEVICE — BNDG ABDOMINAL BINDER 9X45-62" 79-89071

## (undated) DEVICE — STENT ZIMMON PANCREA 5FRX12CM SGL PIGTAIL G22363
Type: IMPLANTABLE DEVICE | Site: PANCREATIC DUCT | Status: NON-FUNCTIONAL
Removed: 2023-03-13

## (undated) DEVICE — CANNULA BILIARY ERCP .035" TAPER TIP 3.2MM CHANNEL

## (undated) DEVICE — SU SILK 3-0 TIE 12X30" A304H

## (undated) DEVICE — DRAPE FLUID WARMING 52 X 60" ORS-321

## (undated) DEVICE — MANOMETER VENOUS PRESSURE W/4-WAY STOPCOCK 35ML MX441

## (undated) DEVICE — SU PDS II 4-0 RB-1 27" Z304H

## (undated) DEVICE — CATH BALLOON DILATOR STERLING OTW 3.5X40X150 H74939032350410

## (undated) DEVICE — DEVICE SUTURE PASSER 14GA WECK EFX EFXSP2

## (undated) DEVICE — CATH RETRIEVAL BALLOON EXTRACTOR PRO RX-S INJ ABOVE 12-15MM

## (undated) DEVICE — KIT MICROINTRODUCER COAXIAL MINISTICK MAX 5FRX10CM 45-756

## (undated) DEVICE — BASIN SET SINGLE STERILE 13752-624

## (undated) DEVICE — ANTIFOG SOLUTION W/FOAM PAD 31142527

## (undated) RX ORDER — PROPOFOL 10 MG/ML
INJECTION, EMULSION INTRAVENOUS
Status: DISPENSED
Start: 2023-07-12

## (undated) RX ORDER — FENTANYL CITRATE 50 UG/ML
INJECTION, SOLUTION INTRAMUSCULAR; INTRAVENOUS
Status: DISPENSED
Start: 2023-03-13

## (undated) RX ORDER — HEPARIN SODIUM 1000 [USP'U]/ML
INJECTION, SOLUTION INTRAVENOUS; SUBCUTANEOUS
Status: DISPENSED
Start: 2023-09-22

## (undated) RX ORDER — FENTANYL CITRATE 50 UG/ML
INJECTION, SOLUTION INTRAMUSCULAR; INTRAVENOUS
Status: DISPENSED
Start: 2024-04-04

## (undated) RX ORDER — LIDOCAINE HYDROCHLORIDE 20 MG/ML
INJECTION, SOLUTION EPIDURAL; INFILTRATION; INTRACAUDAL; PERINEURAL
Status: DISPENSED
Start: 2022-02-16

## (undated) RX ORDER — SODIUM CHLORIDE, SODIUM LACTATE, POTASSIUM CHLORIDE, CALCIUM CHLORIDE 600; 310; 30; 20 MG/100ML; MG/100ML; MG/100ML; MG/100ML
INJECTION, SOLUTION INTRAVENOUS
Status: DISPENSED
Start: 2021-11-01

## (undated) RX ORDER — FENTANYL CITRATE 50 UG/ML
INJECTION, SOLUTION INTRAMUSCULAR; INTRAVENOUS
Status: DISPENSED
Start: 2021-11-01

## (undated) RX ORDER — ESMOLOL HYDROCHLORIDE 10 MG/ML
INJECTION INTRAVENOUS
Status: DISPENSED
Start: 2023-09-22

## (undated) RX ORDER — FENTANYL CITRATE-0.9 % NACL/PF 10 MCG/ML
PLASTIC BAG, INJECTION (ML) INTRAVENOUS
Status: DISPENSED
Start: 2023-01-18

## (undated) RX ORDER — ESMOLOL HYDROCHLORIDE 10 MG/ML
INJECTION INTRAVENOUS
Status: DISPENSED
Start: 2021-10-06

## (undated) RX ORDER — FENTANYL CITRATE 50 UG/ML
INJECTION, SOLUTION INTRAMUSCULAR; INTRAVENOUS
Status: DISPENSED
Start: 2023-05-10

## (undated) RX ORDER — ONDANSETRON 2 MG/ML
INJECTION INTRAMUSCULAR; INTRAVENOUS
Status: DISPENSED
Start: 2024-04-04

## (undated) RX ORDER — CIPROFLOXACIN 2 MG/ML
INJECTION, SOLUTION INTRAVENOUS
Status: DISPENSED
Start: 2022-07-13

## (undated) RX ORDER — INDOMETHACIN 50 MG/1
SUPPOSITORY RECTAL
Status: DISPENSED
Start: 2023-05-10

## (undated) RX ORDER — FENTANYL CITRATE 50 UG/ML
INJECTION, SOLUTION INTRAMUSCULAR; INTRAVENOUS
Status: DISPENSED
Start: 2021-12-13

## (undated) RX ORDER — SCOLOPAMINE TRANSDERMAL SYSTEM 1 MG/1
PATCH, EXTENDED RELEASE TRANSDERMAL
Status: DISPENSED
Start: 2022-11-21

## (undated) RX ORDER — PROPOFOL 10 MG/ML
INJECTION, EMULSION INTRAVENOUS
Status: DISPENSED
Start: 2021-10-06

## (undated) RX ORDER — ONDANSETRON 2 MG/ML
INJECTION INTRAMUSCULAR; INTRAVENOUS
Status: DISPENSED
Start: 2021-12-13

## (undated) RX ORDER — INDOMETHACIN 50 MG/1
SUPPOSITORY RECTAL
Status: DISPENSED
Start: 2023-07-12

## (undated) RX ORDER — LEVOFLOXACIN 5 MG/ML
INJECTION, SOLUTION INTRAVENOUS
Status: DISPENSED
Start: 2021-12-13

## (undated) RX ORDER — SIMETHICONE 40MG/0.6ML
SUSPENSION, DROPS(FINAL DOSAGE FORM)(ML) ORAL
Status: DISPENSED
Start: 2023-05-10

## (undated) RX ORDER — LEVOFLOXACIN 5 MG/ML
INJECTION, SOLUTION INTRAVENOUS
Status: DISPENSED
Start: 2021-10-06

## (undated) RX ORDER — ACETAMINOPHEN 500 MG
TABLET ORAL
Status: DISPENSED
Start: 2022-02-16

## (undated) RX ORDER — ONDANSETRON 2 MG/ML
INJECTION INTRAMUSCULAR; INTRAVENOUS
Status: DISPENSED
Start: 2022-02-16

## (undated) RX ORDER — DEXAMETHASONE SODIUM PHOSPHATE 4 MG/ML
INJECTION, SOLUTION INTRA-ARTICULAR; INTRALESIONAL; INTRAMUSCULAR; INTRAVENOUS; SOFT TISSUE
Status: DISPENSED
Start: 2021-10-06

## (undated) RX ORDER — EPHEDRINE SULFATE 50 MG/ML
INJECTION, SOLUTION INTRAMUSCULAR; INTRAVENOUS; SUBCUTANEOUS
Status: DISPENSED
Start: 2021-10-06

## (undated) RX ORDER — CALCIUM CHLORIDE 100 MG/ML
INJECTION INTRAVENOUS; INTRAVENTRICULAR
Status: DISPENSED
Start: 2021-09-13

## (undated) RX ORDER — HYDROMORPHONE HCL IN WATER/PF 6 MG/30 ML
PATIENT CONTROLLED ANALGESIA SYRINGE INTRAVENOUS
Status: DISPENSED
Start: 2021-09-13

## (undated) RX ORDER — PROPOFOL 10 MG/ML
INJECTION, EMULSION INTRAVENOUS
Status: DISPENSED
Start: 2023-05-10

## (undated) RX ORDER — FENTANYL CITRATE 50 UG/ML
INJECTION, SOLUTION INTRAMUSCULAR; INTRAVENOUS
Status: DISPENSED
Start: 2022-04-04

## (undated) RX ORDER — EPHEDRINE SULFATE 50 MG/ML
INJECTION, SOLUTION INTRAMUSCULAR; INTRAVENOUS; SUBCUTANEOUS
Status: DISPENSED
Start: 2022-02-16

## (undated) RX ORDER — LIDOCAINE HYDROCHLORIDE 20 MG/ML
INJECTION, SOLUTION EPIDURAL; INFILTRATION; INTRACAUDAL; PERINEURAL
Status: DISPENSED
Start: 2022-11-21

## (undated) RX ORDER — FENTANYL CITRATE 50 UG/ML
INJECTION, SOLUTION INTRAMUSCULAR; INTRAVENOUS
Status: DISPENSED
Start: 2023-07-12

## (undated) RX ORDER — ACETAMINOPHEN 325 MG/1
TABLET ORAL
Status: DISPENSED
Start: 2023-05-10

## (undated) RX ORDER — PROPOFOL 10 MG/ML
INJECTION, EMULSION INTRAVENOUS
Status: DISPENSED
Start: 2022-11-21

## (undated) RX ORDER — FENTANYL CITRATE-0.9 % NACL/PF 10 MCG/ML
PLASTIC BAG, INJECTION (ML) INTRAVENOUS
Status: DISPENSED
Start: 2023-09-22

## (undated) RX ORDER — SCOLOPAMINE TRANSDERMAL SYSTEM 1 MG/1
PATCH, EXTENDED RELEASE TRANSDERMAL
Status: DISPENSED
Start: 2021-11-01

## (undated) RX ORDER — SCOLOPAMINE TRANSDERMAL SYSTEM 1 MG/1
PATCH, EXTENDED RELEASE TRANSDERMAL
Status: DISPENSED
Start: 2021-12-13

## (undated) RX ORDER — DEXAMETHASONE SODIUM PHOSPHATE 4 MG/ML
INJECTION, SOLUTION INTRA-ARTICULAR; INTRALESIONAL; INTRAMUSCULAR; INTRAVENOUS; SOFT TISSUE
Status: DISPENSED
Start: 2023-07-12

## (undated) RX ORDER — LEVOFLOXACIN 5 MG/ML
INJECTION, SOLUTION INTRAVENOUS
Status: DISPENSED
Start: 2022-11-21

## (undated) RX ORDER — SCOLOPAMINE TRANSDERMAL SYSTEM 1 MG/1
PATCH, EXTENDED RELEASE TRANSDERMAL
Status: DISPENSED
Start: 2022-07-13

## (undated) RX ORDER — ACETAMINOPHEN 325 MG/1
TABLET ORAL
Status: DISPENSED
Start: 2021-09-13

## (undated) RX ORDER — IOPAMIDOL 510 MG/ML
INJECTION, SOLUTION INTRAVASCULAR
Status: DISPENSED
Start: 2021-12-13

## (undated) RX ORDER — DEXAMETHASONE SODIUM PHOSPHATE 4 MG/ML
INJECTION, SOLUTION INTRA-ARTICULAR; INTRALESIONAL; INTRAMUSCULAR; INTRAVENOUS; SOFT TISSUE
Status: DISPENSED
Start: 2023-05-10

## (undated) RX ORDER — HYDROMORPHONE HCL IN WATER/PF 6 MG/30 ML
PATIENT CONTROLLED ANALGESIA SYRINGE INTRAVENOUS
Status: DISPENSED
Start: 2023-09-22

## (undated) RX ORDER — HYDROMORPHONE HCL IN WATER/PF 6 MG/30 ML
PATIENT CONTROLLED ANALGESIA SYRINGE INTRAVENOUS
Status: DISPENSED
Start: 2024-04-04

## (undated) RX ORDER — ONDANSETRON 2 MG/ML
INJECTION INTRAMUSCULAR; INTRAVENOUS
Status: DISPENSED
Start: 2023-01-18

## (undated) RX ORDER — DEXAMETHASONE SODIUM PHOSPHATE 4 MG/ML
INJECTION, SOLUTION INTRA-ARTICULAR; INTRALESIONAL; INTRAMUSCULAR; INTRAVENOUS; SOFT TISSUE
Status: DISPENSED
Start: 2022-11-21

## (undated) RX ORDER — INDOMETHACIN 50 MG/1
SUPPOSITORY RECTAL
Status: DISPENSED
Start: 2021-10-06

## (undated) RX ORDER — FENTANYL CITRATE-0.9 % NACL/PF 10 MCG/ML
PLASTIC BAG, INJECTION (ML) INTRAVENOUS
Status: DISPENSED
Start: 2023-07-12

## (undated) RX ORDER — ONDANSETRON 2 MG/ML
INJECTION INTRAMUSCULAR; INTRAVENOUS
Status: DISPENSED
Start: 2023-09-22

## (undated) RX ORDER — CEFAZOLIN SODIUM/WATER 2 G/20 ML
SYRINGE (ML) INTRAVENOUS
Status: DISPENSED
Start: 2024-04-04

## (undated) RX ORDER — OXYCODONE HYDROCHLORIDE 5 MG/1
TABLET ORAL
Status: DISPENSED
Start: 2022-07-13

## (undated) RX ORDER — LIDOCAINE HYDROCHLORIDE 10 MG/ML
INJECTION, SOLUTION EPIDURAL; INFILTRATION; INTRACAUDAL; PERINEURAL
Status: DISPENSED
Start: 2024-04-04

## (undated) RX ORDER — FENTANYL CITRATE 50 UG/ML
INJECTION, SOLUTION INTRAMUSCULAR; INTRAVENOUS
Status: DISPENSED
Start: 2023-09-22

## (undated) RX ORDER — PROPOFOL 10 MG/ML
INJECTION, EMULSION INTRAVENOUS
Status: DISPENSED
Start: 2023-09-22

## (undated) RX ORDER — HYDROMORPHONE HYDROCHLORIDE 1 MG/ML
INJECTION, SOLUTION INTRAMUSCULAR; INTRAVENOUS; SUBCUTANEOUS
Status: DISPENSED
Start: 2023-09-22

## (undated) RX ORDER — ONDANSETRON 2 MG/ML
INJECTION INTRAMUSCULAR; INTRAVENOUS
Status: DISPENSED
Start: 2023-05-10

## (undated) RX ORDER — PROPOFOL 10 MG/ML
INJECTION, EMULSION INTRAVENOUS
Status: DISPENSED
Start: 2024-04-04

## (undated) RX ORDER — FENTANYL CITRATE 50 UG/ML
INJECTION, SOLUTION INTRAMUSCULAR; INTRAVENOUS
Status: DISPENSED
Start: 2021-09-13

## (undated) RX ORDER — EPHEDRINE SULFATE 50 MG/ML
INJECTION, SOLUTION INTRAMUSCULAR; INTRAVENOUS; SUBCUTANEOUS
Status: DISPENSED
Start: 2023-03-13

## (undated) RX ORDER — FENTANYL CITRATE 50 UG/ML
INJECTION, SOLUTION INTRAMUSCULAR; INTRAVENOUS
Status: DISPENSED
Start: 2021-10-06

## (undated) RX ORDER — SIMETHICONE 40MG/0.6ML
SUSPENSION, DROPS(FINAL DOSAGE FORM)(ML) ORAL
Status: DISPENSED
Start: 2022-04-04

## (undated) RX ORDER — DEXAMETHASONE SODIUM PHOSPHATE 4 MG/ML
INJECTION, SOLUTION INTRA-ARTICULAR; INTRALESIONAL; INTRAMUSCULAR; INTRAVENOUS; SOFT TISSUE
Status: DISPENSED
Start: 2021-11-01

## (undated) RX ORDER — SIMETHICONE 40MG/0.6ML
SUSPENSION, DROPS(FINAL DOSAGE FORM)(ML) ORAL
Status: DISPENSED
Start: 2022-02-16

## (undated) RX ORDER — ONDANSETRON 2 MG/ML
INJECTION INTRAMUSCULAR; INTRAVENOUS
Status: DISPENSED
Start: 2021-10-06

## (undated) RX ORDER — FENTANYL CITRATE 50 UG/ML
INJECTION, SOLUTION INTRAMUSCULAR; INTRAVENOUS
Status: DISPENSED
Start: 2022-07-13

## (undated) RX ORDER — EPHEDRINE SULFATE 50 MG/ML
INJECTION, SOLUTION INTRAMUSCULAR; INTRAVENOUS; SUBCUTANEOUS
Status: DISPENSED
Start: 2021-12-13

## (undated) RX ORDER — LEVOFLOXACIN 5 MG/ML
INJECTION, SOLUTION INTRAVENOUS
Status: DISPENSED
Start: 2021-11-01

## (undated) RX ORDER — LEVOFLOXACIN 5 MG/ML
INJECTION, SOLUTION INTRAVENOUS
Status: DISPENSED
Start: 2022-02-16

## (undated) RX ORDER — SCOLOPAMINE TRANSDERMAL SYSTEM 1 MG/1
PATCH, EXTENDED RELEASE TRANSDERMAL
Status: DISPENSED
Start: 2022-09-14

## (undated) RX ORDER — HYDROMORPHONE HCL IN WATER/PF 6 MG/30 ML
PATIENT CONTROLLED ANALGESIA SYRINGE INTRAVENOUS
Status: DISPENSED
Start: 2023-01-18

## (undated) RX ORDER — IOPAMIDOL 510 MG/ML
INJECTION, SOLUTION INTRAVASCULAR
Status: DISPENSED
Start: 2022-02-16

## (undated) RX ORDER — FENTANYL CITRATE-0.9 % NACL/PF 10 MCG/ML
PLASTIC BAG, INJECTION (ML) INTRAVENOUS
Status: DISPENSED
Start: 2021-10-06

## (undated) RX ORDER — HYDROMORPHONE HYDROCHLORIDE 1 MG/ML
INJECTION, SOLUTION INTRAMUSCULAR; INTRAVENOUS; SUBCUTANEOUS
Status: DISPENSED
Start: 2022-11-21

## (undated) RX ORDER — FENTANYL CITRATE 50 UG/ML
INJECTION, SOLUTION INTRAMUSCULAR; INTRAVENOUS
Status: DISPENSED
Start: 2023-01-18

## (undated) RX ORDER — HYDROMORPHONE HYDROCHLORIDE 1 MG/ML
INJECTION, SOLUTION INTRAMUSCULAR; INTRAVENOUS; SUBCUTANEOUS
Status: DISPENSED
Start: 2021-10-06

## (undated) RX ORDER — OXYCODONE HYDROCHLORIDE 5 MG/1
TABLET ORAL
Status: DISPENSED
Start: 2023-01-18

## (undated) RX ORDER — ONDANSETRON 2 MG/ML
INJECTION INTRAMUSCULAR; INTRAVENOUS
Status: DISPENSED
Start: 2022-11-21

## (undated) RX ORDER — GABAPENTIN 300 MG/1
CAPSULE ORAL
Status: DISPENSED
Start: 2022-07-13

## (undated) RX ORDER — FENTANYL CITRATE-0.9 % NACL/PF 10 MCG/ML
PLASTIC BAG, INJECTION (ML) INTRAVENOUS
Status: DISPENSED
Start: 2021-11-01

## (undated) RX ORDER — SIMETHICONE 40MG/0.6ML
SUSPENSION, DROPS(FINAL DOSAGE FORM)(ML) ORAL
Status: DISPENSED
Start: 2023-07-12

## (undated) RX ORDER — ACETAMINOPHEN 325 MG/1
TABLET ORAL
Status: DISPENSED
Start: 2021-12-13

## (undated) RX ORDER — FAMOTIDINE 20 MG/1
TABLET, FILM COATED ORAL
Status: DISPENSED
Start: 2023-05-10

## (undated) RX ORDER — ONDANSETRON 2 MG/ML
INJECTION INTRAMUSCULAR; INTRAVENOUS
Status: DISPENSED
Start: 2022-04-04

## (undated) RX ORDER — OXYCODONE HYDROCHLORIDE 10 MG/1
TABLET ORAL
Status: DISPENSED
Start: 2024-04-04

## (undated) RX ORDER — BUPIVACAINE HYDROCHLORIDE 5 MG/ML
INJECTION, SOLUTION EPIDURAL; INTRACAUDAL
Status: DISPENSED
Start: 2024-04-04

## (undated) RX ORDER — HYDROMORPHONE HCL IN WATER/PF 6 MG/30 ML
PATIENT CONTROLLED ANALGESIA SYRINGE INTRAVENOUS
Status: DISPENSED
Start: 2023-07-12

## (undated) RX ORDER — PROPOFOL 10 MG/ML
INJECTION, EMULSION INTRAVENOUS
Status: DISPENSED
Start: 2021-09-13

## (undated) RX ORDER — ONDANSETRON 2 MG/ML
INJECTION INTRAMUSCULAR; INTRAVENOUS
Status: DISPENSED
Start: 2021-11-01

## (undated) RX ORDER — ROCURONIUM BROMIDE 50 MG/5 ML
SYRINGE (ML) INTRAVENOUS
Status: DISPENSED
Start: 2022-04-04

## (undated) RX ORDER — GLYCOPYRROLATE 0.2 MG/ML
INJECTION, SOLUTION INTRAMUSCULAR; INTRAVENOUS
Status: DISPENSED
Start: 2023-09-22

## (undated) RX ORDER — EPHEDRINE SULFATE 50 MG/ML
INJECTION, SOLUTION INTRAMUSCULAR; INTRAVENOUS; SUBCUTANEOUS
Status: DISPENSED
Start: 2023-09-22

## (undated) RX ORDER — IOPAMIDOL 510 MG/ML
INJECTION, SOLUTION INTRAVASCULAR
Status: DISPENSED
Start: 2022-04-04

## (undated) RX ORDER — HYDROMORPHONE HYDROCHLORIDE 1 MG/ML
INJECTION, SOLUTION INTRAMUSCULAR; INTRAVENOUS; SUBCUTANEOUS
Status: DISPENSED
Start: 2024-04-04

## (undated) RX ORDER — ACETAMINOPHEN 325 MG/1
TABLET ORAL
Status: DISPENSED
Start: 2022-07-13

## (undated) RX ORDER — FENTANYL CITRATE-0.9 % NACL/PF 10 MCG/ML
PLASTIC BAG, INJECTION (ML) INTRAVENOUS
Status: DISPENSED
Start: 2022-02-16

## (undated) RX ORDER — INDOMETHACIN 50 MG/1
SUPPOSITORY RECTAL
Status: DISPENSED
Start: 2022-02-16

## (undated) RX ORDER — PROPOFOL 10 MG/ML
INJECTION, EMULSION INTRAVENOUS
Status: DISPENSED
Start: 2023-01-18

## (undated) RX ORDER — INDOMETHACIN 50 MG/1
SUPPOSITORY RECTAL
Status: DISPENSED
Start: 2022-04-04

## (undated) RX ORDER — INDOMETHACIN 50 MG/1
SUPPOSITORY RECTAL
Status: DISPENSED
Start: 2021-12-13

## (undated) RX ORDER — LIDOCAINE HYDROCHLORIDE 20 MG/ML
INJECTION, SOLUTION EPIDURAL; INFILTRATION; INTRACAUDAL; PERINEURAL
Status: DISPENSED
Start: 2021-10-06

## (undated) RX ORDER — FENTANYL CITRATE 50 UG/ML
INJECTION, SOLUTION INTRAMUSCULAR; INTRAVENOUS
Status: DISPENSED
Start: 2022-09-14

## (undated) RX ORDER — IOPAMIDOL 510 MG/ML
INJECTION, SOLUTION INTRAVASCULAR
Status: DISPENSED
Start: 2023-07-12

## (undated) RX ORDER — DEXAMETHASONE SODIUM PHOSPHATE 4 MG/ML
INJECTION, SOLUTION INTRA-ARTICULAR; INTRALESIONAL; INTRAMUSCULAR; INTRAVENOUS; SOFT TISSUE
Status: DISPENSED
Start: 2022-02-16

## (undated) RX ORDER — ACETAMINOPHEN 325 MG/1
TABLET ORAL
Status: DISPENSED
Start: 2023-09-22

## (undated) RX ORDER — IOPAMIDOL 510 MG/ML
INJECTION, SOLUTION INTRAVASCULAR
Status: DISPENSED
Start: 2023-05-10

## (undated) RX ORDER — LIDOCAINE HYDROCHLORIDE 20 MG/ML
INJECTION, SOLUTION EPIDURAL; INFILTRATION; INTRACAUDAL; PERINEURAL
Status: DISPENSED
Start: 2022-04-04

## (undated) RX ORDER — WATER 10 ML/10ML
INJECTION INTRAMUSCULAR; INTRAVENOUS; SUBCUTANEOUS
Status: DISPENSED
Start: 2023-07-12

## (undated) RX ORDER — FENTANYL CITRATE 50 UG/ML
INJECTION, SOLUTION INTRAMUSCULAR; INTRAVENOUS
Status: DISPENSED
Start: 2022-11-21

## (undated) RX ORDER — EPHEDRINE SULFATE 50 MG/ML
INJECTION, SOLUTION INTRAMUSCULAR; INTRAVENOUS; SUBCUTANEOUS
Status: DISPENSED
Start: 2022-04-04

## (undated) RX ORDER — ACETAMINOPHEN 325 MG/1
TABLET ORAL
Status: DISPENSED
Start: 2024-04-04

## (undated) RX ORDER — DEXAMETHASONE SODIUM PHOSPHATE 4 MG/ML
INJECTION, SOLUTION INTRA-ARTICULAR; INTRALESIONAL; INTRAMUSCULAR; INTRAVENOUS; SOFT TISSUE
Status: DISPENSED
Start: 2022-04-04

## (undated) RX ORDER — PROPOFOL 10 MG/ML
INJECTION, EMULSION INTRAVENOUS
Status: DISPENSED
Start: 2021-12-13

## (undated) RX ORDER — LEVOFLOXACIN 5 MG/ML
INJECTION, SOLUTION INTRAVENOUS
Status: DISPENSED
Start: 2023-07-12

## (undated) RX ORDER — SIMETHICONE 40MG/0.6ML
SUSPENSION, DROPS(FINAL DOSAGE FORM)(ML) ORAL
Status: DISPENSED
Start: 2021-12-13

## (undated) RX ORDER — WATER 10 ML/10ML
INJECTION INTRAMUSCULAR; INTRAVENOUS; SUBCUTANEOUS
Status: DISPENSED
Start: 2021-12-13

## (undated) RX ORDER — ONDANSETRON 2 MG/ML
INJECTION INTRAMUSCULAR; INTRAVENOUS
Status: DISPENSED
Start: 2021-09-13

## (undated) RX ORDER — EPHEDRINE SULFATE 50 MG/ML
INJECTION, SOLUTION INTRAMUSCULAR; INTRAVENOUS; SUBCUTANEOUS
Status: DISPENSED
Start: 2021-11-01

## (undated) RX ORDER — GLYCOPYRROLATE 0.2 MG/ML
INJECTION, SOLUTION INTRAMUSCULAR; INTRAVENOUS
Status: DISPENSED
Start: 2022-04-04

## (undated) RX ORDER — DEXTROSE, SODIUM CHLORIDE, SODIUM LACTATE, POTASSIUM CHLORIDE, AND CALCIUM CHLORIDE 5; .6; .31; .03; .02 G/100ML; G/100ML; G/100ML; G/100ML; G/100ML
INJECTION, SOLUTION INTRAVENOUS
Status: DISPENSED
Start: 2023-09-22

## (undated) RX ORDER — EPINEPHRINE 1 MG/ML
INJECTION, SOLUTION INTRAMUSCULAR; SUBCUTANEOUS
Status: DISPENSED
Start: 2022-04-04

## (undated) RX ORDER — FENTANYL CITRATE 50 UG/ML
INJECTION, SOLUTION INTRAMUSCULAR; INTRAVENOUS
Status: DISPENSED
Start: 2022-02-16

## (undated) RX ORDER — OXYCODONE HYDROCHLORIDE 5 MG/1
TABLET ORAL
Status: DISPENSED
Start: 2021-10-06

## (undated) RX ORDER — ESMOLOL HYDROCHLORIDE 10 MG/ML
INJECTION INTRAVENOUS
Status: DISPENSED
Start: 2021-11-01

## (undated) RX ORDER — PROPOFOL 10 MG/ML
INJECTION, EMULSION INTRAVENOUS
Status: DISPENSED
Start: 2022-04-04

## (undated) RX ORDER — APREPITANT 40 MG/1
CAPSULE ORAL
Status: DISPENSED
Start: 2023-05-10

## (undated) RX ORDER — KETOROLAC TROMETHAMINE 30 MG/ML
INJECTION, SOLUTION INTRAMUSCULAR; INTRAVENOUS
Status: DISPENSED
Start: 2024-04-04

## (undated) RX ORDER — ONDANSETRON 2 MG/ML
INJECTION INTRAMUSCULAR; INTRAVENOUS
Status: DISPENSED
Start: 2023-07-12

## (undated) RX ORDER — FENTANYL CITRATE-0.9 % NACL/PF 10 MCG/ML
PLASTIC BAG, INJECTION (ML) INTRAVENOUS
Status: DISPENSED
Start: 2022-04-04

## (undated) RX ORDER — FENTANYL CITRATE-0.9 % NACL/PF 10 MCG/ML
PLASTIC BAG, INJECTION (ML) INTRAVENOUS
Status: DISPENSED
Start: 2022-11-21

## (undated) RX ORDER — HYDROMORPHONE HYDROCHLORIDE 1 MG/ML
INJECTION, SOLUTION INTRAMUSCULAR; INTRAVENOUS; SUBCUTANEOUS
Status: DISPENSED
Start: 2021-11-01

## (undated) RX ORDER — HYDROMORPHONE HCL IN WATER/PF 6 MG/30 ML
PATIENT CONTROLLED ANALGESIA SYRINGE INTRAVENOUS
Status: DISPENSED
Start: 2021-10-06

## (undated) RX ORDER — ROCURONIUM BROMIDE 50 MG/5 ML
SYRINGE (ML) INTRAVENOUS
Status: DISPENSED
Start: 2021-12-13